# Patient Record
Sex: MALE | Race: WHITE | NOT HISPANIC OR LATINO | Employment: OTHER | URBAN - METROPOLITAN AREA
[De-identification: names, ages, dates, MRNs, and addresses within clinical notes are randomized per-mention and may not be internally consistent; named-entity substitution may affect disease eponyms.]

---

## 2019-01-01 ENCOUNTER — APPOINTMENT (OUTPATIENT)
Dept: LAB | Facility: HOSPITAL | Age: 79
End: 2019-01-01
Attending: NURSE PRACTITIONER
Payer: MEDICARE

## 2019-01-01 ENCOUNTER — HOSPITAL ENCOUNTER (OUTPATIENT)
Dept: RADIOLOGY | Facility: HOSPITAL | Age: 79
Discharge: HOME | End: 2019-03-27
Attending: NURSE PRACTITIONER
Payer: MEDICARE

## 2019-01-01 ENCOUNTER — HOSPITAL ENCOUNTER (OUTPATIENT)
Dept: RADIOLOGY | Facility: HOSPITAL | Age: 79
Discharge: HOME | End: 2019-04-12
Attending: UROLOGY
Payer: MEDICARE

## 2019-01-01 ENCOUNTER — OUTSIDE PLACE OF SERVICE (OUTPATIENT)
Dept: INFECTIOUS DISEASES | Facility: CLINIC | Age: 79
End: 2019-01-01
Payer: MEDICARE

## 2019-01-01 ENCOUNTER — ANESTHESIA EVENT (OUTPATIENT)
Dept: SURGERY | Facility: HOSPITAL | Age: 79
DRG: 853 | End: 2019-01-01
Payer: MEDICARE

## 2019-01-01 ENCOUNTER — OFFICE VISIT (OUTPATIENT)
Dept: UROLOGY | Facility: CLINIC | Age: 79
End: 2019-01-01
Payer: MEDICARE

## 2019-01-01 ENCOUNTER — TELEPHONE (OUTPATIENT)
Dept: MEDSURG UNIT | Facility: HOSPITAL | Age: 79
End: 2019-01-01

## 2019-01-01 ENCOUNTER — ANESTHESIA EVENT (OUTPATIENT)
Dept: ENDOSCOPY | Facility: HOSPITAL | Age: 79
DRG: 377 | End: 2019-01-01
Payer: MEDICARE

## 2019-01-01 ENCOUNTER — HOSPITAL ENCOUNTER (OUTPATIENT)
Dept: PREADMISSION TESTING | Facility: HOSPITAL | Age: 79
Discharge: HOME | End: 2019-04-05
Attending: UROLOGY
Payer: MEDICARE

## 2019-01-01 ENCOUNTER — PATIENT OUTREACH (OUTPATIENT)
Dept: ADMINISTRATIVE | Facility: CLINIC | Age: 79
End: 2019-01-01

## 2019-01-01 ENCOUNTER — ANESTHESIA (OUTPATIENT)
Dept: SURGERY | Facility: HOSPITAL | Age: 79
DRG: 853 | End: 2019-01-01
Payer: MEDICARE

## 2019-01-01 ENCOUNTER — HOSPITAL ENCOUNTER (INPATIENT)
Facility: HOSPITAL | Age: 79
LOS: 9 days | Discharge: NURSING, REHAB OR LONG-TERM CARE | DRG: 853 | End: 2019-08-21
Attending: EMERGENCY MEDICINE | Admitting: HOSPITALIST
Payer: MEDICARE

## 2019-01-01 ENCOUNTER — ANESTHESIA (OUTPATIENT)
Dept: SURGERY | Facility: HOSPITAL | Age: 79
DRG: 291 | End: 2019-01-01
Payer: MEDICARE

## 2019-01-01 ENCOUNTER — TELEPHONE (OUTPATIENT)
Dept: PODIATRY | Facility: CLINIC | Age: 79
End: 2019-01-01

## 2019-01-01 ENCOUNTER — HOSPITAL ENCOUNTER (OUTPATIENT)
Facility: HOSPITAL | Age: 79
Discharge: HOME | End: 2019-04-08
Attending: UROLOGY | Admitting: UROLOGY
Payer: MEDICARE

## 2019-01-01 ENCOUNTER — OUTPATIENT CASE MANAGEMENT (OUTPATIENT)
Dept: ADMINISTRATIVE | Facility: OTHER | Age: 79
End: 2019-01-01

## 2019-01-01 ENCOUNTER — HOSPITAL ENCOUNTER (INPATIENT)
Facility: HOSPITAL | Age: 79
LOS: 13 days | Discharge: HOME HEALTH | DRG: 291 | End: 2019-05-17
Attending: EMERGENCY MEDICINE | Admitting: INTERNAL MEDICINE
Payer: MEDICARE

## 2019-01-01 ENCOUNTER — HOSPITAL ENCOUNTER (INPATIENT)
Facility: HOSPITAL | Age: 79
LOS: 1 days | Discharge: ANOTHER HOSPITAL | DRG: 377 | End: 2019-05-30
Attending: EMERGENCY MEDICINE | Admitting: HOSPITALIST
Payer: MEDICARE

## 2019-01-01 ENCOUNTER — TELEPHONE (OUTPATIENT)
Dept: UROLOGY | Facility: CLINIC | Age: 79
End: 2019-01-01

## 2019-01-01 ENCOUNTER — ANESTHESIA (OUTPATIENT)
Dept: ENDOSCOPY | Facility: HOSPITAL | Age: 79
DRG: 377 | End: 2019-01-01
Payer: MEDICARE

## 2019-01-01 ENCOUNTER — ANESTHESIA (OUTPATIENT)
Dept: SURGERY | Facility: HOSPITAL | Age: 79
End: 2019-01-01
Payer: MEDICARE

## 2019-01-01 ENCOUNTER — CLINICAL SUPPORT (OUTPATIENT)
Dept: UROLOGY | Facility: CLINIC | Age: 79
End: 2019-01-01
Payer: MEDICARE

## 2019-01-01 ENCOUNTER — ANESTHESIA EVENT (OUTPATIENT)
Dept: SURGERY | Facility: HOSPITAL | Age: 79
End: 2019-01-01
Payer: MEDICARE

## 2019-01-01 ENCOUNTER — ANESTHESIA EVENT (OUTPATIENT)
Dept: SURGERY | Facility: HOSPITAL | Age: 79
DRG: 291 | End: 2019-01-01
Payer: MEDICARE

## 2019-01-01 ENCOUNTER — HOSPITAL ENCOUNTER (INPATIENT)
Facility: HOSPITAL | Age: 79
LOS: 6 days | Discharge: NURSING, REHAB OR LONG-TERM CARE | DRG: 871 | End: 2019-07-01
Attending: EMERGENCY MEDICINE | Admitting: HOSPITALIST
Payer: MEDICARE

## 2019-01-01 ENCOUNTER — HOSPITAL ENCOUNTER (INPATIENT)
Facility: HOSPITAL | Age: 79
LOS: 17 days | Discharge: NURSING, REHAB OR LONG-TERM CARE | DRG: 377 | End: 2019-06-17
Attending: INTERNAL MEDICINE | Admitting: INTERNAL MEDICINE
Payer: MEDICARE

## 2019-01-01 VITALS
BODY MASS INDEX: 31.91 KG/M2 | HEART RATE: 101 BPM | OXYGEN SATURATION: 95 % | DIASTOLIC BLOOD PRESSURE: 70 MMHG | SYSTOLIC BLOOD PRESSURE: 104 MMHG | WEIGHT: 248.69 LBS | TEMPERATURE: 98 F | HEIGHT: 74 IN | RESPIRATION RATE: 18 BRPM

## 2019-01-01 VITALS
HEIGHT: 74 IN | SYSTOLIC BLOOD PRESSURE: 119 MMHG | HEART RATE: 55 BPM | TEMPERATURE: 96 F | WEIGHT: 267.88 LBS | OXYGEN SATURATION: 94 % | RESPIRATION RATE: 18 BRPM | DIASTOLIC BLOOD PRESSURE: 50 MMHG | BODY MASS INDEX: 34.38 KG/M2

## 2019-01-01 VITALS
HEIGHT: 74 IN | SYSTOLIC BLOOD PRESSURE: 125 MMHG | RESPIRATION RATE: 18 BRPM | TEMPERATURE: 98 F | HEART RATE: 76 BPM | WEIGHT: 290.38 LBS | BODY MASS INDEX: 37.27 KG/M2 | DIASTOLIC BLOOD PRESSURE: 66 MMHG

## 2019-01-01 VITALS
WEIGHT: 285.06 LBS | BODY MASS INDEX: 37.78 KG/M2 | HEIGHT: 73 IN | TEMPERATURE: 99 F | RESPIRATION RATE: 18 BRPM | DIASTOLIC BLOOD PRESSURE: 74 MMHG | HEART RATE: 76 BPM | SYSTOLIC BLOOD PRESSURE: 131 MMHG

## 2019-01-01 VITALS
DIASTOLIC BLOOD PRESSURE: 56 MMHG | BODY MASS INDEX: 34.07 KG/M2 | WEIGHT: 265.44 LBS | HEIGHT: 74 IN | RESPIRATION RATE: 18 BRPM | HEART RATE: 69 BPM | TEMPERATURE: 99 F | OXYGEN SATURATION: 96 % | SYSTOLIC BLOOD PRESSURE: 105 MMHG

## 2019-01-01 VITALS
DIASTOLIC BLOOD PRESSURE: 57 MMHG | WEIGHT: 267.88 LBS | RESPIRATION RATE: 20 BRPM | HEIGHT: 74 IN | SYSTOLIC BLOOD PRESSURE: 100 MMHG | HEART RATE: 79 BPM | BODY MASS INDEX: 34.38 KG/M2 | OXYGEN SATURATION: 96 % | TEMPERATURE: 98 F

## 2019-01-01 VITALS
SYSTOLIC BLOOD PRESSURE: 108 MMHG | TEMPERATURE: 96 F | HEART RATE: 94 BPM | RESPIRATION RATE: 18 BRPM | WEIGHT: 280 LBS | DIASTOLIC BLOOD PRESSURE: 72 MMHG | OXYGEN SATURATION: 95 % | HEIGHT: 68 IN | BODY MASS INDEX: 42.44 KG/M2

## 2019-01-01 VITALS
SYSTOLIC BLOOD PRESSURE: 153 MMHG | HEART RATE: 63 BPM | WEIGHT: 285 LBS | DIASTOLIC BLOOD PRESSURE: 84 MMHG | BODY MASS INDEX: 37.77 KG/M2 | OXYGEN SATURATION: 98 % | TEMPERATURE: 98 F | RESPIRATION RATE: 16 BRPM | HEIGHT: 73 IN

## 2019-01-01 VITALS
BODY MASS INDEX: 37.27 KG/M2 | WEIGHT: 290.38 LBS | SYSTOLIC BLOOD PRESSURE: 130 MMHG | HEART RATE: 90 BPM | DIASTOLIC BLOOD PRESSURE: 73 MMHG | RESPIRATION RATE: 18 BRPM | HEIGHT: 74 IN | TEMPERATURE: 98 F

## 2019-01-01 DIAGNOSIS — K26.4 GASTROINTESTINAL HEMORRHAGE ASSOCIATED WITH DUODENAL ULCER: ICD-10-CM

## 2019-01-01 DIAGNOSIS — I25.10 CAD (CORONARY ARTERY DISEASE): ICD-10-CM

## 2019-01-01 DIAGNOSIS — R93.89 GU ORGAN IMAGING ABNORMALITY: ICD-10-CM

## 2019-01-01 DIAGNOSIS — L03.119 CELLULITIS OF LOWER EXTREMITY, UNSPECIFIED LATERALITY: ICD-10-CM

## 2019-01-01 DIAGNOSIS — I95.89 HYPOTENSION DUE TO BLOOD LOSS: ICD-10-CM

## 2019-01-01 DIAGNOSIS — C67.9 BLADDER CANCER: ICD-10-CM

## 2019-01-01 DIAGNOSIS — K26.9 DUODENAL ULCER: ICD-10-CM

## 2019-01-01 DIAGNOSIS — K92.2 ACUTE GASTROINTESTINAL BLEEDING: ICD-10-CM

## 2019-01-01 DIAGNOSIS — A41.9 SEPSIS, DUE TO UNSPECIFIED ORGANISM: Primary | ICD-10-CM

## 2019-01-01 DIAGNOSIS — R31.29 MICROSCOPIC HEMATURIA: ICD-10-CM

## 2019-01-01 DIAGNOSIS — I49.9 IRREGULAR CARDIAC RHYTHM: ICD-10-CM

## 2019-01-01 DIAGNOSIS — R06.02 SOB (SHORTNESS OF BREATH): ICD-10-CM

## 2019-01-01 DIAGNOSIS — C67.9 MALIGNANT NEOPLASM OF URINARY BLADDER, UNSPECIFIED SITE: ICD-10-CM

## 2019-01-01 DIAGNOSIS — I50.9 CONGESTIVE HEART FAILURE, UNSPECIFIED HF CHRONICITY, UNSPECIFIED HEART FAILURE TYPE: Primary | ICD-10-CM

## 2019-01-01 DIAGNOSIS — I48.91 A-FIB: ICD-10-CM

## 2019-01-01 DIAGNOSIS — I46.9 CARDIAC ARREST: ICD-10-CM

## 2019-01-01 DIAGNOSIS — C67.9 MALIGNANT NEOPLASM OF URINARY BLADDER, UNSPECIFIED SITE: Primary | ICD-10-CM

## 2019-01-01 DIAGNOSIS — I50.9 CHF (CONGESTIVE HEART FAILURE): ICD-10-CM

## 2019-01-01 DIAGNOSIS — I48.91 ATRIAL FIBRILLATION, UNSPECIFIED TYPE: ICD-10-CM

## 2019-01-01 DIAGNOSIS — R31.0 GROSS HEMATURIA: ICD-10-CM

## 2019-01-01 DIAGNOSIS — I50.9 HEART FAILURE: ICD-10-CM

## 2019-01-01 DIAGNOSIS — E78.2 DM TYPE 2 WITH DIABETIC MIXED HYPERLIPIDEMIA: ICD-10-CM

## 2019-01-01 DIAGNOSIS — S91.302D OPEN WOUND OF LEFT HEEL, SUBSEQUENT ENCOUNTER: ICD-10-CM

## 2019-01-01 DIAGNOSIS — N18.6 ESRD (END STAGE RENAL DISEASE) ON DIALYSIS: ICD-10-CM

## 2019-01-01 DIAGNOSIS — R65.20 SEVERE SEPSIS: ICD-10-CM

## 2019-01-01 DIAGNOSIS — I50.23 ACUTE ON CHRONIC SYSTOLIC CONGESTIVE HEART FAILURE: ICD-10-CM

## 2019-01-01 DIAGNOSIS — R07.9 CHEST PAIN: ICD-10-CM

## 2019-01-01 DIAGNOSIS — Z01.818 PRE-OP EXAM: ICD-10-CM

## 2019-01-01 DIAGNOSIS — Z97.8 PRESENCE OF INDWELLING FOLEY CATHETER: Primary | ICD-10-CM

## 2019-01-01 DIAGNOSIS — N39.0 URINARY TRACT INFECTION WITHOUT HEMATURIA, SITE UNSPECIFIED: ICD-10-CM

## 2019-01-01 DIAGNOSIS — E11.69 DM TYPE 2 WITH DIABETIC MIXED HYPERLIPIDEMIA: ICD-10-CM

## 2019-01-01 DIAGNOSIS — I15.0 RENOVASCULAR HYPERTENSION: ICD-10-CM

## 2019-01-01 DIAGNOSIS — I48.91 NEW ONSET ATRIAL FIBRILLATION: ICD-10-CM

## 2019-01-01 DIAGNOSIS — C67.2 MALIGNANT NEOPLASM OF LATERAL WALL OF URINARY BLADDER: Primary | ICD-10-CM

## 2019-01-01 DIAGNOSIS — N18.6 ESRD (END STAGE RENAL DISEASE): Primary | ICD-10-CM

## 2019-01-01 DIAGNOSIS — L03.116 CELLULITIS OF LEFT LOWER EXTREMITY: ICD-10-CM

## 2019-01-01 DIAGNOSIS — K92.2 GIB (GASTROINTESTINAL BLEEDING): ICD-10-CM

## 2019-01-01 DIAGNOSIS — I96 GANGRENE OF LEFT FOOT: ICD-10-CM

## 2019-01-01 DIAGNOSIS — Z85.51 PERSONAL HISTORY OF BLADDER CANCER: ICD-10-CM

## 2019-01-01 DIAGNOSIS — I95.9 HYPOTENSION: ICD-10-CM

## 2019-01-01 DIAGNOSIS — Z99.2 ESRD (END STAGE RENAL DISEASE) ON DIALYSIS: ICD-10-CM

## 2019-01-01 DIAGNOSIS — I50.9 HEART FAILURE, UNSPECIFIED HF CHRONICITY, UNSPECIFIED HEART FAILURE TYPE: ICD-10-CM

## 2019-01-01 DIAGNOSIS — R58 HYPOTENSION DUE TO BLOOD LOSS: ICD-10-CM

## 2019-01-01 DIAGNOSIS — J96.02 ACUTE HYPERCAPNIC RESPIRATORY FAILURE: ICD-10-CM

## 2019-01-01 DIAGNOSIS — R79.89 ELEVATED TROPONIN I LEVEL: ICD-10-CM

## 2019-01-01 DIAGNOSIS — I25.10 CORONARY ARTERY DISEASE, ANGINA PRESENCE UNSPECIFIED, UNSPECIFIED VESSEL OR LESION TYPE, UNSPECIFIED WHETHER NATIVE OR TRANSPLANTED HEART: ICD-10-CM

## 2019-01-01 DIAGNOSIS — I21.4 NSTEMI (NON-ST ELEVATED MYOCARDIAL INFARCTION): ICD-10-CM

## 2019-01-01 DIAGNOSIS — R00.0 TACHYCARDIA: ICD-10-CM

## 2019-01-01 DIAGNOSIS — I50.33 ACUTE ON CHRONIC DIASTOLIC CONGESTIVE HEART FAILURE: ICD-10-CM

## 2019-01-01 DIAGNOSIS — N17.9 AKI (ACUTE KIDNEY INJURY): ICD-10-CM

## 2019-01-01 DIAGNOSIS — J44.9 CHRONIC OBSTRUCTIVE PULMONARY DISEASE, UNSPECIFIED COPD TYPE: ICD-10-CM

## 2019-01-01 DIAGNOSIS — A41.9 SEVERE SEPSIS: ICD-10-CM

## 2019-01-01 DIAGNOSIS — J96.02 ACUTE HYPERCAPNIC RESPIRATORY FAILURE: Primary | ICD-10-CM

## 2019-01-01 DIAGNOSIS — R31.0 HEMATURIA, GROSS: Primary | ICD-10-CM

## 2019-01-01 LAB
ABO + RH BLD: NORMAL
ALBUMIN SERPL BCP-MCNC: 2 G/DL (ref 3.5–5.2)
ALBUMIN SERPL BCP-MCNC: 2.1 G/DL (ref 3.5–5.2)
ALBUMIN SERPL BCP-MCNC: 2.2 G/DL (ref 3.5–5.2)
ALBUMIN SERPL BCP-MCNC: 2.3 G/DL (ref 3.5–5.2)
ALBUMIN SERPL BCP-MCNC: 2.4 G/DL (ref 3.5–5.2)
ALBUMIN SERPL BCP-MCNC: 2.5 G/DL (ref 3.5–5.2)
ALBUMIN SERPL BCP-MCNC: 2.6 G/DL (ref 3.5–5.2)
ALBUMIN SERPL BCP-MCNC: 2.7 G/DL (ref 3.5–5.2)
ALBUMIN SERPL BCP-MCNC: 2.8 G/DL (ref 3.5–5.2)
ALBUMIN SERPL BCP-MCNC: 2.9 G/DL (ref 3.5–5.2)
ALBUMIN SERPL BCP-MCNC: 3 G/DL (ref 3.5–5.2)
ALBUMIN SERPL BCP-MCNC: 3.2 G/DL (ref 3.5–5.2)
ALLENS TEST: ABNORMAL
ALLENS TEST: NORMAL
ALP SERPL-CCNC: 102 U/L (ref 55–135)
ALP SERPL-CCNC: 104 U/L (ref 55–135)
ALP SERPL-CCNC: 105 U/L (ref 55–135)
ALP SERPL-CCNC: 106 U/L (ref 55–135)
ALP SERPL-CCNC: 112 U/L (ref 55–135)
ALP SERPL-CCNC: 115 U/L (ref 55–135)
ALP SERPL-CCNC: 116 U/L (ref 55–135)
ALP SERPL-CCNC: 116 U/L (ref 55–135)
ALP SERPL-CCNC: 131 U/L (ref 55–135)
ALP SERPL-CCNC: 134 U/L (ref 55–135)
ALP SERPL-CCNC: 136 U/L (ref 55–135)
ALP SERPL-CCNC: 138 U/L (ref 55–135)
ALP SERPL-CCNC: 138 U/L (ref 55–135)
ALP SERPL-CCNC: 141 U/L (ref 55–135)
ALP SERPL-CCNC: 151 U/L (ref 55–135)
ALP SERPL-CCNC: 227 U/L (ref 55–135)
ALP SERPL-CCNC: 369 U/L (ref 55–135)
ALP SERPL-CCNC: 65 U/L (ref 55–135)
ALP SERPL-CCNC: 65 U/L (ref 55–135)
ALP SERPL-CCNC: 67 U/L (ref 55–135)
ALP SERPL-CCNC: 74 U/L (ref 55–135)
ALP SERPL-CCNC: 81 U/L (ref 55–135)
ALP SERPL-CCNC: 82 U/L (ref 55–135)
ALP SERPL-CCNC: 84 U/L (ref 55–135)
ALP SERPL-CCNC: 90 U/L (ref 55–135)
ALP SERPL-CCNC: 92 U/L (ref 55–135)
ALP SERPL-CCNC: 94 U/L (ref 55–135)
ALP SERPL-CCNC: 95 U/L (ref 55–135)
ALP SERPL-CCNC: 96 U/L (ref 55–135)
ALP SERPL-CCNC: 99 U/L (ref 55–135)
ALT SERPL W/O P-5'-P-CCNC: 10 U/L (ref 10–44)
ALT SERPL W/O P-5'-P-CCNC: 11 U/L (ref 10–44)
ALT SERPL W/O P-5'-P-CCNC: 11 U/L (ref 10–44)
ALT SERPL W/O P-5'-P-CCNC: 12 U/L (ref 10–44)
ALT SERPL W/O P-5'-P-CCNC: 12 U/L (ref 10–44)
ALT SERPL W/O P-5'-P-CCNC: 13 U/L (ref 10–44)
ALT SERPL W/O P-5'-P-CCNC: 14 U/L (ref 10–44)
ALT SERPL W/O P-5'-P-CCNC: 15 U/L (ref 10–44)
ALT SERPL W/O P-5'-P-CCNC: 16 U/L (ref 10–44)
ALT SERPL W/O P-5'-P-CCNC: 17 U/L (ref 10–44)
ALT SERPL W/O P-5'-P-CCNC: 19 U/L (ref 10–44)
ALT SERPL W/O P-5'-P-CCNC: 21 U/L (ref 10–44)
ALT SERPL W/O P-5'-P-CCNC: 37 U/L (ref 10–44)
ALT SERPL W/O P-5'-P-CCNC: 7 U/L (ref 10–44)
ALT SERPL W/O P-5'-P-CCNC: 8 U/L (ref 10–44)
ALT SERPL W/O P-5'-P-CCNC: 9 U/L (ref 10–44)
ANION GAP SERPL CALC-SCNC: 10 MMOL/L (ref 8–16)
ANION GAP SERPL CALC-SCNC: 11 MMOL/L (ref 8–16)
ANION GAP SERPL CALC-SCNC: 12 MMOL/L (ref 8–16)
ANION GAP SERPL CALC-SCNC: 13 MMOL/L (ref 8–16)
ANION GAP SERPL CALC-SCNC: 14 MMOL/L (ref 8–16)
ANION GAP SERPL CALC-SCNC: 15 MMOL/L (ref 8–16)
ANION GAP SERPL CALC-SCNC: 16 MMOL/L (ref 8–16)
ANION GAP SERPL CALC-SCNC: 18 MMOL/L (ref 8–16)
ANION GAP SERPL CALC-SCNC: 6 MMOL/L (ref 8–16)
ANION GAP SERPL CALC-SCNC: 7 MMOL/L (ref 8–16)
ANION GAP SERPL CALC-SCNC: 8 MMOL/L (ref 8–16)
ANION GAP SERPL CALC-SCNC: 9 MMOL/L (ref 8–16)
ANISOCYTOSIS BLD QL SMEAR: SLIGHT
AORTIC ROOT ANNULUS: 3.38 CM
AORTIC VALVE CUSP SEPERATION: 2.08 CM
APTT BLDCRRT: 24.5 SEC (ref 21–32)
APTT BLDCRRT: 27.1 SEC (ref 21–32)
APTT BLDCRRT: 30.1 SEC (ref 21–32)
ASCENDING AORTA: 3.37 CM
AST SERPL-CCNC: 10 U/L (ref 10–40)
AST SERPL-CCNC: 11 U/L (ref 10–40)
AST SERPL-CCNC: 11 U/L (ref 10–40)
AST SERPL-CCNC: 12 U/L (ref 10–40)
AST SERPL-CCNC: 13 U/L (ref 10–40)
AST SERPL-CCNC: 14 U/L (ref 10–40)
AST SERPL-CCNC: 14 U/L (ref 10–40)
AST SERPL-CCNC: 15 U/L (ref 10–40)
AST SERPL-CCNC: 16 U/L (ref 10–40)
AST SERPL-CCNC: 17 U/L (ref 10–40)
AST SERPL-CCNC: 17 U/L (ref 10–40)
AST SERPL-CCNC: 18 U/L (ref 10–40)
AST SERPL-CCNC: 18 U/L (ref 10–40)
AST SERPL-CCNC: 19 U/L (ref 10–40)
AST SERPL-CCNC: 20 U/L (ref 10–40)
AST SERPL-CCNC: 20 U/L (ref 10–40)
AST SERPL-CCNC: 21 U/L (ref 10–40)
AST SERPL-CCNC: 22 U/L (ref 10–40)
AST SERPL-CCNC: 27 U/L (ref 10–40)
AST SERPL-CCNC: 36 U/L (ref 10–40)
AST SERPL-CCNC: 9 U/L (ref 10–40)
AV INDEX (PROSTH): 0.5
AV INDEX (PROSTH): 0.98
AV MEAN GRADIENT: 2.97 MMHG
AV MEAN GRADIENT: 4.15 MMHG
AV PEAK GRADIENT: 4.33 MMHG
AV PEAK GRADIENT: 6.55 MMHG
AV VALVE AREA: 2.64 CM2
AV VALVE AREA: 3.59 CM2
AV VELOCITY RATIO: 0.48
AV VELOCITY RATIO: 0.9
BACTERIA #/AREA URNS AUTO: ABNORMAL /HPF
BACTERIA #/AREA URNS HPF: ABNORMAL /HPF
BACTERIA #/AREA URNS HPF: ABNORMAL /HPF
BACTERIA BLD CULT: ABNORMAL
BACTERIA BLD CULT: NORMAL
BACTERIA SPEC AEROBE CULT: ABNORMAL
BACTERIA SPEC AEROBE CULT: NORMAL
BACTERIA SPEC AEROBE CULT: NORMAL
BACTERIA SPEC ANAEROBE CULT: NORMAL
BACTERIA UR CULT: NO GROWTH
BACTERIA UR CULT: NORMAL
BACTERIA UR CULT: NORMAL
BASO STIPL BLD QL SMEAR: ABNORMAL
BASOPHILS # BLD AUTO: 0 K/UL (ref 0–0.2)
BASOPHILS # BLD AUTO: 0.01 K/UL (ref 0–0.2)
BASOPHILS # BLD AUTO: 0.02 K/UL (ref 0–0.2)
BASOPHILS # BLD AUTO: 0.03 K/UL (ref 0–0.2)
BASOPHILS # BLD AUTO: 0.04 K/UL (ref 0–0.2)
BASOPHILS # BLD AUTO: 0.05 K/UL (ref 0–0.2)
BASOPHILS # BLD AUTO: 0.06 K/UL (ref 0–0.2)
BASOPHILS # BLD AUTO: 0.07 K/UL (ref 0–0.2)
BASOPHILS # BLD AUTO: 0.08 K/UL (ref 0–0.2)
BASOPHILS # BLD AUTO: 0.09 K/UL (ref 0–0.2)
BASOPHILS # BLD AUTO: 0.09 K/UL (ref 0–0.2)
BASOPHILS # BLD AUTO: 0.1 K/UL (ref 0–0.2)
BASOPHILS # BLD AUTO: 0.1 K/UL (ref 0–0.2)
BASOPHILS # BLD AUTO: 0.12 K/UL (ref 0–0.2)
BASOPHILS # BLD AUTO: 0.13 K/UL (ref 0–0.2)
BASOPHILS # BLD AUTO: 0.16 K/UL (ref 0–0.2)
BASOPHILS # BLD AUTO: ABNORMAL K/UL (ref 0–0.2)
BASOPHILS NFR BLD: 0 % (ref 0–1.9)
BASOPHILS NFR BLD: 0.1 % (ref 0–1.9)
BASOPHILS NFR BLD: 0.2 % (ref 0–1.9)
BASOPHILS NFR BLD: 0.3 % (ref 0–1.9)
BASOPHILS NFR BLD: 0.4 % (ref 0–1.9)
BASOPHILS NFR BLD: 0.5 % (ref 0–1.9)
BASOPHILS NFR BLD: 0.6 % (ref 0–1.9)
BASOPHILS NFR BLD: 0.7 % (ref 0–1.9)
BASOPHILS NFR BLD: 0.7 % (ref 0–1.9)
BASOPHILS NFR BLD: 0.8 % (ref 0–1.9)
BASOPHILS NFR BLD: 0.8 % (ref 0–1.9)
BASOPHILS NFR BLD: 1 % (ref 0–1.9)
BILIRUB SERPL-MCNC: 0.2 MG/DL (ref 0.1–1)
BILIRUB SERPL-MCNC: 0.3 MG/DL (ref 0.1–1)
BILIRUB SERPL-MCNC: 0.4 MG/DL (ref 0.1–1)
BILIRUB SERPL-MCNC: 0.5 MG/DL (ref 0.1–1)
BILIRUB SERPL-MCNC: 0.6 MG/DL (ref 0.1–1)
BILIRUB SERPL-MCNC: 0.7 MG/DL (ref 0.1–1)
BILIRUB SERPL-MCNC: 0.7 MG/DL (ref 0.1–1)
BILIRUB SERPL-MCNC: 0.8 MG/DL (ref 0.1–1)
BILIRUB SERPL-MCNC: 0.9 MG/DL (ref 0.1–1)
BILIRUB SERPL-MCNC: ABNORMAL MG/DL
BILIRUB UR QL STRIP: ABNORMAL
BILIRUB UR QL STRIP: ABNORMAL
BILIRUB UR QL STRIP: NEGATIVE
BLD GP AB SCN CELLS X3 SERPL QL: NORMAL
BLD PROD TYP BPU: NORMAL
BLOOD UNIT EXPIRATION DATE: NORMAL
BLOOD UNIT TYPE CODE: 5100
BLOOD UNIT TYPE: NORMAL
BLOOD URINE, POC: ABNORMAL
BNP SERPL-MCNC: 2577 PG/ML (ref 0–99)
BNP SERPL-MCNC: 3184 PG/ML (ref 0–99)
BNP SERPL-MCNC: 552 PG/ML (ref 0–99)
BSA FOR ECHO PROCEDURE: 2.56 M2
BSA FOR ECHO PROCEDURE: 2.65 M2
BUN SERPL-MCNC: 11 MG/DL (ref 8–23)
BUN SERPL-MCNC: 11 MG/DL (ref 8–23)
BUN SERPL-MCNC: 13 MG/DL (ref 8–23)
BUN SERPL-MCNC: 15 MG/DL (ref 8–23)
BUN SERPL-MCNC: 15 MG/DL (ref 8–23)
BUN SERPL-MCNC: 17 MG/DL (ref 8–23)
BUN SERPL-MCNC: 19 MG/DL (ref 8–23)
BUN SERPL-MCNC: 21 MG/DL (ref 8–23)
BUN SERPL-MCNC: 22 MG/DL (ref 8–23)
BUN SERPL-MCNC: 22 MG/DL (ref 8–23)
BUN SERPL-MCNC: 23 MG/DL (ref 8–23)
BUN SERPL-MCNC: 26 MG/DL (ref 8–23)
BUN SERPL-MCNC: 27 MG/DL (ref 8–23)
BUN SERPL-MCNC: 29 MG/DL (ref 8–23)
BUN SERPL-MCNC: 30 MG/DL (ref 8–23)
BUN SERPL-MCNC: 32 MG/DL (ref 8–23)
BUN SERPL-MCNC: 33 MG/DL (ref 8–23)
BUN SERPL-MCNC: 34 MG/DL (ref 8–23)
BUN SERPL-MCNC: 34 MG/DL (ref 8–23)
BUN SERPL-MCNC: 35 MG/DL (ref 8–23)
BUN SERPL-MCNC: 36 MG/DL (ref 8–23)
BUN SERPL-MCNC: 37 MG/DL (ref 8–23)
BUN SERPL-MCNC: 37 MG/DL (ref 8–23)
BUN SERPL-MCNC: 39 MG/DL (ref 8–23)
BUN SERPL-MCNC: 39 MG/DL (ref 8–23)
BUN SERPL-MCNC: 40 MG/DL (ref 8–23)
BUN SERPL-MCNC: 41 MG/DL (ref 8–23)
BUN SERPL-MCNC: 42 MG/DL (ref 8–23)
BUN SERPL-MCNC: 43 MG/DL (ref 8–23)
BUN SERPL-MCNC: 44 MG/DL (ref 8–23)
BUN SERPL-MCNC: 49 MG/DL (ref 8–23)
BUN SERPL-MCNC: 5 MG/DL (ref 8–23)
BUN SERPL-MCNC: 50 MG/DL (ref 8–23)
BUN SERPL-MCNC: 53 MG/DL (ref 8–23)
BUN SERPL-MCNC: 53 MG/DL (ref 8–23)
BUN SERPL-MCNC: 54 MG/DL (ref 8–23)
BUN SERPL-MCNC: 55 MG/DL (ref 8–23)
BUN SERPL-MCNC: 58 MG/DL (ref 8–23)
BUN SERPL-MCNC: 59 MG/DL (ref 8–23)
BUN SERPL-MCNC: 61 MG/DL (ref 8–23)
BUN SERPL-MCNC: 62 MG/DL (ref 8–23)
BUN SERPL-MCNC: 63 MG/DL (ref 8–23)
BUN SERPL-MCNC: 67 MG/DL (ref 8–23)
BUN SERPL-MCNC: 71 MG/DL (ref 8–23)
BUN SERPL-MCNC: 72 MG/DL (ref 8–23)
BUN SERPL-MCNC: 76 MG/DL (ref 8–23)
BUN SERPL-MCNC: 77 MG/DL (ref 8–23)
BUN SERPL-MCNC: 77 MG/DL (ref 8–23)
BUN SERPL-MCNC: 80 MG/DL (ref 8–23)
BUN SERPL-MCNC: 87 MG/DL (ref 8–23)
BUN SERPL-MCNC: 9 MG/DL (ref 8–23)
BUN SERPL-MCNC: 94 MG/DL (ref 8–23)
BURR CELLS BLD QL SMEAR: ABNORMAL
C DIFF GDH STL QL: NEGATIVE
C DIFF TOX A+B STL QL IA: NEGATIVE
CALCIUM SERPL-MCNC: 7.6 MG/DL (ref 8.7–10.5)
CALCIUM SERPL-MCNC: 7.7 MG/DL (ref 8.7–10.5)
CALCIUM SERPL-MCNC: 7.8 MG/DL (ref 8.7–10.5)
CALCIUM SERPL-MCNC: 8 MG/DL (ref 8.7–10.5)
CALCIUM SERPL-MCNC: 8.1 MG/DL (ref 8.7–10.5)
CALCIUM SERPL-MCNC: 8.2 MG/DL (ref 8.7–10.5)
CALCIUM SERPL-MCNC: 8.2 MG/DL (ref 8.7–10.5)
CALCIUM SERPL-MCNC: 8.3 MG/DL (ref 8.7–10.5)
CALCIUM SERPL-MCNC: 8.4 MG/DL (ref 8.7–10.5)
CALCIUM SERPL-MCNC: 8.5 MG/DL (ref 8.7–10.5)
CALCIUM SERPL-MCNC: 8.6 MG/DL (ref 8.7–10.5)
CALCIUM SERPL-MCNC: 8.7 MG/DL (ref 8.7–10.5)
CALCIUM SERPL-MCNC: 8.8 MG/DL (ref 8.7–10.5)
CALCIUM SERPL-MCNC: 8.9 MG/DL (ref 8.7–10.5)
CALCIUM SERPL-MCNC: 9 MG/DL (ref 8.7–10.5)
CALCIUM SERPL-MCNC: 9.1 MG/DL (ref 8.7–10.5)
CALCIUM SERPL-MCNC: 9.3 MG/DL (ref 8.7–10.5)
CALCIUM SERPL-MCNC: 9.5 MG/DL (ref 8.7–10.5)
CALCIUM SERPL-MCNC: 9.6 MG/DL (ref 8.7–10.5)
CHLORIDE SERPL-SCNC: 101 MMOL/L (ref 95–110)
CHLORIDE SERPL-SCNC: 102 MMOL/L (ref 95–110)
CHLORIDE SERPL-SCNC: 103 MMOL/L (ref 95–110)
CHLORIDE SERPL-SCNC: 104 MMOL/L (ref 95–110)
CHLORIDE SERPL-SCNC: 105 MMOL/L (ref 95–110)
CHLORIDE SERPL-SCNC: 106 MMOL/L (ref 95–110)
CHLORIDE SERPL-SCNC: 107 MMOL/L (ref 95–110)
CHLORIDE SERPL-SCNC: 108 MMOL/L (ref 95–110)
CHLORIDE SERPL-SCNC: 109 MMOL/L (ref 95–110)
CHLORIDE SERPL-SCNC: 96 MMOL/L (ref 95–110)
CHLORIDE SERPL-SCNC: 97 MMOL/L (ref 95–110)
CHLORIDE SERPL-SCNC: 98 MMOL/L (ref 95–110)
CHLORIDE SERPL-SCNC: 99 MMOL/L (ref 95–110)
CHOLEST SERPL-MCNC: 104 MG/DL (ref 120–199)
CHOLEST/HDLC SERPL: 4.3 {RATIO} (ref 2–5)
CLARITY UR REFRACT.AUTO: ABNORMAL
CLARITY UR: ABNORMAL
CLARITY UR: ABNORMAL
CO2 SERPL-SCNC: 12 MMOL/L (ref 23–29)
CO2 SERPL-SCNC: 17 MMOL/L (ref 23–29)
CO2 SERPL-SCNC: 18 MMOL/L (ref 23–29)
CO2 SERPL-SCNC: 18 MMOL/L (ref 23–29)
CO2 SERPL-SCNC: 19 MMOL/L (ref 23–29)
CO2 SERPL-SCNC: 20 MMOL/L (ref 23–29)
CO2 SERPL-SCNC: 21 MMOL/L (ref 23–29)
CO2 SERPL-SCNC: 22 MMOL/L (ref 23–29)
CO2 SERPL-SCNC: 23 MMOL/L (ref 23–29)
CO2 SERPL-SCNC: 24 MMOL/L (ref 23–29)
CO2 SERPL-SCNC: 25 MMOL/L (ref 23–29)
CO2 SERPL-SCNC: 26 MMOL/L (ref 23–29)
CO2 SERPL-SCNC: 27 MMOL/L (ref 23–29)
CO2 SERPL-SCNC: 27 MMOL/L (ref 23–29)
CO2 SERPL-SCNC: 28 MMOL/L (ref 23–29)
CO2 SERPL-SCNC: 29 MMOL/L (ref 23–29)
CO2 SERPL-SCNC: 29 MMOL/L (ref 23–29)
CO2 SERPL-SCNC: 30 MMOL/L (ref 23–29)
CO2 SERPL-SCNC: 32 MMOL/L (ref 23–29)
CO2 SERPL-SCNC: 32 MMOL/L (ref 23–29)
CODING SYSTEM: NORMAL
COLOR UR AUTO: ABNORMAL
COLOR UR: ABNORMAL
COLOR UR: YELLOW
COLOR, POC UA: ABNORMAL
CREAT SERPL-MCNC: 1.2 MG/DL (ref 0.5–1.4)
CREAT SERPL-MCNC: 1.5 MG/DL (ref 0.5–1.4)
CREAT SERPL-MCNC: 1.5 MG/DL (ref 0.5–1.4)
CREAT SERPL-MCNC: 2 MG/DL (ref 0.5–1.4)
CREAT SERPL-MCNC: 2.1 MG/DL (ref 0.5–1.4)
CREAT SERPL-MCNC: 2.4 MG/DL (ref 0.5–1.4)
CREAT SERPL-MCNC: 2.5 MG/DL (ref 0.5–1.4)
CREAT SERPL-MCNC: 2.6 MG/DL (ref 0.5–1.4)
CREAT SERPL-MCNC: 2.7 MG/DL (ref 0.5–1.4)
CREAT SERPL-MCNC: 2.8 MG/DL (ref 0.5–1.4)
CREAT SERPL-MCNC: 3.1 MG/DL (ref 0.5–1.4)
CREAT SERPL-MCNC: 3.1 MG/DL (ref 0.5–1.4)
CREAT SERPL-MCNC: 3.2 MG/DL (ref 0.5–1.4)
CREAT SERPL-MCNC: 3.3 MG/DL (ref 0.5–1.4)
CREAT SERPL-MCNC: 3.3 MG/DL (ref 0.5–1.4)
CREAT SERPL-MCNC: 3.4 MG/DL (ref 0.5–1.4)
CREAT SERPL-MCNC: 3.5 MG/DL (ref 0.5–1.4)
CREAT SERPL-MCNC: 3.5 MG/DL (ref 0.5–1.4)
CREAT SERPL-MCNC: 3.6 MG/DL (ref 0.5–1.4)
CREAT SERPL-MCNC: 3.9 MG/DL (ref 0.5–1.4)
CREAT SERPL-MCNC: 4 MG/DL (ref 0.5–1.4)
CREAT SERPL-MCNC: 4.1 MG/DL (ref 0.5–1.4)
CREAT SERPL-MCNC: 4.2 MG/DL (ref 0.5–1.4)
CREAT SERPL-MCNC: 4.2 MG/DL (ref 0.5–1.4)
CREAT SERPL-MCNC: 4.3 MG/DL (ref 0.5–1.4)
CREAT SERPL-MCNC: 4.3 MG/DL (ref 0.5–1.4)
CREAT SERPL-MCNC: 4.5 MG/DL (ref 0.5–1.4)
CREAT SERPL-MCNC: 4.6 MG/DL (ref 0.5–1.4)
CREAT SERPL-MCNC: 4.7 MG/DL (ref 0.5–1.4)
CREAT SERPL-MCNC: 4.8 MG/DL (ref 0.5–1.4)
CREAT SERPL-MCNC: 4.9 MG/DL (ref 0.5–1.4)
CREAT SERPL-MCNC: 5.1 MG/DL (ref 0.5–1.4)
CREAT SERPL-MCNC: 5.2 MG/DL (ref 0.5–1.4)
CREAT SERPL-MCNC: 5.4 MG/DL (ref 0.5–1.4)
CREAT SERPL-MCNC: 5.5 MG/DL (ref 0.5–1.4)
CREAT SERPL-MCNC: 5.6 MG/DL (ref 0.5–1.4)
CREAT SERPL-MCNC: 5.6 MG/DL (ref 0.5–1.4)
CREAT SERPL-MCNC: 5.7 MG/DL (ref 0.5–1.4)
CREAT SERPL-MCNC: 5.8 MG/DL (ref 0.5–1.4)
CREAT SERPL-MCNC: 5.9 MG/DL (ref 0.5–1.4)
CREAT SERPL-MCNC: 6 MG/DL (ref 0.5–1.4)
CREAT SERPL-MCNC: 6.2 MG/DL (ref 0.5–1.4)
CREAT SERPL-MCNC: 6.3 MG/DL (ref 0.5–1.4)
CREAT SERPL-MCNC: 6.4 MG/DL (ref 0.5–1.4)
CREAT SERPL-MCNC: 6.5 MG/DL (ref 0.5–1.4)
CREAT SERPL-MCNC: 6.6 MG/DL (ref 0.5–1.4)
CREAT SERPL-MCNC: 6.7 MG/DL (ref 0.5–1.4)
CREAT SERPL-MCNC: 7.1 MG/DL (ref 0.5–1.4)
CV ECHO LV RWT: 0.23 CM
CV ECHO LV RWT: 0.28 CM
DACRYOCYTES BLD QL SMEAR: ABNORMAL
DELSYS: ABNORMAL
DELSYS: NORMAL
DIFFERENTIAL METHOD: ABNORMAL
DISPENSE STATUS: NORMAL
DOHLE BOD BLD QL SMEAR: PRESENT
DOP CALC AO PEAK VEL: 1.04 M/S
DOP CALC AO PEAK VEL: 1.28 M/S
DOP CALC AO VTI: 21.62 CM
DOP CALC AO VTI: 22.86 CM
DOP CALC LVOT AREA: 3.66 CM2
DOP CALC LVOT AREA: 5.31 CM2
DOP CALC LVOT DIAMETER: 2.16 CM
DOP CALC LVOT DIAMETER: 2.6 CM
DOP CALC LVOT PEAK VEL: 0.62 M/S
DOP CALC LVOT PEAK VEL: 0.94 M/S
DOP CALC LVOT STROKE VOLUME: 60.44 CM3
DOP CALC LVOT STROKE VOLUME: 77.68 CM3
DOP CALCLVOT PEAK VEL VTI: 11.39 CM
DOP CALCLVOT PEAK VEL VTI: 21.21 CM
E WAVE DECELERATION TIME: 132.79 MSEC
E WAVE DECELERATION TIME: 189.01 MSEC
E/A RATIO: 1.26
E/A RATIO: 2.12
E/E' RATIO: 14.31
E/E' RATIO: 21.17
ECHO LV POSTERIOR WALL: 0.77 CM (ref 0.6–1.1)
ECHO LV POSTERIOR WALL: 0.85 CM (ref 0.6–1.1)
EOSINOPHIL # BLD AUTO: 0 K/UL (ref 0–0.5)
EOSINOPHIL # BLD AUTO: 0.1 K/UL (ref 0–0.5)
EOSINOPHIL # BLD AUTO: 0.2 K/UL (ref 0–0.5)
EOSINOPHIL # BLD AUTO: 0.3 K/UL (ref 0–0.5)
EOSINOPHIL # BLD AUTO: 0.3 K/UL (ref 0–0.5)
EOSINOPHIL # BLD AUTO: ABNORMAL K/UL (ref 0–0.5)
EOSINOPHIL NFR BLD: 0 % (ref 0–8)
EOSINOPHIL NFR BLD: 0.1 % (ref 0–8)
EOSINOPHIL NFR BLD: 0.1 % (ref 0–8)
EOSINOPHIL NFR BLD: 0.2 % (ref 0–8)
EOSINOPHIL NFR BLD: 0.3 % (ref 0–8)
EOSINOPHIL NFR BLD: 0.3 % (ref 0–8)
EOSINOPHIL NFR BLD: 0.4 % (ref 0–8)
EOSINOPHIL NFR BLD: 0.5 % (ref 0–8)
EOSINOPHIL NFR BLD: 0.5 % (ref 0–8)
EOSINOPHIL NFR BLD: 0.6 % (ref 0–8)
EOSINOPHIL NFR BLD: 0.7 % (ref 0–8)
EOSINOPHIL NFR BLD: 0.8 % (ref 0–8)
EOSINOPHIL NFR BLD: 0.9 % (ref 0–8)
EOSINOPHIL NFR BLD: 1 % (ref 0–8)
EOSINOPHIL NFR BLD: 1.1 % (ref 0–8)
EOSINOPHIL NFR BLD: 1.2 % (ref 0–8)
EOSINOPHIL NFR BLD: 1.3 % (ref 0–8)
EOSINOPHIL NFR BLD: 1.3 % (ref 0–8)
EOSINOPHIL NFR BLD: 1.4 % (ref 0–8)
EOSINOPHIL NFR BLD: 1.4 % (ref 0–8)
EOSINOPHIL NFR BLD: 1.5 % (ref 0–8)
EOSINOPHIL NFR BLD: 1.6 % (ref 0–8)
EOSINOPHIL NFR BLD: 1.7 % (ref 0–8)
EOSINOPHIL NFR BLD: 1.8 % (ref 0–8)
EOSINOPHIL NFR BLD: 2 % (ref 0–8)
EOSINOPHIL NFR BLD: 3 % (ref 0–8)
EOSINOPHIL NFR BLD: 3 % (ref 0–8)
EP: 5
EP: 7
ERYTHROCYTE [DISTWIDTH] IN BLOOD BY AUTOMATED COUNT: 16.5 % (ref 11.5–14.5)
ERYTHROCYTE [DISTWIDTH] IN BLOOD BY AUTOMATED COUNT: 16.6 % (ref 11.5–14.5)
ERYTHROCYTE [DISTWIDTH] IN BLOOD BY AUTOMATED COUNT: 16.8 % (ref 11.5–14.5)
ERYTHROCYTE [DISTWIDTH] IN BLOOD BY AUTOMATED COUNT: 16.9 % (ref 11.5–14.5)
ERYTHROCYTE [DISTWIDTH] IN BLOOD BY AUTOMATED COUNT: 16.9 % (ref 11.5–14.5)
ERYTHROCYTE [DISTWIDTH] IN BLOOD BY AUTOMATED COUNT: 17 % (ref 11.5–14.5)
ERYTHROCYTE [DISTWIDTH] IN BLOOD BY AUTOMATED COUNT: 17.2 % (ref 11.5–14.5)
ERYTHROCYTE [DISTWIDTH] IN BLOOD BY AUTOMATED COUNT: 17.2 % (ref 11.5–14.5)
ERYTHROCYTE [DISTWIDTH] IN BLOOD BY AUTOMATED COUNT: 17.4 % (ref 11.5–14.5)
ERYTHROCYTE [DISTWIDTH] IN BLOOD BY AUTOMATED COUNT: 17.4 % (ref 11.5–14.5)
ERYTHROCYTE [DISTWIDTH] IN BLOOD BY AUTOMATED COUNT: 17.5 % (ref 11.5–14.5)
ERYTHROCYTE [DISTWIDTH] IN BLOOD BY AUTOMATED COUNT: 17.7 % (ref 11.5–14.5)
ERYTHROCYTE [DISTWIDTH] IN BLOOD BY AUTOMATED COUNT: 17.7 % (ref 11.5–14.5)
ERYTHROCYTE [DISTWIDTH] IN BLOOD BY AUTOMATED COUNT: 17.8 % (ref 11.5–14.5)
ERYTHROCYTE [DISTWIDTH] IN BLOOD BY AUTOMATED COUNT: 17.9 % (ref 11.5–14.5)
ERYTHROCYTE [DISTWIDTH] IN BLOOD BY AUTOMATED COUNT: 18.1 % (ref 11.5–14.5)
ERYTHROCYTE [DISTWIDTH] IN BLOOD BY AUTOMATED COUNT: 18.1 % (ref 11.5–14.5)
ERYTHROCYTE [DISTWIDTH] IN BLOOD BY AUTOMATED COUNT: 18.2 % (ref 11.5–14.5)
ERYTHROCYTE [DISTWIDTH] IN BLOOD BY AUTOMATED COUNT: 18.3 % (ref 11.5–14.5)
ERYTHROCYTE [DISTWIDTH] IN BLOOD BY AUTOMATED COUNT: 18.4 % (ref 11.5–14.5)
ERYTHROCYTE [DISTWIDTH] IN BLOOD BY AUTOMATED COUNT: 18.5 % (ref 11.5–14.5)
ERYTHROCYTE [DISTWIDTH] IN BLOOD BY AUTOMATED COUNT: 18.5 % (ref 11.5–14.5)
ERYTHROCYTE [DISTWIDTH] IN BLOOD BY AUTOMATED COUNT: 18.6 % (ref 11.5–14.5)
ERYTHROCYTE [DISTWIDTH] IN BLOOD BY AUTOMATED COUNT: 18.7 % (ref 11.5–14.5)
ERYTHROCYTE [DISTWIDTH] IN BLOOD BY AUTOMATED COUNT: 18.8 % (ref 11.5–14.5)
ERYTHROCYTE [DISTWIDTH] IN BLOOD BY AUTOMATED COUNT: 18.9 % (ref 11.5–14.5)
ERYTHROCYTE [DISTWIDTH] IN BLOOD BY AUTOMATED COUNT: 19 % (ref 11.5–14.5)
ERYTHROCYTE [DISTWIDTH] IN BLOOD BY AUTOMATED COUNT: 19.1 % (ref 11.5–14.5)
ERYTHROCYTE [DISTWIDTH] IN BLOOD BY AUTOMATED COUNT: 19.1 % (ref 11.5–14.5)
ERYTHROCYTE [DISTWIDTH] IN BLOOD BY AUTOMATED COUNT: 19.2 % (ref 11.5–14.5)
ERYTHROCYTE [DISTWIDTH] IN BLOOD BY AUTOMATED COUNT: 19.3 % (ref 11.5–14.5)
ERYTHROCYTE [DISTWIDTH] IN BLOOD BY AUTOMATED COUNT: 19.4 % (ref 11.5–14.5)
ERYTHROCYTE [DISTWIDTH] IN BLOOD BY AUTOMATED COUNT: 19.5 % (ref 11.5–14.5)
ERYTHROCYTE [DISTWIDTH] IN BLOOD BY AUTOMATED COUNT: 19.6 % (ref 11.5–14.5)
ERYTHROCYTE [DISTWIDTH] IN BLOOD BY AUTOMATED COUNT: 19.6 % (ref 11.5–14.5)
ERYTHROCYTE [DISTWIDTH] IN BLOOD BY AUTOMATED COUNT: 19.7 % (ref 11.5–14.5)
ERYTHROCYTE [DISTWIDTH] IN BLOOD BY AUTOMATED COUNT: 19.7 % (ref 11.5–14.5)
ERYTHROCYTE [DISTWIDTH] IN BLOOD BY AUTOMATED COUNT: 19.9 % (ref 11.5–14.5)
ERYTHROCYTE [DISTWIDTH] IN BLOOD BY AUTOMATED COUNT: 20 % (ref 11.5–14.5)
ERYTHROCYTE [DISTWIDTH] IN BLOOD BY AUTOMATED COUNT: 20.1 % (ref 11.5–14.5)
ERYTHROCYTE [DISTWIDTH] IN BLOOD BY AUTOMATED COUNT: 20.3 % (ref 11.5–14.5)
ERYTHROCYTE [DISTWIDTH] IN BLOOD BY AUTOMATED COUNT: 20.4 % (ref 11.5–14.5)
ERYTHROCYTE [DISTWIDTH] IN BLOOD BY AUTOMATED COUNT: 20.5 % (ref 11.5–14.5)
ERYTHROCYTE [DISTWIDTH] IN BLOOD BY AUTOMATED COUNT: 20.6 % (ref 11.5–14.5)
ERYTHROCYTE [DISTWIDTH] IN BLOOD BY AUTOMATED COUNT: 20.6 % (ref 11.5–14.5)
ERYTHROCYTE [DISTWIDTH] IN BLOOD BY AUTOMATED COUNT: 20.7 % (ref 11.5–14.5)
ERYTHROCYTE [DISTWIDTH] IN BLOOD BY AUTOMATED COUNT: 21.1 % (ref 11.5–14.5)
ERYTHROCYTE [DISTWIDTH] IN BLOOD BY AUTOMATED COUNT: 21.3 % (ref 11.5–14.5)
ERYTHROCYTE [DISTWIDTH] IN BLOOD BY AUTOMATED COUNT: 21.4 % (ref 11.5–14.5)
ERYTHROCYTE [DISTWIDTH] IN BLOOD BY AUTOMATED COUNT: 21.5 % (ref 11.5–14.5)
ERYTHROCYTE [DISTWIDTH] IN BLOOD BY AUTOMATED COUNT: 21.6 % (ref 11.5–14.5)
ERYTHROCYTE [DISTWIDTH] IN BLOOD BY AUTOMATED COUNT: 21.9 % (ref 11.5–14.5)
ERYTHROCYTE [DISTWIDTH] IN BLOOD BY AUTOMATED COUNT: 22.1 % (ref 11.5–14.5)
ERYTHROCYTE [DISTWIDTH] IN BLOOD BY AUTOMATED COUNT: 22.6 % (ref 11.5–14.5)
ERYTHROCYTE [SEDIMENTATION RATE] IN BLOOD BY WESTERGREN METHOD: 12 MM/H
ERYTHROCYTE [SEDIMENTATION RATE] IN BLOOD BY WESTERGREN METHOD: 12 MM/H
ERYTHROCYTE [SEDIMENTATION RATE] IN BLOOD BY WESTERGREN METHOD: 14 MM/H
ERYTHROCYTE [SEDIMENTATION RATE] IN BLOOD BY WESTERGREN METHOD: 15 MM/H
ERYTHROCYTE [SEDIMENTATION RATE] IN BLOOD BY WESTERGREN METHOD: 20 MM/H
ERYTHROCYTE [SEDIMENTATION RATE] IN BLOOD BY WESTERGREN METHOD: 24 MM/H
ERYTHROCYTE [SEDIMENTATION RATE] IN BLOOD BY WESTERGREN METHOD: 24 MM/H
EST. GFR  (AFRICAN AMERICAN): 10 ML/MIN/1.73 M^2
EST. GFR  (AFRICAN AMERICAN): 10 ML/MIN/1.73 M^2
EST. GFR  (AFRICAN AMERICAN): 10.3 ML/MIN/1.73 M^2
EST. GFR  (AFRICAN AMERICAN): 10.3 ML/MIN/1.73 M^2
EST. GFR  (AFRICAN AMERICAN): 11 ML/MIN/1.73 M^2
EST. GFR  (AFRICAN AMERICAN): 11.3 ML/MIN/1.73 M^2
EST. GFR  (AFRICAN AMERICAN): 11.3 ML/MIN/1.73 M^2
EST. GFR  (AFRICAN AMERICAN): 12 ML/MIN/1.73 M^2
EST. GFR  (AFRICAN AMERICAN): 12.1 ML/MIN/1.73 M^2
EST. GFR  (AFRICAN AMERICAN): 12.5 ML/MIN/1.73 M^2
EST. GFR  (AFRICAN AMERICAN): 12.8 ML/MIN/1.73 M^2
EST. GFR  (AFRICAN AMERICAN): 13 ML/MIN/1.73 M^2
EST. GFR  (AFRICAN AMERICAN): 13.5 ML/MIN/1.73 M^2
EST. GFR  (AFRICAN AMERICAN): 14 ML/MIN/1.73 M^2
EST. GFR  (AFRICAN AMERICAN): 14 ML/MIN/1.73 M^2
EST. GFR  (AFRICAN AMERICAN): 14.6 ML/MIN/1.73 M^2
EST. GFR  (AFRICAN AMERICAN): 15 ML/MIN/1.73 M^2
EST. GFR  (AFRICAN AMERICAN): 15.1 ML/MIN/1.73 M^2
EST. GFR  (AFRICAN AMERICAN): 16 ML/MIN/1.73 M^2
EST. GFR  (AFRICAN AMERICAN): 18 ML/MIN/1.73 M^2
EST. GFR  (AFRICAN AMERICAN): 18 ML/MIN/1.73 M^2
EST. GFR  (AFRICAN AMERICAN): 18.2 ML/MIN/1.73 M^2
EST. GFR  (AFRICAN AMERICAN): 18.9 ML/MIN/1.73 M^2
EST. GFR  (AFRICAN AMERICAN): 18.9 ML/MIN/1.73 M^2
EST. GFR  (AFRICAN AMERICAN): 19 ML/MIN/1.73 M^2
EST. GFR  (AFRICAN AMERICAN): 19.6 ML/MIN/1.73 M^2
EST. GFR  (AFRICAN AMERICAN): 20 ML/MIN/1.73 M^2
EST. GFR  (AFRICAN AMERICAN): 20 ML/MIN/1.73 M^2
EST. GFR  (AFRICAN AMERICAN): 20.3 ML/MIN/1.73 M^2
EST. GFR  (AFRICAN AMERICAN): 20.3 ML/MIN/1.73 M^2
EST. GFR  (AFRICAN AMERICAN): 21 ML/MIN/1.73 M^2
EST. GFR  (AFRICAN AMERICAN): 21.1 ML/MIN/1.73 M^2
EST. GFR  (AFRICAN AMERICAN): 24 ML/MIN/1.73 M^2
EST. GFR  (AFRICAN AMERICAN): 25 ML/MIN/1.73 M^2
EST. GFR  (AFRICAN AMERICAN): 26 ML/MIN/1.73 M^2
EST. GFR  (AFRICAN AMERICAN): 27 ML/MIN/1.73 M^2
EST. GFR  (AFRICAN AMERICAN): 28.8 ML/MIN/1.73 M^2
EST. GFR  (AFRICAN AMERICAN): 28.8 ML/MIN/1.73 M^2
EST. GFR  (AFRICAN AMERICAN): 29 ML/MIN/1.73 M^2
EST. GFR  (AFRICAN AMERICAN): 33.8 ML/MIN/1.73 M^2
EST. GFR  (AFRICAN AMERICAN): 35.9 ML/MIN/1.73 M^2
EST. GFR  (AFRICAN AMERICAN): 50.8 ML/MIN/1.73 M^2
EST. GFR  (AFRICAN AMERICAN): 50.8 ML/MIN/1.73 M^2
EST. GFR  (AFRICAN AMERICAN): 7.8 ML/MIN/1.73 M^2
EST. GFR  (AFRICAN AMERICAN): 8 ML/MIN/1.73 M^2
EST. GFR  (AFRICAN AMERICAN): 8 ML/MIN/1.73 M^2
EST. GFR  (AFRICAN AMERICAN): 9 ML/MIN/1.73 M^2
EST. GFR  (AFRICAN AMERICAN): 9.1 ML/MIN/1.73 M^2
EST. GFR  (AFRICAN AMERICAN): 9.1 ML/MIN/1.73 M^2
EST. GFR  (AFRICAN AMERICAN): 9.5 ML/MIN/1.73 M^2
EST. GFR  (AFRICAN AMERICAN): 9.9 ML/MIN/1.73 M^2
EST. GFR  (AFRICAN AMERICAN): >60 ML/MIN/1.73 M^2
EST. GFR  (NON AFRICAN AMERICAN): 10 ML/MIN/1.73 M^2
EST. GFR  (NON AFRICAN AMERICAN): 10.5 ML/MIN/1.73 M^2
EST. GFR  (NON AFRICAN AMERICAN): 10.8 ML/MIN/1.73 M^2
EST. GFR  (NON AFRICAN AMERICAN): 11 ML/MIN/1.73 M^2
EST. GFR  (NON AFRICAN AMERICAN): 11.1 ML/MIN/1.73 M^2
EST. GFR  (NON AFRICAN AMERICAN): 11.6 ML/MIN/1.73 M^2
EST. GFR  (NON AFRICAN AMERICAN): 12 ML/MIN/1.73 M^2
EST. GFR  (NON AFRICAN AMERICAN): 12 ML/MIN/1.73 M^2
EST. GFR  (NON AFRICAN AMERICAN): 12.7 ML/MIN/1.73 M^2
EST. GFR  (NON AFRICAN AMERICAN): 13 ML/MIN/1.73 M^2
EST. GFR  (NON AFRICAN AMERICAN): 13.8 ML/MIN/1.73 M^2
EST. GFR  (NON AFRICAN AMERICAN): 14 ML/MIN/1.73 M^2
EST. GFR  (NON AFRICAN AMERICAN): 14 ML/MIN/1.73 M^2
EST. GFR  (NON AFRICAN AMERICAN): 15 ML/MIN/1.73 M^2
EST. GFR  (NON AFRICAN AMERICAN): 15.8 ML/MIN/1.73 M^2
EST. GFR  (NON AFRICAN AMERICAN): 16 ML/MIN/1.73 M^2
EST. GFR  (NON AFRICAN AMERICAN): 16 ML/MIN/1.73 M^2
EST. GFR  (NON AFRICAN AMERICAN): 16.3 ML/MIN/1.73 M^2
EST. GFR  (NON AFRICAN AMERICAN): 16.3 ML/MIN/1.73 M^2
EST. GFR  (NON AFRICAN AMERICAN): 16.9 ML/MIN/1.73 M^2
EST. GFR  (NON AFRICAN AMERICAN): 17 ML/MIN/1.73 M^2
EST. GFR  (NON AFRICAN AMERICAN): 17.6 ML/MIN/1.73 M^2
EST. GFR  (NON AFRICAN AMERICAN): 17.6 ML/MIN/1.73 M^2
EST. GFR  (NON AFRICAN AMERICAN): 18 ML/MIN/1.73 M^2
EST. GFR  (NON AFRICAN AMERICAN): 18 ML/MIN/1.73 M^2
EST. GFR  (NON AFRICAN AMERICAN): 18.3 ML/MIN/1.73 M^2
EST. GFR  (NON AFRICAN AMERICAN): 21 ML/MIN/1.73 M^2
EST. GFR  (NON AFRICAN AMERICAN): 21.6 ML/MIN/1.73 M^2
EST. GFR  (NON AFRICAN AMERICAN): 23 ML/MIN/1.73 M^2
EST. GFR  (NON AFRICAN AMERICAN): 24 ML/MIN/1.73 M^2
EST. GFR  (NON AFRICAN AMERICAN): 24.9 ML/MIN/1.73 M^2
EST. GFR  (NON AFRICAN AMERICAN): 24.9 ML/MIN/1.73 M^2
EST. GFR  (NON AFRICAN AMERICAN): 25 ML/MIN/1.73 M^2
EST. GFR  (NON AFRICAN AMERICAN): 29.3 ML/MIN/1.73 M^2
EST. GFR  (NON AFRICAN AMERICAN): 31 ML/MIN/1.73 M^2
EST. GFR  (NON AFRICAN AMERICAN): 44 ML/MIN/1.73 M^2
EST. GFR  (NON AFRICAN AMERICAN): 44 ML/MIN/1.73 M^2
EST. GFR  (NON AFRICAN AMERICAN): 57.6 ML/MIN/1.73 M^2
EST. GFR  (NON AFRICAN AMERICAN): 6.7 ML/MIN/1.73 M^2
EST. GFR  (NON AFRICAN AMERICAN): 7 ML/MIN/1.73 M^2
EST. GFR  (NON AFRICAN AMERICAN): 7.9 ML/MIN/1.73 M^2
EST. GFR  (NON AFRICAN AMERICAN): 7.9 ML/MIN/1.73 M^2
EST. GFR  (NON AFRICAN AMERICAN): 8 ML/MIN/1.73 M^2
EST. GFR  (NON AFRICAN AMERICAN): 8.2 ML/MIN/1.73 M^2
EST. GFR  (NON AFRICAN AMERICAN): 8.6 ML/MIN/1.73 M^2
EST. GFR  (NON AFRICAN AMERICAN): 8.9 ML/MIN/1.73 M^2
EST. GFR  (NON AFRICAN AMERICAN): 8.9 ML/MIN/1.73 M^2
EST. GFR  (NON AFRICAN AMERICAN): 9 ML/MIN/1.73 M^2
EST. GFR  (NON AFRICAN AMERICAN): 9.8 ML/MIN/1.73 M^2
EST. GFR  (NON AFRICAN AMERICAN): 9.8 ML/MIN/1.73 M^2
ESTIMATED AVG GLUCOSE: 114 MG/DL (ref 68–131)
ESTIMATED AVG GLUCOSE: 128 MG/DL (ref 68–131)
FERRITIN SERPL-MCNC: 55 NG/ML (ref 20–300)
FIO2: 100
FIO2: 21
FIO2: 28
FIO2: 32
FIO2: 34
FIO2: 35
FIO2: 36
FIO2: 40
FIO2: 45
FIO2: 50
FIO2: 50
FIO2: 75
FLOW: 2
FLOW: 2
FLOW: 3
FLOW: 3
FLOW: 3.5
FLOW: 4
FOLATE SERPL-MCNC: 10.8 NG/ML (ref 4–24)
FRACTIONAL SHORTENING: 7 % (ref 28–44)
FRACTIONAL SHORTENING: 8 % (ref 28–44)
GIANT PLATELETS BLD QL SMEAR: PRESENT
GIANT PLATELETS BLD QL SMEAR: PRESENT
GLUCOSE SERPL-MCNC: 100 MG/DL (ref 70–110)
GLUCOSE SERPL-MCNC: 105 MG/DL (ref 70–110)
GLUCOSE SERPL-MCNC: 107 MG/DL (ref 70–110)
GLUCOSE SERPL-MCNC: 107 MG/DL (ref 70–110)
GLUCOSE SERPL-MCNC: 110 MG/DL (ref 70–110)
GLUCOSE SERPL-MCNC: 112 MG/DL (ref 70–110)
GLUCOSE SERPL-MCNC: 112 MG/DL (ref 70–110)
GLUCOSE SERPL-MCNC: 113 MG/DL (ref 70–110)
GLUCOSE SERPL-MCNC: 113 MG/DL (ref 70–110)
GLUCOSE SERPL-MCNC: 116 MG/DL (ref 70–110)
GLUCOSE SERPL-MCNC: 117 MG/DL (ref 70–110)
GLUCOSE SERPL-MCNC: 117 MG/DL (ref 70–110)
GLUCOSE SERPL-MCNC: 119 MG/DL (ref 70–110)
GLUCOSE SERPL-MCNC: 121 MG/DL (ref 70–110)
GLUCOSE SERPL-MCNC: 122 MG/DL (ref 70–110)
GLUCOSE SERPL-MCNC: 125 MG/DL (ref 70–110)
GLUCOSE SERPL-MCNC: 127 MG/DL (ref 70–110)
GLUCOSE SERPL-MCNC: 129 MG/DL (ref 70–110)
GLUCOSE SERPL-MCNC: 131 MG/DL (ref 70–110)
GLUCOSE SERPL-MCNC: 132 MG/DL (ref 70–110)
GLUCOSE SERPL-MCNC: 133 MG/DL (ref 70–110)
GLUCOSE SERPL-MCNC: 138 MG/DL (ref 70–110)
GLUCOSE SERPL-MCNC: 138 MG/DL (ref 70–110)
GLUCOSE SERPL-MCNC: 139 MG/DL (ref 70–110)
GLUCOSE SERPL-MCNC: 140 MG/DL (ref 70–110)
GLUCOSE SERPL-MCNC: 142 MG/DL (ref 70–110)
GLUCOSE SERPL-MCNC: 142 MG/DL (ref 70–110)
GLUCOSE SERPL-MCNC: 143 MG/DL (ref 70–110)
GLUCOSE SERPL-MCNC: 145 MG/DL (ref 70–110)
GLUCOSE SERPL-MCNC: 145 MG/DL (ref 70–110)
GLUCOSE SERPL-MCNC: 148 MG/DL (ref 70–110)
GLUCOSE SERPL-MCNC: 150 MG/DL (ref 70–110)
GLUCOSE SERPL-MCNC: 151 MG/DL (ref 70–110)
GLUCOSE SERPL-MCNC: 160 MG/DL (ref 70–110)
GLUCOSE SERPL-MCNC: 167 MG/DL (ref 70–110)
GLUCOSE SERPL-MCNC: 171 MG/DL (ref 70–110)
GLUCOSE SERPL-MCNC: 174 MG/DL (ref 70–110)
GLUCOSE SERPL-MCNC: 175 MG/DL (ref 70–110)
GLUCOSE SERPL-MCNC: 175 MG/DL (ref 70–110)
GLUCOSE SERPL-MCNC: 180 MG/DL (ref 70–110)
GLUCOSE SERPL-MCNC: 181 MG/DL (ref 70–110)
GLUCOSE SERPL-MCNC: 204 MG/DL (ref 70–110)
GLUCOSE SERPL-MCNC: 207 MG/DL (ref 70–110)
GLUCOSE SERPL-MCNC: 216 MG/DL (ref 70–110)
GLUCOSE SERPL-MCNC: 253 MG/DL (ref 70–110)
GLUCOSE SERPL-MCNC: 62 MG/DL (ref 70–110)
GLUCOSE SERPL-MCNC: 66 MG/DL (ref 70–110)
GLUCOSE SERPL-MCNC: 71 MG/DL (ref 70–110)
GLUCOSE SERPL-MCNC: 78 MG/DL (ref 70–110)
GLUCOSE SERPL-MCNC: 84 MG/DL (ref 70–110)
GLUCOSE SERPL-MCNC: 85 MG/DL (ref 70–110)
GLUCOSE SERPL-MCNC: 87 MG/DL (ref 70–110)
GLUCOSE SERPL-MCNC: 87 MG/DL (ref 70–110)
GLUCOSE SERPL-MCNC: 88 MG/DL (ref 70–110)
GLUCOSE SERPL-MCNC: 89 MG/DL (ref 70–110)
GLUCOSE SERPL-MCNC: 89 MG/DL (ref 70–110)
GLUCOSE SERPL-MCNC: 91 MG/DL (ref 70–110)
GLUCOSE SERPL-MCNC: 93 MG/DL (ref 70–110)
GLUCOSE SERPL-MCNC: 96 MG/DL (ref 70–110)
GLUCOSE SERPL-MCNC: 97 MG/DL (ref 70–110)
GLUCOSE SERPL-MCNC: 98 MG/DL (ref 70–110)
GLUCOSE UR QL STRIP: 100
GLUCOSE UR QL STRIP: ABNORMAL
GLUCOSE UR QL STRIP: NEGATIVE
GRAM STN SPEC: NORMAL
GRAM STN SPEC: NORMAL
H PYLORI IGG SERPL QL IA: NEGATIVE
HAV IGM SERPL QL IA: NEGATIVE
HBA1C MFR BLD HPLC: 5.6 % (ref 4–5.6)
HBA1C MFR BLD HPLC: 6.1 % (ref 4–5.6)
HBV CORE AB SERPL QL IA: NEGATIVE
HBV CORE IGM SERPL QL IA: NEGATIVE
HBV SURFACE AB SER-ACNC: NEGATIVE M[IU]/ML
HBV SURFACE AB SER-ACNC: NEGATIVE M[IU]/ML
HBV SURFACE AG SERPL QL IA: NEGATIVE
HCO3 UR-SCNC: 16 MMOL/L (ref 24–28)
HCO3 UR-SCNC: 16.3 MMOL/L (ref 24–28)
HCO3 UR-SCNC: 18.6 MMOL/L (ref 24–28)
HCO3 UR-SCNC: 18.6 MMOL/L (ref 24–28)
HCO3 UR-SCNC: 18.7 MMOL/L (ref 24–28)
HCO3 UR-SCNC: 19.2 MMOL/L (ref 24–28)
HCO3 UR-SCNC: 20.5 MMOL/L (ref 24–28)
HCO3 UR-SCNC: 22 MMOL/L (ref 24–28)
HCO3 UR-SCNC: 23.7 MMOL/L (ref 24–28)
HCO3 UR-SCNC: 23.8 MMOL/L (ref 24–28)
HCO3 UR-SCNC: 24.2 MMOL/L (ref 24–28)
HCO3 UR-SCNC: 24.3 MMOL/L (ref 24–28)
HCO3 UR-SCNC: 24.5 MMOL/L (ref 24–28)
HCO3 UR-SCNC: 25.8 MMOL/L (ref 24–28)
HCO3 UR-SCNC: 26 MMOL/L (ref 24–28)
HCO3 UR-SCNC: 27.2 MMOL/L (ref 24–28)
HCO3 UR-SCNC: 27.5 MMOL/L (ref 24–28)
HCO3 UR-SCNC: 28.2 MMOL/L (ref 24–28)
HCO3 UR-SCNC: 30 MMOL/L (ref 24–28)
HCT VFR BLD AUTO: 18.9 % (ref 40–54)
HCT VFR BLD AUTO: 22.5 % (ref 40–54)
HCT VFR BLD AUTO: 22.8 % (ref 40–54)
HCT VFR BLD AUTO: 23.2 % (ref 40–54)
HCT VFR BLD AUTO: 23.4 % (ref 40–54)
HCT VFR BLD AUTO: 23.6 % (ref 40–54)
HCT VFR BLD AUTO: 23.6 % (ref 40–54)
HCT VFR BLD AUTO: 24 % (ref 40–54)
HCT VFR BLD AUTO: 24.3 % (ref 40–54)
HCT VFR BLD AUTO: 24.7 % (ref 40–54)
HCT VFR BLD AUTO: 24.8 % (ref 40–54)
HCT VFR BLD AUTO: 24.8 % (ref 40–54)
HCT VFR BLD AUTO: 24.9 % (ref 40–54)
HCT VFR BLD AUTO: 25.3 % (ref 40–54)
HCT VFR BLD AUTO: 25.4 % (ref 40–54)
HCT VFR BLD AUTO: 25.8 % (ref 40–54)
HCT VFR BLD AUTO: 25.9 % (ref 40–54)
HCT VFR BLD AUTO: 26 % (ref 40–54)
HCT VFR BLD AUTO: 26.2 % (ref 40–54)
HCT VFR BLD AUTO: 26.3 % (ref 40–54)
HCT VFR BLD AUTO: 26.7 % (ref 40–54)
HCT VFR BLD AUTO: 27 % (ref 40–54)
HCT VFR BLD AUTO: 27.1 % (ref 40–54)
HCT VFR BLD AUTO: 27.2 % (ref 40–54)
HCT VFR BLD AUTO: 27.3 % (ref 40–54)
HCT VFR BLD AUTO: 27.6 % (ref 40–54)
HCT VFR BLD AUTO: 28 % (ref 40–54)
HCT VFR BLD AUTO: 28.1 % (ref 40–54)
HCT VFR BLD AUTO: 28.8 % (ref 40–54)
HCT VFR BLD AUTO: 28.8 % (ref 40–54)
HCT VFR BLD AUTO: 28.9 % (ref 40–54)
HCT VFR BLD AUTO: 29.5 % (ref 40–54)
HCT VFR BLD AUTO: 29.5 % (ref 40–54)
HCT VFR BLD AUTO: 30.2 % (ref 40–54)
HCT VFR BLD AUTO: 30.2 % (ref 40–54)
HCT VFR BLD AUTO: 30.5 % (ref 40–54)
HCT VFR BLD AUTO: 30.6 % (ref 40–54)
HCT VFR BLD AUTO: 30.7 % (ref 40–54)
HCT VFR BLD AUTO: 30.7 % (ref 40–54)
HCT VFR BLD AUTO: 30.8 % (ref 40–54)
HCT VFR BLD AUTO: 30.9 % (ref 40–54)
HCT VFR BLD AUTO: 31 % (ref 40–54)
HCT VFR BLD AUTO: 31 % (ref 40–54)
HCT VFR BLD AUTO: 31.1 % (ref 40–54)
HCT VFR BLD AUTO: 31.2 % (ref 40–54)
HCT VFR BLD AUTO: 31.2 % (ref 40–54)
HCT VFR BLD AUTO: 31.3 % (ref 40–54)
HCT VFR BLD AUTO: 31.4 % (ref 40–54)
HCT VFR BLD AUTO: 31.5 % (ref 40–54)
HCT VFR BLD AUTO: 31.6 % (ref 40–54)
HCT VFR BLD AUTO: 32 % (ref 40–54)
HCT VFR BLD AUTO: 32.3 % (ref 40–54)
HCT VFR BLD AUTO: 34 % (ref 40–54)
HCT VFR BLD AUTO: 34.1 % (ref 40–54)
HCT VFR BLD AUTO: 35.5 % (ref 40–54)
HCT VFR BLD AUTO: 36.1 % (ref 40–54)
HCT VFR BLD AUTO: 37 % (ref 40–54)
HCT VFR BLD AUTO: 38 % (ref 40–54)
HCT VFR BLD AUTO: 38.5 % (ref 40–54)
HCT VFR BLD AUTO: 38.6 % (ref 40–54)
HCT VFR BLD AUTO: 38.7 % (ref 40–54)
HCT VFR BLD AUTO: 39.1 % (ref 40–54)
HCT VFR BLD AUTO: 39.8 % (ref 40–54)
HCT VFR BLD AUTO: 40 % (ref 40–54)
HCT VFR BLD AUTO: 40.8 % (ref 40–54)
HCT VFR BLD AUTO: 41.1 % (ref 40–54)
HCT VFR BLD CALC: 17 %PCV (ref 36–54)
HCT VFR BLD CALC: 24 %PCV (ref 36–54)
HCV AB SERPL QL IA: NEGATIVE
HDLC SERPL-MCNC: 24 MG/DL (ref 40–75)
HDLC SERPL: 23.1 % (ref 20–50)
HEPARIN INDUCED THROMBOCYTOPENIA: NORMAL
HEPARIN INDUCED THROMBOCYTOPENIA: NORMAL
HGB BLD-MCNC: 10 G/DL (ref 14–18)
HGB BLD-MCNC: 10.1 G/DL (ref 14–18)
HGB BLD-MCNC: 10.1 G/DL (ref 14–18)
HGB BLD-MCNC: 10.2 G/DL (ref 14–18)
HGB BLD-MCNC: 10.2 G/DL (ref 14–18)
HGB BLD-MCNC: 10.4 G/DL (ref 14–18)
HGB BLD-MCNC: 10.6 G/DL (ref 14–18)
HGB BLD-MCNC: 10.6 G/DL (ref 14–18)
HGB BLD-MCNC: 10.7 G/DL (ref 14–18)
HGB BLD-MCNC: 10.8 G/DL (ref 14–18)
HGB BLD-MCNC: 10.9 G/DL (ref 14–18)
HGB BLD-MCNC: 11 G/DL (ref 14–18)
HGB BLD-MCNC: 11.4 G/DL (ref 14–18)
HGB BLD-MCNC: 11.5 G/DL (ref 14–18)
HGB BLD-MCNC: 11.6 G/DL (ref 14–18)
HGB BLD-MCNC: 12 G/DL (ref 14–18)
HGB BLD-MCNC: 12 G/DL (ref 14–18)
HGB BLD-MCNC: 5.7 G/DL (ref 14–18)
HGB BLD-MCNC: 6.9 G/DL (ref 14–18)
HGB BLD-MCNC: 7.1 G/DL (ref 14–18)
HGB BLD-MCNC: 7.2 G/DL (ref 14–18)
HGB BLD-MCNC: 7.2 G/DL (ref 14–18)
HGB BLD-MCNC: 7.3 G/DL (ref 14–18)
HGB BLD-MCNC: 7.5 G/DL (ref 14–18)
HGB BLD-MCNC: 7.6 G/DL (ref 14–18)
HGB BLD-MCNC: 7.6 G/DL (ref 14–18)
HGB BLD-MCNC: 7.7 G/DL (ref 14–18)
HGB BLD-MCNC: 7.8 G/DL (ref 14–18)
HGB BLD-MCNC: 7.9 G/DL (ref 14–18)
HGB BLD-MCNC: 8 G/DL (ref 14–18)
HGB BLD-MCNC: 8.1 G/DL (ref 14–18)
HGB BLD-MCNC: 8.2 G/DL (ref 14–18)
HGB BLD-MCNC: 8.2 G/DL (ref 14–18)
HGB BLD-MCNC: 8.3 G/DL (ref 14–18)
HGB BLD-MCNC: 8.5 G/DL (ref 14–18)
HGB BLD-MCNC: 8.9 G/DL (ref 14–18)
HGB BLD-MCNC: 8.9 G/DL (ref 14–18)
HGB BLD-MCNC: 9.2 G/DL (ref 14–18)
HGB BLD-MCNC: 9.2 G/DL (ref 14–18)
HGB BLD-MCNC: 9.3 G/DL (ref 14–18)
HGB BLD-MCNC: 9.4 G/DL (ref 14–18)
HGB BLD-MCNC: 9.6 G/DL (ref 14–18)
HGB BLD-MCNC: 9.6 G/DL (ref 14–18)
HGB BLD-MCNC: 9.7 G/DL (ref 14–18)
HGB BLD-MCNC: 9.8 G/DL (ref 14–18)
HGB BLD-MCNC: 9.9 G/DL (ref 14–18)
HGB UR QL STRIP: ABNORMAL
HGB UR QL STRIP: NEGATIVE
HYALINE CASTS #/AREA URNS LPF: 0 /LPF
HYALINE CASTS UR QL AUTO: 0 /LPF
HYALINE CASTS UR QL AUTO: 4 /LPF
HYALINE CASTS UR QL AUTO: 8 /LPF
HYPOCHROMIA BLD QL SMEAR: ABNORMAL
IMM GRANULOCYTES # BLD AUTO: 0.04 K/UL (ref 0–0.04)
IMM GRANULOCYTES # BLD AUTO: 0.05 K/UL (ref 0–0.04)
IMM GRANULOCYTES # BLD AUTO: 0.06 K/UL (ref 0–0.04)
IMM GRANULOCYTES # BLD AUTO: 0.07 K/UL (ref 0–0.04)
IMM GRANULOCYTES # BLD AUTO: 0.09 K/UL (ref 0–0.04)
IMM GRANULOCYTES # BLD AUTO: 0.1 K/UL (ref 0–0.04)
IMM GRANULOCYTES # BLD AUTO: 0.12 K/UL (ref 0–0.04)
IMM GRANULOCYTES # BLD AUTO: 0.14 K/UL (ref 0–0.04)
IMM GRANULOCYTES # BLD AUTO: 0.14 K/UL (ref 0–0.04)
IMM GRANULOCYTES # BLD AUTO: 0.15 K/UL (ref 0–0.04)
IMM GRANULOCYTES # BLD AUTO: 0.15 K/UL (ref 0–0.04)
IMM GRANULOCYTES # BLD AUTO: 0.16 K/UL (ref 0–0.04)
IMM GRANULOCYTES # BLD AUTO: 0.17 K/UL (ref 0–0.04)
IMM GRANULOCYTES # BLD AUTO: 0.19 K/UL (ref 0–0.04)
IMM GRANULOCYTES # BLD AUTO: 0.2 K/UL (ref 0–0.04)
IMM GRANULOCYTES # BLD AUTO: 0.2 K/UL (ref 0–0.04)
IMM GRANULOCYTES # BLD AUTO: 0.22 K/UL (ref 0–0.04)
IMM GRANULOCYTES # BLD AUTO: 0.24 K/UL (ref 0–0.04)
IMM GRANULOCYTES # BLD AUTO: 0.25 K/UL (ref 0–0.04)
IMM GRANULOCYTES # BLD AUTO: 0.27 K/UL (ref 0–0.04)
IMM GRANULOCYTES # BLD AUTO: 0.27 K/UL (ref 0–0.04)
IMM GRANULOCYTES # BLD AUTO: 0.29 K/UL (ref 0–0.04)
IMM GRANULOCYTES # BLD AUTO: 0.29 K/UL (ref 0–0.04)
IMM GRANULOCYTES # BLD AUTO: 0.3 K/UL (ref 0–0.04)
IMM GRANULOCYTES # BLD AUTO: 0.3 K/UL (ref 0–0.04)
IMM GRANULOCYTES # BLD AUTO: 0.31 K/UL (ref 0–0.04)
IMM GRANULOCYTES # BLD AUTO: 0.31 K/UL (ref 0–0.04)
IMM GRANULOCYTES # BLD AUTO: 0.32 K/UL (ref 0–0.04)
IMM GRANULOCYTES # BLD AUTO: 0.34 K/UL (ref 0–0.04)
IMM GRANULOCYTES # BLD AUTO: 0.36 K/UL (ref 0–0.04)
IMM GRANULOCYTES # BLD AUTO: 0.4 K/UL (ref 0–0.04)
IMM GRANULOCYTES # BLD AUTO: 0.42 K/UL (ref 0–0.04)
IMM GRANULOCYTES # BLD AUTO: 0.44 K/UL (ref 0–0.04)
IMM GRANULOCYTES # BLD AUTO: 0.47 K/UL (ref 0–0.04)
IMM GRANULOCYTES # BLD AUTO: 0.5 K/UL (ref 0–0.04)
IMM GRANULOCYTES # BLD AUTO: 0.54 K/UL (ref 0–0.04)
IMM GRANULOCYTES # BLD AUTO: 0.56 K/UL (ref 0–0.04)
IMM GRANULOCYTES # BLD AUTO: 0.58 K/UL (ref 0–0.04)
IMM GRANULOCYTES # BLD AUTO: 0.58 K/UL (ref 0–0.04)
IMM GRANULOCYTES # BLD AUTO: 0.66 K/UL (ref 0–0.04)
IMM GRANULOCYTES # BLD AUTO: 0.67 K/UL (ref 0–0.04)
IMM GRANULOCYTES # BLD AUTO: 0.68 K/UL (ref 0–0.04)
IMM GRANULOCYTES # BLD AUTO: 0.74 K/UL (ref 0–0.04)
IMM GRANULOCYTES # BLD AUTO: 0.76 K/UL (ref 0–0.04)
IMM GRANULOCYTES # BLD AUTO: 0.79 K/UL (ref 0–0.04)
IMM GRANULOCYTES # BLD AUTO: 0.8 K/UL (ref 0–0.04)
IMM GRANULOCYTES # BLD AUTO: 0.9 K/UL (ref 0–0.04)
IMM GRANULOCYTES # BLD AUTO: ABNORMAL K/UL (ref 0–0.04)
IMM GRANULOCYTES NFR BLD AUTO: 0.8 % (ref 0–0.5)
IMM GRANULOCYTES NFR BLD AUTO: 0.9 % (ref 0–0.5)
IMM GRANULOCYTES NFR BLD AUTO: 1 % (ref 0–0.5)
IMM GRANULOCYTES NFR BLD AUTO: 1.2 % (ref 0–0.5)
IMM GRANULOCYTES NFR BLD AUTO: 1.3 % (ref 0–0.5)
IMM GRANULOCYTES NFR BLD AUTO: 1.3 % (ref 0–0.5)
IMM GRANULOCYTES NFR BLD AUTO: 1.5 % (ref 0–0.5)
IMM GRANULOCYTES NFR BLD AUTO: 1.5 % (ref 0–0.5)
IMM GRANULOCYTES NFR BLD AUTO: 1.6 % (ref 0–0.5)
IMM GRANULOCYTES NFR BLD AUTO: 1.8 % (ref 0–0.5)
IMM GRANULOCYTES NFR BLD AUTO: 1.9 % (ref 0–0.5)
IMM GRANULOCYTES NFR BLD AUTO: 1.9 % (ref 0–0.5)
IMM GRANULOCYTES NFR BLD AUTO: 2 % (ref 0–0.5)
IMM GRANULOCYTES NFR BLD AUTO: 2.4 % (ref 0–0.5)
IMM GRANULOCYTES NFR BLD AUTO: 2.6 % (ref 0–0.5)
IMM GRANULOCYTES NFR BLD AUTO: 2.6 % (ref 0–0.5)
IMM GRANULOCYTES NFR BLD AUTO: 2.7 % (ref 0–0.5)
IMM GRANULOCYTES NFR BLD AUTO: 2.8 % (ref 0–0.5)
IMM GRANULOCYTES NFR BLD AUTO: 2.8 % (ref 0–0.5)
IMM GRANULOCYTES NFR BLD AUTO: 3 % (ref 0–0.5)
IMM GRANULOCYTES NFR BLD AUTO: 3.2 % (ref 0–0.5)
IMM GRANULOCYTES NFR BLD AUTO: 3.5 % (ref 0–0.5)
IMM GRANULOCYTES NFR BLD AUTO: 3.8 % (ref 0–0.5)
IMM GRANULOCYTES NFR BLD AUTO: 4.1 % (ref 0–0.5)
IMM GRANULOCYTES NFR BLD AUTO: 4.1 % (ref 0–0.5)
IMM GRANULOCYTES NFR BLD AUTO: 4.4 % (ref 0–0.5)
IMM GRANULOCYTES NFR BLD AUTO: 4.5 % (ref 0–0.5)
IMM GRANULOCYTES NFR BLD AUTO: 4.7 % (ref 0–0.5)
IMM GRANULOCYTES NFR BLD AUTO: 4.7 % (ref 0–0.5)
IMM GRANULOCYTES NFR BLD AUTO: 4.9 % (ref 0–0.5)
IMM GRANULOCYTES NFR BLD AUTO: ABNORMAL % (ref 0–0.5)
INR PPP: 1.1 (ref 0.8–1.2)
INR PPP: 1.2 (ref 0.8–1.2)
INR PPP: 1.3 (ref 0.8–1.2)
INTERVENTRICULAR SEPTUM: 0.54 CM (ref 0.6–1.1)
INTERVENTRICULAR SEPTUM: 0.62 CM (ref 0.6–1.1)
IP: 10
IP: 14
IP: 15
IP: 18
IP: 18
IP: 20
IRON SERPL-MCNC: 28 UG/DL (ref 45–160)
IT: 0.9
IVRT: 0.09 MSEC
KETONES UR QL STRIP: 15
KETONES UR QL STRIP: ABNORMAL
KETONES UR QL STRIP: NEGATIVE
KETONES UR QL STRIP: NEGATIVE
LA MAJOR: 6.54 CM
LA MINOR: 6.37 CM
LA WIDTH: 4.24 CM
LACTATE SERPL-SCNC: 1 MMOL/L (ref 0.5–2.2)
LACTATE SERPL-SCNC: 1 MMOL/L (ref 0.5–2.2)
LACTATE SERPL-SCNC: 1.4 MMOL/L (ref 0.5–2.2)
LACTATE SERPL-SCNC: 1.7 MMOL/L (ref 0.5–2.2)
LACTATE SERPL-SCNC: 2 MMOL/L (ref 0.5–2.2)
LACTATE SERPL-SCNC: 2.7 MMOL/L (ref 0.5–2.2)
LDH SERPL L TO P-CCNC: 0.87 MMOL/L (ref 0.5–2.2)
LDLC SERPL CALC-MCNC: 55.8 MG/DL (ref 63–159)
LEFT ATRIUM SIZE: 4.88 CM
LEFT ATRIUM SIZE: 5.16 CM
LEFT ATRIUM VOLUME INDEX: 45.5 ML/M2
LEFT ATRIUM VOLUME: 113.51 CM3
LEFT INTERNAL DIMENSION IN SYSTOLE: 5.6 CM (ref 2.1–4)
LEFT INTERNAL DIMENSION IN SYSTOLE: 6.15 CM (ref 2.1–4)
LEFT VENTRICLE DIASTOLIC VOLUME INDEX: 72.05 ML/M2
LEFT VENTRICLE DIASTOLIC VOLUME INDEX: 88.6 ML/M2
LEFT VENTRICLE DIASTOLIC VOLUME: 179.82 ML
LEFT VENTRICLE DIASTOLIC VOLUME: 227.45 ML
LEFT VENTRICLE MASS INDEX: 67.2 G/M2
LEFT VENTRICLE MASS INDEX: 68.4 G/M2
LEFT VENTRICLE SYSTOLIC VOLUME INDEX: 61.7 ML/M2
LEFT VENTRICLE SYSTOLIC VOLUME INDEX: 74.2 ML/M2
LEFT VENTRICLE SYSTOLIC VOLUME: 153.92 ML
LEFT VENTRICLE SYSTOLIC VOLUME: 190.4 ML
LEFT VENTRICULAR INTERNAL DIMENSION IN DIASTOLE: 6 CM (ref 3.5–6)
LEFT VENTRICULAR INTERNAL DIMENSION IN DIASTOLE: 6.65 CM (ref 3.5–6)
LEFT VENTRICULAR MASS: 167.69 G
LEFT VENTRICULAR MASS: 175.55 G
LEUKOCYTE ESTERASE UR QL STRIP: ABNORMAL
LEUKOCYTE ESTERASE URINE, POC: ABNORMAL
LV LATERAL E/E' RATIO: 10.33
LV LATERAL E/E' RATIO: 15.88
LV SEPTAL E/E' RATIO: 23.25
LV SEPTAL E/E' RATIO: 31.75
LYMPHOCYTES # BLD AUTO: 0.3 K/UL (ref 1–4.8)
LYMPHOCYTES # BLD AUTO: 0.4 K/UL (ref 1–4.8)
LYMPHOCYTES # BLD AUTO: 0.5 K/UL (ref 1–4.8)
LYMPHOCYTES # BLD AUTO: 0.6 K/UL (ref 1–4.8)
LYMPHOCYTES # BLD AUTO: 0.7 K/UL (ref 1–4.8)
LYMPHOCYTES # BLD AUTO: 0.8 K/UL (ref 1–4.8)
LYMPHOCYTES # BLD AUTO: 0.9 K/UL (ref 1–4.8)
LYMPHOCYTES # BLD AUTO: 1 K/UL (ref 1–4.8)
LYMPHOCYTES # BLD AUTO: 1.1 K/UL (ref 1–4.8)
LYMPHOCYTES # BLD AUTO: 1.2 K/UL (ref 1–4.8)
LYMPHOCYTES # BLD AUTO: 1.4 K/UL (ref 1–4.8)
LYMPHOCYTES # BLD AUTO: 1.5 K/UL (ref 1–4.8)
LYMPHOCYTES # BLD AUTO: ABNORMAL K/UL (ref 1–4.8)
LYMPHOCYTES NFR BLD: 10 % (ref 18–48)
LYMPHOCYTES NFR BLD: 10 % (ref 18–48)
LYMPHOCYTES NFR BLD: 10.1 % (ref 18–48)
LYMPHOCYTES NFR BLD: 10.2 % (ref 18–48)
LYMPHOCYTES NFR BLD: 10.3 % (ref 18–48)
LYMPHOCYTES NFR BLD: 10.8 % (ref 18–48)
LYMPHOCYTES NFR BLD: 2.7 % (ref 18–48)
LYMPHOCYTES NFR BLD: 3.2 % (ref 18–48)
LYMPHOCYTES NFR BLD: 3.2 % (ref 18–48)
LYMPHOCYTES NFR BLD: 3.5 % (ref 18–48)
LYMPHOCYTES NFR BLD: 3.6 % (ref 18–48)
LYMPHOCYTES NFR BLD: 3.7 % (ref 18–48)
LYMPHOCYTES NFR BLD: 3.8 % (ref 18–48)
LYMPHOCYTES NFR BLD: 3.8 % (ref 18–48)
LYMPHOCYTES NFR BLD: 3.9 % (ref 18–48)
LYMPHOCYTES NFR BLD: 3.9 % (ref 18–48)
LYMPHOCYTES NFR BLD: 4 % (ref 18–48)
LYMPHOCYTES NFR BLD: 4.4 % (ref 18–48)
LYMPHOCYTES NFR BLD: 4.5 % (ref 18–48)
LYMPHOCYTES NFR BLD: 4.6 % (ref 18–48)
LYMPHOCYTES NFR BLD: 4.6 % (ref 18–48)
LYMPHOCYTES NFR BLD: 4.7 % (ref 18–48)
LYMPHOCYTES NFR BLD: 5 % (ref 18–48)
LYMPHOCYTES NFR BLD: 5 % (ref 18–48)
LYMPHOCYTES NFR BLD: 5.1 % (ref 18–48)
LYMPHOCYTES NFR BLD: 5.2 % (ref 18–48)
LYMPHOCYTES NFR BLD: 5.2 % (ref 18–48)
LYMPHOCYTES NFR BLD: 5.4 % (ref 18–48)
LYMPHOCYTES NFR BLD: 5.4 % (ref 18–48)
LYMPHOCYTES NFR BLD: 5.7 % (ref 18–48)
LYMPHOCYTES NFR BLD: 5.7 % (ref 18–48)
LYMPHOCYTES NFR BLD: 5.8 % (ref 18–48)
LYMPHOCYTES NFR BLD: 5.8 % (ref 18–48)
LYMPHOCYTES NFR BLD: 6 % (ref 18–48)
LYMPHOCYTES NFR BLD: 6.2 % (ref 18–48)
LYMPHOCYTES NFR BLD: 6.4 % (ref 18–48)
LYMPHOCYTES NFR BLD: 6.5 % (ref 18–48)
LYMPHOCYTES NFR BLD: 6.6 % (ref 18–48)
LYMPHOCYTES NFR BLD: 6.6 % (ref 18–48)
LYMPHOCYTES NFR BLD: 6.7 % (ref 18–48)
LYMPHOCYTES NFR BLD: 6.8 % (ref 18–48)
LYMPHOCYTES NFR BLD: 7 % (ref 18–48)
LYMPHOCYTES NFR BLD: 7.1 % (ref 18–48)
LYMPHOCYTES NFR BLD: 7.2 % (ref 18–48)
LYMPHOCYTES NFR BLD: 7.5 % (ref 18–48)
LYMPHOCYTES NFR BLD: 7.6 % (ref 18–48)
LYMPHOCYTES NFR BLD: 7.7 % (ref 18–48)
LYMPHOCYTES NFR BLD: 7.7 % (ref 18–48)
LYMPHOCYTES NFR BLD: 7.8 % (ref 18–48)
LYMPHOCYTES NFR BLD: 8 % (ref 18–48)
LYMPHOCYTES NFR BLD: 8.1 % (ref 18–48)
LYMPHOCYTES NFR BLD: 8.3 % (ref 18–48)
LYMPHOCYTES NFR BLD: 8.5 % (ref 18–48)
LYMPHOCYTES NFR BLD: 8.7 % (ref 18–48)
LYMPHOCYTES NFR BLD: 8.7 % (ref 18–48)
LYMPHOCYTES NFR BLD: 8.9 % (ref 18–48)
LYMPHOCYTES NFR BLD: 9 % (ref 18–48)
LYMPHOCYTES NFR BLD: 9.5 % (ref 18–48)
LYMPHOCYTES NFR BLD: 9.7 % (ref 18–48)
LYMPHOCYTES NFR BLD: 9.8 % (ref 18–48)
MAGNESIUM SERPL-MCNC: 1.7 MG/DL (ref 1.6–2.6)
MAGNESIUM SERPL-MCNC: 1.7 MG/DL (ref 1.6–2.6)
MAGNESIUM SERPL-MCNC: 1.8 MG/DL (ref 1.6–2.6)
MAGNESIUM SERPL-MCNC: 1.9 MG/DL (ref 1.6–2.6)
MAGNESIUM SERPL-MCNC: 2 MG/DL (ref 1.6–2.6)
MAGNESIUM SERPL-MCNC: 2.1 MG/DL (ref 1.6–2.6)
MAGNESIUM SERPL-MCNC: 2.2 MG/DL (ref 1.6–2.6)
MAGNESIUM SERPL-MCNC: 2.3 MG/DL (ref 1.6–2.6)
MAGNESIUM SERPL-MCNC: 2.4 MG/DL (ref 1.6–2.6)
MAGNESIUM SERPL-MCNC: 2.5 MG/DL (ref 1.6–2.6)
MAGNESIUM SERPL-MCNC: 2.5 MG/DL (ref 1.6–2.6)
MAP: 16
MCH RBC QN AUTO: 29.5 PG (ref 27–31)
MCH RBC QN AUTO: 29.6 PG (ref 27–31)
MCH RBC QN AUTO: 29.7 PG (ref 27–31)
MCH RBC QN AUTO: 29.8 PG (ref 27–31)
MCH RBC QN AUTO: 29.9 PG (ref 27–31)
MCH RBC QN AUTO: 30 PG (ref 27–31)
MCH RBC QN AUTO: 30.1 PG (ref 27–31)
MCH RBC QN AUTO: 30.2 PG (ref 27–31)
MCH RBC QN AUTO: 30.3 PG (ref 27–31)
MCH RBC QN AUTO: 30.4 PG (ref 27–31)
MCH RBC QN AUTO: 30.5 PG (ref 27–31)
MCH RBC QN AUTO: 30.6 PG (ref 27–31)
MCH RBC QN AUTO: 30.6 PG (ref 27–31)
MCH RBC QN AUTO: 30.7 PG (ref 27–31)
MCH RBC QN AUTO: 30.8 PG (ref 27–31)
MCH RBC QN AUTO: 30.9 PG (ref 27–31)
MCH RBC QN AUTO: 31 PG (ref 27–31)
MCH RBC QN AUTO: 31.1 PG (ref 27–31)
MCH RBC QN AUTO: 31.2 PG (ref 27–31)
MCH RBC QN AUTO: 31.3 PG (ref 27–31)
MCH RBC QN AUTO: 31.4 PG (ref 27–31)
MCH RBC QN AUTO: 31.5 PG (ref 27–31)
MCH RBC QN AUTO: 31.6 PG (ref 27–31)
MCH RBC QN AUTO: 31.7 PG (ref 27–31)
MCH RBC QN AUTO: 31.7 PG (ref 27–31)
MCH RBC QN AUTO: 31.9 PG (ref 27–31)
MCHC RBC AUTO-ENTMCNC: 28.2 G/DL (ref 32–36)
MCHC RBC AUTO-ENTMCNC: 28.4 G/DL (ref 32–36)
MCHC RBC AUTO-ENTMCNC: 28.5 G/DL (ref 32–36)
MCHC RBC AUTO-ENTMCNC: 28.6 G/DL (ref 32–36)
MCHC RBC AUTO-ENTMCNC: 28.6 G/DL (ref 32–36)
MCHC RBC AUTO-ENTMCNC: 28.7 G/DL (ref 32–36)
MCHC RBC AUTO-ENTMCNC: 28.7 G/DL (ref 32–36)
MCHC RBC AUTO-ENTMCNC: 28.9 G/DL (ref 32–36)
MCHC RBC AUTO-ENTMCNC: 29.1 G/DL (ref 32–36)
MCHC RBC AUTO-ENTMCNC: 29.2 G/DL (ref 32–36)
MCHC RBC AUTO-ENTMCNC: 29.3 G/DL (ref 32–36)
MCHC RBC AUTO-ENTMCNC: 29.4 G/DL (ref 32–36)
MCHC RBC AUTO-ENTMCNC: 29.5 G/DL (ref 32–36)
MCHC RBC AUTO-ENTMCNC: 29.7 G/DL (ref 32–36)
MCHC RBC AUTO-ENTMCNC: 29.7 G/DL (ref 32–36)
MCHC RBC AUTO-ENTMCNC: 29.8 G/DL (ref 32–36)
MCHC RBC AUTO-ENTMCNC: 30 G/DL (ref 32–36)
MCHC RBC AUTO-ENTMCNC: 30.1 G/DL (ref 32–36)
MCHC RBC AUTO-ENTMCNC: 30.1 G/DL (ref 32–36)
MCHC RBC AUTO-ENTMCNC: 30.2 G/DL (ref 32–36)
MCHC RBC AUTO-ENTMCNC: 30.3 G/DL (ref 32–36)
MCHC RBC AUTO-ENTMCNC: 30.3 G/DL (ref 32–36)
MCHC RBC AUTO-ENTMCNC: 30.4 G/DL (ref 32–36)
MCHC RBC AUTO-ENTMCNC: 30.6 G/DL (ref 32–36)
MCHC RBC AUTO-ENTMCNC: 30.7 G/DL (ref 32–36)
MCHC RBC AUTO-ENTMCNC: 30.8 G/DL (ref 32–36)
MCHC RBC AUTO-ENTMCNC: 30.8 G/DL (ref 32–36)
MCHC RBC AUTO-ENTMCNC: 30.9 G/DL (ref 32–36)
MCHC RBC AUTO-ENTMCNC: 31 G/DL (ref 32–36)
MCHC RBC AUTO-ENTMCNC: 31 G/DL (ref 32–36)
MCHC RBC AUTO-ENTMCNC: 31.1 G/DL (ref 32–36)
MCHC RBC AUTO-ENTMCNC: 31.2 G/DL (ref 32–36)
MCHC RBC AUTO-ENTMCNC: 31.3 G/DL (ref 32–36)
MCHC RBC AUTO-ENTMCNC: 31.4 G/DL (ref 32–36)
MCHC RBC AUTO-ENTMCNC: 31.5 G/DL (ref 32–36)
MCHC RBC AUTO-ENTMCNC: 31.5 G/DL (ref 32–36)
MCHC RBC AUTO-ENTMCNC: 31.6 G/DL (ref 32–36)
MCHC RBC AUTO-ENTMCNC: 31.7 G/DL (ref 32–36)
MCHC RBC AUTO-ENTMCNC: 31.8 G/DL (ref 32–36)
MCHC RBC AUTO-ENTMCNC: 31.9 G/DL (ref 32–36)
MCHC RBC AUTO-ENTMCNC: 32 G/DL (ref 32–36)
MCHC RBC AUTO-ENTMCNC: 32.1 G/DL (ref 32–36)
MCHC RBC AUTO-ENTMCNC: 32.2 G/DL (ref 32–36)
MCHC RBC AUTO-ENTMCNC: 32.4 G/DL (ref 32–36)
MCHC RBC AUTO-ENTMCNC: 32.7 G/DL (ref 32–36)
MCHC RBC AUTO-ENTMCNC: 33.1 G/DL (ref 32–36)
MCHC RBC AUTO-ENTMCNC: 33.7 G/DL (ref 32–36)
MCHC RBC AUTO-ENTMCNC: 34.2 G/DL (ref 32–36)
MCHC RBC AUTO-ENTMCNC: 34.7 G/DL (ref 32–36)
MCV RBC AUTO: 100 FL (ref 82–98)
MCV RBC AUTO: 101 FL (ref 82–98)
MCV RBC AUTO: 102 FL (ref 82–98)
MCV RBC AUTO: 103 FL (ref 82–98)
MCV RBC AUTO: 104 FL (ref 82–98)
MCV RBC AUTO: 105 FL (ref 82–98)
MCV RBC AUTO: 106 FL (ref 82–98)
MCV RBC AUTO: 107 FL (ref 82–98)
MCV RBC AUTO: 108 FL (ref 82–98)
MCV RBC AUTO: 108 FL (ref 82–98)
MCV RBC AUTO: 109 FL (ref 82–98)
MCV RBC AUTO: 90 FL (ref 82–98)
MCV RBC AUTO: 91 FL (ref 82–98)
MCV RBC AUTO: 91 FL (ref 82–98)
MCV RBC AUTO: 93 FL (ref 82–98)
MCV RBC AUTO: 93 FL (ref 82–98)
MCV RBC AUTO: 94 FL (ref 82–98)
MCV RBC AUTO: 94 FL (ref 82–98)
MCV RBC AUTO: 96 FL (ref 82–98)
MCV RBC AUTO: 97 FL (ref 82–98)
MCV RBC AUTO: 98 FL (ref 82–98)
MCV RBC AUTO: 99 FL (ref 82–98)
METAMYELOCYTES NFR BLD MANUAL: 0.5 %
METAMYELOCYTES NFR BLD MANUAL: 1 %
METAMYELOCYTES NFR BLD MANUAL: 2 %
METAMYELOCYTES NFR BLD MANUAL: 2 %
MICROSCOPIC COMMENT: ABNORMAL
MIN VOL: 10
MIN VOL: 11.4
MIN VOL: 15.4
MIN VOL: 15.5
MIN VOL: 31
MODE: ABNORMAL
MODE: NORMAL
MONOCYTES # BLD AUTO: 0.2 K/UL (ref 0.3–1)
MONOCYTES # BLD AUTO: 0.3 K/UL (ref 0.3–1)
MONOCYTES # BLD AUTO: 0.4 K/UL (ref 0.3–1)
MONOCYTES # BLD AUTO: 0.4 K/UL (ref 0.3–1)
MONOCYTES # BLD AUTO: 0.5 K/UL (ref 0.3–1)
MONOCYTES # BLD AUTO: 0.6 K/UL (ref 0.3–1)
MONOCYTES # BLD AUTO: 0.6 K/UL (ref 0.3–1)
MONOCYTES # BLD AUTO: 0.7 K/UL (ref 0.3–1)
MONOCYTES # BLD AUTO: 0.8 K/UL (ref 0.3–1)
MONOCYTES # BLD AUTO: 0.9 K/UL (ref 0.3–1)
MONOCYTES # BLD AUTO: 1 K/UL (ref 0.3–1)
MONOCYTES # BLD AUTO: 1.1 K/UL (ref 0.3–1)
MONOCYTES # BLD AUTO: 1.2 K/UL (ref 0.3–1)
MONOCYTES # BLD AUTO: 1.2 K/UL (ref 0.3–1)
MONOCYTES # BLD AUTO: 1.3 K/UL (ref 0.3–1)
MONOCYTES # BLD AUTO: 1.3 K/UL (ref 0.3–1)
MONOCYTES # BLD AUTO: 1.5 K/UL (ref 0.3–1)
MONOCYTES # BLD AUTO: ABNORMAL K/UL (ref 0.3–1)
MONOCYTES NFR BLD: 10 % (ref 4–15)
MONOCYTES NFR BLD: 10.3 % (ref 4–15)
MONOCYTES NFR BLD: 10.7 % (ref 4–15)
MONOCYTES NFR BLD: 11 % (ref 4–15)
MONOCYTES NFR BLD: 2 % (ref 4–15)
MONOCYTES NFR BLD: 2.7 % (ref 4–15)
MONOCYTES NFR BLD: 2.7 % (ref 4–15)
MONOCYTES NFR BLD: 3 % (ref 4–15)
MONOCYTES NFR BLD: 3.8 % (ref 4–15)
MONOCYTES NFR BLD: 4 % (ref 4–15)
MONOCYTES NFR BLD: 4.5 % (ref 4–15)
MONOCYTES NFR BLD: 4.6 % (ref 4–15)
MONOCYTES NFR BLD: 5 % (ref 4–15)
MONOCYTES NFR BLD: 5.1 % (ref 4–15)
MONOCYTES NFR BLD: 5.2 % (ref 4–15)
MONOCYTES NFR BLD: 5.4 % (ref 4–15)
MONOCYTES NFR BLD: 5.7 % (ref 4–15)
MONOCYTES NFR BLD: 5.8 % (ref 4–15)
MONOCYTES NFR BLD: 6 % (ref 4–15)
MONOCYTES NFR BLD: 6.2 % (ref 4–15)
MONOCYTES NFR BLD: 6.4 % (ref 4–15)
MONOCYTES NFR BLD: 6.5 % (ref 4–15)
MONOCYTES NFR BLD: 6.6 % (ref 4–15)
MONOCYTES NFR BLD: 6.6 % (ref 4–15)
MONOCYTES NFR BLD: 6.8 % (ref 4–15)
MONOCYTES NFR BLD: 6.9 % (ref 4–15)
MONOCYTES NFR BLD: 7 % (ref 4–15)
MONOCYTES NFR BLD: 7 % (ref 4–15)
MONOCYTES NFR BLD: 7.2 % (ref 4–15)
MONOCYTES NFR BLD: 7.3 % (ref 4–15)
MONOCYTES NFR BLD: 7.4 % (ref 4–15)
MONOCYTES NFR BLD: 7.6 % (ref 4–15)
MONOCYTES NFR BLD: 7.6 % (ref 4–15)
MONOCYTES NFR BLD: 7.7 % (ref 4–15)
MONOCYTES NFR BLD: 7.8 % (ref 4–15)
MONOCYTES NFR BLD: 7.9 % (ref 4–15)
MONOCYTES NFR BLD: 8.1 % (ref 4–15)
MONOCYTES NFR BLD: 8.2 % (ref 4–15)
MONOCYTES NFR BLD: 8.3 % (ref 4–15)
MONOCYTES NFR BLD: 8.6 % (ref 4–15)
MONOCYTES NFR BLD: 8.6 % (ref 4–15)
MONOCYTES NFR BLD: 8.9 % (ref 4–15)
MONOCYTES NFR BLD: 9.1 % (ref 4–15)
MONOCYTES NFR BLD: 9.1 % (ref 4–15)
MONOCYTES NFR BLD: 9.2 % (ref 4–15)
MONOCYTES NFR BLD: 9.8 % (ref 4–15)
MONOCYTES NFR BLD: 9.9 % (ref 4–15)
MV A" WAVE DURATION": 138 MSEC
MV PEAK A VEL: 0.6 M/S
MV PEAK A VEL: 0.74 M/S
MV PEAK E VEL: 0.93 M/S
MV PEAK E VEL: 1.27 M/S
MYELOCYTES NFR BLD MANUAL: 1 %
MYELOCYTES NFR BLD MANUAL: 2 %
MYELOCYTES NFR BLD MANUAL: 2 %
MYELOCYTES NFR BLD MANUAL: 3 %
MYELOCYTES NFR BLD MANUAL: 3 %
NEUTROPHILS # BLD AUTO: 10.1 K/UL (ref 1.8–7.7)
NEUTROPHILS # BLD AUTO: 10.3 K/UL (ref 1.8–7.7)
NEUTROPHILS # BLD AUTO: 10.4 K/UL (ref 1.8–7.7)
NEUTROPHILS # BLD AUTO: 10.8 K/UL (ref 1.8–7.7)
NEUTROPHILS # BLD AUTO: 10.9 K/UL (ref 1.8–7.7)
NEUTROPHILS # BLD AUTO: 11.3 K/UL (ref 1.8–7.7)
NEUTROPHILS # BLD AUTO: 11.4 K/UL (ref 1.8–7.7)
NEUTROPHILS # BLD AUTO: 11.5 K/UL (ref 1.8–7.7)
NEUTROPHILS # BLD AUTO: 11.6 K/UL (ref 1.8–7.7)
NEUTROPHILS # BLD AUTO: 11.7 K/UL (ref 1.8–7.7)
NEUTROPHILS # BLD AUTO: 11.8 K/UL (ref 1.8–7.7)
NEUTROPHILS # BLD AUTO: 11.9 K/UL (ref 1.8–7.7)
NEUTROPHILS # BLD AUTO: 12 K/UL (ref 1.8–7.7)
NEUTROPHILS # BLD AUTO: 12.2 K/UL (ref 1.8–7.7)
NEUTROPHILS # BLD AUTO: 12.6 K/UL (ref 1.8–7.7)
NEUTROPHILS # BLD AUTO: 12.9 K/UL (ref 1.8–7.7)
NEUTROPHILS # BLD AUTO: 13.5 K/UL (ref 1.8–7.7)
NEUTROPHILS # BLD AUTO: 13.8 K/UL (ref 1.8–7.7)
NEUTROPHILS # BLD AUTO: 14.2 K/UL (ref 1.8–7.7)
NEUTROPHILS # BLD AUTO: 14.2 K/UL (ref 1.8–7.7)
NEUTROPHILS # BLD AUTO: 15.1 K/UL (ref 1.8–7.7)
NEUTROPHILS # BLD AUTO: 15.4 K/UL (ref 1.8–7.7)
NEUTROPHILS # BLD AUTO: 16.7 K/UL (ref 1.8–7.7)
NEUTROPHILS # BLD AUTO: 19.1 K/UL (ref 1.8–7.7)
NEUTROPHILS # BLD AUTO: 26.1 K/UL (ref 1.8–7.7)
NEUTROPHILS # BLD AUTO: 5.1 K/UL (ref 1.8–7.7)
NEUTROPHILS # BLD AUTO: 5.6 K/UL (ref 1.8–7.7)
NEUTROPHILS # BLD AUTO: 5.7 K/UL (ref 1.8–7.7)
NEUTROPHILS # BLD AUTO: 6.1 K/UL (ref 1.8–7.7)
NEUTROPHILS # BLD AUTO: 6.3 K/UL (ref 1.8–7.7)
NEUTROPHILS # BLD AUTO: 6.5 K/UL (ref 1.8–7.7)
NEUTROPHILS # BLD AUTO: 6.9 K/UL (ref 1.8–7.7)
NEUTROPHILS # BLD AUTO: 7 K/UL (ref 1.8–7.7)
NEUTROPHILS # BLD AUTO: 7.4 K/UL (ref 1.8–7.7)
NEUTROPHILS # BLD AUTO: 7.5 K/UL (ref 1.8–7.7)
NEUTROPHILS # BLD AUTO: 7.6 K/UL (ref 1.8–7.7)
NEUTROPHILS # BLD AUTO: 7.7 K/UL (ref 1.8–7.7)
NEUTROPHILS # BLD AUTO: 7.9 K/UL (ref 1.8–7.7)
NEUTROPHILS # BLD AUTO: 8 K/UL (ref 1.8–7.7)
NEUTROPHILS # BLD AUTO: 8.1 K/UL (ref 1.8–7.7)
NEUTROPHILS # BLD AUTO: 8.2 K/UL (ref 1.8–7.7)
NEUTROPHILS # BLD AUTO: 8.2 K/UL (ref 1.8–7.7)
NEUTROPHILS # BLD AUTO: 8.4 K/UL (ref 1.8–7.7)
NEUTROPHILS # BLD AUTO: 8.4 K/UL (ref 1.8–7.7)
NEUTROPHILS # BLD AUTO: 8.5 K/UL (ref 1.8–7.7)
NEUTROPHILS # BLD AUTO: 8.6 K/UL (ref 1.8–7.7)
NEUTROPHILS # BLD AUTO: 8.6 K/UL (ref 1.8–7.7)
NEUTROPHILS # BLD AUTO: 8.8 K/UL (ref 1.8–7.7)
NEUTROPHILS # BLD AUTO: 8.8 K/UL (ref 1.8–7.7)
NEUTROPHILS # BLD AUTO: 8.9 K/UL (ref 1.8–7.7)
NEUTROPHILS # BLD AUTO: 9 K/UL (ref 1.8–7.7)
NEUTROPHILS # BLD AUTO: 9.1 K/UL (ref 1.8–7.7)
NEUTROPHILS # BLD AUTO: 9.4 K/UL (ref 1.8–7.7)
NEUTROPHILS # BLD AUTO: 9.7 K/UL (ref 1.8–7.7)
NEUTROPHILS # BLD AUTO: 9.8 K/UL (ref 1.8–7.7)
NEUTROPHILS # BLD AUTO: 9.8 K/UL (ref 1.8–7.7)
NEUTROPHILS NFR BLD: 74.9 % (ref 38–73)
NEUTROPHILS NFR BLD: 75.2 % (ref 38–73)
NEUTROPHILS NFR BLD: 76 % (ref 38–73)
NEUTROPHILS NFR BLD: 76.1 % (ref 38–73)
NEUTROPHILS NFR BLD: 77.3 % (ref 38–73)
NEUTROPHILS NFR BLD: 77.3 % (ref 38–73)
NEUTROPHILS NFR BLD: 77.7 % (ref 38–73)
NEUTROPHILS NFR BLD: 77.9 % (ref 38–73)
NEUTROPHILS NFR BLD: 78.6 % (ref 38–73)
NEUTROPHILS NFR BLD: 78.9 % (ref 38–73)
NEUTROPHILS NFR BLD: 79.1 % (ref 38–73)
NEUTROPHILS NFR BLD: 79.5 % (ref 38–73)
NEUTROPHILS NFR BLD: 79.7 % (ref 38–73)
NEUTROPHILS NFR BLD: 80 % (ref 38–73)
NEUTROPHILS NFR BLD: 80.2 % (ref 38–73)
NEUTROPHILS NFR BLD: 80.4 % (ref 38–73)
NEUTROPHILS NFR BLD: 80.8 % (ref 38–73)
NEUTROPHILS NFR BLD: 80.9 % (ref 38–73)
NEUTROPHILS NFR BLD: 80.9 % (ref 38–73)
NEUTROPHILS NFR BLD: 81.2 % (ref 38–73)
NEUTROPHILS NFR BLD: 81.4 % (ref 38–73)
NEUTROPHILS NFR BLD: 81.5 % (ref 38–73)
NEUTROPHILS NFR BLD: 81.9 % (ref 38–73)
NEUTROPHILS NFR BLD: 81.9 % (ref 38–73)
NEUTROPHILS NFR BLD: 82.3 % (ref 38–73)
NEUTROPHILS NFR BLD: 82.5 % (ref 38–73)
NEUTROPHILS NFR BLD: 82.6 % (ref 38–73)
NEUTROPHILS NFR BLD: 82.7 % (ref 38–73)
NEUTROPHILS NFR BLD: 83 % (ref 38–73)
NEUTROPHILS NFR BLD: 83 % (ref 38–73)
NEUTROPHILS NFR BLD: 83.4 % (ref 38–73)
NEUTROPHILS NFR BLD: 83.5 % (ref 38–73)
NEUTROPHILS NFR BLD: 84 % (ref 38–73)
NEUTROPHILS NFR BLD: 84 % (ref 38–73)
NEUTROPHILS NFR BLD: 84.2 % (ref 38–73)
NEUTROPHILS NFR BLD: 84.7 % (ref 38–73)
NEUTROPHILS NFR BLD: 84.7 % (ref 38–73)
NEUTROPHILS NFR BLD: 84.9 % (ref 38–73)
NEUTROPHILS NFR BLD: 85 % (ref 38–73)
NEUTROPHILS NFR BLD: 85 % (ref 38–73)
NEUTROPHILS NFR BLD: 85.2 % (ref 38–73)
NEUTROPHILS NFR BLD: 85.2 % (ref 38–73)
NEUTROPHILS NFR BLD: 85.3 % (ref 38–73)
NEUTROPHILS NFR BLD: 85.4 % (ref 38–73)
NEUTROPHILS NFR BLD: 85.6 % (ref 38–73)
NEUTROPHILS NFR BLD: 85.7 % (ref 38–73)
NEUTROPHILS NFR BLD: 85.8 % (ref 38–73)
NEUTROPHILS NFR BLD: 86 % (ref 38–73)
NEUTROPHILS NFR BLD: 86.2 % (ref 38–73)
NEUTROPHILS NFR BLD: 86.2 % (ref 38–73)
NEUTROPHILS NFR BLD: 86.7 % (ref 38–73)
NEUTROPHILS NFR BLD: 87 % (ref 38–73)
NEUTROPHILS NFR BLD: 87.3 % (ref 38–73)
NEUTROPHILS NFR BLD: 88.1 % (ref 38–73)
NEUTROPHILS NFR BLD: 88.3 % (ref 38–73)
NEUTROPHILS NFR BLD: 88.7 % (ref 38–73)
NEUTROPHILS NFR BLD: 88.9 % (ref 38–73)
NEUTROPHILS NFR BLD: 90.6 % (ref 38–73)
NEUTROPHILS NFR BLD: 90.8 % (ref 38–73)
NEUTROPHILS NFR BLD: 91.4 % (ref 38–73)
NEUTROPHILS NFR BLD: 92 % (ref 38–73)
NEUTROPHILS NFR BLD: 93 % (ref 38–73)
NEUTS BAND NFR BLD MANUAL: 1 %
NEUTS BAND NFR BLD MANUAL: 1.5 %
NEUTS BAND NFR BLD MANUAL: 2 %
NEUTS BAND NFR BLD MANUAL: 2 %
NITRITE UR QL STRIP: ABNORMAL
NITRITE UR QL STRIP: NEGATIVE
NITRITE, POC UA: ABNORMAL
NON-SQ EPI CELLS #/AREA URNS AUTO: 1 /HPF
NON-SQ EPI CELLS #/AREA URNS AUTO: 1 /HPF
NONHDLC SERPL-MCNC: 80 MG/DL
NRBC BLD-RTO: 0 /100 WBC
NRBC BLD-RTO: 1 /100 WBC
NUM UNITS TRANS PACKED RBC: NORMAL
OB PNL STL: NEGATIVE
OVALOCYTES BLD QL SMEAR: ABNORMAL
PCO2 BLDA: 31 MMHG (ref 35–45)
PCO2 BLDA: 31.4 MMHG (ref 35–45)
PCO2 BLDA: 33.2 MMHG (ref 35–45)
PCO2 BLDA: 35.3 MMHG (ref 35–45)
PCO2 BLDA: 37 MMHG (ref 35–45)
PCO2 BLDA: 46.9 MMHG (ref 35–45)
PCO2 BLDA: 47.5 MMHG (ref 35–45)
PCO2 BLDA: 47.5 MMHG (ref 35–45)
PCO2 BLDA: 48.7 MMHG (ref 35–45)
PCO2 BLDA: 49.3 MMHG (ref 35–45)
PCO2 BLDA: 51.5 MMHG (ref 35–45)
PCO2 BLDA: 55.2 MMHG (ref 35–45)
PCO2 BLDA: 60.4 MMHG (ref 35–45)
PCO2 BLDA: 62 MMHG (ref 35–45)
PCO2 BLDA: 63.5 MMHG (ref 35–45)
PCO2 BLDA: 64.9 MMHG (ref 35–45)
PCO2 BLDA: 65.4 MMHG (ref 35–45)
PCO2 BLDA: 70.7 MMHG (ref 35–45)
PCO2 BLDA: 82.8 MMHG (ref 35–45)
PEEP: 10
PEEP: 10
PEEP: 5
PH SMN: 7.09 [PH] (ref 7.35–7.45)
PH SMN: 7.1 [PH] (ref 7.35–7.45)
PH SMN: 7.11 [PH] (ref 7.35–7.45)
PH SMN: 7.17 [PH] (ref 7.35–7.45)
PH SMN: 7.17 [PH] (ref 7.35–7.45)
PH SMN: 7.2 [PH] (ref 7.35–7.45)
PH SMN: 7.23 [PH] (ref 7.35–7.45)
PH SMN: 7.24 [PH] (ref 7.35–7.45)
PH SMN: 7.26 [PH] (ref 7.35–7.45)
PH SMN: 7.27 [PH] (ref 7.35–7.45)
PH SMN: 7.3 [PH] (ref 7.35–7.45)
PH SMN: 7.32 [PH] (ref 7.35–7.45)
PH SMN: 7.32 [PH] (ref 7.35–7.45)
PH SMN: 7.33 [PH] (ref 7.35–7.45)
PH SMN: 7.33 [PH] (ref 7.35–7.45)
PH SMN: 7.35 [PH] (ref 7.35–7.45)
PH SMN: 7.38 [PH] (ref 7.35–7.45)
PH SMN: 7.38 [PH] (ref 7.35–7.45)
PH SMN: 7.47 [PH] (ref 7.35–7.45)
PH UR STRIP: 5 [PH] (ref 5–8)
PH UR STRIP: ABNORMAL [PH] (ref 5–8)
PH, POC UA: 5.5
PHOSPHATE SERPL-MCNC: 1.9 MG/DL (ref 2.7–4.5)
PHOSPHATE SERPL-MCNC: 1.9 MG/DL (ref 2.7–4.5)
PHOSPHATE SERPL-MCNC: 2.3 MG/DL (ref 2.7–4.5)
PHOSPHATE SERPL-MCNC: 2.4 MG/DL (ref 2.7–4.5)
PHOSPHATE SERPL-MCNC: 2.8 MG/DL (ref 2.7–4.5)
PHOSPHATE SERPL-MCNC: 3.3 MG/DL (ref 2.7–4.5)
PHOSPHATE SERPL-MCNC: 3.9 MG/DL (ref 2.7–4.5)
PHOSPHATE SERPL-MCNC: 4.1 MG/DL (ref 2.7–4.5)
PHOSPHATE SERPL-MCNC: 4.3 MG/DL (ref 2.7–4.5)
PHOSPHATE SERPL-MCNC: 4.3 MG/DL (ref 2.7–4.5)
PHOSPHATE SERPL-MCNC: 4.4 MG/DL (ref 2.7–4.5)
PHOSPHATE SERPL-MCNC: 4.4 MG/DL (ref 2.7–4.5)
PHOSPHATE SERPL-MCNC: 4.7 MG/DL (ref 2.7–4.5)
PHOSPHATE SERPL-MCNC: 4.9 MG/DL (ref 2.7–4.5)
PHOSPHATE SERPL-MCNC: 5.2 MG/DL (ref 2.7–4.5)
PHOSPHATE SERPL-MCNC: 5.3 MG/DL (ref 2.7–4.5)
PHOSPHATE SERPL-MCNC: 5.3 MG/DL (ref 2.7–4.5)
PHOSPHATE SERPL-MCNC: 5.4 MG/DL (ref 2.7–4.5)
PHOSPHATE SERPL-MCNC: 5.5 MG/DL (ref 2.7–4.5)
PHOSPHATE SERPL-MCNC: 5.7 MG/DL (ref 2.7–4.5)
PHOSPHATE SERPL-MCNC: 5.9 MG/DL (ref 2.7–4.5)
PHOSPHATE SERPL-MCNC: 6 MG/DL (ref 2.7–4.5)
PHOSPHATE SERPL-MCNC: 6 MG/DL (ref 2.7–4.5)
PHOSPHATE SERPL-MCNC: 6.1 MG/DL (ref 2.7–4.5)
PHOSPHATE SERPL-MCNC: 6.2 MG/DL (ref 2.7–4.5)
PHOSPHATE SERPL-MCNC: 6.3 MG/DL (ref 2.7–4.5)
PHOSPHATE SERPL-MCNC: 6.3 MG/DL (ref 2.7–4.5)
PHOSPHATE SERPL-MCNC: 6.5 MG/DL (ref 2.7–4.5)
PHOSPHATE SERPL-MCNC: 6.6 MG/DL (ref 2.7–4.5)
PHOSPHATE SERPL-MCNC: 6.6 MG/DL (ref 2.7–4.5)
PHOSPHATE SERPL-MCNC: 6.8 MG/DL (ref 2.7–4.5)
PHOSPHATE SERPL-MCNC: 6.8 MG/DL (ref 2.7–4.5)
PHOSPHATE SERPL-MCNC: 6.9 MG/DL (ref 2.7–4.5)
PHOSPHATE SERPL-MCNC: 7.1 MG/DL (ref 2.7–4.5)
PHOSPHATE SERPL-MCNC: 7.1 MG/DL (ref 2.7–4.5)
PHOSPHATE SERPL-MCNC: 7.2 MG/DL (ref 2.7–4.5)
PHOSPHATE SERPL-MCNC: 7.2 MG/DL (ref 2.7–4.5)
PHOSPHATE SERPL-MCNC: 7.4 MG/DL (ref 2.7–4.5)
PHOSPHATE SERPL-MCNC: 7.7 MG/DL (ref 2.7–4.5)
PHOSPHATE SERPL-MCNC: 8 MG/DL (ref 2.7–4.5)
PHOSPHATE SERPL-MCNC: 8.7 MG/DL (ref 2.7–4.5)
PIP: 18
PIP: 20
PIP: 30
PIP: 33
PISA TR MAX VEL: 2.82 M/S
PISA TR MAX VEL: 2.96 M/S
PLATELET # BLD AUTO: 102 K/UL (ref 150–350)
PLATELET # BLD AUTO: 103 K/UL (ref 150–350)
PLATELET # BLD AUTO: 105 K/UL (ref 150–350)
PLATELET # BLD AUTO: 107 K/UL (ref 150–350)
PLATELET # BLD AUTO: 110 K/UL (ref 150–350)
PLATELET # BLD AUTO: 111 K/UL (ref 150–350)
PLATELET # BLD AUTO: 112 K/UL (ref 150–350)
PLATELET # BLD AUTO: 113 K/UL (ref 150–350)
PLATELET # BLD AUTO: 113 K/UL (ref 150–350)
PLATELET # BLD AUTO: 114 K/UL (ref 150–350)
PLATELET # BLD AUTO: 115 K/UL (ref 150–350)
PLATELET # BLD AUTO: 116 K/UL (ref 150–350)
PLATELET # BLD AUTO: 117 K/UL (ref 150–350)
PLATELET # BLD AUTO: 117 K/UL (ref 150–350)
PLATELET # BLD AUTO: 120 K/UL (ref 150–350)
PLATELET # BLD AUTO: 121 K/UL (ref 150–350)
PLATELET # BLD AUTO: 122 K/UL (ref 150–350)
PLATELET # BLD AUTO: 122 K/UL (ref 150–350)
PLATELET # BLD AUTO: 125 K/UL (ref 150–350)
PLATELET # BLD AUTO: 126 K/UL (ref 150–350)
PLATELET # BLD AUTO: 127 K/UL (ref 150–350)
PLATELET # BLD AUTO: 130 K/UL (ref 150–350)
PLATELET # BLD AUTO: 131 K/UL (ref 150–350)
PLATELET # BLD AUTO: 132 K/UL (ref 150–350)
PLATELET # BLD AUTO: 134 K/UL (ref 150–350)
PLATELET # BLD AUTO: 137 K/UL (ref 150–350)
PLATELET # BLD AUTO: 139 K/UL (ref 150–350)
PLATELET # BLD AUTO: 139 K/UL (ref 150–350)
PLATELET # BLD AUTO: 144 K/UL (ref 150–350)
PLATELET # BLD AUTO: 144 K/UL (ref 150–350)
PLATELET # BLD AUTO: 145 K/UL (ref 150–350)
PLATELET # BLD AUTO: 145 K/UL (ref 150–350)
PLATELET # BLD AUTO: 156 K/UL (ref 150–350)
PLATELET # BLD AUTO: 160 K/UL (ref 150–350)
PLATELET # BLD AUTO: 162 K/UL (ref 150–350)
PLATELET # BLD AUTO: 162 K/UL (ref 150–350)
PLATELET # BLD AUTO: 163 K/UL (ref 150–350)
PLATELET # BLD AUTO: 163 K/UL (ref 150–350)
PLATELET # BLD AUTO: 164 K/UL (ref 150–350)
PLATELET # BLD AUTO: 167 K/UL (ref 150–350)
PLATELET # BLD AUTO: 168 K/UL (ref 150–350)
PLATELET # BLD AUTO: 171 K/UL (ref 150–350)
PLATELET # BLD AUTO: 173 K/UL (ref 150–350)
PLATELET # BLD AUTO: 176 K/UL (ref 150–350)
PLATELET # BLD AUTO: 179 K/UL (ref 150–350)
PLATELET # BLD AUTO: 182 K/UL (ref 150–350)
PLATELET # BLD AUTO: 189 K/UL (ref 150–350)
PLATELET # BLD AUTO: 191 K/UL (ref 150–350)
PLATELET # BLD AUTO: 198 K/UL (ref 150–350)
PLATELET # BLD AUTO: 203 K/UL (ref 150–350)
PLATELET # BLD AUTO: 205 K/UL (ref 150–350)
PLATELET # BLD AUTO: 59 K/UL (ref 150–350)
PLATELET # BLD AUTO: 59 K/UL (ref 150–350)
PLATELET # BLD AUTO: 61 K/UL (ref 150–350)
PLATELET # BLD AUTO: 63 K/UL (ref 150–350)
PLATELET # BLD AUTO: 78 K/UL (ref 150–350)
PLATELET # BLD AUTO: 81 K/UL (ref 150–350)
PLATELET # BLD AUTO: 95 K/UL (ref 150–350)
PLATELET # BLD AUTO: 98 K/UL (ref 150–350)
PLATELET # BLD AUTO: 99 K/UL (ref 150–350)
PLATELET BLD QL SMEAR: ABNORMAL
PMV BLD AUTO: 10 FL (ref 9.2–12.9)
PMV BLD AUTO: 10 FL (ref 9.2–12.9)
PMV BLD AUTO: 10.1 FL (ref 9.2–12.9)
PMV BLD AUTO: 10.2 FL (ref 9.2–12.9)
PMV BLD AUTO: 10.3 FL (ref 9.2–12.9)
PMV BLD AUTO: 10.4 FL (ref 9.2–12.9)
PMV BLD AUTO: 10.5 FL (ref 9.2–12.9)
PMV BLD AUTO: 10.6 FL (ref 9.2–12.9)
PMV BLD AUTO: 10.7 FL (ref 9.2–12.9)
PMV BLD AUTO: 10.7 FL (ref 9.2–12.9)
PMV BLD AUTO: 10.8 FL (ref 9.2–12.9)
PMV BLD AUTO: 10.9 FL (ref 9.2–12.9)
PMV BLD AUTO: 11 FL (ref 9.2–12.9)
PMV BLD AUTO: 11.1 FL (ref 9.2–12.9)
PMV BLD AUTO: 11.2 FL (ref 9.2–12.9)
PMV BLD AUTO: 11.3 FL (ref 9.2–12.9)
PMV BLD AUTO: 11.4 FL (ref 9.2–12.9)
PMV BLD AUTO: 11.4 FL (ref 9.2–12.9)
PMV BLD AUTO: 11.5 FL (ref 9.2–12.9)
PMV BLD AUTO: 11.5 FL (ref 9.2–12.9)
PMV BLD AUTO: 11.6 FL (ref 9.2–12.9)
PMV BLD AUTO: 11.7 FL (ref 9.2–12.9)
PMV BLD AUTO: 11.8 FL (ref 9.2–12.9)
PMV BLD AUTO: 12.1 FL (ref 9.2–12.9)
PMV BLD AUTO: 12.2 FL (ref 9.2–12.9)
PMV BLD AUTO: 12.4 FL (ref 9.2–12.9)
PMV BLD AUTO: 12.7 FL (ref 9.2–12.9)
PMV BLD AUTO: 8.4 FL (ref 9.2–12.9)
PMV BLD AUTO: 9.3 FL (ref 9.2–12.9)
PMV BLD AUTO: 9.4 FL (ref 9.2–12.9)
PMV BLD AUTO: 9.6 FL (ref 9.2–12.9)
PMV BLD AUTO: 9.7 FL (ref 9.2–12.9)
PMV BLD AUTO: 9.7 FL (ref 9.2–12.9)
PMV BLD AUTO: 9.8 FL (ref 9.2–12.9)
PMV BLD AUTO: 9.9 FL (ref 9.2–12.9)
PO2 BLDA: 101 MMHG (ref 80–100)
PO2 BLDA: 101 MMHG (ref 80–100)
PO2 BLDA: 109 MMHG (ref 80–100)
PO2 BLDA: 110 MMHG (ref 80–100)
PO2 BLDA: 116 MMHG (ref 80–100)
PO2 BLDA: 120 MMHG (ref 80–100)
PO2 BLDA: 122 MMHG (ref 80–100)
PO2 BLDA: 172 MMHG (ref 80–100)
PO2 BLDA: 176 MMHG (ref 80–100)
PO2 BLDA: 29 MMHG (ref 40–60)
PO2 BLDA: 30 MMHG (ref 80–100)
PO2 BLDA: 307 MMHG (ref 80–100)
PO2 BLDA: 308 MMHG (ref 80–100)
PO2 BLDA: 43 MMHG (ref 40–60)
PO2 BLDA: 70 MMHG (ref 80–100)
PO2 BLDA: 75 MMHG (ref 80–100)
PO2 BLDA: 84 MMHG (ref 80–100)
PO2 BLDA: 92 MMHG (ref 80–100)
PO2 BLDA: 94 MMHG (ref 80–100)
POC BE: -1 MMOL/L
POC BE: -10 MMOL/L
POC BE: -10 MMOL/L
POC BE: -11 MMOL/L
POC BE: -2 MMOL/L
POC BE: -2 MMOL/L
POC BE: -3 MMOL/L
POC BE: -3 MMOL/L
POC BE: -4 MMOL/L
POC BE: -7 MMOL/L
POC BE: -9 MMOL/L
POC BE: 0 MMOL/L
POC BE: 1 MMOL/L
POC BE: 1 MMOL/L
POC IONIZED CALCIUM: 1.19 MMOL/L (ref 1.06–1.42)
POC IONIZED CALCIUM: 1.22 MMOL/L (ref 1.06–1.42)
POC SATURATED O2: 100 % (ref 95–100)
POC SATURATED O2: 35 % (ref 95–100)
POC SATURATED O2: 39 % (ref 95–100)
POC SATURATED O2: 61 % (ref 95–100)
POC SATURATED O2: 92 % (ref 95–100)
POC SATURATED O2: 95 % (ref 95–100)
POC SATURATED O2: 96 % (ref 95–100)
POC SATURATED O2: 96 % (ref 95–100)
POC SATURATED O2: 97 % (ref 95–100)
POC SATURATED O2: 98 % (ref 95–100)
POC SATURATED O2: 98 % (ref 95–100)
POC SATURATED O2: 99 % (ref 95–100)
POC SATURATED O2: 99 % (ref 95–100)
POC TCO2: 17 MMOL/L (ref 23–27)
POC TCO2: 17 MMOL/L (ref 23–27)
POC TCO2: 20 MMOL/L (ref 23–27)
POC TCO2: 20 MMOL/L (ref 23–27)
POC TCO2: 21 MMOL/L (ref 23–27)
POC TCO2: 21 MMOL/L (ref 24–29)
POC TCO2: 22 MMOL/L (ref 24–29)
POC TCO2: 23 MMOL/L (ref 23–27)
POC TCO2: 25 MMOL/L (ref 23–27)
POC TCO2: 26 MMOL/L (ref 23–27)
POC TCO2: 26 MMOL/L (ref 23–27)
POC TCO2: 27 MMOL/L (ref 23–27)
POC TCO2: 27 MMOL/L (ref 23–27)
POC TCO2: 29 MMOL/L (ref 23–27)
POC TCO2: 30 MMOL/L (ref 23–27)
POC TCO2: 30 MMOL/L (ref 23–27)
POC TCO2: 32 MMOL/L (ref 23–27)
POCT GLUCOSE: 100 MG/DL (ref 70–110)
POCT GLUCOSE: 103 MG/DL (ref 70–110)
POCT GLUCOSE: 104 MG/DL (ref 70–110)
POCT GLUCOSE: 105 MG/DL (ref 70–110)
POCT GLUCOSE: 105 MG/DL (ref 70–110)
POCT GLUCOSE: 106 MG/DL (ref 70–110)
POCT GLUCOSE: 107 MG/DL (ref 70–110)
POCT GLUCOSE: 108 MG/DL (ref 70–110)
POCT GLUCOSE: 108 MG/DL (ref 70–110)
POCT GLUCOSE: 109 MG/DL (ref 70–110)
POCT GLUCOSE: 109 MG/DL (ref 70–110)
POCT GLUCOSE: 111 MG/DL (ref 70–110)
POCT GLUCOSE: 112 MG/DL (ref 70–110)
POCT GLUCOSE: 113 MG/DL (ref 70–110)
POCT GLUCOSE: 114 MG/DL (ref 70–110)
POCT GLUCOSE: 115 MG/DL (ref 70–110)
POCT GLUCOSE: 116 MG/DL (ref 70–110)
POCT GLUCOSE: 116 MG/DL (ref 70–110)
POCT GLUCOSE: 117 MG/DL (ref 70–110)
POCT GLUCOSE: 117 MG/DL (ref 70–110)
POCT GLUCOSE: 118 MG/DL (ref 70–110)
POCT GLUCOSE: 119 MG/DL (ref 70–110)
POCT GLUCOSE: 120 MG/DL (ref 70–110)
POCT GLUCOSE: 121 MG/DL (ref 70–110)
POCT GLUCOSE: 122 MG/DL (ref 70–110)
POCT GLUCOSE: 122 MG/DL (ref 70–110)
POCT GLUCOSE: 123 MG/DL (ref 70–110)
POCT GLUCOSE: 124 MG/DL (ref 70–110)
POCT GLUCOSE: 125 MG/DL (ref 70–110)
POCT GLUCOSE: 126 MG/DL (ref 70–110)
POCT GLUCOSE: 127 MG/DL (ref 70–110)
POCT GLUCOSE: 128 MG/DL (ref 70–110)
POCT GLUCOSE: 129 MG/DL (ref 70–110)
POCT GLUCOSE: 130 MG/DL (ref 70–110)
POCT GLUCOSE: 130 MG/DL (ref 70–110)
POCT GLUCOSE: 131 MG/DL (ref 70–110)
POCT GLUCOSE: 132 MG/DL (ref 70–110)
POCT GLUCOSE: 133 MG/DL (ref 70–110)
POCT GLUCOSE: 135 MG/DL (ref 70–110)
POCT GLUCOSE: 135 MG/DL (ref 70–110)
POCT GLUCOSE: 136 MG/DL (ref 70–110)
POCT GLUCOSE: 136 MG/DL (ref 70–110)
POCT GLUCOSE: 137 MG/DL (ref 70–110)
POCT GLUCOSE: 137 MG/DL (ref 70–110)
POCT GLUCOSE: 138 MG/DL (ref 70–110)
POCT GLUCOSE: 138 MG/DL (ref 70–110)
POCT GLUCOSE: 140 MG/DL (ref 70–110)
POCT GLUCOSE: 141 MG/DL (ref 70–110)
POCT GLUCOSE: 141 MG/DL (ref 70–110)
POCT GLUCOSE: 142 MG/DL (ref 70–110)
POCT GLUCOSE: 143 MG/DL (ref 70–110)
POCT GLUCOSE: 143 MG/DL (ref 70–110)
POCT GLUCOSE: 144 MG/DL (ref 70–110)
POCT GLUCOSE: 145 MG/DL (ref 70–110)
POCT GLUCOSE: 145 MG/DL (ref 70–110)
POCT GLUCOSE: 146 MG/DL (ref 70–110)
POCT GLUCOSE: 146 MG/DL (ref 70–110)
POCT GLUCOSE: 147 MG/DL (ref 70–110)
POCT GLUCOSE: 149 MG/DL (ref 70–110)
POCT GLUCOSE: 153 MG/DL (ref 70–110)
POCT GLUCOSE: 154 MG/DL (ref 70–110)
POCT GLUCOSE: 156 MG/DL (ref 70–110)
POCT GLUCOSE: 157 MG/DL (ref 70–110)
POCT GLUCOSE: 158 MG/DL (ref 70–110)
POCT GLUCOSE: 160 MG/DL (ref 70–110)
POCT GLUCOSE: 161 MG/DL (ref 70–110)
POCT GLUCOSE: 161 MG/DL (ref 70–110)
POCT GLUCOSE: 163 MG/DL (ref 70–110)
POCT GLUCOSE: 163 MG/DL (ref 70–110)
POCT GLUCOSE: 164 MG/DL (ref 70–110)
POCT GLUCOSE: 165 MG/DL (ref 70–110)
POCT GLUCOSE: 165 MG/DL (ref 70–110)
POCT GLUCOSE: 166 MG/DL (ref 70–110)
POCT GLUCOSE: 166 MG/DL (ref 70–110)
POCT GLUCOSE: 170 MG/DL (ref 70–110)
POCT GLUCOSE: 172 MG/DL (ref 70–110)
POCT GLUCOSE: 173 MG/DL (ref 70–110)
POCT GLUCOSE: 174 MG/DL (ref 70–110)
POCT GLUCOSE: 175 MG/DL (ref 70–110)
POCT GLUCOSE: 176 MG/DL (ref 70–110)
POCT GLUCOSE: 177 MG/DL (ref 70–110)
POCT GLUCOSE: 178 MG/DL (ref 70–110)
POCT GLUCOSE: 179 MG/DL (ref 70–110)
POCT GLUCOSE: 180 MG/DL (ref 70–110)
POCT GLUCOSE: 180 MG/DL (ref 70–110)
POCT GLUCOSE: 181 MG/DL (ref 70–110)
POCT GLUCOSE: 182 MG/DL (ref 70–110)
POCT GLUCOSE: 183 MG/DL (ref 70–110)
POCT GLUCOSE: 186 MG/DL (ref 70–110)
POCT GLUCOSE: 188 MG/DL (ref 70–110)
POCT GLUCOSE: 189 MG/DL (ref 70–110)
POCT GLUCOSE: 190 MG/DL (ref 70–110)
POCT GLUCOSE: 190 MG/DL (ref 70–110)
POCT GLUCOSE: 191 MG/DL (ref 70–110)
POCT GLUCOSE: 194 MG/DL (ref 70–110)
POCT GLUCOSE: 194 MG/DL (ref 70–110)
POCT GLUCOSE: 200 MG/DL (ref 70–110)
POCT GLUCOSE: 202 MG/DL (ref 70–110)
POCT GLUCOSE: 206 MG/DL (ref 70–110)
POCT GLUCOSE: 216 MG/DL (ref 70–110)
POCT GLUCOSE: 221 MG/DL (ref 70–110)
POCT GLUCOSE: 225 MG/DL (ref 70–110)
POCT GLUCOSE: 230 MG/DL (ref 70–110)
POCT GLUCOSE: 54 MG/DL (ref 70–110)
POCT GLUCOSE: 57 MG/DL (ref 70–110)
POCT GLUCOSE: 62 MG/DL (ref 70–110)
POCT GLUCOSE: 63 MG/DL (ref 70–110)
POCT GLUCOSE: 63 MG/DL (ref 70–110)
POCT GLUCOSE: 66 MG/DL (ref 70–110)
POCT GLUCOSE: 66 MG/DL (ref 70–110)
POCT GLUCOSE: 69 MG/DL (ref 70–110)
POCT GLUCOSE: 72 MG/DL (ref 70–110)
POCT GLUCOSE: 77 MG/DL (ref 70–110)
POCT GLUCOSE: 77 MG/DL (ref 70–110)
POCT GLUCOSE: 78 MG/DL (ref 70–110)
POCT GLUCOSE: 81 MG/DL (ref 70–110)
POCT GLUCOSE: 82 MG/DL (ref 70–110)
POCT GLUCOSE: 84 MG/DL (ref 70–110)
POCT GLUCOSE: 86 MG/DL (ref 70–110)
POCT GLUCOSE: 89 MG/DL (ref 70–110)
POCT GLUCOSE: 89 MG/DL (ref 70–110)
POCT GLUCOSE: 90 MG/DL (ref 70–110)
POCT GLUCOSE: 90 MG/DL (ref 70–110)
POCT GLUCOSE: 91 MG/DL (ref 70–110)
POCT GLUCOSE: 92 MG/DL (ref 70–110)
POCT GLUCOSE: 93 MG/DL (ref 70–110)
POCT GLUCOSE: 94 MG/DL (ref 70–110)
POCT GLUCOSE: 95 MG/DL (ref 70–110)
POCT GLUCOSE: 95 MG/DL (ref 70–110)
POCT GLUCOSE: 96 MG/DL (ref 70–110)
POCT GLUCOSE: 96 MG/DL (ref 70–110)
POCT GLUCOSE: 97 MG/DL (ref 70–110)
POCT GLUCOSE: 98 MG/DL (ref 70–110)
POCT GLUCOSE: 99 MG/DL (ref 70–110)
POIKILOCYTOSIS BLD QL SMEAR: ABNORMAL
POIKILOCYTOSIS BLD QL SMEAR: SLIGHT
POLYCHROMASIA BLD QL SMEAR: ABNORMAL
POTASSIUM BLD-SCNC: 5 MMOL/L (ref 3.5–5.1)
POTASSIUM BLD-SCNC: 5.3 MMOL/L (ref 3.5–5.1)
POTASSIUM SERPL-SCNC: 3.7 MMOL/L (ref 3.5–5.1)
POTASSIUM SERPL-SCNC: 3.9 MMOL/L (ref 3.5–5.1)
POTASSIUM SERPL-SCNC: 4 MMOL/L (ref 3.5–5.1)
POTASSIUM SERPL-SCNC: 4 MMOL/L (ref 3.5–5.1)
POTASSIUM SERPL-SCNC: 4.2 MMOL/L (ref 3.5–5.1)
POTASSIUM SERPL-SCNC: 4.3 MMOL/L (ref 3.5–5.1)
POTASSIUM SERPL-SCNC: 4.4 MMOL/L (ref 3.5–5.1)
POTASSIUM SERPL-SCNC: 4.5 MMOL/L (ref 3.5–5.1)
POTASSIUM SERPL-SCNC: 4.6 MMOL/L (ref 3.5–5.1)
POTASSIUM SERPL-SCNC: 4.7 MMOL/L (ref 3.5–5.1)
POTASSIUM SERPL-SCNC: 4.8 MMOL/L (ref 3.5–5.1)
POTASSIUM SERPL-SCNC: 4.9 MMOL/L (ref 3.5–5.1)
POTASSIUM SERPL-SCNC: 5 MMOL/L (ref 3.5–5.1)
POTASSIUM SERPL-SCNC: 5.1 MMOL/L (ref 3.5–5.1)
POTASSIUM SERPL-SCNC: 5.1 MMOL/L (ref 3.5–5.1)
POTASSIUM SERPL-SCNC: 5.2 MMOL/L (ref 3.5–5.1)
POTASSIUM SERPL-SCNC: 5.3 MMOL/L (ref 3.5–5.1)
POTASSIUM SERPL-SCNC: 5.4 MMOL/L (ref 3.5–5.1)
POTASSIUM SERPL-SCNC: 5.6 MMOL/L (ref 3.5–5.1)
POTASSIUM SERPL-SCNC: 5.7 MMOL/L (ref 3.5–5.1)
POTASSIUM SERPL-SCNC: 6.2 MMOL/L (ref 3.5–5.1)
PROCALCITONIN SERPL IA-MCNC: 0.18 NG/ML
PROCALCITONIN SERPL IA-MCNC: 1.91 NG/ML
PROMYELOCYTES NFR BLD MANUAL: 1 %
PROT SERPL-MCNC: 4.8 G/DL (ref 6–8.4)
PROT SERPL-MCNC: 4.9 G/DL (ref 6–8.4)
PROT SERPL-MCNC: 5.1 G/DL (ref 6–8.4)
PROT SERPL-MCNC: 5.3 G/DL (ref 6–8.4)
PROT SERPL-MCNC: 5.4 G/DL (ref 6–8.4)
PROT SERPL-MCNC: 5.5 G/DL (ref 6–8.4)
PROT SERPL-MCNC: 5.6 G/DL (ref 6–8.4)
PROT SERPL-MCNC: 5.7 G/DL (ref 6–8.4)
PROT SERPL-MCNC: 5.7 G/DL (ref 6–8.4)
PROT SERPL-MCNC: 5.8 G/DL (ref 6–8.4)
PROT SERPL-MCNC: 5.9 G/DL (ref 6–8.4)
PROT SERPL-MCNC: 6 G/DL (ref 6–8.4)
PROT SERPL-MCNC: 6.1 G/DL (ref 6–8.4)
PROT SERPL-MCNC: 6.1 G/DL (ref 6–8.4)
PROT SERPL-MCNC: 6.3 G/DL (ref 6–8.4)
PROT SERPL-MCNC: 6.3 G/DL (ref 6–8.4)
PROT SERPL-MCNC: 6.4 G/DL (ref 6–8.4)
PROT SERPL-MCNC: 6.6 G/DL (ref 6–8.4)
PROT SERPL-MCNC: 6.6 G/DL (ref 6–8.4)
PROT UR QL STRIP: ABNORMAL
PROTEIN, POC: 300
PROTHROMBIN TIME: 10.9 SEC (ref 9–12.5)
PROTHROMBIN TIME: 10.9 SEC (ref 9–12.5)
PROTHROMBIN TIME: 11 SEC (ref 9–12.5)
PROTHROMBIN TIME: 11.2 SEC (ref 9–12.5)
PROTHROMBIN TIME: 11.2 SEC (ref 9–12.5)
PROTHROMBIN TIME: 11.3 SEC (ref 9–12.5)
PROTHROMBIN TIME: 11.5 SEC (ref 9–12.5)
PROTHROMBIN TIME: 11.7 SEC (ref 9–12.5)
PROTHROMBIN TIME: 11.9 SEC (ref 9–12.5)
PROTHROMBIN TIME: 12.1 SEC (ref 9–12.5)
PROTHROMBIN TIME: 12.2 SEC (ref 9–12.5)
PROTHROMBIN TIME: 12.3 SEC (ref 9–12.5)
PROTHROMBIN TIME: 12.4 SEC (ref 9–12.5)
PROTHROMBIN TIME: 12.4 SEC (ref 9–12.5)
PROTHROMBIN TIME: 12.5 SEC (ref 9–12.5)
PROTHROMBIN TIME: 12.6 SEC (ref 9–12.5)
PROVIDER CREDENTIALS: ABNORMAL
PROVIDER NOTIFIED: ABNORMAL
PULM VEIN A" WAVE DURATION": 125 MSEC
PV PEAK VELOCITY: 0.89 CM/S
RA MAJOR: 5.85 CM
RA PRESSURE: 3 MMHG
RA WIDTH: 4.86 CM
RBC # BLD AUTO: 1.8 M/UL (ref 4.6–6.2)
RBC # BLD AUTO: 2.23 M/UL (ref 4.6–6.2)
RBC # BLD AUTO: 2.32 M/UL (ref 4.6–6.2)
RBC # BLD AUTO: 2.33 M/UL (ref 4.6–6.2)
RBC # BLD AUTO: 2.33 M/UL (ref 4.6–6.2)
RBC # BLD AUTO: 2.39 M/UL (ref 4.6–6.2)
RBC # BLD AUTO: 2.42 M/UL (ref 4.6–6.2)
RBC # BLD AUTO: 2.46 M/UL (ref 4.6–6.2)
RBC # BLD AUTO: 2.47 M/UL (ref 4.6–6.2)
RBC # BLD AUTO: 2.49 M/UL (ref 4.6–6.2)
RBC # BLD AUTO: 2.5 M/UL (ref 4.6–6.2)
RBC # BLD AUTO: 2.51 M/UL (ref 4.6–6.2)
RBC # BLD AUTO: 2.53 M/UL (ref 4.6–6.2)
RBC # BLD AUTO: 2.53 M/UL (ref 4.6–6.2)
RBC # BLD AUTO: 2.55 M/UL (ref 4.6–6.2)
RBC # BLD AUTO: 2.58 M/UL (ref 4.6–6.2)
RBC # BLD AUTO: 2.59 M/UL (ref 4.6–6.2)
RBC # BLD AUTO: 2.6 M/UL (ref 4.6–6.2)
RBC # BLD AUTO: 2.62 M/UL (ref 4.6–6.2)
RBC # BLD AUTO: 2.62 M/UL (ref 4.6–6.2)
RBC # BLD AUTO: 2.63 M/UL (ref 4.6–6.2)
RBC # BLD AUTO: 2.63 M/UL (ref 4.6–6.2)
RBC # BLD AUTO: 2.65 M/UL (ref 4.6–6.2)
RBC # BLD AUTO: 2.65 M/UL (ref 4.6–6.2)
RBC # BLD AUTO: 2.67 M/UL (ref 4.6–6.2)
RBC # BLD AUTO: 2.69 M/UL (ref 4.6–6.2)
RBC # BLD AUTO: 2.69 M/UL (ref 4.6–6.2)
RBC # BLD AUTO: 2.7 M/UL (ref 4.6–6.2)
RBC # BLD AUTO: 2.72 M/UL (ref 4.6–6.2)
RBC # BLD AUTO: 2.74 M/UL (ref 4.6–6.2)
RBC # BLD AUTO: 2.75 M/UL (ref 4.6–6.2)
RBC # BLD AUTO: 2.79 M/UL (ref 4.6–6.2)
RBC # BLD AUTO: 2.81 M/UL (ref 4.6–6.2)
RBC # BLD AUTO: 2.81 M/UL (ref 4.6–6.2)
RBC # BLD AUTO: 2.85 M/UL (ref 4.6–6.2)
RBC # BLD AUTO: 2.99 M/UL (ref 4.6–6.2)
RBC # BLD AUTO: 3 M/UL (ref 4.6–6.2)
RBC # BLD AUTO: 3.03 M/UL (ref 4.6–6.2)
RBC # BLD AUTO: 3.1 M/UL (ref 4.6–6.2)
RBC # BLD AUTO: 3.11 M/UL (ref 4.6–6.2)
RBC # BLD AUTO: 3.12 M/UL (ref 4.6–6.2)
RBC # BLD AUTO: 3.14 M/UL (ref 4.6–6.2)
RBC # BLD AUTO: 3.14 M/UL (ref 4.6–6.2)
RBC # BLD AUTO: 3.15 M/UL (ref 4.6–6.2)
RBC # BLD AUTO: 3.16 M/UL (ref 4.6–6.2)
RBC # BLD AUTO: 3.17 M/UL (ref 4.6–6.2)
RBC # BLD AUTO: 3.19 M/UL (ref 4.6–6.2)
RBC # BLD AUTO: 3.21 M/UL (ref 4.6–6.2)
RBC # BLD AUTO: 3.22 M/UL (ref 4.6–6.2)
RBC # BLD AUTO: 3.22 M/UL (ref 4.6–6.2)
RBC # BLD AUTO: 3.23 M/UL (ref 4.6–6.2)
RBC # BLD AUTO: 3.26 M/UL (ref 4.6–6.2)
RBC # BLD AUTO: 3.26 M/UL (ref 4.6–6.2)
RBC # BLD AUTO: 3.39 M/UL (ref 4.6–6.2)
RBC # BLD AUTO: 3.41 M/UL (ref 4.6–6.2)
RBC # BLD AUTO: 3.42 M/UL (ref 4.6–6.2)
RBC # BLD AUTO: 3.43 M/UL (ref 4.6–6.2)
RBC # BLD AUTO: 3.44 M/UL (ref 4.6–6.2)
RBC # BLD AUTO: 3.51 M/UL (ref 4.6–6.2)
RBC # BLD AUTO: 3.53 M/UL (ref 4.6–6.2)
RBC # BLD AUTO: 3.58 M/UL (ref 4.6–6.2)
RBC # BLD AUTO: 3.66 M/UL (ref 4.6–6.2)
RBC # BLD AUTO: 3.67 M/UL (ref 4.6–6.2)
RBC # BLD AUTO: 3.7 M/UL (ref 4.6–6.2)
RBC # BLD AUTO: 3.79 M/UL (ref 4.6–6.2)
RBC # BLD AUTO: 3.82 M/UL (ref 4.6–6.2)
RBC # BLD AUTO: 3.82 M/UL (ref 4.6–6.2)
RBC # BLD AUTO: 3.93 M/UL (ref 4.6–6.2)
RBC # BLD AUTO: 4 M/UL (ref 4.6–6.2)
RBC #/AREA URNS AUTO: 11 /HPF (ref 0–4)
RBC #/AREA URNS AUTO: 15 /HPF (ref 0–4)
RBC #/AREA URNS AUTO: 38 /HPF (ref 0–4)
RBC #/AREA URNS HPF: 10 /HPF (ref 0–4)
RBC #/AREA URNS HPF: >100 /HPF (ref 0–4)
RIGHT VENTRICULAR END-DIASTOLIC DIMENSION: 4.15 CM
RIGHT VENTRICULAR END-DIASTOLIC DIMENSION: 5.48 CM
RV TISSUE DOPPLER FREE WALL SYSTOLIC VELOCITY 1 (APICAL 4 CHAMBER VIEW): 5.76 M/S
SAMPLE: ABNORMAL
SAMPLE: NORMAL
SATURATED IRON: 8 % (ref 20–50)
SCHISTOCYTES BLD QL SMEAR: ABNORMAL
SINUS: 3.52 CM
SITE: ABNORMAL
SITE: NORMAL
SODIUM BLD-SCNC: 138 MMOL/L (ref 136–145)
SODIUM BLD-SCNC: 140 MMOL/L (ref 136–145)
SODIUM SERPL-SCNC: 132 MMOL/L (ref 136–145)
SODIUM SERPL-SCNC: 134 MMOL/L (ref 136–145)
SODIUM SERPL-SCNC: 134 MMOL/L (ref 136–145)
SODIUM SERPL-SCNC: 135 MMOL/L (ref 136–145)
SODIUM SERPL-SCNC: 136 MMOL/L (ref 136–145)
SODIUM SERPL-SCNC: 137 MMOL/L (ref 136–145)
SODIUM SERPL-SCNC: 138 MMOL/L (ref 136–145)
SODIUM SERPL-SCNC: 139 MMOL/L (ref 136–145)
SODIUM SERPL-SCNC: 140 MMOL/L (ref 136–145)
SODIUM SERPL-SCNC: 141 MMOL/L (ref 136–145)
SODIUM SERPL-SCNC: 144 MMOL/L (ref 136–145)
SP GR UR STRIP: 1.01 (ref 1–1.03)
SP GR UR STRIP: 1.02 (ref 1–1.03)
SP GR UR STRIP: >=1.03 (ref 1–1.03)
SP GR UR STRIP: >=1.03 (ref 1–1.03)
SP GR UR STRIP: ABNORMAL (ref 1–1.03)
SP02: 100
SP02: 95
SP02: 96
SP02: 97
SP02: 98
SP02: 99
SPECIFIC GRAVITY, POC UA: 1.01
SPONT RATE: 14
SPONT RATE: 21
SPONT RATE: 22
SPONT RATE: 23
SPONT RATE: 24
SQUAMOUS #/AREA URNS AUTO: 1 /HPF
SQUAMOUS #/AREA URNS AUTO: 5 /HPF
SQUAMOUS #/AREA URNS AUTO: 7 /HPF
SQUAMOUS #/AREA URNS HPF: 2 /HPF
STJ: 3.16 CM
TB INDURATION 48 - 72 HR READ: 0 MM
TB INDURATION 48 - 72 HR READ: 0 MM
TDI LATERAL: 0.08
TDI LATERAL: 0.09
TDI SEPTAL: 0.04
TDI SEPTAL: 0.04
TDI: 0.06
TDI: 0.07
TOTAL IRON BINDING CAPACITY: 361 UG/DL (ref 250–450)
TOXIC GRANULES BLD QL SMEAR: PRESENT
TR MAX PG: 31.81 MMHG
TR MAX PG: 35.05 MMHG
TRANS ERYTHROCYTES VOL PATIENT: NORMAL ML
TRANSFERRIN SERPL-MCNC: 244 MG/DL (ref 200–375)
TRICUSPID ANNULAR PLANE SYSTOLIC EXCURSION: 0.95 CM
TRICUSPID ANNULAR PLANE SYSTOLIC EXCURSION: 2.03 CM
TRIGL SERPL-MCNC: 121 MG/DL (ref 30–150)
TROPONIN I SERPL DL<=0.01 NG/ML-MCNC: 0.06 NG/ML (ref 0–0.03)
TROPONIN I SERPL DL<=0.01 NG/ML-MCNC: 0.07 NG/ML (ref 0–0.03)
TROPONIN I SERPL DL<=0.01 NG/ML-MCNC: 0.39 NG/ML (ref 0–0.03)
TROPONIN I SERPL DL<=0.01 NG/ML-MCNC: 1.44 NG/ML (ref 0–0.03)
TROPONIN I SERPL DL<=0.01 NG/ML-MCNC: 1.64 NG/ML (ref 0–0.03)
TROPONIN I SERPL DL<=0.01 NG/ML-MCNC: 1.76 NG/ML (ref 0–0.03)
TROPONIN I SERPL DL<=0.01 NG/ML-MCNC: 1.84 NG/ML (ref 0–0.03)
TROPONIN I SERPL DL<=0.01 NG/ML-MCNC: 2.06 NG/ML (ref 0–0.03)
TROPONIN I SERPL DL<=0.01 NG/ML-MCNC: 2.09 NG/ML (ref 0–0.03)
TROPONIN I SERPL DL<=0.01 NG/ML-MCNC: 2.77 NG/ML (ref 0–0.03)
TROPONIN I SERPL DL<=0.01 NG/ML-MCNC: 3.1 NG/ML (ref 0–0.03)
TROPONIN I SERPL DL<=0.01 NG/ML-MCNC: 3.16 NG/ML (ref 0–0.03)
TROPONIN I SERPL DL<=0.01 NG/ML-MCNC: 3.27 NG/ML (ref 0–0.03)
TV REST PULMONARY ARTERY PRESSURE: 38 MMHG
URN SPEC COLLECT METH UR: ABNORMAL
UROBILINOGEN UR STRIP-ACNC: ABNORMAL EU/DL
UROBILINOGEN UR STRIP-ACNC: NEGATIVE EU/DL
UROBILINOGEN, POC UA: 4
VANCOMYCIN SERPL-MCNC: 14.2 UG/ML
VANCOMYCIN SERPL-MCNC: 14.6 UG/ML
VANCOMYCIN SERPL-MCNC: 16 UG/ML
VANCOMYCIN SERPL-MCNC: 16.5 UG/ML
VANCOMYCIN SERPL-MCNC: 18.6 UG/ML
VANCOMYCIN SERPL-MCNC: 21.9 UG/ML
VIT B12 SERPL-MCNC: 374 PG/ML (ref 210–950)
VT: 490
VT: 500
VT: 550
VT: 575
WBC # BLD AUTO: 10.13 K/UL (ref 3.9–12.7)
WBC # BLD AUTO: 10.55 K/UL (ref 3.9–12.7)
WBC # BLD AUTO: 10.63 K/UL (ref 3.9–12.7)
WBC # BLD AUTO: 10.63 K/UL (ref 3.9–12.7)
WBC # BLD AUTO: 10.84 K/UL (ref 3.9–12.7)
WBC # BLD AUTO: 10.88 K/UL (ref 3.9–12.7)
WBC # BLD AUTO: 11.07 K/UL (ref 3.9–12.7)
WBC # BLD AUTO: 11.21 K/UL (ref 3.9–12.7)
WBC # BLD AUTO: 11.27 K/UL (ref 3.9–12.7)
WBC # BLD AUTO: 11.36 K/UL (ref 3.9–12.7)
WBC # BLD AUTO: 11.41 K/UL (ref 3.9–12.7)
WBC # BLD AUTO: 11.65 K/UL (ref 3.9–12.7)
WBC # BLD AUTO: 11.73 K/UL (ref 3.9–12.7)
WBC # BLD AUTO: 11.78 K/UL (ref 3.9–12.7)
WBC # BLD AUTO: 11.89 K/UL (ref 3.9–12.7)
WBC # BLD AUTO: 11.94 K/UL (ref 3.9–12.7)
WBC # BLD AUTO: 12.17 K/UL (ref 3.9–12.7)
WBC # BLD AUTO: 12.26 K/UL (ref 3.9–12.7)
WBC # BLD AUTO: 12.51 K/UL (ref 3.9–12.7)
WBC # BLD AUTO: 12.85 K/UL (ref 3.9–12.7)
WBC # BLD AUTO: 12.89 K/UL (ref 3.9–12.7)
WBC # BLD AUTO: 12.91 K/UL (ref 3.9–12.7)
WBC # BLD AUTO: 12.92 K/UL (ref 3.9–12.7)
WBC # BLD AUTO: 13.16 K/UL (ref 3.9–12.7)
WBC # BLD AUTO: 13.41 K/UL (ref 3.9–12.7)
WBC # BLD AUTO: 13.52 K/UL (ref 3.9–12.7)
WBC # BLD AUTO: 13.61 K/UL (ref 3.9–12.7)
WBC # BLD AUTO: 13.69 K/UL (ref 3.9–12.7)
WBC # BLD AUTO: 13.8 K/UL (ref 3.9–12.7)
WBC # BLD AUTO: 13.86 K/UL (ref 3.9–12.7)
WBC # BLD AUTO: 13.96 K/UL (ref 3.9–12.7)
WBC # BLD AUTO: 14.21 K/UL (ref 3.9–12.7)
WBC # BLD AUTO: 14.22 K/UL (ref 3.9–12.7)
WBC # BLD AUTO: 14.23 K/UL (ref 3.9–12.7)
WBC # BLD AUTO: 14.62 K/UL (ref 3.9–12.7)
WBC # BLD AUTO: 14.83 K/UL (ref 3.9–12.7)
WBC # BLD AUTO: 15.04 K/UL (ref 3.9–12.7)
WBC # BLD AUTO: 16.09 K/UL (ref 3.9–12.7)
WBC # BLD AUTO: 16.73 K/UL (ref 3.9–12.7)
WBC # BLD AUTO: 17.01 K/UL (ref 3.9–12.7)
WBC # BLD AUTO: 17.57 K/UL (ref 3.9–12.7)
WBC # BLD AUTO: 18.18 K/UL (ref 3.9–12.7)
WBC # BLD AUTO: 18.23 K/UL (ref 3.9–12.7)
WBC # BLD AUTO: 18.36 K/UL (ref 3.9–12.7)
WBC # BLD AUTO: 19.14 K/UL (ref 3.9–12.7)
WBC # BLD AUTO: 19.14 K/UL (ref 3.9–12.7)
WBC # BLD AUTO: 20.75 K/UL (ref 3.9–12.7)
WBC # BLD AUTO: 22.43 K/UL (ref 3.9–12.7)
WBC # BLD AUTO: 29.38 K/UL (ref 3.9–12.7)
WBC # BLD AUTO: 5.8 K/UL (ref 3.9–12.7)
WBC # BLD AUTO: 6 K/UL (ref 3.9–12.7)
WBC # BLD AUTO: 6 K/UL (ref 3.9–12.7)
WBC # BLD AUTO: 6.1 K/UL (ref 3.9–12.7)
WBC # BLD AUTO: 6.2 K/UL (ref 3.9–12.7)
WBC # BLD AUTO: 6.29 K/UL (ref 3.9–12.7)
WBC # BLD AUTO: 6.4 K/UL (ref 3.9–12.7)
WBC # BLD AUTO: 6.5 K/UL (ref 3.9–12.7)
WBC # BLD AUTO: 6.5 K/UL (ref 3.9–12.7)
WBC # BLD AUTO: 6.71 K/UL (ref 3.9–12.7)
WBC # BLD AUTO: 6.74 K/UL (ref 3.9–12.7)
WBC # BLD AUTO: 6.9 K/UL (ref 3.9–12.7)
WBC # BLD AUTO: 7.4 K/UL (ref 3.9–12.7)
WBC # BLD AUTO: 7.7 K/UL (ref 3.9–12.7)
WBC # BLD AUTO: 7.8 K/UL (ref 3.9–12.7)
WBC # BLD AUTO: 7.9 K/UL (ref 3.9–12.7)
WBC # BLD AUTO: 8.11 K/UL (ref 3.9–12.7)
WBC # BLD AUTO: 8.85 K/UL (ref 3.9–12.7)
WBC # BLD AUTO: 8.89 K/UL (ref 3.9–12.7)
WBC # BLD AUTO: 8.9 K/UL (ref 3.9–12.7)
WBC # BLD AUTO: 9.04 K/UL (ref 3.9–12.7)
WBC # BLD AUTO: 9.1 K/UL (ref 3.9–12.7)
WBC # BLD AUTO: 9.34 K/UL (ref 3.9–12.7)
WBC # BLD AUTO: 9.6 K/UL (ref 3.9–12.7)
WBC # BLD AUTO: 9.73 K/UL (ref 3.9–12.7)
WBC # BLD AUTO: 9.86 K/UL (ref 3.9–12.7)
WBC #/AREA URNS AUTO: 15 /HPF (ref 0–5)
WBC #/AREA URNS AUTO: 42 /HPF (ref 0–5)
WBC #/AREA URNS AUTO: >100 /HPF (ref 0–5)
WBC #/AREA URNS HPF: 30 /HPF (ref 0–5)
WBC #/AREA URNS HPF: >100 /HPF (ref 0–5)
WBC CLUMPS UR QL AUTO: ABNORMAL
YEAST UR QL AUTO: ABNORMAL
YEAST UR QL AUTO: ABNORMAL

## 2019-01-01 PROCEDURE — 99291 CRITICAL CARE FIRST HOUR: CPT | Mod: GC,,, | Performed by: INTERNAL MEDICINE

## 2019-01-01 PROCEDURE — 25000003 PHARM REV CODE 250: Performed by: NURSE PRACTITIONER

## 2019-01-01 PROCEDURE — 94660 CPAP INITIATION&MGMT: CPT

## 2019-01-01 PROCEDURE — 11000001 HC ACUTE MED/SURG PRIVATE ROOM

## 2019-01-01 PROCEDURE — 84484 ASSAY OF TROPONIN QUANT: CPT

## 2019-01-01 PROCEDURE — 36415 COLL VENOUS BLD VENIPUNCTURE: CPT

## 2019-01-01 PROCEDURE — 99900035 HC TECH TIME PER 15 MIN (STAT)

## 2019-01-01 PROCEDURE — 97164 PT RE-EVAL EST PLAN CARE: CPT | Performed by: PHYSICAL THERAPIST

## 2019-01-01 PROCEDURE — 83735 ASSAY OF MAGNESIUM: CPT

## 2019-01-01 PROCEDURE — 27000646 HC AEROBIKA DEVICE

## 2019-01-01 PROCEDURE — 85025 COMPLETE CBC W/AUTO DIFF WBC: CPT

## 2019-01-01 PROCEDURE — 84100 ASSAY OF PHOSPHORUS: CPT

## 2019-01-01 PROCEDURE — 97535 SELF CARE MNGMENT TRAINING: CPT

## 2019-01-01 PROCEDURE — 25000003 PHARM REV CODE 250: Performed by: HOSPITALIST

## 2019-01-01 PROCEDURE — 99291 CRITICAL CARE FIRST HOUR: CPT | Mod: ,,, | Performed by: PHYSICIAN ASSISTANT

## 2019-01-01 PROCEDURE — 99291 CRITICAL CARE FIRST HOUR: CPT | Mod: 25

## 2019-01-01 PROCEDURE — 85027 COMPLETE CBC AUTOMATED: CPT

## 2019-01-01 PROCEDURE — 27000221 HC OXYGEN, UP TO 24 HOURS

## 2019-01-01 PROCEDURE — 83605 ASSAY OF LACTIC ACID: CPT

## 2019-01-01 PROCEDURE — 94640 AIRWAY INHALATION TREATMENT: CPT

## 2019-01-01 PROCEDURE — 63600175 PHARM REV CODE 636 W HCPCS: Performed by: NURSE PRACTITIONER

## 2019-01-01 PROCEDURE — 25000003 PHARM REV CODE 250: Performed by: INTERNAL MEDICINE

## 2019-01-01 PROCEDURE — 87040 BLOOD CULTURE FOR BACTERIA: CPT

## 2019-01-01 PROCEDURE — 93005 ELECTROCARDIOGRAM TRACING: CPT

## 2019-01-01 PROCEDURE — 85610 PROTHROMBIN TIME: CPT

## 2019-01-01 PROCEDURE — 25000003 PHARM REV CODE 250: Performed by: STUDENT IN AN ORGANIZED HEALTH CARE EDUCATION/TRAINING PROGRAM

## 2019-01-01 PROCEDURE — 36593 DECLOT VASCULAR DEVICE: CPT

## 2019-01-01 PROCEDURE — C9113 INJ PANTOPRAZOLE SODIUM, VIA: HCPCS | Performed by: HOSPITALIST

## 2019-01-01 PROCEDURE — 80053 COMPREHEN METABOLIC PANEL: CPT

## 2019-01-01 PROCEDURE — 11043 DBRDMT MUSC&/FSCA 1ST 20/<: CPT | Mod: ,,, | Performed by: PODIATRIST

## 2019-01-01 PROCEDURE — 94761 N-INVAS EAR/PLS OXIMETRY MLT: CPT

## 2019-01-01 PROCEDURE — S5571 INSULIN DISPOS PEN 3 ML: HCPCS | Performed by: NURSE PRACTITIONER

## 2019-01-01 PROCEDURE — 99900103 DSU ONLY-NO CHARGE-INITIAL HR (STAT): Performed by: SURGERY

## 2019-01-01 PROCEDURE — D9220A PRA ANESTHESIA: Mod: ANES,,, | Performed by: ANESTHESIOLOGY

## 2019-01-01 PROCEDURE — 63600175 PHARM REV CODE 636 W HCPCS: Performed by: HOSPITALIST

## 2019-01-01 PROCEDURE — 37000009 HC ANESTHESIA EA ADD 15 MINS: Performed by: THORACIC SURGERY (CARDIOTHORACIC VASCULAR SURGERY)

## 2019-01-01 PROCEDURE — 90935 HEMODIALYSIS ONE EVALUATION: CPT | Mod: ,,, | Performed by: INTERNAL MEDICINE

## 2019-01-01 PROCEDURE — 87086 URINE CULTURE/COLONY COUNT: CPT

## 2019-01-01 PROCEDURE — 63600175 PHARM REV CODE 636 W HCPCS: Performed by: INTERNAL MEDICINE

## 2019-01-01 PROCEDURE — 76942 ECHO GUIDE FOR BIOPSY: CPT | Performed by: ANESTHESIOLOGY

## 2019-01-01 PROCEDURE — 90935 HEMODIALYSIS ONE EVALUATION: CPT | Mod: ,,, | Performed by: NURSE PRACTITIONER

## 2019-01-01 PROCEDURE — 82746 ASSAY OF FOLIC ACID SERUM: CPT

## 2019-01-01 PROCEDURE — 99223 PR INITIAL HOSPITAL CARE,LEVL III: ICD-10-PCS | Mod: S$GLB,,, | Performed by: INTERNAL MEDICINE

## 2019-01-01 PROCEDURE — 86022 PLATELET ANTIBODIES: CPT

## 2019-01-01 PROCEDURE — 99232 SBSQ HOSP IP/OBS MODERATE 35: CPT | Mod: ,,, | Performed by: INTERNAL MEDICINE

## 2019-01-01 PROCEDURE — 82607 VITAMIN B-12: CPT

## 2019-01-01 PROCEDURE — 25000003 PHARM REV CODE 250: Performed by: ANESTHESIOLOGY

## 2019-01-01 PROCEDURE — 71000033 HC RECOVERY, INTIAL HOUR: Performed by: THORACIC SURGERY (CARDIOTHORACIC VASCULAR SURGERY)

## 2019-01-01 PROCEDURE — 36430 TRANSFUSION BLD/BLD COMPNT: CPT

## 2019-01-01 PROCEDURE — 63600175 PHARM REV CODE 636 W HCPCS: Performed by: PODIATRIST

## 2019-01-01 PROCEDURE — 97164 PT RE-EVAL EST PLAN CARE: CPT

## 2019-01-01 PROCEDURE — S5571 INSULIN DISPOS PEN 3 ML: HCPCS | Performed by: HOSPITALIST

## 2019-01-01 PROCEDURE — 25000242 PHARM REV CODE 250 ALT 637 W/ HCPCS: Performed by: EMERGENCY MEDICINE

## 2019-01-01 PROCEDURE — 80069 RENAL FUNCTION PANEL: CPT | Mod: 91

## 2019-01-01 PROCEDURE — 86580 TB INTRADERMAL TEST: CPT | Performed by: HOSPITALIST

## 2019-01-01 PROCEDURE — 83735 ASSAY OF MAGNESIUM: CPT | Mod: 91

## 2019-01-01 PROCEDURE — 25000003 PHARM REV CODE 250: Performed by: PODIATRIST

## 2019-01-01 PROCEDURE — 87040 BLOOD CULTURE FOR BACTERIA: CPT | Mod: 59

## 2019-01-01 PROCEDURE — 97162 PT EVAL MOD COMPLEX 30 MIN: CPT

## 2019-01-01 PROCEDURE — 97530 THERAPEUTIC ACTIVITIES: CPT

## 2019-01-01 PROCEDURE — 97530 THERAPEUTIC ACTIVITIES: CPT | Performed by: PHYSICAL THERAPIST

## 2019-01-01 PROCEDURE — 85014 HEMATOCRIT: CPT

## 2019-01-01 PROCEDURE — 63600175 PHARM REV CODE 636 W HCPCS: Performed by: ANESTHESIOLOGY

## 2019-01-01 PROCEDURE — G8987 SELF CARE CURRENT STATUS: HCPCS | Mod: CL

## 2019-01-01 PROCEDURE — 76942 PR U/S GUIDANCE FOR NEEDLE GUIDANCE: ICD-10-PCS | Mod: 26,,, | Performed by: ANESTHESIOLOGY

## 2019-01-01 PROCEDURE — 37799 UNLISTED PX VASCULAR SURGERY: CPT

## 2019-01-01 PROCEDURE — 25000242 PHARM REV CODE 250 ALT 637 W/ HCPCS: Performed by: HOSPITALIST

## 2019-01-01 PROCEDURE — 99223 PR INITIAL HOSPITAL CARE,LEVL III: ICD-10-PCS | Mod: ,,, | Performed by: UROLOGY

## 2019-01-01 PROCEDURE — 99231 PR SUBSEQUENT HOSPITAL CARE,LEVL I: ICD-10-PCS | Mod: S$GLB,,, | Performed by: INTERNAL MEDICINE

## 2019-01-01 PROCEDURE — G8979 MOBILITY GOAL STATUS: HCPCS | Mod: CK | Performed by: PHYSICAL THERAPIST

## 2019-01-01 PROCEDURE — C9113 INJ PANTOPRAZOLE SODIUM, VIA: HCPCS | Performed by: STUDENT IN AN ORGANIZED HEALTH CARE EDUCATION/TRAINING PROGRAM

## 2019-01-01 PROCEDURE — 83880 ASSAY OF NATRIURETIC PEPTIDE: CPT

## 2019-01-01 PROCEDURE — P9016 RBC LEUKOCYTES REDUCED: HCPCS

## 2019-01-01 PROCEDURE — 97803 MED NUTRITION INDIV SUBSEQ: CPT

## 2019-01-01 PROCEDURE — 27000190 HC CPAP FULL FACE MASK W/VALVE

## 2019-01-01 PROCEDURE — 99233 PR SUBSEQUENT HOSPITAL CARE,LEVL III: ICD-10-PCS | Mod: GC,,, | Performed by: INTERNAL MEDICINE

## 2019-01-01 PROCEDURE — S0077 INJECTION, CLINDAMYCIN PHOSP: HCPCS | Performed by: EMERGENCY MEDICINE

## 2019-01-01 PROCEDURE — 80202 ASSAY OF VANCOMYCIN: CPT

## 2019-01-01 PROCEDURE — 52240 CYSTOSCOPY AND TREATMENT: CPT | Mod: ,,, | Performed by: UROLOGY

## 2019-01-01 PROCEDURE — 80048 BASIC METABOLIC PNL TOTAL CA: CPT

## 2019-01-01 PROCEDURE — 97116 GAIT TRAINING THERAPY: CPT | Performed by: PHYSICAL THERAPIST

## 2019-01-01 PROCEDURE — 63600175 PHARM REV CODE 636 W HCPCS: Mod: JG | Performed by: STUDENT IN AN ORGANIZED HEALTH CARE EDUCATION/TRAINING PROGRAM

## 2019-01-01 PROCEDURE — 80100014 HC HEMODIALYSIS 1:1

## 2019-01-01 PROCEDURE — 37000009 HC ANESTHESIA EA ADD 15 MINS: Performed by: SURGERY

## 2019-01-01 PROCEDURE — 25000003 PHARM REV CODE 250: Performed by: THORACIC SURGERY (CARDIOTHORACIC VASCULAR SURGERY)

## 2019-01-01 PROCEDURE — 84145 PROCALCITONIN (PCT): CPT

## 2019-01-01 PROCEDURE — 99232 SBSQ HOSP IP/OBS MODERATE 35: CPT | Mod: GC,,, | Performed by: INTERNAL MEDICINE

## 2019-01-01 PROCEDURE — 36000706: Performed by: PODIATRIST

## 2019-01-01 PROCEDURE — 90945 PR DIALYSIS, NOT HEMO, 1 EVAL: ICD-10-PCS | Mod: GC,,, | Performed by: INTERNAL MEDICINE

## 2019-01-01 PROCEDURE — G8978 MOBILITY CURRENT STATUS: HCPCS | Mod: CK | Performed by: PHYSICAL THERAPIST

## 2019-01-01 PROCEDURE — 80069 RENAL FUNCTION PANEL: CPT

## 2019-01-01 PROCEDURE — 96367 TX/PROPH/DG ADDL SEQ IV INF: CPT

## 2019-01-01 PROCEDURE — 80100016 HC MAINTENANCE HEMODIALYSIS

## 2019-01-01 PROCEDURE — 11043 PR DEBRIDEMENT, SKIN, SUB-Q TISSUE,MUSCLE,=<20 SQ CM: ICD-10-PCS | Mod: ,,, | Performed by: PODIATRIST

## 2019-01-01 PROCEDURE — 99238 PR HOSPITAL DISCHARGE DAY,<30 MIN: ICD-10-PCS | Mod: ,,, | Performed by: HOSPITALIST

## 2019-01-01 PROCEDURE — 93010 ELECTROCARDIOGRAM REPORT: CPT | Mod: ,,, | Performed by: INTERNAL MEDICINE

## 2019-01-01 PROCEDURE — 88112 CYTOPATH CELL ENHANCE TECH: CPT | Mod: 26,,, | Performed by: PATHOLOGY

## 2019-01-01 PROCEDURE — 94664 DEMO&/EVAL PT USE INHALER: CPT

## 2019-01-01 PROCEDURE — G8978 MOBILITY CURRENT STATUS: HCPCS | Mod: CL | Performed by: PHYSICAL THERAPIST

## 2019-01-01 PROCEDURE — 99222 1ST HOSP IP/OBS MODERATE 55: CPT | Mod: ,,, | Performed by: PODIATRIST

## 2019-01-01 PROCEDURE — D9220A PRA ANESTHESIA: Mod: CRNA,,, | Performed by: NURSE ANESTHETIST, CERTIFIED REGISTERED

## 2019-01-01 PROCEDURE — G8979 MOBILITY GOAL STATUS: HCPCS | Mod: CI | Performed by: PHYSICAL THERAPIST

## 2019-01-01 PROCEDURE — 99233 PR SUBSEQUENT HOSPITAL CARE,LEVL III: ICD-10-PCS | Mod: ,,, | Performed by: INTERNAL MEDICINE

## 2019-01-01 PROCEDURE — 37000009 HC ANESTHESIA EA ADD 15 MINS: Performed by: PODIATRIST

## 2019-01-01 PROCEDURE — 81001 URINALYSIS AUTO W/SCOPE: CPT

## 2019-01-01 PROCEDURE — 83605 ASSAY OF LACTIC ACID: CPT | Mod: 91

## 2019-01-01 PROCEDURE — 87077 CULTURE AEROBIC IDENTIFY: CPT | Mod: 59

## 2019-01-01 PROCEDURE — 83036 HEMOGLOBIN GLYCOSYLATED A1C: CPT

## 2019-01-01 PROCEDURE — 36000710: Performed by: SURGERY

## 2019-01-01 PROCEDURE — 99999 PR PBB SHADOW E&M-EST. PATIENT-LVL V: ICD-10-PCS | Mod: PBBFAC,,, | Performed by: NURSE PRACTITIONER

## 2019-01-01 PROCEDURE — 81002 URINALYSIS NONAUTO W/O SCOPE: CPT | Mod: PBBFAC,PN | Performed by: NURSE PRACTITIONER

## 2019-01-01 PROCEDURE — 99999 PR PBB SHADOW E&M-EST. PATIENT-LVL III: ICD-10-PCS | Mod: PBBFAC,,, | Performed by: UROLOGY

## 2019-01-01 PROCEDURE — 74176 CT ABD & PELVIS W/O CONTRAST: CPT | Mod: TC

## 2019-01-01 PROCEDURE — 99238 HOSP IP/OBS DSCHRG MGMT 30/<: CPT | Mod: ,,, | Performed by: HOSPITALIST

## 2019-01-01 PROCEDURE — 94799 UNLISTED PULMONARY SVC/PX: CPT

## 2019-01-01 PROCEDURE — 25000003 PHARM REV CODE 250: Performed by: NURSE ANESTHETIST, CERTIFIED REGISTERED

## 2019-01-01 PROCEDURE — 99213 OFFICE O/P EST LOW 20 MIN: CPT | Mod: S$PBB,,, | Performed by: UROLOGY

## 2019-01-01 PROCEDURE — 92526 ORAL FUNCTION THERAPY: CPT

## 2019-01-01 PROCEDURE — 99214 PR OFFICE/OUTPT VISIT, EST, LEVL IV, 30-39 MIN: ICD-10-PCS | Mod: S$PBB,,, | Performed by: NURSE PRACTITIONER

## 2019-01-01 PROCEDURE — G8979 MOBILITY GOAL STATUS: HCPCS | Mod: CJ

## 2019-01-01 PROCEDURE — 99214 OFFICE O/P EST MOD 30 MIN: CPT | Mod: S$PBB,,, | Performed by: NURSE PRACTITIONER

## 2019-01-01 PROCEDURE — 85025 COMPLETE CBC W/AUTO DIFF WBC: CPT | Mod: 91

## 2019-01-01 PROCEDURE — 87106 FUNGI IDENTIFICATION YEAST: CPT

## 2019-01-01 PROCEDURE — 25000242 PHARM REV CODE 250 ALT 637 W/ HCPCS: Performed by: THORACIC SURGERY (CARDIOTHORACIC VASCULAR SURGERY)

## 2019-01-01 PROCEDURE — 99232 SBSQ HOSP IP/OBS MODERATE 35: CPT | Mod: ,,, | Performed by: HOSPITALIST

## 2019-01-01 PROCEDURE — 97116 GAIT TRAINING THERAPY: CPT

## 2019-01-01 PROCEDURE — 20000000 HC ICU ROOM

## 2019-01-01 PROCEDURE — 99292 PR CRITICAL CARE, ADDL 30 MIN: ICD-10-PCS | Mod: ,,, | Performed by: INTERNAL MEDICINE

## 2019-01-01 PROCEDURE — 99900104 DSU ONLY-NO CHARGE-EA ADD'L HR (STAT): Performed by: UROLOGY

## 2019-01-01 PROCEDURE — 76770 US RETROPERITONEAL COMPLETE: ICD-10-PCS | Mod: 26,,, | Performed by: RADIOLOGY

## 2019-01-01 PROCEDURE — 99900026 HC AIRWAY MAINTENANCE (STAT)

## 2019-01-01 PROCEDURE — 27200997: Performed by: INTERNAL MEDICINE

## 2019-01-01 PROCEDURE — 63600175 PHARM REV CODE 636 W HCPCS: Performed by: STUDENT IN AN ORGANIZED HEALTH CARE EDUCATION/TRAINING PROGRAM

## 2019-01-01 PROCEDURE — 99232 PR SUBSEQUENT HOSPITAL CARE,LEVL II: ICD-10-PCS | Mod: ,,, | Performed by: HOSPITALIST

## 2019-01-01 PROCEDURE — 36600 WITHDRAWAL OF ARTERIAL BLOOD: CPT

## 2019-01-01 PROCEDURE — 86850 RBC ANTIBODY SCREEN: CPT

## 2019-01-01 PROCEDURE — 99232 PR SUBSEQUENT HOSPITAL CARE,LEVL II: ICD-10-PCS | Mod: GC,,, | Performed by: INTERNAL MEDICINE

## 2019-01-01 PROCEDURE — S5571 INSULIN DISPOS PEN 3 ML: HCPCS | Performed by: PODIATRIST

## 2019-01-01 PROCEDURE — 99213 OFFICE O/P EST LOW 20 MIN: CPT | Mod: PBBFAC,25,PN | Performed by: UROLOGY

## 2019-01-01 PROCEDURE — 82803 BLOOD GASES ANY COMBINATION: CPT

## 2019-01-01 PROCEDURE — 71000039 HC RECOVERY, EACH ADD'L HOUR: Performed by: PODIATRIST

## 2019-01-01 PROCEDURE — 86920 COMPATIBILITY TEST SPIN: CPT

## 2019-01-01 PROCEDURE — 99900103 DSU ONLY-NO CHARGE-INITIAL HR (STAT): Performed by: PODIATRIST

## 2019-01-01 PROCEDURE — 99232 PR SUBSEQUENT HOSPITAL CARE,LEVL II: ICD-10-PCS | Mod: S$GLB,,, | Performed by: INTERNAL MEDICINE

## 2019-01-01 PROCEDURE — 85007 BL SMEAR W/DIFF WBC COUNT: CPT

## 2019-01-01 PROCEDURE — 99291 CRITICAL CARE FIRST HOUR: CPT | Mod: ,,, | Performed by: INTERNAL MEDICINE

## 2019-01-01 PROCEDURE — 27200651 HC AIRWAY, LMA: Performed by: NURSE ANESTHETIST, CERTIFIED REGISTERED

## 2019-01-01 PROCEDURE — 85730 THROMBOPLASTIN TIME PARTIAL: CPT

## 2019-01-01 PROCEDURE — 99233 SBSQ HOSP IP/OBS HIGH 50: CPT | Mod: GC,,, | Performed by: INTERNAL MEDICINE

## 2019-01-01 PROCEDURE — 99233 SBSQ HOSP IP/OBS HIGH 50: CPT | Mod: ,,, | Performed by: INTERNAL MEDICINE

## 2019-01-01 PROCEDURE — 12000002 HC ACUTE/MED SURGE SEMI-PRIVATE ROOM

## 2019-01-01 PROCEDURE — 90935 HEMODIALYSIS ONE EVALUATION: CPT

## 2019-01-01 PROCEDURE — D9220A PRA ANESTHESIA: ICD-10-PCS | Mod: ANES,,, | Performed by: ANESTHESIOLOGY

## 2019-01-01 PROCEDURE — 37000009 HC ANESTHESIA EA ADD 15 MINS: Performed by: INTERNAL MEDICINE

## 2019-01-01 PROCEDURE — 97802 MEDICAL NUTRITION INDIV IN: CPT

## 2019-01-01 PROCEDURE — 82728 ASSAY OF FERRITIN: CPT

## 2019-01-01 PROCEDURE — 25500020 PHARM REV CODE 255: Performed by: INTERNAL MEDICINE

## 2019-01-01 PROCEDURE — 99223 1ST HOSP IP/OBS HIGH 75: CPT | Mod: GC,,, | Performed by: INTERNAL MEDICINE

## 2019-01-01 PROCEDURE — 27202415 HC CARTRIDGE, CRRT

## 2019-01-01 PROCEDURE — 36000707: Performed by: THORACIC SURGERY (CARDIOTHORACIC VASCULAR SURGERY)

## 2019-01-01 PROCEDURE — P9047 ALBUMIN (HUMAN), 25%, 50ML: HCPCS | Performed by: INTERNAL MEDICINE

## 2019-01-01 PROCEDURE — 99231 SBSQ HOSP IP/OBS SF/LOW 25: CPT | Mod: S$GLB,,, | Performed by: INTERNAL MEDICINE

## 2019-01-01 PROCEDURE — 25500020 PHARM REV CODE 255: Performed by: HOSPITALIST

## 2019-01-01 PROCEDURE — 63600175 PHARM REV CODE 636 W HCPCS: Performed by: NURSE ANESTHETIST, CERTIFIED REGISTERED

## 2019-01-01 PROCEDURE — 27201423 OPTIME MED/SURG SUP & DEVICES STERILE SUPPLY: Performed by: UROLOGY

## 2019-01-01 PROCEDURE — 99222 PR INITIAL HOSPITAL CARE,LEVL II: ICD-10-PCS | Mod: ,,, | Performed by: PODIATRIST

## 2019-01-01 PROCEDURE — 99900103 DSU ONLY-NO CHARGE-INITIAL HR (STAT): Performed by: UROLOGY

## 2019-01-01 PROCEDURE — 99213 OFFICE O/P EST LOW 20 MIN: CPT | Mod: PBBFAC,PN | Performed by: UROLOGY

## 2019-01-01 PROCEDURE — 88307 TISSUE EXAM BY PATHOLOGIST: CPT | Performed by: PATHOLOGY

## 2019-01-01 PROCEDURE — 71000015 HC POSTOP RECOV 1ST HR: Performed by: UROLOGY

## 2019-01-01 PROCEDURE — 86677 HELICOBACTER PYLORI ANTIBODY: CPT

## 2019-01-01 PROCEDURE — 80100008 HC CRRT DAILY MAINTENANCE

## 2019-01-01 PROCEDURE — D9220A PRA ANESTHESIA: ICD-10-PCS | Mod: CRNA,,, | Performed by: NURSE ANESTHETIST, CERTIFIED REGISTERED

## 2019-01-01 PROCEDURE — 92610 EVALUATE SWALLOWING FUNCTION: CPT

## 2019-01-01 PROCEDURE — 37000008 HC ANESTHESIA 1ST 15 MINUTES: Performed by: PODIATRIST

## 2019-01-01 PROCEDURE — 37000008 HC ANESTHESIA 1ST 15 MINUTES: Performed by: SURGERY

## 2019-01-01 PROCEDURE — 25000242 PHARM REV CODE 250 ALT 637 W/ HCPCS: Performed by: SURGERY

## 2019-01-01 PROCEDURE — 30200315 PPD INTRADERMAL TEST REV CODE 302: Performed by: HOSPITALIST

## 2019-01-01 PROCEDURE — 90935 PR HEMODIALYSIS, ONE EVALUATION: ICD-10-PCS | Mod: ,,, | Performed by: NURSE PRACTITIONER

## 2019-01-01 PROCEDURE — 96374 THER/PROPH/DIAG INJ IV PUSH: CPT

## 2019-01-01 PROCEDURE — 97161 PT EVAL LOW COMPLEX 20 MIN: CPT

## 2019-01-01 PROCEDURE — 88307 TISSUE SPECIMEN TO PATHOLOGY - SURGERY: ICD-10-PCS | Mod: 26,,, | Performed by: PATHOLOGY

## 2019-01-01 PROCEDURE — 64445 NJX AA&/STRD SCIATIC NRV IMG: CPT | Performed by: ANESTHESIOLOGY

## 2019-01-01 PROCEDURE — 97162 PT EVAL MOD COMPLEX 30 MIN: CPT | Performed by: PHYSICAL THERAPIST

## 2019-01-01 PROCEDURE — 90945 DIALYSIS ONE EVALUATION: CPT

## 2019-01-01 PROCEDURE — 97110 THERAPEUTIC EXERCISES: CPT

## 2019-01-01 PROCEDURE — 99285 EMERGENCY DEPT VISIT HI MDM: CPT | Mod: 25

## 2019-01-01 PROCEDURE — 94150 VITAL CAPACITY TEST: CPT

## 2019-01-01 PROCEDURE — 99999 PR PBB SHADOW E&M-EST. PATIENT-LVL III: CPT | Mod: PBBFAC,,, | Performed by: UROLOGY

## 2019-01-01 PROCEDURE — 99232 PR SUBSEQUENT HOSPITAL CARE,LEVL II: ICD-10-PCS | Mod: ,,, | Performed by: INTERNAL MEDICINE

## 2019-01-01 PROCEDURE — 85025 COMPLETE CBC W/AUTO DIFF WBC: CPT | Mod: AY

## 2019-01-01 PROCEDURE — 81000 URINALYSIS NONAUTO W/SCOPE: CPT

## 2019-01-01 PROCEDURE — 87340 HEPATITIS B SURFACE AG IA: CPT

## 2019-01-01 PROCEDURE — 99213 PR OFFICE/OUTPT VISIT, EST, LEVL III, 20-29 MIN: ICD-10-PCS | Mod: S$PBB,,, | Performed by: UROLOGY

## 2019-01-01 PROCEDURE — 88112 CYTOLOGY SPECIMEN-URINE: ICD-10-PCS | Mod: 26,,, | Performed by: PATHOLOGY

## 2019-01-01 PROCEDURE — 99291 PR CRITICAL CARE, E/M 30-74 MINUTES: ICD-10-PCS | Mod: ,,, | Performed by: NURSE PRACTITIONER

## 2019-01-01 PROCEDURE — 99900104 DSU ONLY-NO CHARGE-EA ADD'L HR (STAT): Performed by: SURGERY

## 2019-01-01 PROCEDURE — 36000706: Performed by: UROLOGY

## 2019-01-01 PROCEDURE — 63600175 PHARM REV CODE 636 W HCPCS: Performed by: UROLOGY

## 2019-01-01 PROCEDURE — A4216 STERILE WATER/SALINE, 10 ML: HCPCS | Performed by: HOSPITALIST

## 2019-01-01 PROCEDURE — P9021 RED BLOOD CELLS UNIT: HCPCS

## 2019-01-01 PROCEDURE — 99900103 DSU ONLY-NO CHARGE-INITIAL HR (STAT): Performed by: THORACIC SURGERY (CARDIOTHORACIC VASCULAR SURGERY)

## 2019-01-01 PROCEDURE — 80074 ACUTE HEPATITIS PANEL: CPT

## 2019-01-01 PROCEDURE — 43255 EGD CONTROL BLEEDING ANY: CPT | Mod: GC,,, | Performed by: INTERNAL MEDICINE

## 2019-01-01 PROCEDURE — G8987 SELF CARE CURRENT STATUS: HCPCS | Mod: CJ

## 2019-01-01 PROCEDURE — 87186 SC STD MICRODIL/AGAR DIL: CPT

## 2019-01-01 PROCEDURE — 25000003 PHARM REV CODE 250: Performed by: EMERGENCY MEDICINE

## 2019-01-01 PROCEDURE — 88112 CYTOPATH CELL ENHANCE TECH: CPT | Performed by: PATHOLOGY

## 2019-01-01 PROCEDURE — 86901 BLOOD TYPING SEROLOGIC RH(D): CPT

## 2019-01-01 PROCEDURE — 97165 OT EVAL LOW COMPLEX 30 MIN: CPT

## 2019-01-01 PROCEDURE — 52240 PR CYSTOURETHROSCOPY,FULGUR >5 CM LESN: ICD-10-PCS | Mod: ,,, | Performed by: UROLOGY

## 2019-01-01 PROCEDURE — 84132 ASSAY OF SERUM POTASSIUM: CPT

## 2019-01-01 PROCEDURE — 36000711: Performed by: SURGERY

## 2019-01-01 PROCEDURE — 25000003 PHARM REV CODE 250: Performed by: SURGERY

## 2019-01-01 PROCEDURE — 84484 ASSAY OF TROPONIN QUANT: CPT | Mod: 91

## 2019-01-01 PROCEDURE — 25000242 PHARM REV CODE 250 ALT 637 W/ HCPCS: Performed by: NURSE PRACTITIONER

## 2019-01-01 PROCEDURE — 25000003 PHARM REV CODE 250

## 2019-01-01 PROCEDURE — 37000008 HC ANESTHESIA 1ST 15 MINUTES: Performed by: THORACIC SURGERY (CARDIOTHORACIC VASCULAR SURGERY)

## 2019-01-01 PROCEDURE — 90945 DIALYSIS ONE EVALUATION: CPT | Mod: GC,,, | Performed by: INTERNAL MEDICINE

## 2019-01-01 PROCEDURE — 85027 COMPLETE CBC AUTOMATED: CPT | Mod: 91

## 2019-01-01 PROCEDURE — 96361 HYDRATE IV INFUSION ADD-ON: CPT

## 2019-01-01 PROCEDURE — 43255 PR EGD, FLEX, W/CTRL BLEED, ANY METHOD: ICD-10-PCS | Mod: GC,,, | Performed by: INTERNAL MEDICINE

## 2019-01-01 PROCEDURE — 99291 PR CRITICAL CARE, E/M 30-74 MINUTES: ICD-10-PCS | Mod: ,,, | Performed by: PHYSICIAN ASSISTANT

## 2019-01-01 PROCEDURE — 87449 NOS EACH ORGANISM AG IA: CPT

## 2019-01-01 PROCEDURE — 27201028 HC NEEDLE, SCLERO: Performed by: INTERNAL MEDICINE

## 2019-01-01 PROCEDURE — G8978 MOBILITY CURRENT STATUS: HCPCS | Mod: CL

## 2019-01-01 PROCEDURE — 43255 EGD CONTROL BLEEDING ANY: CPT | Performed by: INTERNAL MEDICINE

## 2019-01-01 PROCEDURE — 74176 CT ABD & PELVIS W/O CONTRAST: CPT | Mod: 26,,, | Performed by: RADIOLOGY

## 2019-01-01 PROCEDURE — 76942 ECHO GUIDE FOR BIOPSY: CPT | Mod: 26,,, | Performed by: ANESTHESIOLOGY

## 2019-01-01 PROCEDURE — 63600175 PHARM REV CODE 636 W HCPCS: Performed by: SURGERY

## 2019-01-01 PROCEDURE — 80053 COMPREHEN METABOLIC PANEL: CPT | Mod: 91

## 2019-01-01 PROCEDURE — 99215 OFFICE O/P EST HI 40 MIN: CPT | Mod: PBBFAC,PN | Performed by: NURSE PRACTITIONER

## 2019-01-01 PROCEDURE — 88307 TISSUE EXAM BY PATHOLOGIST: CPT | Mod: 59 | Performed by: PATHOLOGY

## 2019-01-01 PROCEDURE — 90935 PR HEMODIALYSIS, ONE EVALUATION: ICD-10-PCS | Mod: ,,, | Performed by: INTERNAL MEDICINE

## 2019-01-01 PROCEDURE — 36000706: Performed by: THORACIC SURGERY (CARDIOTHORACIC VASCULAR SURGERY)

## 2019-01-01 PROCEDURE — 93010 EKG 12-LEAD: ICD-10-PCS | Mod: ,,, | Performed by: INTERNAL MEDICINE

## 2019-01-01 PROCEDURE — 63600175 PHARM REV CODE 636 W HCPCS

## 2019-01-01 PROCEDURE — 88307 TISSUE EXAM BY PATHOLOGIST: CPT | Mod: 26,,, | Performed by: PATHOLOGY

## 2019-01-01 PROCEDURE — G8988 SELF CARE GOAL STATUS: HCPCS | Mod: CL

## 2019-01-01 PROCEDURE — 94770 HC EXHALED C02 TEST: CPT

## 2019-01-01 PROCEDURE — 97110 THERAPEUTIC EXERCISES: CPT | Performed by: PHYSICAL THERAPIST

## 2019-01-01 PROCEDURE — 99232 SBSQ HOSP IP/OBS MODERATE 35: CPT | Mod: S$GLB,,, | Performed by: INTERNAL MEDICINE

## 2019-01-01 PROCEDURE — 99291 PR CRITICAL CARE, E/M 30-74 MINUTES: ICD-10-PCS | Mod: ,,, | Performed by: INTERNAL MEDICINE

## 2019-01-01 PROCEDURE — 96365 THER/PROPH/DIAG IV INF INIT: CPT

## 2019-01-01 PROCEDURE — 99900103 DSU ONLY-NO CHARGE-INITIAL HR (STAT)

## 2019-01-01 PROCEDURE — 97166 OT EVAL MOD COMPLEX 45 MIN: CPT

## 2019-01-01 PROCEDURE — 99223 PR INITIAL HOSPITAL CARE,LEVL III: ICD-10-PCS | Mod: GC,,, | Performed by: INTERNAL MEDICINE

## 2019-01-01 PROCEDURE — 63600175 PHARM REV CODE 636 W HCPCS: Performed by: THORACIC SURGERY (CARDIOTHORACIC VASCULAR SURGERY)

## 2019-01-01 PROCEDURE — 94002 VENT MGMT INPAT INIT DAY: CPT

## 2019-01-01 PROCEDURE — S0028 INJECTION, FAMOTIDINE, 20 MG: HCPCS | Performed by: STUDENT IN AN ORGANIZED HEALTH CARE EDUCATION/TRAINING PROGRAM

## 2019-01-01 PROCEDURE — 94003 VENT MGMT INPAT SUBQ DAY: CPT

## 2019-01-01 PROCEDURE — 99999 PR PBB SHADOW E&M-EST. PATIENT-LVL V: CPT | Mod: PBBFAC,,, | Performed by: NURSE PRACTITIONER

## 2019-01-01 PROCEDURE — 74176 CT RENAL STONE STUDY ABD PELVIS WO: ICD-10-PCS | Mod: 26,,, | Performed by: RADIOLOGY

## 2019-01-01 PROCEDURE — 87070 CULTURE OTHR SPECIMN AEROBIC: CPT

## 2019-01-01 PROCEDURE — 99223 1ST HOSP IP/OBS HIGH 75: CPT | Mod: ,,, | Performed by: INTERNAL MEDICINE

## 2019-01-01 PROCEDURE — 64450 PR NERVE BLOCK INJ, ANES/STEROID, OTHER PERIPHERAL: ICD-10-PCS | Mod: 59,LT,, | Performed by: ANESTHESIOLOGY

## 2019-01-01 PROCEDURE — 25000003 PHARM REV CODE 250: Performed by: UROLOGY

## 2019-01-01 PROCEDURE — C1750 CATH, HEMODIALYSIS,LONG-TERM: HCPCS | Performed by: THORACIC SURGERY (CARDIOTHORACIC VASCULAR SURGERY)

## 2019-01-01 PROCEDURE — 63600175 PHARM REV CODE 636 W HCPCS: Performed by: EMERGENCY MEDICINE

## 2019-01-01 PROCEDURE — 27201038 HC PROBE, BI-POLAR: Performed by: INTERNAL MEDICINE

## 2019-01-01 PROCEDURE — 71000033 HC RECOVERY, INTIAL HOUR: Performed by: SURGERY

## 2019-01-01 PROCEDURE — 99900104 DSU ONLY-NO CHARGE-EA ADD'L HR (STAT): Performed by: THORACIC SURGERY (CARDIOTHORACIC VASCULAR SURGERY)

## 2019-01-01 PROCEDURE — 99223 PR INITIAL HOSPITAL CARE,LEVL III: ICD-10-PCS | Mod: ,,, | Performed by: INTERNAL MEDICINE

## 2019-01-01 PROCEDURE — 96375 TX/PRO/DX INJ NEW DRUG ADDON: CPT

## 2019-01-01 PROCEDURE — 36000707: Performed by: PODIATRIST

## 2019-01-01 PROCEDURE — 99291 CRITICAL CARE FIRST HOUR: CPT | Mod: ,,, | Performed by: NURSE PRACTITIONER

## 2019-01-01 PROCEDURE — 37000009 HC ANESTHESIA EA ADD 15 MINS: Performed by: UROLOGY

## 2019-01-01 PROCEDURE — 99214 PR OFFICE/OUTPT VISIT, EST, LEVL IV, 30-39 MIN: ICD-10-PCS | Mod: S$PBB,,, | Performed by: UROLOGY

## 2019-01-01 PROCEDURE — 82330 ASSAY OF CALCIUM: CPT

## 2019-01-01 PROCEDURE — 99223 1ST HOSP IP/OBS HIGH 75: CPT | Mod: ,,, | Performed by: UROLOGY

## 2019-01-01 PROCEDURE — 76770 US EXAM ABDO BACK WALL COMP: CPT | Mod: 26,,, | Performed by: RADIOLOGY

## 2019-01-01 PROCEDURE — 99223 1ST HOSP IP/OBS HIGH 75: CPT | Mod: S$GLB,,, | Performed by: INTERNAL MEDICINE

## 2019-01-01 PROCEDURE — 86705 HEP B CORE ANTIBODY IGM: CPT

## 2019-01-01 PROCEDURE — 37000008 HC ANESTHESIA 1ST 15 MINUTES: Performed by: UROLOGY

## 2019-01-01 PROCEDURE — 99214 OFFICE O/P EST MOD 30 MIN: CPT | Mod: S$PBB,,, | Performed by: UROLOGY

## 2019-01-01 PROCEDURE — G8988 SELF CARE GOAL STATUS: HCPCS | Mod: CI

## 2019-01-01 PROCEDURE — S5571 INSULIN DISPOS PEN 3 ML: HCPCS | Performed by: SURGERY

## 2019-01-01 PROCEDURE — 87205 SMEAR GRAM STAIN: CPT

## 2019-01-01 PROCEDURE — 99292 CRITICAL CARE ADDL 30 MIN: CPT | Mod: ,,, | Performed by: INTERNAL MEDICINE

## 2019-01-01 PROCEDURE — 87075 CULTR BACTERIA EXCEPT BLOOD: CPT

## 2019-01-01 PROCEDURE — 99291 PR CRITICAL CARE, E/M 30-74 MINUTES: ICD-10-PCS | Mod: GC,,, | Performed by: INTERNAL MEDICINE

## 2019-01-01 PROCEDURE — 71000033 HC RECOVERY, INTIAL HOUR: Performed by: UROLOGY

## 2019-01-01 PROCEDURE — 27201423 OPTIME MED/SURG SUP & DEVICES STERILE SUPPLY: Performed by: SURGERY

## 2019-01-01 PROCEDURE — 71000033 HC RECOVERY, INTIAL HOUR: Performed by: PODIATRIST

## 2019-01-01 PROCEDURE — G8988 SELF CARE GOAL STATUS: HCPCS | Mod: CK

## 2019-01-01 PROCEDURE — 80061 LIPID PANEL: CPT

## 2019-01-01 PROCEDURE — G8987 SELF CARE CURRENT STATUS: HCPCS | Mod: CK

## 2019-01-01 PROCEDURE — 71000039 HC RECOVERY, EACH ADD'L HOUR: Performed by: SURGERY

## 2019-01-01 PROCEDURE — 37000008 HC ANESTHESIA 1ST 15 MINUTES: Performed by: INTERNAL MEDICINE

## 2019-01-01 PROCEDURE — 87077 CULTURE AEROBIC IDENTIFY: CPT

## 2019-01-01 PROCEDURE — 86706 HEP B SURFACE ANTIBODY: CPT

## 2019-01-01 PROCEDURE — 64450 NJX AA&/STRD OTHER PN/BRANCH: CPT | Mod: 59,LT,, | Performed by: ANESTHESIOLOGY

## 2019-01-01 PROCEDURE — 84295 ASSAY OF SERUM SODIUM: CPT

## 2019-01-01 PROCEDURE — 76770 US EXAM ABDO BACK WALL COMP: CPT | Mod: TC

## 2019-01-01 PROCEDURE — 82272 OCCULT BLD FECES 1-3 TESTS: CPT

## 2019-01-01 PROCEDURE — 87088 URINE BACTERIA CULTURE: CPT

## 2019-01-01 PROCEDURE — 99900104 DSU ONLY-NO CHARGE-EA ADD'L HR (STAT)

## 2019-01-01 PROCEDURE — 36000707: Performed by: UROLOGY

## 2019-01-01 PROCEDURE — 94010 BREATHING CAPACITY TEST: CPT

## 2019-01-01 PROCEDURE — 80053 COMPREHEN METABOLIC PANEL: CPT | Mod: AY

## 2019-01-01 PROCEDURE — 86704 HEP B CORE ANTIBODY TOTAL: CPT

## 2019-01-01 PROCEDURE — 83540 ASSAY OF IRON: CPT

## 2019-01-01 PROCEDURE — 71000016 HC POSTOP RECOV ADDL HR: Performed by: UROLOGY

## 2019-01-01 PROCEDURE — 71000039 HC RECOVERY, EACH ADD'L HOUR: Performed by: UROLOGY

## 2019-01-01 PROCEDURE — 97168 OT RE-EVAL EST PLAN CARE: CPT

## 2019-01-01 DEVICE — KIT CATH HEMOSPLIT 14FR 23CM: Type: IMPLANTABLE DEVICE | Site: SUBCLAVIAN | Status: FUNCTIONAL

## 2019-01-01 RX ORDER — LEVALBUTEROL INHALATION SOLUTION 0.63 MG/3ML
0.63 SOLUTION RESPIRATORY (INHALATION) EVERY 8 HOURS
Status: DISCONTINUED | OUTPATIENT
Start: 2019-01-01 | End: 2019-01-01 | Stop reason: HOSPADM

## 2019-01-01 RX ORDER — MIDODRINE HYDROCHLORIDE 5 MG/1
5 TABLET ORAL
Qty: 12 TABLET | Refills: 11
Start: 2019-01-01 | End: 2020-08-21

## 2019-01-01 RX ORDER — ROPINIROLE 1 MG/1
2 TABLET, FILM COATED ORAL NIGHTLY
Status: DISCONTINUED | OUTPATIENT
Start: 2019-01-01 | End: 2019-01-01 | Stop reason: HOSPADM

## 2019-01-01 RX ORDER — FENTANYL CITRATE 50 UG/ML
25 INJECTION, SOLUTION INTRAMUSCULAR; INTRAVENOUS EVERY 5 MIN PRN
Status: DISCONTINUED | OUTPATIENT
Start: 2019-01-01 | End: 2019-01-01 | Stop reason: HOSPADM

## 2019-01-01 RX ORDER — GLUCAGON 1 MG
1 KIT INJECTION
Status: DISCONTINUED | OUTPATIENT
Start: 2019-01-01 | End: 2019-01-01

## 2019-01-01 RX ORDER — SODIUM CHLORIDE 9 MG/ML
INJECTION, SOLUTION INTRAVENOUS
Status: DISCONTINUED | OUTPATIENT
Start: 2019-01-01 | End: 2019-01-01 | Stop reason: HOSPADM

## 2019-01-01 RX ORDER — DEXAMETHASONE SODIUM PHOSPHATE 4 MG/ML
INJECTION, SOLUTION INTRA-ARTICULAR; INTRALESIONAL; INTRAMUSCULAR; INTRAVENOUS; SOFT TISSUE
Status: DISCONTINUED | OUTPATIENT
Start: 2019-01-01 | End: 2019-01-01

## 2019-01-01 RX ORDER — CLOPIDOGREL BISULFATE 75 MG/1
75 TABLET ORAL DAILY
Status: DISCONTINUED | OUTPATIENT
Start: 2019-01-01 | End: 2019-01-01 | Stop reason: HOSPADM

## 2019-01-01 RX ORDER — SEVELAMER CARBONATE 800 MG/1
1600 TABLET, FILM COATED ORAL
Qty: 180 TABLET | Refills: 11 | Status: SHIPPED | OUTPATIENT
Start: 2019-01-01 | End: 2020-06-30

## 2019-01-01 RX ORDER — PHENYLEPHRINE HYDROCHLORIDE 10 MG/ML
INJECTION INTRAVENOUS
Status: DISCONTINUED | OUTPATIENT
Start: 2019-01-01 | End: 2019-01-01

## 2019-01-01 RX ORDER — ETOMIDATE 2 MG/ML
INJECTION INTRAVENOUS
Status: COMPLETED
Start: 2019-01-01 | End: 2019-01-01

## 2019-01-01 RX ORDER — ALLOPURINOL 300 MG/1
TABLET ORAL
Status: ON HOLD | COMMUNITY
Start: 2015-06-10 | End: 2019-01-01 | Stop reason: HOSPADM

## 2019-01-01 RX ORDER — MUPIROCIN 20 MG/G
OINTMENT TOPICAL 2 TIMES DAILY
Status: DISPENSED | OUTPATIENT
Start: 2019-01-01 | End: 2019-01-01

## 2019-01-01 RX ORDER — NITROGLYCERIN 0.4 MG/1
0.4 TABLET SUBLINGUAL EVERY 5 MIN PRN
Status: DISCONTINUED | OUTPATIENT
Start: 2019-01-01 | End: 2019-01-01 | Stop reason: HOSPADM

## 2019-01-01 RX ORDER — SODIUM CHLORIDE, SODIUM LACTATE, POTASSIUM CHLORIDE, CALCIUM CHLORIDE 600; 310; 30; 20 MG/100ML; MG/100ML; MG/100ML; MG/100ML
10 INJECTION, SOLUTION INTRAVENOUS CONTINUOUS
Status: DISCONTINUED | OUTPATIENT
Start: 2019-01-01 | End: 2019-01-01 | Stop reason: HOSPADM

## 2019-01-01 RX ORDER — SODIUM CHLORIDE 9 MG/ML
INJECTION, SOLUTION INTRAVENOUS CONTINUOUS
Status: DISCONTINUED | OUTPATIENT
Start: 2019-01-01 | End: 2019-01-01

## 2019-01-01 RX ORDER — INDOMETHACIN 25 MG/1
CAPSULE ORAL
Status: COMPLETED
Start: 2019-01-01 | End: 2019-01-01

## 2019-01-01 RX ORDER — IPRATROPIUM BROMIDE AND ALBUTEROL SULFATE 2.5; .5 MG/3ML; MG/3ML
3 SOLUTION RESPIRATORY (INHALATION) EVERY 6 HOURS
Status: DISCONTINUED | OUTPATIENT
Start: 2019-01-01 | End: 2019-01-01 | Stop reason: HOSPADM

## 2019-01-01 RX ORDER — VANCOMYCIN HYDROCHLORIDE 500 MG/100ML
500 INJECTION, SOLUTION INTRAVENOUS ONCE
Status: DISCONTINUED | OUTPATIENT
Start: 2019-01-01 | End: 2019-01-01 | Stop reason: SDUPTHER

## 2019-01-01 RX ORDER — FLUCONAZOLE 100 MG/1
100 TABLET ORAL DAILY
Status: DISCONTINUED | OUTPATIENT
Start: 2019-01-01 | End: 2019-01-01 | Stop reason: HOSPADM

## 2019-01-01 RX ORDER — SODIUM CHLORIDE 9 MG/ML
INJECTION, SOLUTION INTRAVENOUS ONCE
Status: COMPLETED | OUTPATIENT
Start: 2019-01-01 | End: 2019-01-01

## 2019-01-01 RX ORDER — PANTOPRAZOLE SODIUM 40 MG/10ML
80 INJECTION, POWDER, LYOPHILIZED, FOR SOLUTION INTRAVENOUS ONCE
Status: DISCONTINUED | OUTPATIENT
Start: 2019-01-01 | End: 2019-01-01

## 2019-01-01 RX ORDER — DEXTROSE MONOHYDRATE 50 MG/ML
INJECTION, SOLUTION INTRAVENOUS CONTINUOUS
Status: DISCONTINUED | OUTPATIENT
Start: 2019-01-01 | End: 2019-01-01

## 2019-01-01 RX ORDER — CEFAZOLIN SODIUM 2 G/50ML
2 SOLUTION INTRAVENOUS
Status: COMPLETED | OUTPATIENT
Start: 2019-01-01 | End: 2019-01-01

## 2019-01-01 RX ORDER — MIDODRINE HYDROCHLORIDE 5 MG/1
10 TABLET ORAL ONCE
Status: COMPLETED | OUTPATIENT
Start: 2019-01-01 | End: 2019-01-01

## 2019-01-01 RX ORDER — GLUCAGON 1 MG
1 KIT INJECTION
Status: DISCONTINUED | OUTPATIENT
Start: 2019-01-01 | End: 2019-01-01 | Stop reason: HOSPADM

## 2019-01-01 RX ORDER — LEVOFLOXACIN 500 MG/1
500 TABLET, FILM COATED ORAL EVERY OTHER DAY
Qty: 4 TABLET | Refills: 0 | Status: SHIPPED | OUTPATIENT
Start: 2019-01-01 | End: 2019-01-01 | Stop reason: HOSPADM

## 2019-01-01 RX ORDER — FUROSEMIDE 10 MG/ML
40 INJECTION INTRAMUSCULAR; INTRAVENOUS
Status: COMPLETED | OUTPATIENT
Start: 2019-01-01 | End: 2019-01-01

## 2019-01-01 RX ORDER — ZINC GLUCONATE 50 MG
50 TABLET ORAL DAILY
Status: ON HOLD | COMMUNITY
End: 2019-01-01 | Stop reason: HOSPADM

## 2019-01-01 RX ORDER — LIDOCAINE HYDROCHLORIDE 20 MG/ML
JELLY TOPICAL
Refills: 0
Start: 2019-01-01

## 2019-01-01 RX ORDER — SODIUM CHLORIDE 9 MG/ML
INJECTION, SOLUTION INTRAVENOUS ONCE
Status: DISCONTINUED | OUTPATIENT
Start: 2019-01-01 | End: 2019-01-01

## 2019-01-01 RX ORDER — FLUOXETINE HYDROCHLORIDE 20 MG/1
20 CAPSULE ORAL 2 TIMES DAILY
Status: DISCONTINUED | OUTPATIENT
Start: 2019-01-01 | End: 2019-01-01 | Stop reason: HOSPADM

## 2019-01-01 RX ORDER — FERROUS SULFATE 325(65) MG
325 TABLET, DELAYED RELEASE (ENTERIC COATED) ORAL 2 TIMES DAILY WITH MEALS
Status: DISCONTINUED | OUTPATIENT
Start: 2019-01-01 | End: 2019-01-01

## 2019-01-01 RX ORDER — PHENAZOPYRIDINE HYDROCHLORIDE 100 MG/1
200 TABLET, FILM COATED ORAL 3 TIMES DAILY PRN
Qty: 20 TABLET | Refills: 0 | Status: SHIPPED | OUTPATIENT
Start: 2019-01-01 | End: 2019-01-01 | Stop reason: ALTCHOICE

## 2019-01-01 RX ORDER — ACETAMINOPHEN 325 MG/1
650 TABLET ORAL EVERY 6 HOURS PRN
Status: CANCELLED | OUTPATIENT
Start: 2019-01-01

## 2019-01-01 RX ORDER — HYDROCODONE BITARTRATE AND ACETAMINOPHEN 500; 5 MG/1; MG/1
TABLET ORAL CONTINUOUS
Status: DISCONTINUED | OUTPATIENT
Start: 2019-01-01 | End: 2019-01-01

## 2019-01-01 RX ORDER — SODIUM,POTASSIUM PHOSPHATES 280-250MG
2 POWDER IN PACKET (EA) ORAL
Status: DISCONTINUED | OUTPATIENT
Start: 2019-01-01 | End: 2019-01-01 | Stop reason: HOSPADM

## 2019-01-01 RX ORDER — MIDAZOLAM HYDROCHLORIDE 1 MG/ML
INJECTION, SOLUTION INTRAMUSCULAR; INTRAVENOUS
Status: DISCONTINUED | OUTPATIENT
Start: 2019-01-01 | End: 2019-01-01

## 2019-01-01 RX ORDER — SEVELAMER CARBONATE 800 MG/1
1600 TABLET, FILM COATED ORAL
Status: DISCONTINUED | OUTPATIENT
Start: 2019-01-01 | End: 2019-01-01 | Stop reason: HOSPADM

## 2019-01-01 RX ORDER — ROCURONIUM BROMIDE 10 MG/ML
INJECTION, SOLUTION INTRAVENOUS
Status: DISCONTINUED | OUTPATIENT
Start: 2019-01-01 | End: 2019-01-01

## 2019-01-01 RX ORDER — ACETAMINOPHEN 500 MG
1000 TABLET ORAL EVERY 8 HOURS PRN
Status: DISCONTINUED | OUTPATIENT
Start: 2019-01-01 | End: 2019-01-01 | Stop reason: HOSPADM

## 2019-01-01 RX ORDER — PREDNISONE 10 MG/1
40 TABLET ORAL DAILY
Qty: 20 TABLET | Refills: 0 | Status: SHIPPED | OUTPATIENT
Start: 2019-01-01 | End: 2019-01-01

## 2019-01-01 RX ORDER — INSULIN ASPART 100 [IU]/ML
0-5 INJECTION, SOLUTION INTRAVENOUS; SUBCUTANEOUS
Status: DISCONTINUED | OUTPATIENT
Start: 2019-01-01 | End: 2019-01-01 | Stop reason: HOSPADM

## 2019-01-01 RX ORDER — RAMELTEON 8 MG/1
8 TABLET ORAL NIGHTLY PRN
Status: DISCONTINUED | OUTPATIENT
Start: 2019-01-01 | End: 2019-01-01 | Stop reason: HOSPADM

## 2019-01-01 RX ORDER — PANTOPRAZOLE SODIUM 40 MG/10ML
40 INJECTION, POWDER, LYOPHILIZED, FOR SOLUTION INTRAVENOUS EVERY 12 HOURS
Status: DISCONTINUED | OUTPATIENT
Start: 2019-01-01 | End: 2019-01-01

## 2019-01-01 RX ORDER — SODIUM CHLORIDE 0.9 % (FLUSH) 0.9 %
10 SYRINGE (ML) INJECTION
Status: DISCONTINUED | OUTPATIENT
Start: 2019-01-01 | End: 2019-01-01 | Stop reason: HOSPADM

## 2019-01-01 RX ORDER — SEVELAMER CARBONATE 800 MG/1
800 TABLET, FILM COATED ORAL
Qty: 90 TABLET | Refills: 11 | Status: ON HOLD | OUTPATIENT
Start: 2019-01-01 | End: 2019-01-01 | Stop reason: HOSPADM

## 2019-01-01 RX ORDER — PROPOFOL 10 MG/ML
5 INJECTION, EMULSION INTRAVENOUS CONTINUOUS
Status: DISCONTINUED | OUTPATIENT
Start: 2019-01-01 | End: 2019-01-01

## 2019-01-01 RX ORDER — EZETIMIBE 10 MG/1
10 TABLET ORAL NIGHTLY
Status: DISCONTINUED | OUTPATIENT
Start: 2019-01-01 | End: 2019-01-01

## 2019-01-01 RX ORDER — FUROSEMIDE 10 MG/ML
40 INJECTION INTRAMUSCULAR; INTRAVENOUS 2 TIMES DAILY
Status: DISCONTINUED | OUTPATIENT
Start: 2019-01-01 | End: 2019-01-01

## 2019-01-01 RX ORDER — FENTANYL CITRATE 50 UG/ML
INJECTION, SOLUTION INTRAMUSCULAR; INTRAVENOUS
Status: DISCONTINUED | OUTPATIENT
Start: 2019-01-01 | End: 2019-01-01

## 2019-01-01 RX ORDER — ACETAMINOPHEN 500 MG
1000 TABLET ORAL EVERY 4 HOURS PRN
Status: DISCONTINUED | OUTPATIENT
Start: 2019-01-01 | End: 2019-01-01 | Stop reason: HOSPADM

## 2019-01-01 RX ORDER — MAGNESIUM SULFATE HEPTAHYDRATE 40 MG/ML
2 INJECTION, SOLUTION INTRAVENOUS
Status: DISCONTINUED | OUTPATIENT
Start: 2019-01-01 | End: 2019-01-01

## 2019-01-01 RX ORDER — GABAPENTIN 100 MG/1
100 CAPSULE ORAL 2 TIMES DAILY
Status: DISCONTINUED | OUTPATIENT
Start: 2019-01-01 | End: 2019-01-01

## 2019-01-01 RX ORDER — ENOXAPARIN SODIUM 100 MG/ML
30 INJECTION SUBCUTANEOUS EVERY 24 HOURS
Status: DISCONTINUED | OUTPATIENT
Start: 2019-01-01 | End: 2019-01-01

## 2019-01-01 RX ORDER — EZETIMIBE 10 MG/1
10 TABLET ORAL NIGHTLY
Status: DISCONTINUED | OUTPATIENT
Start: 2019-01-01 | End: 2019-01-01 | Stop reason: HOSPADM

## 2019-01-01 RX ORDER — ASPIRIN 81 MG/1
81 TABLET ORAL DAILY
Status: DISCONTINUED | OUTPATIENT
Start: 2019-01-01 | End: 2019-01-01 | Stop reason: HOSPADM

## 2019-01-01 RX ORDER — EPHEDRINE SULFATE 50 MG/ML
INJECTION, SOLUTION INTRAVENOUS
Status: DISCONTINUED | OUTPATIENT
Start: 2019-01-01 | End: 2019-01-01

## 2019-01-01 RX ORDER — IBUPROFEN 200 MG
24 TABLET ORAL
Status: DISCONTINUED | OUTPATIENT
Start: 2019-01-01 | End: 2019-01-01

## 2019-01-01 RX ORDER — SODIUM BICARBONATE 1 MEQ/ML
SYRINGE (ML) INTRAVENOUS
Status: DISCONTINUED | OUTPATIENT
Start: 2019-01-01 | End: 2019-01-01

## 2019-01-01 RX ORDER — CHLORHEXIDINE GLUCONATE ORAL RINSE 1.2 MG/ML
15 SOLUTION DENTAL 2 TIMES DAILY
Status: DISCONTINUED | OUTPATIENT
Start: 2019-01-01 | End: 2019-01-01

## 2019-01-01 RX ORDER — PROPOFOL 10 MG/ML
INJECTION, EMULSION INTRAVENOUS
Status: DISCONTINUED
Start: 2019-01-01 | End: 2019-01-01 | Stop reason: WASHOUT

## 2019-01-01 RX ORDER — ALBUMIN HUMAN 250 G/1000ML
12.5 SOLUTION INTRAVENOUS
Status: DISCONTINUED | OUTPATIENT
Start: 2019-01-01 | End: 2019-01-01

## 2019-01-01 RX ORDER — INSULIN ASPART 100 [IU]/ML
0-5 INJECTION, SOLUTION INTRAVENOUS; SUBCUTANEOUS
Status: DISCONTINUED | OUTPATIENT
Start: 2019-01-01 | End: 2019-01-01

## 2019-01-01 RX ORDER — SODIUM CHLORIDE 9 MG/ML
INJECTION, SOLUTION INTRAVENOUS CONTINUOUS PRN
Status: DISCONTINUED | OUTPATIENT
Start: 2019-01-01 | End: 2019-01-01

## 2019-01-01 RX ORDER — FERROUS SULFATE 325(65) MG
325 TABLET, DELAYED RELEASE (ENTERIC COATED) ORAL 2 TIMES DAILY WITH MEALS
Status: DISCONTINUED | OUTPATIENT
Start: 2019-01-01 | End: 2019-01-01 | Stop reason: HOSPADM

## 2019-01-01 RX ORDER — ROCURONIUM BROMIDE 10 MG/ML
INJECTION, SOLUTION INTRAVENOUS
Status: DISCONTINUED
Start: 2019-01-01 | End: 2019-01-01 | Stop reason: WASHOUT

## 2019-01-01 RX ORDER — SODIUM CHLORIDE 0.9 % (FLUSH) 0.9 %
3 SYRINGE (ML) INJECTION
Status: DISCONTINUED | OUTPATIENT
Start: 2019-01-01 | End: 2019-01-01 | Stop reason: HOSPADM

## 2019-01-01 RX ORDER — HYDROCODONE BITARTRATE AND ACETAMINOPHEN 5; 325 MG/1; MG/1
1 TABLET ORAL EVERY 4 HOURS PRN
Status: DISCONTINUED | OUTPATIENT
Start: 2019-01-01 | End: 2019-01-01 | Stop reason: HOSPADM

## 2019-01-01 RX ORDER — MAGNESIUM SULFATE HEPTAHYDRATE 40 MG/ML
2 INJECTION, SOLUTION INTRAVENOUS
Status: DISPENSED | OUTPATIENT
Start: 2019-01-01 | End: 2019-01-01

## 2019-01-01 RX ORDER — METOPROLOL TARTRATE 25 MG/1
25 TABLET, FILM COATED ORAL 2 TIMES DAILY
Status: DISCONTINUED | OUTPATIENT
Start: 2019-01-01 | End: 2019-01-01

## 2019-01-01 RX ORDER — FLUOXETINE HYDROCHLORIDE 20 MG/1
20 CAPSULE ORAL NIGHTLY
Status: DISCONTINUED | OUTPATIENT
Start: 2019-01-01 | End: 2019-01-01 | Stop reason: HOSPADM

## 2019-01-01 RX ORDER — PANTOPRAZOLE SODIUM 40 MG/1
40 TABLET, DELAYED RELEASE ORAL DAILY
Status: DISCONTINUED | OUTPATIENT
Start: 2019-01-01 | End: 2019-01-01

## 2019-01-01 RX ORDER — ONDANSETRON 2 MG/ML
INJECTION INTRAMUSCULAR; INTRAVENOUS
Status: DISCONTINUED | OUTPATIENT
Start: 2019-01-01 | End: 2019-01-01

## 2019-01-01 RX ORDER — LIDOCAINE HYDROCHLORIDE 10 MG/ML
1 INJECTION, SOLUTION EPIDURAL; INFILTRATION; INTRACAUDAL; PERINEURAL ONCE
Status: DISCONTINUED | OUTPATIENT
Start: 2019-01-01 | End: 2019-01-01 | Stop reason: HOSPADM

## 2019-01-01 RX ORDER — ONDANSETRON 8 MG/1
8 TABLET, ORALLY DISINTEGRATING ORAL EVERY 8 HOURS PRN
Status: DISCONTINUED | OUTPATIENT
Start: 2019-01-01 | End: 2019-01-01

## 2019-01-01 RX ORDER — PANTOPRAZOLE SODIUM 40 MG/1
40 TABLET, DELAYED RELEASE ORAL
Qty: 60 TABLET | Refills: 11 | Status: SHIPPED | OUTPATIENT
Start: 2019-01-01 | End: 2020-06-30

## 2019-01-01 RX ORDER — CLINDAMYCIN PHOSPHATE 600 MG/50ML
600 INJECTION, SOLUTION INTRAVENOUS
Status: COMPLETED | OUTPATIENT
Start: 2019-01-01 | End: 2019-01-01

## 2019-01-01 RX ORDER — CEFEPIME HYDROCHLORIDE 1 G/50ML
1 INJECTION, SOLUTION INTRAVENOUS
Status: DISCONTINUED | OUTPATIENT
Start: 2019-01-01 | End: 2019-01-01 | Stop reason: HOSPADM

## 2019-01-01 RX ORDER — MIDODRINE HYDROCHLORIDE 2.5 MG/1
5 TABLET ORAL
Status: DISCONTINUED | OUTPATIENT
Start: 2019-01-01 | End: 2019-01-01 | Stop reason: HOSPADM

## 2019-01-01 RX ORDER — ALBUTEROL SULFATE 2.5 MG/.5ML
5 SOLUTION RESPIRATORY (INHALATION)
Status: DISCONTINUED | OUTPATIENT
Start: 2019-01-01 | End: 2019-01-01

## 2019-01-01 RX ORDER — PANTOPRAZOLE SODIUM 40 MG/10ML
40 INJECTION, POWDER, LYOPHILIZED, FOR SOLUTION INTRAVENOUS 2 TIMES DAILY
Status: DISCONTINUED | OUTPATIENT
Start: 2019-01-01 | End: 2019-01-01

## 2019-01-01 RX ORDER — LIDOCAINE HYDROCHLORIDE 10 MG/ML
INJECTION, SOLUTION EPIDURAL; INFILTRATION; INTRACAUDAL; PERINEURAL
Status: DISCONTINUED | OUTPATIENT
Start: 2019-01-01 | End: 2019-01-01 | Stop reason: HOSPADM

## 2019-01-01 RX ORDER — EPINEPHRINE 0.1 MG/ML
INJECTION INTRAVENOUS
Status: DISCONTINUED | OUTPATIENT
Start: 2019-01-01 | End: 2019-01-01

## 2019-01-01 RX ORDER — CEFUROXIME AXETIL 500 MG/1
500 TABLET ORAL 2 TIMES DAILY
Qty: 20 TABLET | Refills: 0 | Status: SHIPPED | OUTPATIENT
Start: 2019-01-01 | End: 2019-01-01 | Stop reason: ALTCHOICE

## 2019-01-01 RX ORDER — GEMFIBROZIL 600 MG/1
600 TABLET, FILM COATED ORAL 2 TIMES DAILY
Status: DISCONTINUED | OUTPATIENT
Start: 2019-01-01 | End: 2019-01-01 | Stop reason: HOSPADM

## 2019-01-01 RX ORDER — DOXYCYCLINE HYCLATE 100 MG
100 TABLET ORAL 2 TIMES DAILY
Qty: 42 TABLET | Refills: 0 | Status: SHIPPED | OUTPATIENT
Start: 2019-01-01 | End: 2019-01-01

## 2019-01-01 RX ORDER — METOPROLOL SUCCINATE 50 MG/1
50 TABLET, EXTENDED RELEASE ORAL 2 TIMES DAILY
Status: ON HOLD | COMMUNITY
End: 2019-01-01 | Stop reason: HOSPADM

## 2019-01-01 RX ORDER — NOREPINEPHRINE BITARTRATE 1 MG/ML
INJECTION, SOLUTION INTRAVENOUS
Status: DISPENSED
Start: 2019-01-01 | End: 2019-01-01

## 2019-01-01 RX ORDER — INSULIN ASPART 100 [IU]/ML
1-10 INJECTION, SOLUTION INTRAVENOUS; SUBCUTANEOUS EVERY 6 HOURS PRN
Status: DISCONTINUED | OUTPATIENT
Start: 2019-01-01 | End: 2019-01-01 | Stop reason: HOSPADM

## 2019-01-01 RX ORDER — HYDROCODONE BITARTRATE AND ACETAMINOPHEN 5; 325 MG/1; MG/1
1 TABLET ORAL
Qty: 20 TABLET | Refills: 0 | Status: SHIPPED | OUTPATIENT
Start: 2019-01-01 | End: 2019-01-01 | Stop reason: ALTCHOICE

## 2019-01-01 RX ORDER — MEPERIDINE HYDROCHLORIDE 50 MG/ML
12.5 INJECTION INTRAMUSCULAR; INTRAVENOUS; SUBCUTANEOUS ONCE AS NEEDED
Status: DISCONTINUED | OUTPATIENT
Start: 2019-01-01 | End: 2019-01-01 | Stop reason: HOSPADM

## 2019-01-01 RX ORDER — ALBUMIN HUMAN 250 G/1000ML
12.5 SOLUTION INTRAVENOUS ONCE
Status: COMPLETED | OUTPATIENT
Start: 2019-01-01 | End: 2019-01-01

## 2019-01-01 RX ORDER — ALBUMIN HUMAN 250 G/1000ML
25 SOLUTION INTRAVENOUS 2 TIMES DAILY
Status: DISCONTINUED | OUTPATIENT
Start: 2019-01-01 | End: 2019-01-01

## 2019-01-01 RX ORDER — IPRATROPIUM BROMIDE AND ALBUTEROL SULFATE 2.5; .5 MG/3ML; MG/3ML
3 SOLUTION RESPIRATORY (INHALATION)
Status: COMPLETED | OUTPATIENT
Start: 2019-01-01 | End: 2019-01-01

## 2019-01-01 RX ORDER — SEVELAMER CARBONATE 800 MG/1
2400 TABLET, FILM COATED ORAL
Status: DISCONTINUED | OUTPATIENT
Start: 2019-01-01 | End: 2019-01-01 | Stop reason: HOSPADM

## 2019-01-01 RX ORDER — NATEGLINIDE 60 MG/1
60 TABLET ORAL DAILY
Status: ON HOLD | COMMUNITY
End: 2019-01-01 | Stop reason: HOSPADM

## 2019-01-01 RX ORDER — ASPIRIN 81 MG/1
81 TABLET ORAL DAILY
Status: DISCONTINUED | OUTPATIENT
Start: 2019-01-01 | End: 2019-01-01

## 2019-01-01 RX ORDER — DIPHENHYDRAMINE HYDROCHLORIDE 50 MG/ML
25 INJECTION INTRAMUSCULAR; INTRAVENOUS EVERY 6 HOURS PRN
Status: DISCONTINUED | OUTPATIENT
Start: 2019-01-01 | End: 2019-01-01 | Stop reason: HOSPADM

## 2019-01-01 RX ORDER — SODIUM CHLORIDE 0.9 % (FLUSH) 0.9 %
3 SYRINGE (ML) INJECTION EVERY 8 HOURS
Status: DISCONTINUED | OUTPATIENT
Start: 2019-01-01 | End: 2019-01-01 | Stop reason: HOSPADM

## 2019-01-01 RX ORDER — SAXAGLIPTIN 2.5 MG/1
2.5 TABLET, FILM COATED ORAL DAILY
Status: DISCONTINUED | OUTPATIENT
Start: 2019-01-01 | End: 2019-01-01 | Stop reason: HOSPADM

## 2019-01-01 RX ORDER — HYDROMORPHONE HYDROCHLORIDE 2 MG/ML
0.2 INJECTION, SOLUTION INTRAMUSCULAR; INTRAVENOUS; SUBCUTANEOUS EVERY 5 MIN PRN
Status: DISCONTINUED | OUTPATIENT
Start: 2019-01-01 | End: 2019-01-01 | Stop reason: HOSPADM

## 2019-01-01 RX ORDER — SEVELAMER CARBONATE 800 MG/1
800 TABLET, FILM COATED ORAL
Status: DISCONTINUED | OUTPATIENT
Start: 2019-01-01 | End: 2019-01-01 | Stop reason: HOSPADM

## 2019-01-01 RX ORDER — OXYCODONE HYDROCHLORIDE 5 MG/1
5 TABLET ORAL
Status: DISCONTINUED | OUTPATIENT
Start: 2019-01-01 | End: 2019-01-01 | Stop reason: HOSPADM

## 2019-01-01 RX ORDER — LEVOFLOXACIN 500 MG/1
500 TABLET, FILM COATED ORAL EVERY OTHER DAY
Qty: 2 TABLET | Refills: 0 | Status: SHIPPED | OUTPATIENT
Start: 2019-01-01 | End: 2019-01-01

## 2019-01-01 RX ORDER — ROPIVACAINE HYDROCHLORIDE 5 MG/ML
INJECTION, SOLUTION EPIDURAL; INFILTRATION; PERINEURAL
Status: DISCONTINUED | OUTPATIENT
Start: 2019-01-01 | End: 2019-01-01

## 2019-01-01 RX ORDER — ONDANSETRON 2 MG/ML
4 INJECTION INTRAMUSCULAR; INTRAVENOUS EVERY 8 HOURS PRN
Status: DISCONTINUED | OUTPATIENT
Start: 2019-01-01 | End: 2019-01-01 | Stop reason: HOSPADM

## 2019-01-01 RX ORDER — EPINEPHRINE 0.1 MG/ML
INJECTION INTRAVENOUS CODE/TRAUMA/SEDATION MEDICATION
Status: COMPLETED | OUTPATIENT
Start: 2019-01-01 | End: 2019-01-01

## 2019-01-01 RX ORDER — LIDOCAINE HCL/PF 100 MG/5ML
SYRINGE (ML) INTRAVENOUS
Status: DISCONTINUED | OUTPATIENT
Start: 2019-01-01 | End: 2019-01-01

## 2019-01-01 RX ORDER — ALBUMIN HUMAN 250 G/1000ML
25 SOLUTION INTRAVENOUS ONCE
Status: COMPLETED | OUTPATIENT
Start: 2019-01-01 | End: 2019-01-01

## 2019-01-01 RX ORDER — SUCCINYLCHOLINE CHLORIDE 20 MG/ML
INJECTION INTRAMUSCULAR; INTRAVENOUS
Status: DISCONTINUED
Start: 2019-01-01 | End: 2019-01-01 | Stop reason: WASHOUT

## 2019-01-01 RX ORDER — LANOLIN ALCOHOL/MO/W.PET/CERES
400 CREAM (GRAM) TOPICAL DAILY
Status: ON HOLD | COMMUNITY
End: 2019-01-01 | Stop reason: HOSPADM

## 2019-01-01 RX ORDER — ETOMIDATE 2 MG/ML
INJECTION INTRAVENOUS
Status: DISCONTINUED | OUTPATIENT
Start: 2019-01-01 | End: 2019-01-01

## 2019-01-01 RX ORDER — HYDROMORPHONE HYDROCHLORIDE 2 MG/ML
INJECTION, SOLUTION INTRAMUSCULAR; INTRAVENOUS; SUBCUTANEOUS
Status: COMPLETED
Start: 2019-01-01 | End: 2019-01-01

## 2019-01-01 RX ORDER — SODIUM CHLORIDE 9 MG/ML
INJECTION, SOLUTION INTRAVENOUS ONCE
Status: DISCONTINUED | OUTPATIENT
Start: 2019-01-01 | End: 2019-01-01 | Stop reason: HOSPADM

## 2019-01-01 RX ORDER — DEXTROSE 50 % IN WATER (D50W) INTRAVENOUS SYRINGE
25
Status: COMPLETED | OUTPATIENT
Start: 2019-01-01 | End: 2019-01-01

## 2019-01-01 RX ORDER — METHYLPREDNISOLONE SOD SUCC 125 MG
80 VIAL (EA) INJECTION EVERY 12 HOURS
Status: DISCONTINUED | OUTPATIENT
Start: 2019-01-01 | End: 2019-01-01

## 2019-01-01 RX ORDER — ATORVASTATIN CALCIUM 20 MG/1
40 TABLET, FILM COATED ORAL DAILY
Status: DISCONTINUED | OUTPATIENT
Start: 2019-01-01 | End: 2019-01-01

## 2019-01-01 RX ORDER — MIDAZOLAM HYDROCHLORIDE 5 MG/ML
2 INJECTION INTRAMUSCULAR; INTRAVENOUS ONCE
Status: COMPLETED | OUTPATIENT
Start: 2019-01-01 | End: 2019-01-01

## 2019-01-01 RX ORDER — PROPOFOL 10 MG/ML
VIAL (ML) INTRAVENOUS
Status: DISCONTINUED | OUTPATIENT
Start: 2019-01-01 | End: 2019-01-01

## 2019-01-01 RX ORDER — IBUPROFEN 200 MG
24 TABLET ORAL
Status: DISCONTINUED | OUTPATIENT
Start: 2019-01-01 | End: 2019-01-01 | Stop reason: HOSPADM

## 2019-01-01 RX ORDER — NITROGLYCERIN 0.4 MG/1
0.4 TABLET SUBLINGUAL EVERY 5 MIN PRN
Status: DISCONTINUED | OUTPATIENT
Start: 2019-01-01 | End: 2019-01-01

## 2019-01-01 RX ORDER — INSULIN ASPART 100 [IU]/ML
1-10 INJECTION, SOLUTION INTRAVENOUS; SUBCUTANEOUS EVERY 6 HOURS PRN
Status: DISCONTINUED | OUTPATIENT
Start: 2019-01-01 | End: 2019-01-01

## 2019-01-01 RX ORDER — METOPROLOL TARTRATE 25 MG/1
25 TABLET, FILM COATED ORAL 2 TIMES DAILY
Status: DISCONTINUED | OUTPATIENT
Start: 2019-01-01 | End: 2019-01-01 | Stop reason: HOSPADM

## 2019-01-01 RX ORDER — GEMFIBROZIL 600 MG/1
600 TABLET, FILM COATED ORAL
Status: ON HOLD | COMMUNITY
End: 2019-01-01 | Stop reason: HOSPADM

## 2019-01-01 RX ORDER — SODIUM CHLORIDE 9 MG/ML
INJECTION, SOLUTION INTRAVENOUS
Status: DISCONTINUED | OUTPATIENT
Start: 2019-01-01 | End: 2019-01-01

## 2019-01-01 RX ORDER — MEROPENEM AND SODIUM CHLORIDE 500 MG/50ML
500 INJECTION, SOLUTION INTRAVENOUS EVERY 12 HOURS
Qty: 200 ML | Refills: 0
Start: 2019-01-01 | End: 2019-01-01

## 2019-01-01 RX ORDER — PANTOPRAZOLE SODIUM 40 MG/1
40 TABLET, DELAYED RELEASE ORAL
Qty: 60 TABLET | Refills: 11 | Status: SHIPPED | OUTPATIENT
Start: 2019-01-01 | End: 2019-01-01 | Stop reason: CLARIF

## 2019-01-01 RX ORDER — IBUPROFEN 200 MG
16 TABLET ORAL
Status: DISCONTINUED | OUTPATIENT
Start: 2019-01-01 | End: 2019-01-01 | Stop reason: HOSPADM

## 2019-01-01 RX ORDER — HYDROCORTISONE 25 MG/G
CREAM TOPICAL 2 TIMES DAILY
Start: 2019-01-01

## 2019-01-01 RX ORDER — ALFUZOSIN HYDROCHLORIDE 10 MG/1
10 TABLET, EXTENDED RELEASE ORAL NIGHTLY
Status: DISCONTINUED | OUTPATIENT
Start: 2019-01-01 | End: 2019-01-01 | Stop reason: HOSPADM

## 2019-01-01 RX ORDER — ROPINIROLE 2 MG/1
2 TABLET, FILM COATED ORAL NIGHTLY
Status: ON HOLD | COMMUNITY
End: 2019-01-01

## 2019-01-01 RX ORDER — HYDROCODONE BITARTRATE AND ACETAMINOPHEN 500; 5 MG/1; MG/1
TABLET ORAL
Status: DISCONTINUED | OUTPATIENT
Start: 2019-01-01 | End: 2019-01-01

## 2019-01-01 RX ORDER — EPINEPHRINE 0.22MG
100 AEROSOL WITH ADAPTER (ML) INHALATION DAILY
Status: DISCONTINUED | OUTPATIENT
Start: 2019-01-01 | End: 2019-01-01

## 2019-01-01 RX ORDER — ASPIRIN 81 MG/1
81 TABLET ORAL DAILY
Refills: 0 | Status: ON HOLD | COMMUNITY
Start: 2019-01-01 | End: 2019-01-01

## 2019-01-01 RX ORDER — MUPIROCIN 20 MG/G
OINTMENT TOPICAL 2 TIMES DAILY
Status: DISCONTINUED | OUTPATIENT
Start: 2019-01-01 | End: 2019-01-01 | Stop reason: HOSPADM

## 2019-01-01 RX ORDER — HYDROMORPHONE HYDROCHLORIDE 2 MG/ML
0.2 INJECTION, SOLUTION INTRAMUSCULAR; INTRAVENOUS; SUBCUTANEOUS EVERY 5 MIN PRN
Status: COMPLETED | OUTPATIENT
Start: 2019-01-01 | End: 2019-01-01

## 2019-01-01 RX ORDER — GEMFIBROZIL 600 MG/1
600 TABLET, FILM COATED ORAL
Status: DISCONTINUED | OUTPATIENT
Start: 2019-01-01 | End: 2019-01-01

## 2019-01-01 RX ORDER — FLUOXETINE HYDROCHLORIDE 20 MG/1
20 CAPSULE ORAL NIGHTLY
Status: DISCONTINUED | OUTPATIENT
Start: 2019-01-01 | End: 2019-01-01

## 2019-01-01 RX ORDER — ASPIRIN 81 MG/1
81 TABLET ORAL NIGHTLY
Status: DISCONTINUED | OUTPATIENT
Start: 2019-01-01 | End: 2019-01-01 | Stop reason: HOSPADM

## 2019-01-01 RX ORDER — NOREPINEPHRINE BITARTRATE/D5W 4MG/250ML
0.02 PLASTIC BAG, INJECTION (ML) INTRAVENOUS CONTINUOUS
Status: DISCONTINUED | OUTPATIENT
Start: 2019-01-01 | End: 2019-01-01

## 2019-01-01 RX ORDER — ONDANSETRON 2 MG/ML
4 INJECTION INTRAMUSCULAR; INTRAVENOUS ONCE
Status: DISCONTINUED | OUTPATIENT
Start: 2019-01-01 | End: 2019-01-01 | Stop reason: HOSPADM

## 2019-01-01 RX ORDER — NITROGLYCERIN 0.4 MG/1
0.4 TABLET SUBLINGUAL EVERY 5 MIN PRN
COMMUNITY

## 2019-01-01 RX ORDER — CYANOCOBALAMIN 1000 UG/ML
1000 INJECTION, SOLUTION INTRAMUSCULAR; SUBCUTANEOUS ONCE
Status: DISCONTINUED | OUTPATIENT
Start: 2019-01-01 | End: 2019-01-01

## 2019-01-01 RX ORDER — FLUCONAZOLE 100 MG/1
100 TABLET ORAL DAILY
Qty: 10 TABLET | Refills: 0 | Status: SHIPPED | OUTPATIENT
Start: 2019-01-01 | End: 2019-01-01 | Stop reason: CLARIF

## 2019-01-01 RX ORDER — HYDRALAZINE HYDROCHLORIDE 25 MG/1
25 TABLET, FILM COATED ORAL EVERY 12 HOURS
Status: ON HOLD | COMMUNITY
End: 2019-01-01 | Stop reason: HOSPADM

## 2019-01-01 RX ORDER — CEFEPIME HYDROCHLORIDE 1 G/50ML
1 INJECTION, SOLUTION INTRAVENOUS
Status: COMPLETED | OUTPATIENT
Start: 2019-01-01 | End: 2019-01-01

## 2019-01-01 RX ORDER — CEFEPIME HYDROCHLORIDE 1 G/50ML
1 INJECTION, SOLUTION INTRAVENOUS
Status: DISCONTINUED | OUTPATIENT
Start: 2019-01-01 | End: 2019-01-01

## 2019-01-01 RX ORDER — POTASSIUM CHLORIDE 20 MEQ/15ML
40 SOLUTION ORAL
Status: DISCONTINUED | OUTPATIENT
Start: 2019-01-01 | End: 2019-01-01 | Stop reason: HOSPADM

## 2019-01-01 RX ORDER — ONDANSETRON 2 MG/ML
4 INJECTION INTRAMUSCULAR; INTRAVENOUS EVERY 8 HOURS PRN
Status: CANCELLED | OUTPATIENT
Start: 2019-01-01

## 2019-01-01 RX ORDER — LIDOCAINE HYDROCHLORIDE 20 MG/ML
JELLY TOPICAL
Status: DISCONTINUED | OUTPATIENT
Start: 2019-01-01 | End: 2019-01-01 | Stop reason: HOSPADM

## 2019-01-01 RX ORDER — METOPROLOL TARTRATE 25 MG/1
25 TABLET, FILM COATED ORAL
Status: DISCONTINUED | OUTPATIENT
Start: 2019-01-01 | End: 2019-01-01 | Stop reason: HOSPADM

## 2019-01-01 RX ORDER — HEPARIN SODIUM 1000 [USP'U]/ML
10000 INJECTION, SOLUTION INTRAVENOUS; SUBCUTANEOUS ONCE
Status: COMPLETED | OUTPATIENT
Start: 2019-01-01 | End: 2019-01-01

## 2019-01-01 RX ORDER — FLUOXETINE HYDROCHLORIDE 20 MG/1
20 CAPSULE ORAL EVERY MORNING
COMMUNITY
Start: 2019-01-01

## 2019-01-01 RX ORDER — LANOLIN ALCOHOL/MO/W.PET/CERES
800 CREAM (GRAM) TOPICAL
Status: DISCONTINUED | OUTPATIENT
Start: 2019-01-01 | End: 2019-01-01 | Stop reason: HOSPADM

## 2019-01-01 RX ORDER — METOPROLOL TARTRATE 25 MG/1
25 TABLET, FILM COATED ORAL
Qty: 16 TABLET | Refills: 11 | Status: SHIPPED | OUTPATIENT
Start: 2019-01-01 | End: 2020-06-30

## 2019-01-01 RX ORDER — MORPHINE SULFATE 2 MG/ML
2 INJECTION, SOLUTION INTRAMUSCULAR; INTRAVENOUS ONCE
Status: COMPLETED | OUTPATIENT
Start: 2019-01-01 | End: 2019-01-01

## 2019-01-01 RX ORDER — ONDANSETRON 2 MG/ML
4 INJECTION INTRAMUSCULAR; INTRAVENOUS DAILY PRN
Status: DISCONTINUED | OUTPATIENT
Start: 2019-01-01 | End: 2019-01-01 | Stop reason: HOSPADM

## 2019-01-01 RX ORDER — ASPIRIN 81 MG/1
81 TABLET ORAL DAILY
COMMUNITY
End: 2019-01-01 | Stop reason: CLARIF

## 2019-01-01 RX ORDER — MORPHINE SULFATE 2 MG/ML
2 INJECTION, SOLUTION INTRAMUSCULAR; INTRAVENOUS EVERY 4 HOURS PRN
Status: DISCONTINUED | OUTPATIENT
Start: 2019-01-01 | End: 2019-01-01 | Stop reason: HOSPADM

## 2019-01-01 RX ORDER — ACETAMINOPHEN 500 MG
1000 TABLET ORAL EVERY 8 HOURS PRN
Refills: 0 | COMMUNITY
Start: 2019-01-01

## 2019-01-01 RX ORDER — HEPARIN SODIUM 1000 [USP'U]/ML
4000 INJECTION, SOLUTION INTRAVENOUS; SUBCUTANEOUS ONCE
Status: COMPLETED | OUTPATIENT
Start: 2019-01-01 | End: 2019-01-01

## 2019-01-01 RX ORDER — AMOXICILLIN 250 MG
1 CAPSULE ORAL DAILY PRN
Status: CANCELLED | OUTPATIENT
Start: 2019-01-01

## 2019-01-01 RX ORDER — HEPARIN SODIUM 1000 [USP'U]/ML
5000 INJECTION, SOLUTION INTRAVENOUS; SUBCUTANEOUS
Status: DISCONTINUED | OUTPATIENT
Start: 2019-01-01 | End: 2019-01-01 | Stop reason: HOSPADM

## 2019-01-01 RX ORDER — ONDANSETRON 2 MG/ML
8 INJECTION INTRAMUSCULAR; INTRAVENOUS EVERY 8 HOURS PRN
Status: DISCONTINUED | OUTPATIENT
Start: 2019-01-01 | End: 2019-01-01 | Stop reason: HOSPADM

## 2019-01-01 RX ORDER — AMOXICILLIN 250 MG
1 CAPSULE ORAL 2 TIMES DAILY
Status: DISCONTINUED | OUTPATIENT
Start: 2019-01-01 | End: 2019-01-01 | Stop reason: HOSPADM

## 2019-01-01 RX ORDER — FUROSEMIDE 10 MG/ML
80 INJECTION INTRAMUSCULAR; INTRAVENOUS
Status: COMPLETED | OUTPATIENT
Start: 2019-01-01 | End: 2019-01-01

## 2019-01-01 RX ORDER — POTASSIUM CHLORIDE 20 MEQ/15ML
60 SOLUTION ORAL
Status: DISCONTINUED | OUTPATIENT
Start: 2019-01-01 | End: 2019-01-01 | Stop reason: HOSPADM

## 2019-01-01 RX ORDER — DEXTROSE MONOHYDRATE AND SODIUM CHLORIDE 5; .9 G/100ML; G/100ML
INJECTION, SOLUTION INTRAVENOUS CONTINUOUS
Status: DISCONTINUED | OUTPATIENT
Start: 2019-01-01 | End: 2019-01-01 | Stop reason: HOSPADM

## 2019-01-01 RX ORDER — LEVOFLOXACIN 500 MG/1
500 TABLET, FILM COATED ORAL EVERY OTHER DAY
Status: DISCONTINUED | OUTPATIENT
Start: 2019-01-01 | End: 2019-01-01

## 2019-01-01 RX ORDER — GABAPENTIN 100 MG/1
CAPSULE ORAL NIGHTLY
Status: ON HOLD | COMMUNITY
Start: 2019-01-01 | End: 2019-01-01 | Stop reason: HOSPADM

## 2019-01-01 RX ORDER — FENTANYL CITRATE-0.9 % NACL/PF 10 MCG/ML
25 PLASTIC BAG, INJECTION (ML) INTRAVENOUS CONTINUOUS
Status: DISCONTINUED | OUTPATIENT
Start: 2019-01-01 | End: 2019-01-01

## 2019-01-01 RX ORDER — CEFTRIAXONE 1 G/1
1 INJECTION, POWDER, FOR SOLUTION INTRAMUSCULAR; INTRAVENOUS
Status: DISCONTINUED | OUTPATIENT
Start: 2019-01-01 | End: 2019-01-01

## 2019-01-01 RX ORDER — LIDOCAINE HYDROCHLORIDE AND EPINEPHRINE 20; 10 MG/ML; UG/ML
INJECTION, SOLUTION INFILTRATION; PERINEURAL
Status: DISCONTINUED | OUTPATIENT
Start: 2019-01-01 | End: 2019-01-01

## 2019-01-01 RX ORDER — ASPIRIN 81 MG/1
81 TABLET ORAL DAILY
Refills: 0 | COMMUNITY
Start: 2019-01-01 | End: 2020-08-20

## 2019-01-01 RX ORDER — HEPARIN SODIUM 5000 [USP'U]/ML
5000 INJECTION, SOLUTION INTRAVENOUS; SUBCUTANEOUS EVERY 8 HOURS
Status: DISCONTINUED | OUTPATIENT
Start: 2019-01-01 | End: 2019-01-01 | Stop reason: HOSPADM

## 2019-01-01 RX ORDER — ALFUZOSIN HYDROCHLORIDE 10 MG/1
10 TABLET, EXTENDED RELEASE ORAL NIGHTLY
Status: DISCONTINUED | OUTPATIENT
Start: 2019-01-01 | End: 2019-01-01

## 2019-01-01 RX ORDER — PANTOPRAZOLE SODIUM 40 MG/1
40 TABLET, DELAYED RELEASE ORAL
Status: DISCONTINUED | OUTPATIENT
Start: 2019-01-01 | End: 2019-01-01 | Stop reason: HOSPADM

## 2019-01-01 RX ORDER — SAXAGLIPTIN 2.5 MG/1
2.5 TABLET, FILM COATED ORAL DAILY
Qty: 90 TABLET | Refills: 3 | Status: ON HOLD
Start: 2019-01-01 | End: 2019-01-01

## 2019-01-01 RX ORDER — EZETIMIBE 10 MG/1
10 TABLET ORAL DAILY
Status: ON HOLD | COMMUNITY
End: 2019-01-01 | Stop reason: HOSPADM

## 2019-01-01 RX ORDER — OMEPRAZOLE 20 MG/1
20 CAPSULE, DELAYED RELEASE ORAL DAILY
Status: ON HOLD | COMMUNITY
End: 2019-01-01 | Stop reason: HOSPADM

## 2019-01-01 RX ORDER — IBUPROFEN 200 MG
16 TABLET ORAL
Status: DISCONTINUED | OUTPATIENT
Start: 2019-01-01 | End: 2019-01-01

## 2019-01-01 RX ORDER — METOCLOPRAMIDE HYDROCHLORIDE 5 MG/ML
10 INJECTION INTRAMUSCULAR; INTRAVENOUS EVERY 10 MIN PRN
Status: DISCONTINUED | OUTPATIENT
Start: 2019-01-01 | End: 2019-01-01 | Stop reason: HOSPADM

## 2019-01-01 RX ORDER — HYDROCORTISONE 25 MG/G
CREAM TOPICAL 2 TIMES DAILY
Status: DISCONTINUED | OUTPATIENT
Start: 2019-01-01 | End: 2019-01-01 | Stop reason: HOSPADM

## 2019-01-01 RX ORDER — ONDANSETRON 2 MG/ML
4 INJECTION INTRAMUSCULAR; INTRAVENOUS EVERY 6 HOURS PRN
Status: DISCONTINUED | OUTPATIENT
Start: 2019-01-01 | End: 2019-01-01 | Stop reason: HOSPADM

## 2019-01-01 RX ORDER — AMOXICILLIN 250 MG
1 CAPSULE ORAL 2 TIMES DAILY
COMMUNITY
Start: 2019-01-01

## 2019-01-01 RX ORDER — FLUCONAZOLE 200 MG/1
200 TABLET ORAL DAILY
Status: DISCONTINUED | OUTPATIENT
Start: 2019-01-01 | End: 2019-01-01

## 2019-01-01 RX ORDER — INSULIN GLARGINE 100 [IU]/ML
27 INJECTION, SOLUTION SUBCUTANEOUS DAILY
Status: ON HOLD | COMMUNITY
End: 2019-01-01 | Stop reason: HOSPADM

## 2019-01-01 RX ORDER — SODIUM CHLORIDE, SODIUM LACTATE, POTASSIUM CHLORIDE, CALCIUM CHLORIDE 600; 310; 30; 20 MG/100ML; MG/100ML; MG/100ML; MG/100ML
10 INJECTION, SOLUTION INTRAVENOUS CONTINUOUS
Status: DISCONTINUED | OUTPATIENT
Start: 2019-01-01 | End: 2019-01-01

## 2019-01-01 RX ORDER — MIDAZOLAM HYDROCHLORIDE 1 MG/ML
INJECTION INTRAMUSCULAR; INTRAVENOUS
Status: COMPLETED
Start: 2019-01-01 | End: 2019-01-01

## 2019-01-01 RX ORDER — GABAPENTIN 100 MG/1
100 CAPSULE ORAL NIGHTLY PRN
Status: ON HOLD | COMMUNITY
End: 2019-01-01 | Stop reason: HOSPADM

## 2019-01-01 RX ORDER — FLUOXETINE HYDROCHLORIDE 20 MG/1
20 CAPSULE ORAL EVERY MORNING
Status: DISCONTINUED | OUTPATIENT
Start: 2019-01-01 | End: 2019-01-01 | Stop reason: HOSPADM

## 2019-01-01 RX ORDER — ROPINIROLE 2 MG/1
2 TABLET, FILM COATED ORAL NIGHTLY
Status: DISCONTINUED | OUTPATIENT
Start: 2019-01-01 | End: 2019-01-01

## 2019-01-01 RX ORDER — HYDROMORPHONE HYDROCHLORIDE 2 MG/ML
1 INJECTION, SOLUTION INTRAMUSCULAR; INTRAVENOUS; SUBCUTANEOUS ONCE
Status: COMPLETED | OUTPATIENT
Start: 2019-01-01 | End: 2019-01-01

## 2019-01-01 RX ORDER — MEROPENEM AND SODIUM CHLORIDE 500 MG/50ML
500 INJECTION, SOLUTION INTRAVENOUS
Status: DISCONTINUED | OUTPATIENT
Start: 2019-01-01 | End: 2019-01-01 | Stop reason: HOSPADM

## 2019-01-01 RX ORDER — MEROPENEM AND SODIUM CHLORIDE 1 G/50ML
1 INJECTION, SOLUTION INTRAVENOUS
Status: DISCONTINUED | OUTPATIENT
Start: 2019-01-01 | End: 2019-01-01 | Stop reason: DRUGHIGH

## 2019-01-01 RX ORDER — ACETAMINOPHEN 500 MG
1000 TABLET ORAL EVERY 6 HOURS PRN
Status: DISCONTINUED | OUTPATIENT
Start: 2019-01-01 | End: 2019-01-01 | Stop reason: HOSPADM

## 2019-01-01 RX ORDER — HYDROCODONE BITARTRATE AND ACETAMINOPHEN 10; 325 MG/1; MG/1
1 TABLET ORAL EVERY 6 HOURS PRN
Status: DISCONTINUED | OUTPATIENT
Start: 2019-01-01 | End: 2019-01-01 | Stop reason: HOSPADM

## 2019-01-01 RX ORDER — GENTAMICIN SULFATE 40 MG/ML
80 INJECTION, SOLUTION INTRAMUSCULAR; INTRAVENOUS
Status: DISCONTINUED | OUTPATIENT
Start: 2019-01-01 | End: 2019-01-01 | Stop reason: HOSPADM

## 2019-01-01 RX ORDER — FAMOTIDINE 10 MG/ML
20 INJECTION INTRAVENOUS 2 TIMES DAILY
Status: DISCONTINUED | OUTPATIENT
Start: 2019-01-01 | End: 2019-01-01

## 2019-01-01 RX ORDER — PATIROMER 16.8 G/1
POWDER, FOR SUSPENSION ORAL
Refills: 4 | Status: ON HOLD | COMMUNITY
Start: 2019-01-01 | End: 2019-01-01 | Stop reason: HOSPADM

## 2019-01-01 RX ORDER — CLOPIDOGREL BISULFATE 75 MG/1
75 TABLET ORAL DAILY
Status: ON HOLD | COMMUNITY
End: 2019-01-01 | Stop reason: HOSPADM

## 2019-01-01 RX ORDER — CEFAZOLIN SODIUM 1 G/50ML
1 SOLUTION INTRAVENOUS ONCE
Status: COMPLETED | OUTPATIENT
Start: 2019-01-01 | End: 2019-01-01

## 2019-01-01 RX ORDER — SODIUM CHLORIDE, SODIUM LACTATE, POTASSIUM CHLORIDE, CALCIUM CHLORIDE 600; 310; 30; 20 MG/100ML; MG/100ML; MG/100ML; MG/100ML
75 INJECTION, SOLUTION INTRAVENOUS CONTINUOUS
Status: DISCONTINUED | OUTPATIENT
Start: 2019-01-01 | End: 2019-01-01

## 2019-01-01 RX ORDER — SUCCINYLCHOLINE CHLORIDE 20 MG/ML
INJECTION INTRAMUSCULAR; INTRAVENOUS
Status: DISCONTINUED | OUTPATIENT
Start: 2019-01-01 | End: 2019-01-01

## 2019-01-01 RX ORDER — SODIUM CHLORIDE, SODIUM LACTATE, POTASSIUM CHLORIDE, CALCIUM CHLORIDE 600; 310; 30; 20 MG/100ML; MG/100ML; MG/100ML; MG/100ML
10 INJECTION, SOLUTION INTRAVENOUS CONTINUOUS
Status: CANCELLED | OUTPATIENT
Start: 2019-01-01

## 2019-01-01 RX ORDER — SEVELAMER CARBONATE 800 MG/1
1600 TABLET, FILM COATED ORAL
Status: DISCONTINUED | OUTPATIENT
Start: 2019-01-01 | End: 2019-01-01

## 2019-01-01 RX ORDER — SODIUM CHLORIDE 0.9 % (FLUSH) 0.9 %
10 SYRINGE (ML) INJECTION EVERY 8 HOURS PRN
Status: DISCONTINUED | OUTPATIENT
Start: 2019-01-01 | End: 2019-01-01 | Stop reason: HOSPADM

## 2019-01-01 RX ORDER — IPRATROPIUM BROMIDE AND ALBUTEROL SULFATE 2.5; .5 MG/3ML; MG/3ML
3 SOLUTION RESPIRATORY (INHALATION) EVERY 6 HOURS PRN
Status: DISCONTINUED | OUTPATIENT
Start: 2019-01-01 | End: 2019-01-01 | Stop reason: HOSPADM

## 2019-01-01 RX ORDER — LIDOCAINE HYDROCHLORIDE 10 MG/ML
INJECTION, SOLUTION EPIDURAL; INFILTRATION; INTRACAUDAL; PERINEURAL
Status: COMPLETED
Start: 2019-01-01 | End: 2019-01-01

## 2019-01-01 RX ORDER — INSULIN ASPART 100 [IU]/ML
0-5 INJECTION, SOLUTION INTRAVENOUS; SUBCUTANEOUS EVERY 6 HOURS PRN
Status: DISCONTINUED | OUTPATIENT
Start: 2019-01-01 | End: 2019-01-01

## 2019-01-01 RX ORDER — KETAMINE HYDROCHLORIDE 100 MG/ML
INJECTION, SOLUTION INTRAMUSCULAR; INTRAVENOUS
Status: DISCONTINUED | OUTPATIENT
Start: 2019-01-01 | End: 2019-01-01

## 2019-01-01 RX ORDER — SEVELAMER CARBONATE 800 MG/1
800 TABLET, FILM COATED ORAL
Status: DISCONTINUED | OUTPATIENT
Start: 2019-01-01 | End: 2019-01-01

## 2019-01-01 RX ORDER — HEPARIN SODIUM 1000 [USP'U]/ML
4000 INJECTION, SOLUTION INTRAVENOUS; SUBCUTANEOUS
Status: DISCONTINUED | OUTPATIENT
Start: 2019-01-01 | End: 2019-01-01 | Stop reason: HOSPADM

## 2019-01-01 RX ORDER — IPRATROPIUM BROMIDE AND ALBUTEROL SULFATE 2.5; .5 MG/3ML; MG/3ML
3 SOLUTION RESPIRATORY (INHALATION) EVERY 6 HOURS
Qty: 1 BOX | Refills: 0 | Status: SHIPPED | OUTPATIENT
Start: 2019-01-01 | End: 2020-08-20

## 2019-01-01 RX ORDER — ACETAMINOPHEN 500 MG
5000 TABLET ORAL DAILY
Status: ON HOLD | COMMUNITY
End: 2019-01-01 | Stop reason: HOSPADM

## 2019-01-01 RX ORDER — SODIUM CHLORIDE 9 MG/ML
1000 INJECTION, SOLUTION INTRAVENOUS
Status: COMPLETED | OUTPATIENT
Start: 2019-01-01 | End: 2019-01-01

## 2019-01-01 RX ORDER — PHENAZOPYRIDINE HYDROCHLORIDE 200 MG/1
200 TABLET, FILM COATED ORAL ONCE
Status: COMPLETED | OUTPATIENT
Start: 2019-01-01 | End: 2019-01-01

## 2019-01-01 RX ORDER — METOPROLOL TARTRATE 25 MG/1
25 TABLET, FILM COATED ORAL 2 TIMES DAILY
Qty: 60 TABLET | Refills: 11 | Status: ON HOLD | OUTPATIENT
Start: 2019-01-01 | End: 2019-01-01 | Stop reason: SDUPTHER

## 2019-01-01 RX ORDER — ALBUMIN HUMAN 250 G/1000ML
25 SOLUTION INTRAVENOUS
Status: DISCONTINUED | OUTPATIENT
Start: 2019-01-01 | End: 2019-01-01 | Stop reason: HOSPADM

## 2019-01-01 RX ORDER — HYDROCODONE BITARTRATE AND ACETAMINOPHEN 10; 325 MG/1; MG/1
1 TABLET ORAL EVERY 6 HOURS PRN
Qty: 20 TABLET | Refills: 0 | Status: SHIPPED | OUTPATIENT
Start: 2019-01-01

## 2019-01-01 RX ORDER — FERROUS SULFATE 325(65) MG
325 TABLET, DELAYED RELEASE (ENTERIC COATED) ORAL 2 TIMES DAILY WITH MEALS
Refills: 0 | Status: ON HOLD | COMMUNITY
Start: 2019-01-01 | End: 2019-01-01

## 2019-01-01 RX ORDER — INDOMETHACIN 25 MG/1
50 CAPSULE ORAL
Status: DISCONTINUED | OUTPATIENT
Start: 2019-01-01 | End: 2019-01-01 | Stop reason: HOSPADM

## 2019-01-01 RX ORDER — VASOPRESSIN 20 [USP'U]/ML
INJECTION, SOLUTION INTRAMUSCULAR; SUBCUTANEOUS
Status: DISCONTINUED | OUTPATIENT
Start: 2019-01-01 | End: 2019-01-01

## 2019-01-01 RX ORDER — HEPARIN SODIUM 1000 [USP'U]/ML
4000 INJECTION, SOLUTION INTRAVENOUS; SUBCUTANEOUS ONCE
Status: DISCONTINUED | OUTPATIENT
Start: 2019-01-01 | End: 2019-01-01 | Stop reason: HOSPADM

## 2019-01-01 RX ORDER — MUPIROCIN 20 MG/G
OINTMENT TOPICAL 2 TIMES DAILY
Status: COMPLETED | OUTPATIENT
Start: 2019-01-01 | End: 2019-01-01

## 2019-01-01 RX ORDER — SODIUM CHLORIDE 0.9 % (FLUSH) 0.9 %
10 SYRINGE (ML) INJECTION
Status: CANCELLED | OUTPATIENT
Start: 2019-01-01

## 2019-01-01 RX ADMIN — LEVALBUTEROL HYDROCHLORIDE 0.63 MG: 0.63 SOLUTION RESPIRATORY (INHALATION) at 12:05

## 2019-01-01 RX ADMIN — ALBUMIN (HUMAN) 25 G: 12.5 SOLUTION INTRAVENOUS at 12:08

## 2019-01-01 RX ADMIN — ALFUZOSIN HYDROCHLORIDE 10 MG: 10 TABLET ORAL at 09:05

## 2019-01-01 RX ADMIN — ROPINIROLE HYDROCHLORIDE 2 MG: 1 TABLET, FILM COATED ORAL at 08:05

## 2019-01-01 RX ADMIN — PANTOPRAZOLE SODIUM 40 MG: 40 TABLET, DELAYED RELEASE ORAL at 04:08

## 2019-01-01 RX ADMIN — DEXTROSE MONOHYDRATE 12.5 G: 500 INJECTION PARENTERAL at 06:08

## 2019-01-01 RX ADMIN — SEVELAMER CARBONATE 800 MG: 800 TABLET, FILM COATED ORAL at 12:06

## 2019-01-01 RX ADMIN — INSULIN DETEMIR 20 UNITS: 100 INJECTION, SOLUTION SUBCUTANEOUS at 09:05

## 2019-01-01 RX ADMIN — PANTOPRAZOLE SODIUM 8 MG/HR: 40 INJECTION, POWDER, FOR SOLUTION INTRAVENOUS at 11:05

## 2019-01-01 RX ADMIN — ROPINIROLE HYDROCHLORIDE 2 MG: 2 TABLET, FILM COATED ORAL at 12:06

## 2019-01-01 RX ADMIN — PANTOPRAZOLE SODIUM 40 MG: 40 INJECTION, POWDER, FOR SOLUTION INTRAVENOUS at 08:06

## 2019-01-01 RX ADMIN — HYDROCORTISONE 2.5%: 25 CREAM TOPICAL at 11:08

## 2019-01-01 RX ADMIN — METOPROLOL TARTRATE 25 MG: 25 TABLET, FILM COATED ORAL at 08:05

## 2019-01-01 RX ADMIN — HEPARIN SODIUM 5000 UNITS: 5000 INJECTION, SOLUTION INTRAVENOUS; SUBCUTANEOUS at 09:08

## 2019-01-01 RX ADMIN — METOPROLOL TARTRATE 25 MG: 25 TABLET ORAL at 09:06

## 2019-01-01 RX ADMIN — MEROPENEM AND SODIUM CHLORIDE 500 MG: 500 INJECTION, SOLUTION INTRAVENOUS at 11:08

## 2019-01-01 RX ADMIN — SEVELAMER CARBONATE 800 MG: 800 TABLET, FILM COATED ORAL at 10:06

## 2019-01-01 RX ADMIN — GABAPENTIN 100 MG: 100 CAPSULE ORAL at 09:06

## 2019-01-01 RX ADMIN — PANTOPRAZOLE SODIUM 40 MG: 40 TABLET, DELAYED RELEASE ORAL at 04:06

## 2019-01-01 RX ADMIN — MUPIROCIN: 20 OINTMENT TOPICAL at 10:08

## 2019-01-01 RX ADMIN — PHENYLEPHRINE HYDROCHLORIDE 100 MCG: 10 INJECTION INTRAVENOUS at 03:08

## 2019-01-01 RX ADMIN — PROPOFOL 10 MG: 10 INJECTION, EMULSION INTRAVENOUS at 03:05

## 2019-01-01 RX ADMIN — FLUOXETINE 20 MG: 20 CAPSULE ORAL at 12:06

## 2019-01-01 RX ADMIN — SODIUM CHLORIDE: 0.9 INJECTION, SOLUTION INTRAVENOUS at 02:06

## 2019-01-01 RX ADMIN — EZETIMIBE 10 MG: 10 TABLET ORAL at 08:05

## 2019-01-01 RX ADMIN — PANTOPRAZOLE SODIUM 40 MG: 40 TABLET, DELAYED RELEASE ORAL at 06:06

## 2019-01-01 RX ADMIN — SENNOSIDES AND DOCUSATE SODIUM 1 TABLET: 8.6; 5 TABLET ORAL at 08:06

## 2019-01-01 RX ADMIN — FLUOXETINE 20 MG: 20 CAPSULE ORAL at 08:05

## 2019-01-01 RX ADMIN — METHYLPREDNISOLONE SODIUM SUCCINATE 80 MG: 125 INJECTION, POWDER, FOR SOLUTION INTRAMUSCULAR; INTRAVENOUS at 08:06

## 2019-01-01 RX ADMIN — FERROUS SULFATE TAB EC 325 MG (65 MG FE EQUIVALENT) 325 MG: 325 (65 FE) TABLET DELAYED RESPONSE at 09:05

## 2019-01-01 RX ADMIN — Medication 0.16 MCG/KG/MIN: at 10:06

## 2019-01-01 RX ADMIN — ROPINIROLE HYDROCHLORIDE 2 MG: 1 TABLET, FILM COATED ORAL at 09:08

## 2019-01-01 RX ADMIN — EPINEPHRINE 10 MCG: 0.1 INJECTION, SOLUTION ENDOTRACHEAL; INTRACARDIAC; INTRAVENOUS at 09:06

## 2019-01-01 RX ADMIN — HEPARIN SODIUM 5000 UNITS: 5000 INJECTION, SOLUTION INTRAVENOUS; SUBCUTANEOUS at 01:08

## 2019-01-01 RX ADMIN — MICONAZOLE NITRATE: 20 OINTMENT TOPICAL at 12:06

## 2019-01-01 RX ADMIN — HYDROMORPHONE HYDROCHLORIDE 0.2 MG: 2 INJECTION, SOLUTION INTRAMUSCULAR; INTRAVENOUS; SUBCUTANEOUS at 06:08

## 2019-01-01 RX ADMIN — FUROSEMIDE 40 MG: 10 INJECTION, SOLUTION INTRAVENOUS at 09:05

## 2019-01-01 RX ADMIN — SENNOSIDES, DOCUSATE SODIUM 1 TABLET: 50; 8.6 TABLET, FILM COATED ORAL at 09:08

## 2019-01-01 RX ADMIN — SODIUM CHLORIDE: 0.9 INJECTION, SOLUTION INTRAVENOUS at 08:06

## 2019-01-01 RX ADMIN — ASPIRIN 81 MG: 81 TABLET, COATED ORAL at 08:06

## 2019-01-01 RX ADMIN — GEMFIBROZIL 600 MG: 600 TABLET ORAL at 08:05

## 2019-01-01 RX ADMIN — ASPIRIN 81 MG: 81 TABLET, COATED ORAL at 09:06

## 2019-01-01 RX ADMIN — ROPINIROLE HYDROCHLORIDE 2 MG: 2 TABLET, FILM COATED ORAL at 08:06

## 2019-01-01 RX ADMIN — PANTOPRAZOLE SODIUM 40 MG: 40 TABLET, DELAYED RELEASE ORAL at 06:08

## 2019-01-01 RX ADMIN — EPINEPHRINE 1 MG: 0.1 INJECTION, SOLUTION ENDOTRACHEAL; INTRACARDIAC; INTRAVENOUS at 09:06

## 2019-01-01 RX ADMIN — MEROPENEM AND SODIUM CHLORIDE 500 MG: 500 INJECTION, SOLUTION INTRAVENOUS at 12:08

## 2019-01-01 RX ADMIN — EZETIMIBE 10 MG: 10 TABLET ORAL at 11:05

## 2019-01-01 RX ADMIN — GEMFIBROZIL 600 MG: 600 TABLET ORAL at 01:05

## 2019-01-01 RX ADMIN — SODIUM CHLORIDE: 0.9 INJECTION, SOLUTION INTRAVENOUS at 09:06

## 2019-01-01 RX ADMIN — COLLAGENASE SANTYL: 250 OINTMENT TOPICAL at 09:08

## 2019-01-01 RX ADMIN — MICONAZOLE NITRATE: 20 OINTMENT TOPICAL at 09:06

## 2019-01-01 RX ADMIN — MIDODRINE HYDROCHLORIDE 10 MG: 5 TABLET ORAL at 02:06

## 2019-01-01 RX ADMIN — GEMFIBROZIL 600 MG: 600 TABLET ORAL at 11:05

## 2019-01-01 RX ADMIN — IPRATROPIUM BROMIDE AND ALBUTEROL SULFATE 3 ML: .5; 3 SOLUTION RESPIRATORY (INHALATION) at 01:08

## 2019-01-01 RX ADMIN — ALFUZOSIN HYDROCHLORIDE 10 MG: 10 TABLET ORAL at 09:08

## 2019-01-01 RX ADMIN — PROPOFOL 25 MCG/KG/MIN: 10 INJECTION, EMULSION INTRAVENOUS at 02:05

## 2019-01-01 RX ADMIN — EPHEDRINE SULFATE 10 MG: 50 INJECTION, SOLUTION INTRAMUSCULAR; INTRAVENOUS; SUBCUTANEOUS at 10:04

## 2019-01-01 RX ADMIN — PIPERACILLIN AND TAZOBACTAM 4.5 G: 4; .5 INJECTION, POWDER, LYOPHILIZED, FOR SOLUTION INTRAVENOUS; PARENTERAL at 02:06

## 2019-01-01 RX ADMIN — Medication 0.14 MCG/KG/MIN: at 04:06

## 2019-01-01 RX ADMIN — INSULIN ASPART 2 UNITS: 100 INJECTION, SOLUTION INTRAVENOUS; SUBCUTANEOUS at 05:06

## 2019-01-01 RX ADMIN — PANTOPRAZOLE SODIUM 8 MG/HR: 40 INJECTION, POWDER, FOR SOLUTION INTRAVENOUS at 10:06

## 2019-01-01 RX ADMIN — CHLORHEXIDINE GLUCONATE 0.12% ORAL RINSE 15 ML: 1.2 LIQUID ORAL at 09:05

## 2019-01-01 RX ADMIN — PIPERACILLIN AND TAZOBACTAM 4.5 G: 4; .5 INJECTION, POWDER, LYOPHILIZED, FOR SOLUTION INTRAVENOUS; PARENTERAL at 10:06

## 2019-01-01 RX ADMIN — LEVALBUTEROL HYDROCHLORIDE 0.63 MG: 0.63 SOLUTION RESPIRATORY (INHALATION) at 03:05

## 2019-01-01 RX ADMIN — INSULIN ASPART 4 UNITS: 100 INJECTION, SOLUTION INTRAVENOUS; SUBCUTANEOUS at 11:06

## 2019-01-01 RX ADMIN — PIPERACILLIN AND TAZOBACTAM 4.5 G: 4; .5 INJECTION, POWDER, LYOPHILIZED, FOR SOLUTION INTRAVENOUS; PARENTERAL at 01:06

## 2019-01-01 RX ADMIN — ALBUMIN (HUMAN) 25 G: 12.5 SOLUTION INTRAVENOUS at 10:08

## 2019-01-01 RX ADMIN — KETAMINE HYDROCHLORIDE 5 MG: 100 INJECTION, SOLUTION, CONCENTRATE INTRAMUSCULAR; INTRAVENOUS at 03:05

## 2019-01-01 RX ADMIN — FLUOXETINE 20 MG: 20 CAPSULE ORAL at 08:06

## 2019-01-01 RX ADMIN — INSULIN DETEMIR 20 UNITS: 100 INJECTION, SOLUTION SUBCUTANEOUS at 08:05

## 2019-01-01 RX ADMIN — SEVELAMER CARBONATE 2400 MG: 800 TABLET, FILM COATED ORAL at 06:08

## 2019-01-01 RX ADMIN — ALFUZOSIN HYDROCHLORIDE 10 MG: 10 TABLET, EXTENDED RELEASE ORAL at 08:06

## 2019-01-01 RX ADMIN — FAMOTIDINE 20 MG: 10 INJECTION, SOLUTION INTRAVENOUS at 08:05

## 2019-01-01 RX ADMIN — ATORVASTATIN CALCIUM 40 MG: 20 TABLET, FILM COATED ORAL at 02:06

## 2019-01-01 RX ADMIN — CLOPIDOGREL BISULFATE 75 MG: 75 TABLET ORAL at 09:05

## 2019-01-01 RX ADMIN — ALFUZOSIN HYDROCHLORIDE 10 MG: 10 TABLET ORAL at 08:05

## 2019-01-01 RX ADMIN — LEVALBUTEROL HYDROCHLORIDE 0.63 MG: 0.63 SOLUTION RESPIRATORY (INHALATION) at 08:05

## 2019-01-01 RX ADMIN — FLUOXETINE 20 MG: 20 CAPSULE ORAL at 11:06

## 2019-01-01 RX ADMIN — DEXTROSE MONOHYDRATE 12.5 G: 500 INJECTION PARENTERAL at 11:08

## 2019-01-01 RX ADMIN — PANTOPRAZOLE SODIUM 8 MG/HR: 40 INJECTION, POWDER, FOR SOLUTION INTRAVENOUS at 01:06

## 2019-01-01 RX ADMIN — ALBUMIN HUMAN 25 G: 0.25 SOLUTION INTRAVENOUS at 08:05

## 2019-01-01 RX ADMIN — SEVELAMER CARBONATE 800 MG: 800 TABLET, FILM COATED ORAL at 04:06

## 2019-01-01 RX ADMIN — SEVELAMER CARBONATE 2400 MG: 800 TABLET, FILM COATED ORAL at 09:08

## 2019-01-01 RX ADMIN — HYDROCORTISONE 2.5%: 25 CREAM TOPICAL at 08:08

## 2019-01-01 RX ADMIN — FLUOXETINE 20 MG: 20 CAPSULE ORAL at 08:07

## 2019-01-01 RX ADMIN — METOPROLOL TARTRATE 25 MG: 25 TABLET ORAL at 10:06

## 2019-01-01 RX ADMIN — MORPHINE SULFATE 2 MG: 2 INJECTION, SOLUTION INTRAMUSCULAR; INTRAVENOUS at 08:08

## 2019-01-01 RX ADMIN — FERROUS SULFATE TAB EC 325 MG (65 MG FE EQUIVALENT) 325 MG: 325 (65 FE) TABLET DELAYED RESPONSE at 04:05

## 2019-01-01 RX ADMIN — PANTOPRAZOLE SODIUM 8 MG/HR: 40 INJECTION, POWDER, FOR SOLUTION INTRAVENOUS at 03:06

## 2019-01-01 RX ADMIN — ROPINIROLE HYDROCHLORIDE 2 MG: 1 TABLET, FILM COATED ORAL at 11:05

## 2019-01-01 RX ADMIN — SAXAGLIPTIN 2.5 MG: 2.5 TABLET, FILM COATED ORAL at 08:07

## 2019-01-01 RX ADMIN — MIDAZOLAM HYDROCHLORIDE 2 MG: 1 INJECTION, SOLUTION INTRAMUSCULAR; INTRAVENOUS at 08:06

## 2019-01-01 RX ADMIN — MICONAZOLE NITRATE: 20 OINTMENT TOPICAL at 08:06

## 2019-01-01 RX ADMIN — PANTOPRAZOLE SODIUM 40 MG: 40 TABLET, DELAYED RELEASE ORAL at 05:06

## 2019-01-01 RX ADMIN — EZETIMIBE 10 MG: 10 TABLET ORAL at 09:05

## 2019-01-01 RX ADMIN — Medication 50 MCG/HR: at 10:06

## 2019-01-01 RX ADMIN — IPRATROPIUM BROMIDE AND ALBUTEROL SULFATE 3 ML: .5; 3 SOLUTION RESPIRATORY (INHALATION) at 07:08

## 2019-01-01 RX ADMIN — ASPIRIN 81 MG: 81 TABLET, COATED ORAL at 09:05

## 2019-01-01 RX ADMIN — METOPROLOL TARTRATE 25 MG: 25 TABLET, FILM COATED ORAL at 09:05

## 2019-01-01 RX ADMIN — HEPARIN SODIUM 4000 UNITS: 1000 INJECTION, SOLUTION INTRAVENOUS; SUBCUTANEOUS at 10:05

## 2019-01-01 RX ADMIN — CEFEPIME HYDROCHLORIDE 1 G: 1 INJECTION, POWDER, FOR SOLUTION INTRAMUSCULAR; INTRAVENOUS at 11:06

## 2019-01-01 RX ADMIN — SAXAGLIPTIN 2.5 MG: 2.5 TABLET, FILM COATED ORAL at 09:06

## 2019-01-01 RX ADMIN — COLLAGENASE SANTYL: 250 OINTMENT TOPICAL at 12:08

## 2019-01-01 RX ADMIN — MUPIROCIN: 20 OINTMENT TOPICAL at 08:06

## 2019-01-01 RX ADMIN — METHYLPREDNISOLONE SODIUM SUCCINATE 80 MG: 125 INJECTION, POWDER, FOR SOLUTION INTRAMUSCULAR; INTRAVENOUS at 12:06

## 2019-01-01 RX ADMIN — MIDAZOLAM 1 MG: 1 INJECTION INTRAMUSCULAR; INTRAVENOUS at 03:05

## 2019-01-01 RX ADMIN — HYDROCORTISONE 2.5%: 25 CREAM TOPICAL at 09:08

## 2019-01-01 RX ADMIN — DEXAMETHASONE SODIUM PHOSPHATE 4 MG: 4 INJECTION, SOLUTION INTRAMUSCULAR; INTRAVENOUS at 03:05

## 2019-01-01 RX ADMIN — ROPINIROLE HYDROCHLORIDE 2 MG: 2 TABLET, FILM COATED ORAL at 09:06

## 2019-01-01 RX ADMIN — MUPIROCIN: 20 OINTMENT TOPICAL at 09:06

## 2019-01-01 RX ADMIN — FLUOXETINE 20 MG: 20 CAPSULE ORAL at 09:06

## 2019-01-01 RX ADMIN — METOPROLOL TARTRATE 25 MG: 25 TABLET, FILM COATED ORAL at 08:06

## 2019-01-01 RX ADMIN — IPRATROPIUM BROMIDE AND ALBUTEROL SULFATE 3 ML: .5; 3 SOLUTION RESPIRATORY (INHALATION) at 03:08

## 2019-01-01 RX ADMIN — Medication 3 ML: at 08:06

## 2019-01-01 RX ADMIN — MORPHINE SULFATE 2 MG: 2 INJECTION, SOLUTION INTRAMUSCULAR; INTRAVENOUS at 12:08

## 2019-01-01 RX ADMIN — HEPARIN SODIUM 5000 UNITS: 5000 INJECTION, SOLUTION INTRAVENOUS; SUBCUTANEOUS at 06:08

## 2019-01-01 RX ADMIN — INSULIN ASPART 1 UNITS: 100 INJECTION, SOLUTION INTRAVENOUS; SUBCUTANEOUS at 12:06

## 2019-01-01 RX ADMIN — DEXTROSE MONOHYDRATE 12.5 G: 500 INJECTION PARENTERAL at 07:08

## 2019-01-01 RX ADMIN — ALFUZOSIN HYDROCHLORIDE 10 MG: 10 TABLET ORAL at 08:06

## 2019-01-01 RX ADMIN — DEXTROSE MONOHYDRATE 12.5 G: 500 INJECTION PARENTERAL at 10:08

## 2019-01-01 RX ADMIN — CHLORHEXIDINE GLUCONATE 0.12% ORAL RINSE 15 ML: 1.2 LIQUID ORAL at 08:06

## 2019-01-01 RX ADMIN — SEVELAMER CARBONATE 2400 MG: 800 TABLET, FILM COATED ORAL at 05:08

## 2019-01-01 RX ADMIN — MIDODRINE HYDROCHLORIDE 10 MG: 5 TABLET ORAL at 09:06

## 2019-01-01 RX ADMIN — DEXAMETHASONE SODIUM PHOSPHATE 4 MG: 4 INJECTION, SOLUTION INTRAMUSCULAR; INTRAVENOUS at 10:04

## 2019-01-01 RX ADMIN — INSULIN ASPART 1 UNITS: 100 INJECTION, SOLUTION INTRAVENOUS; SUBCUTANEOUS at 09:06

## 2019-01-01 RX ADMIN — SODIUM BICARBONATE 50 MEQ: 84 INJECTION, SOLUTION INTRAVENOUS at 09:06

## 2019-01-01 RX ADMIN — GEMFIBROZIL 600 MG: 600 TABLET ORAL at 09:05

## 2019-01-01 RX ADMIN — PANTOPRAZOLE SODIUM 40 MG: 40 TABLET, DELAYED RELEASE ORAL at 05:07

## 2019-01-01 RX ADMIN — ASPIRIN 81 MG: 81 TABLET, COATED ORAL at 11:06

## 2019-01-01 RX ADMIN — Medication 3 ML: at 01:06

## 2019-01-01 RX ADMIN — SULFUR HEXAFLUORIDE 2.4 ML: KIT at 12:05

## 2019-01-01 RX ADMIN — MIDODRINE HYDROCHLORIDE 5 MG: 2.5 TABLET ORAL at 02:08

## 2019-01-01 RX ADMIN — DEXTROSE MONOHYDRATE, SODIUM CITRATE, AND CITRIC ACID MONOHYDRATE: 2.45; 2.2; .8 INJECTION, SOLUTION INTRAVENOUS at 01:06

## 2019-01-01 RX ADMIN — PANTOPRAZOLE SODIUM 8 MG/HR: 40 INJECTION, POWDER, FOR SOLUTION INTRAVENOUS at 01:05

## 2019-01-01 RX ADMIN — IOHEXOL 100 ML: 350 INJECTION, SOLUTION INTRAVENOUS at 10:08

## 2019-01-01 RX ADMIN — MIDAZOLAM HYDROCHLORIDE 2 MG: 1 INJECTION, SOLUTION INTRAMUSCULAR; INTRAVENOUS at 09:06

## 2019-01-01 RX ADMIN — FERROUS SULFATE TAB EC 325 MG (65 MG FE EQUIVALENT) 325 MG: 325 (65 FE) TABLET DELAYED RESPONSE at 01:05

## 2019-01-01 RX ADMIN — ALBUMIN HUMAN 25 G: 0.25 SOLUTION INTRAVENOUS at 09:05

## 2019-01-01 RX ADMIN — MUPIROCIN: 20 OINTMENT TOPICAL at 08:05

## 2019-01-01 RX ADMIN — HYDROCORTISONE 2.5%: 25 CREAM TOPICAL at 10:08

## 2019-01-01 RX ADMIN — LEVALBUTEROL HYDROCHLORIDE 0.63 MG: 0.63 SOLUTION RESPIRATORY (INHALATION) at 07:05

## 2019-01-01 RX ADMIN — MUPIROCIN: 20 OINTMENT TOPICAL at 04:05

## 2019-01-01 RX ADMIN — ETOMIDATE 4 MG: 2 INJECTION, SOLUTION INTRAVENOUS at 09:06

## 2019-01-01 RX ADMIN — PANTOPRAZOLE SODIUM 40 MG: 40 INJECTION, POWDER, FOR SOLUTION INTRAVENOUS at 12:06

## 2019-01-01 RX ADMIN — SODIUM CHLORIDE 300 ML: 0.9 INJECTION, SOLUTION INTRAVENOUS at 09:06

## 2019-01-01 RX ADMIN — PROPOFOL 25 MCG/KG/MIN: 10 INJECTION, EMULSION INTRAVENOUS at 09:05

## 2019-01-01 RX ADMIN — INSULIN ASPART 2 UNITS: 100 INJECTION, SOLUTION INTRAVENOUS; SUBCUTANEOUS at 06:06

## 2019-01-01 RX ADMIN — FLUOXETINE 20 MG: 20 CAPSULE ORAL at 09:05

## 2019-01-01 RX ADMIN — SEVELAMER CARBONATE 1600 MG: 800 TABLET, FILM COATED ORAL at 06:08

## 2019-01-01 RX ADMIN — FENTANYL CITRATE 25 MCG: 50 INJECTION, SOLUTION INTRAMUSCULAR; INTRAVENOUS at 03:08

## 2019-01-01 RX ADMIN — MUPIROCIN: 20 OINTMENT TOPICAL at 11:08

## 2019-01-01 RX ADMIN — HYDROCODONE BITARTRATE AND ACETAMINOPHEN 1 TABLET: 10; 325 TABLET ORAL at 10:08

## 2019-01-01 RX ADMIN — ASPIRIN 81 MG: 81 TABLET, COATED ORAL at 08:07

## 2019-01-01 RX ADMIN — SENNOSIDES, DOCUSATE SODIUM 1 TABLET: 50; 8.6 TABLET, FILM COATED ORAL at 08:08

## 2019-01-01 RX ADMIN — ASPIRIN 81 MG: 81 TABLET, COATED ORAL at 08:05

## 2019-01-01 RX ADMIN — ROPIVACAINE HYDROCHLORIDE 15 ML: 5 INJECTION, SOLUTION EPIDURAL; INFILTRATION; PERINEURAL at 08:08

## 2019-01-01 RX ADMIN — EPOETIN ALFA-EPBX 6500 UNITS: 10000 INJECTION, SOLUTION INTRAVENOUS; SUBCUTANEOUS at 12:06

## 2019-01-01 RX ADMIN — GABAPENTIN 100 MG: 100 CAPSULE ORAL at 10:06

## 2019-01-01 RX ADMIN — INSULIN ASPART 1 UNITS: 100 INJECTION, SOLUTION INTRAVENOUS; SUBCUTANEOUS at 11:06

## 2019-01-01 RX ADMIN — FENTANYL CITRATE 25 MCG: 50 INJECTION, SOLUTION INTRAMUSCULAR; INTRAVENOUS at 05:08

## 2019-01-01 RX ADMIN — SEVELAMER CARBONATE 1600 MG: 800 TABLET, FILM COATED ORAL at 08:06

## 2019-01-01 RX ADMIN — METOPROLOL TARTRATE 25 MG: 25 TABLET ORAL at 08:06

## 2019-01-01 RX ADMIN — ROPINIROLE HYDROCHLORIDE 2 MG: 2 TABLET, FILM COATED ORAL at 11:06

## 2019-01-01 RX ADMIN — IOHEXOL 125 ML: 350 INJECTION, SOLUTION INTRAVENOUS at 10:08

## 2019-01-01 RX ADMIN — HEPARIN SODIUM 5000 UNITS: 1000 INJECTION, SOLUTION INTRAVENOUS; SUBCUTANEOUS at 02:08

## 2019-01-01 RX ADMIN — PANTOPRAZOLE SODIUM 40 MG: 40 TABLET, DELAYED RELEASE ORAL at 03:06

## 2019-01-01 RX ADMIN — GABAPENTIN 100 MG: 100 CAPSULE ORAL at 08:06

## 2019-01-01 RX ADMIN — MUPIROCIN: 20 OINTMENT TOPICAL at 12:08

## 2019-01-01 RX ADMIN — MUPIROCIN: 20 OINTMENT TOPICAL at 09:05

## 2019-01-01 RX ADMIN — LEVALBUTEROL HYDROCHLORIDE 0.63 MG: 0.63 SOLUTION RESPIRATORY (INHALATION) at 04:05

## 2019-01-01 RX ADMIN — HEPARIN SODIUM 5000 UNITS: 5000 INJECTION, SOLUTION INTRAVENOUS; SUBCUTANEOUS at 10:08

## 2019-01-01 RX ADMIN — FLUCONAZOLE 100 MG: 100 TABLET ORAL at 09:06

## 2019-01-01 RX ADMIN — SEVELAMER CARBONATE 800 MG: 800 TABLET, FILM COATED ORAL at 11:06

## 2019-01-01 RX ADMIN — LIDOCAINE HYDROCHLORIDE 100 MG: 20 INJECTION, SOLUTION INTRAVENOUS at 02:08

## 2019-01-01 RX ADMIN — INSULIN ASPART 2 UNITS: 100 INJECTION, SOLUTION INTRAVENOUS; SUBCUTANEOUS at 12:06

## 2019-01-01 RX ADMIN — CEFEPIME HYDROCHLORIDE 1 G: 1 INJECTION, POWDER, FOR SOLUTION INTRAMUSCULAR; INTRAVENOUS at 10:06

## 2019-01-01 RX ADMIN — ONDANSETRON 8 MG: 8 TABLET, ORALLY DISINTEGRATING ORAL at 09:06

## 2019-01-01 RX ADMIN — FLUOXETINE 20 MG: 20 CAPSULE ORAL at 06:08

## 2019-01-01 RX ADMIN — PANTOPRAZOLE SODIUM 40 MG: 40 INJECTION, POWDER, FOR SOLUTION INTRAVENOUS at 08:05

## 2019-01-01 RX ADMIN — FERROUS SULFATE TAB EC 325 MG (65 MG FE EQUIVALENT) 325 MG: 325 (65 FE) TABLET DELAYED RESPONSE at 05:05

## 2019-01-01 RX ADMIN — ASPIRIN 81 MG: 81 TABLET, COATED ORAL at 09:08

## 2019-01-01 RX ADMIN — LEVALBUTEROL HYDROCHLORIDE 0.63 MG: 0.63 SOLUTION RESPIRATORY (INHALATION) at 11:05

## 2019-01-01 RX ADMIN — MAGNESIUM SULFATE HEPTAHYDRATE 2 G: 40 INJECTION, SOLUTION INTRAVENOUS at 03:06

## 2019-01-01 RX ADMIN — METOPROLOL TARTRATE 25 MG: 25 TABLET, FILM COATED ORAL at 12:05

## 2019-01-01 RX ADMIN — CALCIUM GLUCONATE 3000 MG: 98 INJECTION, SOLUTION INTRAVENOUS at 11:06

## 2019-01-01 RX ADMIN — PANTOPRAZOLE SODIUM 40 MG: 40 TABLET, DELAYED RELEASE ORAL at 03:08

## 2019-01-01 RX ADMIN — HEPARIN SODIUM 5000 UNITS: 1000 INJECTION, SOLUTION INTRAVENOUS; SUBCUTANEOUS at 09:08

## 2019-01-01 RX ADMIN — HEPARIN SODIUM 5000 UNITS: 5000 INJECTION, SOLUTION INTRAVENOUS; SUBCUTANEOUS at 12:08

## 2019-01-01 RX ADMIN — PANTOPRAZOLE SODIUM 8 MG/HR: 40 INJECTION, POWDER, FOR SOLUTION INTRAVENOUS at 05:05

## 2019-01-01 RX ADMIN — FUROSEMIDE 40 MG: 10 INJECTION, SOLUTION INTRAVENOUS at 05:05

## 2019-01-01 RX ADMIN — NOREPINEPHRINE BITARTRATE 0.05 MCG/KG/MIN: 1 INJECTION, SOLUTION, CONCENTRATE INTRAVENOUS at 06:06

## 2019-01-01 RX ADMIN — DOCUSATE SODIUM AND SENNOSIDES 1 TABLET: 8.6; 5 TABLET, FILM COATED ORAL at 09:05

## 2019-01-01 RX ADMIN — MIDODRINE HYDROCHLORIDE 10 MG: 5 TABLET ORAL at 10:06

## 2019-01-01 RX ADMIN — SEVELAMER CARBONATE 800 MG: 800 TABLET, FILM COATED ORAL at 06:06

## 2019-01-01 RX ADMIN — ALBUMIN (HUMAN) 12.5 G: 25 SOLUTION INTRAVENOUS at 10:08

## 2019-01-01 RX ADMIN — SAXAGLIPTIN 2.5 MG: 2.5 TABLET, FILM COATED ORAL at 08:06

## 2019-01-01 RX ADMIN — ROPINIROLE HYDROCHLORIDE 2 MG: 1 TABLET, FILM COATED ORAL at 09:05

## 2019-01-01 RX ADMIN — FENTANYL CITRATE 100 MCG: 50 INJECTION, SOLUTION INTRAMUSCULAR; INTRAVENOUS at 10:04

## 2019-01-01 RX ADMIN — METHYLPREDNISOLONE SODIUM SUCCINATE 40 MG: 40 INJECTION, POWDER, FOR SOLUTION INTRAMUSCULAR; INTRAVENOUS at 09:06

## 2019-01-01 RX ADMIN — CLINDAMYCIN IN 5 PERCENT DEXTROSE 600 MG: 12 INJECTION, SOLUTION INTRAVENOUS at 04:08

## 2019-01-01 RX ADMIN — ROPINIROLE HYDROCHLORIDE 2 MG: 1 TABLET, FILM COATED ORAL at 08:08

## 2019-01-01 RX ADMIN — CEFTRIAXONE SODIUM 1 G: 1 INJECTION, POWDER, FOR SOLUTION INTRAMUSCULAR; INTRAVENOUS at 08:05

## 2019-01-01 RX ADMIN — MIDAZOLAM HYDROCHLORIDE 2 MG: 5 INJECTION, SOLUTION INTRAMUSCULAR; INTRAVENOUS at 10:06

## 2019-01-01 RX ADMIN — SEVELAMER CARBONATE 1600 MG: 800 TABLET, FILM COATED ORAL at 08:07

## 2019-01-01 RX ADMIN — NOREPINEPHRINE BITARTRATE 0.16 MCG/KG/MIN: 1 INJECTION, SOLUTION, CONCENTRATE INTRAVENOUS at 05:06

## 2019-01-01 RX ADMIN — OXYCODONE HYDROCHLORIDE 5 MG: 5 TABLET ORAL at 11:04

## 2019-01-01 RX ADMIN — GENTAMICIN SULFATE 80 MG: 40 INJECTION, SOLUTION INTRAMUSCULAR; INTRAVENOUS at 11:06

## 2019-01-01 RX ADMIN — NEPHROCAP 1 CAPSULE: 1 CAP ORAL at 08:05

## 2019-01-01 RX ADMIN — HYDROCORTISONE 2.5%: 25 CREAM TOPICAL at 12:08

## 2019-01-01 RX ADMIN — SEVELAMER CARBONATE 1600 MG: 800 TABLET, FILM COATED ORAL at 05:06

## 2019-01-01 RX ADMIN — MEROPENEM AND SODIUM CHLORIDE 500 MG: 500 INJECTION, SOLUTION INTRAVENOUS at 10:08

## 2019-01-01 RX ADMIN — DEXTROSE AND SODIUM CHLORIDE: 5; .9 INJECTION, SOLUTION INTRAVENOUS at 08:04

## 2019-01-01 RX ADMIN — MICONAZOLE NITRATE: 20 OINTMENT TOPICAL at 09:08

## 2019-01-01 RX ADMIN — VANCOMYCIN HYDROCHLORIDE 500 MG: 500 INJECTION, POWDER, LYOPHILIZED, FOR SOLUTION INTRAVENOUS at 06:06

## 2019-01-01 RX ADMIN — FLUCONAZOLE 100 MG: 100 TABLET ORAL at 08:06

## 2019-01-01 RX ADMIN — INSULIN DETEMIR 10 UNITS: 100 INJECTION, SOLUTION SUBCUTANEOUS at 09:08

## 2019-01-01 RX ADMIN — HEPARIN SODIUM 4000 UNITS: 1000 INJECTION, SOLUTION INTRAVENOUS; SUBCUTANEOUS at 03:05

## 2019-01-01 RX ADMIN — TUBERCULIN PURIFIED PROTEIN DERIVATIVE 5 UNITS: 5 INJECTION, SOLUTION INTRADERMAL at 06:06

## 2019-01-01 RX ADMIN — PANTOPRAZOLE SODIUM 8 MG/HR: 40 INJECTION, POWDER, FOR SOLUTION INTRAVENOUS at 07:06

## 2019-01-01 RX ADMIN — DOCUSATE SODIUM AND SENNOSIDES 1 TABLET: 8.6; 5 TABLET, FILM COATED ORAL at 08:05

## 2019-01-01 RX ADMIN — SEVELAMER CARBONATE 800 MG: 800 TABLET, FILM COATED ORAL at 09:06

## 2019-01-01 RX ADMIN — Medication 0.16 MCG/KG/MIN: at 06:06

## 2019-01-01 RX ADMIN — ALBUMIN (HUMAN) 12.5 G: 25 SOLUTION INTRAVENOUS at 04:08

## 2019-01-01 RX ADMIN — SODIUM CHLORIDE: 0.9 INJECTION, SOLUTION INTRAVENOUS at 07:06

## 2019-01-01 RX ADMIN — ALFUZOSIN HYDROCHLORIDE 10 MG: 10 TABLET ORAL at 10:06

## 2019-01-01 RX ADMIN — NEPHROCAP 1 CAPSULE: 1 CAP ORAL at 07:05

## 2019-01-01 RX ADMIN — SODIUM CHLORIDE: 0.9 INJECTION, SOLUTION INTRAVENOUS at 02:08

## 2019-01-01 RX ADMIN — CLOPIDOGREL BISULFATE 75 MG: 75 TABLET ORAL at 01:05

## 2019-01-01 RX ADMIN — FERROUS SULFATE TAB EC 325 MG (65 MG FE EQUIVALENT) 325 MG: 325 (65 FE) TABLET DELAYED RESPONSE at 12:05

## 2019-01-01 RX ADMIN — GENTAMICIN SULFATE 80 MG: 40 INJECTION, SOLUTION INTRAMUSCULAR; INTRAVENOUS at 12:06

## 2019-01-01 RX ADMIN — RAMELTEON 8 MG: 8 TABLET, FILM COATED ORAL at 01:05

## 2019-01-01 RX ADMIN — SEVELAMER CARBONATE 1600 MG: 800 TABLET, FILM COATED ORAL at 11:06

## 2019-01-01 RX ADMIN — PIPERACILLIN AND TAZOBACTAM 4.5 G: 4; .5 INJECTION, POWDER, LYOPHILIZED, FOR SOLUTION INTRAVENOUS; PARENTERAL at 02:05

## 2019-01-01 RX ADMIN — INSULIN DETEMIR 20 UNITS: 100 INJECTION, SOLUTION SUBCUTANEOUS at 11:05

## 2019-01-01 RX ADMIN — MUPIROCIN: 20 OINTMENT TOPICAL at 10:06

## 2019-01-01 RX ADMIN — PANTOPRAZOLE SODIUM 8 MG/HR: 40 INJECTION, POWDER, FOR SOLUTION INTRAVENOUS at 02:06

## 2019-01-01 RX ADMIN — CLOPIDOGREL BISULFATE 75 MG: 75 TABLET ORAL at 08:05

## 2019-01-01 RX ADMIN — PIPERACILLIN AND TAZOBACTAM 4.5 G: 4; .5 INJECTION, POWDER, LYOPHILIZED, FOR SOLUTION INTRAVENOUS; PARENTERAL at 11:06

## 2019-01-01 RX ADMIN — ALBUMIN (HUMAN) 12.5 G: 25 SOLUTION INTRAVENOUS at 09:08

## 2019-01-01 RX ADMIN — RAMELTEON 8 MG: 8 TABLET, FILM COATED ORAL at 12:05

## 2019-01-01 RX ADMIN — METOPROLOL TARTRATE 25 MG: 25 TABLET, FILM COATED ORAL at 10:06

## 2019-01-01 RX ADMIN — Medication 3 ML: at 03:06

## 2019-01-01 RX ADMIN — METOPROLOL TARTRATE 25 MG: 25 TABLET, FILM COATED ORAL at 11:05

## 2019-01-01 RX ADMIN — ETOMIDATE 2 MG: 2 INJECTION, SOLUTION INTRAVENOUS at 09:06

## 2019-01-01 RX ADMIN — MAGNESIUM SULFATE IN WATER 2 G: 40 INJECTION, SOLUTION INTRAVENOUS at 04:06

## 2019-01-01 RX ADMIN — FENTANYL CITRATE 25 MCG: 50 INJECTION, SOLUTION INTRAMUSCULAR; INTRAVENOUS at 03:05

## 2019-01-01 RX ADMIN — SODIUM CHLORIDE: 0.9 INJECTION, SOLUTION INTRAVENOUS at 11:05

## 2019-01-01 RX ADMIN — Medication 3 ML: at 09:06

## 2019-01-01 RX ADMIN — CEFTRIAXONE 2 G: 2 INJECTION, SOLUTION INTRAVENOUS at 11:06

## 2019-01-01 RX ADMIN — HEPARIN SODIUM 10000 UNITS: 1000 INJECTION, SOLUTION INTRAVENOUS; SUBCUTANEOUS at 09:05

## 2019-01-01 RX ADMIN — Medication 100 MG: at 02:06

## 2019-01-01 RX ADMIN — Medication 0.04 MCG/KG/MIN: at 08:05

## 2019-01-01 RX ADMIN — MUPIROCIN: 20 OINTMENT TOPICAL at 12:06

## 2019-01-01 RX ADMIN — MIDODRINE HYDROCHLORIDE 10 MG: 5 TABLET ORAL at 08:06

## 2019-01-01 RX ADMIN — METHYLPREDNISOLONE SODIUM SUCCINATE 80 MG: 125 INJECTION, POWDER, FOR SOLUTION INTRAMUSCULAR; INTRAVENOUS at 09:06

## 2019-01-01 RX ADMIN — MICONAZOLE NITRATE: 20 OINTMENT TOPICAL at 12:08

## 2019-01-01 RX ADMIN — CEFEPIME HYDROCHLORIDE 1 G: 1 INJECTION, POWDER, FOR SOLUTION INTRAMUSCULAR; INTRAVENOUS at 01:06

## 2019-01-01 RX ADMIN — MORPHINE SULFATE 2 MG: 2 INJECTION, SOLUTION INTRAMUSCULAR; INTRAVENOUS at 07:08

## 2019-01-01 RX ADMIN — Medication 0.2 MCG/KG/MIN: at 01:06

## 2019-01-01 RX ADMIN — Medication 0.12 MCG/KG/MIN: at 07:06

## 2019-01-01 RX ADMIN — SEVELAMER CARBONATE 800 MG: 800 TABLET, FILM COATED ORAL at 05:06

## 2019-01-01 RX ADMIN — PANTOPRAZOLE SODIUM 40 MG: 40 TABLET, DELAYED RELEASE ORAL at 05:08

## 2019-01-01 RX ADMIN — ASPIRIN 81 MG: 81 TABLET, COATED ORAL at 10:08

## 2019-01-01 RX ADMIN — MICONAZOLE NITRATE: 20 OINTMENT TOPICAL at 10:06

## 2019-01-01 RX ADMIN — FENTANYL CITRATE 25 MCG: 50 INJECTION INTRAMUSCULAR; INTRAVENOUS at 12:04

## 2019-01-01 RX ADMIN — METOPROLOL TARTRATE 25 MG: 25 TABLET, FILM COATED ORAL at 06:08

## 2019-01-01 RX ADMIN — ALFUZOSIN HYDROCHLORIDE 10 MG: 10 TABLET ORAL at 11:05

## 2019-01-01 RX ADMIN — METHYLPREDNISOLONE SODIUM SUCCINATE 40 MG: 40 INJECTION, POWDER, FOR SOLUTION INTRAMUSCULAR; INTRAVENOUS at 08:06

## 2019-01-01 RX ADMIN — LIDOCAINE HYDROCHLORIDE 50 MG: 10 INJECTION, SOLUTION EPIDURAL; INFILTRATION; INTRACAUDAL; PERINEURAL at 12:06

## 2019-01-01 RX ADMIN — INSULIN ASPART 4 UNITS: 100 INJECTION, SOLUTION INTRAVENOUS; SUBCUTANEOUS at 06:06

## 2019-01-01 RX ADMIN — PANTOPRAZOLE SODIUM 8 MG/HR: 40 INJECTION, POWDER, FOR SOLUTION INTRAVENOUS at 09:06

## 2019-01-01 RX ADMIN — VANCOMYCIN HYDROCHLORIDE 1500 MG: 1.5 INJECTION, POWDER, LYOPHILIZED, FOR SOLUTION INTRAVENOUS at 02:06

## 2019-01-01 RX ADMIN — MIDODRINE HYDROCHLORIDE 5 MG: 2.5 TABLET ORAL at 09:08

## 2019-01-01 RX ADMIN — VASOPRESSIN 1 UNITS: 20 INJECTION, SOLUTION INTRAMUSCULAR; SUBCUTANEOUS at 09:06

## 2019-01-01 RX ADMIN — PANTOPRAZOLE SODIUM 8 MG/HR: 40 INJECTION, POWDER, FOR SOLUTION INTRAVENOUS at 05:06

## 2019-01-01 RX ADMIN — ALBUMIN (HUMAN) 25 G: 12.5 SOLUTION INTRAVENOUS at 03:08

## 2019-01-01 RX ADMIN — ETOMIDATE 20 MG: 2 INJECTION, SOLUTION INTRAVENOUS at 02:08

## 2019-01-01 RX ADMIN — GEMFIBROZIL 600 MG: 600 TABLET ORAL at 12:05

## 2019-01-01 RX ADMIN — SODIUM BICARBONATE 25 MEQ: 84 INJECTION, SOLUTION INTRAVENOUS at 09:06

## 2019-01-01 RX ADMIN — ASPIRIN 81 MG: 81 TABLET, COATED ORAL at 12:06

## 2019-01-01 RX ADMIN — ONDANSETRON 4 MG: 2 INJECTION, SOLUTION INTRAMUSCULAR; INTRAVENOUS at 03:05

## 2019-01-01 RX ADMIN — TUBERCULIN PURIFIED PROTEIN DERIVATIVE 5 UNITS: 5 INJECTION, SOLUTION INTRADERMAL at 04:05

## 2019-01-01 RX ADMIN — CHLORHEXIDINE GLUCONATE 0.12% ORAL RINSE 15 ML: 1.2 LIQUID ORAL at 01:06

## 2019-01-01 RX ADMIN — FUROSEMIDE 40 MG: 10 INJECTION, SOLUTION INTRAVENOUS at 05:08

## 2019-01-01 RX ADMIN — METOPROLOL TARTRATE 25 MG: 25 TABLET ORAL at 01:06

## 2019-01-01 RX ADMIN — SEVELAMER CARBONATE 1600 MG: 800 TABLET, FILM COATED ORAL at 08:08

## 2019-01-01 RX ADMIN — INSULIN ASPART 1 UNITS: 100 INJECTION, SOLUTION INTRAVENOUS; SUBCUTANEOUS at 09:08

## 2019-01-01 RX ADMIN — CLOPIDOGREL BISULFATE 75 MG: 75 TABLET ORAL at 12:05

## 2019-01-01 RX ADMIN — FERROUS SULFATE TAB EC 325 MG (65 MG FE EQUIVALENT) 325 MG: 325 (65 FE) TABLET DELAYED RESPONSE at 08:05

## 2019-01-01 RX ADMIN — FLUCONAZOLE 100 MG: 100 TABLET ORAL at 12:06

## 2019-01-01 RX ADMIN — ACETAMINOPHEN 1000 MG: 500 TABLET ORAL at 01:05

## 2019-01-01 RX ADMIN — IOHEXOL 100 ML: 350 INJECTION, SOLUTION INTRAVENOUS at 02:06

## 2019-01-01 RX ADMIN — CALCIUM GLUCONATE 3000 MG: 98 INJECTION, SOLUTION INTRAVENOUS at 01:06

## 2019-01-01 RX ADMIN — ATORVASTATIN CALCIUM 40 MG: 20 TABLET, FILM COATED ORAL at 08:06

## 2019-01-01 RX ADMIN — ONDANSETRON 4 MG: 2 INJECTION INTRAMUSCULAR; INTRAVENOUS at 10:08

## 2019-01-01 RX ADMIN — SODIUM CHLORIDE: 0.9 INJECTION, SOLUTION INTRAVENOUS at 03:06

## 2019-01-01 RX ADMIN — NEPHROCAP 1 CAPSULE: 1 CAP ORAL at 09:05

## 2019-01-01 RX ADMIN — SODIUM CHLORIDE: 0.9 INJECTION, SOLUTION INTRAVENOUS at 12:06

## 2019-01-01 RX ADMIN — PIPERACILLIN AND TAZOBACTAM 4.5 G: 4; .5 INJECTION, POWDER, LYOPHILIZED, FOR SOLUTION INTRAVENOUS; PARENTERAL at 09:06

## 2019-01-01 RX ADMIN — CEFEPIME HYDROCHLORIDE 1 G: 1 INJECTION, SOLUTION INTRAVENOUS at 12:06

## 2019-01-01 RX ADMIN — ASPIRIN 81 MG: 81 TABLET, COATED ORAL at 08:08

## 2019-01-01 RX ADMIN — FLUCONAZOLE 200 MG: 200 TABLET ORAL at 09:06

## 2019-01-01 RX ADMIN — HEPARIN SODIUM 5000 UNITS: 1000 INJECTION, SOLUTION INTRAVENOUS; SUBCUTANEOUS at 04:08

## 2019-01-01 RX ADMIN — MUPIROCIN: 20 OINTMENT TOPICAL at 09:08

## 2019-01-01 RX ADMIN — PANTOPRAZOLE SODIUM 8 MG/HR: 40 INJECTION, POWDER, FOR SOLUTION INTRAVENOUS at 04:06

## 2019-01-01 RX ADMIN — SEVELAMER CARBONATE 800 MG: 800 TABLET, FILM COATED ORAL at 08:06

## 2019-01-01 RX ADMIN — SEVELAMER CARBONATE 2400 MG: 800 TABLET, FILM COATED ORAL at 12:08

## 2019-01-01 RX ADMIN — MORPHINE SULFATE 2 MG: 2 INJECTION, SOLUTION INTRAMUSCULAR; INTRAVENOUS at 03:08

## 2019-01-01 RX ADMIN — NEPHROCAP 1 CAPSULE: 1 CAP ORAL at 01:05

## 2019-01-01 RX ADMIN — PIPERACILLIN AND TAZOBACTAM 4.5 G: 4; .5 INJECTION, POWDER, LYOPHILIZED, FOR SOLUTION INTRAVENOUS; PARENTERAL at 03:06

## 2019-01-01 RX ADMIN — METHYLPREDNISOLONE SODIUM SUCCINATE 80 MG: 125 INJECTION, POWDER, FOR SOLUTION INTRAMUSCULAR; INTRAVENOUS at 10:06

## 2019-01-01 RX ADMIN — MUPIROCIN: 20 OINTMENT TOPICAL at 01:05

## 2019-01-01 RX ADMIN — SEVELAMER CARBONATE 1600 MG: 800 TABLET, FILM COATED ORAL at 12:07

## 2019-01-01 RX ADMIN — HYDROMORPHONE HYDROCHLORIDE 0.2 MG: 2 INJECTION, SOLUTION INTRAMUSCULAR; INTRAVENOUS; SUBCUTANEOUS at 05:08

## 2019-01-01 RX ADMIN — METOPROLOL TARTRATE 25 MG: 25 TABLET, FILM COATED ORAL at 09:06

## 2019-01-01 RX ADMIN — ROCURONIUM BROMIDE 20 MG: 10 INJECTION, SOLUTION INTRAVENOUS at 09:06

## 2019-01-01 RX ADMIN — SODIUM CHLORIDE 500 ML: 0.9 INJECTION, SOLUTION INTRAVENOUS at 10:05

## 2019-01-01 RX ADMIN — DEXTROSE 750 MG: 5 SOLUTION INTRAVENOUS at 04:06

## 2019-01-01 RX ADMIN — MAGNESIUM SULFATE HEPTAHYDRATE 2 G: 40 INJECTION, SOLUTION INTRAVENOUS at 02:06

## 2019-01-01 RX ADMIN — VANCOMYCIN HYDROCHLORIDE 500 MG: 500 INJECTION, POWDER, LYOPHILIZED, FOR SOLUTION INTRAVENOUS at 08:08

## 2019-01-01 RX ADMIN — Medication 3 ML: at 11:06

## 2019-01-01 RX ADMIN — ASPIRIN 81 MG: 81 TABLET, COATED ORAL at 01:06

## 2019-01-01 RX ADMIN — FENTANYL CITRATE 50 MCG: 50 INJECTION, SOLUTION INTRAMUSCULAR; INTRAVENOUS at 02:08

## 2019-01-01 RX ADMIN — GENTAMICIN SULFATE 40 MG: 40 INJECTION, SOLUTION INTRAMUSCULAR; INTRAVENOUS at 12:06

## 2019-01-01 RX ADMIN — RAMELTEON 8 MG: 8 TABLET, FILM COATED ORAL at 08:05

## 2019-01-01 RX ADMIN — Medication 1000 ML: at 02:06

## 2019-01-01 RX ADMIN — FLUCONAZOLE 100 MG: 100 TABLET ORAL at 08:07

## 2019-01-01 RX ADMIN — IPRATROPIUM BROMIDE AND ALBUTEROL SULFATE 3 ML: .5; 3 SOLUTION RESPIRATORY (INHALATION) at 12:08

## 2019-01-01 RX ADMIN — SODIUM CHLORIDE 25 ML: 0.9 INJECTION, SOLUTION INTRAVENOUS at 12:08

## 2019-01-01 RX ADMIN — SEVELAMER CARBONATE 1600 MG: 800 TABLET, FILM COATED ORAL at 11:08

## 2019-01-01 RX ADMIN — HEPARIN SODIUM 5000 UNITS: 5000 INJECTION, SOLUTION INTRAVENOUS; SUBCUTANEOUS at 05:08

## 2019-01-01 RX ADMIN — ONDANSETRON 4 MG: 2 INJECTION INTRAMUSCULAR; INTRAVENOUS at 05:08

## 2019-01-01 RX ADMIN — Medication 0.13 MCG/KG/MIN: at 03:06

## 2019-01-01 RX ADMIN — VANCOMYCIN HYDROCHLORIDE 500 MG: 500 INJECTION, POWDER, LYOPHILIZED, FOR SOLUTION INTRAVENOUS at 10:06

## 2019-01-01 RX ADMIN — FUROSEMIDE 40 MG: 10 INJECTION, SOLUTION INTRAVENOUS at 08:05

## 2019-01-01 RX ADMIN — SEVELAMER CARBONATE 800 MG: 800 TABLET, FILM COATED ORAL at 01:06

## 2019-01-01 RX ADMIN — ALTEPLASE 2 MG: 2.2 INJECTION, POWDER, LYOPHILIZED, FOR SOLUTION INTRAVENOUS at 11:06

## 2019-01-01 RX ADMIN — ALFUZOSIN HYDROCHLORIDE 10 MG: 10 TABLET ORAL at 09:06

## 2019-01-01 RX ADMIN — PANTOPRAZOLE SODIUM 8 MG/HR: 40 INJECTION, POWDER, FOR SOLUTION INTRAVENOUS at 08:05

## 2019-01-01 RX ADMIN — FERROUS SULFATE TAB EC 325 MG (65 MG FE EQUIVALENT) 325 MG: 325 (65 FE) TABLET DELAYED RESPONSE at 07:05

## 2019-01-01 RX ADMIN — Medication 3 ML: at 06:06

## 2019-01-01 RX ADMIN — FERROUS SULFATE TAB EC 325 MG (65 MG FE EQUIVALENT) 325 MG: 325 (65 FE) TABLET DELAYED RESPONSE at 06:05

## 2019-01-01 RX ADMIN — EPOETIN ALFA-EPBX 5800 UNITS: 10000 INJECTION, SOLUTION INTRAVENOUS; SUBCUTANEOUS at 10:08

## 2019-01-01 RX ADMIN — PROPOFOL 150 MG: 10 INJECTION, EMULSION INTRAVENOUS at 10:04

## 2019-01-01 RX ADMIN — IPRATROPIUM BROMIDE AND ALBUTEROL SULFATE 3 ML: .5; 3 SOLUTION RESPIRATORY (INHALATION) at 06:08

## 2019-01-01 RX ADMIN — MAGNESIUM SULFATE HEPTAHYDRATE 2 G: 40 INJECTION, SOLUTION INTRAVENOUS at 10:06

## 2019-01-01 RX ADMIN — LIDOCAINE HYDROCHLORIDE 40 MG: 20 INJECTION, SOLUTION INTRAVENOUS at 09:06

## 2019-01-01 RX ADMIN — SENNOSIDES AND DOCUSATE SODIUM 1 TABLET: 8.6; 5 TABLET ORAL at 08:07

## 2019-01-01 RX ADMIN — FLUOXETINE 20 MG: 20 CAPSULE ORAL at 10:06

## 2019-01-01 RX ADMIN — SUCCINYLCHOLINE CHLORIDE 200 MG: 20 INJECTION, SOLUTION INTRAMUSCULAR; INTRAVENOUS at 10:04

## 2019-01-01 RX ADMIN — METOPROLOL TARTRATE 25 MG: 25 TABLET, FILM COATED ORAL at 07:08

## 2019-01-01 RX ADMIN — SODIUM CHLORIDE, SODIUM LACTATE, POTASSIUM CHLORIDE, AND CALCIUM CHLORIDE: .6; .31; .03; .02 INJECTION, SOLUTION INTRAVENOUS at 09:04

## 2019-01-01 RX ADMIN — PIPERACILLIN AND TAZOBACTAM 4.5 G: 4; .5 INJECTION, POWDER, LYOPHILIZED, FOR SOLUTION INTRAVENOUS; PARENTERAL at 12:06

## 2019-01-01 RX ADMIN — PANTOPRAZOLE SODIUM 40 MG: 40 TABLET, DELAYED RELEASE ORAL at 07:08

## 2019-01-01 RX ADMIN — METOPROLOL TARTRATE 25 MG: 25 TABLET, FILM COATED ORAL at 02:06

## 2019-01-01 RX ADMIN — MIDAZOLAM 1 MG: 1 INJECTION INTRAMUSCULAR; INTRAVENOUS at 09:04

## 2019-01-01 RX ADMIN — ROPINIROLE HYDROCHLORIDE 2 MG: 2 TABLET, FILM COATED ORAL at 10:06

## 2019-01-01 RX ADMIN — PHENAZOPYRIDINE HYDROCHLORIDE 200 MG: 200 TABLET ORAL at 11:04

## 2019-01-01 RX ADMIN — INSULIN HUMAN 5 UNITS: 100 INJECTION, SOLUTION PARENTERAL at 11:05

## 2019-01-01 RX ADMIN — EPOETIN ALFA-EPBX 5800 UNITS: 10000 INJECTION, SOLUTION INTRAVENOUS; SUBCUTANEOUS at 02:08

## 2019-01-01 RX ADMIN — GEMFIBROZIL 600 MG: 600 TABLET ORAL at 03:06

## 2019-01-01 RX ADMIN — GENTAMICIN SULFATE 40 MG: 40 INJECTION, SOLUTION INTRAMUSCULAR; INTRAVENOUS at 05:06

## 2019-01-01 RX ADMIN — SEVELAMER CARBONATE 2400 MG: 800 TABLET, FILM COATED ORAL at 08:08

## 2019-01-01 RX ADMIN — CEFAZOLIN SODIUM 1 G: 1 SOLUTION INTRAVENOUS at 03:05

## 2019-01-01 RX ADMIN — PANTOPRAZOLE SODIUM 40 MG: 40 INJECTION, POWDER, FOR SOLUTION INTRAVENOUS at 09:06

## 2019-01-01 RX ADMIN — FLUOXETINE 20 MG: 20 CAPSULE ORAL at 08:08

## 2019-01-01 RX ADMIN — SEVELAMER CARBONATE 2400 MG: 800 TABLET, FILM COATED ORAL at 01:08

## 2019-01-01 RX ADMIN — HEPARIN SODIUM 5000 UNITS: 5000 INJECTION, SOLUTION INTRAVENOUS; SUBCUTANEOUS at 03:08

## 2019-01-01 RX ADMIN — INSULIN DETEMIR 10 UNITS: 100 INJECTION, SOLUTION SUBCUTANEOUS at 08:08

## 2019-01-01 RX ADMIN — SODIUM CHLORIDE: 0.9 INJECTION, SOLUTION INTRAVENOUS at 10:06

## 2019-01-01 RX ADMIN — MEROPENEM AND SODIUM CHLORIDE 500 MG: 500 INJECTION, SOLUTION INTRAVENOUS at 01:08

## 2019-01-01 RX ADMIN — GEMFIBROZIL 600 MG: 600 TABLET ORAL at 07:05

## 2019-01-01 RX ADMIN — LEVOFLOXACIN 500 MG: 500 TABLET, FILM COATED ORAL at 02:06

## 2019-01-01 RX ADMIN — INSULIN ASPART 2 UNITS: 100 INJECTION, SOLUTION INTRAVENOUS; SUBCUTANEOUS at 11:06

## 2019-01-01 RX ADMIN — NEPHROCAP 1 CAPSULE: 1 CAP ORAL at 12:05

## 2019-01-01 RX ADMIN — METOPROLOL TARTRATE 25 MG: 25 TABLET, FILM COATED ORAL at 01:05

## 2019-01-01 RX ADMIN — INSULIN ASPART 1 UNITS: 100 INJECTION, SOLUTION INTRAVENOUS; SUBCUTANEOUS at 08:06

## 2019-01-01 RX ADMIN — Medication 150 MCG/HR: at 12:06

## 2019-01-01 RX ADMIN — EPOETIN ALFA-EPBX 10000 UNITS: 10000 INJECTION, SOLUTION INTRAVENOUS; SUBCUTANEOUS at 08:06

## 2019-01-01 RX ADMIN — SEVELAMER CARBONATE 2400 MG: 800 TABLET, FILM COATED ORAL at 11:08

## 2019-01-01 RX ADMIN — METOPROLOL TARTRATE 25 MG: 25 TABLET, FILM COATED ORAL at 07:05

## 2019-01-01 RX ADMIN — CLOPIDOGREL BISULFATE 75 MG: 75 TABLET ORAL at 07:05

## 2019-01-01 RX ADMIN — HYDROMORPHONE HYDROCHLORIDE 1 MG: 2 INJECTION, SOLUTION INTRAMUSCULAR; INTRAVENOUS; SUBCUTANEOUS at 04:08

## 2019-01-01 RX ADMIN — Medication 3 ML: at 07:06

## 2019-01-01 RX ADMIN — HYDROCODONE BITARTRATE AND ACETAMINOPHEN 1 TABLET: 10; 325 TABLET ORAL at 04:08

## 2019-01-01 RX ADMIN — IOHEXOL 100 ML: 350 INJECTION, SOLUTION INTRAVENOUS at 10:05

## 2019-01-01 RX ADMIN — HYDROCODONE BITARTRATE AND ACETAMINOPHEN 1 TABLET: 10; 325 TABLET ORAL at 07:08

## 2019-01-01 RX ADMIN — EZETIMIBE 10 MG: 10 TABLET ORAL at 08:06

## 2019-01-01 RX ADMIN — INSULIN ASPART 2 UNITS: 100 INJECTION, SOLUTION INTRAVENOUS; SUBCUTANEOUS at 06:08

## 2019-01-01 RX ADMIN — PANTOPRAZOLE SODIUM 8 MG/HR: 40 INJECTION, POWDER, FOR SOLUTION INTRAVENOUS at 08:06

## 2019-01-01 RX ADMIN — LIDOCAINE HYDROCHLORIDE AND EPINEPHRINE 15 ML: 20; 10 INJECTION, SOLUTION INFILTRATION; PERINEURAL at 08:08

## 2019-01-01 RX ADMIN — SODIUM CHLORIDE 1000 ML: 0.9 INJECTION, SOLUTION INTRAVENOUS at 10:06

## 2019-01-01 RX ADMIN — SENNOSIDES AND DOCUSATE SODIUM 1 TABLET: 8.6; 5 TABLET ORAL at 12:06

## 2019-01-01 RX ADMIN — EPINEPHRINE 50 MCG: 0.1 INJECTION, SOLUTION ENDOTRACHEAL; INTRACARDIAC; INTRAVENOUS at 09:06

## 2019-01-01 RX ADMIN — CEFEPIME HYDROCHLORIDE 1 G: 1 INJECTION, POWDER, FOR SOLUTION INTRAMUSCULAR; INTRAVENOUS at 09:07

## 2019-01-01 RX ADMIN — ROCURONIUM BROMIDE 10 MG: 10 INJECTION, SOLUTION INTRAVENOUS at 10:04

## 2019-01-01 RX ADMIN — SODIUM CHLORIDE: 0.9 INJECTION, SOLUTION INTRAVENOUS at 03:05

## 2019-01-01 RX ADMIN — HYDROCODONE BITARTRATE AND ACETAMINOPHEN 1 TABLET: 10; 325 TABLET ORAL at 06:08

## 2019-01-01 RX ADMIN — CEFAZOLIN SODIUM 2 G: 2 SOLUTION INTRAVENOUS at 10:04

## 2019-01-01 RX ADMIN — FUROSEMIDE 80 MG: 10 INJECTION, SOLUTION INTRAVENOUS at 07:05

## 2019-01-01 RX ADMIN — FLUOXETINE 20 MG: 20 CAPSULE ORAL at 11:05

## 2019-01-01 RX ADMIN — ALBUMIN (HUMAN) 25 G: 12.5 SOLUTION INTRAVENOUS at 04:08

## 2019-01-01 RX ADMIN — LIDOCAINE HYDROCHLORIDE 100 MG: 20 INJECTION, SOLUTION INTRAVENOUS at 10:04

## 2019-01-01 RX ADMIN — HYDROCODONE BITARTRATE AND ACETAMINOPHEN 1 TABLET: 10; 325 TABLET ORAL at 09:08

## 2019-01-01 RX ADMIN — MEROPENEM AND SODIUM CHLORIDE 1 G: 1 INJECTION, SOLUTION INTRAVENOUS at 11:08

## 2019-01-01 RX ADMIN — METOPROLOL TARTRATE 25 MG: 25 TABLET ORAL at 11:06

## 2019-01-01 RX ADMIN — PHENYLEPHRINE HYDROCHLORIDE 100 MCG: 10 INJECTION INTRAVENOUS at 02:08

## 2019-01-01 RX ADMIN — PROPOFOL 25 MCG/KG/MIN: 10 INJECTION, EMULSION INTRAVENOUS at 07:05

## 2019-01-01 RX ADMIN — CALCIUM GLUCONATE 3000 MG: 98 INJECTION, SOLUTION INTRAVENOUS at 10:06

## 2019-01-01 RX ADMIN — CHLORHEXIDINE GLUCONATE 0.12% ORAL RINSE 15 ML: 1.2 LIQUID ORAL at 09:06

## 2019-01-01 RX ADMIN — ALFUZOSIN HYDROCHLORIDE 10 MG: 10 TABLET ORAL at 12:06

## 2019-01-01 RX ADMIN — DEXTROSE MONOHYDRATE 12.5 G: 500 INJECTION PARENTERAL at 12:08

## 2019-01-01 RX ADMIN — FLUCONAZOLE 100 MG: 100 TABLET ORAL at 11:06

## 2019-01-01 RX ADMIN — INSULIN DETEMIR 10 UNITS: 100 INJECTION, SOLUTION SUBCUTANEOUS at 10:08

## 2019-01-01 RX ADMIN — ALBUMIN (HUMAN) 12.5 G: 25 SOLUTION INTRAVENOUS at 01:08

## 2019-01-01 RX ADMIN — EPOETIN ALFA-EPBX 6500 UNITS: 10000 INJECTION, SOLUTION INTRAVENOUS; SUBCUTANEOUS at 11:06

## 2019-01-01 RX ADMIN — Medication 3 ML: at 05:06

## 2019-01-01 RX ADMIN — METHYLPREDNISOLONE SODIUM SUCCINATE 40 MG: 40 INJECTION, POWDER, FOR SOLUTION INTRAMUSCULAR; INTRAVENOUS at 08:07

## 2019-01-01 RX ADMIN — SODIUM CHLORIDE: 0.9 INJECTION, SOLUTION INTRAVENOUS at 01:05

## 2019-01-01 RX ADMIN — HUMAN ALBUMIN MICROSPHERES AND PERFLUTREN 0.66 MG: 10; .22 INJECTION, SOLUTION INTRAVENOUS at 03:06

## 2019-01-01 RX ADMIN — ONDANSETRON 4 MG: 2 INJECTION, SOLUTION INTRAMUSCULAR; INTRAVENOUS at 10:04

## 2019-01-01 RX ADMIN — VANCOMYCIN HYDROCHLORIDE 1500 MG: 1.5 INJECTION, POWDER, LYOPHILIZED, FOR SOLUTION INTRAVENOUS at 11:08

## 2019-01-01 RX ADMIN — HEPARIN SODIUM 4000 UNITS: 1000 INJECTION, SOLUTION INTRAVENOUS; SUBCUTANEOUS at 08:06

## 2019-01-01 RX ADMIN — ETOMIDATE 10 MG: 2 INJECTION, SOLUTION INTRAVENOUS at 09:06

## 2019-01-01 RX ADMIN — ALFUZOSIN HYDROCHLORIDE 10 MG: 10 TABLET ORAL at 08:08

## 2019-01-01 RX ADMIN — DEXTROSE MONOHYDRATE 25 G: 25 INJECTION, SOLUTION INTRAVENOUS at 11:05

## 2019-01-01 RX ADMIN — Medication 3 ML: at 10:06

## 2019-01-01 RX ADMIN — HYDROCODONE BITARTRATE AND ACETAMINOPHEN 1 TABLET: 10; 325 TABLET ORAL at 05:08

## 2019-01-01 RX ADMIN — EPOETIN ALFA-EPBX 5800 UNITS: 10000 INJECTION, SOLUTION INTRAVENOUS; SUBCUTANEOUS at 09:08

## 2019-01-01 RX ADMIN — HEPARIN SODIUM 5000 UNITS: 1000 INJECTION, SOLUTION INTRAVENOUS; SUBCUTANEOUS at 10:08

## 2019-01-01 RX ADMIN — SEVELAMER CARBONATE 1600 MG: 800 TABLET, FILM COATED ORAL at 06:06

## 2019-03-26 NOTE — PATIENT INSTRUCTIONS
Blood in the Urine    Blood in the urine (hematuria) has many possible causes. If it occurs after an injury (such as a car accident or fall), it is most often a sign of bruising to the kidney or bladder. Common causes of blood in the urine include urinary tract infections, kidney stones, inflammation, tumors, or certain other diseases of the kidney or bladder. Menstruation can cause blood to appear in the urine sample, although it is not coming from the urinary tract.  If only a trace amount of blood is present, it will show up on the urine test, even though the urine may be yellow and not pink or red. This may occur with any of the above conditions, as well as heavy exercise or high fever. In this case, your doctor may want to repeat the urine test on another day. This will show if the blood is still present. If it is, then other tests can be done to find out the cause.  Home care  Follow these home care guidelines:  · If your urine does not appear bloody (pink, brown or red) then you do not need to restrict your activity in any way.  · If you can see blood in your urine, rest and avoid heavy exertion until your next exam. Do not use aspirin, blood thinners, or anti-platelet or anti-inflammatory medicines. These include ibuprofen and naproxen. These thin the blood and may increase bleeding.  Follow-up care  Follow up with your healthcare provider, or as advised. If you were injured and had blood in your urine, you should have a repeat urine test in 1 to 2 days. Contact your doctor for this test.  A radiologist will review any X-rays that were taken. You will be told of any new findings that may affect your care.  When to seek medical advice  Call your healthcare provider right away if any of these occur:  · Bright red blood or blood clots in the urine (if you did not have this before)  · Weakness, dizziness or fainting  · New groin, abdominal, or back pain  · Fever of 100.4ºF (38ºC) or higher, or as directed by  your healthcare provider  · Repeated vomiting  · Bleeding from the nose or gums or easy bruising  Date Last Reviewed: 9/1/2016 © 2000-2017 [employer] [address] [place] [address], [place] [address]. All rights reserved. This information is not intended as a substitute for professional medical care. Always follow your healthcare professional's instructions.        What is Hematuria?  Blood in your urine is a condition known as hematuria. Most of the time, the cause of hematuria is not serious. But, never ignore blood in the urine. Your doctor can evaluate you to find the cause of the bleeding and treat it, if needed.  Types of hematuria  · Gross hematuria means that the blood can be seen by the naked eye. The urine may look pinkish, brownish, or bright red.  · Microscopic hematuria means that the urine is clear, but blood cells can be seen when urine is looked at under a microscope or tested in a lab.  Both types of hematuria can have the same causes. Neither one is necessarily more serious than the other. With either type, you may have other symptoms, such as pain, pressure, or burning when you urinate, abdominal pain, or back pain. Or, you may not have any other symptoms. No matter how much blood is found, the cause of the bleeding needs to be identified.  Finding the cause of hematuria  To evaluate your condition, your doctor will first confirm that blood is indeed present. Then other tests are done to pinpoint where the blood is coming from and why. Your doctor will decide which tests will best determine the cause of your hematuria. Some common tests are listed below.  · History and physical exam  · Lab tests may include urinalysis, a urine culture, a urine cytology, and various blood tests  · Cystoscopy  · Computed tomography (CT) or CT urography  · Magnetic resonance imaging (MRI) or MR urography  · Ultrasound of the kidney  · Kidney biopsy  Causes of hematuria include the very benign (exercised  induced hematuria) to the very severe (cancer of the urinary system). A variety of treatments are available depending on the cause.  Date Last Reviewed: 12/2/2016  © 0627-8758 [employer]. [address] [place] [address], [place] [address]. All rights reserved. This information is not intended as a substitute for professional medical care. Always follow your healthcare professional's instructions.

## 2019-03-26 NOTE — PROGRESS NOTES
[facility] Note  Staff: RON Mayo-C    PCP: Dr. Mikhail Shields  Cardiologist:  Dr. Mendez  Nephrologist:  Dr. Quinonez    Chief Complaint: Gross hematuria, hx of bladder cancer    Subjective:        HPI: Frank Zayas is a 78 y.o. male presents today with c/o gross hematuria which began 3-4 days ago and is intermittent.    The pt was last seen by Dr. Vasquez on 09/27/2016 for hx of transitional cell carcinoma of the bladder.  During last ov, pt received an intravesical mitomycin C instillation.    The patient has a history of transitional cell carcinoma of the bladder, first diagnosed in 2012 and he subsequently developed recurrences in November 2013 and again in   September 2014, but has shown no further evidence of any recurrences since   then.  His last cystoscopic examination was on 07/19/2016, but there was no   evidence of any recurrence.    REVIEW OF SYSTEMS:  A comprehensive 10 system review was performed and is negative except as noted above in HPI    PMHx:  Past Medical History:   Diagnosis Date    Arthritis     Bladder cancer     Blood transfusion     Chronic kidney disease     Coronary artery disease     Diabetes mellitus     Gout     [place] (hard of hearing)     HAS BILAT AIDS BUT DOES NOT WEAR    Hyperlipidemia     Hypertension     Myocardial infarction     RLS (restless legs syndrome)     Sleep apnea     NO MACHINE    Wears glasses      PSHx:  Past Surgical History:   Procedure Laterality Date    CARDIAC SURGERY      CABG X 2 1997    CATARACT EXTRACTION, BILATERAL      CHOLECYSTECTOMY      COLONOSCOPY      CORONARY ARTERY BYPASS GRAFT      x 2    coronary stents      CYSTOSCOPY      EXCISION-BLADDER TUMOR-TRANSURETHRAL (TURBT) N/A 9/8/2014    [facility]    EXCISION-BLADDER TUMOR-TRANSURETHRAL (TURBT) N/A 11/18/2013    [facility] OR    VASECTOMY       Allergies:  Lidocaine and  Statins-hmg-coa reductase inhibitors    Medications: reviewed   Anticoagulation: Yes - Plavix 75 mg daily; Ecotrin 81 mg daily    Objective:     Vitals:    03/26/19 1313   BP: 130/73   Pulse: 90   Resp: 18   Temp: 97.8 °F (36.6 °C)     General:WDWN in NAD  Eyes: PERRLA, normal conjunctiva  Respiratory: no increased work on breathing, clear to auscultation  Cardiovascular: regular rate and rhythm. No obvious extremity edema.  GI: palpation of masses. No tenderness. No hepatosplenomegaly to palpation.  Musculoskeletal: normal range of motion of bilateral upper extremities. Normal muscle strength and tone.  Skin: no obvious rashes or lesions. No tightening of skin noted.  Neurologic: CN grossly normal. Normal sensation.   Psychiatric: awake, alert and oriented x 3. Mood and affect normal. Cooperative.    LABS REVIEW:  UA today:  ABNORMAL    Assessment:       1. Hematuria, gross    2. Personal history of bladder cancer    3. Gross hematuria          Plan:   Gross Hematuria, Hx of Bladder CA:    Urine culture and cytology to be performed.  We will obtain recent lab work from Dr. Jose Shields's office-RECORDS RELEASE form was signed by pt and faxed to their office by me today.  CT Urogram with and without contrast to be performed.    F/u with Dr. Vasquez for Cystoscopy procedure for further evaluation.    [facility]: N/A    CHENTE Wiseman

## 2019-03-28 NOTE — TELEPHONE ENCOUNTER
Patient called with complains of urinary frequency and dysuria. He's requesting a call back to find out if something can be sent in for him to take to ease his symptoms.

## 2019-03-28 NOTE — TELEPHONE ENCOUNTER
Make sure pt started the Doxycycline that I prescribed to him at last office visit.  We are unable to send in a bladder spasmodic (Pyrdium or AZO) because his kidney function is so low at this time. If pain gets severe over weekend he needs to go straight to ER for further treatment.

## 2019-03-28 NOTE — TELEPHONE ENCOUNTER
Talked with patient and he informed that he is taking the antibiotic. He states that he may end up going to the ER if he has to wait until next week ( his scheduled appt is 4/5) to be seen.

## 2019-04-03 NOTE — H&P
Subjective:       Patient ID: Frank Zayas is a 78 y.o. male.    Chief Complaint:  Transitional cell carcinoma of the bladder initially diagnosed in 2012 and he did develop recurrences in 2013 and again in 2014 but none since then.  He has undergone intravesical mitomycin C treatments in the past.  Recent urine cytology was negative.  CT scan on 03/27/2019 revealed a 7.3 cm lobular density within the bladder lumen that could represent blood clot a calculus or neoplasm.  Renal masses consistent with cysts were also identified.    Past Medical History:   Diagnosis Date    Arthritis     Bladder cancer     Blood transfusion     Chronic kidney disease     Coronary artery disease     Diabetes mellitus     Gout     [place] (hard of hearing)     HAS BILAT AIDS BUT DOES NOT WEAR    Hyperlipidemia     Hypertension     Myocardial infarction     RLS (restless legs syndrome)     Sleep apnea     NO MACHINE    Wears glasses      Past Surgical History:   Procedure Laterality Date    CARDIAC SURGERY      CABG X 2 1997    CATARACT EXTRACTION, BILATERAL      CHOLECYSTECTOMY      COLONOSCOPY      CORONARY ARTERY BYPASS GRAFT      x 2    coronary stents      CYSTOSCOPY      EXCISION-BLADDER TUMOR-TRANSURETHRAL (TURBT) N/A 9/8/2014    [facility]    EXCISION-BLADDER TUMOR-TRANSURETHRAL (TURBT) N/A 11/18/2013    [facility]    VASECTOMY       Family History   Problem Relation Age of Onset    Cancer Father     Heart disease Father      Social History     Socioeconomic History    Marital status:      Spouse name: Not on file    Number of children: Not on file    Years of education: Not on file    Highest education level: Not on file   Occupational History    Not on file   Social Needs    Financial resource strain: Not on file    Food insecurity:     Worry: Not on file     Inability: Not on file    Transportation needs:     Medical: Not  on file     Non-medical: Not on file   Tobacco Use    Smoking status: Former Smoker     Packs/day: 0.25     Years: 20.00     Pack years: 5.00     Last attempt to quit: 1982     Years since quittin.1    Smokeless tobacco: Never Used   Substance and Sexual Activity    Alcohol use: No    Drug use: No    Sexual activity: Not on file   Lifestyle    Physical activity:     Days per week: Not on file     Minutes per session: Not on file    Stress: Not on file   Relationships    Social connections:     Talks on phone: Not on file     Gets together: Not on file     Attends Anabaptist service: Not on file     Active member of club or organization: Not on file     Attends meetings of clubs or organizations: Not on file     Relationship status: Not on file   Other Topics Concern    Not on file   Social History Narrative    Not on file       Current Outpatient Medications   Medication Sig Dispense Refill    alfuzosin (UROXATRAL) 10 mg Tb24 Take 10 mg by mouth nightly.       allopurinol (ZYLOPRIM) 100 MG tablet Take 300 mg by mouth once daily.       aspirin (ECOTRIN) 81 MG EC tablet Take 81 mg by mouth once daily.      clopidogrel (PLAVIX) 75 mg tablet Take 75 mg by mouth once daily.      coenzyme Q10 (CO Q-10) 100 mg capsule Take 100 mg by mouth once daily.      doxycycline (VIBRA-TABS) 100 MG tablet Take 1 tablet (100 mg total) by mouth 2 (two) times daily. for 21 days 42 tablet 0    ezetimibe (ZETIA) 10 mg tablet Take 10 mg by mouth every evening.       FLUoxetine 20 MG capsule       gabapentin (NEURONTIN) 100 MG capsule       gemfibrozil (LOPID) 600 MG tablet Take 600 mg by mouth 2 (two) times daily.       insulin glargine (LANTUS) 100 unit/mL injection Inject 60 Units into the skin every evening.       lisinopril (PRINIVIL,ZESTRIL) 5 MG tablet Take 5 mg by mouth nightly.       metoprolol tartrate (LOPRESSOR) 25 MG tablet Take 25 mg by mouth 2 (two) times daily.      nateglinide (STARLIX) 60 MG  tablet Take 60 mg by mouth 3 (three) times daily before meals.       niacin 250 MG Tab Take 250 mg by mouth nightly.      nitroGLYCERIN (NITROSTAT) 0.4 MG SL tablet Place 0.4 mg under the tongue every 5 (five) minutes as needed.       ropinirole (REQUIP) 2 MG tablet Take 2 mg by mouth 2 (two) times daily.       saw palmetto 500 MG capsule Take 450 mg by mouth once daily.      SPIRIVA WITH HANDIHALER 18 mcg inhalation capsule Inhale 18 mcg into the lungs once daily.   3    torsemide (DEMADEX) 20 MG tablet Take 20 mg by mouth once daily.       TRADJENTA 5 mg Tab tablet Take 5 mg by mouth once daily.  0    VASOLEX 90- unit-mg-mg/gram Oint   1    VELTASSA 16.8 gram PwPk MIX 1 PACKET IN LIQUID AND TAKE PO QD UTD  4    zinc 50 mg Tab Take 50 mg by mouth once daily. 1/2 tab daily       Current Facility-Administered Medications   Medication Dose Route Frequency Provider Last Rate Last Dose    mitomycin (MUTAMYCIN) 40 mg in sodium chloride 0.9% 40 mL bladder instillation  40 mg Intravesical 1 time in Clinic/[facility] Kaylee Vasquez MD        mitomycin (MUTAMYCIN) 40 mg in sodium chloride 0.9% 40 mL bladder instillation  40 mg Intravesical 1 time in Clinic/[facility] Kaylee Vasquez MD        mitomycin (MUTAMYCIN) 40 mg in sodium chloride 0.9% 40 mL bladder instillation  40 mg Intravesical 1 time in Clinic/[facility] Kaylee Vasquez MD        mitomycin (MUTAMYCIN) 40 mg in sodium chloride 0.9% 40 mL bladder instillation  40 mg Intravesical 1 time in Clinic/[facility] Kaylee Vasquez MD        mitomycin (MUTAMYCIN) 40 mg in sodium chloride 0.9% 40 mL bladder instillation  40 mg Intravesical 1 time in Clinic/[facility] Kaylee Vasquez MD        mitomycin (MUTAMYCIN) 40 mg in sodium chloride 0.9% 50 mL bladder instillation  40 mg Intravesical 1 time in Clinic/[facility] Obrien        mitomycin 40 mg in sodium chloride 0.9% 40 mL bladder instillation   Intravesical 1 time in Clinic/[facility] Kaylee Vasquez MD         Review of  patient's allergies indicates:   Allergen Reactions    Lidocaine Nausea Only     NOVACAINE --  Severe nausea    Statins-hmg-coa reductase inhibitors Anxiety       Review of Systems   All other systems reviewed and are negative.      Objective:      There were no vitals filed for this visit.  Physical Exam   Cardiovascular: Normal rate.   Pulmonary/Chest: Effort normal.   Abdominal: Soft.   Genitourinary: Prostate normal and penis normal.            Assessment:       1. Malignant neoplasm of urinary bladder, unspecified site    2. Microscopic hematuria    3. Bladder cancer        Plan:       Cystoscopy and possible TURBT

## 2019-04-03 NOTE — H&P (VIEW-ONLY)
Subjective:       Patient ID: Frank Zayas is a 78 y.o. male.    Chief Complaint:  Transitional cell carcinoma of the bladder initially diagnosed in 2012 and he did develop recurrences in 2013 and again in 2014 but none since then.  He has undergone intravesical mitomycin C treatments in the past.  Recent urine cytology was negative.  CT scan on 03/27/2019 revealed a 7.3 cm lobular density within the bladder lumen that could represent blood clot a calculus or neoplasm.  Renal masses consistent with cysts were also identified.    Past Medical History:   Diagnosis Date    Arthritis     Bladder cancer     Blood transfusion     Chronic kidney disease     Coronary artery disease     Diabetes mellitus     Gout     [place] (hard of hearing)     HAS BILAT AIDS BUT DOES NOT WEAR    Hyperlipidemia     Hypertension     Myocardial infarction     RLS (restless legs syndrome)     Sleep apnea     NO MACHINE    Wears glasses      Past Surgical History:   Procedure Laterality Date    CARDIAC SURGERY      CABG X 2 1997    CATARACT EXTRACTION, BILATERAL      CHOLECYSTECTOMY      COLONOSCOPY      CORONARY ARTERY BYPASS GRAFT      x 2    coronary stents      CYSTOSCOPY      EXCISION-BLADDER TUMOR-TRANSURETHRAL (TURBT) N/A 9/8/2014    [facility]    EXCISION-BLADDER TUMOR-TRANSURETHRAL (TURBT) N/A 11/18/2013    [facility]    VASECTOMY       Family History   Problem Relation Age of Onset    Cancer Father     Heart disease Father      Social History     Socioeconomic History    Marital status:      Spouse name: Not on file    Number of children: Not on file    Years of education: Not on file    Highest education level: Not on file   Occupational History    Not on file   Social Needs    Financial resource strain: Not on file    Food insecurity:     Worry: Not on file     Inability: Not on file    Transportation needs:     Medical: Not  on file     Non-medical: Not on file   Tobacco Use    Smoking status: Former Smoker     Packs/day: 0.25     Years: 20.00     Pack years: 5.00     Last attempt to quit: 1982     Years since quittin.1    Smokeless tobacco: Never Used   Substance and Sexual Activity    Alcohol use: No    Drug use: No    Sexual activity: Not on file   Lifestyle    Physical activity:     Days per week: Not on file     Minutes per session: Not on file    Stress: Not on file   Relationships    Social connections:     Talks on phone: Not on file     Gets together: Not on file     Attends Caodaism service: Not on file     Active member of club or organization: Not on file     Attends meetings of clubs or organizations: Not on file     Relationship status: Not on file   Other Topics Concern    Not on file   Social History Narrative    Not on file       Current Outpatient Medications   Medication Sig Dispense Refill    alfuzosin (UROXATRAL) 10 mg Tb24 Take 10 mg by mouth nightly.       allopurinol (ZYLOPRIM) 100 MG tablet Take 300 mg by mouth once daily.       aspirin (ECOTRIN) 81 MG EC tablet Take 81 mg by mouth once daily.      clopidogrel (PLAVIX) 75 mg tablet Take 75 mg by mouth once daily.      coenzyme Q10 (CO Q-10) 100 mg capsule Take 100 mg by mouth once daily.      doxycycline (VIBRA-TABS) 100 MG tablet Take 1 tablet (100 mg total) by mouth 2 (two) times daily. for 21 days 42 tablet 0    ezetimibe (ZETIA) 10 mg tablet Take 10 mg by mouth every evening.       FLUoxetine 20 MG capsule       gabapentin (NEURONTIN) 100 MG capsule       gemfibrozil (LOPID) 600 MG tablet Take 600 mg by mouth 2 (two) times daily.       insulin glargine (LANTUS) 100 unit/mL injection Inject 60 Units into the skin every evening.       lisinopril (PRINIVIL,ZESTRIL) 5 MG tablet Take 5 mg by mouth nightly.       metoprolol tartrate (LOPRESSOR) 25 MG tablet Take 25 mg by mouth 2 (two) times daily.      nateglinide (STARLIX) 60 MG  tablet Take 60 mg by mouth 3 (three) times daily before meals.       niacin 250 MG Tab Take 250 mg by mouth nightly.      nitroGLYCERIN (NITROSTAT) 0.4 MG SL tablet Place 0.4 mg under the tongue every 5 (five) minutes as needed.       ropinirole (REQUIP) 2 MG tablet Take 2 mg by mouth 2 (two) times daily.       saw palmetto 500 MG capsule Take 450 mg by mouth once daily.      SPIRIVA WITH HANDIHALER 18 mcg inhalation capsule Inhale 18 mcg into the lungs once daily.   3    torsemide (DEMADEX) 20 MG tablet Take 20 mg by mouth once daily.       TRADJENTA 5 mg Tab tablet Take 5 mg by mouth once daily.  0    VASOLEX 90- unit-mg-mg/gram Oint   1    VELTASSA 16.8 gram PwPk MIX 1 PACKET IN LIQUID AND TAKE PO QD UTD  4    zinc 50 mg Tab Take 50 mg by mouth once daily. 1/2 tab daily       Current Facility-Administered Medications   Medication Dose Route Frequency Provider Last Rate Last Dose    mitomycin (MUTAMYCIN) 40 mg in sodium chloride 0.9% 40 mL bladder instillation  40 mg Intravesical 1 time in Clinic/[facility] Kaylee Vasquez MD        mitomycin (MUTAMYCIN) 40 mg in sodium chloride 0.9% 40 mL bladder instillation  40 mg Intravesical 1 time in Clinic[facility] [place] Kaylee Vasquez MD        mitomycin (MUTAMYCIN) 40 mg in sodium chloride 0.9% 40 mL bladder instillation  40 mg Intravesical 1 time in Clinic[facility] [place] Kaylee Vasquez MD        mitomycin (MUTAMYCIN) 40 mg in sodium chloride 0.9% 40 mL bladder instillation  40 mg Intravesical 1 time in Clinic/[facility] [place] Kaylee Vasquez MD        mitomycin (MUTAMYCIN) 40 mg in sodium chloride 0.9% 40 mL bladder instillation  40 mg Intravesical 1 time in Clinic/[facility] [place] Kaylee Vasquez MD        mitomycin (MUTAMYCIN) 40 mg in sodium chloride 0.9% 50 mL bladder instillation  40 mg Intravesical 1 time in Clinic/[facility] R[facility] Ob[facility]        mitomycin 40 mg in sodium chloride 0.9% 40 mL bladder instillation   Intravesical 1 time in Clinic/[facility] [place] Kaylee Vasquez MD         Review of  patient's allergies indicates:   Allergen Reactions    Lidocaine Nausea Only     NOVACAINE --  Severe nausea    Statins-hmg-coa reductase inhibitors Anxiety       Review of Systems   All other systems reviewed and are negative.      Objective:      There were no vitals filed for this visit.  Physical Exam   Cardiovascular: Normal rate.   Pulmonary/Chest: Effort normal.   Abdominal: Soft.   Genitourinary: Prostate normal and penis normal.            Assessment:       1. Malignant neoplasm of urinary bladder, unspecified site    2. Microscopic hematuria    3. Bladder cancer        Plan:       Cystoscopy and possible TURBT

## 2019-04-05 NOTE — PROGRESS NOTES
OFFICE NOTE    CHIEF COMPLAINT:  Transitional cell carcinoma of the bladder and hematuria.    HISTORY OF PRESENT ILLNESS:  This is a 78-year-old male has a history of   transitional cell carcinoma of the bladder that was initially diagnosed in 2012   and he was found to have recurrence in 2013 and again in 2014.  His last visit   with me was on 09/27/2016 and had not recurred since then.  His last cystoscopic   examination was on 07/19/2016 and there was no evidence of any tumor recurrence   at that time.  He most recently was seen by Bree Bosch on 03/26/2019   recently a week or two ago when he developed gross hematuria, which has since   improved and is coming to us for further followup evaluation.  He did undergo CT   scan of the abdomen and pelvis on 03/27/2019, which revealed a 7.3 cm density   mass within the bladder lumen.  It is unclear whether this is a blood clot or   could be a stone or neoplasm.  There were also three small masses in the lateral   surface of the right kidney, probable cyst for which ultrasound that needs to   be done and also one mass in the lower pole of the left kidney which also is  probable cyst.  On today's visit, the patient overall doing well. As I was   planning on proceeding with physical examination the patient stated he would   rather hold off on it and we will plan to do it at all at the time of cystoscopy   that we were planning on doing on 04/08/2019.    FINAL IMPRESSION:  Transitional cell carcinoma of the bladder, gross hematuria,   bladder lesions consistent with probable cyst.    RECOMMENDATIONS:  Cystoscopy with possible TURBT scheduled for 04/08/2019.  We   also will need to plan for a renal ultrasound for further followup evaluation of   the renal density seen on the CT scan, and the patient proceeded to the registration office in the   hospital to register for the procedure.      CHRIS  dd: 04/05/2019 13:25:31 (CDT)  td: 04/06/2019 00:38:18 (CDT)  Doc ID    #[id number]  Job ID #[id number]    CC:

## 2019-04-05 NOTE — DISCHARGE INSTRUCTIONS
To confirm, Your doctor has instructed you that surgery is scheduled for:     Please report to [facility], Registration the morning of surgery. You must check-in and receive a wristband before going to your procedure.    Pre-Op will call the afternoon prior to surgery between 1:00 and 6:00 PM with the final arrival time.  Phone number: [phone]    PLEASE NOTE:  The surgery schedule has many variables which may affect the time of your surgery case.  Family members should be available if your surgery time changes.  Plan to be here the day of your procedure between 4-6 hours.    MEDICATIONS:  TAKE ONLY THESE MEDICATIONS WITH A SMALL SIP OF WATER THE MORNING OF YOUR PROCEDURE:  METOPROLOL      DO NOT TAKE THESE MEDICATIONS 5-7 DAYS PRIOR to your procedure or per your surgeon's request: ASPIRIN, ALEVE, ADVIL, IBUPROFEN, FISH OIL VITAMIN E, HERBALS  (May take Tylenol)  PLAVIX AND ASPIRIN, CO Q-10    ONLY if you are prescribed any types of blood thinners such as:  Aspirin, Coumadin, Plavix, Pradaxa, Xarelto, Aggrenox, Effient, Eliquis, Savasya, Brilinta, or any other, ask your surgeon whether you should stop taking them and how long before surgery you should stop.  You may also need to verify with the prescribing physician if it is ok to stop your medication.      INSTRUCTIONS IMPORTANT!!  · Do not eat or drink anything between midnight and the time of your procedure- this includes gum, mints, and candy.  · Do not smoke or drink alcoholic beverages 24 hours prior to your procedure.  · Shower the night before AND the morning of your procedure with a Chlorhexidine wash such as Hibiclens or Dial antibacterial soap from the neck down.  Do not get it on your face or in your eyes.  You may use your own shampoo and face wash. This helps your skin to be as bacteria free as possible.    · If you wear contact lenses, dentures, hearing aids or glasses, bring a container to put them in during surgery and  give to a family member for safe keeping.  Please leave all jewelry, piercing's and valuables at home.   · DO NOT remove hair from the surgery site.  Do not shave the incision site unless you are given specific instructions to do so.    · ONLY if you have been diagnosed with sleep apnea please bring your C-PAP machine.  · ONLY if you wear home oxygen please bring your portable oxygen tank the day of your procedure.  · ONLY if you have a history of OPEN HEART SURGERY you will need a clearance from your Cardiologist per Anesthesia.      · ONLY for patients requiring bowel prep, written instructions will be given by your doctor's office.  · ONLY if you have a neuro stimulator, please bring the controller with you the morning of surgery  · ONLY if a type and screen test is needed before surgery, please return:  · If your doctor has scheduled you for an overnight stay, bring a small overnight bag with any personal items you need.  · Make arrangements in advance for transportation home by a responsible adult.  It is not safe to drive a vehicle during the 24 hours after anesthesia.      · Visiting hours are 10:00AM to 8:30PM.  For the safety of all patients, visitors under the age of 12 are not allowed above the first floor of the hospital.    · All [facility] facilities and properties are tobacco free.  Smoking is NOT allowed.       If you have any questions about these instructions, call Pre-Op Admit  Nursing at [phone] or the Pre-Op Day Surgery Unit at [phone].

## 2019-04-08 PROBLEM — C67.9 BLADDER CANCER: Status: ACTIVE | Noted: 2019-01-01

## 2019-04-08 NOTE — TRANSFER OF CARE
"Anesthesia Transfer of Care Note    Patient: Frank Zayas    Procedure(s) Performed: Procedure(s) (LRB):  CYSTOSCOPY (N/A)  TURBT (TRANSURETHRAL RESECTION OF BLADDER TUMOR) (N/A)    Patient location: PACU    Anesthesia Type: general    Transport from OR: Transported from OR on 2-3 L/min O2 by NC with adequate spontaneous ventilation    Post pain: adequate analgesia    Post assessment: no apparent anesthetic complications and tolerated procedure well    Post vital signs: stable    Level of consciousness: awake, alert and oriented    Nausea/Vomiting: no nausea/vomiting    Complications: none    Transfer of care protocol was followed      Last vitals:   Visit Vitals  BP (!) 161/82 (BP Location: Left arm, Patient Position: Sitting)   Pulse 76   Temp 36.3 °C (97.4 °F) (Skin)   Resp 20   Ht 6' 1" (1.854 m)   Wt 129.3 kg (285 lb)   SpO2 97%   BMI 37.60 kg/m²     "

## 2019-04-08 NOTE — PLAN OF CARE
Arrived via wheelchair with spouse in the waiting room, plan of care was reviewed and a warm blanket provided. Patient stated his blood sugar at home was 89, new accucheck was 63. Notified Dr. Whitley and new order for 5% Dextrose 9% normal saline was initiated. Patient was also noted to have multiple covered areas on both legs from multiple falls. Per patient his spouse cleans and covers them daily.  Photographs taken and in Epic

## 2019-04-08 NOTE — OP NOTE
DATE OF PROCEDURE:  04/08/2019.    PREOPERATIVE DIAGNOSES:  Transitional cell carcinoma of the bladder, first   diagnosed in 2012 and he did develop recurrences again in 2013 and again in   September 2014, but no further recurrences since then including the last   cystoscopic examination of 07/20/2016.  He has not returned for a followup visit   since then.    POSTOPERATIVE DIAGNOSES:  Transitional cell carcinoma of the bladder, first   diagnosed in 2012 and he did develop recurrences again in 2013 and again in   September 2014, but no further recurrences since then including the last   cystoscopic examination of 07/20/2016.  He has not returned for a followup visit   since. Bladder lesions, pathology pending, which appeared to be   suspicious for recurrent transitional cell carcinoma of the bladder.    PROCEDURES PERFORMED:  Cystourethroscopy and transurethral resection of bladder   tumors.    SURGEON:  Kaylee Vasquez M.D.    ANESTHESIA:  General.    PROCEDURE IN DETAIL:  The patient was brought to the Cystoscopy Suite and after   adequate induction of general anesthesia, he was placed in lithotomy position.    Genitalia were prepped and draped in usual manner.  A 22-Yoruba cystoscope   sheath with a foroblique lens video camera was inserted under direct vision into   urethra.  Examination of the anterior urethra was unremarkable.  The   verumontanum of the prostatic urethra was then examined.  There was some   enlargement of the lateral lobes of the prostate that had been noted on prior   examination, but no evidence of any lesions.  The interior of the bladder was   then examined.  The trigone was symmetrically configurated.  Both orifices were   slit-like and had clear efflux.  Between the trigone and the bladder neck, there   were several exophytic frond-like lesions that appear to be consistent with   recurrent bladder tumors.  None of these were actively bleeding, but there was   some larger one between the  trigone and bladder neck was approximately 1 to 2 cm   in diameter.  The right lateral wall of the bladder was examined.  There was a   larger lesion approximately 5 to 6 cm in diameter and there also appeared to be   large blood clots attached to it.  Examination of the rest of the bladder did   not identify any other lesions.  The cystoscope was then removed and replaced by   the 26-Canadian resectoscope sheath that was introduced with the obturator, the   Jain working element and cutting loop and using the bipolar mode.  Initially   the lesion between the trigone and the bladder neck was fully resected and the   bed of the lesion was fulgurated with cautery and subsequently, the large right   lateral wall lesion was resected including all the clots that were then attached   to it and the base of the lesion was also fulgurated with electrocautery.  At   the end the procedure, there did not appear to be any evidence of any undue   bleeding.  A 22-Canadian Huynh catheter was inserted into the bladder and there   was a clear return.  A digital rectal examination did not palpate any pelvic   masses and it was essentially smooth prostatic surface and the patient having   tolerated the procedure well, was then transferred to the Recovery Room in   stable condition.      CHRIS  dd: 04/08/2019 11:36:13 (CDMINERVA)  td: 04/08/2019 12:56:09 (MANUEL)  Doc ID   #[id number]  Job ID #[id number]    CC:

## 2019-04-08 NOTE — BRIEF OP NOTE
Operative Note     SUMMARY     Surgery Date: 4/8/2019     Surgeon(s) and Role:     * Kaylee Vasquez MD - Primary    Pre-op Diagnosis:  Malignant neoplasm of urinary bladder, unspecified site [C67.9]  Microscopic hematuria [R31.29]    Post-op Diagnosis:  Malignant neoplasm of urinary bladder, unspecified site [C67.9]  Microscopic hematuria [R31.29]    Procedure(s) (LRB):  CYSTOSCOPY (N/A)  TURBT (TRANSURETHRAL RESECTION OF BLADDER TUMOR) (N/A)    Anesthesia: General    Findings/Key Components:  See Op Note      Estimated Blood Loss: * No values recorded between 4/8/2019 10:20 AM and 4/8/2019 11:36 AM *         Specimens (From admission, onward)    Start     Ordered    04/08/19 1050  Specimen to Pathology - Surgery  Once     Start Status     04/08/19 1050 Collected (04/08/19 1118) Order ID: [id number]       04/08/19 1118            Discharge Note      SUMMARY     Admit Date: 4/8/2019    Attending Physician: Kaylee Vasquez MD     Discharge Physician: Kaylee Vasquez MD    Discharge Date: 4/8/2019     Final Diagnosis: Malignant neoplasm of urinary bladder, unspecified site [C67.9]  Microscopic hematuria [R31.29]    Hospital Course: uneventful, going home same day    Disposition: Home or Self Care    Patient Instructions:   Current Discharge Medication List      START taking these medications    Details   cefUROXime (CEFTIN) 500 MG tablet Take 1 tablet (500 mg total) by mouth 2 (two) times daily.  Qty: 20 tablet, Refills: 0      HYDROcodone-acetaminophen (NORCO) 5-325 mg per tablet Take 1 tablet by mouth every 4 to 6 hours as needed for Pain.  Qty: 20 tablet, Refills: 0      phenazopyridine (PYRIDIUM) 100 MG tablet Take 2 tablets (200 mg total) by mouth 3 (three) times daily as needed for Pain.  Qty: 20 tablet, Refills: 0         CONTINUE these medications which have NOT CHANGED    Details   alfuzosin (UROXATRAL) 10 mg Tb24 Take 10 mg by mouth nightly.       coenzyme Q10 (CO Q-10) 100 mg capsule Take 100 mg by mouth  once daily.      doxycycline (VIBRA-TABS) 100 MG tablet Take 1 tablet (100 mg total) by mouth 2 (two) times daily. for 21 days  Qty: 42 tablet, Refills: 0      ezetimibe (ZETIA) 10 mg tablet Take 10 mg by mouth every evening.       gemfibrozil (LOPID) 600 MG tablet Take 600 mg by mouth 2 (two) times daily.       insulin glargine (LANTUS) 100 unit/mL injection Inject 27 Units into the skin every evening.       metoprolol tartrate (LOPRESSOR) 25 MG tablet Take 25 mg by mouth 2 (two) times daily.      torsemide (DEMADEX) 20 MG tablet Take 20 mg by mouth once daily.       TRADJENTA 5 mg Tab tablet Take 5 mg by mouth once daily.  Refills: 0      zinc 50 mg Tab Take 50 mg by mouth once daily. 1/2 tab daily      allopurinol (ZYLOPRIM) 300 MG tablet 1 tablet      aspirin (ECOTRIN) 81 MG EC tablet Take 81 mg by mouth once daily.      clopidogrel (PLAVIX) 75 mg tablet Take 75 mg by mouth once daily.      FLUoxetine 20 MG capsule every evening.       gabapentin (NEURONTIN) 100 MG capsule every evening.       nateglinide (STARLIX) 60 MG tablet Take 60 mg by mouth every evening.       nitroGLYCERIN (NITROSTAT) 0.4 MG SL tablet Place 0.4 mg under the tongue every 5 (five) minutes as needed.       ropinirole (REQUIP) 2 MG tablet Take 2 mg by mouth nightly.       VELTASSA 16.8 gram PwPk MIX 1 PACKET IN LIQUID AND TAKE PO QD UTD  Refills: 4             Discharge Procedure Orders (must include Diet, Follow-up, Activity)  Resume previous diet.  Follow up with PCP as needed.

## 2019-04-08 NOTE — PLAN OF CARE
"Dr farmer released pt to phase 2 from pauc vs wnl pain controlled with meds reports a 5/10 tolerable cath is just uncomfortable" reassurace given dr Vasquez at bs to talk with pt and assess urine cherry lt koolaid colored  No clots seen report to Jessica post no nausea no emesis velasquez 600 cc water po   "

## 2019-04-08 NOTE — ANESTHESIA POSTPROCEDURE EVALUATION
Anesthesia Post Evaluation    Patient: Frank Zayas    Procedure(s) Performed: Procedure(s) (LRB):  CYSTOSCOPY (N/A)  TURBT (TRANSURETHRAL RESECTION OF BLADDER TUMOR) (N/A)    Final Anesthesia Type: general  Patient location during evaluation: PACU  Patient participation: Yes- Able to Participate  Level of consciousness: awake and alert  Post-procedure vital signs: reviewed and stable  Pain management: adequate  Airway patency: patent  PONV status at discharge: No PONV  Anesthetic complications: no      Cardiovascular status: blood pressure returned to baseline  Respiratory status: unassisted  Hydration status: euvolemic  Follow-up not needed.          Vitals Value Taken Time   /88 4/8/2019  1:19 PM   Temp 36.5 °C (97.7 °F) 4/8/2019 12:30 PM   Pulse 62 4/8/2019  1:22 PM   Resp 13 4/8/2019  1:22 PM   SpO2 96 % 4/8/2019  1:22 PM   Vitals shown include unvalidated device data.      No case tracking events are documented in the log.      Pain/Giorgi Score: Pain Rating Prior to Med Admin: 5 (4/8/2019  1:00 PM)  Giorgi Score: 10 (4/8/2019  1:00 PM)

## 2019-04-08 NOTE — ANESTHESIA PREPROCEDURE EVALUATION
04/08/2019  Frank Zayas is a 78 y.o., male.    Pre-op Assessment    I have reviewed the Patient Summary Reports.     I have reviewed the Nursing Notes.      Review of Systems  Anesthesia Hx:  Denies Family Hx of Anesthesia complications.   Denies Personal Hx of Anesthesia complications.   Cardiovascular:   Hypertension Past MI CAD  CABG/stent  CHF ECG has been reviewed.    Pulmonary:   COPD, moderate Sleep Apnea    Renal/:   Chronic Renal Disease, CRI    Hepatic/GI:   GERD, poorly controlled    Neurological:   Peripheral Neuropathy    Endocrine:   Diabetes, poorly controlled, type 2, using insulin        Physical Exam  General:  Morbid Obesity, Malnutrition    Airway/Jaw/Neck:  Airway Findings: Mouth Opening: Normal General Airway Assessment: Adult  Mallampati: I  TM Distance: Normal, at least 6 cm       Chest/Lungs:  Chest/Lungs Findings: Clear to auscultation, Normal Respiratory Rate     Heart/Vascular:  Heart Findings: Rate: Normal  Rhythm: Regular Rhythm  Sounds: S3 Gallop             Anesthesia Plan  Type of Anesthesia, risks & benefits discussed:  Anesthesia Type:  general  Patient's Preference:   Intra-op Monitoring Plan: standard ASA monitors  Intra-op Monitoring Plan Comments:   Post Op Pain Control Plan:   Post Op Pain Control Plan Comments:   Induction:   IV  Beta Blocker:  Patient is not currently on a Beta-Blocker (No further documentation required).       Informed Consent: Patient understands risks and agrees with Anesthesia plan.  Questions answered. Anesthesia consent signed with patient.  ASA Score: 3     Day of Surgery Review of History & Physical:    H&P update referred to the surgeon.     Anesthesia Plan Notes: GETA        Ready For Surgery From Anesthesia Perspective.

## 2019-04-08 NOTE — INTERVAL H&P NOTE
The patient has been examined and the H&P has been reviewed:    I concur with the findings and no changes have occurred since H&P was written.    Anesthesia/Surgery risks, benefits and alternative options discussed and understood by patient/family.          Active Hospital Problems    Diagnosis  POA    Bladder cancer [C67.9]  Yes      Resolved Hospital Problems   No resolved problems to display.

## 2019-04-08 NOTE — PLAN OF CARE
Discharged home with spouse after speaking with Dr. Vasquez. All questions were answered, IV removed, prescription provided for pain (patient states he would not fill), prescriptions also e-scribed, assisted with dressing, patient did not want a leg bag. All valuables were returned and criteria met for discharge home

## 2019-04-08 NOTE — DISCHARGE INSTRUCTIONS
"Discharge Instructions: After Your Surgery/Procedure  Youve just had surgery. During surgery you were given medicine called anesthesia to keep you relaxed and free of pain. After surgery you may have some pain or nausea. This is common. Here are some tips for feeling better and getting well after surgery.     Stay on schedule with your medication.   Going home  Your doctor or nurse will show you how to take care of yourself when you go home. He or she will also answer your questions. Have an adult family member or friend drive you home.      For your safety we recommend these precaution for the first 24 hours after your procedure:  · Do not drive or use heavy equipment.  · Do not make important decisions or sign legal papers.  · Do not drink alcohol.  · Have someone stay with you, if needed. He or she can watch for problems and help keep you safe.  · Your concentration, balance, coordination, and judgement may be impaired for many hours after anesthesia.  Use caution when ambulating or standing up.     · You may feel weak and "washed out" after anesthesia and surgery.      Subtle residual effects of general anesthesia or sedation with regional / local anesthesia can last more than 24 hours.  Rest for the remainder of the day or longer if your Doctor/Surgeon has advised you to do so.  Although you may feel normal within the first 24 hours, your reflexes and mental ability may be impaired without you realizing it.  You may feel dizzy, lightheaded or sleepy for 24 hours or longer.      Be sure to go to all follow-up visits with your doctor. And rest after your surgery for as long as your doctor tells you to.  Coping with pain  If you have pain after surgery, pain medicine will help you feel better. Take it as told, before pain becomes severe. Also, ask your doctor or pharmacist about other ways to control pain. This might be with heat, ice, or relaxation. And follow any other instructions your surgeon or nurse gives " you.  Tips for taking pain medicine  To get the best relief possible, remember these points:  · Pain medicines can upset your stomach. Taking them with a little food may help.  · Most pain relievers taken by mouth need at least 20 to 30 minutes to start to work.  · Taking medicine on a schedule can help you remember to take it. Try to time your medicine so that you can take it before starting an activity. This might be before you get dressed, go for a walk, or sit down for dinner.  · Constipation is a common side effect of pain medicines. Call your doctor before taking any medicines such as laxatives or stool softeners to help ease constipation. Also ask if you should skip any foods. Drinking lots of fluids and eating foods such as fruits and vegetables that are high in fiber can also help. Remember, do not take laxatives unless your surgeon has prescribed them.  · Drinking alcohol and taking pain medicine can cause dizziness and slow your breathing. It can even be deadly. Do not drink alcohol while taking pain medicine.  · Pain medicine can make you react more slowly to things. Do not drive or run machinery while taking pain medicine.  Your health care provider may tell you to take acetaminophen to help ease your pain. Ask him or her how much you are supposed to take each day. Acetaminophen or other pain relievers may interact with your prescription medicines or other over-the-counter (OTC) drugs. Some prescription medicines have acetaminophen and other ingredients. Using both prescription and OTC acetaminophen for pain can cause you to overdose. Read the labels on your OTC medicines with care. This will help you to clearly know the list of ingredients, how much to take, and any warnings. It may also help you not take too much acetaminophen. If you have questions or do not understand the information, ask your pharmacist or health care provider to explain it to you before you take the OTC medicine.  Managing  nausea  Some people have an upset stomach after surgery. This is often because of anesthesia, pain, or pain medicine, or the stress of surgery. These tips will help you handle nausea and eat healthy foods as you get better. If you were on a special food plan before surgery, ask your doctor if you should follow it while you get better. These tips may help:  · Do not push yourself to eat. Your body will tell you when to eat and how much.  · Start off with clear liquids and soup. They are easier to digest.  · Next try semi-solid foods, such as mashed potatoes, applesauce, and gelatin, as you feel ready.  · Slowly move to solid foods. Dont eat fatty, rich, or spicy foods at first.  · Do not force yourself to have 3 large meals a day. Instead eat smaller amounts more often.  · Take pain medicines with a small amount of solid food, such as crackers or toast, to avoid nausea.     Call your surgeon if  · You still have pain an hour after taking medicine. The medicine may not be strong enough.  · You feel too sleepy, dizzy, or groggy. The medicine may be too strong.  · You have side effects like nausea, vomiting, or skin changes, such as rash, itching, or hives.       If you have obstructive sleep apnea  You were given anesthesia medicine during surgery to keep you comfortable and free of pain. After surgery, you may have more apnea spells because of this medicine and other medicines you were given. The spells may last longer than usual.   At home:  · Keep using the continuous positive airway pressure (CPAP) device when you sleep. Unless your health care provider tells you not to, use it when you sleep, day or night. CPAP is a common device used to treat obstructive sleep apnea.  · Talk with your provider before taking any pain medicine, muscle relaxants, or sedatives. Your provider will tell you about the possible dangers of taking these medicines.  © 9611-2866 [employer] [address] [place] [address]  [place] [address]. All rights reserved. This information is not intended as a substitute for professional medical care. Always follow your healthcare professional's instructions.    General Information:    1.  Do not drink alcoholic beverages including beer for 24 hours or as long as you are on pain medication..  2.  Do not drive a motor vehicle, operate machinery or power tools, or signs legal papers for 24 hours or as long as you are on pain medication.   3.  You may experience light-headedness, dizziness, and sleepiness following surgery. Please do not stay alone. A responsible adult should be with you for this 24 hour period.  4.  Go home and rest.    5. Progress slowly to a normal diet unless instructed.  Otherwise, begin with liquids such as soft drinks, then soup and crackers working up to solid foods. Drink plenty of nonalcoholic fluids.  6.  Certain anesthetics and pain medications produce nausea and vomiting in certain       individuals. If nausea becomes a problem at home, call you doctor.    7. A nurse will be calling you sometime after surgery. Do not be alarmed. This is our way of finding out how you are doing.    8. Several times every hour while you are awake, take 2-3 deep breaths and cough. If you had stomach surgery hold a pillow or rolled towel firmly against your stomach before you cough. This will help with any pain the cough might cause.  9. Several times every hour while you are awake, pump and flex your feet 5-6 times and do foot circles. This will help prevent blood clots.    10.Call your doctor for severe pain, bleeding, fever, or signs or symptoms of infection (pain, swelling, redness, foul odor, drainage).    Post op instructions for prevention of DVT  What is deep vein thrombosis?  Deep vein thrombosis (DVT) is the medical term for blood clots in the deep veins of the leg.  These blood clots can be dangerous.  A DVT can block a blood vessel and keep blood from getting where it needs to go.   Another problem is that the clot can travel to other parts of the body such as the lungs.  A clot that travels to the lungs is called a pulmonary embolus (PE) and can cause serious problems with breathing which can lead to death.  Am I at risk for DVT/PE?  If you are not very active, you are at risk of DVT.  Anyone confined to bed, sitting for long periods of time, recovering from surgery, etc. increases the risk of DVT.  Other risk factors are cancer diagnosis, certain medications, estrogen replacement in any form,older age, obesity, pregnancy, smoking, history of clotting disorders, and dehydration.  How will I know if I have a DVT?   Swelling in the lower leg   Pain   Warmth, redness, hardness or bulging of the vein  If you have any of these symptoms, call your doctors office right away.  Some people will not have any symptoms until the clot moves to the lungs.  What are the symptoms of a PE?   Panting, shortness of breath, or trouble breathing   Sharp, knife-like chest pain when you breathe   Coughing or coughing up blood   Rapid heartbeat  If you have any of these symptoms or get worse quickly, call 911 for emergency treatment.  How can I prevent a DVT?   Avoid long periods of inactivity and dont cross your legs--get up and walk around every hour or so.   Stay active--walking after surgery is highly encouraged.  This means you should get out of the house and walk in the neighborhood.  Going up and down stairs will not impair healing (unless advised against such activity by your doctor).     Drink plenty of noncaffeinated, nonalcoholic fluids each day to prevent dehydration.   Wear special support stockings, if they have been advised by your doctor.   If you travel, stop at least once an hour and walk around.   Avoid smoking (assistance with stopping is available through your healthcare provider)  Always notify your doctor if you are not able to follow the post operative instructions that are  given to you at the time of discharge.  It may be necessary to prescribe one of the medications available to prevent DVT.Using Opioids for Pain Management     Your doctor has given instructions for you to take an opioid.  This is a drug for bad pain.  It helps control pain without causing bleeding and kidney problems.  Common opioid names are morphine, hydromorphone, oxycodone, and methadone. These drugs are called narcotics.    There are several safety concerns you need to know.     · It is against the law to give or sell this drug to another person.  You must keep this medicine safely locked.    · You may have side effects from taking this medication.  These include nausea, itching, sweating, sleepiness, a change in your ability to breathe, and depression.  · Do not take alcohol or sleeping pills opioids.    · Long-term opoid use may no longer giver you relief from pain.  It can cause you stomach pain, mental anxiety, and headaches.  Long-term opoid use can potentially lead to unlawful street drug abuse and reduce your ability to stay employed.    · Your body may become opioid tolerant if you need to take more to get relief.    · You must stop taking opioids if you begin having more pain as a result of the medicine.    · Opioid withdrawal occurs when you have to stop taking the drug.  It can cause you to have nausea, vomiting, diarrhea, stomach pain, anxiety, and dilated pupils in your eyes. This condition means you are opioid dependent.    · Addiction is a drug induced brain disease. It means there are changes in how your brain is working.  Children, teens, and young adults under 25 years old are more likely to get addicted to opioids.      · Addiction can happen with repeated opioid use.  It does not happen with short-term use of two weeks or less.      We hope your stay was comfortable as you heal now, mend and rest.    For we have enjoyed taking care of you by giving your our best.    And as you get better, by  regaining your health and strength;   We count it as a privilege to have served you and hope your time at [facility] was well spent.      Thank  You!!!   For more information, please speak with your doctor or pharmacist.

## 2019-04-15 NOTE — PROGRESS NOTES
Patient arrived to have catheter removed. 200 ml yellow urine in  bag. Deflated 10 ml NS balloon, removed 22 fr spann without difficulty, discharge teaching done, patient and wife verbally understood.

## 2019-05-04 PROBLEM — I50.9 CONGESTIVE HEART FAILURE: Status: ACTIVE | Noted: 2019-01-01

## 2019-05-04 NOTE — ED PROVIDER NOTES
"Encounter Date: 5/4/2019    SCRIBE #1 NOTE: I, Mayo Aguilar, am scribing for, and in the presence of, Dr. Peña.       History     Chief Complaint   Patient presents with    Shortness of Breath     "due to increasing fluid"     Time seen by provider: 6:59 PM on 05/04/2019      Frank Zayas is a 79 y.o. male who presents to the ED for SOB that started 1 month ago, but worsened 6 days ago. He states that he has been "collecting fluid" and having increased SOB. He reports that he has more swelling in his leg than what he typically does. Patient reports that he has a cough as well that started 4 days ago and is wet in nature. Patient reports that he has had to get "2 lasix's shots" for excessive fluid with Dr. Mendez. The patient denies chest pain, palpations, numbness, weakness, or any other complaint at this time. Patient has a PMHx of HTN, CKD, DM, and COPD. She has a PSHx of cholecystectomy, CABG, colonoscopy, and cystoscopy.     The history is provided by the patient.     Review of patient's allergies indicates:   Allergen Reactions    Lidocaine Nausea Only     NOVACAINE --  Severe nausea    Statins-hmg-coa reductase inhibitors Anxiety     Past Medical History:   Diagnosis Date    Anticoagulant long-term use     Arthritis     Bladder cancer     Blood transfusion     CHF (congestive heart failure)     Chronic kidney disease     COPD (chronic obstructive pulmonary disease)     Coronary artery disease     Diabetes mellitus     Gout     [place] (hard of hearing)     HAS BILAT AIDS BUT DOES NOT WEAR    Hyperlipidemia     Hypertension     Myocardial infarction     RLS (restless legs syndrome)     Sleep apnea     NO MACHINE    Wears glasses      Past Surgical History:   Procedure Laterality Date    CARDIAC SURGERY      CABG X 2 1997    CATARACT EXTRACTION, BILATERAL      CHOLECYSTECTOMY      COLONOSCOPY      CORONARY ARTERY BYPASS GRAFT      x 2    coronary stents      CYSTOSCOPY      " CYSTOSCOPY N/A 2019    [facility]    EXCISION-BLADDER TUMOR-TRANSURETHRAL (TURBT) N/A 2014    [facility]    EXCISION-BLADDER TUMOR-TRANSURETHRAL (TURBT) N/A 2013    [facility]    EYE SURGERY Bilateral     CATARACT    TURBT (TRANSURETHRAL RESECTION OF BLADDER TUMOR) N/A 2019    [facility]    VASECTOMY       Family History   Problem Relation Age of Onset    Cancer Father     Heart disease Father      Social History     Tobacco Use    Smoking status: Former Smoker     Packs/day: 0.25     Years: 20.00     Pack years: 5.00     Last attempt to quit: 1982     Years since quittin.2    Smokeless tobacco: Never Used   Substance Use Topics    Alcohol use: No    Drug use: No     Review of Systems   Constitutional: Negative for activity change, appetite change, chills, fatigue and fever.   Eyes: Negative for visual disturbance.   Respiratory: Positive for cough and shortness of breath. Negative for apnea.    Cardiovascular: Positive for leg swelling. Negative for chest pain and palpitations.   Gastrointestinal: Negative for abdominal distention and abdominal pain.   Genitourinary: Negative for difficulty urinating.   Musculoskeletal: Negative for neck pain.   Skin: Negative for pallor and rash.   Neurological: Negative for headaches.   Hematological: Does not bruise/bleed easily.   Psychiatric/Behavioral: Negative for agitation.       Physical Exam     Initial Vitals [19 1848]   BP Pulse Resp Temp SpO2   (!) 157/90 90 (!) 24 97.8 °F (36.6 °C) 95 %      MAP       --         Physical Exam    Nursing note and vitals reviewed.  Constitutional: He appears well-developed and well-nourished.   HENT:   Head: Normocephalic and atraumatic.   Eyes: Conjunctivae are normal.   Neck: Normal range of motion. Neck supple.   Cardiovascular: Normal rate and regular rhythm.  Exam reveals gallop and S3. Exam reveals no friction rub.    No murmur heard.  Pulmonary/Chest: No respiratory distress. He has decreased breath sounds in the right lower field and the left lower field. He has no wheezes. He has no rhonchi. He has no rales.   Diminished breath sounds more on the right than the left   Abdominal: Soft. He exhibits distension and fluid wave. There is hepatomegaly. There is no tenderness.   Musculoskeletal: Normal range of motion.        Right lower leg: He exhibits edema.        Left lower leg: He exhibits edema.   3+ pitting edema   Neurological: He is alert and oriented to person, place, and time.   Skin: Skin is warm and dry.   Psychiatric: He has a normal mood and affect.         ED Course   Critical Care  Date/Time: 5/4/2019 10:33 PM  Performed by: Neptali Peña III, MD  Authorized by: Marcie Palmer MD   Direct patient critical care time: 60 minutes  Documentation critical care time: 10 minutes  Total critical care time (exclusive of procedural time) : 70 minutes  Critical care was necessary to treat or prevent imminent or life-threatening deterioration of the following conditions: cardiac failure.  Critical care was time spent personally by me on the following activities: review of old charts, pulse oximetry, ordering and review of laboratory studies, obtaining history from patient or surrogate, evaluation of patient's response to treatment, development of treatment plan with patient or surrogate, examination of patient, ordering and performing treatments and interventions and ordering and review of radiographic studies.  Subsequent provider of critical care: I assumed direction of critical care for this patient from another provider of my specialty.        Labs Reviewed   B-TYPE NATRIURETIC PEPTIDE - Abnormal; Notable for the following components:       Result Value    BNP 2,577 (*)     All other components within normal limits   CBC W/ AUTO DIFFERENTIAL - Abnormal;  Notable for the following components:    RBC 3.93 (*)     Hemoglobin 12.0 (*)     Hematocrit 38.5 (*)     Mean Corpuscular Hemoglobin Conc 31.2 (*)     RDW 17.8 (*)     Lymph # 0.9 (*)     Gran% 80.9 (*)     Lymph% 10.3 (*)     All other components within normal limits   COMPREHENSIVE METABOLIC PANEL - Abnormal; Notable for the following components:    Glucose 117 (*)     BUN, Bld 50 (*)     Creatinine 2.4 (*)     Albumin 2.6 (*)     ALT 9 (*)     eGFR if  29 (*)     eGFR if non  25 (*)     All other components within normal limits          Imaging Results    None          Medical Decision Making:   History:   Old Medical Records: I decided to obtain old medical records.  Independently Interpreted Test(s):   I have ordered and independently interpreted X-rays - see summary below.       <> Summary of X-Ray Reading(s): Chest x-ray independently interpreted by me demonstrates bilateral pleural effusions (right much greater than left) with interstitial and alveolar infiltrates.  Clinical Tests:   Lab Tests: Ordered and Reviewed  Radiological Study: Ordered and Reviewed  Medical Tests: Ordered and Reviewed  ED Management:  79-year-old male with a history of chronic renal insufficiency presents with worsening shortness of breath and lower extremity swelling.  Chest x-ray reveals CHF with a markedly elevated BNP.  He will be admitted for diuresis and supplemental oxygen.  Other:   I have discussed this case with another health care provider.       <> Summary of the Discussion: Discussed with MsJohn Trinh who will admit the patient on behalf of Dr. Palmer       APC / Resident Notes:   I, Dr. Neptali Peña III, personally performed the services described in this documentation. All medical record entries made by the scribe were at my direction and in my presence.  I have reviewed the chart and agree that the record reflects my personal performance and is accurate and complete       Mando  Attestation:   Scribe #1: I performed the above scribed service and the documentation accurately describes the services I performed. I attest to the accuracy of the note.               Clinical Impression:       ICD-10-CM ICD-9-CM   1. Congestive heart failure, unspecified HF chronicity, unspecified heart failure type I50.9 428.0   2. SOB (shortness of breath) R06.02 786.05   3. CHF (congestive heart failure) I50.9 428.0         Disposition:   Disposition: Discharged  Condition: Stable                        Neptali Peña III, MD  05/04/19 [id number]

## 2019-05-05 PROBLEM — E83.39 HYPERPHOSPHATEMIA: Status: ACTIVE | Noted: 2019-01-01

## 2019-05-05 PROBLEM — S81.801A LEG WOUND, RIGHT: Status: ACTIVE | Noted: 2019-01-01

## 2019-05-05 PROBLEM — E44.1 MILD MALNUTRITION: Status: ACTIVE | Noted: 2019-01-01

## 2019-05-05 PROBLEM — R53.81 DEBILITY: Status: ACTIVE | Noted: 2019-01-01

## 2019-05-05 PROBLEM — N18.9 CHRONIC KIDNEY DISEASE: Status: ACTIVE | Noted: 2019-01-01

## 2019-05-05 PROBLEM — I25.10 CORONARY ARTERY DISEASE: Status: ACTIVE | Noted: 2019-01-01

## 2019-05-05 PROBLEM — D63.8 ANEMIA, CHRONIC DISEASE: Status: ACTIVE | Noted: 2019-01-01

## 2019-05-05 PROBLEM — R31.9 HEMATURIA: Status: ACTIVE | Noted: 2019-01-01

## 2019-05-05 PROBLEM — Z79.4 TYPE 2 DIABETES MELLITUS WITH KIDNEY COMPLICATION, WITH LONG-TERM CURRENT USE OF INSULIN: Status: ACTIVE | Noted: 2019-01-01

## 2019-05-05 PROBLEM — E11.29 TYPE 2 DIABETES MELLITUS WITH KIDNEY COMPLICATION, WITH LONG-TERM CURRENT USE OF INSULIN: Status: ACTIVE | Noted: 2019-01-01

## 2019-05-05 PROBLEM — N18.4 STAGE 4 CHRONIC KIDNEY DISEASE: Status: ACTIVE | Noted: 2019-01-01

## 2019-05-05 PROBLEM — E78.5 HYPERLIPIDEMIA: Status: ACTIVE | Noted: 2019-01-01

## 2019-05-05 PROBLEM — Z86.69 HISTORY OF OBSTRUCTIVE SLEEP APNEA: Status: ACTIVE | Noted: 2019-01-01

## 2019-05-05 PROBLEM — I10 HYPERTENSION: Status: ACTIVE | Noted: 2019-01-01

## 2019-05-05 PROBLEM — E11.9 TYPE 2 DIABETES MELLITUS: Status: ACTIVE | Noted: 2019-01-01

## 2019-05-05 PROBLEM — R79.89 ELEVATED BRAIN NATRIURETIC PEPTIDE (BNP) LEVEL: Status: ACTIVE | Noted: 2019-01-01

## 2019-05-05 PROBLEM — J44.9 COPD (CHRONIC OBSTRUCTIVE PULMONARY DISEASE): Status: ACTIVE | Noted: 2019-01-01

## 2019-05-05 PROBLEM — E11.69 DM TYPE 2 WITH DIABETIC MIXED HYPERLIPIDEMIA: Status: ACTIVE | Noted: 2019-01-01

## 2019-05-05 PROBLEM — E78.2 DM TYPE 2 WITH DIABETIC MIXED HYPERLIPIDEMIA: Status: ACTIVE | Noted: 2019-01-01

## 2019-05-05 PROBLEM — R79.89 ELEVATED TROPONIN: Status: ACTIVE | Noted: 2019-01-01

## 2019-05-05 PROBLEM — I48.91 NEW ONSET ATRIAL FIBRILLATION: Status: ACTIVE | Noted: 2019-01-01

## 2019-05-05 NOTE — NURSING
Pt just had a 12 beat run of v-tach. Pt asymptomatic, was sitting in bed awake.  First occurence of v-tach. Notified HEDY Tan NP

## 2019-05-05 NOTE — HOSPITAL COURSE
Patient was admitted to the hospital medicine service    He was placed on continuous telemetry monitor.  Cardiology was consulted.  Patient was placed on supplemental O2 and IV diuretics for his acute heart failure.  An echocardiogram was -noted EF 45% and DD.  He was continued on metoprolol for his new onset of atrial fib which later converted back to sinus rhythm. He was transferred to ICU 5/7 overnight for bipap for hypercarbic resp failure and monitored in intensive care while hemodialysis was done to her remove volume.  His respiratory status significantly improved as did his anasarca.   Nephrology was consulted for chronic kidney disease stage 4 with acute of volume overload. He had HD for several days for volume removal after failure with diuresis.  Symptoms were significantly improved and was ambulating and on room air.  He no longer desired to wear BiPAP.  He underwent tunneled catheter placement on 05/16.  He was set up with an outpatient dialysis unit.  Physical therapy and occupational therapy were consulted for debility.  Initially the plan was skilled nursing however his ambulation and strength improved significantly so he was discharged home with home health.    Patient also noted to have some new onset of hematuria and Urology was consulted.   He has a history of bladder cancer.  The [place] remained in place until hematuria resolved and was discontinued.  He had no further symptomatology.  He will follow-up with  for  the bladder cancer.

## 2019-05-05 NOTE — NURSING
When pt was admitted was in a junctional rhythm and as of 2341 pt has been in A-fib with rates in the 70s. VSS. Pt asymptomatic and denies any hx of a-fib. Notified ANA Beauchamp NP

## 2019-05-05 NOTE — ASSESSMENT & PLAN NOTE
Clinic hours for Dr. Dan:  MONDAY   7:00 A.M. - 4:15 P.M.   TUESDAY  7:30 A.M. - 4:00 P.M.  WEDNESDAY  7:30 A.M. - 4:00 P.M.   THURSDAY  9:00 A.M. - 3:30 P.M.   FRIDAY   WE ARE OUT OF THE OFFICE     [address] [address]                      Phone [phone]                  If you need a refill on your prescription please call your pharmacy and let them know. Please be proactive and call before your medication runs out. The pharmacy will then contact us for the refill. Please allow 24-48 hours for the refill to be processed.      If your physician has ordered additional laboratory or radiology testing as part of your ongoing plan of care, please allow 5-7 business days from the day of your lab draw or test for the results to be sent and reviewed by your provider. If your results are critical and require more immediate intervention, you will be contacted sooner. Your results will be conveyed to you via a phone call or letter.    If you are a [employer] user-Test results may be posted within a few hours or a few weeks, depending on the test. Once your test results are available for viewing, you will receive an e-mail stating that you have a test result, which is ready for review.  Not all tests result at the same time.  Your office may wait to reach out to you once ALL results are final.  Please keep in mind, your doctor may not always have had the opportunity to review your results before they were released to your [employer] account.     You may receive a patient satisfaction survey in the mail. Please take the time to complete, as your feedback is very important to us. We strive to make your experience exceptional and your comments help us with that goal. We look forward to hearing from you.    [facility]     [address] [place] [address].   [place], [place]  [id number] [phone]  Hours of Operation:   Monday - Friday 700  Echocardiogram is pending as suspect that he has systolic heart failure.  I'm sure he is not compliant with his diet.  He has observed sleep apnea noncompliant with his sleep apnea machine.  Continue to diuresis.  His renal function might worsen.  We need to keep a close watch on the renal function as well as electrolytes   a.m. -1000 p.m.  Saturday and Sunday 800 a.m. - 400 p.m.  Holiday Hours Vary. Please call the clinic for Holiday hours.

## 2019-05-05 NOTE — ASSESSMENT & PLAN NOTE
Chronic problem.  Holding oral anti diabetic medications.  Continue Levemir at decreased dose 20 units.  Will obtain hemoglobin A1c for control.    Most recent fingerstick glucose reviewed-   Recent Labs   Lab 05/04/19  2310   POCTGLUCOSE 109     Current correctional scale  Low  Maintain anti-hyperglycemic dose as follows-   Antihyperglycemics (From admission, onward)    Start     Stop Route Frequency Ordered    05/04/19 2230  insulin detemir U-100 pen 20 Units      -- SubQ Nightly 05/04/19 2225 05/04/19 2225  insulin aspart U-100 pen 0-5 Units      -- SubQ Before meals & nightly PRN 05/04/19 2225

## 2019-05-05 NOTE — ASSESSMENT & PLAN NOTE
Chronic problem.  BUN and creatinine at baseline.  Monitor renal function panel and electrolytes closely.  Avoid nonessential nephrotoxins. Renal dose medications for Estimated Creatinine Clearance: 37 mL/min (A) (based on SCr of 2.4 mg/dL (H)).

## 2019-05-05 NOTE — PLAN OF CARE
SW met w/ pt for assessment.  Pt lives w/ spouse, reports independence w/ ADL & drives.  Pt denies HH or use of his CPAP.  Pt does not anticipate any d/c needs at present time.       05/05/19 1513   Discharge Assessment   Assessment Type Discharge Planning Assessment   Confirmed/corrected address and phone number on facesheet? Yes   Assessment information obtained from? Patient   Prior to hospitilization cognitive status: Alert/Oriented   Prior to hospitalization functional status: Independent   Current cognitive status: Alert/Oriented   Current Functional Status: Independent   Facility Arrived From: home   Lives With spouse   Able to Return to Prior Arrangements yes   Is patient able to care for self after discharge? Yes   Who are your caregiver(s) and their phone number(s)? spouse:  Yue Zayas  [phone]   Patient's perception of discharge disposition home or selfcare   Readmission Within the Last 30 Days no previous admission in last 30 days   Patient currently being followed by outpatient case management? No   Patient currently receives any other outside agency services? No   Equipment Currently Used at Home cane, elyse  (also has a CPAP--has not used in approx 2 years)   Do you have any problems affording any of your prescribed medications? No   Is the patient taking medications as prescribed? yes   Does the patient have transportation home? Yes   Transportation Anticipated family or friend will provide   Does the patient receive services at the [facility] Clinic? No   Discharge Plan A Home with family   Discharge Plan B Home with family   DME Needed Upon Discharge  none   Patient/Family in Agreement with Plan yes

## 2019-05-05 NOTE — ASSESSMENT & PLAN NOTE
Historical diagnosis s/p MI and stents, presents without s/s of angina.  Continue ASA, Plavix, Metoprolol and Zetia.  Telemetry monitoring.  Monitor closely for s/s of ACS.

## 2019-05-05 NOTE — ASSESSMENT & PLAN NOTE
New onset, without RVR.  Possible culprit of SOB.  Consultation to Cardiology - follow recommendations.  Continuous cardiac monitoring. CHADS-VASc score 5  - will likely need to be started on anticoagulation - will defer to cardiology specialty. Trend troponin x 3 and obtain Echocardiogram. Repeat EKG.

## 2019-05-05 NOTE — PT/OT/SLP EVAL
Physical Therapy Evaluation    Patient Name:  Frank Zayas   MRN:  [id number]    Recommendations:     Discharge Recommendations:  home   Discharge Equipment Recommendations: none(TBD)   Barriers to discharge: None    Assessment:     Frank Zayas is a 79 y.o. male admitted with a medical diagnosis of Acute on chronic congestive heart failure.  He presents with the following impairments/functional limitations:  weakness, impaired endurance, gait instability, impaired functional mobilty .SOBOE    Rehab Prognosis: Good; patient would benefit from acute skilled PT services to address these deficits and reach maximum level of function.    Recent Surgery: * No surgery found *      Plan:     During this hospitalization, patient to be seen   to address the identified rehab impairments via gait training, therapeutic activities, therapeutic exercises and progress toward the following goals:    · Plan of Care Expires:       Subjective     Chief Complaint: weakness  Patient/Family Comments/goals: go home.  Pain/Comfort:  · Pain Rating 1: 0/10  · Pain Rating Post-Intervention 1: 0/10    Patients cultural, spiritual, Taoism conflicts given the current situation: no    Living Environment:  In 1 tom house with wife.  Prior to admission, patients level of function was ambulating with cane PTA..  Equipment used at home: cane, straight.  DME owned (not currently used): single point cane.  Upon discharge, patient will have assistance from wife.    Objective:     Communicated with RN prior to session.  Patient found supine with oxygen, spann catheter  upon PT entry to room.    General Precautions: Standard, fall   Orthopedic Precautions:N/A   Braces: N/A     Exams:  · Cognitive Exam:  Patient is oriented to Person, Place, Time and Situation  · Gross Motor Coordination:  WFL  · Sensation:    · -       Intact    Functional Mobility:  · Bed Mobility:     · Rolling Right: minimum assistance  · Transfers:     · Sit to Stand:   minimum assistance with rolling walker  · Gait: 15'      Therapeutic Activities and Exercises:       AM-PAC 6 CLICK MOBILITY  Total Score:      Patient left supine with all lines intact, call button in reach and bed alarm on.    GOALS:   Multidisciplinary Problems     Physical Therapy Goals        Problem: Physical Therapy Goal    Goal Priority Disciplines Outcome Goal Variances Interventions   Physical Therapy Goal     PT, PT/OT      Description:  1.Pt will be independent with bed mobility and transfers.  2.Pt will ambulate 500' with AD with CGA.                    History:     Past Medical History:   Diagnosis Date    Anticoagulant long-term use     Arthritis     Bladder cancer     Blood transfusion     CHF (congestive heart failure)     Chronic kidney disease     COPD (chronic obstructive pulmonary disease)     Coronary artery disease     Diabetes mellitus     Gout     [place]     HAS BILAT AIDS BUT DOES NOT WEAR    Hyperlipidemia     Hypertension     Myocardial infarction     RLS (restless legs syndrome)     Sleep apnea     NO MACHINE    Wears glasses        Past Surgical History:   Procedure Laterality Date    CARDIAC SURGERY      CABG X 2 1997    CATARACT EXTRACTION, BILATERAL      CHOLECYSTECTOMY      COLONOSCOPY      CORONARY ARTERY BYPASS GRAFT      x 2    coronary stents      CYSTOSCOPY      CYSTOSCOPY N/A 4/8/2019    [facility]    EXCISION-BLADDER TUMOR-TRANSURETHRAL (TURBT) N/A 9/8/2014    [facility]    EXCISION-BLADDER TUMOR-TRANSURETHRAL (TURBT) N/A 11/18/2013    [facility] OR    EYE SURGERY Bilateral     CATARACT    TURBT (TRANSURETHRAL RESECTION OF BLADDER TUMOR) N/A 4/8/2019    [facility] OR    VASECTOMY         Time Tracking:     PT Received On: 05/05/19  PT Start Time: 1005     PT Stop Time: 1020  PT Total Time (min): 15 min      Billable Minutes: Evaluation 15      Jam Adhikari, PT  05/05/2019

## 2019-05-05 NOTE — ASSESSMENT & PLAN NOTE
DM diet  Accu-Cheks with correctional sliding scale insulin  HGBA1C is 5.6  Monitor blood glucose levels

## 2019-05-05 NOTE — ASSESSMENT & PLAN NOTE
Telemetry  Orders as per Cardiology-currently on IV diuretics  Monitor O2 sats, supplemental O2 as needed  Monitor electrolyte and renal status  Check echocardiogram  Daily weights. Strict I/Os. Fluid restriction.   Na restriction <2g/d.

## 2019-05-05 NOTE — SUBJECTIVE & OBJECTIVE
Past Medical History:   Diagnosis Date    Anticoagulant long-term use     Arthritis     Bladder cancer     Blood transfusion     CHF (congestive heart failure)     Chronic kidney disease     COPD (chronic obstructive pulmonary disease)     Coronary artery disease     Diabetes mellitus     Gout     [place] (hard of hearing)     HAS BILAT AIDS BUT DOES NOT WEAR    Hyperlipidemia     Hypertension     Myocardial infarction     RLS (restless legs syndrome)     Sleep apnea     NO MACHINE    Wears glasses        Past Surgical History:   Procedure Laterality Date    CARDIAC SURGERY      CABG X 2 1997    CATARACT EXTRACTION, BILATERAL      CHOLECYSTECTOMY      COLONOSCOPY      CORONARY ARTERY BYPASS GRAFT      x 2    coronary stents      CYSTOSCOPY      CYSTOSCOPY N/A 4/8/2019    [facility]    EXCISION-BLADDER TUMOR-TRANSURETHRAL (TURBT) N/A 9/8/2014    [facility]    EXCISION-BLADDER TUMOR-TRANSURETHRAL (TURBT) N/A 11/18/2013    [facility] OR    EYE SURGERY Bilateral     CATARACT    TURBT (TRANSURETHRAL RESECTION OF BLADDER TUMOR) N/A 4/8/2019    [facility]    VASECTOMY         Review of patient's allergies indicates:   Allergen Reactions    Lidocaine Nausea Only     NOVACAINE --  Severe nausea    Statins-hmg-coa reductase inhibitors Anxiety       No current facility-administered medications on file prior to encounter.      Current Outpatient Medications on File Prior to Encounter   Medication Sig    alfuzosin (UROXATRAL) 10 mg Tb24 Take 10 mg by mouth nightly.     allopurinol (ZYLOPRIM) 300 MG tablet 1 tablet    aspirin (ECOTRIN) 81 MG EC tablet Take 81 mg by mouth once daily.    clopidogrel (PLAVIX) 75 mg tablet Take 75 mg by mouth once daily.    coenzyme Q10 (CO Q-10) 100 mg capsule Take 100 mg by mouth once daily.    ezetimibe (ZETIA) 10 mg tablet Take 10 mg by  mouth every evening.     FLUoxetine 20 MG capsule every evening.     gabapentin (NEURONTIN) 100 MG capsule every evening.     gemfibrozil (LOPID) 600 MG tablet Take 600 mg by mouth 2 (two) times daily.     insulin glargine (LANTUS) 100 unit/mL injection Inject 27 Units into the skin every evening.     metoprolol tartrate (LOPRESSOR) 25 MG tablet Take 25 mg by mouth 2 (two) times daily.    nateglinide (STARLIX) 60 MG tablet Take 60 mg by mouth every evening.     ropinirole (REQUIP) 2 MG tablet Take 2 mg by mouth nightly.     torsemide (DEMADEX) 20 MG tablet Take 20 mg by mouth once daily.     TRADJENTA 5 mg Tab tablet Take 5 mg by mouth once daily.    VELTASSA 16.8 gram PwPk MIX 1 PACKET IN LIQUID AND TAKE PO QD UTD    zinc 50 mg Tab Take 50 mg by mouth once daily. 1/2 tab daily    nitroGLYCERIN (NITROSTAT) 0.4 MG SL tablet Place 0.4 mg under the tongue every 5 (five) minutes as needed.      Family History     Problem Relation (Age of Onset)    Cancer Father, Sister, Brother    Heart disease Father        Tobacco Use    Smoking status: Former Smoker     Packs/day: 0.25     Years: 20.00     Pack years: 5.00     Types: Cigars     Last attempt to quit: 1982     Years since quittin.2    Smokeless tobacco: Never Used   Substance and Sexual Activity    Alcohol use: No    Drug use: No    Sexual activity: Not on file     Review of Systems   Constitution: Positive for malaise/fatigue and weight gain. Negative for fever.   Cardiovascular: Positive for dyspnea on exertion, leg swelling and paroxysmal nocturnal dyspnea. Negative for chest pain and irregular heartbeat.   Respiratory: Positive for cough, shortness of breath and snoring.    Skin: Positive for poor wound healing.   Gastrointestinal: Positive for bloating.   Genitourinary: Positive for frequency and incomplete emptying.     Objective:     Vital Signs (Most Recent):  Temp: 97 °F (36.1 °C) (19)  Pulse: 66 (19)  Resp: 17  (05/05/19 0733)  BP: 121/64 (05/05/19 1141)  SpO2: 96 % (05/05/19 1141) Vital Signs (24h Range):  Temp:  [96.3 °F (35.7 °C)-97.8 °F (36.6 °C)] 97 °F (36.1 °C)  Pulse:  [56-92] 66  Resp:  [17-24] 17  SpO2:  [94 %-100 %] 96 %  BP: (110-158)/(55-94) 121/64     Weight: 134.3 kg (296 lb)  Body mass index is 38 kg/m².    SpO2: 96 %  O2 Device (Oxygen Therapy): nasal cannula      Intake/Output Summary (Last 24 hours) at 5/5/2019 1443  Last data filed at 5/5/2019 0600  Gross per 24 hour   Intake 60 ml   Output 1350 ml   Net -1290 ml       Lines/Drains/Airways     Drain                 Urethral Catheter 04/08/19 1118 Latex 22 Fr. 27 days         Urethral Catheter 05/04/19 2220 less than 1 day                Physical Exam   Constitutional: He is oriented to person, place, and time.   Morbid obesity, he falls asleep  Throughout the interview.  I had constantly had to wake him up   HENT:   Head: Normocephalic.   Eyes: Pupils are equal, round, and reactive to light. Conjunctivae are normal.   Cardiovascular: Normal rate and regular rhythm.   Pulmonary/Chest: He has rales.   Abdominal: There is no tenderness. There is no rebound.   Genitourinary:   Genitourinary Comments: Huynh catheter in place with hematuria.   Neurological: He is alert and oriented to person, place, and time.   Skin: Skin is warm and dry.       Significant Labs:   ABG: No results for input(s): PH, PCO2, HCO3, POCSATURATED, BE in the last 48 hours., BMP:   Recent Labs   Lab 05/04/19 1910 05/05/19  0236   * 98    141   K 4.3 4.3    101   CO2 26 30*   BUN 50* 53*   CREATININE 2.4* 2.5*   CALCIUM 9.6 9.0   MG  --  1.9   , CMP   Recent Labs   Lab 05/04/19 1910 05/05/19  0236    141   K 4.3 4.3    101   CO2 26 30*   * 98   BUN 50* 53*   CREATININE 2.4* 2.5*   CALCIUM 9.6 9.0   PROT 6.6 5.9*   ALBUMIN 2.6* 2.6*   BILITOT 0.2 0.3   ALKPHOS 115 102   AST 18 14   ALT 9* 10   ANIONGAP 11 10   ESTGFRAFRICA 29* 27*   EGFRNONAA 25*  24*   , CBC   Recent Labs   Lab 05/04/19 1910 05/05/19  0236   WBC 9.10 9.60   HGB 12.0* 11.4*   HCT 38.5* 36.1*    162    and Troponin   Recent Labs   Lab 05/04/19 1910 05/05/19 0236   TROPONINI 0.070* 0.060*       Significant Imaging: EKG: nnormal sinus rhythm rate of 68 first-degree AV block and right bundle branch block and left anterior hemiblock and nonspecific ST-T changes

## 2019-05-05 NOTE — PT/OT/SLP PROGRESS
Occupational Therapy      Patient Name:  Frank Zayas   MRN:  [id number]    OT order received and chart reviewed. Pt is not an appropriate OT candidate in the acute care setting due to being admitted for management of CHF, a chronic condition. OT orders to be discontinued.      RUPINDER Lundy  5/5/2019

## 2019-05-05 NOTE — PLAN OF CARE
05/05/19 0025   Patient Assessment/Suction   Level of Consciousness (AVPU) alert   Respiratory Effort Unlabored   PRE-TX-O2   O2 Device (Oxygen Therapy) nasal cannula   $ Is the patient on Low Flow Oxygen? Yes   Flow (L/min) 2   Oxygen Concentration (%) 28   SpO2 96 %   Pulse Oximetry Type Intermittent   $ Pulse Oximetry - Multiple Charge Pulse Oximetry - Multiple   Ready to Wean/Extubation Screen   FIO2<=50 (chart decimal) 0.28

## 2019-05-05 NOTE — PROGRESS NOTES
05/05/19 0145   Significant Events This Shift   Significant Events Other (comment)  (STAT EKG done given to Kelly.)

## 2019-05-05 NOTE — ASSESSMENT & PLAN NOTE
Monitor closely  Nephrology consulted due to volume overload with history of chronic kidney disease stage 4 on IV diuretics

## 2019-05-05 NOTE — NURSING
Irrigated spann catheter Until return from Red to clear noted, with NS 60ml X 3. Irrigated without resistance. Pt tolerated well, and denied pain or any complaints.

## 2019-05-05 NOTE — ASSESSMENT & PLAN NOTE
Patient with PMHx of CHF.  Presents with worsening SOB associated with bilateral lower extremity pitting edema. Patient reports compliance with his medications and dietary restrictions.  BNP upon admission grossly elevated at 2577.  Chest x-ray concerning for right pleural effusion and pulmonary edema. Was given Lasix 80 mg IV in the ED.  Consultation to Cardiology-follow recommendations. Supplemental O2 via nasal canula; titrate O2 saturation to >92%. Serial troponins.  Diuretic administration. Monitor electrolytes for hypokalemia and hypomagnesemia. 2-D Echocardiogram for evaluation of left ventricle systolic function or any valvular heart disease. Daily weights. Strict I/Os. Fluid restriction. Na restriction <2g/d.

## 2019-05-05 NOTE — ASSESSMENT & PLAN NOTE
Continue antihypertensives per home regimen, monitor BP closely, and titrate medication for sustained BP control.

## 2019-05-05 NOTE — NURSING
"Pt has spann catheter that is draining dark red urine with some clots in drainage bag. On assessment, tip of penis has dried blood clots around urinary cath tube. Pt states that he has bladder cancer and see's Dr. Rock outpatient. I ask pt what color his urine has been at home, and he stated yellow. I asked if he has had any urinary or penile bleeding recently and he states "Yea for a while now." Pt denies pain. VSS. Notified CY Barrera NP, whom ordered to consult Urology.   "

## 2019-05-05 NOTE — HPI
This is a 79-year-old male with PMHx significant for HTN, HLD, MI, CHF, COPD, DM, CKD, gout, arthritis, and bladder CA.  He presented to the ED with a progressively worsening SOB.  He stated that his SOB has been going on for the past few months, but has worsened in the last 3 days.  Symptoms are associated with bilateral lower extremity leg swelling. Exertion exacerbates his symptoms.  He states that he has been compliant with his medications and dietary restrictions.  He was given Lasix intravenously in the ED reports that he does feel better.  He reports that he has had a cough productive of clear mucus and felt nauseated earlier today.  He denied fever, chills, sweats, chest pain, wheezing, abdominal pain, vomiting, diarrhea, dysuria, and any other symptoms at this time.  The patient was evaluated in the emergency room where he was noted to have signs of acute heart failure and new onset of atrial fib.  Patient was admitted for further evaluation and treatment.

## 2019-05-05 NOTE — CONSULTS
Consult Note  Nephrology    Consult Requested By: Marcie Plamer MD    Reason for Consult:  CKD 4, HTN, fluid overload    SUBJECTIVE:     History of Present Illness: 80 y/o male pt admitted with acute exacerbation of CHF, new onset Afib, BNP 2577.  His SOB has been worsening over the last week or so, and he reports that his peripheral edema is worse as well.  He sees Dr. Quinonez in clinic.  Renal is consulted for co-management.    5/5  Pt is SOB w/conversation.  Has bloody urine in spann bag.  He reports that he recently saw his urologist, had a cystoscope and his cancer is back.  Will order UA, there is not one for this admission.    Assessment/plan:    1.  CKD 4--at baseline.  Will monitor renal function closely w/diuresis.  UA today, daily renal panel.    2.  Fluid volume overload--agree w/IV lasix bid.  Monitor renal function.  On fluid restriction.  3.  Anemia of chronic dz/ABL--trend Hgb.  Stable for now at 11.4.  4.  Hyperkalemia, chronic--stable at 4.3.  Takes veltassa daily at home.  Will be getting IV lasix here, will monitor closely.  Renal diet.  5.  DM 2--blood glucose control per hospital medicine  6.  HTN--controlled with current treatment    Past Medical History:   Diagnosis Date    Anticoagulant long-term use     Arthritis     Bladder cancer     Blood transfusion     CHF (congestive heart failure)     Chronic kidney disease     COPD (chronic obstructive pulmonary disease)     Coronary artery disease     Diabetes mellitus     Gout     [place] (hard of hearing)     HAS BILAT AIDS BUT DOES NOT WEAR    Hyperlipidemia     Hypertension     Myocardial infarction     RLS (restless legs syndrome)     Sleep apnea     NO MACHINE    Wears glasses      Past Surgical History:   Procedure Laterality Date    CARDIAC SURGERY      CABG X 2 1997    CATARACT EXTRACTION, BILATERAL      CHOLECYSTECTOMY      COLONOSCOPY      CORONARY ARTERY BYPASS GRAFT      x 2    coronary stents      CYSTOSCOPY       CYSTOSCOPY N/A 2019    [facility]    EXCISION-BLADDER TUMOR-TRANSURETHRAL (TURBT) N/A 2014    [facility]    EXCISION-BLADDER TUMOR-TRANSURETHRAL (TURBT) N/A 2013    [facility] OR    EYE SURGERY Bilateral     CATARACT    TURBT (TRANSURETHRAL RESECTION OF BLADDER TUMOR) N/A 2019    [facility] OR    VASECTOMY       Family History   Problem Relation Age of Onset    Cancer Father     Heart disease Father     Cancer Sister     Cancer Brother      Social History     Tobacco Use    Smoking status: Former Smoker     Packs/day: 0.25     Years: 20.00     Pack years: 5.00     Types: Cigars     Last attempt to quit: 1982     Years since quittin.2    Smokeless tobacco: Never Used   Substance Use Topics    Alcohol use: No    Drug use: No       Review of patient's allergies indicates:   Allergen Reactions    Lidocaine Nausea Only     NOVACAINE --  Severe nausea    Statins-hmg-coa reductase inhibitors Anxiety        Review of Systems:  Negative except for above.  Limited questioning  SOB    OBJECTIVE:     Vital Signs Range (Last 24H):  Temp:  [96.3 °F (35.7 °C)-97.8 °F (36.6 °C)]   Pulse:  [56-92]   Resp:  [17-24]   BP: (110-158)/(55-94)   SpO2:  [94 %-100 %]     Physical Exam:  General- Patient alert and oriented x3 in mild distress  HEENT- WNL  Neck- supple  CV- Regular rate and rhythm, No Murmur/lashawn/rubs  Resp- breath sounds diminished, WOB is increased  GI- Non tender, firm, ascites  Extrem- edemetous  Derm- skin w/d  Neuro-  No flap. No focal deficits noted.     Body mass index is 38.07 kg/m².    Laboratory:  CBC:   Recent Labs   Lab 19  0236   WBC 9.60   RBC 3.67*   HGB 11.4*   HCT 36.1*      MCV 98   MCH 31.0   MCHC 31.6*     CMP:   Recent Labs   Lab 19   GLU 98   CALCIUM 9.0   ALBUMIN 2.6*   PROT 5.9*      K 4.3   CO2  30*      BUN 53*   CREATININE 2.5*   ALKPHOS 102   ALT 10   AST 14   BILITOT 0.3       Diagnostic Results:  Labs: Reviewed      ASSESSMENT/PLAN:     Active Hospital Problems    Diagnosis  POA    *Acute on chronic congestive heart failure [I50.9]  Yes    Type 2 diabetes mellitus [E11.9]  Yes    Hypertension [I10]  Yes    Type 2 diabetes mellitus with kidney complication, with long-term current use of insulin [E11.29, Z79.4]  Not Applicable    DM type 2 with diabetic mixed hyperlipidemia [E11.69, E78.2]  Yes    Coronary artery disease [I25.10]  Yes    COPD (chronic obstructive pulmonary disease) [J44.9]  Yes    New onset atrial fibrillation [I48.91]  Yes    Anemia, chronic disease [D63.8]  Yes    Hyperphosphatemia [E83.39]  Yes    Mild malnutrition [E44.1]  Yes    Elevated troponin [R74.8]  Yes    Elevated brain natriuretic peptide (BNP) level [R79.89]  Yes    Hematuria [R31.9]  Yes      Resolved Hospital Problems   No resolved problems to display.         Thank you for allowing us to participate in the care of your patient. We will follow the patient and provide recommendations as needed.      Time spent seeing patient( greater than 1/2 spent in direct contact) :

## 2019-05-05 NOTE — SUBJECTIVE & OBJECTIVE
Interval History:  Cardiology consulted for acute heart failure and new onset of atrial fib.  Nephrology consulted for history of chronic kidney disease stage 4 with acute volume overload.  Patient also with new onset of hematuria after Huynh insertion and urology consulted as well.     Review of Systems   Constitutional: Positive for activity change, appetite change and fatigue. Negative for chills and fever.   HENT: Negative for ear discharge, ear pain and facial swelling.    Eyes: Negative for pain and redness.   Respiratory: Positive for cough and shortness of breath.    Cardiovascular: Positive for leg swelling. Negative for chest pain and palpitations.   Gastrointestinal: Positive for nausea. Negative for abdominal distention, abdominal pain, constipation, diarrhea and vomiting.   Endocrine: Negative for polydipsia and polyphagia.   Genitourinary:        Huynh catheter present   Musculoskeletal: Positive for gait problem. Negative for neck pain and neck stiffness.   Skin: Negative for rash and wound.   Allergic/Immunologic: Negative for food allergies.   Neurological: Positive for weakness. Negative for seizures, facial asymmetry and speech difficulty.   Hematological: Bruises/bleeds easily.   Psychiatric/Behavioral: Negative for agitation, behavioral problems, confusion, hallucinations and suicidal ideas. The patient is not nervous/anxious.      Objective:     Vital Signs (Most Recent):  Temp: 97 °F (36.1 °C) (05/05/19 0733)  Pulse: 66 (05/05/19 0733)  Resp: 17 (05/05/19 0733)  BP: 121/64 (05/05/19 1141)  SpO2: 96 % (05/05/19 1141) Vital Signs (24h Range):  Temp:  [96.3 °F (35.7 °C)-97.8 °F (36.6 °C)] 97 °F (36.1 °C)  Pulse:  [56-92] 66  Resp:  [17-24] 17  SpO2:  [94 %-100 %] 96 %  BP: (110-158)/(55-94) 121/64     Weight: 134.3 kg (296 lb)  Body mass index is 38 kg/m².    Physical Exam   Constitutional: He is oriented to person, place, and time. He appears well-developed and well-nourished. No distress.    Obese.   HENT:   Head: Normocephalic and atraumatic.   Eyes: Pupils are equal, round, and reactive to light. Conjunctivae and EOM are normal. Right eye exhibits no discharge. Left eye exhibits no discharge.   Neck: Normal range of motion. Neck supple.   Cardiovascular: Normal rate, normal heart sounds and intact distal pulses. An irregularly irregular rhythm present.   Pulses:       Dorsalis pedis pulses are 2+ on the right side, and 2+ on the left side.   Pulmonary/Chest: Effort normal. No accessory muscle usage. He has no wheezes. He has no rhonchi. He has rales.   Bilateral breath sounds with rales  Dyspnea on mild exertion   Abdominal: Soft. Bowel sounds are normal. He exhibits no distension. There is no tenderness. There is no guarding.   Genitourinary:   Genitourinary Comments: Huynh catheter noted draining hematuria   Musculoskeletal: Normal range of motion. He exhibits edema (2+ pitting BLE).   Bilateral lower extremity edema 3+   Neurological: He is alert and oriented to person, place, and time. He has normal strength. No cranial nerve deficit.   Skin: Skin is warm, dry and intact. Capillary refill takes less than 2 seconds. He is not diaphoretic.   Right lower extremity dressing intact   Psychiatric: He has a normal mood and affect. His speech is normal and behavior is normal. Judgment and thought content normal.   Vitals reviewed.        CRANIAL NERVES     CN III, IV, VI   Pupils are equal, round, and reactive to light.  Extraocular motions are normal.     Labs reviewed

## 2019-05-05 NOTE — SUBJECTIVE & OBJECTIVE
Past Medical History:   Diagnosis Date    Anticoagulant long-term use     Arthritis     Bladder cancer     Blood transfusion     CHF (congestive heart failure)     Chronic kidney disease     COPD (chronic obstructive pulmonary disease)     Coronary artery disease     Diabetes mellitus     Gout     [place] (hard of hearing)     HAS BILAT AIDS BUT DOES NOT WEAR    Hyperlipidemia     Hypertension     Myocardial infarction     RLS (restless legs syndrome)     Sleep apnea     NO MACHINE    Wears glasses        Past Surgical History:   Procedure Laterality Date    CARDIAC SURGERY      CABG X 2 1997    CATARACT EXTRACTION, BILATERAL      CHOLECYSTECTOMY      COLONOSCOPY      CORONARY ARTERY BYPASS GRAFT      x 2    coronary stents      CYSTOSCOPY      CYSTOSCOPY N/A 4/8/2019    [facility]    EXCISION-BLADDER TUMOR-TRANSURETHRAL (TURBT) N/A 9/8/2014    [facility]    EXCISION-BLADDER TUMOR-TRANSURETHRAL (TURBT) N/A 11/18/2013    [facility]    EYE SURGERY Bilateral     CATARACT    TURBT (TRANSURETHRAL RESECTION OF BLADDER TUMOR) N/A 4/8/2019    [facility]    VASECTOMY         Review of patient's allergies indicates:   Allergen Reactions    Lidocaine Nausea Only     NOVACAINE --  Severe nausea    Statins-hmg-coa reductase inhibitors Anxiety       No current facility-administered medications on file prior to encounter.      Current Outpatient Medications on File Prior to Encounter   Medication Sig    alfuzosin (UROXATRAL) 10 mg Tb24 Take 10 mg by mouth nightly.     allopurinol (ZYLOPRIM) 300 MG tablet 1 tablet    aspirin (ECOTRIN) 81 MG EC tablet Take 81 mg by mouth once daily.    clopidogrel (PLAVIX) 75 mg tablet Take 75 mg by mouth once daily.    coenzyme Q10 (CO Q-10) 100 mg capsule Take 100 mg by mouth once daily.    ezetimibe (ZETIA) 10 mg tablet Take 10 mg by  mouth every evening.     FLUoxetine 20 MG capsule every evening.     gabapentin (NEURONTIN) 100 MG capsule every evening.     gemfibrozil (LOPID) 600 MG tablet Take 600 mg by mouth 2 (two) times daily.     insulin glargine (LANTUS) 100 unit/mL injection Inject 27 Units into the skin every evening.     metoprolol tartrate (LOPRESSOR) 25 MG tablet Take 25 mg by mouth 2 (two) times daily.    nateglinide (STARLIX) 60 MG tablet Take 60 mg by mouth every evening.     ropinirole (REQUIP) 2 MG tablet Take 2 mg by mouth nightly.     torsemide (DEMADEX) 20 MG tablet Take 20 mg by mouth once daily.     TRADJENTA 5 mg Tab tablet Take 5 mg by mouth once daily.    VELTASSA 16.8 gram PwPk MIX 1 PACKET IN LIQUID AND TAKE PO QD UTD    zinc 50 mg Tab Take 50 mg by mouth once daily. 1/2 tab daily    nitroGLYCERIN (NITROSTAT) 0.4 MG SL tablet Place 0.4 mg under the tongue every 5 (five) minutes as needed.      Family History     Problem Relation (Age of Onset)    Cancer Father, Sister, Brother    Heart disease Father        Tobacco Use    Smoking status: Former Smoker     Packs/day: 0.25     Years: 20.00     Pack years: 5.00     Types: Cigars     Last attempt to quit: 1982     Years since quittin.2    Smokeless tobacco: Never Used   Substance and Sexual Activity    Alcohol use: No    Drug use: No    Sexual activity: Not on file     Review of Systems   Constitutional: Negative for activity change, appetite change, chills, fatigue and fever.   HENT: Negative for congestion, hearing loss and sore throat.    Eyes: Negative for photophobia and visual disturbance.   Respiratory: Positive for cough and shortness of breath. Negative for wheezing.    Cardiovascular: Positive for leg swelling. Negative for chest pain and palpitations.   Gastrointestinal: Positive for nausea. Negative for abdominal pain, diarrhea and vomiting.   Endocrine: Negative for polydipsia, polyphagia and polyuria.   Genitourinary: Negative for  dysuria, frequency and urgency.   Musculoskeletal: Negative for neck pain and neck stiffness.   Skin: Negative for rash and wound.   Neurological: Negative for dizziness, syncope, weakness, numbness and headaches.   Psychiatric/Behavioral: Negative for confusion. The patient is not nervous/anxious.      Objective:     Vital Signs (Most Recent):  Temp: 97.1 °F (36.2 °C) (05/04/19 2224)  Pulse: 75 (05/05/19 0042)  Resp: (!) 22 (05/04/19 2224)  BP: (!) 126/59 (05/05/19 0042)  SpO2: 96 % (05/05/19 0025) Vital Signs (24h Range):  Temp:  [97.1 °F (36.2 °C)-97.8 °F (36.6 °C)] 97.1 °F (36.2 °C)  Pulse:  [75-92] 75  Resp:  [22-24] 22  SpO2:  [95 %-100 %] 96 %  BP: (126-158)/(59-94) 126/59     Weight: 134.5 kg (296 lb 8.3 oz)  Body mass index is 38.07 kg/m².    Physical Exam   Constitutional: He is oriented to person, place, and time. He appears well-developed and well-nourished. No distress.   Obese.   HENT:   Head: Normocephalic and atraumatic.   Mouth/Throat: Oropharynx is clear and moist.   Eyes: Pupils are equal, round, and reactive to light. Conjunctivae and EOM are normal. No scleral icterus.   Neck: Normal range of motion. Neck supple. No tracheal deviation present. No thyromegaly present.   Cardiovascular: Normal rate, normal heart sounds and intact distal pulses. An irregularly irregular rhythm present. Exam reveals no gallop and no friction rub.   No murmur heard.  Pulses:       Dorsalis pedis pulses are 2+ on the right side, and 2+ on the left side.   Pulmonary/Chest: Effort normal. No accessory muscle usage. No respiratory distress. He has no wheezes. He has no rhonchi. He has rales.   Abdominal: Soft. Bowel sounds are normal. He exhibits no distension and no mass. There is no tenderness. There is no guarding.   Musculoskeletal: Normal range of motion. He exhibits edema (2+ pitting BLE). He exhibits no tenderness or deformity.   Neurological: He is alert and oriented to person, place, and time. He has normal  strength. Coordination normal.   Skin: Skin is warm, dry and intact. Capillary refill takes less than 2 seconds. No rash noted. No erythema.   Psychiatric: He has a normal mood and affect. His speech is normal and behavior is normal. Thought content normal.   Vitals reviewed.        CRANIAL NERVES     CN III, IV, VI   Pupils are equal, round, and reactive to light.  Extraocular motions are normal.        Significant Labs:   CBC:   Recent Labs   Lab 05/04/19 1910   WBC 9.10   HGB 12.0*   HCT 38.5*        CMP:   Recent Labs   Lab 05/04/19 1910      K 4.3      CO2 26   *   BUN 50*   CREATININE 2.4*   CALCIUM 9.6   PROT 6.6   ALBUMIN 2.6*   BILITOT 0.2   ALKPHOS 115   AST 18   ALT 9*   ANIONGAP 11   EGFRNONAA 25*     Cardiac Markers:   Recent Labs   Lab 05/04/19 1910   BNP 2,577*     Troponin:   Recent Labs   Lab 05/04/19 1910   TROPONINI 0.070*       Significant Imaging:    CXR:  My read: right pleural effusion, possible infiltrates indicating pulmonary edema.  No consolidations.

## 2019-05-05 NOTE — CONSULTS
[facility]  Cardiology  Consult Note    Patient Name: Frank Zayas  MRN: [id number]  Admission Date: 5/4/2019  Hospital Length of Stay: 1 days  Code Status: Full Code   Attending Provider: Marcie Palmer MD   Consulting Provider: Sky Simpson MD  Primary Care Physician: Mikhail Shields MD  Principal Problem:Acute on chronic congestive heart failure    Patient information was obtained from patient and ER records.     Consults  Subjective:     Chief Complaint:  Shortness of breath and generalized edema    HPI:   The patient is a 79-year-old male with history of coronary artery disease and chronic congestive heart failure.  He has not been able to breathe properly for the past few weeks.  He also noticed retention of fluid.  He has bladder cancer he had a cystoscopy ever since then his been having difficulty urinating.  He has been followed by Dr. Mendez.  [facility].  He has obstructive sleep apnea he is noncompliant with his sleep apnea machine.    Past Medical History:   Diagnosis Date    Anticoagulant long-term use     Arthritis     Bladder cancer     Blood transfusion     CHF (congestive heart failure)     Chronic kidney disease     COPD (chronic obstructive pulmonary disease)     Coronary artery disease     Diabetes mellitus     Gout     [place] (hard of hearing)     HAS BILAT AIDS BUT DOES NOT WEAR    Hyperlipidemia     Hypertension     Myocardial infarction     RLS (restless legs syndrome)     Sleep apnea     NO MACHINE    Wears glasses        Past Surgical History:   Procedure Laterality Date    CARDIAC SURGERY      CABG X 2 1997    CATARACT EXTRACTION, BILATERAL      CHOLECYSTECTOMY      COLONOSCOPY      CORONARY ARTERY BYPASS GRAFT      x 2    coronary stents      CYSTOSCOPY      CYSTOSCOPY N/A 4/8/2019    [facility]    EXCISION-BLADDER TUMOR-TRANSURETHRAL  (TURBT) N/A 9/8/2014    [facility]    EXCISION-BLADDER TUMOR-TRANSURETHRAL (TURBT) N/A 11/18/2013    [facility] at [facility] OR    EYE SURGERY Bilateral     CATARACT    TURBT (TRANSURETHRAL RESECTION OF BLADDER TUMOR) N/A 4/8/2019    [facility] OR    VASECTOMY         Review of patient's allergies indicates:   Allergen Reactions    Lidocaine Nausea Only     NOVACAINE --  Severe nausea    Statins-hmg-coa reductase inhibitors Anxiety       No current facility-administered medications on file prior to encounter.      Current Outpatient Medications on File Prior to Encounter   Medication Sig    alfuzosin (UROXATRAL) 10 mg Tb24 Take 10 mg by mouth nightly.     allopurinol (ZYLOPRIM) 300 MG tablet 1 tablet    aspirin (ECOTRIN) 81 MG EC tablet Take 81 mg by mouth once daily.    clopidogrel (PLAVIX) 75 mg tablet Take 75 mg by mouth once daily.    coenzyme Q10 (CO Q-10) 100 mg capsule Take 100 mg by mouth once daily.    ezetimibe (ZETIA) 10 mg tablet Take 10 mg by mouth every evening.     FLUoxetine 20 MG capsule every evening.     gabapentin (NEURONTIN) 100 MG capsule every evening.     gemfibrozil (LOPID) 600 MG tablet Take 600 mg by mouth 2 (two) times daily.     insulin glargine (LANTUS) 100 unit/mL injection Inject 27 Units into the skin every evening.     metoprolol tartrate (LOPRESSOR) 25 MG tablet Take 25 mg by mouth 2 (two) times daily.    nateglinide (STARLIX) 60 MG tablet Take 60 mg by mouth every evening.     ropinirole (REQUIP) 2 MG tablet Take 2 mg by mouth nightly.     torsemide (DEMADEX) 20 MG tablet Take 20 mg by mouth once daily.     TRADJENTA 5 mg Tab tablet Take 5 mg by mouth once daily.    VELTASSA 16.8 gram PwPk MIX 1 PACKET IN LIQUID AND TAKE PO QD UTD    zinc 50 mg Tab Take 50 mg by mouth once daily. 1/2 tab daily    nitroGLYCERIN (NITROSTAT) 0.4 MG SL tablet Place 0.4 mg under the tongue every 5  (five) minutes as needed.      Family History     Problem Relation (Age of Onset)    Cancer Father, Sister, Brother    Heart disease Father        Tobacco Use    Smoking status: Former Smoker     Packs/day: 0.25     Years: 20.00     Pack years: 5.00     Types: Cigars     Last attempt to quit: 1982     Years since quittin.2    Smokeless tobacco: Never Used   Substance and Sexual Activity    Alcohol use: No    Drug use: No    Sexual activity: Not on file     Review of Systems   Constitution: Positive for malaise/fatigue and weight gain. Negative for fever.   Cardiovascular: Positive for dyspnea on exertion, leg swelling and paroxysmal nocturnal dyspnea. Negative for chest pain and irregular heartbeat.   Respiratory: Positive for cough, shortness of breath and snoring.    Skin: Positive for poor wound healing.   Gastrointestinal: Positive for bloating.   Genitourinary: Positive for frequency and incomplete emptying.     Objective:     Vital Signs (Most Recent):  Temp: 97 °F (36.1 °C) (19 07)  Pulse: 66 (19 0733)  Resp: 17 (19 07)  BP: 121/64 (19 1141)  SpO2: 96 % (19 1141) Vital Signs (24h Range):  Temp:  [96.3 °F (35.7 °C)-97.8 °F (36.6 °C)] 97 °F (36.1 °C)  Pulse:  [56-92] 66  Resp:  [17-24] 17  SpO2:  [94 %-100 %] 96 %  BP: (110-158)/(55-94) 121/64     Weight: 134.3 kg (296 lb)  Body mass index is 38 kg/m².    SpO2: 96 %  O2 Device (Oxygen Therapy): nasal cannula      Intake/Output Summary (Last 24 hours) at 2019 1443  Last data filed at 2019 0600  Gross per 24 hour   Intake 60 ml   Output 1350 ml   Net -1290 ml       Lines/Drains/Airways     Drain                 Urethral Catheter 19 1118 Latex 22 Fr. 27 days         Urethral Catheter 19 2220 less than 1 day                Physical Exam   Constitutional: He is oriented to person, place, and time.   Morbid obesity, he falls asleep  Throughout the interview.  I had constantly had to wake him up    HENT:   Head: Normocephalic.   Eyes: Pupils are equal, round, and reactive to light. Conjunctivae are normal.   Cardiovascular: Normal rate and regular rhythm.   Pulmonary/Chest: He has rales.   Abdominal: There is no tenderness. There is no rebound.   Genitourinary:   Genitourinary Comments: Huynh catheter in place with hematuria.   Neurological: He is alert and oriented to person, place, and time.   Skin: Skin is warm and dry.       Significant Labs:   ABG: No results for input(s): PH, PCO2, HCO3, POCSATURATED, BE in the last 48 hours., BMP:   Recent Labs   Lab 05/04/19 1910 05/05/19 0236   * 98    141   K 4.3 4.3    101   CO2 26 30*   BUN 50* 53*   CREATININE 2.4* 2.5*   CALCIUM 9.6 9.0   MG  --  1.9   , CMP   Recent Labs   Lab 05/04/19 1910 05/05/19 0236    141   K 4.3 4.3    101   CO2 26 30*   * 98   BUN 50* 53*   CREATININE 2.4* 2.5*   CALCIUM 9.6 9.0   PROT 6.6 5.9*   ALBUMIN 2.6* 2.6*   BILITOT 0.2 0.3   ALKPHOS 115 102   AST 18 14   ALT 9* 10   ANIONGAP 11 10   ESTGFRAFRICA 29* 27*   EGFRNONAA 25* 24*   , CBC   Recent Labs   Lab 05/04/19 1910 05/05/19 0236   WBC 9.10 9.60   HGB 12.0* 11.4*   HCT 38.5* 36.1*    162    and Troponin   Recent Labs   Lab 05/04/19 1910 05/05/19 0236   TROPONINI 0.070* 0.060*       Significant Imaging: EKG: nnormal sinus rhythm rate of 68 first-degree AV block and right bundle branch block and left anterior hemiblock and nonspecific ST-T changes    Assessment and Plan:     * Acute on chronic congestive heart failure  Echocardiogram is pending as suspect that he has systolic heart failure.  I'm sure he is not compliant with his diet.  He has observed sleep apnea noncompliant with his sleep apnea machine.  Continue to diuresis.  His renal function might worsen.  We need to keep a close watch on the renal function as well as electrolytes    Hematuria  Secondary to bladder CA  Undergoing therapy    Elevated brain natriuretic  peptide (BNP) level  Acute on chronic systolic heart failure    Elevated troponin  Secondary to congestive heart failure    New onset atrial fibrillation  Patient appears to be normal sinus rhythm now    Coronary artery disease  Status post coronary artery bypass.  He denies any chest pains.  His cardiac markers are within normal limits.  I do not feel that this episode of  Heart failure is secondary to myocardial ischemia.    Hypertension  Better control        VTE Risk Mitigation (From admission, onward)        Ordered     Place JACK hose  Until discontinued      05/05/19 1120     IP VTE HIGH RISK PATIENT  Once      05/04/19 8197          Thank you for your consult. I will follow-up with patient. Please contact us if you have any additional questions.    Sky Simpson MD  Cardiology   [facility]

## 2019-05-05 NOTE — ASSESSMENT & PLAN NOTE
COPD is controlled currently.  Not on scheduled inhalers, nebulizer treatments or supplemental oxygen.  Monitor respiratory status closely.

## 2019-05-05 NOTE — HOSPITAL COURSE
He has been admitted to the hospital placed on IV diuretics he has had some improvement of his shortness of breath but continued to have generalized edema.   I suspect that he is not compliant with his diet as well as sodium intake.

## 2019-05-05 NOTE — H&P
"[facility] Medicine  History & Physical    Patient Name: Frank Zayas  MRN: [id number]  Admission Date: 5/4/2019  Attending Physician: Marcie Palmer MD   Primary Care Provider: Mikhail Shields MD         Patient information was obtained from patient, past medical records and ER records.     Subjective:     Principal Problem:Acute on chronic congestive heart failure    Chief Complaint:   Chief Complaint   Patient presents with    Shortness of Breath     "due to increasing fluid"        HPI: Frank Zayas is a 79-year-old male with PMHx significant for HTN, HLD, MI, CHF, COPD, DM, CKD, gout, arthritis, and bladder CA.  He presented to the ED with a progressively worsening SOB.  He stated that his SOB has been going on for the past few months, but has worsened in the last 3 days.  Symptoms are associated with bilateral lower extremity leg swelling. Exertion exacerbates his symptoms.  He states that he has been compliant with his medications and dietary restrictions.  He was given Lasix intravenously in the ED reports that he does feel better.  He reports that he has had a cough productive of clear mucus and felt nauseated earlier today.  He denied fever, chills, sweats, chest pain, wheezing, abdominal pain, vomiting, diarrhea, dysuria, and any other symptoms at this time.    Past Medical History:   Diagnosis Date    Anticoagulant long-term use     Arthritis     Bladder cancer     Blood transfusion     CHF (congestive heart failure)     Chronic kidney disease     COPD (chronic obstructive pulmonary disease)     Coronary artery disease     Diabetes mellitus     Gout     [place] (hard of hearing)     HAS BILAT AIDS BUT DOES NOT WEAR    Hyperlipidemia     Hypertension     Myocardial infarction     RLS (restless legs syndrome)     Sleep apnea     NO MACHINE    Wears glasses        Past Surgical History:   Procedure Laterality Date    CARDIAC SURGERY      CABG X 2 1997 "    CATARACT EXTRACTION, BILATERAL      CHOLECYSTECTOMY      COLONOSCOPY      CORONARY ARTERY BYPASS GRAFT      x 2    coronary stents      CYSTOSCOPY      CYSTOSCOPY N/A 4/8/2019    [facility]    EXCISION-BLADDER TUMOR-TRANSURETHRAL (TURBT) N/A 9/8/2014    [facility] OR    EXCISION-BLADDER TUMOR-TRANSURETHRAL (TURBT) N/A 11/18/2013    [facility] at [facility] OR    EYE SURGERY Bilateral     CATARACT    TURBT (TRANSURETHRAL RESECTION OF BLADDER TUMOR) N/A 4/8/2019    [facility] OR    VASECTOMY         Review of patient's allergies indicates:   Allergen Reactions    Lidocaine Nausea Only     NOVACAINE --  Severe nausea    Statins-hmg-coa reductase inhibitors Anxiety       No current facility-administered medications on file prior to encounter.      Current Outpatient Medications on File Prior to Encounter   Medication Sig    alfuzosin (UROXATRAL) 10 mg Tb24 Take 10 mg by mouth nightly.     allopurinol (ZYLOPRIM) 300 MG tablet 1 tablet    aspirin (ECOTRIN) 81 MG EC tablet Take 81 mg by mouth once daily.    clopidogrel (PLAVIX) 75 mg tablet Take 75 mg by mouth once daily.    coenzyme Q10 (CO Q-10) 100 mg capsule Take 100 mg by mouth once daily.    ezetimibe (ZETIA) 10 mg tablet Take 10 mg by mouth every evening.     FLUoxetine 20 MG capsule every evening.     gabapentin (NEURONTIN) 100 MG capsule every evening.     gemfibrozil (LOPID) 600 MG tablet Take 600 mg by mouth 2 (two) times daily.     insulin glargine (LANTUS) 100 unit/mL injection Inject 27 Units into the skin every evening.     metoprolol tartrate (LOPRESSOR) 25 MG tablet Take 25 mg by mouth 2 (two) times daily.    nateglinide (STARLIX) 60 MG tablet Take 60 mg by mouth every evening.     ropinirole (REQUIP) 2 MG tablet Take 2 mg by mouth nightly.     torsemide (DEMADEX) 20 MG tablet Take 20 mg by mouth once daily.      TRADJENTA 5 mg Tab tablet Take 5 mg by mouth once daily.    VELTASSA 16.8 gram PwPk MIX 1 PACKET IN LIQUID AND TAKE PO QD UTD    zinc 50 mg Tab Take 50 mg by mouth once daily. 1/2 tab daily    nitroGLYCERIN (NITROSTAT) 0.4 MG SL tablet Place 0.4 mg under the tongue every 5 (five) minutes as needed.      Family History     Problem Relation (Age of Onset)    Cancer Father, Sister, Brother    Heart disease Father        Tobacco Use    Smoking status: Former Smoker     Packs/day: 0.25     Years: 20.00     Pack years: 5.00     Types: Cigars     Last attempt to quit: 1982     Years since quittin.2    Smokeless tobacco: Never Used   Substance and Sexual Activity    Alcohol use: No    Drug use: No    Sexual activity: Not on file     Review of Systems   Constitutional: Negative for activity change, appetite change, chills, fatigue and fever.   HENT: Negative for congestion, hearing loss and sore throat.    Eyes: Negative for photophobia and visual disturbance.   Respiratory: Positive for cough and shortness of breath. Negative for wheezing.    Cardiovascular: Positive for leg swelling. Negative for chest pain and palpitations.   Gastrointestinal: Positive for nausea. Negative for abdominal pain, diarrhea and vomiting.   Endocrine: Negative for polydipsia, polyphagia and polyuria.   Genitourinary: Negative for dysuria, frequency and urgency.   Musculoskeletal: Negative for neck pain and neck stiffness.   Skin: Negative for rash and wound.   Neurological: Negative for dizziness, syncope, weakness, numbness and headaches.   Psychiatric/Behavioral: Negative for confusion. The patient is not nervous/anxious.      Objective:     Vital Signs (Most Recent):  Temp: 97.1 °F (36.2 °C) (19)  Pulse: 75 (19)  Resp: (!) 22 (19)  BP: (!) 126/59 (19 004)  SpO2: 96 % (19 0025) Vital Signs (24h Range):  Temp:  [97.1 °F (36.2 °C)-97.8 °F (36.6 °C)] 97.1 °F (36.2 °C)  Pulse:  [75-92]  75  Resp:  [22-24] 22  SpO2:  [95 %-100 %] 96 %  BP: (126-158)/(59-94) 126/59     Weight: 134.5 kg (296 lb 8.3 oz)  Body mass index is 38.07 kg/m².    Physical Exam   Constitutional: He is oriented to person, place, and time. He appears well-developed and well-nourished. No distress.   Obese.   HENT:   Head: Normocephalic and atraumatic.   Mouth/Throat: Oropharynx is clear and moist.   Eyes: Pupils are equal, round, and reactive to light. Conjunctivae and EOM are normal. No scleral icterus.   Neck: Normal range of motion. Neck supple. No tracheal deviation present. No thyromegaly present.   Cardiovascular: Normal rate, normal heart sounds and intact distal pulses. An irregularly irregular rhythm present. Exam reveals no gallop and no friction rub.   No murmur heard.  Pulses:       Dorsalis pedis pulses are 2+ on the right side, and 2+ on the left side.   Pulmonary/Chest: Effort normal. No accessory muscle usage. No respiratory distress. He has no wheezes. He has no rhonchi. He has rales.   Abdominal: Soft. Bowel sounds are normal. He exhibits no distension and no mass. There is no tenderness. There is no guarding.   Musculoskeletal: Normal range of motion. He exhibits edema (2+ pitting BLE). He exhibits no tenderness or deformity.   Neurological: He is alert and oriented to person, place, and time. He has normal strength. Coordination normal.   Skin: Skin is warm, dry and intact. Capillary refill takes less than 2 seconds. No rash noted. No erythema.   Psychiatric: He has a normal mood and affect. His speech is normal and behavior is normal. Thought content normal.   Vitals reviewed.        CRANIAL NERVES     CN III, IV, VI   Pupils are equal, round, and reactive to light.  Extraocular motions are normal.        Significant Labs:   CBC:   Recent Labs   Lab 05/04/19 1910   WBC 9.10   HGB 12.0*   HCT 38.5*        CMP:   Recent Labs   Lab 05/04/19 1910      K 4.3      CO2 26   *   BUN 50*    CREATININE 2.4*   CALCIUM 9.6   PROT 6.6   ALBUMIN 2.6*   BILITOT 0.2   ALKPHOS 115   AST 18   ALT 9*   ANIONGAP 11   EGFRNONAA 25*     Cardiac Markers:   Recent Labs   Lab 05/04/19 1910   BNP 2,577*     Troponin:   Recent Labs   Lab 05/04/19 1910   TROPONINI 0.070*       Significant Imaging:    CXR:  My read: right pleural effusion, possible infiltrates indicating pulmonary edema.  No consolidations.     Assessment/Plan:     * Acute on chronic congestive heart failure  Patient with PMHx of CHF.  Presents with worsening SOB associated with bilateral lower extremity pitting edema. Patient reports compliance with his medications and dietary restrictions.  BNP upon admission grossly elevated at 2577.  Chest x-ray concerning for right pleural effusion and pulmonary edema. Was given Lasix 80 mg IV in the ED.  Consultation to Cardiology-follow recommendations. Supplemental O2 via nasal canula; titrate O2 saturation to >92%. Serial troponins.  Diuretic administration. Monitor electrolytes for hypokalemia and hypomagnesemia. 2-D Echocardiogram for evaluation of left ventricle systolic function or any valvular heart disease. Daily weights. Strict I/Os. Fluid restriction. Na restriction <2g/d.          New onset atrial fibrillation  New onset, without RVR.  Possible culprit of SOB.  Consultation to Cardiology - follow recommendations.  Continuous cardiac monitoring. CHADS-VASc score 5  - will likely need to be started on anticoagulation - will defer to cardiology specialty. Trend troponin x 3 and obtain Echocardiogram. Repeat EKG.        COPD (chronic obstructive pulmonary disease)  COPD is controlled currently.  Not on scheduled inhalers, nebulizer treatments or supplemental oxygen.  Monitor respiratory status closely.      Coronary artery disease  Historical diagnosis s/p MI and stents, presents without s/s of angina.  Continue ASA, Plavix, Metoprolol and Zetia.  Telemetry monitoring.  Monitor closely for s/s of  ACS.        Hyperlipidemia  Chronic, continue Zetia.   Obtain fasting lipid panel.        Chronic kidney disease  Chronic problem.  BUN and creatinine at baseline.  Monitor renal function panel and electrolytes closely.  Avoid nonessential nephrotoxins. Renal dose medications for Estimated Creatinine Clearance: 37 mL/min (A) (based on SCr of 2.4 mg/dL (H)).            Hypertension  Chronic, stable.  Continue antihypertensives per home regimen, monitor BP closely, and titrate medication for sustained BP control.        Type 2 diabetes mellitus  Chronic problem.  Holding oral anti diabetic medications.  Continue Levemir at decreased dose 20 units.  Will obtain hemoglobin A1c for control.    Most recent fingerstick glucose reviewed-   Recent Labs   Lab 05/04/19  2310   POCTGLUCOSE 109     Current correctional scale  Low  Maintain anti-hyperglycemic dose as follows-   Antihyperglycemics (From admission, onward)    Start     Stop Route Frequency Ordered    05/04/19 2230  insulin detemir U-100 pen 20 Units      -- SubQ Nightly 05/04/19 2225    05/04/19 2225  insulin aspart U-100 pen 0-5 Units      -- SubQ Before meals & nightly PRN 05/04/19 2225                VTE Risk Mitigation (From admission, onward)        Ordered     enoxaparin injection 30 mg  Daily      05/04/19 2225     IP VTE HIGH RISK PATIENT  Once      05/04/19 2225             Malini Tsang NP  Department of Hospital Medicine   [facility]

## 2019-05-05 NOTE — HPI
The patient is a 79-year-old male with history of coronary artery disease and chronic congestive heart failure.  He has not been able to breathe properly for the past few weeks.  He also noticed retention of fluid.  He has bladder cancer he had a cystoscopy ever since then his been having difficulty urinating.  He has been followed by Dr. Mendez.  He was admitted to [facility]  Approximately a month ago.  He has obstructive sleep apnea he is noncompliant with his sleep apnea machine.

## 2019-05-05 NOTE — NURSING
Called Dr Vasquez for consult, no answer, there is no urology on call today per house supervisor, Cristina Barrera, Np Notified,consult will be called in tomorrow morning

## 2019-05-05 NOTE — ASSESSMENT & PLAN NOTE
Status post coronary artery bypass.  He denies any chest pains.  His cardiac markers are within normal limits.  I do not feel that this episode of  Heart failure is secondary to myocardial ischemia.

## 2019-05-05 NOTE — PROGRESS NOTES
05/05/19 1358   PT G-Codes   Functional Assessment Tool Used FIM   PT Evaluation   History (PT Eval Complexity) (LOW) no personal factors and/or comorbidities   Examination of Body Systems (PT Eval Complexity) (LOW) 1-2 elements   Clinical Presentation (PT Evaluation Complexity) (LOW) stable   Clinical Decision Making (PT Evaluation Complexity) (LOW) low complexity   $PT Evaluation EVAL, LOW COMPLEXITY

## 2019-05-05 NOTE — PROGRESS NOTES
[facility] Hospital Medicine  Progress Note    Patient Name: Frank Zayas  MRN: [id number]  Patient Class: IP- Inpatient   Admission Date: 5/4/2019  Length of Stay: 1 days  Attending Physician: Marcie Palmer MD  Primary Care Provider: Mikhail Shields MD      Subjective:     Principal Problem:Acute on chronic congestive heart failure    HPI:  This is a 79-year-old male with PMHx significant for HTN, HLD, MI, CHF, COPD, DM, CKD, gout, arthritis, and bladder CA.  He presented to the ED with a progressively worsening SOB.  He stated that his SOB has been going on for the past few months, but has worsened in the last 3 days.  Symptoms are associated with bilateral lower extremity leg swelling. Exertion exacerbates his symptoms.  He states that he has been compliant with his medications and dietary restrictions.  He was given Lasix intravenously in the ED reports that he does feel better.  He reports that he has had a cough productive of clear mucus and felt nauseated earlier today.  He denied fever, chills, sweats, chest pain, wheezing, abdominal pain, vomiting, diarrhea, dysuria, and any other symptoms at this time.  The patient was evaluated in the emergency room where he was noted to have signs of acute heart failure and new onset of atrial fib.  Patient was admitted for further evaluation and treatment.    Hospital Course:  The patient was monitored closely during his stay.  He was placed on continuous telemetry monitor.  Cardiology was consulted.  Patient was placed on supplemental O2 and IV diuretics for his acute heart failure.  An echocardiogram was ordered.  He was continued on metoprolol for his new onset of atrial fib which later converted back to sinus rhythm. Nephrology was consulted for chronic kidney disease stage 4 with acute of volume overload.  Patient also noted to have some new onset of hematuria and Urology was consulted.    Interval History:  Cardiology consulted for acute  heart failure and new onset of atrial fib.  Nephrology consulted for history of chronic kidney disease stage 4 with acute volume overload.  Patient also with new onset of hematuria after Huynh insertion and urology consulted as well.     Review of Systems   Constitutional: Positive for activity change, appetite change and fatigue. Negative for chills and fever.   HENT: Negative for ear discharge, ear pain and facial swelling.    Eyes: Negative for pain and redness.   Respiratory: Positive for cough and shortness of breath.    Cardiovascular: Positive for leg swelling. Negative for chest pain and palpitations.   Gastrointestinal: Positive for nausea. Negative for abdominal distention, abdominal pain, constipation, diarrhea and vomiting.   Endocrine: Negative for polydipsia and polyphagia.   Genitourinary:        Huynh catheter present   Musculoskeletal: Positive for gait problem. Negative for neck pain and neck stiffness.   Skin: Negative for rash and wound.   Allergic/Immunologic: Negative for food allergies.   Neurological: Positive for weakness. Negative for seizures, facial asymmetry and speech difficulty.   Hematological: Bruises/bleeds easily.   Psychiatric/Behavioral: Negative for agitation, behavioral problems, confusion, hallucinations and suicidal ideas. The patient is not nervous/anxious.      Objective:     Vital Signs (Most Recent):  Temp: 97 °F (36.1 °C) (05/05/19 0733)  Pulse: 66 (05/05/19 0733)  Resp: 17 (05/05/19 0733)  BP: 121/64 (05/05/19 1141)  SpO2: 96 % (05/05/19 1141) Vital Signs (24h Range):  Temp:  [96.3 °F (35.7 °C)-97.8 °F (36.6 °C)] 97 °F (36.1 °C)  Pulse:  [56-92] 66  Resp:  [17-24] 17  SpO2:  [94 %-100 %] 96 %  BP: (110-158)/(55-94) 121/64     Weight: 134.3 kg (296 lb)  Body mass index is 38 kg/m².    Physical Exam   Constitutional: He is oriented to person, place, and time. He appears well-developed and well-nourished. No distress.   Obese.   HENT:   Head: Normocephalic and atraumatic.    Eyes: Pupils are equal, round, and reactive to light. Conjunctivae and EOM are normal. Right eye exhibits no discharge. Left eye exhibits no discharge.   Neck: Normal range of motion. Neck supple.   Cardiovascular: Normal rate, normal heart sounds and intact distal pulses. An irregularly irregular rhythm present.   Pulses:       Dorsalis pedis pulses are 2+ on the right side, and 2+ on the left side.   Pulmonary/Chest: Effort normal. No accessory muscle usage. He has no wheezes. He has no rhonchi. He has rales.   Bilateral breath sounds with rales  Dyspnea on mild exertion   Abdominal: Soft. Bowel sounds are normal. He exhibits no distension. There is no tenderness. There is no guarding.   Genitourinary:   Genitourinary Comments: Huynh catheter noted draining hematuria   Musculoskeletal: Normal range of motion. He exhibits edema (2+ pitting BLE).   Bilateral lower extremity edema 3+   Neurological: He is alert and oriented to person, place, and time. He has normal strength. No cranial nerve deficit.   Skin: Skin is warm, dry and intact. Capillary refill takes less than 2 seconds. He is not diaphoretic.   Right lower extremity dressing intact   Psychiatric: He has a normal mood and affect. His speech is normal and behavior is normal. Judgment and thought content normal.   Vitals reviewed.        CRANIAL NERVES     CN III, IV, VI   Pupils are equal, round, and reactive to light.  Extraocular motions are normal.     Labs reviewed    Assessment/Plan:      * Acute on chronic congestive heart failure  Telemetry  Orders as per Cardiology-currently on IV diuretics  Monitor O2 sats, supplemental O2 as needed  Monitor electrolyte and renal status  Check echocardiogram  Daily weights. Strict I/Os. Fluid restriction.   Na restriction <2g/d.          Leg wound, right  Consult wound care for recommendations      History of obstructive sleep apnea  Patient noncompliant with sleep apnea machine in past      Debility  Fall and  skin precautions  Consult physical therapy and occupational therapy      Hematuria  Monitor closely  Discontinue Lovenox  Urology consulted      Elevated troponin  Workup as per Cardiology      Mild malnutrition  Add renal multivitamin  Add p.o. supplement      Hyperphosphatemia  Monitor  Orders as per renal      Anemia, chronic disease  Add renal multivitamin      New onset atrial fibrillation  Telemetry  Orders as per Cardiology  Continue beta-blockade        COPD (chronic obstructive pulmonary disease)  Monitor O2 sats, supplement O2 as needed  Add t.i.d. Xopenex aerosol treatments        Coronary artery disease  Telemetry  Continue home medications- including e ASA, Plavix, Metoprolol and Zetia.  Monitor closely for s/s of ACS.        DM type 2 with diabetic mixed hyperlipidemia  Chronic, continue Zetia.   Obtain fasting lipid panel.        Type 2 diabetes mellitus with kidney complication, with long-term current use of insulin  Monitor closely  Nephrology consulted due to volume overload with history of chronic kidney disease stage 4 on IV diuretics          Hypertension  Continue antihypertensives per home regimen, monitor BP closely, and titrate medication for sustained BP control.        Type 2 diabetes mellitus  DM diet  Accu-Cheks with correctional sliding scale insulin  HGBA1C is 5.6  Monitor blood glucose levels        VTE Risk Mitigation (From admission, onward)        Ordered     Place JACK hose  Until discontinued      05/05/19 1120     IP VTE HIGH RISK PATIENT  Once      05/04/19 2254          RON Ramos  Department of Hospital Medicine   [facility]    Time spent seeing patient( greater than 1/2 spent in direct contact) : 38 minutes

## 2019-05-05 NOTE — ASSESSMENT & PLAN NOTE
Telemetry  Continue home medications- including e ASA, Plavix, Metoprolol and Zetia.  Monitor closely for s/s of ACS.

## 2019-05-05 NOTE — PLAN OF CARE
Problem: Adult Inpatient Plan of Care  Goal: Plan of Care Review  Outcome: Ongoing (interventions implemented as appropriate)  POC reviewed with pt, understanding verbalized. AAOX4. No c/o pain throughout shift.  Glucose monitoring/Coverage AC/HS As needed. CardiaC/DM/Renal/1.5L fluid restricted diet maintained. Irrigated spann once today. IV lasix and Supplemental O2 at 2L via NC. Tele monitoring maintained. Safety Maintained. Will continue to monitor.

## 2019-05-06 PROBLEM — I50.23 ACUTE ON CHRONIC SYSTOLIC CONGESTIVE HEART FAILURE: Status: ACTIVE | Noted: 2019-01-01

## 2019-05-06 NOTE — PROGRESS NOTES
"INPATIENT NEPHROLOGY PROGRESS NOTE  [facility] NEPHROLOGY    Patient Name: Frank Zayas  Date: 05/06/2019    Reason for consultation: MOLLY    Chief Complaint:   Chief Complaint   Patient presents with    Shortness of Breath     "due to increasing fluid"       History of Present Illness:  Patient known to Dr. Quinonez for CKD4, HTN, hyperkalemia on Veltassa is admitted for CHF exacerbation, fluid overload and gross hematuria, consulted for renal management.    · Interval History/Subjective:    - intermittent hypotension, on 2L NC, UOP 900cc  - c/w gross hematuria  - weight is going up  - + abd distention, edema  - no cp/dyspnea    · Review of Systems: All 14 systems reviewed and negative, except as noted in HPI    Plan of Care:    Assessment:  Rodrigo, baseline stage IV CKD  HTN/CHF (EF 45%)  Hyperkalemia  SHPT  Anemia of CKD  Gross hematuria    Plan:    - His renal function is getting worse. I am concerned about cardiorenal syndrome. Continue IV lasix BID. Add albumin 25g BID. If no improvement, he is looking at initiation of RRT for clearance and volume removal. No NSAIDs or IV contrast.  - Add renal diet, 1.5L fluid restriction.  - K is at goal. Alkalosis is worse. Would benefit from NIPPV.  - Phos is rising- will f/u in AM before adding binder- for now, added renal diet.  - Anemia is stable.  - He is getting flushed- on alpha blocker- urology following.    Thank you for allowing us to participate in this patient's care. We will continue to follow.    Medications:  No current facility-administered medications on file prior to encounter.      Current Outpatient Medications on File Prior to Encounter   Medication Sig Dispense Refill    alfuzosin (UROXATRAL) 10 mg Tb24 Take 10 mg by mouth nightly.       allopurinol (ZYLOPRIM) 300 MG tablet 1 tablet      aspirin (ECOTRIN) 81 MG EC tablet Take 81 mg by mouth once daily.      clopidogrel (PLAVIX) 75 mg tablet Take 75 mg by mouth once daily.      coenzyme Q10 (CO " Q-10) 100 mg capsule Take 100 mg by mouth once daily.      ezetimibe (ZETIA) 10 mg tablet Take 10 mg by mouth every evening.       FLUoxetine 20 MG capsule every evening.       gabapentin (NEURONTIN) 100 MG capsule every evening.       gemfibrozil (LOPID) 600 MG tablet Take 600 mg by mouth 2 (two) times daily.       insulin glargine (LANTUS) 100 unit/mL injection Inject 27 Units into the skin every evening.       metoprolol tartrate (LOPRESSOR) 25 MG tablet Take 25 mg by mouth 2 (two) times daily.      nateglinide (STARLIX) 60 MG tablet Take 60 mg by mouth every evening.       ropinirole (REQUIP) 2 MG tablet Take 2 mg by mouth nightly.       torsemide (DEMADEX) 20 MG tablet Take 20 mg by mouth once daily.       TRADJENTA 5 mg Tab tablet Take 5 mg by mouth once daily.  0    VELTASSA 16.8 gram PwPk MIX 1 PACKET IN LIQUID AND TAKE PO QD UTD  4    zinc 50 mg Tab Take 50 mg by mouth once daily. 1/2 tab daily      nitroGLYCERIN (NITROSTAT) 0.4 MG SL tablet Place 0.4 mg under the tongue every 5 (five) minutes as needed.        Scheduled Meds:   alfuzosin  10 mg Oral Nightly    aspirin  81 mg Oral Daily    clopidogrel  75 mg Oral Daily    ezetimibe  10 mg Oral QHS    FLUoxetine  20 mg Oral QHS    furosemide  40 mg Intravenous BID    gemfibrozil  600 mg Oral BID    insulin detemir U-100  20 Units Subcutaneous QHS    levalbuterol  0.63 mg Nebulization Q8H    metoprolol tartrate  25 mg Oral BID    rOPINIRole  2 mg Oral Nightly    senna-docusate 8.6-50 mg  1 tablet Oral BID    vitamin renal formula (B-complex-vitamin c-folic acid)  1 capsule Oral Daily     Continuous Infusions:  PRN Meds:.acetaminophen, dextrose 50%, dextrose 50%, glucagon (human recombinant), glucose, glucose, insulin aspart U-100, magnesium oxide, ondansetron, potassium chloride 10%, ramelteon, sodium chloride 0.9%    Allergies:  Lidocaine and Statins-hmg-coa reductase inhibitors    Vital Signs:  Temp Readings from Last 3 Encounters:    05/06/19 97.7 °F (36.5 °C) (Oral)   04/24/19 98.6 °F (37 °C) (Oral)   04/08/19 97.9 °F (36.6 °C)       Pulse Readings from Last 3 Encounters:   05/06/19 (!) 56   04/24/19 76   04/08/19 63       BP Readings from Last 3 Encounters:   05/06/19 (!) 118/53   04/24/19 131/74   04/08/19 (!) 153/84       Weight:  Wt Readings from Last 3 Encounters:   05/06/19 (!) 136.6 kg (301 lb 2.4 oz)   04/24/19 129.3 kg (285 lb 0.9 oz)   04/08/19 129.3 kg (285 lb)       INS/OUTS:  I/O last 3 completed shifts:  In: 700 [P.O.:700]  Out: 2250 [Urine:2250]  I/O this shift:  In: 240 [P.O.:240]  Out: -     Physical Exam:  Constitutional: nad, aao x 3  Heart: rrr, no m/r/g, wwp, anasarca  Lungs: ant ausc clear, no w/r/r/c, no lb  Abdomen: s/nt/d, +BS    Results:  Lab Results   Component Value Date     05/06/2019    K 4.8 05/06/2019    CL 99 05/06/2019    CO2 32 (H) 05/06/2019    BUN 63 (H) 05/06/2019    CREATININE 3.4 (H) 05/06/2019    CALCIUM 9.1 05/06/2019    ANIONGAP 9 05/06/2019    ESTGFRAFRICA 19 (A) 05/06/2019    EGFRNONAA 16 (A) 05/06/2019       Lab Results   Component Value Date    CALCIUM 9.1 05/06/2019    PHOS 6.3 (H) 05/06/2019    PHOS 6.3 (H) 05/06/2019       Recent Labs   Lab 05/06/19  0513   WBC 7.70   RBC 3.44*   HGB 10.6*   HCT 34.1*   *   MCV 99*   MCH 30.9   MCHC 31.1*       I have personally reviewed pertinent radiological imaging and reports.    Marivel Quinonez MD MPH  [facility] [place] [place]  [address] [place] [address]  [phone] (p)  [phone] (f)

## 2019-05-06 NOTE — NURSING
Rounded with Dr. Gonzláes.  Updated him or urine output, irrigation status, no clots.  No change in POC at this time.  We will continue to monitor and irrigate as needed.

## 2019-05-06 NOTE — ASSESSMENT & PLAN NOTE
Monitor closely-history of bladder cancer  Discontinue Lovenox-Huynh catheter remains in place  Urology consulted

## 2019-05-06 NOTE — NURSING
CY Barrera,NP updated on pt status- very low urine output this morning.  Blood tinged but cath irrigates very easily, no clots noted.  Pt is also very sleepy.  Not interested in eating.  He is arousable but not very interactive.  Vs stable. Will monitor.

## 2019-05-06 NOTE — PLAN OF CARE
Receiving aerosol tx q8     05/06/19 0734   Patient Assessment/Suction   Level of Consciousness (AVPU) responds to voice   Respiratory Effort Unlabored   Expansion/Accessory Muscles/Retractions no use of accessory muscles   All Lung Fields Breath Sounds diminished   PRE-TX-O2   O2 Device (Oxygen Therapy) nasal cannula   $ Is the patient on Low Flow Oxygen? Yes   Flow (L/min) 2   Oxygen Concentration (%) 28   SpO2 96 %   Pulse Oximetry Type Intermittent   $ Pulse Oximetry - Multiple Charge Pulse Oximetry - Multiple   Pulse 61   Resp 18   Aerosol Therapy   $ Aerosol Therapy Charges Aerosol Treatment   Respiratory Treatment Status (SVN) given   Treatment Route (SVN) mask   Patient Position (SVN) semi-Ma's   Post Treatment Assessment (SVN) breath sounds unchanged   Signs of Intolerance (SVN) none   Breath Sounds Post-Respiratory Treatment   Throughout All Fields Post-Treatment All Fields   Throughout All Fields Post-Treatment no change   Post-treatment Heart Rate (beats/min) 64   Post-treatment Resp Rate (breaths/min) 18   Ready to Wean/Extubation Screen   FIO2<=50 (chart decimal) 0.28

## 2019-05-06 NOTE — PROGRESS NOTES
[facility] Hospital Medicine  Progress Note    Patient Name: Frank Zayas  MRN: [id number]  Patient Class: IP- Inpatient   Admission Date: 5/4/2019  Length of Stay: 2 days  Attending Physician: Breanne Parrish MD  Primary Care Provider: Mikhail Shields MD      Subjective:     Principal Problem:Acute on chronic systolic congestive heart failure    HPI:  This is a 79-year-old male with PMHx significant for HTN, HLD, MI, CHF, COPD, DM, CKD, gout, arthritis, and bladder CA.  He presented to the ED with a progressively worsening SOB.  He stated that his SOB has been going on for the past few months, but has worsened in the last 3 days.  Symptoms are associated with bilateral lower extremity leg swelling. Exertion exacerbates his symptoms.  He states that he has been compliant with his medications and dietary restrictions.  He was given Lasix intravenously in the ED reports that he does feel better.  He reports that he has had a cough productive of clear mucus and felt nauseated earlier today.  He denied fever, chills, sweats, chest pain, wheezing, abdominal pain, vomiting, diarrhea, dysuria, and any other symptoms at this time.  The patient was evaluated in the emergency room where he was noted to have signs of acute heart failure and new onset of atrial fib.  Patient was admitted for further evaluation and treatment.    Hospital Course:  The patient was monitored closely during his stay.  He was placed on continuous telemetry monitor.  Cardiology was consulted.  Patient was placed on supplemental O2 and IV diuretics for his acute heart failure.  An echocardiogram was ordered.  He was continued on metoprolol for his new onset of atrial fib which later converted back to sinus rhythm. Nephrology was consulted for chronic kidney disease stage 4 with acute of volume overload.  Patient also noted to have some new onset of hematuria and Urology was consulted.  Physical therapy was consulted for  debility.  The patient's renal and cardiac status were monitored closely.    Interval History:  Patient remains short of breath with significant volume on board.  Creatinine trending upward.  Cardiology and Nephrology following.  Hematuria still noted in Huynh.  Urology consult pending.     Review of Systems   Constitutional: Positive for activity change, appetite change and fatigue. Negative for chills and fever.   HENT: Negative for ear discharge, ear pain and facial swelling.    Eyes: Negative for pain and redness.   Respiratory: Positive for cough and shortness of breath.    Cardiovascular: Positive for leg swelling. Negative for chest pain and palpitations.   Gastrointestinal: Negative for abdominal distention, abdominal pain, constipation, diarrhea and vomiting.   Endocrine: Negative for polydipsia and polyphagia.   Genitourinary: Positive for hematuria.        Huynh catheter present draining hematuria  Patient reports he had some hematuria about 3 or 4 weeks ago and then reoccurred after Huynh was placed   Musculoskeletal: Positive for gait problem. Negative for neck pain and neck stiffness.   Skin: Positive for wound.        Right lower extremity dressing intact   Allergic/Immunologic: Negative for food allergies.   Neurological: Positive for weakness. Negative for seizures, facial asymmetry and speech difficulty.   Hematological: Bruises/bleeds easily.   Psychiatric/Behavioral: Negative for agitation, behavioral problems, confusion, hallucinations and suicidal ideas. The patient is not nervous/anxious.      Objective:     Vital Signs (Most Recent):  Temp: 97.7 °F (36.5 °C) (05/06/19 1133)  Pulse: 62 (05/06/19 1517)  Resp: 19 (05/06/19 1517)  BP: (!) 118/53 (05/06/19 1133)  SpO2: 98 % (05/06/19 1517) Vital Signs (24h Range):  Temp:  [96.2 °F (35.7 °C)-97.7 °F (36.5 °C)] 97.7 °F (36.5 °C)  Pulse:  [52-72] 62  Resp:  [16-20] 19  SpO2:  [93 %-99 %] 98 %  BP: ()/(53-77) 118/53     Weight: (!) 136.6 kg (301  lb 2.4 oz)  Body mass index is 38.67 kg/m².    Physical Exam   Constitutional: He is oriented to person, place, and time. He appears well-developed and well-nourished. No distress.   Obese.   HENT:   Head: Normocephalic and atraumatic.   Eyes: Pupils are equal, round, and reactive to light. Conjunctivae and EOM are normal. Right eye exhibits no discharge. Left eye exhibits no discharge.   Neck: Normal range of motion. Neck supple.   Cardiovascular: Normal rate, normal heart sounds and intact distal pulses. An irregularly irregular rhythm present.   Pulses:       Dorsalis pedis pulses are 2+ on the right side, and 2+ on the left side.   Pulmonary/Chest: Effort normal. No accessory muscle usage. He has no wheezes. He has no rhonchi. He has rales.   Bilateral breath sounds with rales  Dyspnea on mild exertion   Abdominal: Soft. Bowel sounds are normal. He exhibits no distension. There is no tenderness. There is no guarding.   Genitourinary:   Genitourinary Comments: Huynh catheter noted draining hematuria   Musculoskeletal: Normal range of motion. He exhibits edema (2+ pitting BLE).   Bilateral lower extremity edema 3+   Neurological: He is alert and oriented to person, place, and time. He has normal strength. No cranial nerve deficit.   Skin: Skin is warm, dry and intact. Capillary refill takes less than 2 seconds. He is not diaphoretic.   Right lower extremity dressing intact   Psychiatric: He has a normal mood and affect. His speech is normal and behavior is normal. Judgment and thought content normal.   Vitals reviewed.        CRANIAL NERVES     CN III, IV, VI   Pupils are equal, round, and reactive to light.  Extraocular motions are normal.     Labs reviewed    Assessment/Plan:      * Acute on chronic systolic congestive heart failure  Telemetry  Orders as per Cardiology-currently on IV diuretics  Monitor O2 sats, supplemental O2 as needed  Monitor electrolyte and renal status  Echocardiogram results noted-EF  45%  Daily weights. Strict I/Os. Fluid restriction.   Na restriction <2g/d.  Monitor electrolyte and renal status-creatinine currently trending upward -3.4 today          Leg wound, right  Wound care      History of obstructive sleep apnea  Patient reports he cannot tolerate CPAP or BiPAP      Debility  Fall and skin precautions  Continue physical therapy and occupational therapy      Hematuria  Monitor closely-history of bladder cancer  Discontinue Lovenox-Huynh catheter remains in place  Urology consulted      Elevated troponin  Workup as per Cardiology      Mild malnutrition  Continue renal multivitamin  Continue p.o. supplement      Hyperphosphatemia  Monitor  Orders as per renal      Anemia, chronic disease  Continue renal multivitamin      New onset atrial fibrillation  Telemetry  Orders as per Cardiology  Continue beta-blockade        COPD (chronic obstructive pulmonary disease)  Monitor O2 sats, supplement O2 as needed  Continue t.i.d. Xopenex aerosol treatments        Coronary artery disease  Telemetry  Continue home medications- including e ASA, Plavix, Metoprolol and Zetia.  Monitor closely for s/s of ACS.        DM type 2 with diabetic mixed hyperlipidemia  Chronic, continue Zetia.   Lipid panel noted        Type 2 diabetes mellitus with kidney complication, with long-term current use of insulin  Monitor closely  Nephrology consulted due to volume overload with history of chronic kidney disease stage 4 on IV diuretics          Hypertension  Continue antihypertensives per home regimen, monitor BP closely, and titrate medication for sustained BP control.        Type 2 diabetes mellitus  DM diet  Accu-Cheks with correctional sliding scale insulin  HGBA1C is 5.6  Blood sugars running 113-180        VTE Risk Mitigation (From admission, onward)        Ordered     Place JACK hose  Until discontinued      05/05/19 1120     IP VTE HIGH RISK PATIENT  Once      05/04/19 3420          Cristina Barrera,  FNP  Department of Hospital Medicine   [facility]    Time spent seeing patient( greater than 1/2 spent in direct contact) :  38 minutes

## 2019-05-06 NOTE — ASSESSMENT & PLAN NOTE
Telemetry  Orders as per Cardiology-currently on IV diuretics  Monitor O2 sats, supplemental O2 as needed  Monitor electrolyte and renal status  Echocardiogram results noted-EF 45%  Daily weights. Strict I/Os. Fluid restriction.   Na restriction <2g/d.  Monitor electrolyte and renal status-creatinine currently trending upward -3.4 today

## 2019-05-06 NOTE — NURSING
Spoke with Dr. Quinonez during rounds regarding very low urine output.  She is aware.  She is monitoring, creatnine remains elevated.

## 2019-05-06 NOTE — ASSESSMENT & PLAN NOTE
DM diet  Accu-Cheks with correctional sliding scale insulin  HGBA1C is 5.6  Blood sugars running 113-180

## 2019-05-06 NOTE — PLAN OF CARE
05/05/19 1958   Patient Assessment/Suction   Level of Consciousness (AVPU) alert   Respiratory Effort Unlabored   PRE-TX-O2   O2 Device (Oxygen Therapy) nasal cannula   Flow (L/min) 2   Oxygen Concentration (%) 28   SpO2 97 %   Pulse Oximetry Type Intermittent   Ready to Wean/Extubation Screen   FIO2<=50 (chart decimal) 0.28

## 2019-05-06 NOTE — PLAN OF CARE
Problem: Diabetes Comorbidity  Goal: Blood Glucose Level Within Desired Range    Intervention: Maintain Glycemic Control     05/06/19 1541   Monitor and Manage Ketoacidosis   Glycemic Management blood glucose monitoring         Problem: Fall Injury Risk  Goal: Absence of Fall and Fall-Related Injury    Intervention: Promote Injury-Free Environment     05/06/19 1541   Optimize [place] and Functional Mobility   Environmental Safety Modification clutter free environment maintained   Optimize Balance and Safe Activity   Safety Promotion/Fall Prevention bed alarm set;Fall Risk reviewed with patient/family;Fall Risk signage in place;side rails raised x 2         Problem: Skin Injury Risk Increased  Goal: Skin Health and Integrity    Intervention: Promote and Optimize Oral Intake     05/06/19 1541   Monitor and Manage Anemia   Oral Nutrition Promotion physical activity promoted         Problem: Infection  Goal: Infection Symptom Resolution    Intervention: Prevent or Manage Infection     05/06/19 1541   Prevent or Manage Infection   Infection Management aseptic technique maintained

## 2019-05-06 NOTE — PT/OT/SLP PROGRESS
"Occupational Therapy      Patient Name:  Frank Zayas   MRN:  [id number]    Attempted OT evaluation twice today. On both attempts patient refused participation 2/2 generalized weakness. Pt stated "I'm too tired to do anything." Upon discussion with pt's nurse, nurse advised no more attempts be made today 2/2 pt's poor renal function and increased generalized weakness. Will follow up tomorrow (5/7).    Jm Patel, OT  5/6/2019  "

## 2019-05-06 NOTE — PT/OT/SLP PROGRESS
Physical Therapy      Patient Name:  Frank Zayas   MRN:  [id number]    PT on hold today per nurse Vargas.    Reina Winter, PT

## 2019-05-07 NOTE — SUBJECTIVE & OBJECTIVE
Interval History:  Patient remains short of breath with significant volume on board.  Creatinine trending upward.  Patient discussed with Nephrology.  Patient may need ultrafiltration and or dialysis.  I did approach the subject with the patient and he is open to it if needed.     Review of Systems   Constitutional: Positive for activity change, appetite change and fatigue. Negative for chills and fever.   HENT: Negative for ear discharge, ear pain and facial swelling.    Eyes: Negative for pain and redness.   Respiratory: Positive for cough and shortness of breath.    Cardiovascular: Positive for leg swelling. Negative for chest pain and palpitations.   Gastrointestinal: Negative for abdominal distention, abdominal pain, constipation, diarrhea and vomiting.   Endocrine: Negative for polydipsia and polyphagia.   Genitourinary: Positive for hematuria.        Huynh catheter present draining hematuria  Patient reports he had some hematuria about 3 or 4 weeks ago and then reoccurred after Huynh was placed   Musculoskeletal: Positive for gait problem. Negative for neck pain and neck stiffness.   Skin: Positive for wound.        Right lower extremity dressing intact   Allergic/Immunologic: Negative for food allergies.   Neurological: Positive for weakness. Negative for seizures, facial asymmetry and speech difficulty.   Hematological: Bruises/bleeds easily.   Psychiatric/Behavioral: Negative for agitation, behavioral problems, confusion, hallucinations and suicidal ideas. The patient is not nervous/anxious.      Objective:     Vital Signs (Most Recent):  Temp: 97.1 °F (36.2 °C) (05/07/19 1104)  Pulse: 66 (05/07/19 1104)  Resp: 16 (05/07/19 1104)  BP: 123/62 (05/07/19 1104)  SpO2: 97 % (05/07/19 1229) Vital Signs (24h Range):  Temp:  [96.8 °F (36 °C)-97.8 °F (36.6 °C)] 97.1 °F (36.2 °C)  Pulse:  [59-72] 66  Resp:  [16-20] 16  SpO2:  [90 %-100 %] 97 %  BP: (107-158)/() 123/62     Weight: (!) 139 kg (306 lb 7 oz)  Body  mass index is 39.34 kg/m².    Physical Exam   Constitutional: He is oriented to person, place, and time. He appears well-developed and well-nourished. No distress.   Obese.   HENT:   Head: Normocephalic and atraumatic.   Eyes: Pupils are equal, round, and reactive to light. Conjunctivae and EOM are normal. Right eye exhibits no discharge. Left eye exhibits no discharge.   Neck: Normal range of motion. Neck supple.   Cardiovascular: Normal rate, normal heart sounds and intact distal pulses. An irregularly irregular rhythm present.   Pulses:       Dorsalis pedis pulses are 2+ on the right side, and 2+ on the left side.   Pulmonary/Chest: Effort normal. No accessory muscle usage. He has no wheezes. He has no rhonchi. He has rales.   Bilateral breath sounds with rales  Dyspnea on mild exertion   Abdominal: Soft. Bowel sounds are normal. He exhibits no distension. There is no tenderness. There is no guarding.   Genitourinary:   Genitourinary Comments: Huynh catheter noted draining hematuria   Musculoskeletal: Normal range of motion. He exhibits edema (2+ pitting BLE).   Bilateral lower extremity edema 3+   Neurological: He is alert and oriented to person, place, and time. He has normal strength. No cranial nerve deficit.   Skin: Skin is warm, dry and intact. Capillary refill takes less than 2 seconds. He is not diaphoretic.   Right lower extremity dressing intact   Psychiatric: He has a normal mood and affect. His speech is normal and behavior is normal. Judgment and thought content normal.   Vitals reviewed.        CRANIAL NERVES     CN III, IV, VI   Pupils are equal, round, and reactive to light.  Extraocular motions are normal.     Labs reviewed

## 2019-05-07 NOTE — PLAN OF CARE
05/07/19 0018   Patient Assessment/Suction   Level of Consciousness (AVPU) alert   Respiratory Effort Unlabored   PRE-TX-O2   O2 Device (Oxygen Therapy) nasal cannula   Flow (L/min) 2   Oxygen Concentration (%) 28   SpO2 97 %   Pulse Oximetry Type Intermittent   $ Pulse Oximetry - Multiple Charge Pulse Oximetry - Multiple   Aerosol Therapy   $ Aerosol Therapy Charges Refused  (pt. wanted to sleep. )   Ready to Wean/Extubation Screen   FIO2<=50 (chart decimal) 0.28

## 2019-05-07 NOTE — CONSULTS
Bilateral lower extremity venous stasis dermatitis.  Right anterior tibial area skin integrity impairment is draining large amount of clear fluid.  Must keep both legs elevated to help reduce edema.

## 2019-05-07 NOTE — PROGRESS NOTES
"INPATIENT NEPHROLOGY PROGRESS NOTE  [facility] NEPHROLOGY    Patient Name: Frank Zayas  Date: 05/07/2019    Reason for consultation: MOLLY    Chief Complaint:   Chief Complaint   Patient presents with    Shortness of Breath     "due to increasing fluid"       History of Present Illness:  Patient known to Dr. Quinonez for CKD4, HTN, hyperkalemia on [place] is admitted for CHF exacerbation, fluid overload and gross hematuria, consulted for renal management.    · Interval History/Subjective:    - vital signs same as yest, on 2L NC, UOP down to 325cc  - weight is going up- now up 4kg since admission  - he says he feels like he is dying- short of breath, abd distention, edema- gen discomfort- no cp    · Review of Systems: All 14 systems reviewed and negative, except as noted in HPI    Plan of Care:    Assessment:  Rodrigo, baseline stage IV CKD- suspect CRS + ischemic ATN  HTN/CHF (EF 45%)  SHPT  Anemia of CKD  Gross hematuria    Plan:    - His renal function is worse with symptomatic volume overload and oliguria. Discussed risks/benefits of RRT- he has agreed to proceed. Consulted Dr. Rodrgiuez for a archana. Informed HD nurse. HD orders placed for AM. No NSAIDs or IV contrast.  - For now, continue IV lasix/albumin + NIPPV. Ordered 2L UF with HD for AM. Continue a low salt diet + 1.5L fluid restriction.  - Recheck phos in AM.  - + Hb drop- will give RAFAEL with HD if he requires more than 3 treatments.   - He c/w spann- urology following.    Thank you for allowing us to participate in this patient's care. We will continue to follow.    Medications:  No current facility-administered medications on file prior to encounter.      Current Outpatient Medications on File Prior to Encounter   Medication Sig Dispense Refill    alfuzosin (UROXATRAL) 10 mg Tb24 Take 10 mg by mouth nightly.       allopurinol (ZYLOPRIM) 300 MG tablet 1 tablet      aspirin (ECOTRIN) 81 MG EC tablet Take 81 mg by mouth once daily.      clopidogrel " (PLAVIX) 75 mg tablet Take 75 mg by mouth once daily.      coenzyme Q10 (CO Q-10) 100 mg capsule Take 100 mg by mouth once daily.      ezetimibe (ZETIA) 10 mg tablet Take 10 mg by mouth every evening.       FLUoxetine 20 MG capsule every evening.       gabapentin (NEURONTIN) 100 MG capsule every evening.       gemfibrozil (LOPID) 600 MG tablet Take 600 mg by mouth 2 (two) times daily.       insulin glargine (LANTUS) 100 unit/mL injection Inject 27 Units into the skin every evening.       metoprolol tartrate (LOPRESSOR) 25 MG tablet Take 25 mg by mouth 2 (two) times daily.      nateglinide (STARLIX) 60 MG tablet Take 60 mg by mouth every evening.       ropinirole (REQUIP) 2 MG tablet Take 2 mg by mouth nightly.       torsemide (DEMADEX) 20 MG tablet Take 20 mg by mouth once daily.       TRADJENTA 5 mg Tab tablet Take 5 mg by mouth once daily.  0    VELTASSA 16.8 gram PwPk MIX 1 PACKET IN LIQUID AND TAKE PO QD UTD  4    zinc 50 mg Tab Take 50 mg by mouth once daily. 1/2 tab daily      nitroGLYCERIN (NITROSTAT) 0.4 MG SL tablet Place 0.4 mg under the tongue every 5 (five) minutes as needed.        Scheduled Meds:   albumin human 25%  25 g Intravenous BID    alfuzosin  10 mg Oral Nightly    aspirin  81 mg Oral Daily    clopidogrel  75 mg Oral Daily    ezetimibe  10 mg Oral QHS    FLUoxetine  20 mg Oral QHS    furosemide  40 mg Intravenous BID    gemfibrozil  600 mg Oral BID    insulin detemir U-100  20 Units Subcutaneous QHS    levalbuterol  0.63 mg Nebulization Q8H    metoprolol tartrate  25 mg Oral BID    rOPINIRole  2 mg Oral Nightly    senna-docusate 8.6-50 mg  1 tablet Oral BID    vitamin renal formula (B-complex-vitamin c-folic acid)  1 capsule Oral Daily     Continuous Infusions:  PRN Meds:.acetaminophen, dextrose 50%, dextrose 50%, glucagon (human recombinant), glucose, glucose, insulin aspart U-100, magnesium oxide, ondansetron, potassium chloride 10%, ramelteon, sodium chloride  0.9%    Allergies:  Lidocaine and Statins-hmg-coa reductase inhibitors    Vital Signs:  Temp Readings from Last 3 Encounters:   05/07/19 97.1 °F (36.2 °C)   04/24/19 98.6 °F (37 °C) (Oral)   04/08/19 97.9 °F (36.6 °C)       Pulse Readings from Last 3 Encounters:   05/07/19 66   04/24/19 76   04/08/19 63       BP Readings from Last 3 Encounters:   05/07/19 123/62   04/24/19 131/74   04/08/19 (!) 153/84       Weight:  Wt Readings from Last 3 Encounters:   05/07/19 (!) 139 kg (306 lb 7 oz)   04/24/19 129.3 kg (285 lb 0.9 oz)   04/08/19 129.3 kg (285 lb)       INS/OUTS:  I/O last 3 completed shifts:  In: 750 [P.O.:750]  Out: 575 [Urine:575]  No intake/output data recorded.    Physical Exam:  Constitutional: nad, aao x 3  Heart: rrr, no m/r/g, wwp, anasarca  Lungs: coarse, no w/r/r/c, no lb  Abdomen: s/nt/d, +BS    Results:  Lab Results   Component Value Date     05/07/2019    K 4.7 05/07/2019    CL 97 05/07/2019    CO2 29 05/07/2019    BUN 76 (H) 05/07/2019    CREATININE 3.9 (H) 05/07/2019    CALCIUM 8.8 05/07/2019    ANIONGAP 11 05/07/2019    ESTGFRAFRICA 16 (A) 05/07/2019    EGFRNONAA 14 (A) 05/07/2019       Lab Results   Component Value Date    CALCIUM 8.8 05/07/2019    PHOS 6.3 (H) 05/06/2019    PHOS 6.3 (H) 05/06/2019       Recent Labs   Lab 05/07/19  0502   WBC 6.20   RBC 3.19*   HGB 9.9*   HCT 31.2*   *   MCV 98   MCH 31.0   MCHC 31.7*       I have personally reviewed pertinent radiological imaging and reports.    Marivel Quinonez MD MPH  [facility] [address] [place] [address]  [phone] (p)  [phone] (f)

## 2019-05-07 NOTE — CONSULTS
ATTENDING PHYSICIAN:  Dr. Parrish.    CONSULTANT:  Kaylee Vasquez M.D.    REASON FOR CONSULTATION:  History of transitional cell carcinoma of the bladder   and gross hematuria.    HISTORY OF PRESENT ILLNESS:  This 79-year-old male was admitted on 05/04/2019   with significant shortness of breath, found to be in congestive heart failure.    He also has history of COPD, hypertension, chronic renal insufficiency.  The   patient is familiar to me as he has diagnosis of transitional carcinoma of the   bladder that was initially diagnosed in 2012 and was found to have recurrence in   2013 and again in 2014.  Followup evaluation in 2016 did not show any evidence   of any recurrence, but more recently on 04/08/2019, the patient did undergo   cystoscopy and TURBT and was found to have two bladder lesions, both of which   further resected and one of them did show an area of invasion of the lamina   propria and the muscle of the trigonal lesion.  The patient was seen in the   office for followup and discussion was held with him considering other treatment   options for the above findings.  In view of his very poor cardiovascular   conditions and renal insufficiency, he is not a candidate for radical   cystectomy, which would be a consideration for transitional cell carcinoma of   the bladder with muscle invasion.  The other option to consider would be   repeating a TURBT if he can found the Anesthesia and also consideration for   referral to Oncology.  Possibility of also intravesical BCG could be considered   even though this is usually used for noninvasive lesions.  The patient was   recently seen in the office at a followup appointment to discuss the above   findings, but now he is admitted as mentioned above with severe congestive heart   failure and shortness of breath and also found to have worsening of his renal   insufficiency during his current admission with rising BUN and creatinine.  He   states he feels very weak  and has poor energy.  The patient also must be noted   he is on Plavix.  Since his admission, he did undergo placement of a Huynh   catheter.  He states prior to that, he was voiding well and the urine was clear   and following admission, the urine did become somewhat bloody, although during   my visit with him it did show a tea colored urine, which showing evidence of old   blood, but no evidence of any active bleeding, but the urine output is somewhat   decreased in view of his renal insufficiency.    PAST MEDICAL HISTORY:  Otherwise is clearly well documented and has mentioned   above, does have a history of congestive heart failure, chronic renal   insufficiency, COPD, coronary artery disease, diabetes mellitus, Gout,   hyperlipidemia, hypertension, myocardial infarction, restless leg syndrome,   sleep apnea.    PAST SURGICAL HISTORY:  Include coronary artery bypass surgery, cataract   surgery, cholecystectomy and colonoscopy.  He has undergone number of   cystoscopies and TURBT and also vasectomy.    ALLERGIES:  INCLUDE LIDOCAINE AND STATINS.    MEDICATIONS:  Clearly documented.    PHYSICAL EXAMINATION:  Examination by me at the bedside:  GENERAL:  Reveals him to be somewhat weak and shortness of breath, but he was   awake and alert, able to respond.  ABDOMEN:  Soft, nontender.  No masses.  EXTERNAL GENITAL:  Normal phallus with indwelling Huynh catheter draining   slightly tea colored urine.  No evidence of any active bleeding.  RECTAL:  Deferred at this time, although on prior examinations has found to be a   smooth prostate surface.  No evidence of any prostatic malignancy.    CURRENT LABORATORY WORK:  Reveals BUN of 63, creatinine 3.4.  WBC 7.7,   hemoglobin 10.6, hematocrit 34.1, platelets 127.  Urinalysis did reveal evidence   of hematuria.  Urine culture is pending at this time.    FINAL IMPRESSION:  Current admission with congestive heart failure, multiple   comorbid cardiopulmonary problems, history of  transitional cell carcinoma of the   bladder for which I am familiar with, gross hematuria, very likely from   catheter irritation in addition to his history of transitional cell carcinoma of   the bladder.    RECOMMENDATIONS:  I will suggest that we may continue with current treatment for   his cardiovascular problems. In terms of the transitional cell carcinoma of the   bladder we will have to hold off any further treatment until he has fully recovered from his current   condition and we will need to make a decision following consultation with   Oncology concerning the possibility of other treatment options for his   transitional cell carcinoma of the bladder as he is not a candidate for any   major surgery at this time.  In terms of the Huynh catheter once it has   adequately cleared and urine output monitoring is not required, it can be   discontinued.    Thank you for this consult.      CHRIS  dd: 05/06/2019 15:19:33 (CDT)  td: 05/06/2019 19:45:37 (MAYRAT)  Doc ID   #[id number]  Job ID #[id number]    CC:

## 2019-05-07 NOTE — PLAN OF CARE
Problem: Physical Therapy Goal  Goal: Physical Therapy Goal  1.Pt will be independent with bed mobility and transfers.  2.Pt will ambulate 500' with AD with CGA.   Outcome: Ongoing (interventions implemented as appropriate)  PT for bed mobility and EOB with max encouragement

## 2019-05-07 NOTE — ASSESSMENT & PLAN NOTE
Echocardiogram is pending as suspect that he has systolic heart failure.  I'm sure he is not compliant with his diet.  He has observed sleep apnea noncompliant with his sleep apnea machine.  Worsened renal function

## 2019-05-07 NOTE — PLAN OF CARE
Problem: Adult Inpatient Plan of Care  Goal: Plan of Care Review  Outcome: Ongoing (interventions implemented as appropriate)  Bed is in low position, bed alarm set, call light is within reach, SR up x 2, instructed to use call light for assistance.  VSS, Cardiac monitored SR, glucose monitored to sliding scale.  Irrigated spann cath per PRN order, mild pain with irrigation, no signs of distress, safety maintained, patient also complained of mild nose bleed states he believed he knocked a scab loose, has gauze in nostril to clot bleed, will continue to monitor and round.

## 2019-05-07 NOTE — ASSESSMENT & PLAN NOTE
Noncompliant with C Pap machine   Telephone Encounter by Raisa House NP at 03/17/17 04:26 PM     Author:  Raisa House NP Service:  (none) Author Type:  Nurse Practitioner     Filed:  03/17/17 04:27 PM Encounter Date:  3/17/2017 Status:  Signed     :  Riasa House NP (Nurse Practitioner)            Please schedule patient for a diaphragm fitting, at a time when she is not menstruating.[LN1.1M]      Revision History        User Key Date/Time User Provider Type Action    > LN1.1 03/17/17 04:27 PM Raisa House NP Nurse Practitioner Sign    M - Manual

## 2019-05-07 NOTE — PROGRESS NOTES
[facility] Hospital Medicine  Progress Note    Patient Name: Frank Zayas  MRN: [id number]  Patient Class: IP- Inpatient   Admission Date: 5/4/2019  Length of Stay: 3 days  Attending Physician: Breanne Parrish MD  Primary Care Provider: Mikhail Shields MD    Subjective:     Principal Problem:Acute on chronic systolic congestive heart failure    HPI:  This is a 79-year-old male with PMHx significant for HTN, HLD, MI, CHF, COPD, DM, CKD, gout, arthritis, and bladder CA.  He presented to the ED with a progressively worsening SOB.  He stated that his SOB has been going on for the past few months, but has worsened in the last 3 days.  Symptoms are associated with bilateral lower extremity leg swelling. Exertion exacerbates his symptoms.  He states that he has been compliant with his medications and dietary restrictions.  He was given Lasix intravenously in the ED reports that he does feel better.  He reports that he has had a cough productive of clear mucus and felt nauseated earlier today.  He denied fever, chills, sweats, chest pain, wheezing, abdominal pain, vomiting, diarrhea, dysuria, and any other symptoms at this time.  The patient was evaluated in the emergency room where he was noted to have signs of acute heart failure and new onset of atrial fib.  Patient was admitted for further evaluation and treatment.    Hospital Course:  The patient was monitored closely during his stay.  He was placed on continuous telemetry monitor.  Cardiology was consulted.  Patient was placed on supplemental O2 and IV diuretics for his acute heart failure.  An echocardiogram was ordered.  He was continued on metoprolol for his new onset of atrial fib which later converted back to sinus rhythm. Nephrology was consulted for chronic kidney disease stage 4 with acute of volume overload.  Patient also noted to have some new onset of hematuria and Urology was consulted.  Physical therapy was consulted for debility.   The patient's renal and cardiac status were monitored closely.    Interval History:  Patient remains short of breath with significant volume on board.  Creatinine trending upward.  Patient discussed with Nephrology.  Patient may need ultrafiltration and or dialysis.  I did approach the subject with the patient and he is open to it if needed.     Review of Systems   Constitutional: Positive for activity change, appetite change and fatigue. Negative for chills and fever.   HENT: Negative for ear discharge, ear pain and facial swelling.    Eyes: Negative for pain and redness.   Respiratory: Positive for cough and shortness of breath.    Cardiovascular: Positive for leg swelling. Negative for chest pain and palpitations.   Gastrointestinal: Negative for abdominal distention, abdominal pain, constipation, diarrhea and vomiting.   Endocrine: Negative for polydipsia and polyphagia.   Genitourinary: Positive for hematuria.        Huynh catheter present draining hematuria  Patient reports he had some hematuria about 3 or 4 weeks ago and then reoccurred after Huynh was placed   Musculoskeletal: Positive for gait problem. Negative for neck pain and neck stiffness.   Skin: Positive for wound.        Right lower extremity dressing intact   Allergic/Immunologic: Negative for food allergies.   Neurological: Positive for weakness. Negative for seizures, facial asymmetry and speech difficulty.   Hematological: Bruises/bleeds easily.   Psychiatric/Behavioral: Negative for agitation, behavioral problems, confusion, hallucinations and suicidal ideas. The patient is not nervous/anxious.      Objective:     Vital Signs (Most Recent):  Temp: 97.1 °F (36.2 °C) (05/07/19 1104)  Pulse: 66 (05/07/19 1104)  Resp: 16 (05/07/19 1104)  BP: 123/62 (05/07/19 1104)  SpO2: 97 % (05/07/19 1229) Vital Signs (24h Range):  Temp:  [96.8 °F (36 °C)-97.8 °F (36.6 °C)] 97.1 °F (36.2 °C)  Pulse:  [59-72] 66  Resp:  [16-20] 16  SpO2:  [90 %-100 %] 97 %  BP:  (107-158)/() 123/62     Weight: (!) 139 kg (306 lb 7 oz)  Body mass index is 39.34 kg/m².    Physical Exam   Constitutional: He is oriented to person, place, and time. He appears well-developed and well-nourished. No distress.   Obese.   HENT:   Head: Normocephalic and atraumatic.   Eyes: Pupils are equal, round, and reactive to light. Conjunctivae and EOM are normal. Right eye exhibits no discharge. Left eye exhibits no discharge.   Neck: Normal range of motion. Neck supple.   Cardiovascular: Normal rate, normal heart sounds and intact distal pulses. An irregularly irregular rhythm present.   Pulses:       Dorsalis pedis pulses are 2+ on the right side, and 2+ on the left side.   Pulmonary/Chest: Effort normal. No accessory muscle usage. He has no wheezes. He has no rhonchi. He has rales.   Bilateral breath sounds with rales  Dyspnea on mild exertion   Abdominal: Soft. Bowel sounds are normal. He exhibits no distension. There is no tenderness. There is no guarding.   Genitourinary:   Genitourinary Comments: Huynh catheter noted draining hematuria   Musculoskeletal: Normal range of motion. He exhibits edema (2+ pitting BLE).   Bilateral lower extremity edema 3+   Neurological: He is alert and oriented to person, place, and time. He has normal strength. No cranial nerve deficit.   Skin: Skin is warm, dry and intact. Capillary refill takes less than 2 seconds. He is not diaphoretic.   Right lower extremity dressing intact   Psychiatric: He has a normal mood and affect. His speech is normal and behavior is normal. Judgment and thought content normal.   Vitals reviewed.        CRANIAL NERVES     CN III, IV, VI   Pupils are equal, round, and reactive to light.  Extraocular motions are normal.     Labs reviewed    Assessment/Plan:      * Acute on chronic systolic congestive heart failure  Telemetry  Orders as per Cardiology-currently on IV diuretics  Monitor O2 sats, supplemental O2 as needed  Monitor electrolyte  and renal status  Echocardiogram results noted-EF 45%  Daily weights. Strict I/Os. Fluid restriction.   Na restriction <2g/d.  Monitor electrolyte and renal status-BUN creatinine currently continue to trend upward-discussed with Nephrology and patient may need ultrafiltration and are hemodialysis    Leg wound, right  Continue Wound care      History of obstructive sleep apnea  Patient reports he cannot tolerate CPAP or BiPAP      Debility  Fall and skin precautions  Continue physical therapy and occupational therapy      Hematuria  Monitor closely-history of bladder cancer  Discontinue Lovenox-Huynh catheter remains in place  Urology consulted      Elevated troponin  Workup as per Cardiology      Mild malnutrition  Continue renal multivitamin  Continue p.o. supplement      Hyperphosphatemia  Monitor  Orders as per renal      Anemia, chronic disease  Continue renal multivitamin  Iron level low TIBC within normal limits, ferritin on the low side of normal-continue renal multivitamin and add ferrous sulfate b.i.d.  Folate within normal limits, vitamin B12 on the low side of normal and patient given a dose of subq vitamin B12  Check stool OCB      New onset atrial fibrillation  Telemetry  Orders as per Cardiology  Continue beta-blockade        COPD (chronic obstructive pulmonary disease)  Monitor O2 sats, supplement O2 as needed  Continue t.i.d. Xopenex aerosol treatments        Coronary artery disease  Telemetry  Continue home medications- including e ASA, Plavix, Metoprolol and Zetia.  Monitor closely for s/s of ACS.        DM type 2 with diabetic mixed hyperlipidemia  Chronic, continue Zetia.   Lipid panel noted        Type 2 diabetes mellitus with kidney complication, with long-term current use of insulin  Monitor closely  Nephrology consulted due to volume overload with history of chronic kidney disease stage 4 on IV diuretics          Hypertension  Continue antihypertensives per home regimen, monitor BP closely,  and titrate medication for sustained BP control.        Type 2 diabetes mellitus  DM diet  Accu-Cheks with correctional sliding scale insulin  HGBA1C is 5.6  Blood sugars running 108-154    VTE Risk Mitigation (From admission, onward)        Ordered     Place JACK hose  Until discontinued      05/07/19 0923     Place JACK hose  Until discontinued      05/05/19 1120     IP VTE HIGH RISK PATIENT  Once      05/04/19 9534          RON Ramos  Department of Hospital Medicine   [facility]    Time spent seeing patient( greater than 1/2 spent in direct contact) : 42 minutes

## 2019-05-07 NOTE — PROGRESS NOTES
[facility]  Cardiology  Progress Note    Patient Name: Frank Zayas  MRN: [id number]  Admission Date: 5/4/2019  Hospital Length of Stay: 3 days  Code Status: Full Code   Attending Physician: Breanne Parrish MD   Primary Care Physician: Mikhail Shields MD  Expected Discharge Date:   Principal Problem:Acute on chronic systolic congestive heart failure    Subjective:     Hospital Course:   He has been admitted to the hospital placed on IV diuretics he has had some improvement of his shortness of breath but continued to have generalized edema.   I suspect that he is not compliant with his diet as well as sodium intake.    Interval History: He is breathing better. Continues to have significant amount of edema.    Review of Systems   Constitution: Negative for chills and decreased appetite.   Cardiovascular: Positive for dyspnea on exertion and leg swelling. Negative for chest pain and claudication.   Respiratory: Negative for cough, hemoptysis and shortness of breath.    Skin: Positive for itching, poor wound healing and rash.   Gastrointestinal: Negative for bloating and abdominal pain.     Objective:     Vital Signs (Most Recent):  Temp: 96.1 °F (35.6 °C) (05/07/19 1716)  Pulse: 66 (05/07/19 1716)  Resp: 20 (05/07/19 1716)  BP: 112/60 (05/07/19 1716)  SpO2: 98 % (05/07/19 1716) Vital Signs (24h Range):  Temp:  [96.1 °F (35.6 °C)-97.8 °F (36.6 °C)] 96.1 °F (35.6 °C)  Pulse:  [60-72] 66  Resp:  [16-20] 20  SpO2:  [90 %-100 %] 98 %  BP: (107-132)/(60-71) 112/60     Weight: (!) 139 kg (306 lb 7 oz)  Body mass index is 39.34 kg/m².     SpO2: 98 %  O2 Device (Oxygen Therapy): nasal cannula      Intake/Output Summary (Last 24 hours) at 5/7/2019 1839  Last data filed at 5/7/2019 0400  Gross per 24 hour   Intake 150 ml   Output 175 ml   Net -25 ml       Lines/Drains/Airways     Drain                 Urethral Catheter 04/08/19 1118 Latex 22 Fr. 29 days         Urethral Catheter 05/04/19 2220 2 days           Peripheral Intravenous Line                 Peripheral IV - Single Lumen 20 G Anterior;Right Wrist -- days                Physical Exam    Significant Labs:   BMP:   Recent Labs   Lab 05/06/19  0513 05/07/19  0502    107    137   K 4.8 4.7   CL 99 97   CO2 32* 29   BUN 63* 76*   CREATININE 3.4* 3.9*   CALCIUM 9.1 8.8   MG 2.2  --     and CBC   Recent Labs   Lab 05/06/19  0513 05/07/19  0502   WBC 7.70 6.20   HGB 10.6* 9.9*   HCT 34.1* 31.2*   * 121*       Significant Imaging: X-Ray: CXR: X-Ray Chest 1 View (CXR): No results found for this visit on 05/04/19.    Assessment and Plan:     Brief HPI: The breathing is better. He continues to have significant amount of edema    * Acute on chronic systolic congestive heart failure  Echocardiogram is pending as suspect that he has systolic heart failure.  I'm sure he is not compliant with his diet.  He has observed sleep apnea noncompliant with his sleep apnea machine.  Worsened renal function    History of obstructive sleep apnea  Noncompliant with C Pap machine    Hematuria  Secondary to bladder CA  Undergoing therapy    Elevated brain natriuretic peptide (BNP) level  Acute on chronic systolic heart failure    Elevated troponin  Secondary to congestive heart failure    New onset atrial fibrillation  Patient appears to be normal sinus rhythm now    Coronary artery disease  Status post coronary artery bypass.  He denies any chest pains.  His cardiac markers are within normal limits.  I do not feel that this episode of  Heart failure is secondary to myocardial ischemia.    Hypertension  Better control        VTE Risk Mitigation (From admission, onward)        Ordered     Place JACK hose  Until discontinued      05/07/19 0923     Place JACK hose  Until discontinued      05/05/19 1120     IP VTE HIGH RISK PATIENT  Once      05/04/19 2225       Chronic kidney disease that is deteriorating. Agree with renal evaluation and see if ultrafiltration will  help    Sky Simpson MD  Cardiology  [facility]

## 2019-05-07 NOTE — PT/OT/SLP PROGRESS
"Physical Therapy Treatment    Patient Name:  Frank Zayas   MRN:  [id number]    Recommendations:     Discharge Recommendations:      Assessment:     Frank Zayas is a 79 y.o. male admitted with a medical diagnosis of Acute on chronic systolic congestive heart failure.  He presents with the following impairments/functional limitations:  weakness, impaired endurance, impaired self care skills, impaired functional mobilty, impaired cardiopulmonary response to activity, impaired skin, decreased lower extremity function, edema, impaired balance .    Rehab Prognosis: Fair; patient would benefit from acute skilled PT services to address these deficits and reach maximum level of function.    Recent Surgery: * No surgery found *      Plan:     During this hospitalization, patient to be seen   to address the identified rehab impairments via gait training, therapeutic activities, therapeutic exercises and progress toward the following goals:    · Plan of Care Expires:       Subjective     Chief Complaint: fatigue and weakness  Patient/Family Comments/goals: agreeable to PT with max encouragement. Pt stating "I'm waiting on that lady to come back and look at this" (pt pointing to wound on R LE)  Pain/Comfort:  · Pain Rating 1: (did not express)      Objective:     Communicated with nurse Pandya prior to session.  Patient found supine with oxygen, telemetry upon PT entry to room.     General Precautions: Standard, fall   Orthopedic Precautions:N/A   Braces: N/A     Functional Mobility:  · Bed Mobility:     · Scooting: minimum assistance  · Supine to Sit: minimum assistance  · Sit to Supine: minimum assistance    Therapeutic Activities and Exercises:   pt assisted to sitting EOB after much encouragement.   pt sat EOB approx 2' and began to return to supine 2' fatigue.      Patient left supine with all lines intact, call button in reach and nurse Pandya notified..    GOALS:   Multidisciplinary Problems     Physical Therapy " Goals        Problem: Physical Therapy Goal    Goal Priority Disciplines Outcome Goal Variances Interventions   Physical Therapy Goal     PT, PT/OT      Description:  1.Pt will be independent with bed mobility and transfers.  2.Pt will ambulate 500' with AD with CGA.                    Time Tracking:     PT Received On: 05/07/19  PT Start Time: 1132     PT Stop Time: 1140  PT Total Time (min): 8 min     Billable Minutes: Therapeutic Activity 8    Treatment Type: Treatment  PT/PTA: PTA     PTA Visit Number: 1     Jackie Cage PTA  05/07/2019

## 2019-05-07 NOTE — SUBJECTIVE & OBJECTIVE
Interval History: He is breathing better. Continues to have significant amount of edema.    Review of Systems   Constitution: Negative for chills and decreased appetite.   Cardiovascular: Positive for dyspnea on exertion and leg swelling. Negative for chest pain and claudication.   Respiratory: Negative for cough, hemoptysis and shortness of breath.    Skin: Positive for itching, poor wound healing and rash.   Gastrointestinal: Negative for bloating and abdominal pain.     Objective:     Vital Signs (Most Recent):  Temp: 96.1 °F (35.6 °C) (05/07/19 1716)  Pulse: 66 (05/07/19 1716)  Resp: 20 (05/07/19 1716)  BP: 112/60 (05/07/19 1716)  SpO2: 98 % (05/07/19 1716) Vital Signs (24h Range):  Temp:  [96.1 °F (35.6 °C)-97.8 °F (36.6 °C)] 96.1 °F (35.6 °C)  Pulse:  [60-72] 66  Resp:  [16-20] 20  SpO2:  [90 %-100 %] 98 %  BP: (107-132)/(60-71) 112/60     Weight: (!) 139 kg (306 lb 7 oz)  Body mass index is 39.34 kg/m².     SpO2: 98 %  O2 Device (Oxygen Therapy): nasal cannula      Intake/Output Summary (Last 24 hours) at 5/7/2019 1839  Last data filed at 5/7/2019 0400  Gross per 24 hour   Intake 150 ml   Output 175 ml   Net -25 ml       Lines/Drains/Airways     Drain                 Urethral Catheter 04/08/19 1118 Latex 22 Fr. 29 days         Urethral Catheter 05/04/19 2220 2 days          Peripheral Intravenous Line                 Peripheral IV - Single Lumen 20 G Anterior;Right Wrist -- days                Physical Exam    Significant Labs:   BMP:   Recent Labs   Lab 05/06/19  0513 05/07/19  0502    107    137   K 4.8 4.7   CL 99 97   CO2 32* 29   BUN 63* 76*   CREATININE 3.4* 3.9*   CALCIUM 9.1 8.8   MG 2.2  --     and CBC   Recent Labs   Lab 05/06/19  0513 05/07/19  0502   WBC 7.70 6.20   HGB 10.6* 9.9*   HCT 34.1* 31.2*   * 121*       Significant Imaging: X-Ray: CXR: X-Ray Chest 1 View (CXR): No results found for this visit on 05/04/19.

## 2019-05-07 NOTE — PLAN OF CARE
Problem: Adult Inpatient Plan of Care  Goal: Plan of Care Review  Outcome: Ongoing (interventions implemented as appropriate)  Pt remained free from injury/falls this shift. VSS- no signs of any distress. Tele NSR. POC reviewed with pt. Pt repositioned with assist. Wound care completed to bilateral legs. Consult placed to [place]io this shift for quintion placement.  Bed locked in lowest position, SR upx2, call light within reach. Will continue to monitor.

## 2019-05-07 NOTE — PLAN OF CARE
Problem: Adult Inpatient Plan of Care  Goal: Plan of Care Review  Outcome: Ongoing (interventions implemented as appropriate)  Patient receiving aerosol tx via 0.63mg xopenex and nacl now and q8hr.  Hr 67 and 02 saturation 96% on nc at 2lpm.

## 2019-05-08 PROBLEM — G93.41 ENCEPHALOPATHY, METABOLIC: Status: ACTIVE | Noted: 2019-01-01

## 2019-05-08 PROBLEM — I50.33 ACUTE ON CHRONIC DIASTOLIC CONGESTIVE HEART FAILURE: Status: ACTIVE | Noted: 2019-01-01

## 2019-05-08 PROBLEM — E66.2 MORBID (SEVERE) OBESITY WITH ALVEOLAR HYPOVENTILATION: Status: ACTIVE | Noted: 2019-01-01

## 2019-05-08 PROBLEM — E11.22 HYPERTENSION ASSOCIATED WITH STAGE 4 CHRONIC KIDNEY DISEASE DUE TO TYPE 2 DIABETES MELLITUS: Status: ACTIVE | Noted: 2019-01-01

## 2019-05-08 PROBLEM — N18.4 HYPERTENSION ASSOCIATED WITH STAGE 4 CHRONIC KIDNEY DISEASE DUE TO TYPE 2 DIABETES MELLITUS: Status: ACTIVE | Noted: 2019-01-01

## 2019-05-08 PROBLEM — E87.5 HYPERKALEMIA: Status: ACTIVE | Noted: 2019-01-01

## 2019-05-08 PROBLEM — G47.33 OSA (OBSTRUCTIVE SLEEP APNEA): Status: ACTIVE | Noted: 2019-01-01

## 2019-05-08 PROBLEM — N17.9 AKI (ACUTE KIDNEY INJURY): Status: ACTIVE | Noted: 2019-01-01

## 2019-05-08 PROBLEM — I12.9 HYPERTENSION ASSOCIATED WITH STAGE 4 CHRONIC KIDNEY DISEASE DUE TO TYPE 2 DIABETES MELLITUS: Status: ACTIVE | Noted: 2019-01-01

## 2019-05-08 PROBLEM — L97.921 SKIN ULCER OF LEFT LOWER LEG, LIMITED TO BREAKDOWN OF SKIN: Status: ACTIVE | Noted: 2019-01-01

## 2019-05-08 PROBLEM — J96.02 ACUTE HYPERCAPNIC RESPIRATORY FAILURE: Status: ACTIVE | Noted: 2019-01-01

## 2019-05-08 NOTE — PROGRESS NOTES
After informed consent obtained a trialysis catheter was inserted via the R subclavian vein in the usual sterile fashion without complication  Tolerated well  Follow up CXR : catheter appears to be in good position and no pneumothorax seen

## 2019-05-08 NOTE — PT/OT/SLP PROGRESS
Physical Therapy      Patient Name:  Frank Zayas   MRN:  [id number]    Patient not seen today secondary to Patient fatigue, Unavailable (Comment), Dialysis. Pt with increased fatigue/lethargy on 1st attempt in AM. 2nd attempt pt in dialysis. Will follow-up .    Jackie Cage, PTA

## 2019-05-08 NOTE — ASSESSMENT & PLAN NOTE
Body mass index is 39.2 kg/m².  General weight loss/lifestyle modification strategies discussed (elicit support from others; identify saboteurs; non-food rewards, etc).

## 2019-05-08 NOTE — PLAN OF CARE
Problem: Adult Inpatient Plan of Care  Goal: Plan of Care Review  Outcome: Ongoing (interventions implemented as appropriate)  Bed is in low position, bed alarm set, call light is within reach, SR up x 2, instructed to use call light for assistance.  Cardiac monitored SR, VSS, glucose monitored to sliding scale, patient refused night time long acting detemir and cyanocobalamin injection. Patient has been very agitated has made several attempts to get up without assistance, states he is trapped, reassured and reiterated patient safety and care, no complaints of pain, patient is resting between care, very little output of urine 50 ml, bladder scan resulted with 52 ml, dialysis port dry and intact, will continue to monitor and round.

## 2019-05-08 NOTE — SUBJECTIVE & OBJECTIVE
Interval History:  No new issues overnight.  Patient with worsening of anasarca and shortness of breath.  Renal function continues to worsen.  Venous access device placed last p.m. for vascular surgery.  Patient evaluated while on dialysis.  He is encephalopathic.     Review of Systems   Constitutional: Positive for activity change, appetite change and fatigue. Negative for chills and fever.   HENT: Negative for ear discharge, ear pain and facial swelling.    Eyes: Negative for pain and redness.   Respiratory: Positive for cough and shortness of breath.    Cardiovascular: Positive for leg swelling. Negative for chest pain and palpitations.   Gastrointestinal: Negative for abdominal distention, abdominal pain, constipation, diarrhea and vomiting.   Endocrine: Negative for polydipsia and polyphagia.   Genitourinary: Positive for hematuria. Huynh was placed   Musculoskeletal: Positive for gait problem. Negative for neck pain and neck stiffness.   Skin: Positive for wound.        Right lower extremity dressing intact   Allergic/Immunologic: Negative for food allergies.   Neurological: Positive for weakness. Negative for seizures, facial asymmetry and speech difficulty.   Hematological: Bruises/bleeds easily.   Psychiatric/Behavioral: Negative for agitation, behavioral problems, confusion, hallucinations and suicidal ideas. The patient is not nervous/anxious.      Objective:     Vital Signs (Most Recent):  Temp: 97.6 °F (36.4 °C) (05/08/19 1628)  Pulse: 81 (05/08/19 1628)  Resp: 18 (05/08/19 1628)  BP: 130/63 (05/08/19 1628)  SpO2: 97 % (05/08/19 1628) Vital Signs (24h Range):  Temp:  [96.3 °F (35.7 °C)-97.6 °F (36.4 °C)] 97.6 °F (36.4 °C)  Pulse:  [70-81] 81  Resp:  [] 18  SpO2:  [91 %-99 %] 97 %  BP: (111-166)/() 130/63     Weight: (!) 138.5 kg (305 lb 5.4 oz)  Body mass index is 39.2 kg/m².    Physical Exam   Constitutional: He is oriented to person, place, and time. He appears well-developed and  well-nourished. No distress. 97% on 2 L nasal cannula  Obese.   HENT:   Head: Normocephalic and atraumatic.   Eyes: Pupils are equal, round, and reactive to light. Conjunctivae and EOM are normal.  Neck: Normal range of motion. Neck supple.   Cardiovascular: Normal rate, normal heart sounds and intact distal pulses. An irregularly irregular rhythm present.   Pulses:       Dorsalis pedis pulses are 2+ on the right side, and 2+ on the left side.   Pulmonary/Chest: Effort normal. No accessory muscle usage. He has no wheezes. He has no rhonchi. He has rales.   Bilateral breath sounds with diffuse rales  Dyspnea on exertion  Abdominal: Soft. Bowel sounds are normal. He exhibits no distension. There is no tenderness. There is no guarding.   Genitourinary:   Genitourinary Comments: Huynh catheter noted draining hematuria   Musculoskeletal: Normal range of motion. He exhibits edema (3-4+ pitting BLE).   Bilateral lower extremity edema 3+  extending to bilateral thighs and sacral area  Neurological:  Encephalopathic.  Answers few questions  Skin: Skin is warm, dry and intact. Capillary refill takes less than 2 seconds. He is not diaphoretic.   Right lower extremity dressing intact   Psychiatric: He has a normal mood and affect. His speech is normal and behavior is normal. Judgment and thought content normal.   Vitals reviewed.        CRANIAL NERVES     CN III, IV, VI   Pupils are equal, round, and reactive to light.  Extraocular motions are normal.     Labs reviewed

## 2019-05-08 NOTE — ASSESSMENT & PLAN NOTE
With cardiorenal syndrome.  Cardiovascular surgery consulted for Lucho placement.  Nephrology consulted.  He has been initiated on hemodialysis for ultrafiltration and volume removal.  Trend renal function close

## 2019-05-08 NOTE — PROGRESS NOTES
05/08/19 0043   Patient Assessment/Suction   Level of Consciousness (AVPU) alert   Respiratory Effort Normal;Unlabored   Expansion/Accessory Muscles/Retractions no use of accessory muscles;no retractions;expansion symmetric   All Lung Fields Breath Sounds diminished   SANDER Breath Sounds diminished   LLL Breath Sounds diminished   RUL Breath Sounds diminished   RML Breath Sounds diminished   PRE-TX-O2   O2 Device (Oxygen Therapy) nasal cannula   Flow (L/min) 2   Oxygen Concentration (%) 28   SpO2 97 %   Pulse Oximetry Type Intermittent   Pulse 75   Resp 17   Aerosol Therapy   $ Aerosol Therapy Charges Aerosol Treatment   Respiratory Treatment Status (SVN) given   Treatment Route (SVN) mask   Patient Position (SVN) HOB elevated   Signs of Intolerance (SVN) none   Breath Sounds Post-Respiratory Treatment   Throughout All Fields Post-Treatment no change   Post-treatment Heart Rate (beats/min) 74   Post-treatment Resp Rate (breaths/min) 18   Ready to Wean/Extubation Screen   FIO2<=50 (chart decimal) 0.28

## 2019-05-08 NOTE — PROGRESS NOTES
UF 2 liters.  Tolerated treatment well.  Consent signed and on chart.  Patient's 1st treatment ever.

## 2019-05-08 NOTE — PROGRESS NOTES
[facility] Hospital Medicine  Progress Note    Patient Name: Frank Zayas  MRN: [id number]  Patient Class: IP- Inpatient   Admission Date: 5/4/2019  Length of Stay: 4 days  Attending Physician: Katerin Kaufman MD  Primary Care Provider: Mikhail Shields MD        Subjective:     Principal Problem:Acute on chronic diastolic congestive heart failure    HPI:  This is a 79-year-old male with PMHx significant for HTN, HLD, MI, CHF, COPD, DM, CKD, gout, arthritis, and bladder CA.  He presented to the ED with a progressively worsening SOB.  He stated that his SOB has been going on for the past few months, but has worsened in the last 3 days.  Symptoms are associated with bilateral lower extremity leg swelling. Exertion exacerbates his symptoms.  He states that he has been compliant with his medications and dietary restrictions.  He was given Lasix intravenously in the ED reports that he does feel better.  He reports that he has had a cough productive of clear mucus and felt nauseated earlier today.  He denied fever, chills, sweats, chest pain, wheezing, abdominal pain, vomiting, diarrhea, dysuria, and any other symptoms at this time.  The patient was evaluated in the emergency room where he was noted to have signs of acute heart failure and new onset of atrial fib.  Patient was admitted for further evaluation and treatment.    Hospital Course:  The patient was monitored closely during his stay.  He was placed on continuous telemetry monitor.  Cardiology was consulted.  Patient was placed on supplemental O2 and IV diuretics for his acute heart failure.  An echocardiogram was ordered.  He was continued on metoprolol for his new onset of atrial fib which later converted back to sinus rhythm. Nephrology was consulted for chronic kidney disease stage 4 with acute of volume overload.  Patient also noted to have some new onset of hematuria and Urology was consulted.  Physical therapy was consulted for  debility.  The patient's renal and cardiac status were monitored closely.    Interval History:  No new issues overnight.  Patient with worsening of anasarca and shortness of breath.  Renal function continues to worsen.  Venous access device placed last p.m. for vascular surgery.  Patient evaluated while on dialysis.  He is encephalopathic.     Review of Systems   Constitutional: Positive for activity change, appetite change and fatigue. Negative for chills and fever.   HENT: Negative for ear discharge, ear pain and facial swelling.    Eyes: Negative for pain and redness.   Respiratory: Positive for cough and shortness of breath.    Cardiovascular: Positive for leg swelling. Negative for chest pain and palpitations.   Gastrointestinal: Negative for abdominal distention, abdominal pain, constipation, diarrhea and vomiting.   Endocrine: Negative for polydipsia and polyphagia.   Genitourinary: Positive for hematuria. Huynh was placed   Musculoskeletal: Positive for gait problem. Negative for neck pain and neck stiffness.   Skin: Positive for wound.        Right lower extremity dressing intact   Allergic/Immunologic: Negative for food allergies.   Neurological: Positive for weakness. Negative for seizures, facial asymmetry and speech difficulty.   Hematological: Bruises/bleeds easily.   Psychiatric/Behavioral: Negative for agitation, behavioral problems, confusion, hallucinations and suicidal ideas. The patient is not nervous/anxious.      Objective:     Vital Signs (Most Recent):  Temp: 97.6 °F (36.4 °C) (05/08/19 1628)  Pulse: 81 (05/08/19 1628)  Resp: 18 (05/08/19 1628)  BP: 130/63 (05/08/19 1628)  SpO2: 97 % (05/08/19 1628) Vital Signs (24h Range):  Temp:  [96.3 °F (35.7 °C)-97.6 °F (36.4 °C)] 97.6 °F (36.4 °C)  Pulse:  [70-81] 81  Resp:  [] 18  SpO2:  [91 %-99 %] 97 %  BP: (111-166)/() 130/63     Weight: (!) 138.5 kg (305 lb 5.4 oz)  Body mass index is 39.2 kg/m².    Physical Exam   Constitutional: He is  oriented to person, place, and time. He appears well-developed and well-nourished. No distress. 97% on 2 L nasal cannula  Obese.   HENT:   Head: Normocephalic and atraumatic.   Eyes: Pupils are equal, round, and reactive to light. Conjunctivae and EOM are normal.  Neck: Normal range of motion. Neck supple.   Cardiovascular: Normal rate, normal heart sounds and intact distal pulses. An irregularly irregular rhythm present.   Pulses:       Dorsalis pedis pulses are 2+ on the right side, and 2+ on the left side.   Pulmonary/Chest: Effort normal. No accessory muscle usage. He has no wheezes. He has no rhonchi. He has rales.   Bilateral breath sounds with diffuse rales  Dyspnea on exertion  Abdominal: Soft. Bowel sounds are normal. He exhibits no distension. There is no tenderness. There is no guarding.   Genitourinary:   Genitourinary Comments: Huynh catheter noted draining hematuria   Musculoskeletal: Normal range of motion. He exhibits edema (3-4+ pitting BLE).   Bilateral lower extremity edema 3+  extending to bilateral thighs and sacral area  Neurological:  Encephalopathic.  Answers few questions  Skin: Skin is warm, dry and intact. Capillary refill takes less than 2 seconds. He is not diaphoretic.   Right lower extremity dressing intact   Psychiatric: He has a normal mood and affect. His speech is normal and behavior is normal. Judgment and thought content normal.   Vitals reviewed.        CRANIAL NERVES     CN III, IV, VI   Pupils are equal, round, and reactive to light.  Extraocular motions are normal.     Labs reviewed      Assessment/Plan:      * Acute on chronic diastolic congestive heart failure  Telemetry  Orders as per Cardiology-currently on IV diuretics  Monitor O2 sats, supplemental O2 as needed  Monitor electrolyte and renal status  Echocardiogram results noted-EF 45%  Daily weights. Strict I/Os. Fluid restriction.   Na restriction <2g/d.  Monitor electrolyte and renal status-creatinine currently  trending upward -3.4 today          Hyperkalemia  Renal diet.  Trend renal function.  K bath adjusted with HD      Encephalopathy, metabolic  Due to worsening of renal function.  Fall and delirium precautions    MOLLY (acute kidney injury)  With cardiorenal syndrome.  Cardiovascular surgery consulted for Lucho placement.  Nephrology consulted.  He has been initiated on hemodialysis for ultrafiltration and volume removal.  Trend renal function close      Morbid (severe) obesity with alveolar hypoventilation  Body mass index is 39.2 kg/m².  General weight loss/lifestyle modification strategies discussed (elicit support from others; identify saboteurs; non-food rewards, etc).        Skin ulcer of left lower leg, limited to breakdown of skin  Wound care      BRANDEN (obstructive sleep apnea)  Patient reports he cannot tolerate CPAP or BiPAP      Debility  Fall and skin precautions  Continue physical therapy and occupational therapy      Hematuria  Monitor closely-history of bladder cancer  Discontinue Lovenox-Huynh catheter remains in place  Urology consulted      Elevated troponin  Workup as per Cardiology      Mild malnutrition  Continue renal multivitamin  Continue p.o. supplement      Hyperphosphatemia  Monitor  Orders as per renal      Anemia, chronic disease  Continue renal multivitamin  Check iron and vitamin B levels  Check stool OCB      New onset atrial fibrillation  Telemetry  Orders as per Cardiology  Continue beta-blockade        COPD (chronic obstructive pulmonary disease)  Monitor O2 sats, supplement O2 as needed  Continue t.i.d. Xopenex aerosol treatments        Coronary artery disease  Telemetry  Continue home medications- including e ASA, Plavix, Metoprolol and Zetia.  Monitor closely for s/s of ACS.        DM type 2 with diabetic mixed hyperlipidemia  Chronic, continue Zetia.   Lipid panel noted        Type 2 diabetes mellitus with kidney complication, with long-term current use of insulin  Monitor  closely  Nephrology consulted due to volume overload with history of chronic kidney disease stage 4 on IV diuretics          Hypertension associated with stage 4 chronic kidney disease due to type 2 diabetes mellitus  Continue antihypertensives per home regimen, monitor BP closely, and titrate medication for sustained BP control.        Type 2 diabetes mellitus  DM diet  Accu-Cheks with correctional sliding scale insulin  HGBA1C is 5.6  Blood sugars running 113-180        VTE Risk Mitigation (From admission, onward)        Ordered     Place JACK hose  Until discontinued      05/07/19 0923     Place JACK hose  Until discontinued      05/05/19 1120     IP VTE HIGH RISK PATIENT  Once      05/04/19 8452              Monika Holliday NP  Department of Hospital Medicine   [facility]

## 2019-05-08 NOTE — PLAN OF CARE
Problem: Adult Inpatient Plan of Care  Goal: Plan of Care Review  Outcome: Ongoing (interventions implemented as appropriate)  Patient receiving aerosol tx via 0.63mg xopenex and nacl now and q8hr.  Hr 74 and 02 saturation 96% on nc at 2lpm.

## 2019-05-08 NOTE — CONSULTS
Consult Note  Cardiothoracic Surgery    Consults  SUBJECTIVE:     History of Present Illness:  Patient is a 79 y.o. male presents SOB, worsening bilateral lower extremity swelling    Scheduled Meds:   albumin human 25%  25 g Intravenous BID    alfuzosin  10 mg Oral Nightly    aspirin  81 mg Oral Daily    clopidogrel  75 mg Oral Daily    cyanocobalamin  1,000 mcg Subcutaneous Once    ezetimibe  10 mg Oral QHS    ferrous sulfate  325 mg Oral BID WM    FLUoxetine  20 mg Oral QHS    furosemide  40 mg Intravenous BID    gemfibrozil  600 mg Oral BID    heparin (porcine)  10,000 Units Intravenous Once    insulin detemir U-100  20 Units Subcutaneous QHS    levalbuterol  0.63 mg Nebulization Q8H    metoprolol tartrate  25 mg Oral BID    rOPINIRole  2 mg Oral Nightly    senna-docusate 8.6-50 mg  1 tablet Oral BID    vitamin renal formula (B-complex-vitamin c-folic acid)  1 capsule Oral Daily     Infusions/Drips:  PRN Meds:acetaminophen, dextrose 50%, dextrose 50%, glucagon (human recombinant), glucose, glucose, insulin aspart U-100, magnesium oxide, ondansetron, potassium chloride 10%, ramelteon, sodium chloride 0.9%    Review of patient's allergies indicates:   Allergen Reactions    Lidocaine Nausea Only     NOVACAINE --  Severe nausea    Statins-hmg-coa reductase inhibitors Anxiety       Past Medical History:   Diagnosis Date    Anticoagulant long-term use     Arthritis     Bladder cancer     Blood transfusion     CHF (congestive heart failure)     Chronic kidney disease     COPD (chronic obstructive pulmonary disease)     Coronary artery disease     Diabetes mellitus     Gout     [place] (hard of hearing[place]     HAS BILAT AIDS BUT DOES NOT WEAR    Hyperlipidemia     Hypertension     Myocardial infarction     RLS (restless legs syndrome)     Sleep apnea     NO MACHINE    Wears glasses      Past Surgical History:   Procedure Laterality Date    CARDIAC SURGERY      CABG X 2 1997    CATARACT  EXTRACTION, BILATERAL      CHOLECYSTECTOMY      COLONOSCOPY      CORONARY ARTERY BYPASS GRAFT      x 2    coronary stents      CYSTOSCOPY      CYSTOSCOPY N/A 2019    [facility]    EXCISION-BLADDER TUMOR-TRANSURETHRAL (TURBT) N/A 2014    [facility] OR    EXCISION-BLADDER TUMOR-TRANSURETHRAL (TURBT) N/A 2013    [facility] OR    EYE SURGERY Bilateral     CATARACT    TURBT (TRANSURETHRAL RESECTION OF BLADDER TUMOR) N/A 2019    [facility] OR    VASECTOMY       Family History   Problem Relation Age of Onset    Cancer Father     Heart disease Father     Cancer Sister     Cancer Brother      Social History     Tobacco Use    Smoking status: Former Smoker     Packs/day: 0.25     Years: 20.00     Pack years: 5.00     Types: Cigars     Last attempt to quit: 1982     Years since quittin.2    Smokeless tobacco: Never Used   Substance Use Topics    Alcohol use: No    Drug use: No            OBJECTIVE:     Vital Signs (Most Recent)  Temp: 97.1 °F (36.2 °C) (19)  Pulse: 76 (19)  Resp: 18 (19)  BP: 133/71 (19)  SpO2: 96 % (19)    Admission Weight: 135.2 kg (298 lb) (19 184)   Most Recent Weight: (!) 139 kg (306 lb 7 oz) (19 0400)    Vital Signs Range (Last 24H):  Temp:  [96.1 °F (35.6 °C)-97.8 °F (36.6 °C)]   Pulse:  [60-77]   Resp:  [16-20]   BP: (107-133)/(60-71)   SpO2:  [90 %-100 %]     Physical Exam:  NAD  BS  CV RRR  GI abd soft  Ext + edema    Laboratory:  CBC:   Recent Labs   Lab 19  0502   WBC 6.20   RBC 3.19*   HGB 9.9*   HCT 31.2*   *   MCV 98   MCH 31.0   MCHC 31.7*     BMP:   Recent Labs   Lab 19  0513 19  0502    107    137   K 4.8 4.7   CL 99 97   CO2 32* 29   BUN 63* 76*   CREATININE 3.4* 3.9*   CALCIUM 9.1 8.8   MG 2.2  --      Coagulation: No  results for input(s): LABPROT, INR, APTT in the last 168 hours.    ASSESSMENT/PLAN:     Impression : Acute on chronic kidney injury    Plan : Will place acute hemodialysis catheter               Thank you for the consult.

## 2019-05-08 NOTE — PROGRESS NOTES
"INPATIENT NEPHROLOGY PROGRESS NOTE  [facility] NEPHROLOGY    Patient Name: Frank Zayas  Date: 05/08/2019    Reason for consultation: MOLLY    Chief Complaint:   Chief Complaint   Patient presents with    Shortness of Breath     "due to increasing fluid"       History of Present Illness:  Patient known to Dr. Quinonez for CKD4, HTN, hyperkalemia on Veltassa is admitted for CHF exacerbation, fluid overload and gross hematuria, consulted for renal management.    · Interval History/Subjective:    - Dr. Rodriguez placed trialysis catheter last night- much appreciated.  - VSS, on 2L NC, UOP 325cc, weight is stable since yest  - seen on HD- no cp, + dyspnea/abd distention/worsening edema    · Review of Systems: All 14 systems reviewed and negative, except as noted in HPI    Plan of Care:    Assessment:  Rodrigo, baseline stage IV CKD- suspect CRS + ischemic ATN  HTN/CHF (EF 45%) with volume overload  Hyperkalemia  SHPT  Anemia of CKD  Gross hematuria    Plan:    - His renal function is worse with metabolic derangements, symptomatic volume overload, and oliguria. He now has a trialysis catheter. Will initiate HD today (5/8/19).   - Ordered 2L UF. D/C'ed IV lasix/albumin. Continue 2g salt, 1.5L fluid restriction.  - Ordered 2K bath.  - Trend daily phos with dialysis. If after 3 treatments he remains > 5.5, will add phos binder.  - Hb stabilized. If he requires more than 3 treatments, will start RAFAEL with HD.  - He c/w spann- urology following.    Thank you for allowing us to participate in this patient's care. We will continue to follow.    Medications:  No current facility-administered medications on file prior to encounter.      Current Outpatient Medications on File Prior to Encounter   Medication Sig Dispense Refill    alfuzosin (UROXATRAL) 10 mg Tb24 Take 10 mg by mouth nightly.       allopurinol (ZYLOPRIM) 300 MG tablet 1 tablet      aspirin (ECOTRIN) 81 MG EC tablet Take 81 mg by mouth once daily.      clopidogrel " (PLAVIX) 75 mg tablet Take 75 mg by mouth once daily.      coenzyme Q10 (CO Q-10) 100 mg capsule Take 100 mg by mouth once daily.      ezetimibe (ZETIA) 10 mg tablet Take 10 mg by mouth every evening.       FLUoxetine 20 MG capsule every evening.       gabapentin (NEURONTIN) 100 MG capsule every evening.       gemfibrozil (LOPID) 600 MG tablet Take 600 mg by mouth 2 (two) times daily.       insulin glargine (LANTUS) 100 unit/mL injection Inject 27 Units into the skin every evening.       metoprolol tartrate (LOPRESSOR) 25 MG tablet Take 25 mg by mouth 2 (two) times daily.      nateglinide (STARLIX) 60 MG tablet Take 60 mg by mouth every evening.       ropinirole (REQUIP) 2 MG tablet Take 2 mg by mouth nightly.       torsemide (DEMADEX) 20 MG tablet Take 20 mg by mouth once daily.       TRADJENTA 5 mg Tab tablet Take 5 mg by mouth once daily.  0    VELTASSA 16.8 gram PwPk MIX 1 PACKET IN LIQUID AND TAKE PO QD UTD  4    zinc 50 mg Tab Take 50 mg by mouth once daily. 1/2 tab daily      nitroGLYCERIN (NITROSTAT) 0.4 MG SL tablet Place 0.4 mg under the tongue every 5 (five) minutes as needed.        Scheduled Meds:   albumin human 25%  25 g Intravenous BID    alfuzosin  10 mg Oral Nightly    aspirin  81 mg Oral Daily    clopidogrel  75 mg Oral Daily    cyanocobalamin  1,000 mcg Subcutaneous Once    ezetimibe  10 mg Oral QHS    ferrous sulfate  325 mg Oral BID WM    FLUoxetine  20 mg Oral QHS    furosemide  40 mg Intravenous BID    gemfibrozil  600 mg Oral BID    insulin detemir U-100  20 Units Subcutaneous QHS    levalbuterol  0.63 mg Nebulization Q8H    metoprolol tartrate  25 mg Oral BID    mupirocin   Nasal BID    rOPINIRole  2 mg Oral Nightly    senna-docusate 8.6-50 mg  1 tablet Oral BID    vitamin renal formula (B-complex-vitamin c-folic acid)  1 capsule Oral Daily     Continuous Infusions:  PRN Meds:.acetaminophen, dextrose 50%, dextrose 50%, glucagon (human recombinant), glucose,  glucose, insulin aspart U-100, magnesium oxide, ondansetron, potassium chloride 10%, ramelteon, sodium chloride 0.9%    Allergies:  Lidocaine and Statins-hmg-coa reductase inhibitors    Vital Signs:  Temp Readings from Last 3 Encounters:   05/08/19 96.9 °F (36.1 °C) (Oral)   04/24/19 98.6 °F (37 °C) (Oral)   04/08/19 97.9 °F (36.6 °C)       Pulse Readings from Last 3 Encounters:   05/08/19 76   04/24/19 76   04/08/19 63       BP Readings from Last 3 Encounters:   05/08/19 128/60   04/24/19 131/74   04/08/19 (!) 153/84       Weight:  Wt Readings from Last 3 Encounters:   05/08/19 (!) 138.5 kg (305 lb 5.4 oz)   04/24/19 129.3 kg (285 lb 0.9 oz)   04/08/19 129.3 kg (285 lb)       INS/OUTS:  I/O last 3 completed shifts:  In: 450 [P.O.:450]  Out: 500 [Urine:500]  No intake/output data recorded.    Physical Exam:  Constitutional: nad, aao x 3  Heart: rrr, estela, no r/g, wwp, anasarca  Lungs: coarse, no w/r/r/c, no lb  Abdomen: s/nt/d, +BS    Results:  Lab Results   Component Value Date     05/08/2019    K 5.3 (H) 05/08/2019    CL 98 05/08/2019    CO2 25 05/08/2019    BUN 87 (H) 05/08/2019    CREATININE 4.3 (H) 05/08/2019    CALCIUM 8.7 05/08/2019    ANIONGAP 14 05/08/2019    ESTGFRAFRICA 14 (A) 05/08/2019    EGFRNONAA 12 (A) 05/08/2019       Lab Results   Component Value Date    CALCIUM 8.7 05/08/2019    PHOS 7.2 (H) 05/08/2019    PHOS 7.2 (H) 05/08/2019       Recent Labs   Lab 05/08/19  0421   WBC 6.50   RBC 3.23*   HGB 9.9*   HCT 31.4*   *   MCV 97   MCH 30.8   MCHC 31.6*       I have personally reviewed pertinent radiological imaging and reports.    Marivel Quinonez MD MPH  [facility] [address] [address]  [phone] (p)  [phone] (f)

## 2019-05-09 NOTE — EICU
New admit    80 y/o M HFrEF 45%, HTN, DM, bladder CA, CKD 4 with hyperkalemia on Veltassa presented with progressive worsening of shortness of breath and lower extremity edema.  He was started on diuresis and is now on dialysis after dialysis catheter placement. Placed on BiPap for hypercapneic and hypoxemic respiratory failure.    Camera assessment:  Tolerating BiPap 18/8 FiO2 40% TV 500s to 700s    Telemetry:  /59  HR 58  O2 100%    Data:  WBC 6.5, H/H 9.9/31.4, plt 125  Na 137, K 5.3, BUN 87, creatinine 4.3  ABG 7.26/62/92  CXR congestive changes with right sided effusion pleural effusion    · Hypoxemic and hypercapneic respiratory failure on BiPap, volume removal with dialysis  · Hematuria with history of bladder CA, urology consulted

## 2019-05-09 NOTE — PROGRESS NOTES
[facility] Hospital Medicine  Progress Note    Patient Name: Frank Zayas  MRN: [id number]  Patient Class: IP- Inpatient   Admission Date: 5/4/2019  Length of Stay: 5 days  Attending Physician: Katerin Kaufman MD  Primary Care Provider: Mikhail Shields MD        Subjective:     Principal Problem:Acute on chronic diastolic congestive heart failure    HPI:  This is a 79-year-old male with PMHx significant for HTN, HLD, MI, CHF, COPD, DM, CKD, gout, arthritis, and bladder CA.  He presented to the ED with a progressively worsening SOB.  He stated that his SOB has been going on for the past few months, but has worsened in the last 3 days.  Symptoms are associated with bilateral lower extremity leg swelling. Exertion exacerbates his symptoms.  He states that he has been compliant with his medications and dietary restrictions.  He was given Lasix intravenously in the ED reports that he does feel better.  He reports that he has had a cough productive of clear mucus and felt nauseated earlier today.  He denied fever, chills, sweats, chest pain, wheezing, abdominal pain, vomiting, diarrhea, dysuria, and any other symptoms at this time.  The patient was evaluated in the emergency room where he was noted to have signs of acute heart failure and new onset of atrial fib.  Patient was admitted for further evaluation and treatment.    Hospital Course:  The patient was monitored closely during his stay.  He was placed on continuous telemetry monitor.  Cardiology was consulted.  Patient was placed on supplemental O2 and IV diuretics for his acute heart failure.  An echocardiogram was -noted EF 45% and DD.  He was continued on metoprolol for his new onset of atrial fib which later converted back to sinus rhythm. He was transferred to ICU 5/7 overnight for bipap for hypercarbic resp failure   Nephrology was consulted for chronic kidney disease stage 4 with acute of volume overload. He had HD 5/7, 5/8 for volume  removal after failure with diuresis.     Patient also noted to have some new onset of hematuria and Urology was consulted.  Physical therapy was consulted for debility.        Interval History: awake and alert-  Tolerated bipap overnight and weaned to nc oxygen  Tolerated HD today --day  2   Eating and bowels working  3500cc off in HD today     Review of Systems   Constitutional: Positive for activity change, appetite change and fatigue. Negative for chills and fever.   Respiratory: Negative for cough and shortness of breath.    Cardiovascular: Positive for leg swelling. Negative for chest pain and palpitations.   Gastrointestinal: Negative for abdominal distention, abdominal pain, constipation, diarrhea and vomiting.   Genitourinary: Positive for hematuria.        Huynh catheter present draining hematuria  Patient reports he had some hematuria about 3 or 4 weeks ago and then reoccurred after Huynh was placed   Musculoskeletal: Positive for gait problem. Negative for neck pain and neck stiffness.   Skin: Positive for wound.        Right lower extremity dressing intact   Neurological: Positive for weakness. Negative for seizures and speech difficulty.   Hematological: Negative for adenopathy. Bruises/bleeds easily.   Psychiatric/Behavioral: Positive for behavioral problems and confusion (AMS with hypercarbia).     Objective:     Vital Signs (Most Recent):  Temp: 98.4 °F (36.9 °C) (05/09/19 1315)  Pulse: 66 (05/09/19 1526)  Resp: (!) 27 (05/09/19 1526)  BP: (!) 109/55 (05/09/19 1445)  SpO2: 96 % (05/09/19 1526) Vital Signs (24h Range):  Temp:  [98.4 °F (36.9 °C)-99 °F (37.2 °C)] 98.4 °F (36.9 °C)  Pulse:  [54-86] 66  Resp:  [19-49] 27  SpO2:  [90 %-100 %] 96 %  BP: (103-129)/(51-69) 109/55     Weight: (!) 138.5 kg (305 lb 5.4 oz)  Body mass index is 39.2 kg/m².    Intake/Output Summary (Last 24 hours) at 5/9/2019 1700  Last data filed at 5/9/2019 0600  Gross per 24 hour   Intake 120 ml   Output 345 ml   Net -225 ml       Physical Exam   Constitutional: He is oriented to person, place, and time. He appears well-developed and well-nourished. No distress.   Obese.   HENT:   Head: Normocephalic and atraumatic.   Eyes: Pupils are equal, round, and reactive to light. Conjunctivae and EOM are normal. Right eye exhibits no discharge. Left eye exhibits no discharge.   Neck: Normal range of motion. Neck supple.   Cardiovascular: Normal rate, normal heart sounds and intact distal pulses. An irregularly irregular rhythm present.   Pulses:       Dorsalis pedis pulses are 2+ on the right side, and 2+ on the left side.   Pulmonary/Chest: Effort normal. No accessory muscle usage. He has no wheezes. He has no rhonchi. He has rales.   Bilateral breath sounds with rales  Dyspnea on mild exertion   Abdominal: Soft. Bowel sounds are normal. He exhibits no distension. There is no tenderness. There is no guarding.   Genitourinary:   Genitourinary Comments: Huynh catheter noted draining hematuria   Musculoskeletal: Normal range of motion. He exhibits edema (2+ pitting BLE).   Bilateral lower extremity edema 3+   Neurological: He is alert and oriented to person, place, and time. He has normal strength. No cranial nerve deficit.   Skin: Skin is warm, dry and intact. Capillary refill takes less than 2 seconds. He is not diaphoretic.   Right lower extremity dressing intact   Psychiatric: He has a normal mood and affect. His speech is normal and behavior is normal. Judgment and thought content normal.   Vitals reviewed.      Significant Labs:   BMP:   Recent Labs   Lab 05/09/19  0337   GLU 89      K 4.9   CL 98   CO2 27   BUN 67*   CREATININE 3.6*   CALCIUM 8.5*     CBC:   Recent Labs   Lab 05/08/19  0421 05/09/19  0337   WBC 6.50 6.50   HGB 9.9* 9.6*   HCT 31.4* 30.6*   * 125*       Significant Imaging: I have reviewed and interpreted all pertinent imaging results/findings within the past 24 hours.    Assessment/Plan:      * Acute on chronic  diastolic congestive heart failure  IN ICU  for pulm edema /hypoventilation   Orders as per Cardiology-currently on IV diuretics and now HD x 2 days for volume removal 2/2 associated  ckd4  Monitor O2 sats, supplemental O2 as needed  Monitor electrolyte and renal status-stable   Echocardiogram results noted-EF 45%  Daily weights. Strict I/Os. Fluid restriction.   Na restriction <2g/d.  Monitor electrolyte and renal status-creatinine currently trending upward -3.4 today          Acute hypercapnic respiratory failure    Acute --multifactorial as noted in all above -    Hyperkalemia  Renal diet.  Trend renal function.  K bath adjusted with HD      Encephalopathy, metabolic  Due to worsening of renal function.  Fall and delirium precautions    MOLLY (acute kidney injury)  With cardiorenal syndrome.  Cardiovascular surgery consulted for Lucho placement.  Nephrology consulted.  He has been initiated on hemodialysis for ultrafiltration and volume removal.  Trend renal function closely      Morbid (severe) obesity with alveolar hypoventilation  Body mass index is 39.2 kg/m².  General weight loss/lifestyle modification strategies discussed (elicit support from others; identify saboteurs; non-food rewards, etc).     bipap at hs for hypercarbia --will use prn   Has improved with volume removal  Consult PT to ambulate  Add IS am         Skin ulcer of left lower leg, limited to breakdown of skin  Wound care      BRANDEN (obstructive sleep apnea)  Patient reports he cannot tolerate CPAP or BiPAP      Debility  Fall and skin precautions  Continue physical therapy and occupational therapy      Hematuria  Monitor closely-history of bladder cancer  Discontinue Lovenox-Huynh catheter remains in place  Urology consulted      Elevated troponin  Workup as per Cardiology      Mild malnutrition  Continue renal multivitamin  Continue p.o. supplement      Hyperphosphatemia  Monitor  Orders as per renal      Anemia, chronic disease  Continue  renal multivitamin  Check iron and vitamin B levels  Check stool OCB      New onset atrial fibrillation  Telemetry  Orders as per Cardiology  Continue beta-blockade        COPD (chronic obstructive pulmonary disease)  Monitor O2 sats, supplement O2 as needed  Continue t.i.d. Xopenex aerosol treatments        Coronary artery disease  Telemetry  Continue home medications- including e ASA, Plavix, Metoprolol and Zetia.  Monitor closely for s/s of ACS.  Pt takes plavix only 3 x per week       DM type 2 with diabetic mixed hyperlipidemia  Chronic, continue Zetia.   Lipid panel noted        Type 2 diabetes mellitus with kidney complication, with long-term current use of insulin  Chronic, stable-on  levermir and ISS ;   Nephrology consulted due to volume overload with history of chronic kidney disease stage 4 on IV diuretics          Hypertension associated with stage 4 chronic kidney disease due to type 2 diabetes mellitus  Continue antihypertensives per home regimen, monitor BP closely, and titrate medication for sustained BP control.  Hypertension Medications             metoprolol tartrate (LOPRESSOR) 25 MG tablet Take 25 mg by mouth 2 (two) times daily.    nitroGLYCERIN (NITROSTAT) 0.4 MG SL tablet Place 0.4 mg under the tongue every 5 (five) minutes as needed.     torsemide (DEMADEX) 20 MG tablet Take 20 mg by mouth once daily.       Hospital Medications             metoprolol tartrate (LOPRESSOR) tablet 25 mg Take 1 tablet (25 mg total) by mouth 2 (two) times daily.            Type 2 diabetes mellitus  DM diet  Accu-Cheks with correctional sliding scale insulin  HGBA1C is 5.6  Blood sugars running 113-180      Malignant neoplasm of bladder  Follows with urology         VTE Risk Mitigation (From admission, onward)        Ordered     heparin (porcine) injection 4,000 Units  Once      05/09/19 1537     Place JACK hose  Until discontinued      05/05/19 1120     IP VTE HIGH RISK PATIENT  Once      05/04/19 1932           Critical care time spent on the evaluation and treatment of severe organ dysfunction, review of pertinent labs and imaging studies, discussions with consulting providers and discussions with patient/family: 35 minutes.  Discussed with nephrology -continue HD and monitor for recovery  Katerin Kaufman MD  Department of Hospital Medicine   [facility]

## 2019-05-09 NOTE — ASSESSMENT & PLAN NOTE
Chronic, stable-on  levermir and ISS ;   Nephrology consulted due to volume overload with history of chronic kidney disease stage 4 on IV diuretics

## 2019-05-09 NOTE — PLAN OF CARE
Problem: Adult Inpatient Plan of Care  Goal: Plan of Care Review  Outcome: Ongoing (interventions implemented as appropriate)  Pt remains in ICU on room air now, O2 sats 95-97 at rest. 3 L taken off with dialysis. Up to bedside commode with one person assist, 2 soft BM's this shift. 125 ml of dark ruth urine. Remains aferile. Speech evaluated swallowing and recommended dysphagia soft diet with nectar thick liquids. Denies any pain. Safety maintained.

## 2019-05-09 NOTE — PLAN OF CARE
05/08/19 2116   PRE-TX-O2   SpO2 100 %   Pulse (!) 59   Resp 20   Preset CPAP/BiPAP Settings   Mode Of Delivery BiPAP   $ CPAP/BiPAP Daily Charge BiPAP/CPAP Daily   $ Initial CPAP/BiPAP Setup? Yes   $ Is patient using? Yes   Size of Mask Small/Medium   Sized Appropriately? Yes   Equipment Type V60   Airway Device Type medium full face mask   Humidifier not applicable   Ipap 18   EPAP (cm H2O) 8   Pressure Support (cm H2O) 10   Set Rate (Breaths/Min) 12   ITime (sec) 0.8   Rise Time (sec) 2   Patient CPAP/BiPAP Settings   RR Total (Breaths/Min) 18   Tidal Volume (mL) 990   VE Minute Ventilation (L/min) 17.8 L/min   Peak Inspiratory Pressure (cm H2O) 19   TiTOT (%) 20   Total Leak (L/Min) 26   Patient Trigger - ST Mode Only (%) 90

## 2019-05-09 NOTE — PLAN OF CARE
Problem: Adult Inpatient Plan of Care  Goal: Plan of Care Review  Outcome: Ongoing (interventions implemented as appropriate)  Pt on 2L NC with Q8 xopenex treatments and Bipap as needed at bedside on standby.

## 2019-05-09 NOTE — PROGRESS NOTES
Patient arrived from PCU via bed and transferred over to our unit bed. Patient was on 3LO2NC sating 98&, but stating he was sob. He was hooked up to all monitors and placed on bipap at 40%, sating 100% rr 18. Patient in NAD and bath given. Will continue to monitor closely.

## 2019-05-09 NOTE — PROGRESS NOTES
3 hour dialysis tx today.  No complications except patient had some mild hand cramping at end of tx.  Net uf 3L.

## 2019-05-09 NOTE — PLAN OF CARE
Problem: SLP Goal  Goal: SLP Goal    1) Participate in MBSS for further evaluation of swallow function    Clinical swallow evaluation completed. Pt presents with s.s possible pharyngeal dysphagia. +H/O esophageal dilation. REC Dental soft textures (IDDSI 6) with NECTAR thick liquids. ONE sip at a time, slow pace. REC MBSS for further evaluation. Please order if you agree. Will need to be completed at [facility] due to equipment repairs.     Shannon Burt MS, CCC/SLP 5/9/2019

## 2019-05-09 NOTE — PT/OT/SLP DISCHARGE
Physical Therapy Discharge Summary    Name: Frank Zayas  MRN: [id number]   Principal Problem: Acute on chronic diastolic congestive heart failure     Patient Discharged from acute Physical Therapy on 05-.  Please refer to prior PT noted date on 05- for functional status.     Assessment:     transferred to ICU 05-    Objective:     GOALS:   Multidisciplinary Problems     Physical Therapy Goals        Problem: Physical Therapy Goal    Goal Priority Disciplines Outcome Goal Variances Interventions   Physical Therapy Goal     PT, PT/OT Ongoing (interventions implemented as appropriate)     Description:  1.Pt will be independent with bed mobility and transfers.  2.Pt will ambulate 500' with AD with CGA.                    Reasons for Discontinuation of Therapy Services  ICU      Plan:     Patient Discharged to: ICU- reconsult once appropriate.    Reina Winter, PT  5/9/2019

## 2019-05-09 NOTE — PT/OT/SLP PROGRESS
Occupational Therapy      Patient Name:  Frank Zayas   MRN:  [id number]     Pt transferred to ICU and will need new orders when appropriate for OT. Will follow-up when new orders are submitted.    RUPINDER Lundy  5/9/2019

## 2019-05-09 NOTE — ASSESSMENT & PLAN NOTE
Body mass index is 39.2 kg/m².  General weight loss/lifestyle modification strategies discussed (elicit support from others; identify saboteurs; non-food rewards, etc).     bipap at hs for hypercarbia --will use prn   Has improved with volume removal  Consult PT to ambulate  Add IS am

## 2019-05-09 NOTE — PLAN OF CARE
Problem: Adult Inpatient Plan of Care  Goal: Plan of Care Review  Outcome: Outcome(s) achieved Date Met: 05/09/19  Ensured patient safety with frequent checks, assessing pain and monitoring respiratory status with patient on continuous bipap at 40%. Patient remains free of falls and skin intact. Wound care done to legs with serous drainage noted. Patient remains aaox4 throughout the shift and remains calm and cooperative. RCW trialysis cath in place and piv intact. Minimal uop noted and dark ruth urine clearing up to a dark yellow. Denies pain and will continue to monitor.

## 2019-05-09 NOTE — PROGRESS NOTES
"INPATIENT NEPHROLOGY PROGRESS NOTE  [facility] NEPHROLOGY    Patient Name: Frank Zayas  Date: 05/09/2019    Reason for consultation: MOLLY    Chief Complaint:   Chief Complaint   Patient presents with    Shortness of Breath     "due to increasing fluid"       History of Present Illness:  Patient known to Dr. Quinonez for CKD4, HTN, hyperkalemia on Veltassa is admitted for CHF exacerbation, fluid overload and gross hematuria, consulted for renal management.    · Interval History/Subjective:    - transferred to ICU for increased work of breathing and encephalopathy- mental status is now improved  - placed on BIPAP  - borderline T, HR 50s, SBP 120s, on 2L NC/BIPAP, UOP 425cc  - no cp; + dyspnea, abd distention, edema    · Review of Systems: All 14 systems reviewed and negative, except as noted in HPI    Plan of Care:    Assessment:  AoCKI, baseline stage IV CKD- suspect CRS + ischemic ATN  HTN/CHF (EF 45%) with volume overload/HHRF requiring BIPAP   Hyperkalemia  SHPT  Anemia 2/2 SHANDRA + CKD  Gross hematuria/Hx of bladder cancer    Plan:    - He was initiated on dialysis on 5/8 for worsening renal function with metabolic derangements, symptomatic volume overload, and oliguria. Plan for 2nd HD treatment today and 3rd HD treatment tomorrow. No NSAIDs or IV contrast.   - Will continue to work on fluid removal. Plan for 3L UF today and will try for 4L tomorrow. Continue 2g salt, 1.5L fluid restriction. ABG noted- predominant resp acidosis causing acidemia- continue BIPAP- likely explanation for encephalopathy along with uremia.   - HyperK is resolved with 2K bath.  - Phos is improving. If after 3 treatments he remains > 5.5, will add phos binder.  - Hb is stable. Continue iron tabs BID. If he requires more than 3 treatments, will start RAFAEL with HD.  - He c/w spann- urology following.    Thank you for allowing us to participate in this patient's care. We will continue to follow.    Medications:  No current " facility-administered medications on file prior to encounter.      Current Outpatient Medications on File Prior to Encounter   Medication Sig Dispense Refill    alfuzosin (UROXATRAL) 10 mg Tb24 Take 10 mg by mouth nightly.       allopurinol (ZYLOPRIM) 300 MG tablet 1 tablet      aspirin (ECOTRIN) 81 MG EC tablet Take 81 mg by mouth once daily.      clopidogrel (PLAVIX) 75 mg tablet Take 75 mg by mouth once daily.      coenzyme Q10 (CO Q-10) 100 mg capsule Take 100 mg by mouth once daily.      ezetimibe (ZETIA) 10 mg tablet Take 10 mg by mouth every evening.       FLUoxetine 20 MG capsule every evening.       gabapentin (NEURONTIN) 100 MG capsule every evening.       gemfibrozil (LOPID) 600 MG tablet Take 600 mg by mouth 2 (two) times daily.       insulin glargine (LANTUS) 100 unit/mL injection Inject 27 Units into the skin every evening.       metoprolol tartrate (LOPRESSOR) 25 MG tablet Take 25 mg by mouth 2 (two) times daily.      nateglinide (STARLIX) 60 MG tablet Take 60 mg by mouth every evening.       ropinirole (REQUIP) 2 MG tablet Take 2 mg by mouth nightly.       torsemide (DEMADEX) 20 MG tablet Take 20 mg by mouth once daily.       TRADJENTA 5 mg Tab tablet Take 5 mg by mouth once daily.  0    VELTASSA 16.8 gram PwPk MIX 1 PACKET IN LIQUID AND TAKE PO QD UTD  4    zinc 50 mg Tab Take 50 mg by mouth once daily. 1/2 tab daily      nitroGLYCERIN (NITROSTAT) 0.4 MG SL tablet Place 0.4 mg under the tongue every 5 (five) minutes as needed.        Scheduled Meds:   sodium chloride 0.9%   Intravenous Once    alfuzosin  10 mg Oral Nightly    aspirin  81 mg Oral Daily    clopidogrel  75 mg Oral Daily    cyanocobalamin  1,000 mcg Subcutaneous Once    ezetimibe  10 mg Oral QHS    ferrous sulfate  325 mg Oral BID WM    FLUoxetine  20 mg Oral QHS    gemfibrozil  600 mg Oral BID    insulin detemir U-100  20 Units Subcutaneous QHS    levalbuterol  0.63 mg Nebulization Q8H    metoprolol  tartrate  25 mg Oral BID    mupirocin   Nasal BID    rOPINIRole  2 mg Oral Nightly    senna-docusate 8.6-50 mg  1 tablet Oral BID    vitamin renal formula (B-complex-vitamin c-folic acid)  1 capsule Oral Daily     Continuous Infusions:  PRN Meds:.sodium chloride 0.9%, acetaminophen, dextrose 50%, dextrose 50%, glucagon (human recombinant), glucose, glucose, insulin aspart U-100, magnesium oxide, ondansetron, potassium chloride 10%, ramelteon, sodium chloride 0.9%    Allergies:  Lidocaine and Statins-hmg-coa reductase inhibitors    Vital Signs:  Temp Readings from Last 3 Encounters:   05/09/19 98.5 °F (36.9 °C) (Axillary)   04/24/19 98.6 °F (37 °C) (Oral)   04/08/19 97.9 °F (36.6 °C)       Pulse Readings from Last 3 Encounters:   05/09/19 72   04/24/19 76   04/08/19 63       BP Readings from Last 3 Encounters:   05/09/19 126/69   04/24/19 131/74   04/08/19 (!) 153/84       Weight:  Wt Readings from Last 3 Encounters:   05/08/19 (!) 138.5 kg (305 lb 5.4 oz)   04/24/19 129.3 kg (285 lb 0.9 oz)   04/08/19 129.3 kg (285 lb)       INS/OUTS:  I/O last 3 completed shifts:  In: 920 [P.O.:420; Other:500]  Out: 3030 [Urine:530; Other:2500]  No intake/output data recorded.    Physical Exam:  Constitutional: nad, aao x 3  Heart: rrr, estela, no r/g, wwp, anasarca  Lungs: coarse, no w/r/r/c, no lb  Abdomen: s/nt/d, +BS    Results:  Lab Results   Component Value Date     05/09/2019    K 4.9 05/09/2019    CL 98 05/09/2019    CO2 27 05/09/2019    BUN 67 (H) 05/09/2019    CREATININE 3.6 (H) 05/09/2019    CALCIUM 8.5 (L) 05/09/2019    ANIONGAP 11 05/09/2019    ESTGFRAFRICA 18 (A) 05/09/2019    EGFRNONAA 15 (A) 05/09/2019       Lab Results   Component Value Date    CALCIUM 8.5 (L) 05/09/2019    PHOS 6.2 (H) 05/09/2019       Recent Labs   Lab 05/09/19  0337   WBC 6.50   RBC 3.15*   HGB 9.6*   HCT 30.6*   *   MCV 97   MCH 30.6   MCHC 31.5*       I have personally reviewed pertinent radiological imaging and  reports.    Marivel Quinonez MD MPH  [facility] [place]  [address] [place] [address]  [phone] (p)  [phone] (f)

## 2019-05-09 NOTE — PT/OT/SLP EVAL
Speech Language Pathology Evaluation  Bedside Swallow    Patient Name:  Frank Zayas   MRN:  [id number]  Admitting Diagnosis: Acute on chronic diastolic congestive heart failure    Recommendations:                 General Recommendations:  Modified barium swallow study  Diet recommendations:  Dental Soft(IDDSI 6), [place] Thick   Aspiration Precautions: 1 bite/sip at a time, Alternating bites/sips, Meds whole 1 at a time and Small bites/sips   General Precautions: Standard, aspiration, fall, respiratory  Communication strategies:  none    History:     Past Medical History:   Diagnosis Date    Anticoagulant long-term use     Arthritis     Bladder cancer     Blood transfusion     CHF (congestive heart failure)     Chronic kidney disease     COPD (chronic obstructive pulmonary disease)     Coronary artery disease     Diabetes mellitus     Gout     [place] (hard of hearing)     HAS BILAT AIDS BUT DOES NOT WEAR    Hyperlipidemia     Hypertension     Myocardial infarction     RLS (restless legs syndrome)     Sleep apnea     NO MACHINE    Wears glasses        Past Surgical History:   Procedure Laterality Date    CARDIAC SURGERY      CABG X 2 1997    CATARACT EXTRACTION, BILATERAL      CHOLECYSTECTOMY      COLONOSCOPY      CORONARY ARTERY BYPASS GRAFT      x 2    coronary stents      CYSTOSCOPY      CYSTOSCOPY N/A 4/8/2019    [facility]    EXCISION-BLADDER TUMOR-TRANSURETHRAL (TURBT) N/A 9/8/2014    [facility]    EXCISION-BLADDER TUMOR-TRANSURETHRAL (TURBT) N/A 11/18/2013    [facility] OR    EYE SURGERY Bilateral     CATARACT    TURBT (TRANSURETHRAL RESECTION OF BLADDER TUMOR) N/A 4/8/2019    [facility] OR    VASECTOMY         Social History: Patient lives with spouse.    Prior Intubation HX:  None this admit    Modified Barium Swallow: None in Epic. Possibly had esophagram  "in past. None in Epic    Imaging:   X-Ray Chest 1 View   Final Result      Continued opacification right lower lung field appearing slightly decreased compared to the prior exam.      Final read         Electronically signed by: Anahi Galvez MD   Date:    05/09/2019   Time:    08:31      X-Ray Chest AP Portable   Final Result      Appropriately positioned central catheter.  Right perihilar infiltrate and small pleural effusion without significant change.         Electronically signed by: Pop Salas MD   Date:    05/08/2019   Time:    08:34      X-Ray Chest AP Portable   Final Result      Radiographic findings which suggest cardiac decompensation/heart failure.  Pneumonia and aspiration could theoretically produce a similar appearance.  Please correlate clinically.         Electronically signed by: Tommie Maldonado MD   Date:    05/05/2019   Time:    07:18      RADIOLOGY REPORT   Final Result           Prior diet: Regular/thin.    Subjective     Wife states, "He used to get his throat stretched."  Pt shares, "I choke. It gets stuck."    Objective:   Pt seen for clinical swallow evaluation. Transferred to ICU yesterday with respiratory distress. Nsg reports "coughing and clearing throat with everything." He is alert and cooperative with wife at bedside. He endorses dysphagia and h/o esophageal dilation in remote past. Wife states pt eats too quickly and frequently chokes.     Oral Musculature Evaluation  · Oral Musculature: WFL  · Dentition: present and adequate  · Secretion Management: adequate  · Mucosal Quality: good  · Mandibular Strength and Mobility: WFL  · Oral Labial Strength and Mobility: WFL  · Lingual Strength and Mobility: WFL  · Velar Elevation: WFL  · Buccal Strength and Mobility: WFL  · Volitional Cough: strong  · Volitional Swallow: able to palpate laryngeal rise  · Voice Prior to PO Intake: clear    Bedside Swallow Eval:   Consistencies Assessed:  · Thin liquids via tsp, cup, straw, continous " cup, 3 oz water test  · Nectar thick liquids via cup, straw, continous straw  · Puree --applesauce  · Mixed consistencies -diced peaches  · Solids -cookie     Oral Phase:   · WFL    Pharyngeal Phase:   · Coughing/choking following 3 oz water test  · Strong throat clearing noted with thin liquids via cup and straw  · All other PO trials consumed with no overt s/s penetration/aspiration with cues for single/small bites/sips and slow pace.    Compensatory Strategies  · Single sips     Treatment: Pt/family educated re: results/recs of evaluation, need for MBSS, SLP role and POC. Receptive to information provided.     Assessment:   Clinical swallow evaluation completed. Pt presents with s.s possible pharyngeal dysphagia. +H/O esophageal dilation. REC Dental soft textures (IDDSI 6) with NECTAR thick liquids. ONE sip at a time, slow pace. REC MBSS for further evaluation. Please order if you agree. Will need to be completed [facility] due to equipment repairs.     Goals:   Multidisciplinary Problems     SLP Goals        Problem: SLP Goal    Goal Priority Disciplines Outcome   SLP Goal     SLP    Description:    1) Participate in MBSS for further evaluation of swallow function                    Plan:     · Patient to be seen:  (pending results of MBSS)   · Plan of Care expires:     · Plan of Care reviewed with:  patient, spouse   · SLP Follow-Up:  Yes       Time Tracking:     SLP Treatment Date:   05/09/19  Speech Start Time:  1023  Speech Stop Time:  1043     Speech Total Time (min):  20 min    Billable Minutes: Eval Swallow and Oral Function 20 and Total Time 20    Shannon Burt CCC-SLP  05/09/2019

## 2019-05-09 NOTE — SUBJECTIVE & OBJECTIVE
Interval History: awake and alert-  Tolerated bipap overnight and weaned to nc oxygen  Tolerated HD today --day  2   Eating and bowels working  3500cc off in HD today     Review of Systems   Constitutional: Positive for activity change, appetite change and fatigue. Negative for chills and fever.   Respiratory: Negative for cough and shortness of breath.    Cardiovascular: Positive for leg swelling. Negative for chest pain and palpitations.   Gastrointestinal: Negative for abdominal distention, abdominal pain, constipation, diarrhea and vomiting.   Genitourinary: Positive for hematuria.        Huynh catheter present draining hematuria  Patient reports he had some hematuria about 3 or 4 weeks ago and then reoccurred after Huynh was placed   Musculoskeletal: Positive for gait problem. Negative for neck pain and neck stiffness.   Skin: Positive for wound.        Right lower extremity dressing intact   Neurological: Positive for weakness. Negative for seizures and speech difficulty.   Hematological: Negative for adenopathy. Bruises/bleeds easily.   Psychiatric/Behavioral: Positive for behavioral problems and confusion (AMS with hypercarbia).     Objective:     Vital Signs (Most Recent):  Temp: 98.4 °F (36.9 °C) (05/09/19 1315)  Pulse: 66 (05/09/19 1526)  Resp: (!) 27 (05/09/19 1526)  BP: (!) 109/55 (05/09/19 1445)  SpO2: 96 % (05/09/19 1526) Vital Signs (24h Range):  Temp:  [98.4 °F (36.9 °C)-99 °F (37.2 °C)] 98.4 °F (36.9 °C)  Pulse:  [54-86] 66  Resp:  [19-49] 27  SpO2:  [90 %-100 %] 96 %  BP: (103-129)/(51-69) 109/55     Weight: (!) 138.5 kg (305 lb 5.4 oz)  Body mass index is 39.2 kg/m².    Intake/Output Summary (Last 24 hours) at 5/9/2019 1700  Last data filed at 5/9/2019 0600  Gross per 24 hour   Intake 120 ml   Output 345 ml   Net -225 ml      Physical Exam   Constitutional: He is oriented to person, place, and time. He appears well-developed and well-nourished. No distress.   Obese.   HENT:   Head: Normocephalic  and atraumatic.   Eyes: Pupils are equal, round, and reactive to light. Conjunctivae and EOM are normal. Right eye exhibits no discharge. Left eye exhibits no discharge.   Neck: Normal range of motion. Neck supple.   Cardiovascular: Normal rate, normal heart sounds and intact distal pulses. An irregularly irregular rhythm present.   Pulses:       Dorsalis pedis pulses are 2+ on the right side, and 2+ on the left side.   Pulmonary/Chest: Effort normal. No accessory muscle usage. He has no wheezes. He has no rhonchi. He has rales.   Bilateral breath sounds with rales  Dyspnea on mild exertion   Abdominal: Soft. Bowel sounds are normal. He exhibits no distension. There is no tenderness. There is no guarding.   Genitourinary:   Genitourinary Comments: Huynh catheter noted draining hematuria   Musculoskeletal: Normal range of motion. He exhibits edema (2+ pitting BLE).   Bilateral lower extremity edema 3+   Neurological: He is alert and oriented to person, place, and time. He has normal strength. No cranial nerve deficit.   Skin: Skin is warm, dry and intact. Capillary refill takes less than 2 seconds. He is not diaphoretic.   Right lower extremity dressing intact   Psychiatric: He has a normal mood and affect. His speech is normal and behavior is normal. Judgment and thought content normal.   Vitals reviewed.      Significant Labs:   BMP:   Recent Labs   Lab 05/09/19  0337   GLU 89      K 4.9   CL 98   CO2 27   BUN 67*   CREATININE 3.6*   CALCIUM 8.5*     CBC:   Recent Labs   Lab 05/08/19  0421 05/09/19  0337   WBC 6.50 6.50   HGB 9.9* 9.6*   HCT 31.4* 30.6*   * 125*       Significant Imaging: I have reviewed and interpreted all pertinent imaging results/findings within the past 24 hours.

## 2019-05-09 NOTE — ASSESSMENT & PLAN NOTE
IN ICU  for pulm edema /hypoventilation   Orders as per Cardiology-currently on IV diuretics and now HD x 2 days for volume removal 2/2 associated  ckd4  Monitor O2 sats, supplemental O2 as needed  Monitor electrolyte and renal status-stable   Echocardiogram results noted-EF 45%  Daily weights. Strict I/Os. Fluid restriction.   Na restriction <2g/d.  Monitor electrolyte and renal status-creatinine currently trending upward -3.4 today

## 2019-05-09 NOTE — ASSESSMENT & PLAN NOTE
With cardiorenal syndrome.  Cardiovascular surgery consulted for Lucho placement.  Nephrology consulted.  He has been initiated on hemodialysis for ultrafiltration and volume removal.  Trend renal function closely

## 2019-05-09 NOTE — ASSESSMENT & PLAN NOTE
Telemetry  Continue home medications- including e ASA, Plavix, Metoprolol and Zetia.  Monitor closely for s/s of ACS.  Pt takes plavix only 3 x per week

## 2019-05-09 NOTE — ASSESSMENT & PLAN NOTE
Continue antihypertensives per home regimen, monitor BP closely, and titrate medication for sustained BP control.  Hypertension Medications             metoprolol tartrate (LOPRESSOR) 25 MG tablet Take 25 mg by mouth 2 (two) times daily.    nitroGLYCERIN (NITROSTAT) 0.4 MG SL tablet Place 0.4 mg under the tongue every 5 (five) minutes as needed.     torsemide (DEMADEX) 20 MG tablet Take 20 mg by mouth once daily.       Hospital Medications             metoprolol tartrate (LOPRESSOR) tablet 25 mg Take 1 tablet (25 mg total) by mouth 2 (two) times daily.

## 2019-05-09 NOTE — PROGRESS NOTES
05/09/19 1205   PRE-TX-O2   O2 Device (Oxygen Therapy) nasal cannula   $ Is the patient on Low Flow Oxygen? Yes   Flow (L/min) 1  (decreased to 1L from 2L for sats 98%)   SpO2 98 %

## 2019-05-10 PROBLEM — R13.13 DYSPHAGIA, PHARYNGEAL: Status: ACTIVE | Noted: 2019-01-01

## 2019-05-10 NOTE — PT/OT/SLP RE-EVAL
Physical Therapy Re-evaluation    Patient Name:  Frank Zayas   MRN:  [id number]    Recommendations:     Discharge Recommendations:  nursing facility, skilled   Discharge Equipment Recommendations: (TBD)   Barriers to discharge: None    Assessment:     Frank Zayas is a 79 y.o. male admitted with a medical diagnosis of Acute on chronic diastolic congestive heart failure.  He presents with the following impairments/functional limitations:  weakness, decreased upper extremity function, gait instability, impaired cardiopulmonary response to activity, impaired endurance, impaired balance, decreased lower extremity function, decreased safety awareness, impaired self care skills, impaired skin, impaired functional mobilty.  During PT re-eval, he demonstrated supine<>sit with supervision, and sit<>Stand with Min A and RW.  Increased time to gain initial standing balance.  Pt then ambulated 30ft with RW and CGA on 1L of O2.  Pt is recommended for SNF upon d/c.     Rehab Prognosis:  Good; patient would benefit from acute skilled PT services to address these deficits and reach maximum level of function.      Recent Surgery: * No surgery found *      Plan:     During this hospitalization, patient to be seen   to address the above listed problems via gait training, therapeutic activities, therapeutic exercises  · Plan of Care Expires:  05/24/19   Plan of Care Reviewed with: patient    Subjective     Communicated with nurse Francie prior to session.  Patient found HOB elevated with peripheral IV, telemetry, pulse ox (continuous), spann catheter, oxygen upon PT entry to room, agreeable to evaluation.      Chief Complaint: none  Patient comments/goals: none stated  Pain/Comfort:  · Pain Rating 1: 0/10    Patients cultural, spiritual, Episcopal conflicts given the current situation: no      Objective:     Patient found with: peripheral IV, telemetry, pulse ox (continuous), spann catheter, oxygen     General Precautions:  Standard, aspiration, fall, respiratory   Orthopedic Precautions:N/A   Braces: N/A     Exams:  · Cognitive Exam:  Patient is oriented to Person and Place  · Postural Exam:  Patient presented with the following abnormalities:    · -       Rounded shoulders  · -       Forward head  · RLE ROM: WFL  · RLE Strength: WFL  · LLE ROM: WFL  · LLE Strength: WFL   · Wound on R shin noted to be weeping. Nurse Francie notified.     Functional Mobility:  · Bed Mobility:     · Supine to Sit: supervision  · Transfers:     · Sit to Stand:  minimum assistance with rolling walker  · Gait: 30ft with RW and CGA on 1L of O2.  Distance limited due to fatigue.   · Balance: fair    AM-PAC 6 CLICK MOBILITY  Total Score:20       Patient left up in chair with all lines intact, call button in reach, chair alarm on and nurse Francie notified.    GOALS:   Multidisciplinary Problems     Physical Therapy Goals        Problem: Physical Therapy Goal    Goal Priority Disciplines Outcome Goal Variances Interventions   Physical Therapy Goal     PT, PT/OT Ongoing (interventions implemented as appropriate)     Description:  1.Pt will be independent with bed mobility and transfers.  2. Pt will perform sit<>Stand with RW and SBA  3.Pt will ambulate 100' with AD with CGA.   4. Pt will perform LE exercises x 10-20 reps independently.                    History:     Past Medical History:   Diagnosis Date    Anticoagulant long-term use     Arthritis     Bladder cancer     Blood transfusion     CHF (congestive heart failure)     Chronic kidney disease     COPD (chronic obstructive pulmonary disease)     Coronary artery disease     Diabetes mellitus     Gout     [place] (hard of hearing)     HAS BILAT AIDS BUT DOES NOT WEAR    Hyperlipidemia     Hypertension     Myocardial infarction     RLS (restless legs syndrome)     Sleep apnea     NO MACHINE    Wears glasses        Past Surgical History:   Procedure Laterality Date    CARDIAC SURGERY      CABG X 2  1997    CATARACT EXTRACTION, BILATERAL      CHOLECYSTECTOMY      COLONOSCOPY      CORONARY ARTERY BYPASS GRAFT      x 2    coronary stents      CYSTOSCOPY      CYSTOSCOPY N/A 4/8/2019    [facility] OR    EXCISION-BLADDER TUMOR-TRANSURETHRAL (TURBT) N/A 9/8/2014    [facility] at [facility] OR    EXCISION-BLADDER TUMOR-TRANSURETHRAL (TURBT) N/A 11/18/2013    Performed by [facility] at [facility] OR    EYE SURGERY Bilateral     CATARACT    TURBT (TRANSURETHRAL RESECTION OF BLADDER TUMOR) N/A 4/8/2019    Performed by [facility] at [facility] OR    VASECTOMY         Time Tracking:     PT Received On: 05/10/19  PT Start Time: 1335     PT Stop Time: 1358  PT Total Time (min): 23 min     Billable Minutes: Re-eval 10 and Gait Training 13       Norma Salomon, PT  05/10/2019

## 2019-05-10 NOTE — NURSING
Pt had HD TX this am, uf net 3500ml off pt, post vss , pt voiced no complaints, report given to LEELEE Brown rn

## 2019-05-10 NOTE — ASSESSMENT & PLAN NOTE
Continue renal multivitamin  Check iron and vitamin B levels  Check stool OCB  Monitor and transfuse if he becomes hemodynamically unstable or H/H < 7/21  epo per nephrology

## 2019-05-10 NOTE — PLAN OF CARE
05/10/19 0056   Patient Assessment/Suction   Level of Consciousness (AVPU) alert   Respiratory Effort Unlabored   Expansion/Accessory Muscles/Retractions no use of accessory muscles   All Lung Fields Breath Sounds diminished;clear   PRE-TX-O2   O2 Device (Oxygen Therapy) nasal cannula   $ Is the patient on Low Flow Oxygen? Yes   Flow (L/min) 2   SpO2 95 %   Pulse 84   Resp 20   Aerosol Therapy   $ Aerosol Therapy Charges Aerosol Treatment   Respiratory Treatment Status (SVN) given   Treatment Route (SVN) mask;oxygen   Patient Position (SVN) semi-Ma's   Post Treatment Assessment (SVN) breath sounds unchanged   Signs of Intolerance (SVN) none   Breath Sounds Post-Respiratory Treatment   Throughout All Fields Post-Treatment All Fields   Throughout All Fields Post-Treatment no change   Post-treatment Heart Rate (beats/min) 76   Post-treatment Resp Rate (breaths/min) 22   Wound Care   $ Wound Care Tech Time 15 min   Area of Concern Left;Right;Cheek;Bridge of nose;Chin   Skin Color/Characteristics without discoloration  (skin is intact)   Skin Temperature warm   Preset CPAP/BiPAP Settings   Mode Of Delivery Standby  (Pt is wake at this time and not ready to sleep. )   Pt tolerates NC and treatments well. Pt is not ready for BiPAP at this time.

## 2019-05-10 NOTE — PROGRESS NOTES
[facility] Hospital Medicine  Progress Note    Patient Name: Frank Zayas  MRN: [id number]  Patient Class: IP- Inpatient   Admission Date: 5/4/2019  Length of Stay: 6 days  Attending Physician: Katerin Kaufman MD  Primary Care Provider: Mikhail Shields MD        Subjective:     Principal Problem:Acute on chronic diastolic congestive heart failure    HPI:  This is a 79-year-old male with PMHx significant for HTN, HLD, MI, CHF, COPD, DM, CKD, gout, arthritis, and bladder CA.  He presented to the ED with a progressively worsening SOB.  He stated that his SOB has been going on for the past few months, but has worsened in the last 3 days.  Symptoms are associated with bilateral lower extremity leg swelling. Exertion exacerbates his symptoms.  He states that he has been compliant with his medications and dietary restrictions.  He was given Lasix intravenously in the ED reports that he does feel better.  He reports that he has had a cough productive of clear mucus and felt nauseated earlier today.  He denied fever, chills, sweats, chest pain, wheezing, abdominal pain, vomiting, diarrhea, dysuria, and any other symptoms at this time.  The patient was evaluated in the emergency room where he was noted to have signs of acute heart failure and new onset of atrial fib.  Patient was admitted for further evaluation and treatment.    Hospital Course:  The patient was monitored closely during his stay.  He was placed on continuous telemetry monitor.  Cardiology was consulted.  Patient was placed on supplemental O2 and IV diuretics for his acute heart failure.  An echocardiogram was -noted EF 45% and DD.  He was continued on metoprolol for his new onset of atrial fib which later converted back to sinus rhythm. He was transferred to ICU 5/7 overnight for bipap for hypercarbic resp failure   Nephrology was consulted for chronic kidney disease stage 4 with acute of volume overload. He had HD 5/7, 5/8 for volume  removal after failure with diuresis.     Patient also noted to have some new onset of hematuria and Urology was consulted.  Physical therapy was consulted for debility.        Interval History:   Patient seen and examined.  Sitting up and doing well during dialysis.  3.5 L removed.  Reports she rested well.  Shortness of breath and swelling much improved.  Ambulated a few steps with PT.  Review of Systems   Constitutional: Positive for activity change, appetite change and fatigue. Negative for chills and fever.   Respiratory: Negative for cough and shortness of breath.    Cardiovascular: Positive for leg swelling. Negative for chest pain and palpitations.   Gastrointestinal: Negative for abdominal distention, abdominal pain, constipation, diarrhea and vomiting.   Genitourinary: Positive for hematuria.        Huynh catheter present draining hematuria initially and now improved  Patient reports he had some hematuria about 3 or 4 weeks ago and then reoccurred after Huynh was placed   Musculoskeletal: Positive for gait problem. Negative for neck pain and neck stiffness.   Skin: Positive for wound.        Right lower extremity dressing intact   Neurological: Positive for weakness. Negative for seizures and speech difficulty.   Hematological: Negative for adenopathy. Bruises/bleeds easily.   Psychiatric/Behavioral: Negative for behavioral problems and confusion (AMS with hypercarbia).     Objective:     Vital Signs (Most Recent):  Temp: 97.9 °F (36.6 °C) (05/10/19 1150)  Pulse: (!) 59 (05/10/19 1505)  Resp: (!) 33 (05/10/19 1505)  BP: 128/61 (05/10/19 1505)  SpO2: 100 % (05/10/19 1505) Vital Signs (24h Range):  Temp:  [97.8 °F (36.6 °C)-98.5 °F (36.9 °C)] 97.9 °F (36.6 °C)  Pulse:  [51-87] 59  Resp:  [20-39] 33  SpO2:  [88 %-100 %] 100 %  BP: (104-151)/(51-75) 128/61     Weight: (!) 138.5 kg (305 lb 5.4 oz)  Body mass index is 39.2 kg/m².    Intake/Output Summary (Last 24 hours) at 5/10/2019 0645  Last data filed at  5/10/2019 1150  Gross per 24 hour   Intake 982 ml   Output 4150 ml   Net -3168 ml      Physical Exam   Constitutional: He is oriented to person, place, and time. He appears well-developed and well-nourished. No distress.   Obese.   HENT:   Head: Normocephalic and atraumatic.   Eyes: Pupils are equal, round, and reactive to light. Conjunctivae and EOM are normal. Right eye exhibits no discharge. Left eye exhibits no discharge.   Neck: Normal range of motion. Neck supple.   Cardiovascular: Normal rate, normal heart sounds and intact distal pulses. An irregularly irregular rhythm present.   Pulses:       Dorsalis pedis pulses are 2+ on the right side, and 2+ on the left side.   Pulmonary/Chest: Effort normal. No accessory muscle usage. He has decreased breath sounds in the right lower field and the left lower field. He has no wheezes. He has no rhonchi. He has rales in the right lower field.   Abdominal: Soft. Bowel sounds are normal. He exhibits no distension. There is no tenderness. There is no guarding.   Genitourinary:   Genitourinary Comments: Huynh catheter noted draining hematuria   Musculoskeletal: Normal range of motion. He exhibits edema (2+ pitting BLE).   Bilateral lower extremity edema 3+   Neurological: He is alert and oriented to person, place, and time. He has normal strength. No cranial nerve deficit.   Skin: Skin is warm, dry and intact. Capillary refill takes less than 2 seconds. He is not diaphoretic.   Right lower extremity dressing intact   Psychiatric: He has a normal mood and affect. His speech is normal and behavior is normal. Judgment and thought content normal.   Vitals reviewed.      Significant Labs:   BMP:   Recent Labs   Lab 05/10/19  0333   *      K 3.9   CL 99   CO2 29   BUN 50*   CREATININE 3.3*   CALCIUM 8.3*   MG 2.1     CBC:   Recent Labs   Lab 05/09/19  0337 05/10/19  0333   WBC 6.50 6.00   HGB 9.6* 9.3*   HCT 30.6* 28.8*   * 98*       Significant Imaging: I  have reviewed and interpreted all pertinent imaging results/findings within the past 24 hours.    Assessment/Plan:      * Acute on chronic diastolic congestive heart failure  IN ICU  for pulm edema /hypoventilation due to cardiorenal syndrome and obesity hypoventilation--much improved  Orders as per Cardiology-currently on IV diuretics and now HD x 3 days for volume removal 2/2 associated  ckd4  Monitor O2 sats, supplemental O2 as needed  Monitor electrolyte and renal status-stable   Echocardiogram results noted-EF 45%  Daily weights. Strict I/Os. Fluid restriction.   Na restriction <2g/d.            Acute hypercapnic respiratory failure    Acute --multifactorial as noted in all above -continue BiPAP at HS and p.r.n. as well as volume removal and incentive spirometry and physical therapy    Hyperkalemia  Renal diet.  Trend renal function.  K bath adjusted with HD      Encephalopathy, metabolic  Due to worsening of renal function.-resolved with improvement of renal function and dialysis as well as treatment of hypercarbia    Fall and delirium precautions    MOLLY (acute kidney injury)  With cardiorenal syndrome.  Cardiovascular surgery consulted for Lucho placement.  Nephrology consulted.  He has been initiated on hemodialysis for ultrafiltration and volume removal.  Trend renal function closely      Morbid (severe) obesity with alveolar hypoventilation  Body mass index is 39.2 kg/m².  General weight loss/lifestyle modification strategies discussed (elicit support from others; identify saboteurs; non-food rewards, etc).     bipap at hs for hypercarbia --will use prn -improved.  Has improved with volume removal  Consult PT to ambulate  Add IS am         Skin ulcer of left lower leg, limited to breakdown of skin  Wound care      BRANDEN (obstructive sleep apnea)  Patient reports he cannot tolerate CPAP or BiPAP-at home. Tolerating BIpap here       Debility  Fall and skin precautions  Continue physical therapy and  occupational therapy-follow recs for outpt      Hematuria  Monitor closely-history of bladder cancer  Discontinue Lovenox-Spann catheter remains in place  Urology consulted--  In terms of the transitional cell carcinoma of the   bladder we will have to hold off any further treatment until he has fully recovered from his current   condition and we will need to make a decision following consultation with   Oncology concerning the possibility of other treatment options for his   transitional cell carcinoma of the bladder  And removed spann when able--requires strict I/ O at present         Elevated troponin  Workup as per Cardiology      Mild malnutrition  Continue renal multivitamin/supplements recom.mended by dietician as indicated  Continue p.o. 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 Xopenex aerosol treatments        Coronary artery disease  Telemetry  Continue home medications- including e ASA, Plavix, Metoprolol and Zetia.  Monitor closely for s/s of ACS.  Pt takes plavix only 3 x per week       DM type 2 with diabetic mixed hyperlipidemia  Chronic, continue Zetia.   Lipid panel noted        Type 2 diabetes mellitus with kidney complication, with long-term current use of insulin  Chronic, stable-on  levermir and ISS ;   Nephrology consulted due to volume overload with history of chronic kidney disease stage 4 on IV diuretics          Hypertension associated with stage 4 chronic kidney disease due to type 2 diabetes mellitus  Continue  antihypertensives per home regimen, monitor BP closely, and titrate medication for sustained BP control.  On hemodialysis for fluid removal  Hypertension Medications             metoprolol tartrate (LOPRESSOR) 25 MG tablet Take 25 mg by mouth 2 (two) times daily.    nitroGLYCERIN (NITROSTAT) 0.4 MG SL tablet Place 0.4 mg under the tongue every 5 (five) minutes as needed.     torsemide (DEMADEX) 20 MG tablet Take 20 mg by mouth once daily.       Hospital Medications             metoprolol tartrate (LOPRESSOR) tablet 25 mg Take 1 tablet (25 mg total) by mouth 2 (two) times daily.            Type 2 diabetes mellitus  DM diet  Accu-Cheks with correctional sliding scale insulin  HGBA1C is 5.6  Blood sugars running 113-180      Malignant neoplasm of bladder  Follows with urology -current has Huynh because of hematuria and monitoring of INR.  Urine culture no growth.      VTE Risk Mitigation (From admission, onward)        Ordered     heparin (porcine) injection 4,000 Units  Once      05/09/19 1537     Place JACK hose  Until discontinued      05/05/19 1120     IP VTE HIGH RISK PATIENT  Once      05/04/19 2225          Critical care time spent on the evaluation and treatment of severe organ dysfunction, review of pertinent labs and imaging studies, discussions with consulting providers and discussions with patient/family: 35 minutes.    Katerin Kaufman MD  Department of Hospital Medicine   [facility]

## 2019-05-10 NOTE — PLAN OF CARE
Problem: Adult Inpatient Plan of Care  Goal: Plan of Care Review  Outcome: Ongoing (interventions implemented as appropriate)  POC discussed with patient, verbalized understanding. Patient with uneventful night, was restless with sleep until am bath given and then medicated with Tylenol and Rozerem for sleep, then slept well with bipap on. VS stable. No distress noted except for the shortness of breath with minimal exertion. Initially was on room air, but eventually changed to 02 @ 2L/NC when sats dipped down to 87-88  RA. Bilateral legs elevated on pillows,scant weeping noted from ulcers to lower extremities. No complaints of pain voiced. 1.5 FR. Minimal urine output noted in spann. Scheduled for HD today via [facility]. Did notice some coughing after swallowing a few pm pills, will monitor. Call light at bedside.

## 2019-05-10 NOTE — PROGRESS NOTES
05/10/19 0731   Patient Assessment/Suction   Level of Consciousness (AVPU) alert   All Lung Fields Breath Sounds diminished   Cough Frequency no cough   PRE-TX-O2   O2 Device (Oxygen Therapy) nasal cannula   $ Is the patient on Low Flow Oxygen? Yes   Flow (L/min) 2   SpO2 100 %   Pulse Oximetry Type Continuous   $ Pulse Oximetry - Multiple Charge Pulse Oximetry - Multiple   Pulse 69   Resp (!) 21   Aerosol Therapy   $ Aerosol Therapy Charges Aerosol Treatment   Respiratory Treatment Status (SVN) given   Treatment Route (SVN) mask   Patient Position (SVN) sitting in chair   Post Treatment Assessment (SVN) breath sounds unchanged   Signs of Intolerance (SVN) none   Breath Sounds Post-Respiratory Treatment   Post-treatment Heart Rate (beats/min) 71   Post-treatment Resp Rate (breaths/min) 22   Incentive Spirometer   $ Incentive Spirometer Charges done with encouragement;proper technique demonstrated   Administration (IS) instruction provided, initial;mouthpiece;proper technique demonstrated   Number of Repetitions (IS) 10   Level Incentive Spirometer (mL) 1000   Patient Tolerance (IS) good   Preset CPAP/BiPAP Settings   Mode Of Delivery Standby

## 2019-05-10 NOTE — PT/OT/SLP PROGRESS
Speech Language Pathology      Frank Zayas  MRN: [id number]    Patient not seen today secondary to dialysis. Awaiting MBSS orders.    Shannon Burt, SHORTY-SLP

## 2019-05-10 NOTE — ASSESSMENT & PLAN NOTE
Fall and skin precautions  Continue physical therapy and occupational therapy-follow recs for outpt

## 2019-05-10 NOTE — PLAN OF CARE
Problem: Physical Therapy Goal  Goal: Physical Therapy Goal  1.Pt will be independent with bed mobility and transfers.  2. Pt will perform sit<>Stand with RW and SBA  3.Pt will ambulate 100' with AD with CGA.   4. Pt will perform LE exercises x 10-20 reps independently.  Outcome: Ongoing (interventions implemented as appropriate)  Pt was able to ambulate 30ft with RW and CGA on 1L

## 2019-05-10 NOTE — ASSESSMENT & PLAN NOTE
Due to worsening of renal function.-resolved with improvement of renal function and dialysis as well as treatment of hypercarbia    Fall and delirium precautions

## 2019-05-10 NOTE — ASSESSMENT & PLAN NOTE
Continue renal multivitamin/supplements recom.mended by dietician as indicated  Continue p.o. Supplement-

## 2019-05-10 NOTE — SUBJECTIVE & OBJECTIVE
Interval History:   Patient seen and examined.  Sitting up and doing well during dialysis.  3.5 L removed.  Reports she rested well.  Shortness of breath and swelling much improved.  Ambulated a few steps with PT.  Review of Systems   Constitutional: Positive for activity change, appetite change and fatigue. Negative for chills and fever.   Respiratory: Negative for cough and shortness of breath.    Cardiovascular: Positive for leg swelling. Negative for chest pain and palpitations.   Gastrointestinal: Negative for abdominal distention, abdominal pain, constipation, diarrhea and vomiting.   Genitourinary: Positive for hematuria.        Huynh catheter present draining hematuria initially and now improved  Patient reports he had some hematuria about 3 or 4 weeks ago and then reoccurred after Huynh was placed   Musculoskeletal: Positive for gait problem. Negative for neck pain and neck stiffness.   Skin: Positive for wound.        Right lower extremity dressing intact   Neurological: Positive for weakness. Negative for seizures and speech difficulty.   Hematological: Negative for adenopathy. Bruises/bleeds easily.   Psychiatric/Behavioral: Negative for behavioral problems and confusion (AMS with hypercarbia).     Objective:     Vital Signs (Most Recent):  Temp: 97.9 °F (36.6 °C) (05/10/19 1150)  Pulse: (!) 59 (05/10/19 1505)  Resp: (!) 33 (05/10/19 1505)  BP: 128/61 (05/10/19 1505)  SpO2: 100 % (05/10/19 1505) Vital Signs (24h Range):  Temp:  [97.8 °F (36.6 °C)-98.5 °F (36.9 °C)] 97.9 °F (36.6 °C)  Pulse:  [51-87] 59  Resp:  [20-39] 33  SpO2:  [88 %-100 %] 100 %  BP: (104-151)/(51-75) 128/61     Weight: (!) 138.5 kg (305 lb 5.4 oz)  Body mass index is 39.2 kg/m².    Intake/Output Summary (Last 24 hours) at 5/10/2019 1656  Last data filed at 5/10/2019 1150  Gross per 24 hour   Intake 982 ml   Output 4150 ml   Net -3168 ml      Physical Exam   Constitutional: He is oriented to person, place, and time. He appears  well-developed and well-nourished. No distress.   Obese.   HENT:   Head: Normocephalic and atraumatic.   Eyes: Pupils are equal, round, and reactive to light. Conjunctivae and EOM are normal. Right eye exhibits no discharge. Left eye exhibits no discharge.   Neck: Normal range of motion. Neck supple.   Cardiovascular: Normal rate, normal heart sounds and intact distal pulses. An irregularly irregular rhythm present.   Pulses:       Dorsalis pedis pulses are 2+ on the right side, and 2+ on the left side.   Pulmonary/Chest: Effort normal. No accessory muscle usage. He has decreased breath sounds in the right lower field and the left lower field. He has no wheezes. He has no rhonchi. He has rales in the right lower field.   Abdominal: Soft. Bowel sounds are normal. He exhibits no distension. There is no tenderness. There is no guarding.   Genitourinary:   Genitourinary Comments: Huynh catheter noted draining hematuria   Musculoskeletal: Normal range of motion. He exhibits edema (2+ pitting BLE).   Bilateral lower extremity edema 3+   Neurological: He is alert and oriented to person, place, and time. He has normal strength. No cranial nerve deficit.   Skin: Skin is warm, dry and intact. Capillary refill takes less than 2 seconds. He is not diaphoretic.   Right lower extremity dressing intact   Psychiatric: He has a normal mood and affect. His speech is normal and behavior is normal. Judgment and thought content normal.   Vitals reviewed.      Significant Labs:   BMP:   Recent Labs   Lab 05/10/19  0333   *      K 3.9   CL 99   CO2 29   BUN 50*   CREATININE 3.3*   CALCIUM 8.3*   MG 2.1     CBC:   Recent Labs   Lab 05/09/19  0337 05/10/19  0333   WBC 6.50 6.00   HGB 9.6* 9.3*   HCT 30.6* 28.8*   * 98*       Significant Imaging: I have reviewed and interpreted all pertinent imaging results/findings within the past 24 hours.

## 2019-05-10 NOTE — ASSESSMENT & PLAN NOTE
Acute --multifactorial as noted in all above -continue BiPAP at HS and p.r.n. as well as volume removal and incentive spirometry and physical therapy

## 2019-05-10 NOTE — ASSESSMENT & PLAN NOTE
Body mass index is 39.2 kg/m².  General weight loss/lifestyle modification strategies discussed (elicit support from others; identify saboteurs; non-food rewards, etc).     bipap at hs for hypercarbia --will use prn -improved.  Has improved with volume removal  Consult PT to ambulate  Add IS am

## 2019-05-10 NOTE — ASSESSMENT & PLAN NOTE
Monitor closely-history of bladder cancer  Discontinue Lovenox-Spann catheter remains in place  Urology consulted--  In terms of the transitional cell carcinoma of the   bladder we will have to hold off any further treatment until he has fully recovered from his current   condition and we will need to make a decision following consultation with   Oncology concerning the possibility of other treatment options for his   transitional cell carcinoma of the bladder  And removed spann when able--requires strict I/ O at present

## 2019-05-10 NOTE — ASSESSMENT & PLAN NOTE
IN ICU  for pulm edema /hypoventilation due to cardiorenal syndrome and obesity hypoventilation--much improved  Orders as per Cardiology-currently on IV diuretics and now HD x 3 days for volume removal 2/2 associated  ckd4  Monitor O2 sats, supplemental O2 as needed  Monitor electrolyte and renal status-stable   Echocardiogram results noted-EF 45%  Daily weights. Strict I/Os. Fluid restriction.   Na restriction <2g/d.

## 2019-05-10 NOTE — ASSESSMENT & PLAN NOTE
Continue antihypertensives per home regimen, monitor BP closely, and titrate medication for sustained BP control.  On hemodialysis for fluid removal  Hypertension Medications             metoprolol tartrate (LOPRESSOR) 25 MG tablet Take 25 mg by mouth 2 (two) times daily.    nitroGLYCERIN (NITROSTAT) 0.4 MG SL tablet Place 0.4 mg under the tongue every 5 (five) minutes as needed.     torsemide (DEMADEX) 20 MG tablet Take 20 mg by mouth once daily.       Hospital Medications             metoprolol tartrate (LOPRESSOR) tablet 25 mg Take 1 tablet (25 mg total) by mouth 2 (two) times daily.

## 2019-05-10 NOTE — ASSESSMENT & PLAN NOTE
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

## 2019-05-10 NOTE — PROGRESS NOTES
"INPATIENT NEPHROLOGY PROGRESS NOTE  [facility] NEPHROLOGY    Patient Name: Frank Zayas  Date: 05/10/2019    Reason for consultation: MOLLY    Chief Complaint:   Chief Complaint   Patient presents with    Shortness of Breath     "due to increasing fluid"       History of Present Illness:  Patient known to Dr. Quinonez for CKD4, HTN, hyperkalemia on Veltassa is admitted for CHF exacerbation, fluid overload and gross hematuria, consulted for renal management.    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 Plan 3rd HD treatment today and 4rth HD treatment tomorrow. No NSAIDs or IV contrast.   - Will continue to work on fluid removal. Plan for 4L UF today and tomorrow. Continue 2g salt, 1.5L fluid restriction. Continue NIPPV.   - Phos is at goal. He doesn't need a phos binder.  - Hb is stable. Continue iron tabs BID. If he requires HD on Monday, will start RAFAEL with HD.   - He c/w spann- urology following.    Thank you for allowing us to participate in this patient's care. We will continue to follow.    Medications:  No current facility-administered medications on file prior to encounter.      Current Outpatient Medications on File Prior to Encounter   Medication Sig Dispense Refill    alfuzosin (UROXATRAL) 10 mg Tb24 Take 10 mg by mouth nightly.       allopurinol (ZYLOPRIM) 300 MG tablet 1 tablet      " aspirin (ECOTRIN) 81 MG EC tablet Take 81 mg by mouth once daily.      clopidogrel (PLAVIX) 75 mg tablet Take 75 mg by mouth once daily.      coenzyme Q10 (CO Q-10) 100 mg capsule Take 100 mg by mouth once daily.      ezetimibe (ZETIA) 10 mg tablet Take 10 mg by mouth every evening.       FLUoxetine 20 MG capsule every evening.       gabapentin (NEURONTIN) 100 MG capsule every evening.       gemfibrozil (LOPID) 600 MG tablet Take 600 mg by mouth 2 (two) times daily.       insulin glargine (LANTUS) 100 unit/mL injection Inject 27 Units into the skin every evening.       metoprolol tartrate (LOPRESSOR) 25 MG tablet Take 25 mg by mouth 2 (two) times daily.      nateglinide (STARLIX) 60 MG tablet Take 60 mg by mouth every evening.       ropinirole (REQUIP) 2 MG tablet Take 2 mg by mouth nightly.       torsemide (DEMADEX) 20 MG tablet Take 20 mg by mouth once daily.       TRADJENTA 5 mg Tab tablet Take 5 mg by mouth once daily.  0    VELTASSA 16.8 gram PwPk MIX 1 PACKET IN LIQUID AND TAKE PO QD UTD  4    zinc 50 mg Tab Take 50 mg by mouth once daily. 1/2 tab daily      nitroGLYCERIN (NITROSTAT) 0.4 MG SL tablet Place 0.4 mg under the tongue every 5 (five) minutes as needed.        Scheduled Meds:   alfuzosin  10 mg Oral Nightly    aspirin  81 mg Oral QHS    clopidogrel  75 mg Oral Daily    cyanocobalamin  1,000 mcg Subcutaneous Once    ezetimibe  10 mg Oral QHS    ferrous sulfate  325 mg Oral BID WM    FLUoxetine  20 mg Oral QHS    gemfibrozil  600 mg Oral BID    heparin (porcine)  4,000 Units Intra-Catheter Once    insulin detemir U-100  20 Units Subcutaneous QHS    levalbuterol  0.63 mg Nebulization Q8H    metoprolol tartrate  25 mg Oral BID    mupirocin   Nasal BID    rOPINIRole  2 mg Oral Nightly    senna-docusate 8.6-50 mg  1 tablet Oral BID    vitamin renal formula (B-complex-vitamin c-folic acid)  1 capsule Oral Daily     Continuous Infusions:  PRN Meds:.sodium chloride 0.9%,  acetaminophen, dextrose 50%, dextrose 50%, glucagon (human recombinant), glucose, glucose, insulin aspart U-100, magnesium oxide, ondansetron, potassium chloride 10%, ramelteon, sodium chloride 0.9%    Allergies:  Lidocaine and Statins-hmg-coa reductase inhibitors    Vital Signs:  Temp Readings from Last 3 Encounters:   05/10/19 97.8 °F (36.6 °C) (Axillary)   04/24/19 98.6 °F (37 °C) (Oral)   04/08/19 97.9 °F (36.6 °C)       Pulse Readings from Last 3 Encounters:   05/10/19 (!) 54   04/24/19 76   04/08/19 63       BP Readings from Last 3 Encounters:   05/10/19 (!) 118/55   04/24/19 131/74   04/08/19 (!) 153/84       Weight:  Wt Readings from Last 3 Encounters:   05/08/19 (!) 138.5 kg (305 lb 5.4 oz)   04/24/19 129.3 kg (285 lb 0.9 oz)   04/08/19 129.3 kg (285 lb)       INS/OUTS:  I/O last 3 completed shifts:  In: 1582 [P.O.:1082; Other:500]  Out: 3995 [Urine:495; Other:3500]  No intake/output data recorded.    Physical Exam:  Constitutional: nad, aao x 3  Heart: rrr, estela, no r/g, wwp, anasarca  Lungs: coarse, no w/r/r/c, no lb  Abdomen: s/nt/d, +BS    Results:  Lab Results   Component Value Date     05/10/2019    K 3.9 05/10/2019    CL 99 05/10/2019    CO2 29 05/10/2019    BUN 50 (H) 05/10/2019    CREATININE 3.3 (H) 05/10/2019    CALCIUM 8.3 (L) 05/10/2019    ANIONGAP 9 05/10/2019    ESTGFRAFRICA 20 (A) 05/10/2019    EGFRNONAA 17 (A) 05/10/2019       Lab Results   Component Value Date    CALCIUM 8.3 (L) 05/10/2019    PHOS 4.7 (H) 05/10/2019       Recent Labs   Lab 05/10/19  0333   WBC 6.00   RBC 2.99*   HGB 9.3*   HCT 28.8*   PLT 98*   MCV 96   MCH 31.1*   MCHC 32.2       I have personally reviewed pertinent radiological imaging and reports.    Marivel Quinonez MD MPH  [facility] [address] [address]  [phone] (p)  [phone] (f)

## 2019-05-10 NOTE — PLAN OF CARE
Problem: Adult Inpatient Plan of Care  Goal: Plan of Care Review  Outcome: Ongoing (interventions implemented as appropriate)  Pt remains in ICU with orders to transfer to floor. 3.5 L off today with dialysis. Pt ambulated to nursing station with physical therapy and up to chair for a few hours. Denies any chest pain. Had run v-tach, awaiting BMP and Mg results. Safety maintained.

## 2019-05-10 NOTE — ASSESSMENT & PLAN NOTE
Follows with urology -current has Huynh because of hematuria and monitoring of INR.  Urine culture no growth.

## 2019-05-11 NOTE — PT/OT/SLP PROGRESS
Physical Therapy Treatment    Patient Name:  Frank Zayas   MRN:  [id number]    Recommendations:     Discharge Recommendations:  nursing facility, skilled   Discharge Equipment Recommendations: walker, rolling, commode   Barriers to discharge: None    Assessment:     Frank Zayas is a 79 y.o. male admitted with a medical diagnosis of Acute on chronic diastolic congestive heart failure.  He presents with the following impairments/functional limitations:  weakness, gait instability, decreased upper extremity function, impaired cardiopulmonary response to activity, impaired balance, decreased lower extremity function, impaired endurance, decreased safety awareness, pain, impaired skin, impaired self care skills, impaired functional mobilty.  During PT tx, pt was able to perform supine<>sit with Min A, sit<>stand with RW and Min A.  He then ambulated 60ft x2 with CGA, RW, Chair follow, and 1L O2.  He took 1 seated rest break in between trials.  Pt noted to have a silver dollar size amount of bright red blood on the pad after standing up from the chair.  Nurse Daly notified.   After walking pt also has increased weeping from wound on anterior aspect of L shin.    Rehab Prognosis: Good; patient would benefit from acute skilled PT services to address these deficits and reach maximum level of function.    Recent Surgery: * No surgery found *      Plan:     During this hospitalization, patient to be seen 6 x/week to address the identified rehab impairments via gait training, therapeutic activities, therapeutic exercises and progress toward the following goals:    · Plan of Care Expires:  05/27/19    Subjective     Chief Complaint: none  Patient/Family Comments/goals: none stated  Pain/Comfort:  · Pain Rating 1: 0/10      Objective:     Communicated with nurse Daly prior to session.  Patient found HOB elevated with peripheral IV, telemetry, blood pressure cuff, pulse ox (continuous), spann catheter, oxygen upon PT  entry to room.     General Precautions: Standard, aspiration, fall, respiratory   Orthopedic Precautions:N/A   Braces: N/A     Functional Mobility:  · Bed Mobility:     · Supine to Sit: minimum assistance  · Transfers:     · Sit to Stand:  minimum assistance with rolling walker  · Gait: 60ft x 2  trials with RW, CGA, chair follow, and 1L O2.  L shin wound noted to increase weeping with ambulation.  · Balance: fair      AM-PAC 6 CLICK MOBILITY  Turning over in bed (including adjusting bedclothes, sheets and blankets)?: 4  Sitting down on and standing up from a chair with arms (e.g., wheelchair, bedside commode, etc.): 3  Moving from lying on back to sitting on the side of the bed?: 4  Moving to and from a bed to a chair (including a wheelchair)?: 3  Need to walk in hospital room?: 3  Climbing 3-5 steps with a railing?: 3  Basic Mobility Total Score: 20       Patient left up in chair with all lines intact, call button in reach, nurse Addy notified and present..    GOALS:   Multidisciplinary Problems     Physical Therapy Goals        Problem: Physical Therapy Goal    Goal Priority Disciplines Outcome Goal Variances Interventions   Physical Therapy Goal     PT, PT/OT Ongoing (interventions implemented as appropriate)     Description:  1.Pt will be independent with bed mobility and transfers.  2. Pt will perform sit<>Stand with RW and SBA  3.Pt will ambulate 100' with AD with CGA.   4. Pt will perform LE exercises x 10-20 reps independently.                    Time Tracking:     PT Received On: 05/11/19  PT Start Time: 0912     PT Stop Time: 0930  PT Total Time (min): 18 min     Billable Minutes: Gait Training 18    Treatment Type: Treatment  PT/PTA: PT     PTA Visit Number: 1     Norma Salomon, PT  05/11/2019

## 2019-05-11 NOTE — PLAN OF CARE
Problem: Physical Therapy Goal  Goal: Physical Therapy Goal  1.Pt will be independent with bed mobility and transfers.  2. Pt will perform sit<>Stand with RW and SBA  3.Pt will ambulate 100' with AD with CGA.   4. Pt will perform LE exercises x 10-20 reps independently.   Outcome: Ongoing (interventions implemented as appropriate)  Pt ambulated 60ft x 2 with RW and CGA on 1L

## 2019-05-11 NOTE — PLAN OF CARE
Problem: Adult Inpatient Plan of Care  Goal: Plan of Care Review  Outcome: Ongoing (interventions implemented as appropriate)  No UO this shift.  BM x1.  Afebrile.  Currently on 2L NC.  Pt's legs continue to weep.  POC discussed with pt.  Pt will be should be transferring to floor today.  Safety maintained entire shift.

## 2019-05-11 NOTE — PROGRESS NOTES
05/11/19 0707   Patient Assessment/Suction   Level of Consciousness (AVPU) alert   All Lung Fields Breath Sounds diminished   Cough Frequency no cough   PRE-TX-O2   O2 Device (Oxygen Therapy) nasal cannula   $ Is the patient on Low Flow Oxygen? Yes   Flow (L/min) 2   SpO2 98 %   Pulse Oximetry Type Continuous   $ Pulse Oximetry - Multiple Charge Pulse Oximetry - Multiple   Pulse 75   Resp (!) 21   Aerosol Therapy   $ Aerosol Therapy Charges Aerosol Treatment   Respiratory Treatment Status (SVN) given   Treatment Route (SVN) mask   Patient Position (SVN) HOB elevated   Post Treatment Assessment (SVN) breath sounds unchanged   Signs of Intolerance (SVN) none   Breath Sounds Post-Respiratory Treatment   Post-treatment Heart Rate (beats/min) 76   Post-treatment Resp Rate (breaths/min) 23   Incentive Spirometer   $ Incentive Spirometer Charges done with encouragement   Administration (IS) mouthpiece   Number of Repetitions (IS) 10   Level Incentive Spirometer (mL) 1000   Patient Tolerance (IS) good

## 2019-05-11 NOTE — PLAN OF CARE
Problem: Adult Inpatient Plan of Care  Goal: Plan of Care Review  Outcome: Ongoing (interventions implemented as appropriate)  Has eaten breakfast, 100%, and walkin in hallway with PT.  Up in chair.  Orders received for transfer to telemetry.  Transferred via wheelchair to room 213, with report given to DERRICK Mejia.  Tolerated activity well.

## 2019-05-11 NOTE — PROGRESS NOTES
"INPATIENT NEPHROLOGY PROGRESS NOTE  [facility] NEPHROLOGY    Patient Name: Frank Zayas  Date: 05/11/2019    Reason for consultation: MOLLY    Chief Complaint:   Chief Complaint   Patient presents with    Shortness of Breath     "due to increasing fluid"       History of Present Illness:  Patient known to Dr. Quinonez for CKD4, HTN, hyperkalemia on Veltassa is admitted for CHF exacerbation, fluid overload and gross hematuria, consulted for renal management.    · Interval History/Subjective:    -5/11 seen on dialysis.  No chest pain, sob, nausea.  He states he feels good.  His appetite is good.    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 Getting his fourth hd today.   No NSAIDs or IV contrast.    -erythropoiesis stimulating agent with renal replacement therapy  .   - Phos is at goal. He doesn't need a phos binder.  - Hb is stable. Continue iron tabs BID. If he requires HD on Monday,    - He c/w spann- urology following.  Hold dialysis tomorrow.  Reassess Monday       Thank you for allowing us to participate in this patient's care. We will continue to follow.    Medications:  No current facility-administered medications on file prior to encounter.      Current Outpatient Medications on File Prior to Encounter   Medication Sig Dispense Refill    alfuzosin (UROXATRAL) 10 mg Tb24 Take 10 mg by mouth nightly.       allopurinol (ZYLOPRIM) 300 MG tablet 1 tablet      aspirin (ECOTRIN) 81 MG EC tablet Take 81 mg by mouth once daily.      clopidogrel (PLAVIX) 75 mg tablet Take 75 mg by mouth once daily. "      coenzyme Q10 (CO Q-10) 100 mg capsule Take 100 mg by mouth once daily.      ezetimibe (ZETIA) 10 mg tablet Take 10 mg by mouth every evening.       FLUoxetine 20 MG capsule every evening.       gabapentin (NEURONTIN) 100 MG capsule every evening.       gemfibrozil (LOPID) 600 MG tablet Take 600 mg by mouth 2 (two) times daily.       insulin glargine (LANTUS) 100 unit/mL injection Inject 27 Units into the skin every evening.       metoprolol tartrate (LOPRESSOR) 25 MG tablet Take 25 mg by mouth 2 (two) times daily.      nateglinide (STARLIX) 60 MG tablet Take 60 mg by mouth every evening.       ropinirole (REQUIP) 2 MG tablet Take 2 mg by mouth nightly.       torsemide (DEMADEX) 20 MG tablet Take 20 mg by mouth once daily.       TRADJENTA 5 mg Tab tablet Take 5 mg by mouth once daily.  0    VELTASSA 16.8 gram PwPk MIX 1 PACKET IN LIQUID AND TAKE PO QD UTD  4    zinc 50 mg Tab Take 50 mg by mouth once daily. 1/2 tab daily      nitroGLYCERIN (NITROSTAT) 0.4 MG SL tablet Place 0.4 mg under the tongue every 5 (five) minutes as needed.        Scheduled Meds:   alfuzosin  10 mg Oral Nightly    aspirin  81 mg Oral QHS    clopidogrel  75 mg Oral Daily    ezetimibe  10 mg Oral QHS    ferrous sulfate  325 mg Oral BID WM    FLUoxetine  20 mg Oral QHS    gemfibrozil  600 mg Oral BID    heparin (porcine)  4,000 Units Intra-Catheter Once    insulin detemir U-100  20 Units Subcutaneous QHS    levalbuterol  0.63 mg Nebulization Q8H    metoprolol tartrate  25 mg Oral BID    mupirocin   Nasal BID    rOPINIRole  2 mg Oral Nightly    senna-docusate 8.6-50 mg  1 tablet Oral BID    vitamin renal formula (B-complex-vitamin c-folic acid)  1 capsule Oral Daily     Continuous Infusions:  PRN Meds:.sodium chloride 0.9%, acetaminophen, dextrose 50%, dextrose 50%, glucagon (human recombinant), glucose, glucose, insulin aspart U-100, magnesium oxide, nitroGLYCERIN, ondansetron, potassium chloride 10%, ramelteon,  sodium chloride 0.9%    Allergies:  Lidocaine and Statins-hmg-coa reductase inhibitors    Vital Signs:  Temp Readings from Last 3 Encounters:   05/11/19 96.2 °F (35.7 °C) (Tympanic)   04/24/19 98.6 °F (37 °C) (Oral)   04/08/19 97.9 °F (36.6 °C)       Pulse Readings from Last 3 Encounters:   05/11/19 (!) 42   04/24/19 76   04/08/19 63       BP Readings from Last 3 Encounters:   05/11/19 97/63   04/24/19 131/74   04/08/19 (!) 153/84       Weight:  Wt Readings from Last 3 Encounters:   05/11/19 (!) 139.1 kg (306 lb 10.6 oz)   04/24/19 129.3 kg (285 lb 0.9 oz)   04/08/19 129.3 kg (285 lb)       INS/OUTS:  I/O last 3 completed shifts:  In: 620 [P.O.:120; Other:500]  Out: 4050 [Urine:50; Other:4000]  No intake/output data recorded.    Physical Exam:  Constitutional: nad, aao x 3  Heart: rrr, estela, no r/g, wwp, anasarca  Lungs: coarse, no w/r/r/c, no lb  Abdomen: s/nt/d, +BS    Results:  Lab Results   Component Value Date     (L) 05/11/2019    K 4.0 05/11/2019    CL 98 05/11/2019    CO2 24 05/11/2019    BUN 39 (H) 05/11/2019    CREATININE 3.2 (H) 05/11/2019    CALCIUM 8.6 (L) 05/11/2019    ANIONGAP 13 05/11/2019    ESTGFRAFRICA 20 (A) 05/11/2019    EGFRNONAA 18 (A) 05/11/2019       Lab Results   Component Value Date    CALCIUM 8.6 (L) 05/11/2019    PHOS 4.9 (H) 05/11/2019       Recent Labs   Lab 05/11/19  0312   WBC 7.40   RBC 3.21*   HGB 9.9*   HCT 31.0*   *   MCV 97   MCH 30.9   MCHC 32.0       I have personally reviewed pertinent radiological imaging and reports.    Kalyan Babin MD  Nephrology  [facility]  [phone]

## 2019-05-11 NOTE — PROGRESS NOTES
HD:  Pt stable, tx complete, lines reinfused, catheter capped and clamped, dressing CDI.  Pt tolerated tx well, some mild cramping.    NET UF:  2000 mL

## 2019-05-11 NOTE — NURSING
Pt received to room 213 via wheelchair. Pt on 1.5 L NC- O2 sats 96%. Pt reports mild SOB. Huynh to gravity. /66. Bed alarm set. Will monitor.

## 2019-05-12 NOTE — PROGRESS NOTES
"INPATIENT NEPHROLOGY PROGRESS NOTE  [facility] NEPHROLOGY    Patient Name: Frank Zayas  Date: 05/12/2019    Reason for consultation: MOLLY    Chief Complaint:   Chief Complaint   Patient presents with    Shortness of Breath     "due to increasing fluid"       History of Present Illness:  Patient known to Dr. Quinonez for CKD4, HTN, hyperkalemia on Veltassa is admitted for CHF exacerbation, fluid overload and gross hematuria, consulted for renal management.    · Interval History/Subjective:    -5/11 seen on dialysis.  No chest pain, sob, nausea.  He states he feels good.  His appetite is good.  -5/12 sleeping,  No distress.  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 Getting his fourth hd today.   No NSAIDs or IV contrast.    -erythropoiesis stimulating agent with renal replacement therapy  .   - Phos is at goal. He doesn't need a phos binder.  - Hb is stable. Continue iron tabs BID. If he requires HD on Monday,  Consult for hemesplit  Holding hd today  -reassess for dialytic need tomorrow.           Thank you for allowing us to participate in this patient's care. We will continue to follow.    Medications:  No current facility-administered medications on file prior to encounter.      Current Outpatient Medications on File Prior to Encounter   Medication Sig Dispense Refill    alfuzosin (UROXATRAL) 10 mg Tb24 Take 10 mg by mouth nightly.       allopurinol (ZYLOPRIM) 300 MG tablet 1 tablet      aspirin (ECOTRIN) 81 MG EC tablet Take 81 mg by mouth once daily.      clopidogrel (PLAVIX) 75 mg tablet Take 75 mg by mouth once daily.      coenzyme " Q10 (CO Q-10) 100 mg capsule Take 100 mg by mouth once daily.      ezetimibe (ZETIA) 10 mg tablet Take 10 mg by mouth every evening.       FLUoxetine 20 MG capsule every evening.       gabapentin (NEURONTIN) 100 MG capsule every evening.       gemfibrozil (LOPID) 600 MG tablet Take 600 mg by mouth 2 (two) times daily.       insulin glargine (LANTUS) 100 unit/mL injection Inject 27 Units into the skin every evening.       metoprolol tartrate (LOPRESSOR) 25 MG tablet Take 25 mg by mouth 2 (two) times daily.      nateglinide (STARLIX) 60 MG tablet Take 60 mg by mouth every evening.       ropinirole (REQUIP) 2 MG tablet Take 2 mg by mouth nightly.       torsemide (DEMADEX) 20 MG tablet Take 20 mg by mouth once daily.       TRADJENTA 5 mg Tab tablet Take 5 mg by mouth once daily.  0    VELTASSA 16.8 gram PwPk MIX 1 PACKET IN LIQUID AND TAKE PO QD UTD  4    zinc 50 mg Tab Take 50 mg by mouth once daily. 1/2 tab daily      nitroGLYCERIN (NITROSTAT) 0.4 MG SL tablet Place 0.4 mg under the tongue every 5 (five) minutes as needed.        Scheduled Meds:   alfuzosin  10 mg Oral Nightly    aspirin  81 mg Oral QHS    clopidogrel  75 mg Oral Daily    ezetimibe  10 mg Oral QHS    ferrous sulfate  325 mg Oral BID WM    FLUoxetine  20 mg Oral QHS    gemfibrozil  600 mg Oral BID    heparin (porcine)  4,000 Units Intra-Catheter Once    insulin detemir U-100  20 Units Subcutaneous QHS    levalbuterol  0.63 mg Nebulization Q8H    metoprolol tartrate  25 mg Oral BID    mupirocin   Nasal BID    rOPINIRole  2 mg Oral Nightly    senna-docusate 8.6-50 mg  1 tablet Oral BID    vitamin renal formula (B-complex-vitamin c-folic acid)  1 capsule Oral Daily     Continuous Infusions:  PRN Meds:.sodium chloride 0.9%, acetaminophen, dextrose 50%, dextrose 50%, glucagon (human recombinant), glucose, glucose, insulin aspart U-100, magnesium oxide, nitroGLYCERIN, ondansetron, potassium chloride 10%, ramelteon, sodium chloride  0.9%    Allergies:  Lidocaine and Statins-hmg-coa reductase inhibitors    Vital Signs:  Temp Readings from Last 3 Encounters:   05/12/19 97.6 °F (36.4 °C) (Oral)   04/24/19 98.6 °F (37 °C) (Oral)   04/08/19 97.9 °F (36.6 °C)       Pulse Readings from Last 3 Encounters:   05/12/19 70   04/24/19 76   04/08/19 63       BP Readings from Last 3 Encounters:   05/12/19 120/62   04/24/19 131/74   04/08/19 (!) 153/84       Weight:  Wt Readings from Last 3 Encounters:   05/12/19 124.1 kg (273 lb 9.5 oz)   04/24/19 129.3 kg (285 lb 0.9 oz)   04/08/19 129.3 kg (285 lb)       INS/OUTS:  I/O last 3 completed shifts:  In: 860 [P.O.:360; Other:500]  Out: 2500 [Other:2500]  No intake/output data recorded.    Physical Exam:  Constitutional: nad, aao x 3  Heart: rrr, estela, no r/g, wwp, anasarca  Lungs: coarse, no w/r/r/c, no lb  Abdomen: s/nt/d, +BS    Results:  Lab Results   Component Value Date     (L) 05/12/2019    K 3.9 05/12/2019    CL 98 05/12/2019    CO2 28 05/12/2019    BUN 33 (H) 05/12/2019    CREATININE 3.1 (H) 05/12/2019    CALCIUM 8.8 05/12/2019    ANIONGAP 9 05/12/2019    ESTGFRAFRICA 21 (A) 05/12/2019    EGFRNONAA 18 (A) 05/12/2019       Lab Results   Component Value Date    CALCIUM 8.8 05/12/2019    PHOS 4.9 (H) 05/12/2019       Recent Labs   Lab 05/12/19  0542   WBC 7.90   RBC 3.22*   HGB 10.0*   HCT 31.1*   *   MCV 97   MCH 31.0   MCHC 32.0       I have personally reviewed pertinent radiological imaging and reports.    Kalyan Babin MD  Nephrology  [facility] [place]  [phone]

## 2019-05-12 NOTE — PLAN OF CARE
05/11/19 0037   Patient Assessment/Suction   Level of Consciousness (AVPU) alert   Respiratory Effort Normal;Unlabored   Expansion/Accessory Muscles/Retractions no use of accessory muscles   All Lung Fields Breath Sounds diminished   Rhythm/Pattern, Respiratory unlabored   PRE-TX-O2   O2 Device (Oxygen Therapy) nasal cannula   $ Is the patient on Low Flow Oxygen? Yes   Flow (L/min) 2   SpO2 98 %   Pulse 62   Resp (!) 22   Aerosol Therapy   $ Aerosol Therapy Charges Aerosol Treatment   Respiratory Treatment Status (SVN) given   Treatment Route (SVN) mask;oxygen   Patient Position (SVN) semi-Ma's   Post Treatment Assessment (SVN) breath sounds unchanged   Signs of Intolerance (SVN) none   Breath Sounds Post-Respiratory Treatment   Throughout All Fields Post-Treatment All Fields   Throughout All Fields Post-Treatment no change   Post-treatment Heart Rate (beats/min) 54   Post-treatment Resp Rate (breaths/min) 22   Incentive Spirometer   $ Incentive Spirometer Charges done independently per patient   Administration (IS) self-administered   Number of Repetitions (IS) 10   Level Incentive Spirometer (mL) 1000   Patient Tolerance (IS) good   Wound Care   $ Wound Care Tech Time 15 min   Area of Concern Left;Right;Cheek;Bridge of nose;Chin   Skin Color/Characteristics without discoloration   Skin Temperature warm   Preset CPAP/BiPAP Settings   Mode Of Delivery BiPAP  (Pt placed on BiPAP after TX.)   $ CPAP/BiPAP Daily Charge BiPAP/CPAP Daily   $ Initial CPAP/BiPAP Setup? No   $ Is patient using? Yes   Sized Appropriately? Yes   Equipment Type V60   Airway Device Type large full face mask   Ipap 18   EPAP (cm H2O) 8   Pressure Support (cm H2O) 10   Set Rate (Breaths/Min) 12   ITime (sec) 0.8   Rise Time (sec) 0.2   Patient CPAP/BiPAP Settings   RR Total (Breaths/Min) 22   Tidal Volume (mL) 675   VE Minute Ventilation (L/min) 14 L/min   Peak Inspiratory Pressure (cm H2O) 18   TiTOT (%) 27   Total Leak (L/Min) 45  (Pt  has facial hair.)   Patient Trigger - ST Mode Only (%) 89   CPAP/BiPAP Alarms   High Pressure (cm H2O) 31   Low Pressure (cm H2O) 5   Low Pressure Delay (Sec) 20   Minute Ventilation (L/Min) 2   High RR (breaths/min) 40   Low RR (breaths/min) 10   Pt tolerates NC, treatments, and BiPAP well.

## 2019-05-12 NOTE — PLAN OF CARE
Problem: Adult Inpatient Plan of Care  Goal: Absence of Hospital-Acquired Illness or Injury    Intervention: Prevent Skin Injury  Provided pt with clean bedding, assisted with perianal care and repositioned stat lock to increase slack and relieve pressure around penis.  Pt had very little urine output (unmeasurable) but did have urine drainage on pad.  Advised oncoming nurse to assess pad for leakage for better output measurement.

## 2019-05-12 NOTE — ASSESSMENT & PLAN NOTE
Improved--stable. Transferred out of icu   Acute --multifactorial as noted in all above -continue BiPAP at HS and p.r.n. as well as volume removal and incentive spirometry and physical therapy

## 2019-05-12 NOTE — PROGRESS NOTES
05/12/19 0724   Patient Assessment/Suction   Level of Consciousness (AVPU) alert   Respiratory Effort Normal;Unlabored   All Lung Fields Breath Sounds diminished   PRE-TX-O2   O2 Device (Oxygen Therapy) nasal cannula   $ Is the patient on Low Flow Oxygen? Yes   Flow (L/min) 2   Oxygen Concentration (%) 28   SpO2 99 %   Pulse Oximetry Type Intermittent   $ Pulse Oximetry - Multiple Charge Pulse Oximetry - Multiple   Pulse 70   Resp 18   Aerosol Therapy   $ Aerosol Therapy Charges Aerosol Treatment   Daily Review of Necessity (SVN) completed   Respiratory Treatment Status (SVN) given   Treatment Route (SVN) mask   Patient Position (SVN) Ma's   Post Treatment Assessment (SVN) breath sounds unchanged   Signs of Intolerance (SVN) none   Breath Sounds Post-Respiratory Treatment   Throughout All Fields Post-Treatment no change   Post-treatment Heart Rate (beats/min) 70   Post-treatment Resp Rate (breaths/min) 18   Incentive Spirometer   $ Incentive Spirometer Charges done with encouragement   Administration (IS) instruction provided, follow-up   Number of Repetitions (IS) 10   Level Incentive Spirometer (mL) 1000   Patient Tolerance (IS) good   Xop .63 Q8, IS Q2, tolerated tx well, vitals as charted.

## 2019-05-12 NOTE — SUBJECTIVE & OBJECTIVE
Interval History:   sleepy-  Aroused and oriented x 3. Reports feeling much better and got up with PT   Will dc spann today -after reviewing Dr Vasquez note-can be dc once hematuria resolved and heart failure improved   Weight down 10 plus kg     Review of Systems   Constitutional: Positive for activity change, appetite change and fatigue. Negative for chills and fever.   Respiratory: Negative for cough and shortness of breath.    Cardiovascular: Positive for leg swelling. Negative for chest pain and palpitations.   Gastrointestinal: Negative for abdominal distention, abdominal pain, constipation, diarrhea and vomiting.   Genitourinary: Negative for hematuria.        Spann catheter present draining hematuria initially and now improved  Patient reports he had some hematuria about 3 or 4 weeks ago and then reoccurred after Spann was placed   Musculoskeletal: Positive for gait problem. Negative for neck pain and neck stiffness.   Skin: Positive for wound.        Right lower extremity dressing intact   Neurological: Positive for weakness. Negative for seizures and speech difficulty.   Hematological: Negative for adenopathy. Bruises/bleeds easily.   Psychiatric/Behavioral: Negative for behavioral problems and confusion (AMS with hypercarbia).     Objective:     Vital Signs (Most Recent):  Temp: 97.6 °F (36.4 °C) (05/12/19 0730)  Pulse: 70 (05/12/19 0730)  Resp: 18 (05/12/19 0730)  BP: 120/62 (05/12/19 0730)  SpO2: 99 % (05/12/19 0730) Vital Signs (24h Range):  Temp:  [96.1 °F (35.6 °C)-97.6 °F (36.4 °C)] 97.6 °F (36.4 °C)  Pulse:  [40-77] 70  Resp:  [18-20] 18  SpO2:  [95 %-99 %] 99 %  BP: ()/(47-95) 120/62     Weight: 124.1 kg (273 lb 9.5 oz)  Body mass index is 35.13 kg/m².    Intake/Output Summary (Last 24 hours) at 5/12/2019 1045  Last data filed at 5/12/2019 0500  Gross per 24 hour   Intake 860 ml   Output 2500 ml   Net -1640 ml      Physical Exam   Constitutional: He is oriented to person, place, and time. He  appears well-developed and well-nourished. No distress.   Obese.   HENT:   Head: Normocephalic and atraumatic.   Eyes: Pupils are equal, round, and reactive to light. Conjunctivae and EOM are normal. Right eye exhibits no discharge. Left eye exhibits no discharge.   Neck: Normal range of motion. Neck supple.   Cardiovascular: Normal rate, normal heart sounds and intact distal pulses. An irregularly irregular rhythm present.   Pulses:       Dorsalis pedis pulses are 2+ on the right side, and 2+ on the left side.   Pulmonary/Chest: Effort normal. No accessory muscle usage. He has decreased breath sounds in the right lower field and the left lower field. He has no wheezes. He has no rhonchi. He has rales in the right lower field.   Abdominal: Soft. Bowel sounds are normal. He exhibits no distension. There is no tenderness. There is no guarding.   Genitourinary:   Genitourinary Comments: Huynh catheter noted draining--clear yellow urine   Musculoskeletal: Normal range of motion. He exhibits edema (2+ pitting BLE).   Bilateral lower extremity edema 3+   Neurological: He is alert and oriented to person, place, and time. He has normal strength. No cranial nerve deficit.   Skin: Skin is warm, dry and intact. Capillary refill takes less than 2 seconds. He is not diaphoretic.   Right lower extremity dressing intact   Psychiatric: He has a normal mood and affect. His speech is normal and behavior is normal. Judgment and thought content normal.   Vitals reviewed.      Significant Labs:   BMP:   Recent Labs   Lab 05/10/19  1743  05/12/19  0542   *   < > 100   *   < > 135*   K 3.7   < > 3.9   CL 98   < > 98   CO2 27   < > 28   BUN 35*   < > 33*   CREATININE 2.8*   < > 3.1*   CALCIUM 8.5*   < > 8.8   MG 2.1  --   --     < > = values in this interval not displayed.     CBC:   Recent Labs   Lab 05/11/19  0312 05/12/19  0542   WBC 7.40 7.90   HGB 9.9* 10.0*   HCT 31.0* 31.1*   * 105*       Significant Imaging: I  have reviewed and interpreted all pertinent imaging results/findings within the past 24 hours.

## 2019-05-12 NOTE — PROGRESS NOTES
[facility] Hospital Medicine  Progress Note    Patient Name: Frank Zayas  MRN: [id number]  Patient Class: IP- Inpatient   Admission Date: 5/4/2019  Length of Stay: 8 days  Attending Physician: Katerin Kaufman MD  Primary Care Provider: Mikhail Shields MD        Subjective:     Principal Problem:Acute on chronic diastolic congestive heart failure    HPI:  This is a 79-year-old male with PMHx significant for HTN, HLD, MI, CHF, COPD, DM, CKD, gout, arthritis, and bladder CA.  He presented to the ED with a progressively worsening SOB.  He stated that his SOB has been going on for the past few months, but has worsened in the last 3 days.  Symptoms are associated with bilateral lower extremity leg swelling. Exertion exacerbates his symptoms.  He states that he has been compliant with his medications and dietary restrictions.  He was given Lasix intravenously in the ED reports that he does feel better.  He reports that he has had a cough productive of clear mucus and felt nauseated earlier today.  He denied fever, chills, sweats, chest pain, wheezing, abdominal pain, vomiting, diarrhea, dysuria, and any other symptoms at this time.  The patient was evaluated in the emergency room where he was noted to have signs of acute heart failure and new onset of atrial fib.  Patient was admitted for further evaluation and treatment.    Hospital Course:  The patient was monitored closely during his stay.  He was placed on continuous telemetry monitor.  Cardiology was consulted.  Patient was placed on supplemental O2 and IV diuretics for his acute heart failure.  An echocardiogram was -noted EF 45% and DD.  He was continued on metoprolol for his new onset of atrial fib which later converted back to sinus rhythm. He was transferred to ICU 5/7 overnight for bipap for hypercarbic resp failure   Nephrology was consulted for chronic kidney disease stage 4 with acute of volume overload. He had HD 5/7, 5/8 for volume  removal after failure with diuresis.     Patient also noted to have some new onset of hematuria and Urology was consulted.  Physical therapy was consulted for debility.        Interval History:    Patient seen examined.  No arrhythmias overnight.  No respiratory difficulty.    Review of Systems   Constitutional: Positive for activity change, appetite change and fatigue. Negative for chills and fever.   Respiratory: Negative for cough and shortness of breath.    Cardiovascular: Positive for leg swelling. Negative for chest pain and palpitations.   Gastrointestinal: Negative for abdominal distention, abdominal pain, constipation, diarrhea and vomiting.   Genitourinary: Positive for hematuria.        Huynh catheter present draining hematuria initially and now improved  Patient reports he had some hematuria about 3 or 4 weeks ago and then reoccurred after Huynh was placed   Musculoskeletal: Positive for gait problem. Negative for neck pain and neck stiffness.   Skin: Positive for wound.        Right lower extremity dressing intact   Neurological: Positive for weakness. Negative for seizures and speech difficulty.   Hematological: Negative for adenopathy. Bruises/bleeds easily.   Psychiatric/Behavioral: Negative for behavioral problems and confusion (AMS with hypercarbia).     Objective:     Vital Signs (Most Recent):  Temp: 96.6 °F (35.9 °C) (05/11/19 1420)  Pulse: (!) 46 (05/11/19 1420)  Resp: 20 (05/11/19 1420)  BP: (!) 111/47 (05/11/19 1420)  SpO2: 96 % (05/11/19 0815) Vital Signs (24h Range):  Temp:  [96.2 °F (35.7 °C)-98.7 °F (37.1 °C)] 96.6 °F (35.9 °C)  Pulse:  [] 46  Resp:  [17-41] 20  SpO2:  [89 %-100 %] 96 %  BP: ()/(47-95) 111/47     Weight: (!) 139.1 kg (306 lb 10.6 oz)  Body mass index is 39.37 kg/m².    Intake/Output Summary (Last 24 hours) at 5/11/2019 1443  Last data filed at 5/11/2019 1420  Gross per 24 hour   Intake 500 ml   Output 2525 ml   Net -2025 ml      Physical Exam   Constitutional:  He is oriented to person, place, and time. He appears well-developed and well-nourished. No distress.   Obese.   HENT:   Head: Normocephalic and atraumatic.   Eyes: Pupils are equal, round, and reactive to light. Conjunctivae and EOM are normal. Right eye exhibits no discharge. Left eye exhibits no discharge.   Neck: Normal range of motion. Neck supple.   Cardiovascular: Normal rate, normal heart sounds and intact distal pulses. An irregularly irregular rhythm present.   Pulses:       Dorsalis pedis pulses are 2+ on the right side, and 2+ on the left side.   Pulmonary/Chest: Effort normal. No accessory muscle usage. He has decreased breath sounds in the right lower field and the left lower field. He has no wheezes. He has no rhonchi. He has rales in the right lower field.   Abdominal: Soft. Bowel sounds are normal. He exhibits no distension. There is no tenderness. There is no guarding.   Genitourinary:   Genitourinary Comments: Huynh catheter noted draining--clear yellow urine   Musculoskeletal: Normal range of motion. He exhibits edema (2+ pitting BLE).   Bilateral lower extremity edema 3+   Neurological: He is alert and oriented to person, place, and time. He has normal strength. No cranial nerve deficit.   Skin: Skin is warm, dry and intact. Capillary refill takes less than 2 seconds. He is not diaphoretic.   Right lower extremity dressing intact   Psychiatric: He has a normal mood and affect. His speech is normal and behavior is normal. Judgment and thought content normal.   Vitals reviewed.      Significant Labs:   CBC:   Recent Labs   Lab 05/11/19 0312 05/12/19  0542   WBC 7.40 7.90   HGB 9.9* 10.0*   HCT 31.0* 31.1*   * 105*     CMP:   Recent Labs   Lab 05/10/19  1743 05/11/19 0312 05/12/19  0542   * 135* 135*   K 3.7 4.0 3.9   CL 98 98 98   CO2 27 24 28   * 113* 100   BUN 35* 39* 33*   CREATININE 2.8* 3.2* 3.1*   CALCIUM 8.5* 8.6* 8.8   ALBUMIN  --  2.8* 2.9*   ANIONGAP 10 13 9    EGFRNONAA 21* 18* 18*       Significant Imaging: I have reviewed and interpreted all pertinent imaging results/findings within the past 24 hours.    Assessment/Plan:      * Acute on chronic diastolic congestive heart failure  IN ICU  for pulm edema /hypoventilation due to cardiorenal syndrome and obesity hypoventilation--much improved  Orders as per Cardiology-currently on IV diuretics and now HD x 3 days for volume removal 2/2 associated  ckd4  Monitor O2 sats, supplemental O2 as needed  Monitor electrolyte and renal status-stable   Echocardiogram results noted-EF 45%  Daily weights. Strict I/Os. Fluid restriction.   Na restriction <2g/d.            Acute hypercapnic respiratory failure  Improved--stable. Transferred out of icu   Acute --multifactorial as noted in all above -continue BiPAP at HS and p.r.n. as well as volume removal and incentive spirometry and physical therapy    Hyperkalemia  Renal diet.  Trend renal function.  K bath adjusted with HD      Encephalopathy, metabolic  Due to worsening of renal function.-resolved with improvement of renal function and dialysis as well as treatment of hypercarbia    Fall and delirium precautions    MOLLY (acute kidney injury)  With cardiorenal syndrome.  Cardiovascular surgery consulted for Lucho placement.  Nephrology consulted.  He has been initiated on hemodialysis for ultrafiltration and volume removal.  Trend renal function closely      Morbid (severe) obesity with alveolar hypoventilation  Body mass index is 39.2 kg/m².  General weight loss/lifestyle modification strategies discussed (elicit support from others; identify saboteurs; non-food rewards, etc).     bipap at hs for hypercarbia --will use prn -improved.  Has improved with volume removal  Consult PT to ambulate  Add IS am         Skin ulcer of left lower leg, limited to breakdown of skin  Wound care      BRANDEN (obstructive sleep apnea)  Patient reports he cannot tolerate CPAP or BiPAP-at home. Tolerating  BIpap here       Debility  Fall and skin precautions  Continue physical therapy and occupational therapy-follow recs for outpt      Hematuria  Monitor closely-history of bladder cancer  Discontinue Lovenox-Spann catheter remains in place  Urology consulted--  In terms of the transitional cell carcinoma of the   bladder we will have to hold off any further treatment until he has fully recovered from his current   condition and we will need to make a decision following consultation with   Oncology concerning the possibility of other treatment options for his   transitional cell carcinoma of the bladder  And removed spann when able--requires strict I/ O at present         Elevated troponin  Workup as per Cardiology      Mild malnutrition  Continue renal multivitamin/supplements recom.mended by dietician as indicated  Continue p.o. 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 Xopenex aerosol treatments        Coronary artery disease  Telemetry  Continue home medications- including e ASA, Plavix, Metoprolol and Zetia.  Monitor closely for s/s of ACS.  Pt takes plavix only 3 x per week       DM type 2 with diabetic mixed hyperlipidemia  Chronic, continue Zetia.   Lipid panel noted        Type 2 diabetes mellitus with kidney complication, with long-term current use of insulin  Chronic, stable-on  levermir and ISS ;   Nephrology consulted due to volume overload with history of chronic kidney disease stage 4 on IV diuretics          Hypertension associated with  stage 4 chronic kidney disease due to type 2 diabetes mellitus  Continue antihypertensives per home regimen, monitor BP closely, and titrate medication for sustained BP control.  On hemodialysis for fluid removal  Hypertension Medications             metoprolol tartrate (LOPRESSOR) 25 MG tablet Take 25 mg by mouth 2 (two) times daily.    nitroGLYCERIN (NITROSTAT) 0.4 MG SL tablet Place 0.4 mg under the tongue every 5 (five) minutes as needed.     torsemide (DEMADEX) 20 MG tablet Take 20 mg by mouth once daily.       Hospital Medications             metoprolol tartrate (LOPRESSOR) tablet 25 mg Take 1 tablet (25 mg total) by mouth 2 (two) times daily.            Type 2 diabetes mellitus  DM diet  Accu-Cheks with correctional sliding scale insulin  HGBA1C is 5.6  Blood sugars running 113-180      Malignant neoplasm of bladder  Follows with urology -current has Huynh because of hematuria and monitoring of INR.  Urine culture no growth.      VTE Risk Mitigation (From admission, onward)        Ordered     heparin (porcine) injection 4,000 Units  Once      05/09/19 1537     Place JACK hose  Until discontinued      05/05/19 1120     IP VTE HIGH RISK PATIENT  Once      05/04/19 8729              Katerin Kaufman MD  Department of Hospital Medicine   [facility]

## 2019-05-13 NOTE — PT/OT/SLP PROGRESS
"Physical Therapy Treatment    Patient Name:  Frank Zayas   MRN:  [id number]    Recommendations:     Discharge Recommendations:  nursing facility, skilled(vs HHPT as pt is progressing well today)   Discharge Equipment Recommendations: walker, rolling, commode   Barriers to discharge: None    Assessment:     Frank Zayas is a 79 y.o. male admitted with a medical diagnosis of Acute on chronic diastolic congestive heart failure.  He presents with the following impairments/functional limitations:  weakness, impaired functional mobilty, impaired cardiopulmonary response to activity, impaired endurance, gait instability, impaired balance, decreased lower extremity function; pt has very good motivation to get better; able to amb ~ 200 ft with a RW with a portable O2 at 2L/min per NC; prgressing well with Tx; pt felt better after Tx; no new complaints.    Rehab Prognosis: Good; patient would benefit from acute skilled PT services to address these deficits and reach maximum level of function.    Recent Surgery: * No surgery found *      Plan:     During this hospitalization, patient to be seen 6 x/week to address the identified rehab impairments via gait training, therapeutic activities, therapeutic exercises and progress toward the following goals:    · Plan of Care Expires:  05/27/19    Subjective     Chief Complaint: "I stink today", otherwise pt is cooperative and pleasant  Patient/Family Comments/goals: wants to walk as tolerated and sit in the chair afterwards  Pain/Comfort:  · Pain Rating 1: 0/10  · Pain Rating Post-Intervention 1: 0/10      Objective:     Communicated with DERRICK Benoit prior to session.  Patient found HOB elevated with peripheral IV, telemetry, oxygen upon PT entry to room.     General Precautions: Standard, aspiration, fall, respiratory   Orthopedic Precautions:N/A   Braces: N/A     Functional Mobility:  · Bed Mobility:     · Rolling Left:  supervision  · Supine to Sit: stand by " assistance  · Transfers:     · Sit to Stand:  contact guard assistance with rolling walker  · Bed to Chair: contact guard assistance with  rolling walker  using  Stand Pivot  · Gait: 200 ft with a RW, CGA with O2 per NC at 2L/min (portable tank), slow-paced, min kyphotic posture  · Balance: Sitting: G/G; Standing: G/F      AM-PAC 6 CLICK MOBILITY  Turning over in bed (including adjusting bedclothes, sheets and blankets)?: 4  Sitting down on and standing up from a chair with arms (e.g., wheelchair, bedside commode, etc.): 3  Moving from lying on back to sitting on the side of the bed?: 4  Moving to and from a bed to a chair (including a wheelchair)?: 3  Need to walk in hospital room?: 3  Climbing 3-5 steps with a railing?: 3  Basic Mobility Total Score: 20       Therapeutic Activities and Exercises:   Functional mob training as above; role and importance of PT and mobilization and the benefits of sitting more erect and not lean over the RW, and the befits of sitting up longer/ upright rather than just reclined/ supine in bed.    Patient left up in chair with all lines intact, call button in reach and RN Kaycee notified..    GOALS:   Multidisciplinary Problems     Physical Therapy Goals        Problem: Physical Therapy Goal    Goal Priority Disciplines Outcome Goal Variances Interventions   Physical Therapy Goal     PT, PT/OT Ongoing (interventions implemented as appropriate)     Description:  1.Pt will be independent with bed mobility and transfers.  2. Pt will perform sit<>Stand with RW and SBA  3.Pt will ambulate 100' with AD with CGA.   4. Pt will perform LE exercises x 10-20 reps independently.                    Time Tracking:     PT Received On: 05/13/19  PT Start Time: 1049     PT Stop Time: 1114  PT Total Time (min): 25 min     Billable Minutes: Gait Training 12 and Therapeutic Activity 12    Treatment Type: Treatment  PT/PTA: PT     PTA Visit Number: 1     Peggy Willis, PT  05/13/2019

## 2019-05-13 NOTE — PLAN OF CARE
05/12/19 1940   Patient Assessment/Suction   Level of Consciousness (AVPU) alert   Respiratory Effort Normal;Unlabored   PRE-TX-O2   O2 Device (Oxygen Therapy) nasal cannula   Flow (L/min) 2   SpO2 99 %   Pulse Oximetry Type Intermittent   Incentive Spirometer   $ Incentive Spirometer Charges done with encouragement   Administration (IS) instruction provided, follow-up   Number of Repetitions (IS) 10   Level Incentive Spirometer (mL) 1000   Patient Tolerance (IS) good   Preset CPAP/BiPAP Settings   Mode Of Delivery Standby

## 2019-05-13 NOTE — PLAN OF CARE
Problem: SLP Goal  Goal: SLP Goal    1) Participate in MBSS for further evaluation of swallow function   2) Demonstrate use of safe swallow strategies with SPV    Improved tolerance of thin liquids trials. Continues s/s possible pharyngeal dysphagia with thin liquids via straw. REC advance diet to Dental Soft (IDDSI 6) and thin liquids; NO straws, small/single sips, slow pace, wear O2. Will continue to follow.     Shannon Burt MS, CCC/SLP 5/13/2019

## 2019-05-13 NOTE — ASSESSMENT & PLAN NOTE
Contributing Nutrition Diagnosis  Acute mild malnutrition    Related to (etiology):   Debility     Signs and Symptoms (as evidenced by):   1) Mild fat/muscle losses noted in upper arm, orbital, thigh, patella, calf  2) Edema 3+    Of note: Wounds of unknown stage on LLL,    Interventions/Recommendations (treatment strategy):  1) DM, renal, cardiac diet + ONS to meet pt's calorie needs 2) wt checks     Nutrition Diagnosis Status:   New

## 2019-05-13 NOTE — PROGRESS NOTES
"Addendum @ 1:00 PM  Seen and examined on HD machine, tolerating well, no complains. 2L UF as tolerated.    Reyes Yoo MD    INPATIENT NEPHROLOGY PROGRESS NOTE  [facility] NEPHROLOGY    Patient Name: Frank Zayas  Date: 05/13/2019    Reason for consultation: MOLLY    Chief Complaint:   Chief Complaint   Patient presents with    Shortness of Breath     "due to increasing fluid"       History of Present Illness:  Patient known to Dr. Quinonez for CKD4, HTN, hyperkalemia on Veltassa is admitted for CHF exacerbation, fluid overload and gross hematuria, consulted for renal management.    · Interval History/Subjective:    -5/11 seen on dialysis.  No chest pain, sob, nausea.  He states he feels good.  His appetite is good.  -5/12 sleeping,  No distress.  Urine output not recorded  -5/13 still requires HD, will do HD today.  ·      Plan of Care:    Assessment:  Rodrigo, baseline stage IV CKD- suspect CRS + ischemic ATN  HTN/CHF (EF 45%) with volume overload/HHRF (+ underlying BRANDEN) requiring BIPAP   SHPT  Anemia 2/2 SHANDRA + CKD  Gross hematuria/Hx of bladder cancer    Plan:    - Initiated on dialysis on 5/8 for worsening renal function with metabolic derangements, symptomatic volume overload, and oliguria. Has not recovered yet and remains dialysis dependent. If he still requires HD by Wed, will get tunneled HD cath and prepare for outpatient HD.  - No NSAIDs or IV contrast.    -RAFAEL with HD PRN.   - Phos is at goal. He doesn't need a phos binder now.  - Hb is stable. Continue iron tabs BID.  - Reassess for dialytic need tomorrow.    Thank you for allowing us to participate in this patient's care. We will continue to follow.    Medications:  No current facility-administered medications on file prior to encounter.      Current Outpatient Medications on File Prior to Encounter   Medication Sig Dispense Refill    alfuzosin (UROXATRAL) 10 mg Tb24 Take 10 mg by mouth nightly.       allopurinol (ZYLOPRIM) 300 MG tablet 1 tablet "      aspirin (ECOTRIN) 81 MG EC tablet Take 81 mg by mouth once daily.      clopidogrel (PLAVIX) 75 mg tablet Take 75 mg by mouth once daily.      coenzyme Q10 (CO Q-10) 100 mg capsule Take 100 mg by mouth once daily.      ezetimibe (ZETIA) 10 mg tablet Take 10 mg by mouth every evening.       FLUoxetine 20 MG capsule every evening.       gabapentin (NEURONTIN) 100 MG capsule every evening.       gemfibrozil (LOPID) 600 MG tablet Take 600 mg by mouth 2 (two) times daily.       insulin glargine (LANTUS) 100 unit/mL injection Inject 27 Units into the skin every evening.       metoprolol tartrate (LOPRESSOR) 25 MG tablet Take 25 mg by mouth 2 (two) times daily.      nateglinide (STARLIX) 60 MG tablet Take 60 mg by mouth every evening.       ropinirole (REQUIP) 2 MG tablet Take 2 mg by mouth nightly.       torsemide (DEMADEX) 20 MG tablet Take 20 mg by mouth once daily.       TRADJENTA 5 mg Tab tablet Take 5 mg by mouth once daily.  0    VELTASSA 16.8 gram PwPk MIX 1 PACKET IN LIQUID AND TAKE PO QD UTD  4    zinc 50 mg Tab Take 50 mg by mouth once daily. 1/2 tab daily      nitroGLYCERIN (NITROSTAT) 0.4 MG SL tablet Place 0.4 mg under the tongue every 5 (five) minutes as needed.        Scheduled Meds:   alfuzosin  10 mg Oral Nightly    aspirin  81 mg Oral QHS    clopidogrel  75 mg Oral Daily    ezetimibe  10 mg Oral QHS    ferrous sulfate  325 mg Oral BID WM    FLUoxetine  20 mg Oral QHS    gemfibrozil  600 mg Oral BID    heparin (porcine)  4,000 Units Intra-Catheter Once    insulin detemir U-100  20 Units Subcutaneous QHS    levalbuterol  0.63 mg Nebulization Q8H    metoprolol tartrate  25 mg Oral BID    mupirocin   Nasal BID    rOPINIRole  2 mg Oral Nightly    senna-docusate 8.6-50 mg  1 tablet Oral BID    vitamin renal formula (B-complex-vitamin c-folic acid)  1 capsule Oral Daily     Continuous Infusions:  PRN Meds:.sodium chloride 0.9%, acetaminophen, dextrose 50%, dextrose 50%,  glucagon (human recombinant), glucose, glucose, insulin aspart U-100, magnesium oxide, nitroGLYCERIN, ondansetron, potassium chloride 10%, ramelteon, sodium chloride 0.9%    Allergies:  Lidocaine and Statins-hmg-coa reductase inhibitors    Vital Signs:  Temp Readings from Last 3 Encounters:   05/13/19 96.3 °F (35.7 °C)   04/24/19 98.6 °F (37 °C) (Oral)   04/08/19 97.9 °F (36.6 °C)       Pulse Readings from Last 3 Encounters:   05/13/19 72   04/24/19 76   04/08/19 63       BP Readings from Last 3 Encounters:   05/13/19 118/62   04/24/19 131/74   04/08/19 (!) 153/84       Weight:  Wt Readings from Last 3 Encounters:   05/13/19 123.2 kg (271 lb 9.7 oz)   04/24/19 129.3 kg (285 lb 0.9 oz)   04/08/19 129.3 kg (285 lb)       INS/OUTS:  I/O last 3 completed shifts:  In: 580 [P.O.:580]  Out: 350 [Urine:350]  I/O this shift:  In: 120 [P.O.:120]  Out: -     Physical Exam:  Constitutional: nad, aao x 3  Heart: rrr, estela, no r/g, wwp, anasarca  Lungs: coarse, no w/r/r/c, no lb  Abdomen: s/nt/d, +BS    Results:  Lab Results   Component Value Date     (L) 05/13/2019    K 4.3 05/13/2019    CL 97 05/13/2019    CO2 26 05/13/2019    BUN 43 (H) 05/13/2019    CREATININE 3.9 (H) 05/13/2019    CALCIUM 9.0 05/13/2019    ANIONGAP 12 05/13/2019    ESTGFRAFRICA 16 (A) 05/13/2019    EGFRNONAA 14 (A) 05/13/2019       Lab Results   Component Value Date    CALCIUM 9.0 05/13/2019    PHOS 5.9 (H) 05/13/2019       Recent Labs   Lab 05/13/19  0516   WBC 6.40   RBC 3.17*   HGB 9.8*   HCT 30.7*   *   MCV 97   MCH 31.0   MCHC 32.0       I have personally reviewed pertinent radiological imaging and reports.    Reyes Yoo MD  Nephrology  [facility]  [phone]

## 2019-05-13 NOTE — PROGRESS NOTES
"[facility]  Adult Nutrition  Progress Note    SUMMARY   Interventions: Modified carbohydrate, sodium, fat, phosphorus, potassium    Recommendations:   1) Continue DM 2000 calorie, renal, cardiac diet with texture per SLP   2) Change supplement to chocolate Boost pudding 2 x daily to meet requirement for nectar thick beverage.  (providing 250 mg potassium and phosphorus per carton daily)   3) Discontinue Novasource Renal  4) Continue monitoring weight  Goals: 1) Pt will consume >=85% EEN during admit.   Nutrition Goal Status: new  Communication of RD Recs: (POC, sticky note, second sign)    Reason for Assessment    Reason For Assessment: length of stay  Diagnosis: cardiac disease  Relevant Medical History: Admitted with SOB, acute on chronic HF, dysphagia and debility. PMH: HTN, HLD, CHF, DM CKD, COPD  Interdisciplinary Rounds: did not attend  General Information Comments: Pt alert. Reports good appetite. Wt has fluctuated greatly since admit 2/2 fluid shifts.  Pt is diuresing and wt is closer to pt's stated UBW of 250 lbs. HD was initiated 5/8/19.  NFPE completed, mild fat/muscle loss. Education on nectar thick liquids and supplements provided.   Nutrition Discharge Planning: Recommend DM, renal with texture per SLP.     Nutrition Risk Screen    Nutrition Risk Screen: no indicators present    Nutrition/Diet History    Patient Reported Diet/Restrictions/Preferences: diabetic diet, renal  Typical Food/Fluid Intake: 1-2 meals daily, wife cooks/shops.    Spiritual, Cultural Beliefs, Buddhism Practices, Values that Affect Care: no  Food Allergies: NKFA(or intolerances)  Factors Affecting Nutritional Intake: difficulty/impaired swallowing(dislike of current dysphagia diet)    Anthropometrics    Temp: 96.3 °F (35.7 °C)  Height Method: Measured  Height: 6' 2"  Height (inches): 74 in  Weight Method: Bed Scale  Weight: 123.2 kg (271 lb 9.7 oz)  Weight (lb): 271.61 lb  Ideal Body Weight (IBW), Male: 190 " lb  % Ideal Body Weight, Male (lb): 142.95 lb  BMI (Calculated): 34.9  BMI Grade: 30 - 34.9- obesity - grade I  Usual Body Weight (UBW), k kg  % Usual Body Weight: 94.97  % Weight Change From Usual Weight: -5.23 %       Lab/Procedures/Meds    Pertinent Labs Reviewed: reviewed  BMP  Lab Results   Component Value Date     (L) 2019    K 4.3 2019    CL 97 2019    CO2 26 2019    BUN 43 (H) 2019    CREATININE 3.9 (H) 2019    CALCIUM 9.0 2019    ANIONGAP 12 2019    ESTGFRAFRICA 16 (A) 2019    EGFRNONAA 14 (A) 2019     Lab Results   Component Value Date    CALCIUM 9.0 2019    PHOS 5.9 (H) 2019     Lab Results   Component Value Date    HGBA1C 5.6 2019     Recent Labs   Lab 19  0605   POCTGLUCOSE 103       Pertinent Medications Reviewed: reviewed  ferrous sulfate, insulin, senna, vitamin renal formula    Physical Findings/Assessment         Estimated/Assessed Needs    Weight Used For Calorie Calculations: 89 kg (196 lb 3.4 oz)(per NHANES SBW)  Energy Calorie Requirements (kcal): 6066-7183   Energy Need Method: Kcal/kg  Protein Requirements: 107 (1.2 g/kg/d per renal on HD)  Weight Used For Protein Calculations: 89 kg (196 lb 3.4 oz)     Estimated Fluid Requirement Method: (1500 mls per MD )  RDA Method (mL): 2225  CHO Requirement: 45-50% EEN      Nutrition Prescription Ordered    Current Diet Order: DM, renal, cardiac. Texture per SLP, dysphagia (soft) no bread   Nutrition Order Comments: Pt dislikes Novasource Renal.   Oral Nutrition Supplement: Novasource Renal, bid    Evaluation of Received Nutrient/Fluid Intake    Energy Calories Required: meeting needs  Protein Required: meeting needs  Fluid Required: (Per MD 2/2 CKD 5, CHF diuresing)  Tolerance: tolerating  % Intake of Estimated Energy Needs: 75 - 100 % (~91% EEN)  % Meal Intake: 75 - 100 %    Nutrition Risk      1 x wkly    Assessment and Plan    Mild  malnutrition  Contributing Nutrition Diagnosis  Acute mild malnutrition    Related to (etiology):   Debility     Signs and Symptoms (as evidenced by):   1) Mild fat/muscle losses noted in upper arm, orbital, thigh, patella, calf  2) Edema 3+    Of note: Wounds of unknown stage on LLL,    Interventions/Recommendations (treatment strategy):  1) DM, renal, cardiac diet + ONS to meet pt's calorie needs 2) wt checks     Nutrition Diagnosis Status:   New           Monitor and Evaluation    Food and Nutrient Intake: energy intake  Food and Nutrient Adminstration: diet order  Anthropometric Measurements: weight  Biochemical Data, Medical Tests and Procedures: electrolyte and renal panel, glucose/endocrine profile, inflammatory profile  Nutrition-Focused Physical Findings: overall appearance, skin     Malnutrition Assessment         Micronutrient Evaluation Comments: on renal vitamin       Orbital Region (Subcutaneous Fat Loss): mild depletion  Upper Arm Region (Subcutaneous Fat Loss): mild depletion   Hinduism Region (Muscle Loss): well nourished  Clavicle Bone Region (Muscle Loss): well nourished  Clavicle and Acromion Bone Region (Muscle Loss): well nourished  Dorsal Hand (Muscle Loss): well nourished  Patellar Region (Muscle Loss): well nourished  Anterior Thigh Region (Muscle Loss): mild depletion  Posterior Calf Region (Muscle Loss): mild depletion   Edema (Fluid Accumulation): 3-->moderate(dependent, 5/12/19)   Subcutaneous Fat Loss (Final Summary): mild protein-calorie malnutrition  Fluid Accumulation Evaluation: moderate         Nutrition Follow-Up  yes

## 2019-05-13 NOTE — PLAN OF CARE
Problem: Adult Inpatient Plan of Care  Goal: Plan of Care Review  Outcome: Ongoing (interventions implemented as appropriate)  Pt currently in bed resting.VSS. No acute distress noted. O2 at 2/LPM. 1500cc fluid restriction with nectar thick liquids maintained this shift. Sinus rhythm on the monitor. Fall and safety precautions maintained. Bed in low position, call light and bedside table within reach. Will continue to monitor.

## 2019-05-13 NOTE — PLAN OF CARE
Problem: Adult Inpatient Plan of Care  Goal: Plan of Care Review  Outcome: Ongoing (interventions implemented as appropriate)  Patient ambulated to bathroom, output 100cc urine, patient on hemodialysis, blood sugar controlled, monitor weight, nad

## 2019-05-13 NOTE — ASSESSMENT & PLAN NOTE
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

## 2019-05-13 NOTE — PLAN OF CARE
Problem: Malnutrition  Goal: Improved Nutritional Intake    Intervention: Promote and Optimize Oral Intake  Interventions: Modified carbohydrate, sodium, fat, phosphorus, potassium    Recommendations:   1) Continue DM 2000 calorie, renal, cardiac diet with texture per SLP   2) Change supplement to chocolate Boost pudding 2 x daily to meet requirement for nectar thick beverage.  (providing 250 mg potassium and phosphorus per carton daily)   3) Discontinue Novasource Renal  4) Continue monitoring weight  Goals: 1) Pt will consume >=85% EEN during admit.   Nutrition Goal Status: new  Communication of RD Recs: (POC, sticky note, second sign)

## 2019-05-13 NOTE — ASSESSMENT & PLAN NOTE
Fall and skin precautions  Continue physical therapy and occupational therapy-follow recs for outpt -SNF with HD

## 2019-05-13 NOTE — PT/OT/SLP PROGRESS
"Speech Language Pathology Treatment    Patient Name:  Frank Zayas   MRN:  [id number]  Admitting Diagnosis: Acute on chronic diastolic congestive heart failure    Recommendations:                 General Recommendations:  Dysphagia therapy  Diet recommendations:  Dental Soft(IDDSI 6), Liquid Diet Level: Thin   Aspiration Precautions: slow pace, respites as needed, 1 bite/sip at a time, Avoid talking while eating, Meds whole 1 at a time, No straws, Small bites/sips and Wear oxygen during intake   General Precautions: Standard, aspiration, fall, respiratory  Communication strategies:  none    Subjective     "I think it's just something I'm going to have to live with."  "My wife says its because I eat too fast."    Objective:   Pt seen for ongoing swallow assessment and tolerance of upgraded consistencies. No orders for MBSS. He is AAOx4 sitting upright in chair. Endorses coughing with breakfast this morning. He consume water via straw with reactive cough on 1 of 3 trials. He consistently consumed water via cup, single sips, with no overt s/s penetration/aspiration. Educated pt re: importance of compliance of aspiration precautions. He verbalized understanding.     Has the patient been evaluated by SLP for swallowing?   Yes  Keep patient NPO? No   Current Respiratory Status: nasal cannula      Assessment:   Improved tolerance of thin liquids trials. Continues s/s possible pharyngeal dysphagia with thin liquids via straw. REC advance diet to Dental Soft (IDDSI 6) and thin liquids; NO straws, small/single sips, slow pace, wear O2. Will continue to follow.     Goals:   Multidisciplinary Problems     SLP Goals        Problem: SLP Goal    Goal Priority Disciplines Outcome   SLP Goal     SLP    Description:    1) Participate in MBSS for further evaluation of swallow function   2) Demonstrate use of safe swallow strategies with SPV                    Plan:     · Patient to be seen:  5 x/week   · Plan of Care expires:  " 06/03/19  · Plan of Care reviewed with:  patient   · SLP Follow-Up:  Yes       Discharge recommendations:      Barriers to Discharge:  None    Time Tracking:     SLP Treatment Date:   05/13/19  Speech Start Time:  1120  Speech Stop Time:  1130     Speech Total Time (min):  10 min    Billable Minutes: Treatment Swallowing Dysfunction 10 and Total Time 10    Shannon Burt CCC-SLP  05/13/2019

## 2019-05-13 NOTE — PROGRESS NOTES
HD: Pt stable, tx complete. Lines re infused. Catheter capped and clamped. dsg c/d/i, pt tolerated tx well.     NET UF: 2000  ML

## 2019-05-13 NOTE — PROGRESS NOTES
[facility] Hospital Medicine  Progress Note    Patient Name: Frank Zayas  MRN: [id number]  Patient Class: IP- Inpatient   Admission Date: 5/4/2019  Length of Stay: 8 days  Attending Physician: Katerin Kaufman MD  Primary Care Provider: Mikhail Shields MD        Subjective:     Principal Problem:Acute on chronic diastolic congestive heart failure    HPI:  This is a 79-year-old male with PMHx significant for HTN, HLD, MI, CHF, COPD, DM, CKD, gout, arthritis, and bladder CA.  He presented to the ED with a progressively worsening SOB.  He stated that his SOB has been going on for the past few months, but has worsened in the last 3 days.  Symptoms are associated with bilateral lower extremity leg swelling. Exertion exacerbates his symptoms.  He states that he has been compliant with his medications and dietary restrictions.  He was given Lasix intravenously in the ED reports that he does feel better.  He reports that he has had a cough productive of clear mucus and felt nauseated earlier today.  He denied fever, chills, sweats, chest pain, wheezing, abdominal pain, vomiting, diarrhea, dysuria, and any other symptoms at this time.  The patient was evaluated in the emergency room where he was noted to have signs of acute heart failure and new onset of atrial fib.  Patient was admitted for further evaluation and treatment.    Hospital Course:  The patient was monitored closely during his stay.  He was placed on continuous telemetry monitor.  Cardiology was consulted.  Patient was placed on supplemental O2 and IV diuretics for his acute heart failure.  An echocardiogram was -noted EF 45% and DD.  He was continued on metoprolol for his new onset of atrial fib which later converted back to sinus rhythm. He was transferred to ICU 5/7 overnight for bipap for hypercarbic resp failure   Nephrology was consulted for chronic kidney disease stage 4 with acute of volume overload. He had HD 5/7, 5/8 for volume  removal after failure with diuresis.     Patient also noted to have some new onset of hematuria and Urology was consulted.  Physical therapy was consulted for debility.        Interval History:   sleepy-  Aroused and oriented x 3. Reports feeling much better and got up with PT   Will dc spann today -after reviewing Dr Vasquez note-can be dc once hematuria resolved and heart failure improved   Weight down 10 plus kg     Review of Systems   Constitutional: Positive for activity change, appetite change and fatigue. Negative for chills and fever.   Respiratory: Negative for cough and shortness of breath.    Cardiovascular: Positive for leg swelling. Negative for chest pain and palpitations.   Gastrointestinal: Negative for abdominal distention, abdominal pain, constipation, diarrhea and vomiting.   Genitourinary: Negative for hematuria.        Spann catheter present draining hematuria initially and now improved  Patient reports he had some hematuria about 3 or 4 weeks ago and then reoccurred after Spann was placed   Musculoskeletal: Positive for gait problem. Negative for neck pain and neck stiffness.   Skin: Positive for wound.        Right lower extremity dressing intact   Neurological: Positive for weakness. Negative for seizures and speech difficulty.   Hematological: Negative for adenopathy. Bruises/bleeds easily.   Psychiatric/Behavioral: Negative for behavioral problems and confusion (AMS with hypercarbia).     Objective:     Vital Signs (Most Recent):  Temp: 97.6 °F (36.4 °C) (05/12/19 0730)  Pulse: 70 (05/12/19 0730)  Resp: 18 (05/12/19 0730)  BP: 120/62 (05/12/19 0730)  SpO2: 99 % (05/12/19 0730) Vital Signs (24h Range):  Temp:  [96.1 °F (35.6 °C)-97.6 °F (36.4 °C)] 97.6 °F (36.4 °C)  Pulse:  [40-77] 70  Resp:  [18-20] 18  SpO2:  [95 %-99 %] 99 %  BP: ()/(47-95) 120/62     Weight: 124.1 kg (273 lb 9.5 oz)  Body mass index is 35.13 kg/m².    Intake/Output Summary (Last 24 hours) at 5/12/2019 1045  Last  data filed at 5/12/2019 0500  Gross per 24 hour   Intake 860 ml   Output 2500 ml   Net -1640 ml      Physical Exam   Constitutional: He is oriented to person, place, and time. He appears well-developed and well-nourished. No distress.   Obese.   HENT:   Head: Normocephalic and atraumatic.   Eyes: Pupils are equal, round, and reactive to light. Conjunctivae and EOM are normal. Right eye exhibits no discharge. Left eye exhibits no discharge.   Neck: Normal range of motion. Neck supple.   Cardiovascular: Normal rate, normal heart sounds and intact distal pulses. An irregularly irregular rhythm present.   Pulses:       Dorsalis pedis pulses are 2+ on the right side, and 2+ on the left side.   Pulmonary/Chest: Effort normal. No accessory muscle usage. He has decreased breath sounds in the right lower field and the left lower field. He has no wheezes. He has no rhonchi. He has rales in the right lower field.   Abdominal: Soft. Bowel sounds are normal. He exhibits no distension. There is no tenderness. There is no guarding.   Genitourinary:   Genitourinary Comments: Huynh catheter noted draining--clear yellow urine   Musculoskeletal: Normal range of motion. He exhibits edema (2+ pitting BLE).   Bilateral lower extremity edema 3+   Neurological: He is alert and oriented to person, place, and time. He has normal strength. No cranial nerve deficit.   Skin: Skin is warm, dry and intact. Capillary refill takes less than 2 seconds. He is not diaphoretic.   Right lower extremity dressing intact   Psychiatric: He has a normal mood and affect. His speech is normal and behavior is normal. Judgment and thought content normal.   Vitals reviewed.      Significant Labs:   BMP:   Recent Labs   Lab 05/10/19  1743  05/12/19  0542   *   < > 100   *   < > 135*   K 3.7   < > 3.9   CL 98   < > 98   CO2 27   < > 28   BUN 35*   < > 33*   CREATININE 2.8*   < > 3.1*   CALCIUM 8.5*   < > 8.8   MG 2.1  --   --     < > = values in this  interval not displayed.     CBC:   Recent Labs   Lab 05/11/19  0312 05/12/19  0542   WBC 7.40 7.90   HGB 9.9* 10.0*   HCT 31.0* 31.1*   * 105*       Significant Imaging: I have reviewed and interpreted all pertinent imaging results/findings within the past 24 hours.    Assessment/Plan:      * Acute on chronic diastolic congestive heart failure  IN ICU  for pulm edema /hypoventilation due to cardiorenal syndrome and obesity hypoventilation--much improved  Orders as per Cardiology-currently on IV diuretics and now HD x 3 days for volume removal 2/2 associated  ckd4  Monitor O2 sats, supplemental O2 as needed  Monitor electrolyte and renal status-stable   Echocardiogram results noted-EF 45%  Daily weights. Strict I/Os. Fluid restriction.   Na restriction <2g/d.            Dysphagia, pharyngeal  Improved continue st -eval done; no mbss as was not available at facility   And pt swallowing improved with treatment of pulm elizabeth a      Acute hypercapnic respiratory failure  Improved--stable. Transferred out of icu   Acute --multifactorial as noted in all above -continue BiPAP at HS and p.r.n. as well as volume removal and incentive spirometry and physical therapy    Hyperkalemia  Renal diet.  Trend renal function.  K bath adjusted with HD      Encephalopathy, metabolic  Due to worsening of renal function.-resolved with improvement of renal function and dialysis as well as treatment of hypercarbia    Fall and delirium precautions    MOLLY (acute kidney injury)  With cardiorenal syndrome.  Cardiovascular surgery consulted for Lucho placement.  Nephrology consulted.  He has been initiated on hemodialysis for ultrafiltration and volume removal.  Trend renal function closely      Morbid (severe) obesity with alveolar hypoventilation  Body mass index is 39.2 kg/m².  General weight loss/lifestyle modification strategies discussed (elicit support from others; identify saboteurs; non-food rewards, etc).     bipap at hs for  hypercarbia --will use prn -improved.  Has improved with volume removal  Consult PT to ambulate  Add IS am         Skin ulcer of left lower leg, limited to breakdown of skin  Wound care      BRANDEN (obstructive sleep apnea)  Patient reports he cannot tolerate CPAP or BiPAP-at home. Tolerating BIpap here       Debility  Fall and skin precautions  Continue physical therapy and occupational therapy-follow recs for outpt -SNF with HD     Hematuria  Monitor closely-history of bladder cancer  Discontinue Lovenox-Spann catheter dc 5/12      Per Urology consult--Dr Vasquez  As follows:  In terms of the transitional cell carcinoma of the   bladder we will have to hold off any further treatment until he has fully recovered from his current   condition and we will need to make a decision following consultation with   Oncology concerning the possibility of other treatment options for his   transitional cell carcinoma of the bladder  And removed spann when able--requires strict I/ O at present         Elevated troponin  Workup as per Cardiology      Mild malnutrition  Continue renal multivitamin/supplements recom.mended by dietician as indicated  Continue p.o. 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 Xopenex aerosol treatments        Coronary artery disease  Telemetry  Continue home medications- including e ASA, Plavix, Metoprolol and Zetia.  Monitor closely for s/s of  ACS.  Pt takes plavix only 3 x per week       DM type 2 with diabetic mixed hyperlipidemia  Chronic, continue Zetia.   Lipid panel noted        Type 2 diabetes mellitus with kidney complication, with long-term current use of insulin  Chronic, stable-on  levermir and ISS ;   Nephrology consulted due to volume overload with history of chronic kidney disease stage 4 on IV diuretics          Hypertension associated with stage 4 chronic kidney disease due to type 2 diabetes mellitus  Continue antihypertensives per home regimen, monitor BP closely, and titrate medication for sustained BP control.  On hemodialysis for fluid removal  Hypertension Medications             metoprolol tartrate (LOPRESSOR) 25 MG tablet Take 25 mg by mouth 2 (two) times daily.    nitroGLYCERIN (NITROSTAT) 0.4 MG SL tablet Place 0.4 mg under the tongue every 5 (five) minutes as needed.     torsemide (DEMADEX) 20 MG tablet Take 20 mg by mouth once daily.       Hospital Medications             metoprolol tartrate (LOPRESSOR) tablet 25 mg Take 1 tablet (25 mg total) by mouth 2 (two) times daily.            Type 2 diabetes mellitus  DM diet  Accu-Cheks with correctional sliding scale insulin  HGBA1C is 5.6  Blood sugars running 113-180      Malignant neoplasm of bladder  Follows with urology -current has Huynh because of hematuria and monitoring of INR.-clear to dc   Urine culture no growth.      VTE Risk Mitigation (From admission, onward)        Ordered     heparin (porcine) injection 4,000 Units  Once      05/09/19 1537     Place JACK hose  Until discontinued      05/05/19 1120     IP VTE HIGH RISK PATIENT  Once      05/04/19 4860              Katerin Kaufman MD  Department of Hospital Medicine   [facility]

## 2019-05-13 NOTE — PT/OT/SLP PROGRESS
Occupational Therapy      Patient Name:  Frank Zayas   MRN:  [id number]    Patient not seen today secondary to Dialysis. Will follow-up 5/14/19.    RUPINDER Lundy  5/13/2019

## 2019-05-13 NOTE — ASSESSMENT & PLAN NOTE
Improved continue st -eval done; no mbss as was not available at facility   And pt swallowing improved with treatment of pulm elizabeth a

## 2019-05-13 NOTE — ASSESSMENT & PLAN NOTE
Monitor closely-history of bladder cancer  Discontinue Lovenox-Spann catheter dc 5/12      Per Urology consult--Dr Vasquez  As follows:  In terms of the transitional cell carcinoma of the   bladder we will have to hold off any further treatment until he has fully recovered from his current   condition and we will need to make a decision following consultation with   Oncology concerning the possibility of other treatment options for his   transitional cell carcinoma of the bladder  And removed spann when able--requires strict I/ O at present

## 2019-05-13 NOTE — PROGRESS NOTES
HD:  Pt stable, tx complete, lines reinfused, catheter capped and clamped, dressing CDI.  Pt tolerated tx well.    NET UF:  2000 mL

## 2019-05-13 NOTE — ASSESSMENT & PLAN NOTE
Follows with urology -current has Huynh because of hematuria and monitoring of INR.-clear to dc   Urine culture no growth.

## 2019-05-13 NOTE — PLAN OF CARE
05/13/19 0831   Patient Assessment/Suction   Level of Consciousness (AVPU) alert   All Lung Fields Breath Sounds diminished   PRE-TX-O2   O2 Device (Oxygen Therapy) nasal cannula   $ Is the patient on Low Flow Oxygen? Yes   Flow (L/min) 2   Oxygen Concentration (%) 28   SpO2 99 %   Pulse Oximetry Type Intermittent   $ Pulse Oximetry - Multiple Charge Pulse Oximetry - Multiple   Pulse 72   Resp 18   Aerosol Therapy   $ Aerosol Therapy Charges Aerosol Treatment   Respiratory Treatment Status (SVN) given   Treatment Route (SVN) mask   Patient Position (SVN) HOB elevated   Post Treatment Assessment (SVN) breath sounds unchanged   Signs of Intolerance (SVN) none   Breath Sounds Post-Respiratory Treatment   Throughout All Fields Post-Treatment All Fields   Throughout All Fields Post-Treatment no change   Post-treatment Heart Rate (beats/min) 70   Post-treatment Resp Rate (breaths/min) 18   Ready to Wean/Extubation Screen   FIO2<=50 (chart decimal) 0.28

## 2019-05-14 NOTE — PLAN OF CARE
Problem: SLP Goal  Goal: SLP Goal    1) Participate in MBSS for further evaluation of swallow function   2) Demonstrate use of safe swallow strategies with SPV     Continue POC    Shannon Burt MS, CCC/SLP 5/14/2019

## 2019-05-14 NOTE — ASSESSMENT & PLAN NOTE
Follows with urology -had Huynh because of hematuria and monitoring of INR.-clear to dc   Urine culture no growth.

## 2019-05-14 NOTE — ASSESSMENT & PLAN NOTE
Was IN ICU  for pulm edema /hypoventilation due to cardiorenal syndrome and obesity hypoventilation--much improved aftervolume removal on HD for CKD 4  Orders as per Cardiology-Monitor electrolyte and renal status-stable   Echocardiogram results noted-EF 45%  Daily weights. Strict I/Os. Fluid restriction.   Na restriction <2g/d.

## 2019-05-14 NOTE — PLAN OF CARE
Problem: Physical Therapy Goal  Goal: Physical Therapy Goal  1.Pt will be independent with bed mobility and transfers.  2. Pt will perform sit<>Stand with RW and SBA  3.Pt will ambulate 250' with RW and Supervision.   4. Pt will perform LE exercises x 10-20 reps independently.   Outcome: Ongoing (interventions implemented as appropriate)  Pt ambulated 250ft with RW and CGA on 2L of O2.

## 2019-05-14 NOTE — PLAN OF CARE
Problem: Adult Inpatient Plan of Care  Goal: Plan of Care Review  Outcome: Ongoing (interventions implemented as appropriate)  Bed is in low position call light is within reach, SR up x 2, bed alarm set, instructed to use call light for assistance.  Cardiac monitored SR, VSS, glucose monitored to sliding scale, no complaints of pain or discomfort, safety maintained, will continue to monitor and round.

## 2019-05-14 NOTE — PROGRESS NOTES
[facility] Hospital Medicine  Progress Note    Patient Name: Frank Zayas  MRN: [id number]  Patient Class: IP- Inpatient   Admission Date: 5/4/2019  Length of Stay: 10 days  Attending Physician: Katerin Kaufman MD  Primary Care Provider: Mikhail Shields MD        Subjective:     Principal Problem:Acute on chronic diastolic congestive heart failure    HPI:  This is a 79-year-old male with PMHx significant for HTN, HLD, MI, CHF, COPD, DM, CKD, gout, arthritis, and bladder CA.  He presented to the ED with a progressively worsening SOB.  He stated that his SOB has been going on for the past few months, but has worsened in the last 3 days.  Symptoms are associated with bilateral lower extremity leg swelling. Exertion exacerbates his symptoms.  He states that he has been compliant with his medications and dietary restrictions.  He was given Lasix intravenously in the ED reports that he does feel better.  He reports that he has had a cough productive of clear mucus and felt nauseated earlier today.  He denied fever, chills, sweats, chest pain, wheezing, abdominal pain, vomiting, diarrhea, dysuria, and any other symptoms at this time.  The patient was evaluated in the emergency room where he was noted to have signs of acute heart failure and new onset of atrial fib.  Patient was admitted for further evaluation and treatment.    Hospital Course:      He was placed on continuous telemetry monitor.  Cardiology was consulted.  Patient was placed on supplemental O2 and IV diuretics for his acute heart failure.  An echocardiogram was -noted EF 45% and DD.  He was continued on metoprolol for his new onset of atrial fib which later converted back to sinus rhythm. He was transferred to ICU 5/7 overnight for bipap for hypercarbic resp failure and monitored in intensive care while hemodialysis was done to her remove volume.  His respiratory status significantly improved as did his anasarca.   Nephrology was  consulted for chronic kidney disease stage 4 with acute of volume overload. He had HD for several days for volume removal after failure with diuresis.  And symptomatic we a significantly improved and was ambulating and on room air.  He no longer desired to wear BiPAP.  He is scheduled for tunnel catheter placement on 05/14 and then scheduled for outpatient dialysis.  Initially the plan was skilled nursing however his ambulation and strength improved significantly so he will be discharged home with home health.    Patient also noted to have some new onset of hematuria and Urology was consulted.  He has a history of bladder cancer.  The Huynh remained in place until hematuria resolved and was discontinued.  He had no further symptomatology.  He needs follow-up with Dr. yelena Grove or the bladder cancer.  Physical therapy was consulted for debility.        Interval History: .  Resting and denies complaints--easily awakened.  No longer using BiPAP./reports discussion with Nephrology for tunnel placement in the morning and consult      Review of Systems   Constitutional: Positive for activity change, appetite change and fatigue. Negative for chills and fever.   Respiratory: Negative for cough and shortness of breath.    Cardiovascular: Positive for leg swelling. Negative for chest pain and palpitations.   Gastrointestinal: Negative for abdominal distention, abdominal pain, constipation, diarrhea and vomiting.   Genitourinary: Negative for hematuria.        Huynh catheter present draining hematuria initially and now improved  Patient reports he had some hematuria about 3 or 4 weeks ago and then reoccurred after Huynh was placed   Musculoskeletal: Positive for gait problem. Negative for neck pain and neck stiffness.   Skin: Positive for wound.        Right lower extremity dressing intact   Neurological: Positive for weakness. Negative for seizures and speech difficulty.   Hematological: Negative for adenopathy.  Bruises/bleeds easily.   Psychiatric/Behavioral: Negative for behavioral problems and confusion (AMS with hypercarbia).          Objective:     Vital Signs (Most Recent):  Temp: 96.3 °F (35.7 °C) (05/14/19 1147)  Pulse: 65 (05/14/19 1511)  Resp: 20 (05/14/19 1511)  BP: 123/63 (05/14/19 1147)  SpO2: 95 % (05/14/19 1511) Vital Signs (24h Range):  Temp:  [96.3 °F (35.7 °C)-98.6 °F (37 °C)] 96.3 °F (35.7 °C)  Pulse:  [] 65  Resp:  [16-20] 20  SpO2:  [94 %-100 %] 95 %  BP: (104-138)/(40-65) 123/63     Weight: 122.3 kg (269 lb 11.2 oz)  Body mass index is 34.63 kg/m².    Intake/Output Summary (Last 24 hours) at 5/14/2019 1539  Last data filed at 5/14/2019 0500  Gross per 24 hour   Intake 1190 ml   Output 2700 ml   Net -1510 ml      Physical Exam   Constitutional: He is oriented to person, place, and time. He appears well-developed and well-nourished. No distress.   Obese.   HENT:   Head: Normocephalic and atraumatic.   Eyes: Pupils are equal, round, and reactive to light. Conjunctivae and EOM are normal. Right eye exhibits no discharge. Left eye exhibits no discharge.   Neck: Normal range of motion. Neck supple.   Cardiovascular: Normal rate, regular rhythm, normal heart sounds and intact distal pulses.   Pulses:       Dorsalis pedis pulses are 2+ on the right side, and 2+ on the left side.   Pulmonary/Chest: Effort normal. No accessory muscle usage. He has decreased breath sounds in the right lower field and the left lower field. He has no wheezes. He has no rhonchi. He has rales in the right lower field.   Abdominal: Soft. Bowel sounds are normal. He exhibits no distension. There is no tenderness. There is no guarding.   Genitourinary:   Genitourinary Comments: Huynh catheter noted draining--clear yellow urine   Musculoskeletal: Normal range of motion. He exhibits edema (2+ pitting BLE).   Bilateral lower extremity edema down to 1+      Neurological: He is alert and oriented to person, place, and time. He has  normal strength. No cranial nerve deficit.   Skin: Skin is warm, dry and intact. Capillary refill takes less than 2 seconds. He is not diaphoretic.   Right lower extremity dressing intact  Areas of dry scaly skin and excoriations anterior tibial a bilaterally without signs of infection   Psychiatric: He has a normal mood and affect. His speech is normal and behavior is normal. Judgment and thought content normal.   Vitals reviewed.      Significant Labs: All pertinent labs within the past 24 hours have been reviewed.   GFR remains the same    Significant Imaging: I have reviewed and interpreted all pertinent imaging results/findings within the past 24 hours.    Assessment/Plan:      * Acute on chronic diastolic congestive heart failure  Was IN ICU  for pulm edema /hypoventilation due to cardiorenal syndrome and obesity hypoventilation--much improved aftervolume removal on HD for CKD 4  Orders as per Cardiology-Monitor electrolyte and renal status-stable   Echocardiogram results noted-EF 45%  Daily weights. Strict I/Os. Fluid restriction.   Na restriction <2g/d.              Dysphagia, pharyngeal  Improved continue st -eval done; no mbss as was not available at facility   And pt swallowing improved with treatment of pulm elizabeth a      Acute hypercapnic respiratory failure  Improved--resolved . Transferred out of icu   Acute --multifactorial as noted in all above -continue BiPAP at HS and p.r.n. as well as volume removal and incentive spirometry and physical therapy--poorly/non compliant with bipap     Hyperkalemia  Renal diet.  Trend renal function.  K bath adjusted with HD      Encephalopathy, metabolic  Due to worsening of renal function.-resolved with improvement of renal function and dialysis as well as treatment of hypercarbia    Fall and delirium precautions    MOLLY (acute kidney injury)  With cardiorenal syndrome.  Cardiovascular surgery consulted for Lucho placement.  Nephrology consulted.  He has been  initiated on hemodialysis for ultrafiltration and volume removal.  Trend renal function closely      Morbid (severe) obesity with alveolar hypoventilation  Body mass index is 39.2 kg/m².  General weight loss/lifestyle modification strategies discussed (elicit support from others; identify saboteurs; non-food rewards, etc).     bipap at hs for hypercarbia --will use prn -improved.  Has improved with volume removal  Consult PT to ambulate  Add IS am         Skin ulcer of left lower leg, limited to breakdown of skin  Wound care      BRANDEN (obstructive sleep apnea)  Patient reports he cannot tolerate CPAP or BiPAP-at home. Tolerating BIpap here       Debility  Fall and skin precautions  Continue physical therapy and occupational therapy-follow recs for outpt -SNF with HD     Hematuria  Monitor closely-history of bladder cancer  Discontinue Lovenox-Spann catheter dc 5/12      Per Urology consult--Dr Vasquez  As follows:  In terms of the transitional cell carcinoma of the   bladder we will have to hold off any further treatment until he has fully recovered from his current   condition and we will need to make a decision following consultation with   Oncology concerning the possibility of other treatment options for his   transitional cell carcinoma of the bladder  And removed spann when able--requires strict I/ O at present         Elevated troponin  Workup as per Cardiology      Mild malnutrition  Continue renal multivitamin/supplements recom.mended by dietician as indicated  Continue p.o. 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 Xopenex aerosol treatments        Coronary artery disease  Telemetry  Continue home medications- including e ASA, Plavix, Metoprolol and Zetia.  Monitor closely for s/s of ACS.  Pt takes plavix only 3 x per week       DM type 2 with diabetic mixed hyperlipidemia  Chronic, continue Zetia.   Lipid panel noted        Type 2 diabetes mellitus with kidney complication, with long-term current use of insulin  Chronic, stable-on  levermir and ISS ;   Nephrology consulted due to volume overload with history of chronic kidney disease stage 4 on IV diuretics          Hypertension associated with stage 4 chronic kidney disease due to type 2 diabetes mellitus  Continue antihypertensives per home regimen, monitor BP closely, and titrate medication for sustained BP control.  On hemodialysis for fluid removal  Hypertension Medications             metoprolol tartrate (LOPRESSOR) 25 MG tablet Take 25 mg by mouth 2 (two) times daily.    nitroGLYCERIN (NITROSTAT) 0.4 MG SL tablet Place 0.4 mg under the tongue every 5 (five) minutes as needed.     torsemide (DEMADEX) 20 MG tablet Take 20 mg by mouth once daily.       Hospital Medications             metoprolol tartrate (LOPRESSOR) tablet 25 mg Take 1 tablet (25 mg total) by mouth 2 (two) times daily.            Type 2 diabetes mellitus  DM diet  Accu-Cheks with correctional sliding scale insulin  HGBA1C is 5.6  Blood sugars running 113-180      Malignant neoplasm of bladder  Follows with urology -had Huynh because of hematuria and monitoring of INR.-clear to dc   Urine culture no growth.      VTE Risk Mitigation (From admission, onward)        Ordered     Place JACK hose  Until discontinued      05/05/19 1120     IP VTE HIGH RISK PATIENT  Once      05/04/19 4471              Katerin Kaufman MD  Department of Hospital  Medicine   [facility]

## 2019-05-14 NOTE — SUBJECTIVE & OBJECTIVE
Interval History: .  Resting and denies complaints--easily awakened.  No longer using BiPAP./reports discussion with Nephrology for tunnel placement in the morning and consult      Review of Systems   Constitutional: Positive for activity change, appetite change and fatigue. Negative for chills and fever.   Respiratory: Negative for cough and shortness of breath.    Cardiovascular: Positive for leg swelling. Negative for chest pain and palpitations.   Gastrointestinal: Negative for abdominal distention, abdominal pain, constipation, diarrhea and vomiting.   Genitourinary: Negative for hematuria.        Huynh catheter present draining hematuria initially and now improved  Patient reports he had some hematuria about 3 or 4 weeks ago and then reoccurred after Huynh was placed   Musculoskeletal: Positive for gait problem. Negative for neck pain and neck stiffness.   Skin: Positive for wound.        Right lower extremity dressing intact   Neurological: Positive for weakness. Negative for seizures and speech difficulty.   Hematological: Negative for adenopathy. Bruises/bleeds easily.   Psychiatric/Behavioral: Negative for behavioral problems and confusion (AMS with hypercarbia).          Objective:     Vital Signs (Most Recent):  Temp: 96.3 °F (35.7 °C) (05/14/19 1147)  Pulse: 65 (05/14/19 1511)  Resp: 20 (05/14/19 1511)  BP: 123/63 (05/14/19 1147)  SpO2: 95 % (05/14/19 1511) Vital Signs (24h Range):  Temp:  [96.3 °F (35.7 °C)-98.6 °F (37 °C)] 96.3 °F (35.7 °C)  Pulse:  [] 65  Resp:  [16-20] 20  SpO2:  [94 %-100 %] 95 %  BP: (104-138)/(40-65) 123/63     Weight: 122.3 kg (269 lb 11.2 oz)  Body mass index is 34.63 kg/m².    Intake/Output Summary (Last 24 hours) at 5/14/2019 1539  Last data filed at 5/14/2019 0500  Gross per 24 hour   Intake 1190 ml   Output 2700 ml   Net -1510 ml      Physical Exam   Constitutional: He is oriented to person, place, and time. He appears well-developed and well-nourished. No distress.    Obese.   HENT:   Head: Normocephalic and atraumatic.   Eyes: Pupils are equal, round, and reactive to light. Conjunctivae and EOM are normal. Right eye exhibits no discharge. Left eye exhibits no discharge.   Neck: Normal range of motion. Neck supple.   Cardiovascular: Normal rate, regular rhythm, normal heart sounds and intact distal pulses.   Pulses:       Dorsalis pedis pulses are 2+ on the right side, and 2+ on the left side.   Pulmonary/Chest: Effort normal. No accessory muscle usage. He has decreased breath sounds in the right lower field and the left lower field. He has no wheezes. He has no rhonchi. He has rales in the right lower field.   Abdominal: Soft. Bowel sounds are normal. He exhibits no distension. There is no tenderness. There is no guarding.   Genitourinary:   Genitourinary Comments: Huynh catheter noted draining--clear yellow urine   Musculoskeletal: Normal range of motion. He exhibits edema (2+ pitting BLE).   Bilateral lower extremity edema down to 1+      Neurological: He is alert and oriented to person, place, and time. He has normal strength. No cranial nerve deficit.   Skin: Skin is warm, dry and intact. Capillary refill takes less than 2 seconds. He is not diaphoretic.   Right lower extremity dressing intact  Areas of dry scaly skin and excoriations anterior tibial a bilaterally without signs of infection   Psychiatric: He has a normal mood and affect. His speech is normal and behavior is normal. Judgment and thought content normal.   Vitals reviewed.      Significant Labs: All pertinent labs within the past 24 hours have been reviewed.   GFR remains the same    Significant Imaging: I have reviewed and interpreted all pertinent imaging results/findings within the past 24 hours.

## 2019-05-14 NOTE — PROGRESS NOTES
"INPATIENT NEPHROLOGY PROGRESS NOTE  [facility] NEPHROLOGY    Patient Name: Frank Zayas  Date: 05/14/2019    Reason for consultation: MOLLY    Chief Complaint:   Chief Complaint   Patient presents with    Shortness of Breath     "due to increasing fluid"       History of Present Illness:  Patient known to Dr. Quinonez for CKD4, HTN, hyperkalemia on Veltassa is admitted for CHF exacerbation, fluid overload and gross hematuria, consulted for renal management.    · Interval History/Subjective:    -5/11 seen on dialysis.  No chest pain, sob, nausea.  He states he feels good.  His appetite is good.  -5/12 sleeping,  No distress.  Urine output not recorded  -5/13 still requires HD, will do HD today.  -5/14 VSS, tolerated HD well yesterday. Plan to get tunneled cath by Dr. Rodriguez and start dispo planning with HD placement as he is not improving sufficiently at this time. HD tomorrow.    Plan of Care:    Assessment:  AoCKI, baseline stage IV CKD- suspect CRS + ischemic ATN  HTN/CHF (EF 45%) with volume overload/HHRF (+ underlying BRANDEN) requiring BIPAP   SHPT  Anemia 2/2 SHANDRA + CKD  Gross hematuria/Hx of bladder cancer    Plan:    - Initiated on dialysis on 5/8 for worsening renal function with metabolic derangements, symptomatic volume overload and oliguria. Has not recovered yet and remains dialysis dependent. Will ask Dr. Rodriguez to place a tunneled HD cath and will prepare patient for outpatient HD.  - No NSAIDs or IV contrast.  - Phos is at goal. He doesn't need a phos binder now.  - Hgb is stable. Continue iron tabs BID. RAFAEL with HD PRN.     Thank you for allowing us to participate in this patient's care. We will continue to follow.    Medications:  No current facility-administered medications on file prior to encounter.      Current Outpatient Medications on File Prior to Encounter   Medication Sig Dispense Refill    alfuzosin (UROXATRAL) 10 mg Tb24 Take 10 mg by mouth nightly.       allopurinol (ZYLOPRIM) 300 MG " tablet 1 tablet      aspirin (ECOTRIN) 81 MG EC tablet Take 81 mg by mouth once daily.      clopidogrel (PLAVIX) 75 mg tablet Take 75 mg by mouth once daily.      coenzyme Q10 (CO Q-10) 100 mg capsule Take 100 mg by mouth once daily.      ezetimibe (ZETIA) 10 mg tablet Take 10 mg by mouth every evening.       FLUoxetine 20 MG capsule every evening.       gabapentin (NEURONTIN) 100 MG capsule every evening.       gemfibrozil (LOPID) 600 MG tablet Take 600 mg by mouth 2 (two) times daily.       insulin glargine (LANTUS) 100 unit/mL injection Inject 27 Units into the skin every evening.       metoprolol tartrate (LOPRESSOR) 25 MG tablet Take 25 mg by mouth 2 (two) times daily.      nateglinide (STARLIX) 60 MG tablet Take 60 mg by mouth every evening.       ropinirole (REQUIP) 2 MG tablet Take 2 mg by mouth nightly.       torsemide (DEMADEX) 20 MG tablet Take 20 mg by mouth once daily.       TRADJENTA 5 mg Tab tablet Take 5 mg by mouth once daily.  0    VELTASSA 16.8 gram PwPk MIX 1 PACKET IN LIQUID AND TAKE PO QD UTD  4    zinc 50 mg Tab Take 50 mg by mouth once daily. 1/2 tab daily      nitroGLYCERIN (NITROSTAT) 0.4 MG SL tablet Place 0.4 mg under the tongue every 5 (five) minutes as needed.        Scheduled Meds:   alfuzosin  10 mg Oral Nightly    aspirin  81 mg Oral QHS    clopidogrel  75 mg Oral Daily    ezetimibe  10 mg Oral QHS    ferrous sulfate  325 mg Oral BID WM    FLUoxetine  20 mg Oral QHS    gemfibrozil  600 mg Oral BID    insulin detemir U-100  20 Units Subcutaneous QHS    levalbuterol  0.63 mg Nebulization Q8H    metoprolol tartrate  25 mg Oral BID    mupirocin   Nasal BID    rOPINIRole  2 mg Oral Nightly    senna-docusate 8.6-50 mg  1 tablet Oral BID    vitamin renal formula (B-complex-vitamin c-folic acid)  1 capsule Oral Daily     Continuous Infusions:  PRN Meds:.sodium chloride 0.9%, acetaminophen, dextrose 50%, dextrose 50%, glucagon (human recombinant), glucose,  glucose, insulin aspart U-100, magnesium oxide, nitroGLYCERIN, ondansetron, potassium chloride 10%, ramelteon, sodium chloride 0.9%    Allergies:  Lidocaine and Statins-hmg-coa reductase inhibitors    Vital Signs:  Temp Readings from Last 3 Encounters:   05/14/19 98.6 °F (37 °C) (Oral)   04/24/19 98.6 °F (37 °C) (Oral)   04/08/19 97.9 °F (36.6 °C)       Pulse Readings from Last 3 Encounters:   05/14/19 74   04/24/19 76   04/08/19 63       BP Readings from Last 3 Encounters:   05/14/19 (!) 104/56   04/24/19 131/74   04/08/19 (!) 153/84       Weight:  Wt Readings from Last 3 Encounters:   05/14/19 122.3 kg (269 lb 11.2 oz)   04/24/19 129.3 kg (285 lb 0.9 oz)   04/08/19 129.3 kg (285 lb)       INS/OUTS:  I/O last 3 completed shifts:  In: 1790 [P.O.:1290; Other:500]  Out: 2950 [Urine:450; Other:2500]  No intake/output data recorded.    Physical Exam:  Constitutional: nad, aao x 3  Heart: rrr, estela, no r/g, wwp, anasarca  Lungs: coarse, no w/r/r/c, no lb  Abdomen: s/nt/d, +BS    Results:  Lab Results   Component Value Date     (L) 05/14/2019    K 4.0 05/14/2019    CL 99 05/14/2019    CO2 25 05/14/2019    BUN 34 (H) 05/14/2019    CREATININE 3.5 (H) 05/14/2019    CALCIUM 8.8 05/14/2019    ANIONGAP 10 05/14/2019    ESTGFRAFRICA 18 (A) 05/14/2019    EGFRNONAA 16 (A) 05/14/2019       Lab Results   Component Value Date    CALCIUM 8.8 05/14/2019    PHOS 5.3 (H) 05/14/2019       Recent Labs   Lab 05/14/19  0514   WBC 6.10   RBC 3.16*   HGB 9.8*   HCT 30.8*   PLT 99*   MCV 97   MCH 30.8   MCHC 31.7*       I have personally reviewed pertinent radiological imaging and reports.    Reyes Yoo MD  Nephrology  [facility] [place]  [phone]

## 2019-05-14 NOTE — PLAN OF CARE
Met with patient at bedside regarding arranging outpatient HD; patient would like the [facility], no preference in days, however prefers mid morning.       05/14/19 1056   Discharge Reassessment   Assessment Type Discharge Planning Reassessment

## 2019-05-14 NOTE — PLAN OF CARE
05/14/19 0853   Patient Assessment/Suction   Level of Consciousness (AVPU) alert   Respiratory Effort Normal;Unlabored   All Lung Fields Breath Sounds diminished   PRE-TX-O2   O2 Device (Oxygen Therapy) nasal cannula   $ Is the patient on Low Flow Oxygen? Yes   Flow (L/min) 2   Oxygen Concentration (%) 28   SpO2 100 %   Pulse Oximetry Type Intermittent   $ Pulse Oximetry - Multiple Charge Pulse Oximetry - Multiple   Pulse 74   Resp 16   Aerosol Therapy   $ Aerosol Therapy Charges Aerosol Treatment   Respiratory Treatment Status (SVN) given   Treatment Route (SVN) mask   Patient Position (SVN) HOB elevated   Signs of Intolerance (SVN) none   Incentive Spirometer   $ Incentive Spirometer Charges ready for self-administration   Administration (IS) mouthpiece   Number of Repetitions (IS) 10   Level Incentive Spirometer (mL) 1500   Patient Tolerance (IS) good   Ready to Wean/Extubation Screen   FIO2<=50 (chart decimal) 0.28

## 2019-05-14 NOTE — PLAN OF CARE
Faxed Admissions packet to [facility].  Need to fax PPD, and Hep B Core Total when available.       05/14/19 1110   Post-Acute Status   Post-Acute Authorization Dialysis   Diaylsis Status Referrals Sent

## 2019-05-14 NOTE — PLAN OF CARE
Xopenex Q8 aerosol treatments. BIPAP PRN, pt says he does not wear it. Will continue to monitor     05/13/19 1956   Patient Assessment/Suction   Level of Consciousness (AVPU) alert   Respiratory Effort Normal;Unlabored   PRE-TX-O2   O2 Device (Oxygen Therapy) nasal cannula   $ Is the patient on Low Flow Oxygen? Yes   Flow (L/min) 2   Oxygen Concentration (%) 28   SpO2 100 %   Pulse Oximetry Type Intermittent   $ Pulse Oximetry - Multiple Charge Pulse Oximetry - Multiple   Pulse 85   Resp 16   Ready to Wean/Extubation Screen   FIO2<=50 (chart decimal) 0.28   .

## 2019-05-14 NOTE — ASSESSMENT & PLAN NOTE
Improved--resolved . Transferred out of icu   Acute --multifactorial as noted in all above -continue BiPAP at HS and p.r.n. as well as volume removal and incentive spirometry and physical therapy--poorly/non compliant with bipap

## 2019-05-14 NOTE — PT/OT/SLP PROGRESS
Speech Language Pathology Treatment    Patient Name:  Frank Zayas   MRN:  [id number]  Admitting Diagnosis: Acute on chronic diastolic congestive heart failure    Recommendations:                 General Recommendations:  Dysphagia therapy  Diet recommendations:  Dental Soft(IDDSI 6), Liquid Diet Level: Thin   Aspiration Precautions: slow pace, 1 bite/sip at a time, Meds whole 1 at a time, Monitor for s/s of aspiration, No straws, Small bites/sips and Wear oxygen during intake   General Precautions: Standard, aspiration, fall, respiratory  Communication strategies:  none    Subjective     Reports he may be discharging by Friday.     Objective:   Pt alert, pleasant and participatory. Remains on nectar thick liquids. Instructed nurse of diet change yesterday. Reviewed swallowing guidelines with pt. Delayed recall of guideline with MIN/MOD cues. Consumed sips of water via with cough noted on 2 of 10 trials. He required MIN verbal cues for small sips. Educated re: aspiration precautions, importance of compliance, risk on PNA, etc. Verbalized understanding.     Has the patient been evaluated by SLP for swallowing?   Yes  Keep patient NPO? No   Current Respiratory Status: nasal cannula      Assessment:     Frank Zayas is a 79 y.o. male with an SLP diagnosis of Dysphagia.  He presents with improving tolerance of thin liquids when consumed enma cup and with small sips. Continue POC. .    Goals:   Multidisciplinary Problems     SLP Goals        Problem: SLP Goal    Goal Priority Disciplines Outcome   SLP Goal     SLP    Description:    1) Participate in MBSS for further evaluation of swallow function   2) Demonstrate use of safe swallow strategies with SPV                    Plan:     · Patient to be seen:  5 x/week   · Plan of Care expires:  06/03/19  · Plan of Care reviewed with:  patient   · SLP Follow-Up:  Yes       Discharge recommendations:      Barriers to Discharge:  None    Time Tracking:     SLP Treatment  Date:   05/14/19  Speech Start Time:  1027  Speech Stop Time:  1040     Speech Total Time (min):  13 min    Billable Minutes: Treatment Swallowing Dysfunction 13 and Total Time 13    Shannon Burt CCC-SLP  05/14/2019

## 2019-05-14 NOTE — PLAN OF CARE
Problem: Adult Inpatient Plan of Care  Goal: Plan of Care Review  Outcome: Ongoing (interventions implemented as appropriate)  No complaint, up to the bathroom, put out 50cc of urine, diet thin liquids, no straws, refused plavix (patient stated he only take on MWF), nothing for legs, PPD left inner forearm, last glucose 137

## 2019-05-14 NOTE — PHYSICIAN QUERY
PT Name: Frank Zayas  MR #: [id number]     PHYSICIAN QUERY -  ACUITY OF CONDITION CLARIFICATION      CDS/: Tammy Villalpando               Contact information:[email or web address]  This form is a permanent document in the medical record.     Query Date: May 14, 2019    By submitting this query, we are merely seeking further clarification of documentation to reflect the severity of illness of your patient. Please utilize your independent clinical judgment when addressing the question(s) below.    The Medical record reflects the following:     Indicators   Supporting Clinical Findings Location in Medical Record   x Documentation of condition Hypoxemic and hypercapneic respiratory failure    EICU note 5-9    Lab Value(s)      Radiology Findings     x Treatment                                 Medication Bipap EICU note 5-9    Other       Provider, please specify the acuity/chronicity of  Hypoxemic respiratory failure.    [  x ] Acute   [   ]  Clinically Undetermined       Please document in your progress notes daily for the duration of treatment until resolved, and include in your discharge summary.

## 2019-05-14 NOTE — PT/OT/SLP PROGRESS
Physical Therapy Treatment    Patient Name:  Frank Zayas   MRN:  [id number]    Recommendations:     Discharge Recommendations:  home health PT   Discharge Equipment Recommendations: walker, rolling, commode   Barriers to discharge: None    Assessment:     Frank Zayas is a 79 y.o. male admitted with a medical diagnosis of Acute on chronic diastolic congestive heart failure.  He presents with the following impairments/functional limitations:  weakness, impaired self care skills, impaired balance, decreased safety awareness, impaired endurance, impaired functional mobilty, gait instability, decreased lower extremity function, impaired cardiopulmonary response to activity.  Pt continues to improve.  He was able to perform supine<>sit with supervision, and sit<>Stand with CGA.  He ambulated 250ft with RW and CGA on 2L.  D/C recs updated to [facility].     Rehab Prognosis: Good; patient would benefit from acute skilled PT services to address these deficits and reach maximum level of function.    Recent Surgery: * No surgery found *      Plan:     During this hospitalization, patient to be seen 6 x/week to address the identified rehab impairments via therapeutic activities, therapeutic exercises and progress toward the following goals:    · Plan of Care Expires:  05/27/19    Subjective     Chief Complaint: none  Patient/Family Comments/goals: to get stronger  Pain/Comfort:  · Pain Rating 1: 0/10      Objective:     Communicated with nurse Benoit prior to session.  Patient found HOB elevated with peripheral IV, oxygen, telemetry upon PT entry to room.     General Precautions: Standard, aspiration, fall, respiratory   Orthopedic Precautions:N/A   Braces: N/A     Functional Mobility:  · Bed Mobility:     · Supine to Sit: supervision  · Transfers:     · Sit to Stand:  contact guard assistance with rolling walker  · Gait: 250ft with RW and CGA on 2L.   · Balance: fair      AM-PAC 6 CLICK MOBILITY  Turning over in bed (including  adjusting bedclothes, sheets and blankets)?: 4  Sitting down on and standing up from a chair with arms (e.g., wheelchair, bedside commode, etc.): 3  Moving from lying on back to sitting on the side of the bed?: 4  Moving to and from a bed to a chair (including a wheelchair)?: 3  Need to walk in hospital room?: 3  Climbing 3-5 steps with a railing?: 3  Basic Mobility Total Score: 20       Patient left up in chair with all lines intact and call button in reach..    GOALS:   Multidisciplinary Problems     Physical Therapy Goals        Problem: Physical Therapy Goal    Goal Priority Disciplines Outcome Goal Variances Interventions   Physical Therapy Goal     PT, PT/OT Ongoing (interventions implemented as appropriate)     Description:  1.Pt will be independent with bed mobility and transfers.  2. Pt will perform sit<>Stand with RW and SBA  3.Pt will ambulate 250' with RW and Supervision.   4. Pt will perform LE exercises x 10-20 reps independently.                     Time Tracking:     PT Received On: 05/14/19  PT Start Time: 1515     PT Stop Time: 1530  PT Total Time (min): 15 min     Billable Minutes: Gait Training 15    Treatment Type: Treatment  PT/PTA: PT     PTA Visit Number: 1     Norma Salomon, PT  05/14/2019

## 2019-05-15 NOTE — PLAN OF CARE
05/15/19 0726   Patient Assessment/Suction   Level of Consciousness (AVPU) alert   Respiratory Effort Normal;Unlabored   Expansion/Accessory Muscles/Retractions no use of accessory muscles;no retractions;expansion symmetric   All Lung Fields Breath Sounds diminished   Rhythm/Pattern, Respiratory depth regular;pattern regular;unlabored   Cough Frequency no cough   PRE-TX-O2   O2 Device (Oxygen Therapy) nasal cannula   $ Is the patient on Low Flow Oxygen? Yes   Flow (L/min) 2   SpO2 98 %   Pulse Oximetry Type Intermittent   $ Pulse Oximetry - Multiple Charge Pulse Oximetry - Multiple   Pulse 64   Resp 16   Aerosol Therapy   $ Aerosol Therapy Charges Aerosol Treatment   Respiratory Treatment Status (SVN) given   Treatment Route (SVN) mask   Patient Position (SVN) HOB elevated   Post Treatment Assessment (SVN) increased aeration   Signs of Intolerance (SVN) none   Breath Sounds Post-Respiratory Treatment   Throughout All Fields Post-Treatment All Fields   Throughout All Fields Post-Treatment aeration increased   Post-treatment Heart Rate (beats/min) 68   Post-treatment Resp Rate (breaths/min) 16   Incentive Spirometer   $ Incentive Spirometer Charges done with encouragement   Administration (IS) instruction provided, follow-up   Number of Repetitions (IS) 10   Level Incentive Spirometer (mL) 1000   Patient Tolerance (IS) good       Aerosol treatments q8 with Xopenex and IS q2. Patient encouraged to do deep breathing exercises independently.

## 2019-05-15 NOTE — ASSESSMENT & PLAN NOTE
RESOLVED .  No normal sinus rhythm on telemetry.    Orders as per Cardiology  Continue beta-blockade  On asa, plavix for  PAD  anticoag not indicated

## 2019-05-15 NOTE — ASSESSMENT & PLAN NOTE
With cardiorenal syndrome.  Cardiovascular surgery consulted for tunneled cath.  Nephrology consulted.  He has been initiated on hemodialysis for ultrafiltration and volume removal.  Trend renal function closely

## 2019-05-15 NOTE — ASSESSMENT & PLAN NOTE
Monitor closely-history of bladder cancer  Discontinue Lovenox-Huynh catheter dc 5/12      Per Urology consult--Dr Vasquez  As follows:  In terms of the transitional cell carcinoma of the   bladder we will have to hold off any further treatment until he has fully recovered from his current   condition and we will need to make a decision following consultation with   Oncology concerning the possibility of other treatment options for his   transitional cell carcinoma of the bladder.    Huynh removed.

## 2019-05-15 NOTE — ASSESSMENT & PLAN NOTE
Fall and skin precautions  Continue physical therapy and occupational therapy.  They are recommending home with home health PT.

## 2019-05-15 NOTE — ASSESSMENT & PLAN NOTE
Was in ICU  for pulm edema /hypoventilation due to cardiorenal syndrome and obesity hypoventilation which is now much improved after volume removal on HD for CKD 4  Orders as per Cardiology-Monitor electrolyte and renal status-stable   Echocardiogram results noted-EF 45%  Daily weights. Strict I/Os. Fluid restriction.   Na restriction <2g/d.

## 2019-05-15 NOTE — PLAN OF CARE
1024  Call received from Bria with [employer] stating she is working on getting patient a chair time, and will call back when she has it.  Faxed Bria the result of the Hep B Core Total Antibody.       05/15/19 1026   Discharge Reassessment   Assessment Type Discharge Planning Reassessment

## 2019-05-15 NOTE — ASSESSMENT & PLAN NOTE
Monitor and transfuse if he becomes hemodynamically unstable or H/H < 7/21  epo per nephrology   No transfusion needed

## 2019-05-15 NOTE — PLAN OF CARE
05/14/19 1900   Patient Assessment/Suction   Level of Consciousness (AVPU) alert   PRE-TX-O2   O2 Device (Oxygen Therapy) nasal cannula   Flow (L/min) 1   SpO2 100 %   Pulse Oximetry Type Intermittent   Incentive Spirometer   $ Incentive Spirometer Charges done independently per patient   Administration (IS) self-administered

## 2019-05-15 NOTE — PT/OT/SLP PROGRESS
Physical Therapy      Patient Name:  Frank Zayas   MRN:  [id number]    Patient not seen today secondary to Dialysis. Will follow-up 5/16/19.    Susanne Ferrari PTA

## 2019-05-15 NOTE — PROGRESS NOTES
[facility] Hospital Medicine  Progress Note    Patient Name: Frank Zayas  MRN: [id number]  Patient Class: IP- Inpatient   Admission Date: 5/4/2019  Length of Stay: 11 days  Attending Physician: Breanne Parrish MD  Primary Care Provider: Mikhail Shields MD        Subjective:     Principal Problem:Acute on chronic diastolic congestive heart failure    HPI:  This is a 79-year-old male with PMHx significant for HTN, HLD, MI, CHF, COPD, DM, CKD, gout, arthritis, and bladder CA.  He presented to the ED with a progressively worsening SOB.  He stated that his SOB has been going on for the past few months, but has worsened in the last 3 days.  Symptoms are associated with bilateral lower extremity leg swelling. Exertion exacerbates his symptoms.  He states that he has been compliant with his medications and dietary restrictions.  He was given Lasix intravenously in the ED reports that he does feel better.  He reports that he has had a cough productive of clear mucus and felt nauseated earlier today.  He denied fever, chills, sweats, chest pain, wheezing, abdominal pain, vomiting, diarrhea, dysuria, and any other symptoms at this time.  The patient was evaluated in the emergency room where he was noted to have signs of acute heart failure and new onset of atrial fib.  Patient was admitted for further evaluation and treatment.    Hospital Course:      He was placed on continuous telemetry monitor.  Cardiology was consulted.  Patient was placed on supplemental O2 and IV diuretics for his acute heart failure.  An echocardiogram was -noted EF 45% and DD.  He was continued on metoprolol for his new onset of atrial fib which later converted back to sinus rhythm. He was transferred to ICU 5/7 overnight for bipap for hypercarbic resp failure and monitored in intensive care while hemodialysis was done to her remove volume.  His respiratory status significantly improved as did his anasarca.   Nephrology was  consulted for chronic kidney disease stage 4 with acute of volume overload. He had HD for several days for volume removal after failure with diuresis.  And symptomatic we a significantly improved and was ambulating and on room air.  He no longer desired to wear BiPAP.  He is scheduled for tunnel catheter placement on 05/14 and then scheduled for outpatient dialysis.  Initially the plan was skilled nursing however his ambulation and strength improved significantly so he will be discharged home with home health.    Patient also noted to have some new onset of hematuria and Urology was consulted.  He has a history of bladder cancer.  The Huynh remained in place until hematuria resolved and was discontinued.  He had no further symptomatology.  He needs follow-up with Dr. yelena Grove or the bladder cancer.  Physical therapy was consulted for debility.        Interval History:  Patient seen and examined.  Denies any complaints at this time.  Discussed with Nephrology.  Awaiting tunneled cath placement.  Awaiting outpatient HD bed.    Review of Systems   Constitutional: Negative for chills and fever.   Respiratory: Negative for cough and shortness of breath.    Cardiovascular: Positive for leg swelling. Negative for chest pain and palpitations.   Gastrointestinal: Negative for abdominal distention, abdominal pain, constipation, diarrhea and vomiting.   Genitourinary: Negative for flank pain and frequency.   Musculoskeletal: Negative for neck pain and neck stiffness.   Neurological: Negative for dizziness and weakness.   Psychiatric/Behavioral: Negative for behavioral problems and confusion.     Objective:     Vital Signs (Most Recent):  Temp: 96.9 °F (36.1 °C) (05/15/19 1516)  Pulse: 65 (05/15/19 1516)  Resp: 18 (05/15/19 1516)  BP: (!) 108/58 (05/15/19 1516)  SpO2: 99 % (05/15/19 1516) Vital Signs (24h Range):  Temp:  [96.3 °F (35.7 °C)-98.9 °F (37.2 °C)] 96.9 °F (36.1 °C)  Pulse:  [58-78] 65  Resp:  [16-20] 18  SpO2:  [95  %-100 %] 99 %  BP: (107-136)/(57-85) 108/58     Weight: 122.9 kg (271 lb)  Body mass index is 34.79 kg/m².    Intake/Output Summary (Last 24 hours) at 5/15/2019 1529  Last data filed at 5/15/2019 1230  Gross per 24 hour   Intake 1150 ml   Output 3450 ml   Net -2300 ml      Physical Exam   Constitutional: He is oriented to person, place, and time. He appears well-developed and well-nourished. No distress.   Obese.   HENT:   Head: Normocephalic and atraumatic.   Eyes: Pupils are equal, round, and reactive to light. Conjunctivae and EOM are normal. Right eye exhibits no discharge. Left eye exhibits no discharge.   Neck: Normal range of motion. Neck supple.   Cardiovascular: Normal rate, regular rhythm, normal heart sounds and intact distal pulses.   Pulses:       Dorsalis pedis pulses are 2+ on the right side, and 2+ on the left side.   Pulmonary/Chest: Effort normal. No accessory muscle usage. He has decreased breath sounds in the right lower field and the left lower field. He has no wheezes. He has no rhonchi. He has no rales.   Abdominal: Soft. Bowel sounds are normal. He exhibits no distension. There is no tenderness. There is no guarding.   Musculoskeletal: Normal range of motion. He exhibits edema.   Bilateral lower extremity edema      Neurological: He is alert and oriented to person, place, and time. He has normal strength. No cranial nerve deficit.   Skin: Skin is warm, dry and intact. Capillary refill takes less than 2 seconds. He is not diaphoretic.   Right lower extremity dressing intact  Areas of dry scaly skin and excoriations anterior tibial a bilaterally without signs of infection   Psychiatric: He has a normal mood and affect. His speech is normal and behavior is normal. Judgment and thought content normal.   Nursing note and vitals reviewed.      Significant Labs: All pertinent labs within the past 24 hours have been reviewed.    Significant Imaging: I have reviewed and interpreted all pertinent  imaging results/findings within the past 24 hours.    Assessment/Plan:      * Acute on chronic diastolic congestive heart failure  Was in ICU  for pulm edema /hypoventilation due to cardiorenal syndrome and obesity hypoventilation which is now much improved after volume removal on HD for CKD 4  Orders as per Cardiology-Monitor electrolyte and renal status-stable   Echocardiogram results noted-EF 45%  Daily weights. Strict I/Os. Fluid restriction.   Na restriction <2g/d.              Dysphagia, pharyngeal  Improved continue speech therapy      Acute hypercapnic respiratory failure  Resolved with BiPAP and volume removal.    Continue BiPAP q.h.s. and p.r.n..  Continue incentive spirometry    Hyperkalemia  Resolved.  Renal diet.  Trend renal function.  K bath adjusted with HD      Encephalopathy, metabolic  Due to worsening of renal function.-resolved with improvement of renal function and dialysis as well as treatment of hypercarbia    Fall and delirium precautions    MOLLY (acute kidney injury)  With cardiorenal syndrome.  Cardiovascular surgery consulted for tunneled cath.  Nephrology consulted.  He has been initiated on hemodialysis for ultrafiltration and volume removal.  Trend renal function closely      Morbid (severe) obesity with alveolar hypoventilation  Body mass index is 34.79 kg/m².  General weight loss/lifestyle modification strategies discussed (elicit support from others; identify saboteurs; non-food rewards, etc).           Skin ulcer of left lower leg, limited to breakdown of skin  Wound care      BRANDEN (obstructive sleep apnea)  Patient reports he cannot tolerate CPAP or BiPAP-at home. Tolerating BIpap here       Debility  Fall and skin precautions  Continue physical therapy and occupational therapy.  They are recommending home with home health PT.    Hematuria  Monitor closely-history of bladder cancer  Discontinue Lovenox-Hyunh catheter dc 5/12      Per Urology consult--Dr Vasquez  As follows:  In  terms of the transitional cell carcinoma of the   bladder we will have to hold off any further treatment until he has fully recovered from his current   condition and we will need to make a decision following consultation with   Oncology concerning the possibility of other treatment options for his   transitional cell carcinoma of the bladder.    Huynh removed.    Elevated brain natriuretic peptide (BNP) level        Elevated troponin  Workup as per Cardiology      Mild malnutrition  Continue renal multivitamin/supplements recom.mended by dietician as indicated  Continue p.o. Supplement-      Hyperphosphatemia  Monitor  Orders as per renal      Anemia, chronic disease  Monitor and transfuse if he becomes hemodynamically unstable or H/H < 7/21  epo per nephrology   No transfusion needed     New onset atrial fibrillation  RESOLVED .  No normal sinus rhythm on telemetry.    Orders as per Cardiology  Continue beta-blockade  On asa, plavix for  PAD  anticoag not indicated         COPD (chronic obstructive pulmonary disease)  Monitor O2 sats, supplement O2 as needed  Continue t.i.d. Xopenex aerosol treatments        Coronary artery disease  Telemetry  Continue home medications- including  ASA, Plavix, Metoprolol and Zetia.  Monitor closely for s/s of ACS.  Pt takes plavix only 3 x per week       DM type 2 with diabetic mixed hyperlipidemia  Chronic, continue Zetia.   Lipid panel noted        Type 2 diabetes mellitus with kidney complication, with long-term current use of insulin  Chronic, stable-on  levermir and ISS ;   Nephrology consulted due to volume overload with history of chronic kidney disease stage 4 on IV diuretics          Hypertension associated with stage 4 chronic kidney disease due to type 2 diabetes mellitus  Continue antihypertensives per home regimen, monitor BP closely, and titrate medication for sustained BP control.  On hemodialysis for fluid removal  Hypertension Medications             metoprolol  tartrate (LOPRESSOR) 25 MG tablet Take 25 mg by mouth 2 (two) times daily.    nitroGLYCERIN (NITROSTAT) 0.4 MG SL tablet Place 0.4 mg under the tongue every 5 (five) minutes as needed.     torsemide (DEMADEX) 20 MG tablet Take 20 mg by mouth once daily.       Hospital Medications             metoprolol tartrate (LOPRESSOR) tablet 25 mg Take 1 tablet (25 mg total) by mouth 2 (two) times daily.            Type 2 diabetes mellitus  DM diet  Accu-Cheks with correctional sliding scale insulin  HGBA1C is 5.6  Blood sugars running 113-180      Malignant neoplasm of bladder  Follows with urology.  Had Huynh because of hematuria monitoring of INR, it has now been discontinued.  Urine culture no growth.      VTE Risk Mitigation (From admission, onward)        Ordered     heparin (porcine) injection 4,000 Units  Once      05/15/19 1013     Place JACK hose  Until discontinued      05/05/19 1120     IP VTE HIGH RISK PATIENT  Once      05/04/19 5064              Breanne Parrish MD  Department of Hospital Medicine   [facility]

## 2019-05-15 NOTE — NURSING
Pt lying in bed, respirations even and unlabored, no acute distress noted.  He had dialysis today, 2.5 liters removed, tolerated treatment well.  Vitals stable.  He denies pain and discomfort at this time.  Encouraged to use incentive spirometer to expand his lungs and encouraged to sit in chair for meals and while awake.  Awaiting tunnel catheter placement by Dr. Rodriguez before he can discharge home with home health.  Side rails up times two, bed low and locked, call light within reach.

## 2019-05-15 NOTE — ASSESSMENT & PLAN NOTE
Body mass index is 34.79 kg/m².  General weight loss/lifestyle modification strategies discussed (elicit support from others; identify saboteurs; non-food rewards, etc).

## 2019-05-15 NOTE — ASSESSMENT & PLAN NOTE
Follows with urology.  Had Huyhn because of hematuria monitoring of INR, it has now been discontinued.  Urine culture no growth.

## 2019-05-15 NOTE — ASSESSMENT & PLAN NOTE
Resolved with BiPAP and volume removal.    Continue BiPAP q.h.s. and p.r.n..  Continue incentive spirometry

## 2019-05-15 NOTE — SUBJECTIVE & OBJECTIVE
Interval History:  Patient seen and examined.  Denies any complaints at this time.  Discussed with Nephrology.  Awaiting tunneled cath placement.  Awaiting outpatient HD bed.    Review of Systems   Constitutional: Negative for chills and fever.   Respiratory: Negative for cough and shortness of breath.    Cardiovascular: Positive for leg swelling. Negative for chest pain and palpitations.   Gastrointestinal: Negative for abdominal distention, abdominal pain, constipation, diarrhea and vomiting.   Genitourinary: Negative for flank pain and frequency.   Musculoskeletal: Negative for neck pain and neck stiffness.   Neurological: Negative for dizziness and weakness.   Psychiatric/Behavioral: Negative for behavioral problems and confusion.     Objective:     Vital Signs (Most Recent):  Temp: 96.9 °F (36.1 °C) (05/15/19 1516)  Pulse: 65 (05/15/19 1516)  Resp: 18 (05/15/19 1516)  BP: (!) 108/58 (05/15/19 1516)  SpO2: 99 % (05/15/19 1516) Vital Signs (24h Range):  Temp:  [96.3 °F (35.7 °C)-98.9 °F (37.2 °C)] 96.9 °F (36.1 °C)  Pulse:  [58-78] 65  Resp:  [16-20] 18  SpO2:  [95 %-100 %] 99 %  BP: (107-136)/(57-85) 108/58     Weight: 122.9 kg (271 lb)  Body mass index is 34.79 kg/m².    Intake/Output Summary (Last 24 hours) at 5/15/2019 1529  Last data filed at 5/15/2019 1230  Gross per 24 hour   Intake 1150 ml   Output 3450 ml   Net -2300 ml      Physical Exam   Constitutional: He is oriented to person, place, and time. He appears well-developed and well-nourished. No distress.   Obese.   HENT:   Head: Normocephalic and atraumatic.   Eyes: Pupils are equal, round, and reactive to light. Conjunctivae and EOM are normal. Right eye exhibits no discharge. Left eye exhibits no discharge.   Neck: Normal range of motion. Neck supple.   Cardiovascular: Normal rate, regular rhythm, normal heart sounds and intact distal pulses.   Pulses:       Dorsalis pedis pulses are 2+ on the right side, and 2+ on the left side.   Pulmonary/Chest:  Effort normal. No accessory muscle usage. He has decreased breath sounds in the right lower field and the left lower field. He has no wheezes. He has no rhonchi. He has no rales.   Abdominal: Soft. Bowel sounds are normal. He exhibits no distension. There is no tenderness. There is no guarding.   Musculoskeletal: Normal range of motion. He exhibits edema.   Bilateral lower extremity edema      Neurological: He is alert and oriented to person, place, and time. He has normal strength. No cranial nerve deficit.   Skin: Skin is warm, dry and intact. Capillary refill takes less than 2 seconds. He is not diaphoretic.   Right lower extremity dressing intact  Areas of dry scaly skin and excoriations anterior tibial a bilaterally without signs of infection   Psychiatric: He has a normal mood and affect. His speech is normal and behavior is normal. Judgment and thought content normal.   Nursing note and vitals reviewed.      Significant Labs: All pertinent labs within the past 24 hours have been reviewed.    Significant Imaging: I have reviewed and interpreted all pertinent imaging results/findings within the past 24 hours.

## 2019-05-15 NOTE — PROGRESS NOTES
"INPATIENT NEPHROLOGY PROGRESS NOTE  [facility] NEPHROLOGY    Patient Name: Frank Zayas  Date: 05/15/2019    Reason for consultation: MOLLY    Chief Complaint:   Chief Complaint   Patient presents with    Shortness of Breath     "due to increasing fluid"       History of Present Illness:  Patient known to Dr. Quinonez for CKD4, HTN, hyperkalemia on [place] is admitted for CHF exacerbation, fluid overload and gross hematuria, consulted for renal management.    · Interval History/Subjective:    -5/11 seen on dialysis.  No chest pain, sob, nausea.  He states he feels good.  His appetite is good.  -5/12 sleeping,  No distress.  Urine output not recorded  -5/13 still requires HD, will do HD today.  -5/14 VSS, tolerated HD well yesterday. Plan to get tunneled cath by Dr. Rodriguez and start dispo planning with HD placement as he is not improving sufficiently at this time. HD tomorrow.  -5/15 VSS, seen and examined on HD today, tolerating well, no complains. Needs tunneled cath and dispo planing.    Plan of Care:    Assessment:  Rodrigo, baseline stage IV CKD- suspect CRS + ischemic ATN  HTN/CHF (EF 45%) with volume overload/HHRF (+ underlying BRANDEN) requiring BIPAP   SHPT  Anemia 2/2 SHANDRA + CKD  Gross hematuria/Hx of bladder cancer    Plan:    - Initiated on dialysis on 5/8 for worsening renal function with metabolic derangements, symptomatic volume overload and oliguria. Has not recovered yet and remains dialysis dependent. Asked Dr. Rodriguez to place a tunneled HD cath and will prepare patient for outpatient HD.  - No NSAIDs or IV contrast.  - Phos is at goal. He doesn't need a phos binder now.  - Hgb is stable. Continue iron tabs BID. RAFAEL with HD PRN.     Thank you for allowing us to participate in this patient's care. We will continue to follow.    Medications:  No current facility-administered medications on file prior to encounter.      Current Outpatient Medications on File Prior to Encounter   Medication Sig Dispense " Refill    alfuzosin (UROXATRAL) 10 mg Tb24 Take 10 mg by mouth nightly.       allopurinol (ZYLOPRIM) 300 MG tablet 1 tablet      aspirin (ECOTRIN) 81 MG EC tablet Take 81 mg by mouth once daily.      clopidogrel (PLAVIX) 75 mg tablet Take 75 mg by mouth once daily.      coenzyme Q10 (CO Q-10) 100 mg capsule Take 100 mg by mouth once daily.      ezetimibe (ZETIA) 10 mg tablet Take 10 mg by mouth every evening.       FLUoxetine 20 MG capsule every evening.       gabapentin (NEURONTIN) 100 MG capsule every evening.       gemfibrozil (LOPID) 600 MG tablet Take 600 mg by mouth 2 (two) times daily.       insulin glargine (LANTUS) 100 unit/mL injection Inject 27 Units into the skin every evening.       metoprolol tartrate (LOPRESSOR) 25 MG tablet Take 25 mg by mouth 2 (two) times daily.      nateglinide (STARLIX) 60 MG tablet Take 60 mg by mouth every evening.       ropinirole (REQUIP) 2 MG tablet Take 2 mg by mouth nightly.       torsemide (DEMADEX) 20 MG tablet Take 20 mg by mouth once daily.       TRADJENTA 5 mg Tab tablet Take 5 mg by mouth once daily.  0    VELTASSA 16.8 gram PwPk MIX 1 PACKET IN LIQUID AND TAKE PO QD UTD  4    zinc 50 mg Tab Take 50 mg by mouth once daily. 1/2 tab daily      nitroGLYCERIN (NITROSTAT) 0.4 MG SL tablet Place 0.4 mg under the tongue every 5 (five) minutes as needed.        Scheduled Meds:   alfuzosin  10 mg Oral Nightly    aspirin  81 mg Oral QHS    clopidogrel  75 mg Oral Daily    ezetimibe  10 mg Oral QHS    ferrous sulfate  325 mg Oral BID WM    FLUoxetine  20 mg Oral QHS    gemfibrozil  600 mg Oral BID    heparin (porcine)  4,000 Units Intra-Catheter Once    insulin detemir U-100  20 Units Subcutaneous QHS    levalbuterol  0.63 mg Nebulization Q8H    metoprolol tartrate  25 mg Oral BID    mupirocin   Nasal BID    rOPINIRole  2 mg Oral Nightly    senna-docusate 8.6-50 mg  1 tablet Oral BID    vitamin renal formula (B-complex-vitamin c-folic acid)  1  capsule Oral Daily     Continuous Infusions:  PRN Meds:.sodium chloride 0.9%, acetaminophen, dextrose 50%, dextrose 50%, glucagon (human recombinant), glucose, glucose, insulin aspart U-100, magnesium oxide, nitroGLYCERIN, ondansetron, potassium chloride 10%, ramelteon, sodium chloride 0.9%    Allergies:  Lidocaine and Statins-hmg-coa reductase inhibitors    Vital Signs:  Temp Readings from Last 3 Encounters:   05/15/19 98.6 °F (37 °C) (Tympanic)   04/24/19 98.6 °F (37 °C) (Oral)   04/08/19 97.9 °F (36.6 °C)       Pulse Readings from Last 3 Encounters:   05/15/19 73   04/24/19 76   04/08/19 63       BP Readings from Last 3 Encounters:   05/15/19 115/66   04/24/19 131/74   04/08/19 (!) 153/84       Weight:  Wt Readings from Last 3 Encounters:   05/15/19 122.9 kg (271 lb)   04/24/19 129.3 kg (285 lb 0.9 oz)   04/08/19 129.3 kg (285 lb)       INS/OUTS:  I/O last 3 completed shifts:  In: 900 [P.O.:900]  Out: 350 [Urine:350]  No intake/output data recorded.    Physical Exam:  Constitutional: nad, aao x 3  Heart: rrr, estela, no r/g, wwp, anasarca  Lungs: coarse, no w/r/r/c, no lb  Abdomen: s/nt/d, +BS    Results:  Lab Results   Component Value Date     (L) 05/15/2019    K 4.3 05/15/2019    CL 99 05/15/2019    CO2 25 05/15/2019    BUN 49 (H) 05/15/2019    CREATININE 4.3 (H) 05/15/2019    CALCIUM 9.0 05/15/2019    ANIONGAP 11 05/15/2019    ESTGFRAFRICA 14 (A) 05/15/2019    EGFRNONAA 12 (A) 05/15/2019       Lab Results   Component Value Date    CALCIUM 9.0 05/15/2019    PHOS 6.6 (H) 05/15/2019       Recent Labs   Lab 05/15/19  0513   WBC 6.00   RBC 3.16*   HGB 9.7*   HCT 30.7*   *   MCV 97   MCH 30.7   MCHC 31.6*       I have personally reviewed pertinent radiological imaging and reports.    Reyes Yoo MD  Nephrology  [facility]  [phone]

## 2019-05-16 NOTE — PLAN OF CARE
Patient alert and oriented resting in bed. NAD. Denies pain or SOB. VSS. Afib on monitor. Up with assistance. Npo after MN for tunnel cath placement. Plan of care reviewed with patient. Verbalizes understanding.Call light in reach. Pt free from fall or injury. Will monitor.

## 2019-05-16 NOTE — TRANSFER OF CARE
"Anesthesia Transfer of Care Note    Patient: Frank Zayas    Procedure(s) Performed: Procedure(s) (LRB):  Insertion,catheter,tunneled (N/A)    Patient location: PACU    Anesthesia Type: general    Transport from OR: Transported from OR on 2-3 L/min O2 by NC with adequate spontaneous ventilation    Post pain: adequate analgesia    Post assessment: no apparent anesthetic complications and tolerated procedure well    Post vital signs: stable    Level of consciousness: sedated and responds to stimulation    Nausea/Vomiting: no nausea/vomiting    Complications: none    Transfer of care protocol was followed      Last vitals:   Visit Vitals  /65   Pulse 62   Temp 36.5 °C (97.7 °F) (Temporal)   Resp 16   Ht 6' 2" (1.88 m)   Wt 123.4 kg (272 lb)   SpO2 98%   BMI 34.92 kg/m²     "

## 2019-05-16 NOTE — PLAN OF CARE
Pre op assessment in progress, clipped left chest wall, right lower leg edema with redness and sweeping, changed sheet, wiped leg with bath wipe/NO SCD placed on the lower right leg.  Only the left leg,  Left lower leg with healing scabs.  Telemetry off placed in belonging bag with wipes etc.  Will bring to pacu

## 2019-05-16 NOTE — PT/OT/SLP EVAL
"Occupational Therapy   Evaluation    Name: Frank Zayas  MRN: [id number]  Admitting Diagnosis:  Acute on chronic diastolic congestive heart failure Day of Surgery    Recommendations:     Discharge Recommendations: home health PT  Discharge Equipment Recommendations:  walker, rolling, tub bench, commode, grab bar, tub/shower  Barriers to discharge:  None    Assessment:     Frank Zayas is a 79 y.o. male with a medical diagnosis of Acute on chronic diastolic congestive heart failure.  He presents with a decline in functional status due to the listed impairments, impacting ADLs and functional mobility. PTA, pt was (I)/Mod I with ADL's and most IADL's and driving. Pt displayed fair tolerance for functional mobility and standing ADL tasks in room and bathroom with 1.5 L O2. Pt reported hx of multiple falls but displayed good balance with all standing tasks. Recommend OT treatment to maximize endurance, safety & independence with ADL's & functional mobility.  Performance deficits affecting function: weakness, gait instability, impaired endurance, impaired functional mobilty, impaired self care skills, impaired skin, impaired cardiopulmonary response to activity.    Pt will benefit from HH PT to increase activity tolerance, independence and safety with functional mobility in pt's home environment.    Rehab Prognosis: Good; patient would benefit from acute skilled OT services to address these deficits and reach maximum level of function.       Plan:     Patient to be seen 2 x/week to address the above listed problems via self-care/home management, therapeutic activities, therapeutic exercises  · Plan of Care Expires: 06/13/19  · Plan of Care Reviewed with: patient    Subjective     Chief Complaint: None stated  Patient/Family Comments/goals: " I am ready to be home with my family."    Occupational Profile:  Living Environment: Pt lives with his wife in a [facility] with 0 LIGIA and a step-over tub/shower.  Previous level of " function: Patient was Mod I to Independent with all I/ADL's at home and in the community and driving.  Roles and Routines: Pt enjoys sitting on his porch and reading.  Equipment Used at Home:  cane, straight  Assistance upon Discharge: Family will assist pt at home.      Pain/Comfort:  · Pain Rating 1: 0/10  · Pain Rating Post-Intervention 1: 0/10    Patients cultural, spiritual, Hinduism conflicts given the current situation:      Objective:     Communicated with: Shannon, nurse prior to session.  Patient found HOB elevated with PICC line, telemetry, oxygen upon OT entry to room.    General Precautions: Standard, aspiration, fall, respiratory   Orthopedic Precautions:N/A   Braces: N/A     Occupational Performance:    Bed Mobility:    · Patient completed Scooting/Bridging with stand by assistance and with side rail  · Patient completed Supine to Sit with stand by assistance and with side rail  · Patient completed Sit to Supine with stand by assistance and with side rail    Functional Mobility/Transfers:  · Patient completed Sit <> Stand Transfer with contact guard assistance and cues for hand placement  with  rolling walker   · Patient completed Toilet Transfer Stand Pivot technique with contact guard assistance with  rolling walker and grab bars  · Functional Mobility: Pt walked from bedside to bathroom, then to sink & back to bedside using walker with CGA & no LOB noted.      Activities of Daily Living:  · Grooming: stand by assistance standing at sink with walker  · Upper Body Dressing: supervision seated EOB  · Lower Body Dressing: stand by assistance and to don/doff socks at EOB    · Toileting: stand by assistance at toilet for all tasks    Cognitive/Visual Perceptual:  Cognitive/Psychosocial Skills:     -       Oriented to: Person, Place, Time and Situation   -       Follows Commands/attention:Follows multistep  commands  -       Communication: clear/fluent  -       Safety awareness/insight to disability: intact  "  -       Mood/Affect/Coping skills/emotional control: Appropriate to situation and Pleasant    Physical Exam:  Postural examination/scapula alignment:    -       Rounded shoulders  -       Forward head  Skin integrity: B lower legs red with small, open wounds  Edema:  Moderate B lower legs  Upper Extremity Range of Motion:     -       Right Upper Extremity: WNL  -       Left Upper Extremity: WNL  Upper Extremity Strength:    -       Right Upper Extremity: WNL  -       Left Upper Extremity: WNL   Strength:    -       Right Upper Extremity: WNL  -       Left Upper Extremity: WNL  Fine Motor Coordination:    -       Intact  Gross motor coordination:   WFL    AMPAC 6 Click ADL:  AMPAC Total Score: 19    Treatment & Education:  OT ed pt on OT role & POC as well as discharge recommendations.  OT ed patient on safety with walker use for functional mobility with cues for hand placement & sequencing.   OT educated patient on toilet transfer techniques using rolling walker.  OT ed pt on fall risk and strongly advised pt to call for help for all OOB mobility.  Pt refused an UE HEP, stating, "I only do what I want and I don't like exercise."  OT ed pt & family on keeping HOB raised and sitting up in chair as pt will tolerate to counteract effects of bedrest.  Pt verbalized understanding.  Education:    Patient left HOB elevated with all lines intact, call button in reach and Lilli, nurse notified    GOALS:   Multidisciplinary Problems     Occupational Therapy Goals        Problem: Occupational Therapy Goal    Goal Priority Disciplines Outcome Interventions   Occupational Therapy Goal     OT, PT/OT Ongoing (interventions implemented as appropriate)    Description:  Goals to be met by: 5/23/19     Patient will increase functional independence with ADLs by performing:    LE Dressing with Supervision and Assistive Devices as needed.  Grooming while standing at sink with Supervision and Assistive Devices as needed.  Toileting " from toilet with Supervision and Assistive Devices as needed for hygiene and clothing management.   Toilet transfer to toilet with Supervision.                        History:     Past Medical History:   Diagnosis Date    Anticoagulant long-term use     Arthritis     Bladder cancer     Blood transfusion     CHF (congestive heart failure)     Chronic kidney disease     COPD (chronic obstructive pulmonary disease)     Coronary artery disease     Diabetes mellitus     Gout     [place]     HAS BILAT AIDS BUT DOES NOT WEAR    Hyperlipidemia     Hypertension     Myocardial infarction     RLS (restless legs syndrome)     Sleep apnea     NO MACHINE    Wears glasses        Past Surgical History:   Procedure Laterality Date    CARDIAC SURGERY      CABG X 2 1997    CATARACT EXTRACTION, BILATERAL      CHOLECYSTECTOMY      COLONOSCOPY      CORONARY ARTERY BYPASS GRAFT      x 2    coronary stents      CYSTOSCOPY      CYSTOSCOPY N/A 4/8/2019    [facility]    EXCISION-BLADDER TUMOR-TRANSURETHRAL (TURBT) N/A 9/8/2014    [facility] at [facility] OR    EXCISION-BLADDER TUMOR-TRANSURETHRAL (TURBT) N/A 11/18/2013    [facility] by [facility] at [facility] OR    EYE SURGERY Bilateral     CATARACT    TURBT (TRANSURETHRAL RESECTION OF BLADDER TUMOR) N/A 4/8/2019    [facility] by [facility] at [facility] OR    VASECTOMY         Time Tracking:     OT Date of Treatment: 05/16/19  OT Start Time: 0922  OT Stop Time: 0954  OT Total Time (min): 32 min    Billable Minutes:Evaluation 9  Self Care/Home Management 13  Therapeutic Activity 10    RUPINDER Lundy  5/16/2019

## 2019-05-16 NOTE — PLAN OF CARE
Pt awake, sleeping in between care, vss, denies pain, chest xray done for hemosplit placement, SCD to L leg only due to weeping on RLE. Ok per Dr. Carnes to transfer the pt back to room 213. No family present. Call light within reach. Tele monitor [id number] placed back on pt. Report given to DANIAL Reeder.

## 2019-05-16 NOTE — PLAN OF CARE
Call received from Bria with [facility] patient is arranged for outpatient HD:    [facility] [address].  TTS @ 11:00, beginning Saturday 05/18/19  Patient to arrive at 10:30 his 1st visit on 05/18 05/16/19 1156   Post-Acute Status   Post-Acute Authorization Dialysis   Diaylsis Status Set-up Complete

## 2019-05-16 NOTE — BRIEF OP NOTE
[facility]  Brief Operative Note    SUMMARY     Surgery Date: 5/16/2019     Surgeon(s) and Role:     * Wade Rodriguez MD - Primary    Assisting Surgeon: None    Pre-op Diagnosis:  MOLLY (acute kidney injury) [N17.9]    Post-op Diagnosis:  Post-Op Diagnosis Codes:     * MOLLY (acute kidney injury) [N17.9]    Procedure(s) (LRB):  Insertion,catheter,tunneled (N/A)    Anesthesia: Local MAC    Description of Procedure:   R subclavian    Estimated Blood Loss: 3cc         Specimens:   Specimen (12h ago, onward)    None

## 2019-05-16 NOTE — PLAN OF CARE
Problem: Occupational Therapy Goal  Goal: Occupational Therapy Goal  Goals to be met by: 5/23/19     Patient will increase functional independence with ADLs by performing:    LE Dressing with Supervision and Assistive Devices as needed.  Grooming while standing at sink with Supervision and Assistive Devices as needed.  Toileting from toilet with Supervision and Assistive Devices as needed for hygiene and clothing management.   Toilet transfer to toilet with Supervision.      Outcome: Ongoing (interventions implemented as appropriate)  OT evaluation completed today. Goals & care plan established.    RUPINDER Rodriguez  5/16/2019  \

## 2019-05-16 NOTE — ANESTHESIA PREPROCEDURE EVALUATION
2019  Frank Zayas is a 79 y.o., male.    Anesthesia Evaluation    I have reviewed the Patient Summary Reports.     I have reviewed the Nursing Notes.   I have reviewed the Medications.     Review of Systems  Social:  Former Smoker Smoking Status: Former Smoker - 5 pack years  Quit Smokin82  Smokeless Tobacco Status: Never Used  Alcohol use: No  Drug use: No       Cardiovascular:   Hypertension Past MI CAD   CHF    Pulmonary:   COPD, moderate Sleep Apnea    Renal/:   Chronic Renal Disease, Dialysis, ESRD    Endocrine:   Diabetes, well controlled        Physical Exam  General:  Morbid Obesity    Airway/Jaw/Neck:  Airway Findings: Mouth Opening: Normal Tongue: Normal  General Airway Assessment: Adult, Good  Mallampati: II  Improves to II with phonation.  TM Distance: 4-6 cm      Dental:  Dental Findings: In tact   Chest/Lungs:  Chest/Lungs Findings: Clear to auscultation, Normal Respiratory Rate     Heart/Vascular:  Heart Findings: Rate: Normal  Rhythm: Regular Rhythm  Sounds: Normal  Heart murmur: negative       Mental Status:  Mental Status Findings:  Cooperative, Alert and Oriented         Anesthesia Plan  Type of Anesthesia, risks & benefits discussed:  Anesthesia Type:  general  Patient's Preference:   Intra-op Monitoring Plan: standard ASA monitors  Intra-op Monitoring Plan Comments:   Post Op Pain Control Plan:   Post Op Pain Control Plan Comments:   Induction:   IV  Beta Blocker:  Patient is on a Beta-Blocker and has received one dose within the past 24 hours (No further documentation required).       Informed Consent: Patient understands risks and agrees with Anesthesia plan.  Questions answered. Anesthesia consent signed with patient.  ASA Score: 4     Day of Surgery Review of History & Physical: I have interviewed and examined the patient. I have reviewed the patient's H&P dated:   There are no significant changes.  H&P update referred to the surgeon.         Ready For Surgery From Anesthesia Perspective.

## 2019-05-16 NOTE — PT/OT/SLP PROGRESS
Physical Therapy      Patient Name:  Frank Zayas   MRN:  [id number]    Patient not seen today secondary to Other (Comment)(Patient refused 1st attempt. Having procedure at this time.). Will follow-up 5/17/19.    Susanne Ferrari PTA

## 2019-05-16 NOTE — PT/OT/SLP PROGRESS
Speech Language Pathology      Frank Zayas  MRN: [id number]    Patient not seen today secondary to (NPO and out of room for procedure). Will follow-up 5/17/19.    Shannon Burt, SHORTY-SLP

## 2019-05-16 NOTE — PROGRESS NOTES
"INPATIENT NEPHROLOGY PROGRESS NOTE  [facility] NEPHROLOGY    Patient Name: Frank Zayas  Date: 05/16/2019    Reason for consultation: MOLLY    Chief Complaint:   Chief Complaint   Patient presents with    Shortness of Breath     "due to increasing fluid"       History of Present Illness:  Patient known to Dr. Quinonez for CKD4, HTN, hyperkalemia on [place] is admitted for CHF exacerbation, fluid overload and gross hematuria, consulted for renal management.    · Interval History/Subjective:    -5/11 seen on dialysis.  No chest pain, sob, nausea.  He states he feels good.  His appetite is good.  -5/12 sleeping,  No distress.  Urine output not recorded  -5/13 still requires HD, will do HD today.  -5/14 VSS, tolerated HD well yesterday. Plan to get tunneled cath by Dr. Rodriguez and start dispo planning with HD placement as he is not improving sufficiently at this time. HD tomorrow.  -5/15 VSS, seen and examined on HD today, tolerating well, no complains. Needs tunneled cath and dispo planing.  -5/16 VSS, tolerated HD well yesterday. Getting tunneled cath by Dr. Rodriguez, ongoing dispo planning with HD placemen. HD tomorrow.    Plan of Care:    Assessment:  AoCKI, baseline stage IV CKD- suspect CRS + ischemic ATN  HTN/CHF (EF 45%) with volume overload/HHRF (+ underlying BRANDEN) requiring BIPAP   SHPT  Anemia 2/2 SHANDRA + CKD  Gross hematuria/Hx of bladder cancer    Plan:    - Initiated on dialysis on 5/8 for worsening renal function with metabolic derangements, symptomatic volume overload and oliguria. Has not recovered yet and remains dialysis dependent. Dr. Rodriguez to place a tunneled HD cath 5/16 and prepare patient for outpatient HD.  - No NSAIDs or IV contrast.  - Phos is at goal. He doesn't need a phos binder now.  - Hgb is stable. Continue iron tabs BID. RAFAEL with HD PRN.     Thank you for allowing us to participate in this patient's care. We will continue to follow.    Medications:  No current facility-administered " medications on file prior to encounter.      Current Outpatient Medications on File Prior to Encounter   Medication Sig Dispense Refill    alfuzosin (UROXATRAL) 10 mg Tb24 Take 10 mg by mouth nightly.       allopurinol (ZYLOPRIM) 300 MG tablet 1 tablet      aspirin (ECOTRIN) 81 MG EC tablet Take 81 mg by mouth once daily.      clopidogrel (PLAVIX) 75 mg tablet Take 75 mg by mouth once daily.      coenzyme Q10 (CO Q-10) 100 mg capsule Take 100 mg by mouth once daily.      ezetimibe (ZETIA) 10 mg tablet Take 10 mg by mouth every evening.       FLUoxetine 20 MG capsule every evening.       gabapentin (NEURONTIN) 100 MG capsule every evening.       gemfibrozil (LOPID) 600 MG tablet Take 600 mg by mouth 2 (two) times daily.       insulin glargine (LANTUS) 100 unit/mL injection Inject 27 Units into the skin every evening.       metoprolol tartrate (LOPRESSOR) 25 MG tablet Take 25 mg by mouth 2 (two) times daily.      nateglinide (STARLIX) 60 MG tablet Take 60 mg by mouth every evening.       ropinirole (REQUIP) 2 MG tablet Take 2 mg by mouth nightly.       torsemide (DEMADEX) 20 MG tablet Take 20 mg by mouth once daily.       TRADJENTA 5 mg Tab tablet Take 5 mg by mouth once daily.  0    VELTASSA 16.8 gram PwPk MIX 1 PACKET IN LIQUID AND TAKE PO QD UTD  4    zinc 50 mg Tab Take 50 mg by mouth once daily. 1/2 tab daily      nitroGLYCERIN (NITROSTAT) 0.4 MG SL tablet Place 0.4 mg under the tongue every 5 (five) minutes as needed.        Scheduled Meds:   alfuzosin  10 mg Oral Nightly    aspirin  81 mg Oral QHS    clopidogrel  75 mg Oral Daily    ezetimibe  10 mg Oral QHS    ferrous sulfate  325 mg Oral BID WM    FLUoxetine  20 mg Oral QHS    gemfibrozil  600 mg Oral BID    heparin (porcine)  4,000 Units Intra-Catheter Once    insulin detemir U-100  20 Units Subcutaneous QHS    levalbuterol  0.63 mg Nebulization Q8H    lidocaine (PF) 10 mg/ml (1%)  1 mL Intradermal Once    metoprolol tartrate   25 mg Oral BID    mupirocin   Nasal BID    ondansetron  4 mg Intravenous Once    rOPINIRole  2 mg Oral Nightly    senna-docusate 8.6-50 mg  1 tablet Oral BID    sodium chloride 0.9%  3 mL Intravenous Q8H    vitamin renal formula (B-complex-vitamin c-folic acid)  1 capsule Oral Daily     Continuous Infusions:   sodium chloride 0.9% 10 mL/hr at 05/16/19 1330    lactated ringers       PRN Meds:.sodium chloride 0.9%, acetaminophen, dextrose 50%, dextrose 50%, diphenhydrAMINE, fentaNYL, glucagon (human recombinant), glucose, glucose, HYDROmorphone, insulin aspart U-100, magnesium oxide, meperidine, nitroGLYCERIN, ondansetron, oxyCODONE, potassium chloride 10%, promethazine (PHENERGAN) IVPB, ramelteon, sodium chloride 0.9%, sodium chloride 0.9%    Allergies:  Lidocaine and Statins-hmg-coa reductase inhibitors    Vital Signs:  Temp Readings from Last 3 Encounters:   05/16/19 98.1 °F (36.7 °C) (Skin)   04/24/19 98.6 °F (37 °C) (Oral)   04/08/19 97.9 °F (36.6 °C)       Pulse Readings from Last 3 Encounters:   05/16/19 74   04/24/19 76   04/08/19 63       BP Readings from Last 3 Encounters:   05/16/19 (!) 111/57   04/24/19 131/74   04/08/19 (!) 153/84       Weight:  Wt Readings from Last 3 Encounters:   05/16/19 123.4 kg (272 lb)   04/24/19 129.3 kg (285 lb 0.9 oz)   04/08/19 129.3 kg (285 lb)       INS/OUTS:  I/O last 3 completed shifts:  In: 1600 [P.O.:900; Other:700]  Out: 3650 [Urine:450; Other:3200]  I/O this shift:  In: 100 [I.V.:100]  Out: 5 [Blood:5]    Physical Exam:  Constitutional: nad, aao x 3  Heart: rrr, estela, no r/g, wwp, anasarca  Lungs: coarse, no w/r/r/c, no lb  Abdomen: s/nt/d, +BS    Results:  Lab Results   Component Value Date     (L) 05/16/2019    K 4.2 05/16/2019     05/16/2019    CO2 24 05/16/2019    BUN 40 (H) 05/16/2019    CREATININE 4.0 (H) 05/16/2019    CALCIUM 8.8 05/16/2019    ANIONGAP 10 05/16/2019    ESTGFRAFRICA 16 (A) 05/16/2019    EGFRNONAA 13 (A) 05/16/2019       Lab  Results   Component Value Date    CALCIUM 8.8 05/16/2019    PHOS 5.3 (H) 05/16/2019       Recent Labs   Lab 05/16/19  0522   WBC 5.80   RBC 3.12*   HGB 9.6*   HCT 30.2*   *   MCV 97   MCH 30.7   MCHC 31.7*       I have personally reviewed pertinent radiological imaging and reports.    Reyes Yoo MD  Nephrology  [facility]  [phone]

## 2019-05-16 NOTE — ANESTHESIA POSTPROCEDURE EVALUATION
Anesthesia Post Evaluation    Patient: Frank Zayas    Procedure(s) Performed: Procedure(s) (LRB):  Insertion,catheter,tunneled (N/A)    Final Anesthesia Type: MAC  Patient location during evaluation: PACU  Patient participation: Yes- Able to Participate  Level of consciousness: awake and alert  Post-procedure vital signs: reviewed and stable  Pain management: adequate  Airway patency: patent  PONV status at discharge: No PONV  Anesthetic complications: no      Cardiovascular status: blood pressure returned to baseline and hemodynamically stable  Respiratory status: unassisted  Hydration status: euvolemic  Follow-up not needed.          Vitals Value Taken Time   /50 5/16/2019  4:56 PM   Temp 36.7 °C (98.1 °F) 5/16/2019  4:15 PM   Pulse 66 5/16/2019  4:56 PM   Resp 15 5/16/2019  4:56 PM   SpO2 97 % 5/16/2019  4:56 PM   Vitals shown include unvalidated device data.      No case tracking events are documented in the log.      Pain/Giorgi Score: Giorgi Score: 9 (5/16/2019  4:55 PM)

## 2019-05-16 NOTE — PROGRESS NOTES
05/16/19 0728   Patient Assessment/Suction   Level of Consciousness (AVPU) alert   Respiratory Effort Normal;Unlabored   All Lung Fields Breath Sounds diminished   PRE-TX-O2   O2 Device (Oxygen Therapy) nasal cannula   Flow (L/min) 2   Oxygen Concentration (%) 28   SpO2 95 %   Pulse Oximetry Type Intermittent   $ Pulse Oximetry - Multiple Charge Pulse Oximetry - Multiple   Pulse 61   Resp 18   Aerosol Therapy   $ Aerosol Therapy Charges Aerosol Treatment   Daily Review of Necessity (SVN) completed   Respiratory Treatment Status (SVN) given   Treatment Route (SVN) mask   Patient Position (SVN) HOB elevated   Post Treatment Assessment (SVN) breath sounds improved   Signs of Intolerance (SVN) none   Breath Sounds Post-Respiratory Treatment   Throughout All Fields Post-Treatment All Fields   Throughout All Fields Post-Treatment aeration increased   Post-treatment Heart Rate (beats/min) 65   Post-treatment Resp Rate (breaths/min) 16   Incentive Spirometer   $ Incentive Spirometer Charges done with encouragement   Administration (IS) instruction provided, follow-up   Number of Repetitions (IS) 10   Level Incentive Spirometer (mL) 1000   Patient Tolerance (IS) good   Xop .63 Q8, IS Q2, tolerates tx well, vitals are as charted.

## 2019-05-16 NOTE — PLAN OF CARE
05/15/19 2004   Patient Assessment/Suction   Level of Consciousness (AVPU) alert   Respiratory Effort Normal;Unlabored   PRE-TX-O2   O2 Device (Oxygen Therapy) nasal cannula   $ Is the patient on Low Flow Oxygen? Yes   Flow (L/min) 2   Oxygen Concentration (%) 28   SpO2 97 %   Pulse Oximetry Type Intermittent   $ Pulse Oximetry - Multiple Charge Pulse Oximetry - Multiple   Pulse 72   Resp 18   Ready to Wean/Extubation Screen   FIO2<=50 (chart decimal) 0.28

## 2019-05-16 NOTE — PROGRESS NOTES
[facility] Hospital Medicine  Progress Note    Patient Name: Frank Zayas  MRN: [id number]  Patient Class: IP- Inpatient   Admission Date: 5/4/2019  Length of Stay: 12 days  Attending Physician: Breanne Parrish MD  Primary Care Provider: Mikhail Shields MD        Subjective:     Principal Problem:Acute on chronic diastolic congestive heart failure    HPI:  This is a 79-year-old male with PMHx significant for HTN, HLD, MI, CHF, COPD, DM, CKD, gout, arthritis, and bladder CA.  He presented to the ED with a progressively worsening SOB.  He stated that his SOB has been going on for the past few months, but has worsened in the last 3 days.  Symptoms are associated with bilateral lower extremity leg swelling. Exertion exacerbates his symptoms.  He states that he has been compliant with his medications and dietary restrictions.  He was given Lasix intravenously in the ED reports that he does feel better.  He reports that he has had a cough productive of clear mucus and felt nauseated earlier today.  He denied fever, chills, sweats, chest pain, wheezing, abdominal pain, vomiting, diarrhea, dysuria, and any other symptoms at this time.  The patient was evaluated in the emergency room where he was noted to have signs of acute heart failure and new onset of atrial fib.  Patient was admitted for further evaluation and treatment.    Hospital Course:      He was placed on continuous telemetry monitor.  Cardiology was consulted.  Patient was placed on supplemental O2 and IV diuretics for his acute heart failure.  An echocardiogram was -noted EF 45% and DD.  He was continued on metoprolol for his new onset of atrial fib which later converted back to sinus rhythm. He was transferred to ICU 5/7 overnight for bipap for hypercarbic resp failure and monitored in intensive care while hemodialysis was done to her remove volume.  His respiratory status significantly improved as did his anasarca.   Nephrology was  consulted for chronic kidney disease stage 4 with acute of volume overload. He had HD for several days for volume removal after failure with diuresis.  And symptomatic we a significantly improved and was ambulating and on room air.  He no longer desired to wear BiPAP.  He is scheduled for tunnel catheter placement on 05/14 and then scheduled for outpatient dialysis.  Initially the plan was skilled nursing however his ambulation and strength improved significantly so he will be discharged home with home health.    Patient also noted to have some new onset of hematuria and Urology was consulted.  He has a history of bladder cancer.  The Huynh remained in place until hematuria resolved and was discontinued.  He had no further symptomatology.  He needs follow-up with Dr. yelena Grove or the bladder cancer.  Physical therapy was consulted for debility.        Interval History:  Patient seen and examined.  NPO for tunneled cath placement today.  Only complaint is hunger.  Otherwise feels well.    Review of Systems   Constitutional: Negative for chills and fever.   Respiratory: Negative for cough and shortness of breath.    Cardiovascular: Positive for leg swelling. Negative for chest pain and palpitations.   Gastrointestinal: Negative for abdominal distention, abdominal pain, constipation, diarrhea and vomiting.   Genitourinary: Negative for flank pain and frequency.   Musculoskeletal: Negative for neck pain and neck stiffness.   Neurological: Negative for dizziness and weakness.   Psychiatric/Behavioral: Negative for behavioral problems and confusion.     Objective:     Vital Signs (Most Recent):  Temp: 97.7 °F (36.5 °C) (05/16/19 1254)  Pulse: 62 (05/16/19 1254)  Resp: 16 (05/16/19 1254)  BP: 108/65 (05/16/19 1300)  SpO2: 98 % (05/16/19 1254) Vital Signs (24h Range):  Temp:  [96.1 °F (35.6 °C)-97.7 °F (36.5 °C)] 97.7 °F (36.5 °C)  Pulse:  [56-78] 62  Resp:  [16-20] 16  SpO2:  [93 %-100 %] 98 %  BP: (102-120)/(53-65)  108/65     Weight: 123.4 kg (272 lb)  Body mass index is 34.92 kg/m².    Intake/Output Summary (Last 24 hours) at 5/16/2019 1302  Last data filed at 5/15/2019 1630  Gross per 24 hour   Intake 150 ml   Output 250 ml   Net -100 ml      Physical Exam   Constitutional: He is oriented to person, place, and time. He appears well-developed and well-nourished. No distress.   Obese.   HENT:   Head: Normocephalic and atraumatic.   Eyes: Pupils are equal, round, and reactive to light. Conjunctivae and EOM are normal. Right eye exhibits no discharge. Left eye exhibits no discharge.   Neck: Normal range of motion. Neck supple.   Cardiovascular: Normal rate, regular rhythm, normal heart sounds and intact distal pulses.   Pulses:       Dorsalis pedis pulses are 2+ on the right side, and 2+ on the left side.   Pulmonary/Chest: Effort normal. No accessory muscle usage. He has decreased breath sounds in the right lower field and the left lower field. He has no wheezes. He has no rhonchi. He has no rales.   Abdominal: Soft. Bowel sounds are normal. He exhibits no distension. There is no tenderness. There is no guarding.   Musculoskeletal: Normal range of motion. He exhibits edema.   Bilateral lower extremity edema      Neurological: He is alert and oriented to person, place, and time. He has normal strength. No cranial nerve deficit.   Skin: Skin is warm, dry and intact. Capillary refill takes less than 2 seconds. He is not diaphoretic.   Right lower extremity dressing intact  Areas of dry scaly skin and excoriations anterior tibial a bilaterally without signs of infection   Psychiatric: He has a normal mood and affect. His speech is normal and behavior is normal. Judgment and thought content normal.   Nursing note and vitals reviewed.      Significant Labs: All pertinent labs within the past 24 hours have been reviewed.    Significant Imaging: I have reviewed and interpreted all pertinent imaging results/findings within the past 24  hours.    Assessment/Plan:      * Acute on chronic diastolic congestive heart failure  Was in ICU  for pulm edema /hypoventilation due to cardiorenal syndrome and obesity hypoventilation which is now much improved after volume removal on HD for CKD 4  Orders as per Cardiology-Monitor electrolyte and renal status-stable   Echocardiogram results noted-EF 45%  Daily weights. Strict I/Os. Fluid restriction.   Na restriction <2g/d.              Dysphagia, pharyngeal  Improved continue speech therapy      Acute hypercapnic respiratory failure  Resolved with BiPAP and volume removal.    Continue BiPAP q.h.s. and p.r.n..  Continue incentive spirometry    Hyperkalemia  Resolved.  Renal diet.  Trend renal function.  K bath adjusted with HD      Encephalopathy, metabolic  Due to worsening of renal function.-resolved with improvement of renal function and dialysis as well as treatment of hypercarbia    Fall and delirium precautions    MOLLY (acute kidney injury)  With cardiorenal syndrome.  Cardiovascular surgery to place tunnel cath today  Nephrology following.  He has been initiated on hemodialysis for ultrafiltration and volume removal.  Trend renal function closely.      Morbid (severe) obesity with alveolar hypoventilation  Body mass index is 34.79 kg/m².  General weight loss/lifestyle modification strategies discussed (elicit support from others; identify saboteurs; non-food rewards, etc).           Skin ulcer of left lower leg, limited to breakdown of skin  Wound care      BRANDEN (obstructive sleep apnea)  Patient reports he cannot tolerate CPAP or BiPAP-at home. Tolerating BIpap here       Debility  Fall and skin precautions  Continue physical therapy and occupational therapy.  They are recommending home with home health PT.    Hematuria  Monitor closely-history of bladder cancer  Discontinue Lovenox-Huynh catheter dc 5/12      Per Urology consult--Dr Vasquez  As follows:  In terms of the transitional cell carcinoma of the    bladder we will have to hold off any further treatment until he has fully recovered from his current   condition and we will need to make a decision following consultation with   Oncology concerning the possibility of other treatment options for his   transitional cell carcinoma of the bladder.    Huynh removed.    Elevated brain natriuretic peptide (BNP) level        Elevated troponin  Workup as per Cardiology      Mild malnutrition  Continue renal multivitamin/supplements recom.mended by dietician as indicated  Continue p.o. Supplement-      Hyperphosphatemia  Monitor  Orders as per renal      Anemia, chronic disease  Monitor and transfuse if he becomes hemodynamically unstable or H/H < 7/21  epo per nephrology   No transfusion needed     New onset atrial fibrillation  RESOLVED .  No normal sinus rhythm on telemetry.    Orders as per Cardiology  Continue beta-blockade  On asa, plavix for  PAD  anticoag not indicated         COPD (chronic obstructive pulmonary disease)  Monitor O2 sats, supplement O2 as needed  Continue t.i.d. Xopenex aerosol treatments        Coronary artery disease  Telemetry  Continue home medications- including  ASA, Plavix, Metoprolol and Zetia.  Monitor closely for s/s of ACS.  Pt takes plavix only 3 x per week       DM type 2 with diabetic mixed hyperlipidemia  Chronic, continue Zetia.   Lipid panel noted        Type 2 diabetes mellitus with kidney complication, with long-term current use of insulin  Chronic, stable-on  levermir and ISS ;   Nephrology following          Hypertension associated with stage 4 chronic kidney disease due to type 2 diabetes mellitus  Continue antihypertensives per home regimen, monitor BP closely, and titrate medication for sustained BP control.  On hemodialysis for fluid removal  Hypertension Medications             metoprolol tartrate (LOPRESSOR) 25 MG tablet Take 25 mg by mouth 2 (two) times daily.    nitroGLYCERIN (NITROSTAT) 0.4 MG SL tablet Place 0.4 mg  under the tongue every 5 (five) minutes as needed.     torsemide (DEMADEX) 20 MG tablet Take 20 mg by mouth once daily.       Hospital Medications             metoprolol tartrate (LOPRESSOR) tablet 25 mg Take 1 tablet (25 mg total) by mouth 2 (two) times daily.            Type 2 diabetes mellitus  Patient's FSGs are controlled on current hypoglycemics.   Last A1c reviewed-   Lab Results   Component Value Date    HGBA1C 5.6 05/04/2019     Most recent fingerstick glucose reviewed-   Recent Labs   Lab 05/15/19  1627 05/15/19  2105 05/16/19  0520 05/16/19  1124   POCTGLUCOSE 107 130* 104 81     Current correctional scale Low  Maintain anti-hyperglycemic dose as follows-   Antihyperglycemics (From admission, onward)    Start     Stop Route Frequency Ordered    05/04/19 2230  insulin detemir U-100 pen 20 Units      -- SubQ Nightly 05/04/19 2225    05/04/19 2225  insulin aspart U-100 pen 0-5 Units      -- SubQ Before meals & nightly PRN 05/04/19 2225              Malignant neoplasm of bladder  Follows with urology.  Had Huynh because of hematuria monitoring of INR, it has now been discontinued.  Urine culture no growth.      VTE Risk Mitigation (From admission, onward)        Ordered     heparin (porcine) injection 4,000 Units  Once      05/15/19 1013     Place JACK hose  Until discontinued      05/05/19 1120     IP VTE HIGH RISK PATIENT  Once      05/04/19 2225              Breanne Parrish MD  Department of Hospital Medicine   [facility]

## 2019-05-16 NOTE — SUBJECTIVE & OBJECTIVE
Interval History:  Patient seen and examined.  NPO for tunneled cath placement today.  Only complaint is hunger.  Otherwise feels well.    Review of Systems   Constitutional: Negative for chills and fever.   Respiratory: Negative for cough and shortness of breath.    Cardiovascular: Positive for leg swelling. Negative for chest pain and palpitations.   Gastrointestinal: Negative for abdominal distention, abdominal pain, constipation, diarrhea and vomiting.   Genitourinary: Negative for flank pain and frequency.   Musculoskeletal: Negative for neck pain and neck stiffness.   Neurological: Negative for dizziness and weakness.   Psychiatric/Behavioral: Negative for behavioral problems and confusion.     Objective:     Vital Signs (Most Recent):  Temp: 97.7 °F (36.5 °C) (05/16/19 1254)  Pulse: 62 (05/16/19 1254)  Resp: 16 (05/16/19 1254)  BP: 108/65 (05/16/19 1300)  SpO2: 98 % (05/16/19 1254) Vital Signs (24h Range):  Temp:  [96.1 °F (35.6 °C)-97.7 °F (36.5 °C)] 97.7 °F (36.5 °C)  Pulse:  [56-78] 62  Resp:  [16-20] 16  SpO2:  [93 %-100 %] 98 %  BP: (102-120)/(53-65) 108/65     Weight: 123.4 kg (272 lb)  Body mass index is 34.92 kg/m².    Intake/Output Summary (Last 24 hours) at 5/16/2019 1302  Last data filed at 5/15/2019 1630  Gross per 24 hour   Intake 150 ml   Output 250 ml   Net -100 ml      Physical Exam   Constitutional: He is oriented to person, place, and time. He appears well-developed and well-nourished. No distress.   Obese.   HENT:   Head: Normocephalic and atraumatic.   Eyes: Pupils are equal, round, and reactive to light. Conjunctivae and EOM are normal. Right eye exhibits no discharge. Left eye exhibits no discharge.   Neck: Normal range of motion. Neck supple.   Cardiovascular: Normal rate, regular rhythm, normal heart sounds and intact distal pulses.   Pulses:       Dorsalis pedis pulses are 2+ on the right side, and 2+ on the left side.   Pulmonary/Chest: Effort normal. No accessory muscle usage. He  has decreased breath sounds in the right lower field and the left lower field. He has no wheezes. He has no rhonchi. He has no rales.   Abdominal: Soft. Bowel sounds are normal. He exhibits no distension. There is no tenderness. There is no guarding.   Musculoskeletal: Normal range of motion. He exhibits edema.   Bilateral lower extremity edema      Neurological: He is alert and oriented to person, place, and time. He has normal strength. No cranial nerve deficit.   Skin: Skin is warm, dry and intact. Capillary refill takes less than 2 seconds. He is not diaphoretic.   Right lower extremity dressing intact  Areas of dry scaly skin and excoriations anterior tibial a bilaterally without signs of infection   Psychiatric: He has a normal mood and affect. His speech is normal and behavior is normal. Judgment and thought content normal.   Nursing note and vitals reviewed.      Significant Labs: All pertinent labs within the past 24 hours have been reviewed.    Significant Imaging: I have reviewed and interpreted all pertinent imaging results/findings within the past 24 hours.

## 2019-05-16 NOTE — ASSESSMENT & PLAN NOTE
Patient's FSGs are controlled on current hypoglycemics.   Last A1c reviewed-   Lab Results   Component Value Date    HGBA1C 5.6 05/04/2019     Most recent fingerstick glucose reviewed-   Recent Labs   Lab 05/15/19  1627 05/15/19  2105 05/16/19  0520 05/16/19  1124   POCTGLUCOSE 107 130* 104 81     Current correctional scale Low  Maintain anti-hyperglycemic dose as follows-   Antihyperglycemics (From admission, onward)    Start     Stop Route Frequency Ordered    05/04/19 2230  insulin detemir U-100 pen 20 Units      -- SubQ Nightly 05/04/19 2225 05/04/19 2225  insulin aspart U-100 pen 0-5 Units      -- SubQ Before meals & nightly PRN 05/04/19 2225

## 2019-05-16 NOTE — ASSESSMENT & PLAN NOTE
With cardiorenal syndrome.  Cardiovascular surgery to place tunnel cath today  Nephrology following.  He has been initiated on hemodialysis for ultrafiltration and volume removal.  Trend renal function closely.

## 2019-05-17 NOTE — PLAN OF CARE
Patient alert and oriented resting in bed. NAD. Denies pain or SOB. VSS. Afib on monitor. Up with assistance.  tunnel cath placement today site clean and intact. Plan of care reviewed with patient. Verbalizes understanding.Call light in reach. Pt free from fall or injury. Will monitor.

## 2019-05-17 NOTE — PLAN OF CARE
I sent the pts FS, H&P orders for a home walker, 3 in 1 and transfer bench to [facility]. Tish Jamison, DORIE      05/17/19 1004   Post-Acute Status   Post-Acute Authorization E   E Status Referrals Sent

## 2019-05-17 NOTE — PLAN OF CARE
05/17/19 1243   Final Note   Assessment Type Final Discharge Note   Anticipated Discharge Disposition Home-Health   Hospital Follow Up  Appt(s) scheduled? Yes

## 2019-05-17 NOTE — PLAN OF CARE
1020  Contacted Dr Shields's office to request a hospital f/u; they will call me back.    1040  Scheduled f/u with Dr Vasquez, and added patient to wait list; appt on AVS.       05/17/19 1022   Discharge Reassessment   Assessment Type Discharge Planning Reassessment

## 2019-05-17 NOTE — PLAN OF CARE
Date Status/Notes Created By   · 5/17/2019 10:05:42 AM Completed  Tish Jamison  · 5/17/2019 10:03:25 AM Accepted: Thanks for the referral!  Mk [email or web address]  · 5/17/2019 10:00:51 AM New: Pt is discharging home today and needs new HH setup. Thanks ! Tish [phone]  Tish Jamison    The pt is accepted by [facility] via [facility]. Discharge destination booked. Tish Jamison LCSW     11:24- I confirmed with Elva at [facility] [phone] that she has the DME orders and is working on it now. Tish Jamison LCSW       05/17/19 1004   Post-Acute Status   Post-Acute Authorization Home Health/Hospice   HME Status Referrals Sent   Home Health/Hospice Status Set-up Complete

## 2019-05-17 NOTE — PT/OT/SLP PROGRESS
Physical Therapy      Patient Name:  Frank Zayas   MRN:  [id number]    Patient not seen today secondary to Dialysis. Will follow-up 5/18/19.    Susanne Ferrari PTA

## 2019-05-17 NOTE — PLAN OF CARE
05/17/19 0732   Patient Assessment/Suction   Level of Consciousness (AVPU) alert   All Lung Fields Breath Sounds diminished   PRE-TX-O2   O2 Device (Oxygen Therapy) nasal cannula   $ Is the patient on Low Flow Oxygen? Yes   Flow (L/min) 2   Oxygen Concentration (%) 28   SpO2 95 %   Pulse Oximetry Type Intermittent   $ Pulse Oximetry - Multiple Charge Pulse Oximetry - Multiple   Pulse 62   Resp 18   Aerosol Therapy   $ Aerosol Therapy Charges Aerosol Treatment   Respiratory Treatment Status (SVN) given   Treatment Route (SVN) mask   Patient Position (SVN) HOB elevated   Post Treatment Assessment (SVN) breath sounds unchanged   Signs of Intolerance (SVN) none   Breath Sounds Post-Respiratory Treatment   Throughout All Fields Post-Treatment All Fields   Throughout All Fields Post-Treatment aeration increased   Post-treatment Heart Rate (beats/min) 78   Post-treatment Resp Rate (breaths/min) 18   Ready to Wean/Extubation Screen   FIO2<=50 (chart decimal) 0.28

## 2019-05-17 NOTE — PROGRESS NOTES
HD:  Pt stable, tx complete, lines reinfused, catheter capped and clamped, dressing changed.  Pt tolerated tx well.    NET UF:  3000mL

## 2019-05-17 NOTE — DISCHARGE SUMMARY
[facility] Medicine  Discharge Summary      Patient Name: Frank Zayas  MRN: [id number]  Admission Date: 5/4/2019  Hospital Length of Stay: 13 days  Discharge Date and Time: 5/17/2019  2:57 PM  Attending Physician: No att. providers found   Discharging Provider: Breanne Parrish MD  Primary Care Provider: Mikhail Shields MD      HPI:   This is a 79-year-old male with PMHx significant for HTN, HLD, MI, CHF, COPD, DM, CKD, gout, arthritis, and bladder CA.  He presented to the ED with a progressively worsening SOB.  He stated that his SOB has been going on for the past few months, but has worsened in the last 3 days.  Symptoms are associated with bilateral lower extremity leg swelling. Exertion exacerbates his symptoms.  He states that he has been compliant with his medications and dietary restrictions.  He was given Lasix intravenously in the ED reports that he does feel better.  He reports that he has had a cough productive of clear mucus and felt nauseated earlier today.  He denied fever, chills, sweats, chest pain, wheezing, abdominal pain, vomiting, diarrhea, dysuria, and any other symptoms at this time.  The patient was evaluated in the emergency room where he was noted to have signs of acute heart failure and new onset of atrial fib.  Patient was admitted for further evaluation and treatment.    Procedure(s) (LRB):  Insertion,catheter,tunneled (N/A)      Hospital Course:   Patient was admitted to the hospital medicine service    He was placed on continuous telemetry monitor.  Cardiology was consulted.  Patient was placed on supplemental O2 and IV diuretics for his acute heart failure.  An echocardiogram was -noted EF 45% and DD.  He was continued on metoprolol for his new onset of atrial fib which later converted back to sinus rhythm. He was transferred to ICU 5/7 overnight for bipap for hypercarbic resp failure and monitored in intensive care while hemodialysis was done to her  remove volume.  His respiratory status significantly improved as did his anasarca.   Nephrology was consulted for chronic kidney disease stage 4 with acute of volume overload. He had HD for several days for volume removal after failure with diuresis.  Symptoms were significantly improved and was ambulating and on room air.  He no longer desired to wear BiPAP.  He underwent tunneled catheter placement on 05/16.  He was set up with an outpatient dialysis unit.  Physical therapy and occupational therapy were consulted for debility.  Initially the plan was skilled nursing however his ambulation and strength improved significantly so he was discharged home with home health.    Patient also noted to have some new onset of hematuria and Urology was consulted.   He has a history of bladder cancer.  The Huynh remained in place until hematuria resolved and was discontinued.  He had no further symptomatology.  He will follow-up with  for  the bladder cancer.         Consults:   Consults (From admission, onward)        Status Ordering Provider     Inpatient consult to Nephrology  Once     Provider:  Tu Yoo MD    Completed TERESSA BLAKELY     Inpatient consult to Social Work/Case Management  Once     Provider:  (Not yet assigned)    Completed PAM SANTOYO     Inpatient consult to Social Work/Case Management  Once     Provider:  (Not yet assigned)    Completed TU YOO new Assessment & Plan notes have been filed under this hospital service since the last note was generated.  Service: Hospital Medicine    Final Active Diagnoses:    Diagnosis Date Noted POA    PRINCIPAL PROBLEM:  MOLLY (acute kidney injury) [N17.9] 05/08/2019 Yes    Dysphagia, pharyngeal [R13.13] 05/10/2019 Yes    Morbid (severe) obesity with alveolar hypoventilation [E66.2] 05/08/2019 Yes    Encephalopathy, metabolic [G93.41] 05/08/2019 No    Hyperkalemia [E87.5] 05/08/2019 No    Acute hypercapnic respiratory  failure [J96.02] 05/08/2019 No    Type 2 diabetes mellitus [E11.9] 05/05/2019 Yes    Hypertension associated with stage 4 chronic kidney disease due to type 2 diabetes mellitus [E11.22, I12.9, N18.4] 05/05/2019 Yes    Type 2 diabetes mellitus with kidney complication, with long-term current use of insulin [E11.29, Z79.4] 05/05/2019 Not Applicable    DM type 2 with diabetic mixed hyperlipidemia [E11.69, E78.2] 05/05/2019 Yes    Coronary artery disease [I25.10] 05/05/2019 Yes    COPD (chronic obstructive pulmonary disease) [J44.9] 05/05/2019 Yes    New onset atrial fibrillation [I48.91] 05/05/2019 Yes    Anemia, chronic disease [D63.8] 05/05/2019 Yes    Hyperphosphatemia [E83.39] 05/05/2019 Yes    Mild malnutrition [E44.1] 05/05/2019 Yes    Elevated brain natriuretic peptide (BNP) level [R79.89] 05/05/2019 Yes    Hematuria [R31.9] 05/05/2019 Yes    Debility [R53.81] 05/05/2019 Yes    BRANDEN (obstructive sleep apnea) [G47.33] 05/05/2019 Yes    Skin ulcer of left lower leg, limited to breakdown of skin [L97.921] 05/05/2019 Yes    Acute on chronic diastolic congestive heart failure [I50.33] 05/04/2019 Yes    Malignant neoplasm of bladder [C67.9] 11/18/2013 Yes      Problems Resolved During this Admission:       Discharged Condition: good    Disposition: Home or Self Care    Follow Up:  Follow-up Information     [facility] On 5/18/2019.    Why:  arrive at 10:30 on 5/18 to complete paperwork before your dialysis; your regular schedule will be Tuesday, Thursday, Saturday at 11:00  Contact information:  [address]  [facility] [address] [phone]           Mikhail Shields MD In 2 weeks.    Specialty:  Family Medicine  Contact information:  [address]  [address]  [facility] [address]  [phone]             Kaylee Vasquez MD In 2 weeks.    Specialty:  Urology  Contact information:  [address] [place] [address] DR  [address]  [place]  "[id number]  [phone]             [facility].    Specialty:  Home Health Services  Why:  Home Health  Contact information:  [address]  [address]  [facility] [id number] [phone]             [facility].    Specialty:  DME Provider  Why:  DME  Contact information:  [address]  [address]  [place] [address]  [phone]                 Patient Instructions:      WALKER FOR HOME USE     Order Specific Question Answer Comments   Type of Walker: Rollator    With wheels? Yes    Height: 6' 2" (1.88 m)    Weight: 121.5 kg (267 lb 14.4 oz)    Length of need (1-99 months): 12    Does patient have medical equipment at home? cane, straight    Please check all that apply: Patient's condition impairs ambulation.      TRANSFER TUB BENCH FOR HOME USE     Order Specific Question Answer Comments   Type of Transfer Tub Bench: Unpadded    Height: 6' 2" (1.88 m)    Weight: 121.5 kg (267 lb 14.4 oz)    Does patient have medical equipment at home? cane, straight    Length of need (1-99 months): 12      3 IN 1 COMMODE FOR HOME USE     Order Specific Question Answer Comments   Type: Standard    Height: 6' 2" (1.88 m)    Weight: 121.5 kg (267 lb 14.4 oz)    Does patient have medical equipment at home? cane, straight    Length of need (1-99 months): 12      Referral to Home health   Referral Priority: Routine Referral Type: Home Health   Referral Reason: Specialty Services Required   Requested Specialty: Home Health Services   Number of Visits Requested: 1     Diet diabetic     Diet renal     Diet Cardiac     Notify your health care provider if you experience any of the following:  temperature >100.4     Notify your health care provider if you experience any of the following:  persistent nausea and vomiting or diarrhea     Notify your health care provider if you experience any of the following:  severe uncontrolled pain     Notify your health care provider if you experience any of the following:  redness, " tenderness, or signs of infection (pain, swelling, redness, odor or green/yellow discharge around incision site)     Notify your health care provider if you experience any of the following:  difficulty breathing or increased cough     Notify your health care provider if you experience any of the following:  increased confusion or weakness     Notify your health care provider if you experience any of the following:  persistent dizziness, light-headedness, or visual disturbances     Activity as tolerated       Significant Diagnostic Studies: Labs:   BMP:   Recent Labs   Lab 05/16/19 0522 05/17/19  0516   GLU 91 85   * 135*   K 4.2 4.7    99   CO2 24 24   BUN 40* 53*   CREATININE 4.0* 5.1*   CALCIUM 8.8 9.1   , CMP   Recent Labs   Lab 05/16/19 0522 05/17/19  0516   * 135*   K 4.2 4.7    99   CO2 24 24   GLU 91 85   BUN 40* 53*   CREATININE 4.0* 5.1*   CALCIUM 8.8 9.1   ALBUMIN 2.9* 3.0*   ANIONGAP 10 12   ESTGFRAFRICA 16* 12*   EGFRNONAA 13* 10*    and CBC   Recent Labs   Lab 05/16/19 0522 05/17/19 0516   WBC 5.80 6.90   HGB 9.6* 10.1*   HCT 30.2* 31.6*   * 120*     Radiology Results (last 7 days)     Procedure Component Value Units Date/Time   X-Ray Chest AP Portable [[id number]] Resulted: 05/16/19 1635   Order Status: Completed Updated: 05/16/19 1638   Narrative:     EXAMINATION:  XR CHEST AP PORTABLE    CLINICAL HISTORY:  s/p tunneled hemodialysis catheter;    TECHNIQUE:  Single frontal view of the chest was performed.    COMPARISON:  5/8/2019    FINDINGS:  Right basilar opacification consistent with combination of small pleural effusion and infiltrate atelectasis appearing very slightly further decreased compared to the prior exam.  The left lung remains clear.  The cardiomediastinal silhouette appears stable.  The heart is enlarged.  Central venous catheter remains in place.   Impression:       Continued opacification right lung base appearing very slightly further decreased  compared to the prior exam.      Electronically signed by: Anahi Galvez MD  Date: 05/16/2019  Time: 16:35   SURG FL Surgery Fluoro Usage [[id number]] Resulted: 05/16/19 1612   Order Status: Completed Updated: 05/16/19 1612   Narrative:     See OP Notes for results.    Impression:     See OP Notes for results.             This procedure was auto-finalized by: Virtual Radiologist         Pending Diagnostic Studies:     None         Medications:  Reconciled Home Medications:      Medication List      START taking these medications    ferrous sulfate 325 (65 FE) MG EC tablet  Take 1 tablet (325 mg total) by mouth 2 (two) times daily with meals.     vitamin renal formula (B-complex-vitamin c-folic acid) 1 mg Cap  Commonly known as:  NEPHROCAP  Take 1 capsule by mouth once daily.        CONTINUE taking these medications    alfuzosin 10 mg Tb24  Commonly known as:  UROXATRAL  Take 10 mg by mouth nightly.     aspirin 81 MG EC tablet  Commonly known as:  ECOTRIN  Take 81 mg by mouth once daily.     clopidogrel 75 mg tablet  Commonly known as:  PLAVIX  Take 75 mg by mouth once daily.     CO Q-10 100 mg capsule  Generic drug:  coenzyme Q10  Take 100 mg by mouth once daily.     ezetimibe 10 mg tablet  Commonly known as:  ZETIA  Take 10 mg by mouth every evening.     FLUoxetine 20 MG capsule  every evening.     gabapentin 100 MG capsule  Commonly known as:  NEURONTIN  every evening.     gemfibrozil 600 MG tablet  Commonly known as:  LOPID  Take 600 mg by mouth 2 (two) times daily.     insulin glargine 100 unit/mL injection  Commonly known as:  LANTUS  Inject 27 Units into the skin every evening.     metoprolol tartrate 25 MG tablet  Commonly known as:  LOPRESSOR  Take 25 mg by mouth 2 (two) times daily.     nateglinide 60 MG tablet  Commonly known as:  STARLIX  Take 60 mg by mouth every evening.     nitroGLYCERIN 0.4 MG SL tablet  Commonly known as:  NITROSTAT  Place 0.4 mg under the tongue every 5 (five) minutes as needed.      rOPINIRole 2 MG tablet  Commonly known as:  REQUIP  Take 2 mg by mouth nightly.     TRADJENTA 5 mg Tab tablet  Generic drug:  linagliptin  Take 5 mg by mouth once daily.     zinc 50 mg Tab  Take 50 mg by mouth once daily. 1/2 tab daily        STOP taking these medications    allopurinol 300 MG tablet  Commonly known as:  ZYLOPRIM     torsemide 20 MG Tab  Commonly known as:  DEMADEX     VELTASSA 16.8 gram Pwpk  Generic drug:  patiromer calcium sorbitex            Indwelling Lines/Drains at time of discharge:   Lines/Drains/Airways     Central Venous Catheter Line                 Hemodialysis Catheter 05/16/19 1600 right subclavian 1 day                Time spent on the discharge of patient: 35 minutes  Patient was seen and examined on the date of discharge and determined to be suitable for discharge.  Case discussed with Nephrology in detail on day of discharge.          Breanne Parrish MD  Department of Hospital Medicine  [facility]

## 2019-05-17 NOTE — PROGRESS NOTES
"INPATIENT NEPHROLOGY PROGRESS NOTE  [facility] NEPHROLOGY    Patient Name: Frank Zayas  Date: 05/17/2019    Reason for consultation: MOLLY    Chief Complaint:   Chief Complaint   Patient presents with    Shortness of Breath     "due to increasing fluid"       History of Present Illness:  Patient known to Dr. Quinonez for CKD4, HTN, hyperkalemia on Veltassa is admitted for CHF exacerbation, fluid overload and gross hematuria, consulted for renal management.    · Interval History/Subjective:    -5/11 seen on dialysis.  No chest pain, sob, nausea.  He states he feels good.  His appetite is good.  -5/12 sleeping,  No distress.  Urine output not recorded  -5/13 still requires HD, will do HD today.  -5/14 VSS, tolerated HD well yesterday. Plan to get tunneled cath by Dr. Rodriguez and start dispo planning with HD placement as he is not improving sufficiently at this time. HD tomorrow.  -5/15 VSS, seen and examined on HD today, tolerating well, no complains. Needs tunneled cath and dispo planing.  -5/16 VSS, tolerated HD well yesterday. Getting tunneled cath by Dr. Rodriguez, ongoing dispo planning with HD placemen. HD tomorrow.  -5/17 VSS, tolerated HD well, got tunneled cath by Dr. Rodriguez, ok t oc once outpatient HD arrangements in place.    Plan of Care:    Assessment:  BernardoCKI, baseline stage IV CKD- suspect CRS + ischemic ATN  HTN/CHF (EF 45%) with volume overload/HHRF (+ underlying BRANDEN) requiring BIPAP   SHPT  Anemia 2/2 SHANDRA + CKD  Gross hematuria/Hx of bladder cancer    Plan:    - Initiated on dialysis on 5/8 for worsening renal function with metabolic derangements, symptomatic volume overload and oliguria. Has not recovered yet and remains dialysis dependent. Dr. Rodriguez placed a tunneled HD cath 5/16.  - No NSAIDs or IV contrast.  - Phos is at goal. He doesn't need a phos binder now.  - Hgb is stable. Continue iron tabs BID. RAFAEL with HD PRN.     Thank you for allowing us to participate in this patient's care. We will " continue to follow.    Medications:  No current facility-administered medications on file prior to encounter.      Current Outpatient Medications on File Prior to Encounter   Medication Sig Dispense Refill    alfuzosin (UROXATRAL) 10 mg Tb24 Take 10 mg by mouth nightly.       aspirin (ECOTRIN) 81 MG EC tablet Take 81 mg by mouth once daily.      clopidogrel (PLAVIX) 75 mg tablet Take 75 mg by mouth once daily.      coenzyme Q10 (CO Q-10) 100 mg capsule Take 100 mg by mouth once daily.      ezetimibe (ZETIA) 10 mg tablet Take 10 mg by mouth every evening.       FLUoxetine 20 MG capsule every evening.       gabapentin (NEURONTIN) 100 MG capsule every evening.       gemfibrozil (LOPID) 600 MG tablet Take 600 mg by mouth 2 (two) times daily.       insulin glargine (LANTUS) 100 unit/mL injection Inject 27 Units into the skin every evening.       metoprolol tartrate (LOPRESSOR) 25 MG tablet Take 25 mg by mouth 2 (two) times daily.      nateglinide (STARLIX) 60 MG tablet Take 60 mg by mouth every evening.       ropinirole (REQUIP) 2 MG tablet Take 2 mg by mouth nightly.       TRADJENTA 5 mg Tab tablet Take 5 mg by mouth once daily.  0    zinc 50 mg Tab Take 50 mg by mouth once daily. 1/2 tab daily      [DISCONTINUED] allopurinol (ZYLOPRIM) 300 MG tablet 1 tablet      [DISCONTINUED] torsemide (DEMADEX) 20 MG tablet Take 20 mg by mouth once daily.       [DISCONTINUED] VELTASSA 16.8 gram PwPk MIX 1 PACKET IN LIQUID AND TAKE PO QD UTD  4    nitroGLYCERIN (NITROSTAT) 0.4 MG SL tablet Place 0.4 mg under the tongue every 5 (five) minutes as needed.        Scheduled Meds:   alfuzosin  10 mg Oral Nightly    aspirin  81 mg Oral QHS    clopidogrel  75 mg Oral Daily    ezetimibe  10 mg Oral QHS    ferrous sulfate  325 mg Oral BID WM    FLUoxetine  20 mg Oral QHS    gemfibrozil  600 mg Oral BID    heparin (porcine)  4,000 Units Intra-Catheter Once    insulin detemir U-100  20 Units Subcutaneous QHS     levalbuterol  0.63 mg Nebulization Q8H    metoprolol tartrate  25 mg Oral BID    mupirocin   Nasal BID    rOPINIRole  2 mg Oral Nightly    senna-docusate 8.6-50 mg  1 tablet Oral BID    vitamin renal formula (B-complex-vitamin c-folic acid)  1 capsule Oral Daily     Continuous Infusions:    PRN Meds:.sodium chloride 0.9%, acetaminophen, dextrose 50%, dextrose 50%, glucagon (human recombinant), glucose, glucose, heparin (porcine), insulin aspart U-100, magnesium oxide, nitroGLYCERIN, ondansetron, potassium chloride 10%, ramelteon, sodium chloride 0.9%    Allergies:  Lidocaine and Statins-hmg-coa reductase inhibitors    Vital Signs:  Temp Readings from Last 3 Encounters:   05/17/19 96.3 °F (35.7 °C) (Tympanic)   04/24/19 98.6 °F (37 °C) (Oral)   04/08/19 97.9 °F (36.6 °C)       Pulse Readings from Last 3 Encounters:   05/17/19 (!) 55   04/24/19 76   04/08/19 63       BP Readings from Last 3 Encounters:   05/17/19 (!) 119/50   04/24/19 131/74   04/08/19 (!) 153/84       Weight:  Wt Readings from Last 3 Encounters:   05/17/19 121.5 kg (267 lb 14.4 oz)   04/24/19 129.3 kg (285 lb 0.9 oz)   04/08/19 129.3 kg (285 lb)       INS/OUTS:  I/O last 3 completed shifts:  In: 108 [I.V.:108]  Out: 105 [Urine:100; Blood:5]  I/O this shift:  In: 500 [Other:500]  Out: 3500 [Other:3500]    Physical Exam:  Constitutional: nad, aao x 3  Heart: rrr, estela, no r/g, wwp, anasarca  Lungs: coarse, no w/r/r/c, no lb  Abdomen: s/nt/d, +BS    Results:  Lab Results   Component Value Date     (L) 05/17/2019    K 4.7 05/17/2019    CL 99 05/17/2019    CO2 24 05/17/2019    BUN 53 (H) 05/17/2019    CREATININE 5.1 (H) 05/17/2019    CALCIUM 9.1 05/17/2019    ANIONGAP 12 05/17/2019    ESTGFRAFRICA 12 (A) 05/17/2019    EGFRNONAA 10 (A) 05/17/2019       Lab Results   Component Value Date    CALCIUM 9.1 05/17/2019    PHOS 7.1 (H) 05/17/2019       Recent Labs   Lab 05/17/19  0516   WBC 6.90   RBC 3.26*   HGB 10.1*   HCT 31.6*   *   MCV 97    [facility] 31.0   [facility] 32.0       I have personally reviewed pertinent radiological imaging and reports.    Reyes Yoo MD  Nephrology  [facility]  [phone]

## 2019-05-17 NOTE — PLAN OF CARE
Per Elva with [facility]- she spoke to the pts wife and she only wants the tub transfer bench which they are going to deliver to the pts home. Pt is clear for discharge from CM standpoint. Tish Jamison, MARYW     05/17/19 2490   Post-Acute Status   Post-Acute Authorization HME   E Status Set-up Complete

## 2019-05-19 NOTE — OP NOTE
DATE OF PROCEDURE:  05/16/2019.    PREOPERATIVE DIAGNOSIS:  Renal failure.    POSTOPERATIVE DIAGNOSIS:  Renal failure.    TITLE OF OPERATION:  Placement of tunneled hemodialysis catheter.    SURGEON:  Wade Rodriguez M.D.    PROCEDURE IN DETAIL:  The patient was taken to the operating room, placed in   supine position, and prepped and draped in usual sterile fashion.  Percutaneous   access was achieved to the vein in usual fashion.  Guidewire was inserted and   position confirmed on fluoroscopy.  A #15 blade was used to enlarge the   percutaneous access site and then using a blade, a second incision was made   inferior to this and the chest wall.  Using the tunneling device, the tunneled   catheter was brought from the second incision out through the percutaneous   access site.  With the catheter now in position, the graduated dilators were   placed over the guidewire until the dilator sheath assembly was inserted, at   which point the dilator and guidewire were removed and the catheter was inserted   through the sheath in its entirety, at which point the sheath was split and   removed in the usual fashion.  Using fluoroscopic guidance, the catheter was   then pulled back until the tip was in the appropriate position, at which point   the catheter was secured in position.  Both ports were flushed and fabrizio back   easily.  The ports were then flushed with saline and then subsequently flushed   with heparin per protocol.  At this point, the site was cleaned.  Sterile   dressing was applied.  The patient tolerated the procedure well and was   transported to the recovery room in stable condition.    Unique to this operation, right subclavian vein was accessed and a 23 catheter   was placed.      EMIGDIO/TERRENCE  dd: 05/19/2019 09:09:30 (CDT)  td: 05/19/2019 10:43:26 (CD)  Doc ID   #[id number]  Job ID #[id number]    CC:

## 2019-05-20 NOTE — PHYSICIAN QUERY
PT Name: Frank Zayas  MR #: [id number]     Physician Query Form - Diagnosis Clarification      CDS/: Tammy Villalpando               Contact information:[email or web address]    This form is a permanent document in the medical record.     Query Date: May 20, 2019    By submitting this query, we are merely seeking further clarification of documentation.  Please utilize your independent clinical judgment when addressing the question(s) below.     The medical record contains the following:      Findings Supporting Clinical Information Location in Medical Record   ATN AoCKI, baseline stage IV CKD- suspect CRS + ischemic ATN    HTN/CHF (EF 45%)    SHPT    Anemia of CKD     Bun=35 to 87  Creatinine=2.4 to 4.3       Nephrology pn 5-7            Lab 5-4 to 5-10     Please clarify if the _ATN diagnosis has been:    [  ] Ruled In   [  ] Ruled Out   [  x] Other/Clarification of findings (please specify): defer to nephrologist     [  ] Clinically undetermined     Please document in your progress notes daily for the duration of treatment, until resolved, and include in your discharge summary.

## 2019-05-20 NOTE — PHYSICIAN QUERY
PT Name: Farnk Zayas  MR #: [id number]     Physician Query Form - Documentation Clarification      CDS/: Tammy Villalpando               Contact information:[email or web address]    This form is a permanent document in the medical record.     Query Date: May 20, 2019    By submitting this query, we are merely seeking further clarification of documentation. Please utilize your independent clinical judgment when addressing the question(s) below.    The Medical record reflects the following:    Supporting Clinical Findings Location in Medical Record   · Mild left ventricular enlargement.  · Mildly decreased left ventricular systolic function.The estimated ejection fraction is 45%   · Normal LV diastolic function.  · Normal right ventricular systolic function.  · Mild left atrial enlargement.  · Mild right atrial enlargement.  · Mild mitral regurgitation.  · Mild tricuspid regurgitation.  · The estimated PA systolic pressure is 38 mm Hg       TTE 5-5   Acute on chronic systolic heart failure       Acute on chronic diastolic congestive heart failure       Cardiology  5-5    Discharge summary under final active diagnosis                                                                            Doctor, Please specify diagnosis or diagnoses associated with above clinical findings.    Please further clarify the conflicting CHF diagnosis.    Provider Use Only      [   x ]  Acute on chronic systolic CHF      [    ]  Other:please specify _____________________                                                                                                                   [  ] Clinically Undetermined

## 2019-05-23 NOTE — PHYSICIAN QUERY
PT Name: Frank Zayas  MR #: [id number]     Physician Query Form - Diagnosis Clarification      CDS/: Tammy Villalpando               Contact information:[email or web address]    This form is a permanent document in the medical record.     Query Date: May 23, 2019    By submitting this query, we are merely seeking further clarification of documentation.  Please utilize your independent clinical judgment when addressing the question(s) below.     The medical record contains the following:      Findings Supporting Clinical Information Location in Medical Record   ATN     AoCKI, baseline stage IV CKD- suspect CRS + ischemic ATN    HTN/CHF (EF 45%)    SHPT    Anemia of CKD      Bun=35 to 87  Creatinine=2.4 to 4.3            Nephrology pn 5-7            Lab 5-4 to 5-10     Please clarify if the ATN diagnosis has been:    [+] Ruled In   [  ] Ruled Out   [  ] Other/Clarification of findings (please specify):     [  ] Clinically undetermined     Please document in your progress notes daily for the duration of treatment, until resolved, and include in your discharge summary.

## 2019-05-30 PROBLEM — K92.2 GIB (GASTROINTESTINAL BLEEDING): Status: ACTIVE | Noted: 2019-01-01

## 2019-05-30 NOTE — CONSULTS
INPATIENT NEPHROLOGY CONSULT   [facility] NEPHROLOGY    Patient Name: Frank Zayas  Date: 05/30/2019    Reason for consultation: ESRD    Chief Complaint:   Chief Complaint   Patient presents with    Melena       History of Present Illness:  78 y/o M with hx of CAD s/p CABG, CHF, COPD, HTN, DM, morbid obesity and ESRD on HD TTS who p/w black stool x 5 hours, associated with nausea, dyspnea and generalized weakness. He denies CP. He is on plavix. There are no exac/reliev factors. We are consulted for dialysis.    CXR: Right basilar opacification consistent with right basilar infiltrate and small pleural effusion with infiltrate not appearing significantly changed compared to the past couple exams    Plan of Care:    Assessment:  ESRD  CHF  Hyperkalemia  SHPT  GIB/Anemia of CKD    Plan:    - Ordered HD for today but if transfers to [facility], can be done there.  - Ordered 2-3L UF.  - Ordered 2K bath.  - Check phos- resume binders.  - He is getting 2 units of blood. Ordered  units/kg. Spoke with Dr. Wilkins- Dr. Reed wants transfer to [facility] for EGD due to elevated troponin. Suspect this is demand ischemia. If patient remains inpatient here, we will dialyze him here.    Thank you for allowing us to participate in this patient's care. We will continue to follow.    Vital Signs:  Temp Readings from Last 3 Encounters:   05/30/19 97.9 °F (36.6 °C) (Oral)   05/17/19 96.3 °F (35.7 °C) (Tympanic)   04/24/19 98.6 °F (37 °C) (Oral)       Pulse Readings from Last 3 Encounters:   05/30/19 88   05/17/19 (!) 55   04/24/19 76       BP Readings from Last 3 Encounters:   05/30/19 (!) 108/56   05/17/19 (!) 119/50   04/24/19 131/74       Weight:  Wt Readings from Last 3 Encounters:   05/30/19 121.5 kg (267 lb 13.7 oz)   05/17/19 121.5 kg (267 lb 14.4 oz)   04/24/19 129.3 kg (285 lb 0.9 oz)       Past Medical & Surgical History:  Past Medical History:   Diagnosis Date    Anticoagulant long-term use     Arthritis     Bladder cancer      Blood transfusion     CHF (congestive heart failure)     Chronic kidney disease     COPD (chronic obstructive pulmonary disease)     Coronary artery disease     Diabetes mellitus     Gout     [place] (hard of hearing)     HAS BILAT AIDS BUT DOES NOT WEAR    Hyperlipidemia     Hypertension     Myocardial infarction     RLS (restless legs syndrome)     Sleep apnea     NO MACHINE    Wears glasses        Past Surgical History:   Procedure Laterality Date    CARDIAC SURGERY      CABG X 2     CATARACT EXTRACTION, BILATERAL      CHOLECYSTECTOMY      COLONOSCOPY      CORONARY ARTERY BYPASS GRAFT      x 2    coronary stents      CYSTOSCOPY      CYSTOSCOPY N/A 2019    [facility]    EXCISION-BLADDER TUMOR-TRANSURETHRAL (TURBT) N/A 2014    [facility]    EXCISION-BLADDER TUMOR-TRANSURETHRAL (TURBT) N/A 2013    [facility]    EYE SURGERY Bilateral     CATARACT    Insertion,catheter,tunneled N/A 2019    [facility]    TURBT (TRANSURETHRAL RESECTION OF BLADDER TUMOR) N/A 2019    [facility]    VASECTOMY         Past Social History:  Social History     Socioeconomic History    Marital status:      Spouse name: Not on file    Number of children: Not on file    Years of education: Not on file    Highest education level: Not on file   Occupational History    Not on file   Social Needs    Financial resource strain: Not on file    Food insecurity:     Worry: Not on file     Inability: Not on file    Transportation needs:     Medical: Not on file     Non-medical: Not on file   Tobacco Use    Smoking status: Former Smoker     Packs/day: 0.25     Years: 20.00     Pack years: 5.00     Types: Cigars     Last attempt to quit: 1982     Years since quittin.3    Smokeless tobacco: Never Used   Substance and  Sexual Activity    Alcohol use: No    Drug use: No    Sexual activity: Not on file   Lifestyle    Physical activity:     Days per week: Not on file     Minutes per session: Not on file    Stress: Not on file   Relationships    Social connections:     Talks on phone: Not on file     Gets together: Not on file     Attends Orthodoxy service: Not on file     Active member of club or organization: Not on file     Attends meetings of clubs or organizations: Not on file     Relationship status: Not on file   Other Topics Concern    Not on file   Social History Narrative    Not on file       Medications:  No current facility-administered medications on file prior to encounter.      Current Outpatient Medications on File Prior to Encounter   Medication Sig Dispense Refill    alfuzosin (UROXATRAL) 10 mg Tb24 Take 10 mg by mouth nightly.       aspirin (ECOTRIN) 81 MG EC tablet Take 81 mg by mouth once daily.      clopidogrel (PLAVIX) 75 mg tablet Take 75 mg by mouth once daily.      coenzyme Q10 (CO Q-10) 100 mg capsule Take 100 mg by mouth once daily.      ezetimibe (ZETIA) 10 mg tablet Take 10 mg by mouth every evening.       ferrous sulfate 325 (65 FE) MG EC tablet Take 1 tablet (325 mg total) by mouth 2 (two) times daily with meals.  0    FLUoxetine 20 MG capsule every evening.       gabapentin (NEURONTIN) 100 MG capsule every evening.       gemfibrozil (LOPID) 600 MG tablet Take 600 mg by mouth 2 (two) times daily.       insulin glargine (LANTUS) 100 unit/mL injection Inject 27 Units into the skin every evening.       metoprolol tartrate (LOPRESSOR) 25 MG tablet Take 25 mg by mouth 2 (two) times daily.      nateglinide (STARLIX) 60 MG tablet Take 60 mg by mouth every evening.       nitroGLYCERIN (NITROSTAT) 0.4 MG SL tablet Place 0.4 mg under the tongue every 5 (five) minutes as needed.       ropinirole (REQUIP) 2 MG tablet Take 2 mg by mouth nightly.       TRADJENTA 5 mg Tab tablet Take 5 mg by  mouth once daily.  0    vitamin renal formula, B-complex-vitamin c-folic acid, (NEPHROCAP) 1 mg Cap Take 1 capsule by mouth once daily. 30 capsule 2    zinc 50 mg Tab Take 50 mg by mouth once daily. 1/2 tab daily       Scheduled Meds:   sodium bicarbonate  50 mEq Intravenous ED 1 Time     Continuous Infusions:  PRN Meds:.    Allergies:  Lidocaine and Statins-hmg-coa reductase inhibitors    Past Family History:  Reviewed; refer to Hospitalist Admission Note    Review of Systems:  Review of Systems - All 14 systems reviewed and negative, except as noted in HPI    Physical Exam:  General Appearance:    NAD, AAO x 3, cooperative, appears stated age   Head:    Normocephalic, atraumatic   Eyes:    PER, EOMI, and conjunctiva/sclera clear bilaterally       Throat:   Moist mucus membranes, no thrush or oral lesions, normal      dentition   Lungs:     Coarse to auscultation bilaterally, no wheezes, crackles, rales    or rhonchi, symmetric air movement, respirations unlabored   Chest wall:    No tenderness or deformity   Heart:    Regular rate and rhythm, S1 and S2 normal, no murmur, rub   or gallop   Abdomen:     Soft, non-tender, non-distended, bowel sounds active all four   quadrants, no RT or guarding, no masses, no organomegaly   Extremities:   Erythema over chins, + pitting edema   MSK:   No joint or muscle swelling, tenderness or deformity   Skin:   Skin color, texture, turgor normal, no rashes or lesions   Neurologic/Psychiatric:   CNII-XII intact, normal strength and sensation       throughout, no asterixis; normal affect, memory, judgement     and insight      Results:  Lab Results   Component Value Date     05/30/2019    K 5.7 (H) 05/30/2019     05/30/2019    CO2 23 05/30/2019    BUN 94 (H) 05/30/2019    CREATININE 6.7 (H) 05/30/2019    CALCIUM 8.6 (L) 05/30/2019    ANIONGAP 16 05/30/2019    ESTGFRAFRICA 8 (A) 05/30/2019    EGFRNONAA 7 (A) 05/30/2019       Lab Results   Component Value Date    CALCIUM  8.6 (L) 05/30/2019    PHOS 7.1 (H) 05/17/2019       Recent Labs   Lab 05/30/19  1013   WBC 8.90   RBC 2.51*   HGB 7.9*   HCT 24.3*      MCV 97   MCH 31.4*   MCHC 32.4       I have personally reviewed pertinent radiological imaging and reports.    Marivel Quinonez MD MPH  [facility] [address] [place] [address]  [phone] (p)  [phone] (f)

## 2019-05-30 NOTE — ED NOTES
"Here for eval of tarry dark stools this AM (denies known trauma or illness); "Took Kaopectate prior [to arrival]". Reports weakness with activity; none at rest - usual activity is "sitting around watching TV". Recently had Rt upper chest wall dialysis catheter placed. Reports dialysis T,Th & Sat (none today PTA)  "

## 2019-05-30 NOTE — ED NOTES
Report called to DERRICK Stevenson (receiving nurse @ [facility] in the ED) @ this time. No sig change. #[phone]; EMS transport arranged by [facility]. Awaiting transport

## 2019-05-30 NOTE — ED NOTES
"ER MD  @ bedside for re-eval & discussion of tx options. "Will admit for GI consultation & dialysis". PT/family aware & agree with plan  "

## 2019-05-30 NOTE — ED NOTES
Departs in [facility] via [facility] with PRBCs transfusing via pump en route.

## 2019-05-31 PROBLEM — I46.9 PEA (PULSELESS ELECTRICAL ACTIVITY): Status: ACTIVE | Noted: 2019-01-01

## 2019-05-31 PROBLEM — N18.6 ESRD (END STAGE RENAL DISEASE): Status: ACTIVE | Noted: 2019-01-01

## 2019-05-31 PROBLEM — K92.2 ACUTE GASTROINTESTINAL BLEEDING: Status: ACTIVE | Noted: 2019-01-01

## 2019-05-31 NOTE — H&P (VIEW-ONLY)
[facility]  Gastroenterology  Consult Note    Patient Name: Frank Zayas  MRN: [id number]  Admission Date: 5/31/2019  Hospital Length of Stay: 0 days  Code Status: Full Code   Attending Provider: Fidencio Naidu MD   Consulting Provider: Kalyan Mesa MD  Primary Care Physician: Mikhail Shields MD  Principal Problem:GIB (gastrointestinal bleeding)    Gastroenterology  Consult performed by: Kalyan Mesa MD  Consult ordered by: Behram Khan, MD        Subjective:     HPI:  79 year old man with history of T2DM, ESRD on HD (started 1-2 ms ago), HFpEF, CAD s/p CABG on plavix, who is presenting from outside hospital due to concern for duodenal ulcer.    No family present, patient intubated and sedated. History per chart review.    He presented to OSH ([facility]) after an episode of abdominal pain and hematochezia which awoken him from rest. He was transfused 1u pRBC at OSH and transferred to [facility]. At OSH was started on levophed for shock and suffered brief episode of PEA arrest which resolved with resuscitation. At OSH underwent EGD which was notable for actively bleeding duodenal ulcer s/p hemostasis with epi and clip. Due to concern for rebleeding he was transferred to [facility] for potential IR therapy.    On arrival he is intubated and sedated. On levophed with decreasing requirement. Underwent CTA which was negative on arrival.    H&H on arrival 10.1 (7.9 on arrival to OSH on 5/30, unclear baseline), required ~5u pRBC total.    EGD. (5/30/19). Dr. BRITTNEE Garcia. Indication:Hematochezia.  - stomach with 6mm ulceration concerning for NGT trauma (non-bleeding).  - no blood in stomach  - duodenal ulcer with 'pumpin' from a thick stalked vessel in duodenal sweep along posteiror wall. 4cc epi and then clipped without further bleeding  - photos reviewed in chart      Past Medical History:   Diagnosis Date    Anticoagulant long-term use     Arthritis     Bladder cancer     Blood  transfusion     CHF (congestive heart failure)     Chronic kidney disease     COPD (chronic obstructive pulmonary disease)     Coronary artery disease     Diabetes mellitus     Gout     [place] (hard of hearing)     HAS BILAT AIDS BUT DOES NOT WEAR    Hyperlipidemia     Hypertension     Myocardial infarction     RLS (restless legs syndrome)     Sleep apnea     NO MACHINE    Wears glasses        Past Surgical History:   Procedure Laterality Date    CARDIAC SURGERY      CABG X 2     CATARACT EXTRACTION, BILATERAL      CHOLECYSTECTOMY      COLONOSCOPY      CORONARY ARTERY BYPASS GRAFT      x 2    coronary stents      CYSTOSCOPY      CYSTOSCOPY N/A 2019    [facility] OR    EXCISION-BLADDER TUMOR-TRANSURETHRAL (TURBT) N/A 2014    [facility]e [facility] OR    EXCISION-BLADDER TUMOR-TRANSURETHRAL (TURBT) N/A 2013    [facility] OR    EYE SURGERY Bilateral     CATARACT    Insertion,catheter,tunneled N/A 2019    [facility] OR    TURBT (TRANSURETHRAL RESECTION OF BLADDER TUMOR) N/A 2019    [facility] OR    VASECTOMY         Review of patient's allergies indicates:   Allergen Reactions    Lidocaine Nausea Only     NOVACAINE --  Severe nausea    Statins-hmg-coa reductase inhibitors Anxiety     Family History     Problem Relation (Age of Onset)    Cancer Father, Sister, Brother    Heart disease Father        Tobacco Use    Smoking status: Former Smoker     Packs/day: 0.25     Years: 20.00     Pack years: 5.00     Types: Cigars     Last attempt to quit: 1982     Years since quittin.3    Smokeless tobacco: Never Used   Substance and Sexual Activity    Alcohol use: No    Drug use: No    Sexual activity: Not on file     Review of Systems   Unable to perform ROS: Intubated     Objective:     Vital Signs (Most Recent):  Temp: 97.7 °F  (36.5 °C) (05/31/19 1100)  Pulse: 60 (05/31/19 1511)  Resp: 18 (05/31/19 1511)  BP: 108/62 (05/31/19 1400)  SpO2: 97 % (05/31/19 1511) Vital Signs (24h Range):  Temp:  [96 °F (35.6 °C)-99.7 °F (37.6 °C)] 97.7 °F (36.5 °C)  Pulse:  [] 60  Resp:  [11-22] 18  SpO2:  [96 %-100 %] 97 %  BP: ()/(44-77) 108/62  Arterial Line BP: (113-145)/(48-65) 113/54     Weight: 125 kg (275 lb 9.2 oz) (05/31/19 0700)  Body mass index is 35.38 kg/m².      Intake/Output Summary (Last 24 hours) at 5/31/2019 1521  Last data filed at 5/31/2019 1410  Gross per 24 hour   Intake 403.41 ml   Output 155 ml   Net 248.41 ml       Lines/Drains/Airways     Central Venous Catheter Line                 Hemodialysis Catheter 05/16/19 1600 right subclavian 14 days         Percutaneous Central Line Insertion/Assessment - triple lumen  05/31/19 0700 left internal jugular less than 1 day          Drain                 Urethral Catheter 05/31/19 0700 less than 1 day          Airway                 Airway - Non-Surgical -- days          Arterial Line                 Arterial Line 05/31/19 0700 Left Radial less than 1 day          Peripheral Intravenous Line                 Peripheral IV - Single Lumen 05/30/19 20 G Right Forearm 1 day                Physical Exam   Constitutional: No distress.   Intubated, sedated.    HENT:   Head: Normocephalic and atraumatic.   Mouth/Throat: Oropharynx is clear and moist. No oropharyngeal exudate.   Eyes: Conjunctivae are normal. No scleral icterus.   Neck: No JVD present.   Cardiovascular: Normal rate, regular rhythm, normal heart sounds and intact distal pulses.   Pulmonary/Chest: Effort normal and breath sounds normal. No respiratory distress.   Abdominal: Soft. Bowel sounds are normal. He exhibits no distension and no mass. There is no tenderness. There is no rebound and no guarding.   Soft abdomen. Melena present.   Musculoskeletal: He exhibits no edema or tenderness.   Lymphadenopathy:     He has no  cervical adenopathy.   Neurological: He is alert.   Unable to assess.   Skin: Skin is warm. Capillary refill takes less than 2 seconds. He is not diaphoretic.   Psychiatric:   Unable to assess.   Nursing note and vitals reviewed.      Significant Labs:  All pertinent lab results from the last 24 hours have been reviewed.    Significant Imaging:  Imaging results within the past 24 hours have been reviewed.    Assessment/Plan:     * GIB (gastrointestinal bleeding)  79 year old man with history of T2DM, ESRD on HD (started 1-2 ms ago), HFpEF, CAD s/p CABG on plavix, who is presenting from outside hospital due to concern for duodenal ulcer. Hemostasis obtained for actively bleeding duodenal ulcer at OSH (5/30), currently without signs of ongoing bleeding.    Plan  - CTA without signs of ongoing bleeding, H&H stable, decreasing vasopressor requirement.  - recommend protonix gtt x72hr then 40mg IV BID  - repeat EGD vs IR therapy if rebleeds  - no evidence of varices on EGD, does not require octreotide  - maintain 2 large bore peripheral IV  - maintain Hgb > 7  - call if any changes in patient's clinical status          Thank you for your consult. I will follow-up with patient. Please contact us if you have any additional questions.    Kalyan Mesa MD  Gastroenterology  [facility]

## 2019-05-31 NOTE — HPI
80 yo M with morbid obesity, ESRD on HD (TTS, 2 months), Afib, CHF, DM2 (on insulin), CAD s/p CABG, HLD, COPD, HTN transferred from [facility] ([facility]).  He presented with hematochezia and abdominal pain.  He went to [facility] ER for lower GI bleed and received 1 u PRBC and then was transferred to [facility] for cardiac evaluation when trop was 0.39.  At [facility] he had continued hyperkalemia (K of 6.1) after being treated for it at [facility].  He was started on levophed for hypovolemic shock, and an art line and central line was placed.  During line placement, he briefly lost a pulse (PEA) and CPR was started.  He was given epinephrine, calcium gluconate, sodium bicarb, and intubated emergently.  He achieved ROSC.  He received IVF and 3-4 units PRBC.  He underwent emergent upper and lower endoscopy.  They found a large vessel in the duodenum, actively oozing with copious amounts of fresh bright red blood pumping out.  GI placed a clop on the artery and injected epinephrine, achieving hemostasis.  There was concern that the vessel could be directly assosiated with a larger vessel (ie GDA).  There were no varices seen.  Both GI and Gen Surg recommended transfer to [facility] for arteriovenous embolization which couldn't be performed there.      Patient admitted to Critical Care Medicine on 5/31/2019 for evaluation and management of acute upper GI bleed (s/p EGD with clipping of bleeding visible vessel at OSH).  Pt arrived intubated following PEA arrest at OSH.  Interventional Radiology consulted and recommended CTA abdomen/pelvis, however no evidence of active GI bleeding on imaging.  Gastroenterology consulted and recommended continue IV Protonix gtt for a total of 72 hours.  Nephrology consulted for dialysis in setting of ESRD.  Patient passed SAT/SBT and was successfully extubated on the afternoon of 5/31.  On 6/1, as pt remained hemodynamically stable and his hemoglobin remained stable, pt stepped down to Hospital  Medicine.    The evening of 6/2 into 6/3 the patient had several melanotic stools with slight downtrend in Hgb (10 > 8), however hemodynamics remained stable. Repeat CTA at that time did not show any evidence of active bleeding, however duodenal clip was noted to have migrated into distal small bowel. GI with plans to repeat EGD on 6/4.     Around 1am on 6/4, the patient became hypotensive (70s/40s). Bedside POC showed Hct 17. Decision made to move patient back to ICU for further management.

## 2019-05-31 NOTE — ASSESSMENT & PLAN NOTE
79 year old man with history of T2DM, ESRD on HD (started 1-2 ms ago), HFpEF, CAD s/p CABG on plavix, who is presenting from outside hospital due to concern for duodenal ulcer. Nephrology consulted for ESRD and HD management. Last dialyze at the outside hospital per family.     Patient intubated and sedated on pressor support (Levophed) secondary to hypovolemic shock and PEA arrest at OSH.     Plan:  -will plan for SLED today for metabolic clearance and volume management, target -350 ml/hr as tolerated, keep MAP >65.  -will reassess in the AM for further CRRT requirements  -continue Levophed, please adjust as needed with CRRT  -recommend serial labs with CRRT for dialysate adjustments as needed and/or electrolytes replacements   -strict I/Os and daily weights   -currently on 3 pressors : Levophed  -continue on mechanical ventilation with FiO at 40%  - Avoid nephrotoxic medication and renal dose medications to GFR  - Will discuss with staff

## 2019-05-31 NOTE — PLAN OF CARE
Problem: Adult Inpatient Plan of Care  Goal: Plan of Care Review  Outcome: Ongoing (interventions implemented as appropriate)  Afib on telemetry, MAP > 65 on levophed, 2+ pulses BUE, 1+ BLE  Follows commands, sedated. SBT to be performed, sedation vacation at this time  ETT in place, FiO2 40%, PEEP 5  1 blood BM this shift, BGC < 200  Huynh in place and draining, UOP ~10 cc/hr, CRRT to be started  Levophed, and propofol gtts continued, protonix gtt started    VS and assessment per flowsheets

## 2019-05-31 NOTE — SUBJECTIVE & OBJECTIVE
Past Medical History:   Diagnosis Date    Anticoagulant long-term use     Arthritis     Bladder cancer     Blood transfusion     CHF (congestive heart failure)     Chronic kidney disease     COPD (chronic obstructive pulmonary disease)     Coronary artery disease     Diabetes mellitus     Gout     [place] (hard of hearing)     HAS BILAT AIDS BUT DOES NOT WEAR    Hyperlipidemia     Hypertension     Myocardial infarction     RLS (restless legs syndrome)     Sleep apnea     NO MACHINE    Wears glasses        Past Surgical History:   Procedure Laterality Date    CARDIAC SURGERY      CABG X 2     CATARACT EXTRACTION, BILATERAL      CHOLECYSTECTOMY      COLONOSCOPY      CORONARY ARTERY BYPASS GRAFT      x 2    coronary stents      CYSTOSCOPY      CYSTOSCOPY N/A 2019    [facility]    EXCISION-BLADDER TUMOR-TRANSURETHRAL (TURBT) N/A 2014    [facility]    EXCISION-BLADDER TUMOR-TRANSURETHRAL (TURBT) N/A 2013    [facility] OR    EYE SURGERY Bilateral     CATARACT    Insertion,catheter,tunneled N/A 2019    [facility] Wade [facility] OR    TURBT (TRANSURETHRAL RESECTION OF BLADDER TUMOR) N/A 2019    [facility] OR    VASECTOMY         Review of patient's allergies indicates:   Allergen Reactions    Lidocaine Nausea Only     NOVACAINE --  Severe nausea    Statins-hmg-coa reductase inhibitors Anxiety     Family History     Problem Relation (Age of Onset)    Cancer Father, Sister, Brother    Heart disease Father        Tobacco Use    Smoking status: Former Smoker     Packs/day: 0.25     Years: 20.00     Pack years: 5.00     Types: Cigars     Last attempt to quit: 1982     Years since quittin.3    Smokeless tobacco: Never Used   Substance and Sexual Activity    Alcohol use: No    Drug use: No    Sexual activity: Not on file      Review of Systems   Unable to perform ROS: Intubated     Objective:     Vital Signs (Most Recent):  Temp: 97.7 °F (36.5 °C) (05/31/19 1100)  Pulse: 60 (05/31/19 1511)  Resp: 18 (05/31/19 1511)  BP: 108/62 (05/31/19 1400)  SpO2: 97 % (05/31/19 1511) Vital Signs (24h Range):  Temp:  [96 °F (35.6 °C)-99.7 °F (37.6 °C)] 97.7 °F (36.5 °C)  Pulse:  [] 60  Resp:  [11-22] 18  SpO2:  [96 %-100 %] 97 %  BP: ()/(44-77) 108/62  Arterial Line BP: (113-145)/(48-65) 113/54     Weight: 125 kg (275 lb 9.2 oz) (05/31/19 0700)  Body mass index is 35.38 kg/m².      Intake/Output Summary (Last 24 hours) at 5/31/2019 1521  Last data filed at 5/31/2019 1410  Gross per 24 hour   Intake 403.41 ml   Output 155 ml   Net 248.41 ml       Lines/Drains/Airways     Central Venous Catheter Line                 Hemodialysis Catheter 05/16/19 1600 right subclavian 14 days         Percutaneous Central Line Insertion/Assessment - triple lumen  05/31/19 0700 left internal jugular less than 1 day          Drain                 Urethral Catheter 05/31/19 0700 less than 1 day          Airway                 Airway - Non-Surgical -- days          Arterial Line                 Arterial Line 05/31/19 0700 Left Radial less than 1 day          Peripheral Intravenous Line                 Peripheral IV - Single Lumen 05/30/19 20 G Right Forearm 1 day                Physical Exam   Constitutional: No distress.   Intubated, sedated.    HENT:   Head: Normocephalic and atraumatic.   Mouth/Throat: Oropharynx is clear and moist. No oropharyngeal exudate.   Eyes: Conjunctivae are normal. No scleral icterus.   Neck: No JVD present.   Cardiovascular: Normal rate, regular rhythm, normal heart sounds and intact distal pulses.   Pulmonary/Chest: Effort normal and breath sounds normal. No respiratory distress.   Abdominal: Soft. Bowel sounds are normal. He exhibits no distension and no mass. There is no tenderness. There is no rebound and no guarding.   Soft  abdomen. Melena present.   Musculoskeletal: He exhibits no edema or tenderness.   Lymphadenopathy:     He has no cervical adenopathy.   Neurological: He is alert.   Unable to assess.   Skin: Skin is warm. Capillary refill takes less than 2 seconds. He is not diaphoretic.   Psychiatric:   Unable to assess.   Nursing note and vitals reviewed.      Significant Labs:  All pertinent lab results from the last 24 hours have been reviewed.    Significant Imaging:  Imaging results within the past 24 hours have been reviewed.

## 2019-05-31 NOTE — PLAN OF CARE
Mikhail Shields MD  [address] [facility] / [place]*    [employer]  for [facility], [place] - [address]  [address] [facility] [address]  Phone: [phone] Fax: [phone]    [facility] [id number]  [place] - [address] AT [facility] [address]  [address]  [facility] [address]  Phone: [phone] Fax: [phone]    Payor: MEDICARE / Plan: MEDICARE PART A & B / Product Type: Government /     Extended Emergency Contact Information  Primary Emergency Contact: Yue Zayas  Address: [address] [place] [address]           [place] [address] [facility]  Home Phone: [phone]  Relation: Spouse  Preferred language: English   needed? No    Future Appointments   Date Time Provider Department Center   7/2/2019  2:15 PM Kaylee Vasquez MD [facility] UROLOGY [facility]        05/31/19 1531   Discharge Assessment   Assessment Type Discharge Planning Assessment   Confirmed/corrected address and phone number on facesheet? Yes   Assessment information obtained from? Patient;Caregiver   Communicated expected length of stay with patient/caregiver no   Prior to hospitilization cognitive status: Alert/Oriented   Prior to hospitalization functional status: Needs Assistance;Assistive Equipment   Current cognitive status: Coma/Sedated/Intubated   Current Functional Status: Completely Dependent   Facility Arrived From: [facility] transfer to [facility] transfer [facility] With spouse   Able to Return to Prior Arrangements other (see comments)  (TBD)   Is patient able to care for self after discharge? Unable to determine at this time (comments)   Who are your caregiver(s) and their phone number(s)? Yue Zayas (Spouse) [phone],  Mariah Coelho (daughter) [phone]    Patient's perception of discharge disposition other (comments)  (Patient intubate/sedated)   Readmission Within the Last 30 Days no  previous admission in last 30 days   Patient currently being followed by outpatient case management? No   Patient currently receives any other outside agency services? Yes   Name and contact number of agency or person providing outside services [facility]     Is it the patient/care giver preference to resume care with the current outside agency? Yes   Equipment Currently Used at Home cane, straight;walker, rolling;shower chair;grab bar   Do you have any problems affording any of your prescribed medications? No   Is the patient taking medications as prescribed? yes   Does the patient have transportation home? Yes   Transportation Anticipated family or friend will provide   Dialysis Name and Scheduled days [facility] (T,Th,S)    Discharge Plan A Home Health;Home with family   Discharge Plan B Skilled Nursing Facility   DME Needed Upon Discharge  other (see comments)  (TBD)   Patient/Family in Agreement with Plan yes     Patient lives with his wife in a single story home (1 step to the front entrance). CM able to verify demographics in chart are correct with patient's wife at the bedside. [facility] Packet given to patient with understanding verbalized. CM name and number written on white board in patient's room, informed pt's wife to call for any questions or concerns. CM will continue to follow for needs.       Minda Parikh RN, [facility]  Case Management-Critical Care     [phone]

## 2019-05-31 NOTE — HPI
79 year old man with history of T2DM, ESRD on HD (started 1-2 ms ago), HFpEF, and CAD s/p CABG on plavix who initially presented to [facility] for complaints of abdominal pain and hematochezia. He was transfused 1u pRBC at OSH and transferred to [facility] for a cardiac evaluation after being found with a troponin of 0.39. At [facility], he was started on levophed for shock and suffered brief episode of PEA arrest which resolved with resuscitation. At OSH underwent EGD which was notable for actively bleeding duodenal ulcer s/p hemostasis with epi and clip. Due to concern for rebleeding he was transferred to [facility] for potential IR therapy. According to the family, the patient was last dialyze on yesterday, 5/30, at the OSH. He was recently started on dialysis about 1-2 months ago. His renal failure is likely due to his co morbidities. He typically dialyze on T/Th/Sat for 4 hrs at [facility] in [place] via a L permacath. Nephrology consulted for management of ESRD and HD treatment.

## 2019-05-31 NOTE — SUBJECTIVE & OBJECTIVE
Past Medical History:   Diagnosis Date    Anticoagulant long-term use     Arthritis     Bladder cancer     Blood transfusion     CHF (congestive heart failure)     Chronic kidney disease     COPD (chronic obstructive pulmonary disease)     Coronary artery disease     Diabetes mellitus     Gout     [place] (hard of hearing)     HAS BILAT AIDS BUT DOES NOT WEAR    Hyperlipidemia     Hypertension     Myocardial infarction     RLS (restless legs syndrome)     Sleep apnea     NO MACHINE    Wears glasses        Past Surgical History:   Procedure Laterality Date    CARDIAC SURGERY      CABG X 2 1997    CATARACT EXTRACTION, BILATERAL      CHOLECYSTECTOMY      COLONOSCOPY      CORONARY ARTERY BYPASS GRAFT      x 2    coronary stents      CYSTOSCOPY      CYSTOSCOPY N/A 4/8/2019    [facility]    EXCISION-BLADDER TUMOR-TRANSURETHRAL (TURBT) N/A 9/8/2014    [facility] OR    EXCISION-BLADDER TUMOR-TRANSURETHRAL (TURBT) N/A 11/18/2013    [facility] OR    EYE SURGERY Bilateral     CATARACT    Insertion,catheter,tunneled N/A 5/16/2019    [facility]formed [facility] Wade [facility] OR    TURBT (TRANSURETHRAL RESECTION OF BLADDER TUMOR) N/A 4/8/2019    [facility] OR    VASECTOMY         Review of patient's allergies indicates:   Allergen Reactions    Lidocaine Nausea Only     NOVACAINE --  Severe nausea    Statins-hmg-coa reductase inhibitors Anxiety     Current Facility-Administered Medications   Medication Frequency    0.9%  NaCl infusion (CRRT USE ONLY) Continuous    albuterol-ipratropium 2.5 mg-0.5 mg/3 mL nebulizer solution 3 mL Q6H PRN    chlorhexidine 0.12 % solution 15 mL BID    dextrose 50% injection 12.5 g PRN    glucagon (human recombinant) injection 1 mg PRN    insulin aspart U-100 pen 1-10 Units Q6H PRN    magnesium sulfate 2g in water 50mL IVPB (premix) PRN     nitroGLYCERIN SL tablet 0.4 mg Q5 Min PRN    norepinephrine 1 mg/mL injection     norepinephrine 4 mg in dextrose 5% 250 mL infusion (premix) (titrating) Continuous    ondansetron disintegrating tablet 8 mg Q8H PRN    pantoprazole (PROTONIX) 40 mg in dextrose 5 % 100 mL infusion Continuous    piperacillin-tazobactam 4.5 g in sodium chloride 0.9% 100 mL IVPB (ready to mix system) Q12H    propofol (DIPRIVAN) 10 mg/mL infusion Continuous    ramelteon tablet 8 mg Nightly PRN    sodium chloride 0.9% flush 10 mL PRN    sodium phosphate 20.01 mmol in dextrose 5 % 250 mL IVPB PRN    sodium phosphate 30 mmol in dextrose 5 % 250 mL IVPB PRN    sodium phosphate 39.99 mmol in dextrose 5 % 250 mL IVPB PRN     Family History     Problem Relation (Age of Onset)    Cancer Father, Sister, Brother    Heart disease Father        Tobacco Use    Smoking status: Former Smoker     Packs/day: 0.25     Years: 20.00     Pack years: 5.00     Types: Cigars     Last attempt to quit: 1982     Years since quittin.3    Smokeless tobacco: Never Used   Substance and Sexual Activity    Alcohol use: No    Drug use: No    Sexual activity: Not on file     Review of Systems   Unable to perform ROS: Intubated     Objective:     Vital Signs (Most Recent):  Temp: 97.5 °F (36.4 °C) (19 1500)  Pulse: 80 (19 1600)  Resp: 18 (19 1600)  BP: 109/64 (19 1600)  SpO2: 97 % (19 1600)  O2 Device (Oxygen Therapy): ventilator (19 1600) Vital Signs (24h Range):  Temp:  [96 °F (35.6 °C)-99.7 °F (37.6 °C)] 97.5 °F (36.4 °C)  Pulse:  [] 80  Resp:  [11-22] 18  SpO2:  [96 %-100 %] 97 %  BP: ()/(44-77) 109/64  Arterial Line BP: (104-145)/(48-65) 104/51     Weight: 125 kg (275 lb 9.2 oz) (19 0700)  Body mass index is 35.38 kg/m².  Body surface area is 2.55 meters squared.    I/O last 3 completed shifts:  In: -   Out: 75 [Urine:75]    Physical Exam   Constitutional: No distress.   Intubated, sedated.     HENT:   Head: Normocephalic and atraumatic.   Mouth/Throat: Oropharynx is clear and moist. No oropharyngeal exudate.   Eyes: Conjunctivae are normal. No scleral icterus.   Neck: No JVD present.   Cardiovascular: Normal rate, regular rhythm, normal heart sounds and intact distal pulses.   Pulmonary/Chest: Effort normal and breath sounds normal. No respiratory distress.   Abdominal: Soft. Bowel sounds are normal. He exhibits no distension and no mass. There is no tenderness. There is no rebound and no guarding.   Soft abdomen. Melena present.   Musculoskeletal: He exhibits no edema or tenderness.   Lymphadenopathy:     He has no cervical adenopathy.   Neurological: He is alert.   Unable to assess.   Skin: Skin is warm. Capillary refill takes less than 2 seconds. He is not diaphoretic.   Psychiatric:   Unable to assess.   Nursing note and vitals reviewed.    Significant Labs:  CBC:   Recent Labs   Lab 05/31/19  1348   WBC 14.62*   RBC 3.26*   HGB 10.1*   HCT 29.5*      MCV 91   MCH 31.0   MCHC 34.2     CMP:   Recent Labs   Lab 05/31/19  0650 05/31/19  1344   * 150*   CALCIUM 8.8 8.6*   ALBUMIN 2.3* 2.2*   PROT 5.3*  --     138   K 5.4* 5.2*   CO2 19* 20*    103   BUN 71* 72*   CREATININE 4.9* 5.2*   ALKPHOS 74  --    ALT 9*  --    AST 19  --    BILITOT 0.5  --

## 2019-05-31 NOTE — CONSULTS
Radiology Consult    Frank Zayas is a 79 y.o. male with a history of GI bleed s/p clipping of oozing vessel the duodenum.    Past Medical History:   Diagnosis Date    Anticoagulant long-term use     Arthritis     Bladder cancer     Blood transfusion     CHF (congestive heart failure)     Chronic kidney disease     COPD (chronic obstructive pulmonary disease)     Coronary artery disease     Diabetes mellitus     Gout     [place] (hard of hearing)     HAS BILAT AIDS BUT DOES NOT WEAR    Hyperlipidemia     Hypertension     Myocardial infarction     RLS (restless legs syndrome)     Sleep apnea     NO MACHINE    Wears glasses      Past Surgical History:   Procedure Laterality Date    CARDIAC SURGERY      CABG X 2 1997    CATARACT EXTRACTION, BILATERAL      CHOLECYSTECTOMY      COLONOSCOPY      CORONARY ARTERY BYPASS GRAFT      x 2    coronary stents      CYSTOSCOPY      CYSTOSCOPY N/A 4/8/2019    [facility]    EXCISION-BLADDER TUMOR-TRANSURETHRAL (TURBT) N/A 9/8/2014    [facility] at [facility] OR    EXCISION-BLADDER TUMOR-TRANSURETHRAL (TURBT) N/A 11/18/2013    [facility]formed by [facility] at [facility] OR    EYE SURGERY Bilateral     CATARACT    Insertion,catheter,tunneled N/A 5/16/2019    Performed by Wade [facility] at [facility] OR    TURBT (TRANSURETHRAL RESECTION OF BLADDER TUMOR) N/A 4/8/2019    [facility]formed [facility] at [facility] OR    VASECTOMY         Discussed with primary team.    Imaging reviewed with Radiology staff, Dr. Nieves.     Procedure: embolization of artery    Scheduled Meds:    chlorhexidine  15 mL Mouth/Throat BID    norepinephrine         Continuous Infusions:    sodium chloride 0.9%      norepinephrine bitartrate-D5W 0.04 mcg/kg/min (05/31/19 1100)    pantoprozole (PROTONIX) IV infusion 8 mg/hr (05/31/19 1100)    propofol 25.067 mcg/kg/min (05/31/19 1100)     PRN Meds:albuterol-ipratropium, dextrose  "50%, glucagon (human recombinant), insulin aspart U-100, magnesium sulfate IVPB, nitroGLYCERIN, ondansetron, ramelteon, sodium chloride 0.9%, sodium phosphate IVPB, sodium phosphate IVPB, sodium phosphate IVPB    Allergies:   Review of patient's allergies indicates:   Allergen Reactions    Lidocaine Nausea Only     NOVACAINE --  Severe nausea    Statins-hmg-coa reductase inhibitors Anxiety       Labs:  Recent Labs   Lab 05/31/19  0650   INR 1.2       Recent Labs   Lab 05/31/19  0822   WBC 16.73*   HGB 10.4*   HCT 30.9*   MCV 91         Recent Labs   Lab 05/31/19  0650   *      K 5.4*      CO2 19*   BUN 71*   CREATININE 4.9*   CALCIUM 8.8   ALT 9*   AST 19   ALBUMIN 2.3*   BILITOT 0.5         Vitals (Most Recent):  Temp: 97.7 °F (36.5 °C) (05/31/19 1100)  Pulse: 66 (05/31/19 1112)  Resp: 20 (05/31/19 1112)  BP: 109/61 (05/31/19 1100)  SpO2: 98 % (05/31/19 1112)    Plan:   No active bleed noted CTA, noting that a delayed phase of imaging was not obtained. Note mentions that hemostasis was achieved with clipping of vessel at OSH. No intervention at this time as no active hemorrhage appears to be present. If patient rebleeds, recommend speaking with GI first as the may be able scope and clip source. IR happy to help, but would like positive CTA prior to intervention. Please reconsult as needed.    Pop Lovell MD ([facility])  Radiology PGY-5  [phone]      "

## 2019-05-31 NOTE — ASSESSMENT & PLAN NOTE
--2/2 bleeding duodenal vessel s/p clipped at OSH prior to transfer  --serial CBCs, IV protonix BID, NPO, transfuse PRN  --has L IJ central line  --GI consulted  --hold all home AC and aspirin  --there was concern for possible cirrhosis at OSH  --start SBP ppx.  --IR consulted:  CTA abdomen pelvis ordered per their recommendations.  They'd like GI to see the patient first.

## 2019-05-31 NOTE — ASSESSMENT & PLAN NOTE
--obtain BNP  --echo (5/5/19) - EF 45%, mildly decreased LV systolic function, no diastolic dysfunction  --diurese as needed  --CXR at OSH (5/30):  Right basilar opacification consistent with right basilar infiltrate and small pleural effusion

## 2019-05-31 NOTE — CONSULTS
[facility]  Nephrology  Consult Note    Patient Name: Frank Zayas  MRN: [id number]  Admission Date: 5/31/2019  Hospital Length of Stay: 0 days  Attending Provider: Fidencio Naidu MD   Primary Care Physician: Mikhail Shields MD  Principal Problem:GIB (gastrointestinal bleeding)    Inpatient consult to Nephrology  Consult performed by: Carolina Edwards NP  Consult ordered by: Behram Khan, MD  Reason for consult: ESRD Management         Subjective:     HPI: 79 year old man with history of T2DM, ESRD on HD (started 1-2 ms ago), HFpEF, and CAD s/p CABG on plavix who initially presented to [facility] for complaints of abdominal pain and hematochezia. He was transfused 1u pRBC at OSH and transferred to [facility] for a cardiac evaluation after being found with a troponin of 0.39. At [facility], he was started on levophed for shock and suffered brief episode of PEA arrest which resolved with resuscitation. At OSH underwent EGD which was notable for actively bleeding duodenal ulcer s/p hemostasis with epi and clip. Due to concern for rebleeding he was transferred to [facility] for potential IR therapy. According to the family, the patient was last dialyze on yesterday, 5/30, at the OSH. He was recently started on dialysis about 1-2 months ago. His renal failure is likely due to his co morbidities. He typically dialyze on T/Th/Sat for 4 hrs at [facility] in [place] via a L permacath. Nephrology consulted for management of ESRD and HD treatment.        Past Medical History:   Diagnosis Date    Anticoagulant long-term use     Arthritis     Bladder cancer     Blood transfusion     CHF (congestive heart failure)     Chronic kidney disease     COPD (chronic obstructive pulmonary disease)     Coronary artery disease     Diabetes mellitus     Gout     [place] (hard of hearing)     HAS BILAT AIDS BUT DOES NOT WEAR    Hyperlipidemia     Hypertension     Myocardial infarction     RLS (restless  legs syndrome)     Sleep apnea     NO MACHINE    Wears glasses        Past Surgical History:   Procedure Laterality Date    CARDIAC SURGERY      CABG X 2 1997    CATARACT EXTRACTION, BILATERAL      CHOLECYSTECTOMY      COLONOSCOPY      CORONARY ARTERY BYPASS GRAFT      x 2    coronary stents      CYSTOSCOPY      CYSTOSCOPY N/A 4/8/2019    [facility] OR    EXCISION-BLADDER TUMOR-TRANSURETHRAL (TURBT) N/A 9/8/2014    [facility]formed [facility] at [facility] OR    EXCISION-BLADDER TUMOR-TRANSURETHRAL (TURBT) N/A 11/18/2013    [facility]formed by [facility] at [facility] OR    EYE SURGERY Bilateral     CATARACT    Insertion,catheter,tunneled N/A 5/16/2019    Performed by Wade Rodriguez [facility] at [facility] OR    TURBT (TRANSURETHRAL RESECTION OF BLADDER TUMOR) N/A 4/8/2019    [facility]formed by [facility] at [facility] OR    VASECTOMY         Review of patient's allergies indicates:   Allergen Reactions    Lidocaine Nausea Only     NOVACAINE --  Severe nausea    Statins-hmg-coa reductase inhibitors Anxiety     Current Facility-Administered Medications   Medication Frequency    0.9%  NaCl infusion (CRRT USE ONLY) Continuous    albuterol-ipratropium 2.5 mg-0.5 mg/3 mL nebulizer solution 3 mL Q6H PRN    chlorhexidine 0.12 % solution 15 mL BID    dextrose 50% injection 12.5 g PRN    glucagon (human recombinant) injection 1 mg PRN    insulin aspart U-100 pen 1-10 Units Q6H PRN    magnesium sulfate 2g in water 50mL IVPB (premix) PRN    nitroGLYCERIN SL tablet 0.4 mg Q5 Min PRN    norepinephrine 1 mg/mL injection     norepinephrine 4 mg in dextrose 5% 250 mL infusion (premix) (titrating) Continuous    ondansetron disintegrating tablet 8 mg Q8H PRN    pantoprazole (PROTONIX) 40 mg in dextrose 5 % 100 mL infusion Continuous    piperacillin-tazobactam 4.5 g in sodium chloride 0.9% 100 mL IVPB (ready to mix system) Q12H    propofol (DIPRIVAN) 10 mg/mL infusion Continuous    ramelteon  tablet 8 mg Nightly PRN    sodium chloride 0.9% flush 10 mL PRN    sodium phosphate 20.01 mmol in dextrose 5 % 250 mL IVPB PRN    sodium phosphate 30 mmol in dextrose 5 % 250 mL IVPB PRN    sodium phosphate 39.99 mmol in dextrose 5 % 250 mL IVPB PRN     Family History     Problem Relation (Age of Onset)    Cancer Father, Sister, Brother    Heart disease Father        Tobacco Use    Smoking status: Former Smoker     Packs/day: 0.25     Years: 20.00     Pack years: 5.00     Types: Cigars     Last attempt to quit: 1982     Years since quittin.3    Smokeless tobacco: Never Used   Substance and Sexual Activity    Alcohol use: No    Drug use: No    Sexual activity: Not on file     Review of Systems   Unable to perform ROS: Intubated     Objective:     Vital Signs (Most Recent):  Temp: 97.5 °F (36.4 °C) (19 1500)  Pulse: 80 (19 1600)  Resp: 18 (19 1600)  BP: 109/64 (19 1600)  SpO2: 97 % (19 1600)  O2 Device (Oxygen Therapy): ventilator (19 1600) Vital Signs (24h Range):  Temp:  [96 °F (35.6 °C)-99.7 °F (37.6 °C)] 97.5 °F (36.4 °C)  Pulse:  [] 80  Resp:  [11-22] 18  SpO2:  [96 %-100 %] 97 %  BP: ()/(44-77) 109/64  Arterial Line BP: (104-145)/(48-65) 104/51     Weight: 125 kg (275 lb 9.2 oz) (19 0700)  Body mass index is 35.38 kg/m².  Body surface area is 2.55 meters squared.    I/O last 3 completed shifts:  In: -   Out: 75 [Urine:75]    Physical Exam   Constitutional: No distress.   Intubated, sedated.    HENT:   Head: Normocephalic and atraumatic.   Mouth/Throat: Oropharynx is clear and moist. No oropharyngeal exudate.   Eyes: Conjunctivae are normal. No scleral icterus.   Neck: No JVD present.   Cardiovascular: Normal rate, regular rhythm, normal heart sounds and intact distal pulses.   Pulmonary/Chest: Effort normal and breath sounds normal. No respiratory distress.   Abdominal: Soft. Bowel sounds are normal. He exhibits no distension and no mass.  There is no tenderness. There is no rebound and no guarding.   Soft abdomen. Melena present.   Musculoskeletal: He exhibits no edema or tenderness.   Lymphadenopathy:     He has no cervical adenopathy.   Neurological: He is alert.   Unable to assess.   Skin: Skin is warm. Capillary refill takes less than 2 seconds. He is not diaphoretic.   Psychiatric:   Unable to assess.   Nursing note and vitals reviewed.    Significant Labs:  CBC:   Recent Labs   Lab 05/31/19  1348   WBC 14.62*   RBC 3.26*   HGB 10.1*   HCT 29.5*      MCV 91   MCH 31.0   MCHC 34.2     CMP:   Recent Labs   Lab 05/31/19  0650 05/31/19  1344   * 150*   CALCIUM 8.8 8.6*   ALBUMIN 2.3* 2.2*   PROT 5.3*  --     138   K 5.4* 5.2*   CO2 19* 20*    103   BUN 71* 72*   CREATININE 4.9* 5.2*   ALKPHOS 74  --    ALT 9*  --    AST 19  --    BILITOT 0.5  --            Assessment/Plan:     * GIB (gastrointestinal bleeding)  - Being follow by admitting service and GI    ESRD (end stage renal disease)  79 year old man with history of T2DM, ESRD on HD (started 1-2 ms ago), HFpEF, CAD s/p CABG on plavix, who is presenting from outside hospital due to concern for duodenal ulcer. Nephrology consulted for ESRD and HD management. Last dialyze at the outside hospital per family.     Patient intubated and sedated on pressor support (Levophed) secondary to hypovolemic shock and PEA arrest at OSH.     Plan:  -will plan for SLED today for metabolic clearance and volume management, target -350 ml/hr as tolerated, keep MAP >65.  -will reassess in the AM for further CRRT requirements  -continue Levophed, please adjust as needed with CRRT  -recommend serial labs with CRRT for dialysate adjustments as needed and/or electrolytes replacements   -strict I/Os and daily weights   -currently on 3 pressors : Levophed  -continue on mechanical ventilation with FiO at 40%  - Avoid nephrotoxic medication and renal dose medications to GFR  - Will discuss with  staff          Thank you for your consult. I will follow-up with patient. Please contact us if you have any additional questions.    Carolina Edwards NP  Nephrology  [facility]

## 2019-05-31 NOTE — ASSESSMENT & PLAN NOTE
--went into PEA at OSH and was coded and intubated emergently, on propofol  --cardiac monitoring  --correct electrolytes PRN  --likely related to underlying blood loss

## 2019-05-31 NOTE — ASSESSMENT & PLAN NOTE
--with hyperkalemia, trend and correct as needed  --missed last HD on 5/30, likely last HD was 5/28  --nephrology consulted  --may need volume removal  --strict I/Os  --does make urine

## 2019-05-31 NOTE — PROGRESS NOTES
Received intubated patient from EMS. 7.5 ETT secured at 23cm at the lip. Placed on mechanical ventilation at documented settings. Pt tolerated well.

## 2019-05-31 NOTE — PROGRESS NOTES
[facility]  Critical Care Medicine  Progress Note    Patient Name: Frank Zayas  MRN: [id number]  Admission Date: 5/31/2019  Hospital Length of Stay: 0 days  Code Status: Full Code  Attending Provider: Fidencio Naidu MD  Primary Care Provider: Mikhail Shields MD   Principal Problem: GIB (gastrointestinal bleeding)    Subjective:     HPI:  80 yo M with morbid obesity, ESRD on HD (TTS, 2 months), Afib, CHF, DM2 (on insulin), CAD s/p CABG, HLD, COPD, HTN transferred from [facility]  He presented with hematochezia and abdominal pain.  He went to [facility] ER for lower GI bleed and received 1 u PRBC and then was transferred to [facility] for cardiac evaluation when trop was 0.39.  At [facility] he had continued hyperkalemia (K of 6.1) after being treated for it at [facility].  He was started on levophed for hypovolemic shock, and an art line and central line was placed.  During line placement, he briefly lost a pulse (PEA) and CPR was started.  He was given epinephrine, calcium gluconate, sodium bicarb, and intubated emergently.  He achieved ROSC.  He received IVF and 3-4 units PRBC.  He underwent emergent upper and lower endoscopy.  They found a large vessel in the duodenum, actively oozing with copious amounts of fresh bright red blood pumping out.  GI placed a clop on the artery and injected epinephrine, achieving hemostasis.  There was concern that the vessel could be directly assosiated with a larger vessel (ie GDA).  There were no varices seen.  Both GI and Gen Surg recommended transfer to [facility] for arteriovenous embolization which couldn't be performed there.        Hospital/ICU Course:  No notes on file    Past Medical History:   Diagnosis Date    Anticoagulant long-term use     Arthritis     Bladder cancer     Blood transfusion     CHF (congestive heart failure)     Chronic kidney disease     COPD (chronic obstructive pulmonary disease)     Coronary artery disease      Diabetes mellitus     Gout     [place] (hard of hearing)     HAS BILAT AIDS BUT DOES NOT WEAR    Hyperlipidemia     Hypertension     Myocardial infarction     RLS (restless legs syndrome)     Sleep apnea     NO MACHINE    Wears glasses        Past Surgical History:   Procedure Laterality Date    CARDIAC SURGERY      CABG X 2     CATARACT EXTRACTION, BILATERAL      CHOLECYSTECTOMY      COLONOSCOPY      CORONARY ARTERY BYPASS GRAFT      x 2    coronary stents      CYSTOSCOPY      CYSTOSCOPY N/A 2019    [facility]    EXCISION-BLADDER TUMOR-TRANSURETHRAL (TURBT) N/A 2014    [facility] at [facility] OR    EXCISION-BLADDER TUMOR-TRANSURETHRAL (TURBT) N/A 2013    [facility] at [facility] OR    EYE SURGERY Bilateral     CATARACT    Insertion,catheter,tunneled N/A 2019    [facility]formed by Wade [facility] at [facility] OR    TURBT (TRANSURETHRAL RESECTION OF BLADDER TUMOR) N/A 2019    [facility]formed by [facility] at [facility] OR    VASECTOMY         Review of patient's allergies indicates:   Allergen Reactions    Lidocaine Nausea Only     NOVACAINE --  Severe nausea    Statins-hmg-coa reductase inhibitors Anxiety       Family History     Problem Relation (Age of Onset)    Cancer Father, Sister, Brother    Heart disease Father        Tobacco Use    Smoking status: Former Smoker     Packs/day: 0.25     Years: 20.00     Pack years: 5.00     Types: Cigars     Last attempt to quit: 1982     Years since quittin.3    Smokeless tobacco: Never Used   Substance and Sexual Activity    Alcohol use: No    Drug use: No    Sexual activity: Not on file      Review of Systems   Unable to perform ROS: Intubated     Objective:     Vital Signs (Most Recent):  Pulse: 98 (19)  Resp: 20 (19)  BP: 94/77 (19) Vital Signs (24h Range):  Temp:  [96 °F (35.6 °C)-98 °F (36.7 °C)] 98 °F (36.7 °C)  Pulse:   [] 98  Resp:  [16-20] 20  SpO2:  [96 %-100 %] 96 %  BP: ()/(44-77) 94/77      There is no height or weight on file to calculate BMI.    No intake or output data in the 24 hours ending 05/31/19 0651    Physical Exam   Constitutional: He appears well-developed and well-nourished.   HENT:   Head: Normocephalic and atraumatic.   Right Ear: External ear normal.   Left Ear: External ear normal.   Eyes: Conjunctivae and EOM are normal. Right eye exhibits no discharge. Left eye exhibits no discharge.   Small pupils bilaterally   Neck: Normal range of motion. No tracheal deviation present.   Cardiovascular: Normal rate.   Irregularly irregular rhythm   Pulmonary/Chest:   Intubated, breath sounds obscured by ventilator sounds   Abdominal: Soft. Bowel sounds are normal.   Distended abdomen   Musculoskeletal: Normal range of motion.   2+ bilateral pitting edema   Neurological:   sedated   Skin: Skin is warm and dry.       Vents:  Vent Mode: A/C (05/31/19 0625)  Ventilator Initiated: Yes (05/31/19 0625)  Set Rate: 20 bmp (05/31/19 0625)  Vt Set: 600 mL (05/31/19 0625)  PEEP/CPAP: 5 cmH20 (05/31/19 0625)  Oxygen Concentration (%): 40 (05/31/19 0625)  Peak Airway Pressure: 41 cmH2O (05/31/19 0625)  Plateau Pressure: 0 cmH20 (05/31/19 0625)  Total Ve: 12.2 mL (05/31/19 0625)  F/VT Ratio<105 (RSBI): (!) 32.89 (05/31/19 0625)  Lines/Drains/Airways     Central Venous Catheter Line                 Hemodialysis Catheter 05/16/19 1600 right subclavian 14 days          Peripheral Intravenous Line                 Peripheral IV - Single Lumen 05/30/19 20 G Right Forearm 1 day         Peripheral IV - Single Lumen 05/30/19 20 G Right Wrist 1 day              Significant Labs:    CBC/Anemia Profile:  Recent Labs   Lab 05/30/19  1013   WBC 8.90   HGB 7.9*   HCT 24.3*      MCV 97   RDW 18.3*        Chemistries:  Recent Labs   Lab 05/30/19  1013      K 5.7*      CO2 23   BUN 94*   CREATININE 6.7*   CALCIUM 8.6*    ALBUMIN 2.4*   PROT 5.7*   BILITOT 0.3   ALKPHOS 84   ALT 13   AST 13           Significant Imaging: I have reviewed all pertinent imaging results/findings within the past 24 hours.      ABG  No results for input(s): PH, PO2, PCO2, HCO3, BE in the last 168 hours.  Assessment/Plan:     Cardiac/Vascular  PEA (Pulseless electrical activity)  --went into PEA at OSH and was coded and intubated emergently, on propofol  --cardiac monitoring  --correct electrolytes PRN  --likely related to underlying blood loss    A-fib  --hold any home ACs for now  --clarify if any home rate or rhythm control agents    Coronary artery disease  --clarify if patient is on home statin for CAD and HLD      DM type 2 with diabetic mixed hyperlipidemia  --NPO for now  --SSI for now      HTN (hypertension)  --hold any home antihypertensives while on pressors for now    CHF (congestive heart failure)  --obtain BNP  --echo (5/5/19) - EF 45%, mildly decreased LV systolic function, no diastolic dysfunction  --diurese as needed  --CXR at OSH (5/30):  Right basilar opacification consistent with right basilar infiltrate and small pleural effusion       Renal/  ESRD (end stage renal disease)  --with hyperkalemia, trend and correct as needed  --missed last HD on 5/30, likely last HD was 5/28  --nephrology consulted  --may need volume removal  --strict I/Os  --does make urine      GI  * GIB (gastrointestinal bleeding)  --2/2 bleeding duodenal vessel s/p clipped at OSH prior to transfer  --serial CBCs, IV protonix BID, NPO, transfuse PRN  --has L IJ central line  --GI consulted  --hold all home AC and aspirin  --there was concern for possible cirrhosis at OSH  --start SBP ppx.  --IR consulted:  CTA abdomen pelvis ordered per their recommendations.  They'd like GI to see the patient first.       Critical Care Daily Checklist:    A: Awake: RASS Goal/Actual Goal: RASS Goal: 0-->alert and calm  Actual:     B: Spontaneous Breathing Trial Performed?     C:  SAT & SBT Coordinated?  n/a                   D: Delirium: CAM-ICU     E: Early Mobility Performed? Yes   F: Feeding Goal:    Status:     Current Diet Order   Procedures    Diet NPO Except for: Medication, Ice Chips, Sips with Medication     Order Specific Question:   Except for     Answer:   Medication     Order Specific Question:   Except for     Answer:   Ice Chips     Order Specific Question:   Except for     Answer:   Sips with Medication      AS: Analgesia/Sedation yes   T: Thromboembolic Prophylaxis no   H: HOB > 300 Yes   U: Stress Ulcer Prophylaxis (if needed) yes   G: Glucose Control yes   B: Bowel Function     I: Indwelling Catheter (Lines & Huynh) Necessity Huynh, R IJ central line, art line   D: De-escalation of Antimicrobials/Pharmacotherapies     Plan for the day/ETD GI and IR consults    Code Status:  Family/Goals of Care: Full Code            Aida Kevin MD  Critical Care Medicine  [facility]

## 2019-05-31 NOTE — HPI
79 year old man with history of T2DM, ESRD on HD (started 1-2 ms ago), HFpEF, CAD s/p CABG on plavix, who is presenting from outside hospital due to concern for duodenal ulcer.    No family present, patient intubated and sedated. History per chart review.    He presented to OSH ([facility]) after an episode of abdominal pain and hematochezia which awoken him from rest. He was transfused 1u pRBC at OSH and transferred to [facility]. At OSH was started on levophed for shock and suffered brief episode of PEA arrest which resolved with resuscitation. At OSH underwent EGD which was notable for actively bleeding duodenal ulcer s/p hemostasis with epi and clip. Due to concern for rebleeding he was transferred to [facility] for potential IR therapy.    On arrival he is intubated and sedated. On levophed with decreasing requirement. Underwent CTA which was negative on arrival.    H&H on arrival 10.1 (7.9 on arrival to OSH on 5/30, unclear baseline), required ~5u pRBC total.    EGD. (5/30/19). Dr. BRITTNEE Garcia. Indication:Hematochezia.  - stomach with 6mm ulceration concerning for NGT trauma (non-bleeding).  - no blood in stomach  - duodenal ulcer with 'pumpin' from a thick stalked vessel in duodenal sweep along posteiror wall. 4cc epi and then clipped without further bleeding  - photos reviewed in chart

## 2019-05-31 NOTE — CONSULTS
[facility]  Gastroenterology  Consult Note    Patient Name: Frank Zayas  MRN: [id number]  Admission Date: 5/31/2019  Hospital Length of Stay: 0 days  Code Status: Full Code   Attending Provider: Fidencio Naidu MD   Consulting Provider: Kalyan Mesa MD  Primary Care Physician: Mikhail Shields MD  Principal Problem:GIB (gastrointestinal bleeding)    Gastroenterology  Consult performed by: Kalyan Mesa MD  Consult ordered by: Behram Khan, MD        Subjective:     HPI:  79 year old man with history of T2DM, ESRD on HD (started 1-2 ms ago), HFpEF, CAD s/p CABG on plavix, who is presenting from outside hospital due to concern for duodenal ulcer.    No family present, patient intubated and sedated. History per chart review.    He presented to OSH ([facility]) after an episode of abdominal pain and hematochezia which awoken him from rest. He was transfused 1u pRBC at OSH and transferred to [facility]. At OSH was started on levophed for shock and suffered brief episode of PEA arrest which resolved with resuscitation. At OSH underwent EGD which was notable for actively bleeding duodenal ulcer s/p hemostasis with epi and clip. Due to concern for rebleeding he was transferred to [facility] for potential IR therapy.    On arrival he is intubated and sedated. On levophed with decreasing requirement. Underwent CTA which was negative on arrival.    H&H on arrival 10.1 (7.9 on arrival to OSH on 5/30, unclear baseline), required ~5u pRBC total.    EGD. (5/30/19). Dr. BRITTNEE Garcia. Indication:Hematochezia.  - stomach with 6mm ulceration concerning for NGT trauma (non-bleeding).  - no blood in stomach  - duodenal ulcer with 'pumpin' from a thick stalked vessel in duodenal sweep along posteiror wall. 4cc epi and then clipped without further bleeding  - photos reviewed in chart      Past Medical History:   Diagnosis Date    Anticoagulant long-term use     Arthritis     Bladder cancer     Blood  transfusion     CHF (congestive heart failure)     Chronic kidney disease     COPD (chronic obstructive pulmonary disease)     Coronary artery disease     Diabetes mellitus     Gout     [place] (hard of hearing)     HAS BILAT AIDS BUT DOES NOT WEAR    Hyperlipidemia     Hypertension     Myocardial infarction     RLS (restless legs syndrome)     Sleep apnea     NO MACHINE    Wears glasses        Past Surgical History:   Procedure Laterality Date    CARDIAC SURGERY      CABG X 2     CATARACT EXTRACTION, BILATERAL      CHOLECYSTECTOMY      COLONOSCOPY      CORONARY ARTERY BYPASS GRAFT      x 2    coronary stents      CYSTOSCOPY      CYSTOSCOPY N/A 2019    [facility] OR    EXCISION-BLADDER TUMOR-TRANSURETHRAL (TURBT) N/A 2014    [facility]formed [facility] at [facility] OR    EXCISION-BLADDER TUMOR-TRANSURETHRAL (TURBT) N/A 2013    [facility] at [facility] OR    EYE SURGERY Bilateral     CATARACT    Insertion,catheter,tunneled N/A 2019    Performed by Wade Rodriguez MD at [facility] OR    TURBT (TRANSURETHRAL RESECTION OF BLADDER TUMOR) N/A 2019    [facility] at [facility] OR    VASECTOMY         Review of patient's allergies indicates:   Allergen Reactions    Lidocaine Nausea Only     NOVACAINE --  Severe nausea    Statins-hmg-coa reductase inhibitors Anxiety     Family History     Problem Relation (Age of Onset)    Cancer Father, Sister, Brother    Heart disease Father        Tobacco Use    Smoking status: Former Smoker     Packs/day: 0.25     Years: 20.00     Pack years: 5.00     Types: Cigars     Last attempt to quit: 1982     Years since quittin.3    Smokeless tobacco: Never Used   Substance and Sexual Activity    Alcohol use: No    Drug use: No    Sexual activity: Not on file     Review of Systems   Unable to perform ROS: Intubated     Objective:     Vital Signs (Most Recent):  Temp: 97.7 °F  (36.5 °C) (05/31/19 1100)  Pulse: 60 (05/31/19 1511)  Resp: 18 (05/31/19 1511)  BP: 108/62 (05/31/19 1400)  SpO2: 97 % (05/31/19 1511) Vital Signs (24h Range):  Temp:  [96 °F (35.6 °C)-99.7 °F (37.6 °C)] 97.7 °F (36.5 °C)  Pulse:  [] 60  Resp:  [11-22] 18  SpO2:  [96 %-100 %] 97 %  BP: ()/(44-77) 108/62  Arterial Line BP: (113-145)/(48-65) 113/54     Weight: 125 kg (275 lb 9.2 oz) (05/31/19 0700)  Body mass index is 35.38 kg/m².      Intake/Output Summary (Last 24 hours) at 5/31/2019 1521  Last data filed at 5/31/2019 1410  Gross per 24 hour   Intake 403.41 ml   Output 155 ml   Net 248.41 ml       Lines/Drains/Airways     Central Venous Catheter Line                 Hemodialysis Catheter 05/16/19 1600 right subclavian 14 days         Percutaneous Central Line Insertion/Assessment - triple lumen  05/31/19 0700 left internal jugular less than 1 day          Drain                 Urethral Catheter 05/31/19 0700 less than 1 day          Airway                 Airway - Non-Surgical -- days          Arterial Line                 Arterial Line 05/31/19 0700 Left Radial less than 1 day          Peripheral Intravenous Line                 Peripheral IV - Single Lumen 05/30/19 20 G Right Forearm 1 day                Physical Exam   Constitutional: No distress.   Intubated, sedated.    HENT:   Head: Normocephalic and atraumatic.   Mouth/Throat: Oropharynx is clear and moist. No oropharyngeal exudate.   Eyes: Conjunctivae are normal. No scleral icterus.   Neck: No JVD present.   Cardiovascular: Normal rate, regular rhythm, normal heart sounds and intact distal pulses.   Pulmonary/Chest: Effort normal and breath sounds normal. No respiratory distress.   Abdominal: Soft. Bowel sounds are normal. He exhibits no distension and no mass. There is no tenderness. There is no rebound and no guarding.   Soft abdomen. Melena present.   Musculoskeletal: He exhibits no edema or tenderness.   Lymphadenopathy:     He has no  cervical adenopathy.   Neurological: He is alert.   Unable to assess.   Skin: Skin is warm. Capillary refill takes less than 2 seconds. He is not diaphoretic.   Psychiatric:   Unable to assess.   Nursing note and vitals reviewed.      Significant Labs:  All pertinent lab results from the last 24 hours have been reviewed.    Significant Imaging:  Imaging results within the past 24 hours have been reviewed.    Assessment/Plan:     * GIB (gastrointestinal bleeding)  79 year old man with history of T2DM, ESRD on HD (started 1-2 ms ago), HFpEF, CAD s/p CABG on plavix, who is presenting from outside hospital due to concern for duodenal ulcer. Hemostasis obtained for actively bleeding duodenal ulcer at OSH (5/30), currently without signs of ongoing bleeding.    Plan  - CTA without signs of ongoing bleeding, H&H stable, decreasing vasopressor requirement.  - recommend protonix gtt x72hr then 40mg IV BID  - repeat EGD vs IR therapy if rebleeds  - no evidence of varices on EGD, does not require octreotide  - maintain 2 large bore peripheral IV  - maintain Hgb > 7  - call if any changes in patient's clinical status          Thank you for your consult. I will follow-up with patient. Please contact us if you have any additional questions.    Kalyan Mesa MD  Gastroenterology  [facility]

## 2019-05-31 NOTE — SUBJECTIVE & OBJECTIVE
Past Medical History:   Diagnosis Date    Anticoagulant long-term use     Arthritis     Bladder cancer     Blood transfusion     CHF (congestive heart failure)     Chronic kidney disease     COPD (chronic obstructive pulmonary disease)     Coronary artery disease     Diabetes mellitus     Gout     [place] (hard of hearing)     HAS BILAT AIDS BUT DOES NOT WEAR    Hyperlipidemia     Hypertension     Myocardial infarction     RLS (restless legs syndrome)     Sleep apnea     NO MACHINE    Wears glasses        Past Surgical History:   Procedure Laterality Date    CARDIAC SURGERY      CABG X 2     CATARACT EXTRACTION, BILATERAL      CHOLECYSTECTOMY      COLONOSCOPY      CORONARY ARTERY BYPASS GRAFT      x 2    coronary stents      CYSTOSCOPY      CYSTOSCOPY N/A 2019    [facility] OR    EXCISION-BLADDER TUMOR-TRANSURETHRAL (TURBT) N/A 2014    [facility] at [facility] OR    EXCISION-BLADDER TUMOR-TRANSURETHRAL (TURBT) N/A 2013    [facility] at [facility] OR    EYE SURGERY Bilateral     CATARACT    Insertion,catheter,tunneled N/A 2019    Performed by Wade Rodriguez MD at [facility] OR    TURBT (TRANSURETHRAL RESECTION OF BLADDER TUMOR) N/A 2019    Performed by [facility] at [facility] OR    VASECTOMY         Review of patient's allergies indicates:   Allergen Reactions    Lidocaine Nausea Only     NOVACAINE --  Severe nausea    Statins-hmg-coa reductase inhibitors Anxiety       Family History     Problem Relation (Age of Onset)    Cancer Father, Sister, Brother    Heart disease Father        Tobacco Use    Smoking status: Former Smoker     Packs/day: 0.25     Years: 20.00     Pack years: 5.00     Types: Cigars     Last attempt to quit: 1982     Years since quittin.3    Smokeless tobacco: Never Used   Substance and Sexual Activity    Alcohol use: No    Drug use: No    Sexual activity: Not on file       Review of Systems   Unable to perform ROS: Intubated     Objective:     Vital Signs (Most Recent):  Pulse: 98 (05/31/19 0625)  Resp: 20 (05/31/19 0625)  BP: 94/77 (05/31/19 0625) Vital Signs (24h Range):  Temp:  [96 °F (35.6 °C)-98 °F (36.7 °C)] 98 °F (36.7 °C)  Pulse:  [] 98  Resp:  [16-20] 20  SpO2:  [96 %-100 %] 96 %  BP: ()/(44-77) 94/77      There is no height or weight on file to calculate BMI.    No intake or output data in the 24 hours ending 05/31/19 0651    Physical Exam   Constitutional: He appears well-developed and well-nourished.   HENT:   Head: Normocephalic and atraumatic.   Right Ear: External ear normal.   Left Ear: External ear normal.   Eyes: Conjunctivae and EOM are normal. Right eye exhibits no discharge. Left eye exhibits no discharge.   Small pupils bilaterally   Neck: Normal range of motion. No tracheal deviation present.   Cardiovascular: Normal rate.   Irregularly irregular rhythm   Pulmonary/Chest:   Intubated, breath sounds obscured by ventilator sounds   Abdominal: Soft. Bowel sounds are normal.   Distended abdomen   Musculoskeletal: Normal range of motion.   2+ bilateral pitting edema   Neurological:   sedated   Skin: Skin is warm and dry.       Vents:  Vent Mode: A/C (05/31/19 0625)  Ventilator Initiated: Yes (05/31/19 0625)  Set Rate: 20 bmp (05/31/19 0625)  Vt Set: 600 mL (05/31/19 0625)  PEEP/CPAP: 5 cmH20 (05/31/19 0625)  Oxygen Concentration (%): 40 (05/31/19 0625)  Peak Airway Pressure: 41 cmH2O (05/31/19 0625)  Plateau Pressure: 0 cmH20 (05/31/19 0625)  Total Ve: 12.2 mL (05/31/19 0625)  F/VT Ratio<105 (RSBI): (!) 32.89 (05/31/19 0625)  Lines/Drains/Airways     Central Venous Catheter Line                 Hemodialysis Catheter 05/16/19 1600 right subclavian 14 days          Peripheral Intravenous Line                 Peripheral IV - Single Lumen 05/30/19 20 G Right Forearm 1 day         Peripheral IV - Single Lumen 05/30/19 20 G Right Wrist 1 day               Significant Labs:    CBC/Anemia Profile:  Recent Labs   Lab 05/30/19  1013   WBC 8.90   HGB 7.9*   HCT 24.3*      MCV 97   RDW 18.3*        Chemistries:  Recent Labs   Lab 05/30/19  1013      K 5.7*      CO2 23   BUN 94*   CREATININE 6.7*   CALCIUM 8.6*   ALBUMIN 2.4*   PROT 5.7*   BILITOT 0.3   ALKPHOS 84   ALT 13   AST 13           Significant Imaging: I have reviewed all pertinent imaging results/findings within the past 24 hours.

## 2019-06-01 PROBLEM — R57.8 HEMORRHAGIC SHOCK: Status: ACTIVE | Noted: 2019-01-01

## 2019-06-01 PROBLEM — G25.81 RESTLESS LEG SYNDROME: Status: ACTIVE | Noted: 2019-01-01

## 2019-06-01 PROBLEM — J69.0 ASPIRATION PNEUMONIA: Status: ACTIVE | Noted: 2019-01-01

## 2019-06-01 NOTE — PLAN OF CARE
Problem: Adult Inpatient Plan of Care  Goal: Plan of Care Review  Outcome: Ongoing (interventions implemented as appropriate)  Afib on telemetry, MAP > 65, 2+ pulses BUE, 1+ BLE  AAOx4, moves all extremities, PERRLA  1L NC, SpO2 88 - 92% per order  BGC < 200, no bloody BM this shift  Huynh removed, oliguric  LIJ TLC, L rad A-line removed    VS and assessment per flowsheets

## 2019-06-01 NOTE — H&P
[facility]  Critical Care Medicine  History & Physical    Patient Name: Frank Zayas  MRN: [id number]  Admission Date: 5/31/2019  Hospital Length of Stay: 0 days  Code Status: Full Code  Attending Physician: Fidencio Naidu MD   Primary Care Provider: Mikhail Shields MD   Principal Problem: GIB (gastrointestinal bleeding)    Subjective:     HPI:  80 yo M with morbid obesity, ESRD on HD (TTS, 2 months), Afib, CHF, DM2 (on insulin), CAD s/p CABG, HLD, COPD, HTN transferred from [facility] ([facility]  He presented with hematochezia and abdominal pain.  He went to [facility] ER for lower GI bleed and received 1 u PRBC and then was transferred to [facility] for cardiac evaluation when trop was 0.39.  At [facility] he had continued hyperkalemia (K of 6.1) after being treated for it at [facility].  He was started on levophed for hypovolemic shock, and an art line and central line was placed.  During line placement, he briefly lost a pulse (PEA) and CPR was started.  He was given epinephrine, calcium gluconate, sodium bicarb, and intubated emergently.  He achieved ROSC.  He received IVF and 3-4 units PRBC.  He underwent emergent upper and lower endoscopy.  They found a large vessel in the duodenum, actively oozing with copious amounts of fresh bright red blood pumping out.  GI placed a clop on the artery and injected epinephrine, achieving hemostasis.  There was concern that the vessel could be directly assosiated with a larger vessel (ie GDA).  There were no varices seen.  Both GI and Gen Surg recommended transfer to [facility] for arteriovenous embolization which couldn't be performed there.        Hospital/ICU Course:  No notes on file     Past Medical History:   Diagnosis Date    Anticoagulant long-term use     Arthritis     Bladder cancer     Blood transfusion     CHF (congestive heart failure)     Chronic kidney disease     COPD (chronic obstructive pulmonary disease)     Coronary artery  disease     Diabetes mellitus     Gout     [place] (hard of hearing)     HAS BILAT AIDS BUT DOES NOT WEAR    Hyperlipidemia     Hypertension     Myocardial infarction     RLS (restless legs syndrome)     Sleep apnea     NO MACHINE    Wears glasses        Past Surgical History:   Procedure Laterality Date    CARDIAC SURGERY      CABG X 2     CATARACT EXTRACTION, BILATERAL      CHOLECYSTECTOMY      COLONOSCOPY      CORONARY ARTERY BYPASS GRAFT      x 2    coronary stents      CYSTOSCOPY      CYSTOSCOPY N/A 2019    [facility] OR    EXCISION-BLADDER TUMOR-TRANSURETHRAL (TURBT) N/A 2014    [facility]formed by [facility] at [facility] OR    EXCISION-BLADDER TUMOR-TRANSURETHRAL (TURBT) N/A 2013    Performed by [facility] at [facility] OR    EYE SURGERY Bilateral     CATARACT    Insertion,catheter,tunneled N/A 2019    Performed by Wade Rodriguez MD at [facility] OR    TURBT (TRANSURETHRAL RESECTION OF BLADDER TUMOR) N/A 2019    Performed by [facility] at [facility] OR    VASECTOMY         Review of patient's allergies indicates:   Allergen Reactions    Lidocaine Nausea Only     NOVACAINE --  Severe nausea    Statins-hmg-coa reductase inhibitors Anxiety       Family History     Problem Relation (Age of Onset)    Cancer Father, Sister, Brother    Heart disease Father        Tobacco Use    Smoking status: Former Smoker     Packs/day: 0.25     Years: 20.00     Pack years: 5.00     Types: Cigars     Last attempt to quit: 1982     Years since quittin.3    Smokeless tobacco: Never Used   Substance and Sexual Activity    Alcohol use: No    Drug use: No    Sexual activity: Not on file      Review of Systems   Unable to perform ROS: Intubated     Objective:     Vital Signs (Most Recent):  Temp: 97.7 °F (36.5 °C) (19)  Pulse: (!) 112 (19)  Resp: 18 (19)  BP: 107/66 (19)  SpO2: 100 % (19)  Vital Signs (24h Range):  Temp:  [97.5 °F (36.4 °C)-99.7 °F (37.6 °C)] 97.7 °F (36.5 °C)  Pulse:  [] 112  Resp:  [11-27] 18  SpO2:  [96 %-100 %] 100 %  BP: ()/(44-82) 107/66  Arterial Line BP: (104-157)/(48-71) 131/57   Weight: 125 kg (275 lb 9.2 oz)  Body mass index is 35.38 kg/m².      Intake/Output Summary (Last 24 hours) at 5/31/2019 2208  Last data filed at 5/31/2019 1800  Gross per 24 hour   Intake 552.9 ml   Output 210 ml   Net 342.9 ml       Physical Exam   Constitutional: He appears well-developed and well-nourished.   HENT:   Head: Normocephalic and atraumatic.   Right Ear: External ear normal.   Left Ear: External ear normal.   Eyes: Conjunctivae and EOM are normal. Right eye exhibits no discharge. Left eye exhibits no discharge.   Small pupils bilaterally   Neck: Normal range of motion. No tracheal deviation present.   Cardiovascular: Normal rate.   Irregularly irregular rhythm   Pulmonary/Chest:   Intubated, breath sounds obscured by ventilator sounds   Abdominal: Soft. Bowel sounds are normal.   Distended abdomen   Musculoskeletal: Normal range of motion.   2+ bilateral pitting edema   Neurological:   sedated   Skin: Skin is warm and dry.       Vents:  Vent Mode: Spont (05/31/19 1631)  Ventilator Initiated: Yes (05/31/19 0625)  Set Rate: 18 bmp (05/31/19 1631)  Vt Set: 480 mL (05/31/19 1631)  Pressure Support: 10 cmH20 (05/31/19 1631)  PEEP/CPAP: 5 cmH20 (05/31/19 1631)  Oxygen Concentration (%): 40 (05/31/19 1700)  Peak Airway Pressure: 15 cmH2O (05/31/19 1631)  Plateau Pressure: 0 cmH20 (05/31/19 1631)  Total Ve: 3.66 mL (05/31/19 1631)  Negative Inspiratory Force (cm H2O): -27 (05/31/19 1751)  F/VT Ratio<105 (RSBI): (!) 84.68 (05/31/19 1631)  Lines/Drains/Airways     Central Venous Catheter Line                 Hemodialysis Catheter 05/16/19 1600 right subclavian 15 days         Percutaneous Central Line Insertion/Assessment - triple lumen  05/31/19 0700 left internal jugular less than 1  day          Drain                 Urethral Catheter 05/31/19 0700 less than 1 day          Arterial Line                 Arterial Line 05/31/19 0700 Left Radial less than 1 day          Peripheral Intravenous Line                 Peripheral IV - Single Lumen 05/30/19 20 G Right Forearm 1 day              Significant Labs:    CBC/Anemia Profile:  Recent Labs   Lab 05/31/19  0650 05/31/19  0822 05/31/19  1348   WBC 16.09* 16.73* 14.62*   HGB 10.7* 10.4* 10.1*   HCT 30.8* 30.9* 29.5*    205 179   MCV 90 91 91   RDW 17.5* 17.4* 17.5*        Chemistries:  Recent Labs   Lab 05/30/19  1013 05/31/19  0650 05/31/19  1344    137 138   K 5.7* 5.4* 5.2*    102 103   CO2 23 19* 20*   BUN 94* 71* 72*   CREATININE 6.7* 4.9* 5.2*   CALCIUM 8.6* 8.8 8.6*   ALBUMIN 2.4* 2.3* 2.2*   PROT 5.7* 5.3*  --    BILITOT 0.3 0.5  --    ALKPHOS 84 74  --    ALT 13 9*  --    AST 13 19  --    PHOS  --   --  6.6*           Significant Imaging: I have reviewed all pertinent imaging results/findings within the past 24 hours.    Assessment/Plan:     Cardiac/Vascular  PEA (Pulseless electrical activity)  --went into PEA at OSH and was coded and intubated emergently, on propofol  --cardiac monitoring  --correct electrolytes PRN  --likely related to underlying blood loss    A-fib  --hold any home ACs for now  --clarify if any home rate or rhythm control agents    Coronary artery disease  --clarify if patient is on home statin for CAD and HLD      DM type 2 with diabetic mixed hyperlipidemia  --NPO for now  --SSI for now      HTN (hypertension)  --hold any home antihypertensives while on pressors for now    CHF (congestive heart failure)  --obtain BNP  --echo (5/5/19) - EF 45%, mildly decreased LV systolic function, no diastolic dysfunction  --diurese as needed  --CXR at OSH (5/30):  Right basilar opacification consistent with right basilar infiltrate and small pleural effusion       Renal/  ESRD (end stage renal disease)  --with  hyperkalemia, trend and correct as needed  --missed last HD on 5/30, likely last HD was 5/28  --nephrology consulted  --may need volume removal  --strict I/Os  --does make urine      GI  * GIB (gastrointestinal bleeding)  --2/2 bleeding duodenal vessel s/p clipped at OSH prior to transfer  --serial CBCs, IV protonix BID, NPO, transfuse PRN  --has L IJ central line  --GI consulted  --hold all home AC and aspirin  --there was concern for possible cirrhosis at OSH  --start SBP ppx.  --IR consulted:  CTA abdomen pelvis ordered per their recommendations.  They'd like GI to see the patient first.       (this is an H&P generated because a progress note was generated unintentionally earlier today when it should have been an H&P.  This note does not reflect updates from today).    Aida Kevin MD  Critical Care Medicine  [facility]

## 2019-06-01 NOTE — ASSESSMENT & PLAN NOTE
- Pt on HD TThSat as outpatient via right Permacath  - Nephrology following, appreciate assistance  - Strict I/Os, makes urine  - Daily RFP

## 2019-06-01 NOTE — ASSESSMENT & PLAN NOTE
- CTA from 5/31/19 with evidence of bibasilar airspace opacities and atelectasis with concern for aspiration PNA  - May be 2/2 compressions from PEA arrest and/or vomiting from upper GI bleed  - Continue Zosyn

## 2019-06-01 NOTE — PROGRESS NOTES
Frank Zayas is a 79 y.o. male patient.  On SLED currently  Pt making conversation  Scheduled Meds:   gabapentin  100 mg Oral BID    piperacillin-tazobactam (ZOSYN) IVPB  4.5 g Intravenous Q12H    rOPINIRole  2 mg Oral QHS       Review of patient's allergies indicates:   Allergen Reactions    Lidocaine Nausea Only     NOVACAINE --  Severe nausea    Statins-hmg-coa reductase inhibitors Anxiety       Past Medical History:   Diagnosis Date    Anticoagulant long-term use     Arthritis     Bladder cancer     Blood transfusion     CHF (congestive heart failure)     Chronic kidney disease     COPD (chronic obstructive pulmonary disease)     Coronary artery disease     Diabetes mellitus     Gout     [place] (hard of hearing)     HAS BILAT AIDS BUT DOES NOT WEAR    Hyperlipidemia     Hypertension     Myocardial infarction     RLS (restless legs syndrome)     Sleep apnea     NO MACHINE    Wears glasses      Past Surgical History:   Procedure Laterality Date    CARDIAC SURGERY      CABG X 2 1997    CATARACT EXTRACTION, BILATERAL      CHOLECYSTECTOMY      COLONOSCOPY      CORONARY ARTERY BYPASS GRAFT      x 2    coronary stents      CYSTOSCOPY      CYSTOSCOPY N/A 4/8/2019    [facility] at [facility]    EXCISION-BLADDER TUMOR-TRANSURETHRAL (TURBT) N/A 9/8/2014    [facility] OR    EXCISION-BLADDER TUMOR-TRANSURETHRAL (TURBT) N/A 11/18/2013    [facility] at [facility] OR    EYE SURGERY Bilateral     CATARACT    Insertion,catheter,tunneled N/A 5/16/2019    [facility]formed by Wade [facility] at [facility] OR    TURBT (TRANSURETHRAL RESECTION OF BLADDER TUMOR) N/A 4/8/2019    [facility] OR    VASECTOMY        reports that he quit smoking about 37 years ago. His smoking use included cigars. He has a 5.00 pack-year smoking history. He has never used smokeless tobacco. He reports that he does not drink alcohol or  use drugs.   Family History   Problem Relation Age of Onset    Cancer Father     Heart disease Father     Cancer Sister     Cancer Brother           Vital Signs Range (Last 24H):  Temp:  [97.5 °F (36.4 °C)-98 °F (36.7 °C)]   Pulse:  []   Resp:  [16-27]   BP: (104-125)/(57-82)   SpO2:  [93 %-100 %]   Arterial Line BP: (104-157)/(51-71)     I & O (Last 24H):    Intake/Output Summary (Last 24 hours) at 6/1/2019 1133  Last data filed at 6/1/2019 1100  Gross per 24 hour   Intake 3252.17 ml   Output 4061 ml   Net -808.83 ml           Physical Exam:  Constitutional: No distress.   Intubated, sedated.    Head: Normocephalic and atraumatic.   Mouth/Throat: Oropharynx is clear and moist. No oropharyngeal exudate.   Eyes: Conjunctivae are normal. No scleral icterus.   Neck: No JVD present.   Cardiovascular: Normal rate, regular rhythm, normal heart sounds and intact distal pulses.   Pulmonary/Chest: Effort normal and breath sounds normal. No respiratory distress.   Abdominal: Soft. Bowel sounds are normal. He exhibits no distension and no mass. There is no tenderness. There is no rebound and no guarding.   Soft abdomen. Melena present.   Musculoskeletal: He exhibits no edema or tenderness.   Lymphadenopathy:     He has no cervical adenopathy.   Neurological: He is alert.    Skin: Skin is warm. Capillary refill takes less than 2 seconds. He is not diaphoretic.   Psychiatric:      Nursing note and vitals reviewed.    Laboratory:  CBC:   Recent Labs   Lab 06/01/19  0758   WBC 10.88   RBC 3.12*   HGB 9.8*   HCT 30.8*   *   MCV 99*   MCH 31.4*   MCHC 31.8*     BMP:   Recent Labs   Lab 06/01/19  0344   GLU 87  87     137   K 4.9  4.9     106   CO2 22*  22*   BUN 37*  37*   CREATININE 3.2*  3.2*   CALCIUM 8.3*  8.3*   MG 1.9  1.9     ABGs:   Recent Labs   Lab 05/31/19  1112   PH 7.470*   PCO2 33.2*   PO2 75*   HCO3 24.2   POCSATURATED 96   BE 0         Diagnostic Results:    GIB (gastrointestinal  bleeding)  - Being follow by admitting service and GI     ESRD (end stage renal disease)  79 year old man with history of T2DM, ESRD on HD (started 1-2 ms ago), HFpEF, CAD s/p CABG on plavix, who is presenting from outside hospital due to concern for duodenal ulcer. Nephrology consulted for ESRD and HD management. Last dialyze at the outside hospital per family.      Patient intubated and sedated on pressor support (Levophed) secondary to hypovolemic shock and PEA arrest at OSH.      Plan:  -continue SLED or today, can consider switch to IHD when hemodynamically stable  -will reassess in the AM for further CRRT requirements  -recommend serial labs with CRRT for dialysate adjustments as needed and/or electrolytes replacements   -strict I/Os and daily weights   -continue on mechanical ventilation with FiO at 40%  - Avoid nephrotoxic medication and renal dose medications to GFR             Tres Cardona  6/1/2019

## 2019-06-01 NOTE — ASSESSMENT & PLAN NOTE
- Pt noted to have elevated troponin of 0.39 at OSH  - Likely secondary to GI hemorrhage and PEA arrest  - No acute ischemic changes  - Troponin flat  - Trend: 0.386 > 1.841 > 1.765

## 2019-06-01 NOTE — NURSING
Blood Flow setting ordered is 150 ml/min but set to 200 ml/min by Dialysis Nurse (Pop). Just had a discussion with him and said that since the access is a tunneled line 200 ml/hr blood flow will give a better pressure and flow.

## 2019-06-01 NOTE — ASSESSMENT & PLAN NOTE
- s/p CABG in 1997  - s/p PCI approx. 10 years ago  - Holding home ASA and Plavix in setting of GI bleed  - Resume antiplatelet agents when appropriate

## 2019-06-01 NOTE — TREATMENT PLAN
GI Treatment Plan    Frank Zayas is a 79 y.o. male admitted to hospital 5/31/2019 (Hospital Day: 2) due to GIB (gastrointestinal bleeding).     Interval History  - H&h remains stable without evidence of ongoing bleeding  - extubated yesterday  - off pressers  - feels well without any complaints. Denies any abdominal pain, n/v. Per nursing stools melenic.  - on history he denies NSAID though endorses taking donn seltzer for abdominal discomfort prior to hospitalizaiton    General: Alert, Oriented x3, no distress. Extubated. Talkative. Friendly and in no distress. Family at bedside.  Abdomen: Normoactive bowel sounds. Non-distended. Normal tympany. Soft. Non-tender. No peritoneal signs.      Laboratory    Recent Labs   Lab 06/01/19  0344 06/01/19  0758 06/01/19  1514   HGB 9.8* 9.8* 9.9*     Plan  - transferred for GI bleed s/p endoscopic therapy at OSH without evidence of rebleeding  - recommend protonix gtt x 72 hr, then 40mg BID x 4 weeks then daily  - HP IGG pending, treat if positive  - CTA concerning for ESLD, normal platelets, no evidence of varices on EGD. Should see hepatology as outpatient  - complete 5 days of antibiotics due to GI bleed in setting of ESLD  - discussed ESLD on imaging with patient and family. Emphasized seeing hepatologist on discharge  - reviewed avoidance of nsaids with patient and family  - Plan of care was discussed with primary team.    Thank you for involving us in the care of Frank Zayas. Please call with any additional questions, concerns or changes in the patient's clinical status.    Kalyan Mesa MD  Gastroenterology Fellow, PGY IV  Spectralink: [id number]

## 2019-06-01 NOTE — RESIDENT HANDOFF
Handoff     Primary Team: [facility] CRITICAL CARE MEDICINE Room Number: [id number] A     Patient Name: rFank Zayas MRN: [id number]     Date of Birth: [id number] Allergies: Lidocaine and Statins-hmg-coa reductase inhibitors     Age: 79 y.o. Admit Date: 5/31/2019     Sex: male  BMI: Body mass index is 35.86 kg/m².     Code Status: Full Code        Illness Level (current clinical status): Watcher - Yes - GI re-bleed possible    Reason for Admission: GIB (gastrointestinal bleeding)    Brief HPI (pertinent PMH and diagnosis or differential diagnosis):   80 yo M with morbid obesity, ESRD on HD (TTS, 2 months), Afib, CHF, DM2 (on insulin), CAD s/p CABG, HLD, COPD, HTN transferred from [facility] ([facility]  He presented with hematochezia and abdominal pain.  He went to [facility] ER for lower GI bleed and received 1 u PRBC and then was transferred to [facility] for cardiac evaluation when trop was 0.39.  At [facility] he had continued hyperkalemia (K of 6.1) after being treated for it at [facility].  He was started on levophed for hypovolemic shock, and an art line and central line was placed.  During line placement, he briefly lost a pulse (PEA) and CPR was started.  He was given epinephrine, calcium gluconate, sodium bicarb, and intubated emergently.  He achieved ROSC.  He received IVF and 3-4 units PRBC.  He underwent emergent upper and lower endoscopy.  They found a large vessel in the duodenum, actively oozing with copious amounts of fresh bright red blood pumping out.  GI placed a clop on the artery and injected epinephrine, achieving hemostasis.  There was concern that the vessel could be directly assosiated with a larger vessel (ie GDA).  There were no varices seen.  Both GI and Gen Surg recommended transfer to [facility] for arteriovenous embolization which couldn't be performed there.      Procedure Date: EGD at OSH with bleeding visible vessel clipped (5/30/2019)    Hospital Course (updated, brief assessment by system or problem,  significant events):   Patient admitted to Critical Care Medicine on 5/31/2019 for evaluation and management of acute upper GI bleed (s/p EGD with clipping of bleeding visible vessel at OSH).  Pt arrived intubated following PEA arrest at OSH.  Interventional Radiology consulted and recommended CTA abdomen/pelvis, however no evidence of active GI bleeding on imaging.  Gastroenterology consulted and recommended continue IV Protonix gtt for a total of 72 hours.  Nephrology consulted for dialysis in setting of ESRD.  Patient passed SAT/SBT and was successfully extubated on the afternoon of 5/31.  On 6/1, as pt remained hemodynamically stable and his hemoglobin remained stable, pt stepped down to Hospital Medicine.     Tasks (specific, using if-then statements):   1. Discontinue Protonix gtt after 72 hours and start Protonix 40mg IV BID  2. Follow up with GI regarding repeat endoscopy  3. Resume home ASA and Plavix when safe  4. Pt with A-fib - may need to start oral anticoagulation  5. PT/OT    Contingency Plan (special circumstances anticipated and plan):   1. If pt with signs of re-bleeding, get STAT CBC and consider STAT CTA with IR consultation for embolization and call GI.    Estimated Discharge Date: TBD    Discharge Disposition: TBD    Mentored By: Dr. Fidencio Naidu

## 2019-06-01 NOTE — SUBJECTIVE & OBJECTIVE
Interval History/Significant Events: No acute events overnight.  Pt c/o difficulty sleeping due to restless legs.  Pt denies nausea/vomiting.    Review of Systems   Constitutional: Negative for chills, diaphoresis and fever.   HENT: Negative for trouble swallowing and voice change.    Eyes: Negative for photophobia and visual disturbance.   Respiratory: Negative for cough and shortness of breath.    Cardiovascular: Negative for palpitations and leg swelling.   Gastrointestinal: Negative for abdominal pain, constipation, diarrhea, nausea and vomiting.   Genitourinary: Negative for dysuria and hematuria.   Musculoskeletal: Negative for back pain and myalgias.   Skin: Negative for color change and rash.   Neurological: Negative for light-headedness and headaches.     Objective:     Vital Signs (Most Recent):  Temp: 97.5 °F (36.4 °C) (06/01/19 1100)  Pulse: (!) 123 (06/01/19 1500)  Resp: (!) 26 (06/01/19 1500)  BP: 102/62 (06/01/19 1500)  SpO2: 97 % (06/01/19 1500) Vital Signs (24h Range):  Temp:  [97.5 °F (36.4 °C)-98 °F (36.7 °C)] 97.5 °F (36.4 °C)  Pulse:  [] 123  Resp:  [16-33] 26  SpO2:  [89 %-100 %] 97 %  BP: (102-133)/(59-82) 102/62  Arterial Line BP: (104-157)/(51-71) 130/62   Weight: 126.7 kg (279 lb 5.2 oz)  Body mass index is 35.86 kg/m².      Intake/Output Summary (Last 24 hours) at 6/1/2019 1517  Last data filed at 6/1/2019 1500  Gross per 24 hour   Intake 3914.62 ml   Output 5322 ml   Net -1407.38 ml       Physical Exam   Constitutional: He is oriented to person, place, and time. No distress. Nasal cannula in place.   HENT:   Head: Normocephalic and atraumatic.   Eyes: Conjunctivae are normal. No scleral icterus.   Neck: Neck supple. No JVD present.   Cardiovascular: Normal rate and normal heart sounds. An irregularly irregular rhythm present.   Pulmonary/Chest: Effort normal. No respiratory distress. He has decreased breath sounds in the right lower field. He has no wheezes. He exhibits tenderness  (chest wall).   Abdominal: Soft. There is no tenderness. There is no rebound and no guarding.   Musculoskeletal: He exhibits edema (trace edema of bilateral LE). He exhibits no tenderness.   Neurological: He is alert and oriented to person, place, and time.   Skin: Skin is warm and dry. He is not diaphoretic.   Psychiatric: He has a normal mood and affect.   Vitals reviewed.      Vents:  Vent Mode: Spont (05/31/19 1631)  Ventilator Initiated: Yes (05/31/19 0625)  Set Rate: 18 bmp (05/31/19 1631)  Vt Set: 480 mL (05/31/19 1631)  Pressure Support: 10 cmH20 (05/31/19 1631)  PEEP/CPAP: 5 cmH20 (05/31/19 1631)  Oxygen Concentration (%): 40 (05/31/19 1700)  Peak Airway Pressure: 15 cmH2O (05/31/19 1631)  Plateau Pressure: 0 cmH20 (05/31/19 1631)  Total Ve: 3.66 mL (05/31/19 1631)  Negative Inspiratory Force (cm H2O): -27 (05/31/19 1751)  F/VT Ratio<105 (RSBI): (!) 84.68 (05/31/19 1631)     Lines/Drains/Airways     Central Venous Catheter Line                 Hemodialysis Catheter 05/16/19 1600 right subclavian 15 days          Peripheral Intravenous Line                 Peripheral IV - Single Lumen 05/30/19 20 G Right Forearm 2 days         Peripheral IV - Single Lumen 06/01/19 1309 22 G Right Forearm less than 1 day              Significant Labs:    CBC/Anemia Profile:  Recent Labs   Lab 05/31/19 2131 06/01/19  0344 06/01/19  0758   WBC 12.85* 11.73 10.88   HGB 10.2* 9.8* 9.8*   HCT 30.8* 31.2* 30.8*    144* 137*   MCV 96 99* 99*   RDW 18.7* 18.5* 18.3*        Chemistries:  Recent Labs   Lab 05/31/19  0650 05/31/19  1344 05/31/19 2131 06/01/19  0344    138 137 137  137   K 5.4* 5.2* 5.4* 4.9  4.9    103 102 106  106   CO2 19* 20* 21* 22*  22*   BUN 71* 72* 77* 37*  37*   CREATININE 4.9* 5.2* 5.6* 3.2*  3.2*   CALCIUM 8.8 8.6* 8.7 8.3*  8.3*   ALBUMIN 2.3* 2.2* 2.3* 2.4*  2.4*   PROT 5.3*  --   --  5.5*   BILITOT 0.5  --   --  0.7   ALKPHOS 74  --   --  82   ALT 9*  --   --  10   AST 19  --    --  19   MG  --   --  2.1 1.9  1.9   PHOS  --  6.6* 8.0* 4.7*  4.7*       Coagulation:   Recent Labs   Lab 05/31/19  0650 06/01/19  0344   INR 1.2 1.1   APTT 24.5  --      Lactic Acid:   Recent Labs   Lab 05/31/19  0650   LACTATE 1.7     Troponin:   Recent Labs   Lab 05/31/19  0650 06/01/19  0344   TROPONINI 1.841* 1.765*     All pertinent labs within the past 24 hours have been reviewed.    Significant Imaging:  I have reviewed all pertinent imaging results/findings within the past 24 hours.

## 2019-06-01 NOTE — ASSESSMENT & PLAN NOTE
Pt is a 78 yo male with ESRD (HD TThSat), T2DM, HTN, HLD, CAD (s/p CABG 1997, s/p PCI), HFrEF, who presented to [facility] as a transfer from [facility] for further monitoring and evaluation of upper GI bleed with concern for high probability of re-bleeding.  There, hospital course complicated by PEA arrest with intubation.  Pt subsequently underwent EGD which showed duodenal ulcer with visible vessel actively bleeding, which was successfully clipped.  Pt transferred here for possible need for IR intervention given likelihood of re-bleeding.    Plan:  - s/p EGD at OSH on 5/30/19 with clip of bleeding visible vessel from duodenal ulcer  - IR consulted, recommended CTA  - CTA abdomen/pelvis on 5/31 without evidence of active GI bleed  - Gastroenterology consulted, appreciate recs  - Continue IV Protonix gtt for total of 72 hours, then 40mg IV BID  - Continue to hold anticoagulation and antiplatelet agents  - Maintain IV access with 2 large-bore peripheral IVs  - Transfuse to maintain Hgb >7  - H. pylori serum IgG pending  - CBC q8h

## 2019-06-01 NOTE — ASSESSMENT & PLAN NOTE
- BNP on admission 552  - Echo from approx. 1 month ago with mildly decreased LV systolic function  - Although not seen on echo, likely some element of diastolic function given hx of HTN and obesity  - CXR on admission and CTA with right pleural effusion  - Daily weights, strict I/Os  - Careful with diuresis in setting of recent volume loss from GI bleed    TTE from 5/5/2019:  · Mild left ventricular enlargement.  · Mildly decreased left ventricular systolic function. The estimated ejection fraction is 45%  · Normal LV diastolic function.  · Normal right ventricular systolic function.  · Mild left atrial enlargement.  · Mild right atrial enlargement.  · Mild mitral regurgitation.  · Mild tricuspid regurgitation.  · The estimated PA systolic pressure is 38 mm Hg

## 2019-06-01 NOTE — SUBJECTIVE & OBJECTIVE
Past Medical History:   Diagnosis Date    Anticoagulant long-term use     Arthritis     Bladder cancer     Blood transfusion     CHF (congestive heart failure)     Chronic kidney disease     COPD (chronic obstructive pulmonary disease)     Coronary artery disease     Diabetes mellitus     Gout     [place] (hard of hearing)     HAS BILAT AIDS BUT DOES NOT WEAR    Hyperlipidemia     Hypertension     Myocardial infarction     RLS (restless legs syndrome)     Sleep apnea     NO MACHINE    Wears glasses        Past Surgical History:   Procedure Laterality Date    CARDIAC SURGERY      CABG X 2     CATARACT EXTRACTION, BILATERAL      CHOLECYSTECTOMY      COLONOSCOPY      CORONARY ARTERY BYPASS GRAFT      x 2    coronary stents      CYSTOSCOPY      CYSTOSCOPY N/A 2019    [facility] OR    EXCISION-BLADDER TUMOR-TRANSURETHRAL (TURBT) N/A 2014    [facility]    EXCISION-BLADDER TUMOR-TRANSURETHRAL (TURBT) N/A 2013    [facility] OR    EYE SURGERY Bilateral     CATARACT    Insertion,catheter,tunneled N/A 2019    [facility]formed [facility] at [facility] OR    TURBT (TRANSURETHRAL RESECTION OF BLADDER TUMOR) N/A 2019    [facility] OR    VASECTOMY         Review of patient's allergies indicates:   Allergen Reactions    Lidocaine Nausea Only     NOVACAINE --  Severe nausea    Statins-hmg-coa reductase inhibitors Anxiety       Family History     Problem Relation (Age of Onset)    Cancer Father, Sister, Brother    Heart disease Father        Tobacco Use    Smoking status: Former Smoker     Packs/day: 0.25     Years: 20.00     Pack years: 5.00     Types: Cigars     Last attempt to quit: 1982     Years since quittin.3    Smokeless tobacco: Never Used   Substance and Sexual Activity    Alcohol use: No    Drug use: No    Sexual activity: Not on file       Review of Systems   Unable to perform ROS: Intubated     Objective:     Vital Signs (Most Recent):  Temp: 97.7 °F (36.5 °C) (05/31/19 1905)  Pulse: (!) 112 (05/31/19 2100)  Resp: 18 (05/31/19 2100)  BP: 107/66 (05/31/19 2000)  SpO2: 100 % (05/31/19 2100) Vital Signs (24h Range):  Temp:  [97.5 °F (36.4 °C)-99.7 °F (37.6 °C)] 97.7 °F (36.5 °C)  Pulse:  [] 112  Resp:  [11-27] 18  SpO2:  [96 %-100 %] 100 %  BP: ()/(44-82) 107/66  Arterial Line BP: (104-157)/(48-71) 131/57   Weight: 125 kg (275 lb 9.2 oz)  Body mass index is 35.38 kg/m².      Intake/Output Summary (Last 24 hours) at 5/31/2019 2208  Last data filed at 5/31/2019 1800  Gross per 24 hour   Intake 552.9 ml   Output 210 ml   Net 342.9 ml       Physical Exam   Constitutional: He appears well-developed and well-nourished.   HENT:   Head: Normocephalic and atraumatic.   Right Ear: External ear normal.   Left Ear: External ear normal.   Eyes: Conjunctivae and EOM are normal. Right eye exhibits no discharge. Left eye exhibits no discharge.   Small pupils bilaterally   Neck: Normal range of motion. No tracheal deviation present.   Cardiovascular: Normal rate.   Irregularly irregular rhythm   Pulmonary/Chest:   Intubated, breath sounds obscured by ventilator sounds   Abdominal: Soft. Bowel sounds are normal.   Distended abdomen   Musculoskeletal: Normal range of motion.   2+ bilateral pitting edema   Neurological:   sedated   Skin: Skin is warm and dry.       Vents:  Vent Mode: Spont (05/31/19 1631)  Ventilator Initiated: Yes (05/31/19 0625)  Set Rate: 18 bmp (05/31/19 1631)  Vt Set: 480 mL (05/31/19 1631)  Pressure Support: 10 cmH20 (05/31/19 1631)  PEEP/CPAP: 5 cmH20 (05/31/19 1631)  Oxygen Concentration (%): 40 (05/31/19 1700)  Peak Airway Pressure: 15 cmH2O (05/31/19 1631)  Plateau Pressure: 0 cmH20 (05/31/19 1631)  Total Ve: 3.66 mL (05/31/19 1631)  Negative Inspiratory Force (cm H2O): -27 (05/31/19 1751)  F/VT Ratio<105 (RSBI): (!) 84.68  (05/31/19 1631)  Lines/Drains/Airways     Central Venous Catheter Line                 Hemodialysis Catheter 05/16/19 1600 right subclavian 15 days         Percutaneous Central Line Insertion/Assessment - triple lumen  05/31/19 0700 left internal jugular less than 1 day          Drain                 Urethral Catheter 05/31/19 0700 less than 1 day          Arterial Line                 Arterial Line 05/31/19 0700 Left Radial less than 1 day          Peripheral Intravenous Line                 Peripheral IV - Single Lumen 05/30/19 20 G Right Forearm 1 day              Significant Labs:    CBC/Anemia Profile:  Recent Labs   Lab 05/31/19  0650 05/31/19  0822 05/31/19  1348   WBC 16.09* 16.73* 14.62*   HGB 10.7* 10.4* 10.1*   HCT 30.8* 30.9* 29.5*    205 179   MCV 90 91 91   RDW 17.5* 17.4* 17.5*        Chemistries:  Recent Labs   Lab 05/30/19  1013 05/31/19  0650 05/31/19  1344    137 138   K 5.7* 5.4* 5.2*    102 103   CO2 23 19* 20*   BUN 94* 71* 72*   CREATININE 6.7* 4.9* 5.2*   CALCIUM 8.6* 8.8 8.6*   ALBUMIN 2.4* 2.3* 2.2*   PROT 5.7* 5.3*  --    BILITOT 0.3 0.5  --    ALKPHOS 84 74  --    ALT 13 9*  --    AST 13 19  --    PHOS  --   --  6.6*           Significant Imaging: I have reviewed all pertinent imaging results/findings within the past 24 hours.

## 2019-06-01 NOTE — HOSPITAL COURSE
Pt was admitted to MICU once more on the night of 6/3-6/4 d/t concerns for hypotension requiring vasopressors. Hemoglobin returned at 5.7 mg/dL. He was transfused two units. Repeat hemoglobin of 7.6 mg/dL. On bedside echo this morning, IVC was not collapsible with sniff and patient had complained of cough productive of clear white sputum. Anesthesia arrived and performed RSI with etomidate and succinylcholine. Pt became hypotensive and bradycardic. He eventually underwent cardiac arrest. Epinephrine was given and chest compressions were started. Almost immediately after chest compressions were initiated, end-tital Co2 was positive and ROSC was achieved. He was intubated during his arrest. Since his airway was secured, gastroenterology proceeded with endoscopy. They found a non-obstructing oozing duodenal ulcer with a visible vessel. It was injected with epinephrine and treated with bipolar cautery. Unfortunately, oozing continued. GI recommended evaluation by interventional radiology for possible embolization. A clip was placed for marking.     Overnight, 6/4, patient had no major events other than occasionally clotting off his dialysis port. Does have what appears to be occasional sinus pauses on the monitor, however has had no other cardiac arrests since yesterday morning. Is still on levophed currently, trending H/H, has not required additional transfusion. Could not completely address patient's oozing vessel yesterday, will likely need consult with IR to address his continued bleeding as multiple EGDs could not completely tamponade his bleeding.

## 2019-06-01 NOTE — PROGRESS NOTES
[facility]  Critical Care Medicine  Progress Note    Patient Name: Frank Zayas  MRN: [id number]  Admission Date: 5/31/2019  Hospital Length of Stay: 1 days  Code Status: Full Code  Attending Provider: Fidencio Naidu MD  Primary Care Provider: Mikhail Shields MD   Principal Problem: GIB (gastrointestinal bleeding)    Subjective:     HPI:  78 yo M with morbid obesity, ESRD on HD (TTS, 2 months), Afib, CHF, DM2 (on insulin), CAD s/p CABG, HLD, COPD, HTN transferred from [facility] ([facility]).  He presented with hematochezia and abdominal pain.  He went to [facility] ER for lower GI bleed and received 1 u PRBC and then was transferred to [facility] for cardiac evaluation when trop was 0.39.  At [facility] he had continued hyperkalemia (K of 6.1) after being treated for it at [facility].  He was started on levophed for hypovolemic shock, and an art line and central line was placed.  During line placement, he briefly lost a pulse (PEA) and CPR was started.  He was given epinephrine, calcium gluconate, sodium bicarb, and intubated emergently.  He achieved ROSC.  He received IVF and 3-4 units PRBC.  He underwent emergent upper and lower endoscopy.  They found a large vessel in the duodenum, actively oozing with copious amounts of fresh bright red blood pumping out.  GI placed a clop on the artery and injected epinephrine, achieving hemostasis.  There was concern that the vessel could be directly assosiated with a larger vessel (ie GDA).  There were no varices seen.  Both GI and Gen Surg recommended transfer to [facility] for arteriovenous embolization which couldn't be performed there.        Hospital/ICU Course:  Patient admitted to Critical Care Medicine on 5/31/2019 for evaluation and management of acute upper GI bleed (s/p EGD with clipping of bleeding visible vessel at OSH).  Pt arrived intubated following PEA arrest at OSH.  Interventional Radiology consulted and recommended CTA abdomen/pelvis,  however no evidence of active GI bleeding on imaging.  Gastroenterology consulted and recommended continue IV Protonix gtt for a total of 72 hours.  Nephrology consulted for dialysis in setting of ESRD.  Patient passed SAT/SBT and was successfully extubated on the afternoon of 5/31.  On 6/1, as pt remained hemodynamically stable and his hemoglobin remained stable, pt stepped down to Hospital Medicine.    Interval History/Significant Events: No acute events overnight.  Pt c/o difficulty sleeping due to restless legs.  Pt denies nausea/vomiting.    Review of Systems   Constitutional: Negative for chills, diaphoresis and fever.   HENT: Negative for trouble swallowing and voice change.    Eyes: Negative for photophobia and visual disturbance.   Respiratory: Negative for cough and shortness of breath.    Cardiovascular: Negative for palpitations and leg swelling.   Gastrointestinal: Negative for abdominal pain, constipation, diarrhea, nausea and vomiting.   Genitourinary: Negative for dysuria and hematuria.   Musculoskeletal: Negative for back pain and myalgias.   Skin: Negative for color change and rash.   Neurological: Negative for light-headedness and headaches.     Objective:     Vital Signs (Most Recent):  Temp: 97.5 °F (36.4 °C) (06/01/19 1100)  Pulse: (!) 123 (06/01/19 1500)  Resp: (!) 26 (06/01/19 1500)  BP: 102/62 (06/01/19 1500)  SpO2: 97 % (06/01/19 1500) Vital Signs (24h Range):  Temp:  [97.5 °F (36.4 °C)-98 °F (36.7 °C)] 97.5 °F (36.4 °C)  Pulse:  [] 123  Resp:  [16-33] 26  SpO2:  [89 %-100 %] 97 %  BP: (102-133)/(59-82) 102/62  Arterial Line BP: (104-157)/(51-71) 130/62   Weight: 126.7 kg (279 lb 5.2 oz)  Body mass index is 35.86 kg/m².      Intake/Output Summary (Last 24 hours) at 6/1/2019 1517  Last data filed at 6/1/2019 1500  Gross per 24 hour   Intake 3914.62 ml   Output 5322 ml   Net -1407.38 ml       Physical Exam   Constitutional: He is oriented to person, place, and time. No distress. Nasal  cannula in place.   HENT:   Head: Normocephalic and atraumatic.   Eyes: Conjunctivae are normal. No scleral icterus.   Neck: Neck supple. No JVD present.   Cardiovascular: Normal rate and normal heart sounds. An irregularly irregular rhythm present.   Pulmonary/Chest: Effort normal. No respiratory distress. He has decreased breath sounds in the right lower field. He has no wheezes. He exhibits tenderness (chest wall).   Abdominal: Soft. There is no tenderness. There is no rebound and no guarding.   Musculoskeletal: He exhibits edema (trace edema of bilateral LE). He exhibits no tenderness.   Neurological: He is alert and oriented to person, place, and time.   Skin: Skin is warm and dry. He is not diaphoretic.   Psychiatric: He has a normal mood and affect.   Vitals reviewed.      Vents:  Vent Mode: Spont (05/31/19 1631)  Ventilator Initiated: Yes (05/31/19 0625)  Set Rate: 18 bmp (05/31/19 1631)  Vt Set: 480 mL (05/31/19 1631)  Pressure Support: 10 cmH20 (05/31/19 1631)  PEEP/CPAP: 5 cmH20 (05/31/19 1631)  Oxygen Concentration (%): 40 (05/31/19 1700)  Peak Airway Pressure: 15 cmH2O (05/31/19 1631)  Plateau Pressure: 0 cmH20 (05/31/19 1631)  Total Ve: 3.66 mL (05/31/19 1631)  Negative Inspiratory Force (cm H2O): -27 (05/31/19 1751)  F/VT Ratio<105 (RSBI): (!) 84.68 (05/31/19 1631)     Lines/Drains/Airways     Central Venous Catheter Line                 Hemodialysis Catheter 05/16/19 1600 right subclavian 15 days          Peripheral Intravenous Line                 Peripheral IV - Single Lumen 05/30/19 20 G Right Forearm 2 days         Peripheral IV - Single Lumen 06/01/19 1309 22 G Right Forearm less than 1 day              Significant Labs:    CBC/Anemia Profile:  Recent Labs   Lab 05/31/19  2131 06/01/19  0344 06/01/19  0758   WBC 12.85* 11.73 10.88   HGB 10.2* 9.8* 9.8*   HCT 30.8* 31.2* 30.8*    144* 137*   MCV 96 99* 99*   RDW 18.7* 18.5* 18.3*        Chemistries:  Recent Labs   Lab 05/31/19  0650  05/31/19  1344 05/31/19  2131 06/01/19  0344    138 137 137  137   K 5.4* 5.2* 5.4* 4.9  4.9    103 102 106  106   CO2 19* 20* 21* 22*  22*   BUN 71* 72* 77* 37*  37*   CREATININE 4.9* 5.2* 5.6* 3.2*  3.2*   CALCIUM 8.8 8.6* 8.7 8.3*  8.3*   ALBUMIN 2.3* 2.2* 2.3* 2.4*  2.4*   PROT 5.3*  --   --  5.5*   BILITOT 0.5  --   --  0.7   ALKPHOS 74  --   --  82   ALT 9*  --   --  10   AST 19  --   --  19   MG  --   --  2.1 1.9  1.9   PHOS  --  6.6* 8.0* 4.7*  4.7*       Coagulation:   Recent Labs   Lab 05/31/19  0650 06/01/19  0344   INR 1.2 1.1   APTT 24.5  --      Lactic Acid:   Recent Labs   Lab 05/31/19  0650   LACTATE 1.7     Troponin:   Recent Labs   Lab 05/31/19  0650 06/01/19  0344   TROPONINI 1.841* 1.765*     All pertinent labs within the past 24 hours have been reviewed.    Significant Imaging:  I have reviewed all pertinent imaging results/findings within the past 24 hours.      ABG  Recent Labs   Lab 05/31/19  1112   PH 7.470*   PO2 75*   PCO2 33.2*   HCO3 24.2   BE 0     Assessment/Plan:     Neuro  Restless leg syndrome  - Continue home ropinirole 2mg qHS    Pulmonary  Aspiration pneumonia  - CTA from 5/31/19 with evidence of bibasilar airspace opacities and atelectasis with concern for aspiration PNA  - May be 2/2 compressions from PEA arrest and/or vomiting from upper GI bleed  - Continue Zosyn    Cardiac/Vascular  PEA (Pulseless electrical activity)  - Pt went into PEA at OSH during central line placement and was coded and intubated emergently  - Continue telemetry  - Correct electrolytes PRN  - Likely 2/2 acute blood loss and hypotension    Elevated troponin I level  - Pt noted to have elevated troponin of 0.39 at OSH  - Likely secondary to GI hemorrhage and PEA arrest  - No acute ischemic changes  - Troponin flat  - Trend: 0.386 > 1.841 > 1.765     A-fib  - Holding anticoagulation in setting of GI bleed  - Resume Lopressor when stable    Coronary artery disease  - s/p CABG in  1997  - s/p PCI approx. 10 years ago  - Holding home ASA and Plavix in setting of GI bleed  - Resume antiplatelet agents when appropriate    DM type 2 with diabetic mixed hyperlipidemia  - Accuchecks qAC/qHS  - LDSSI    HTN (hypertension)  - Holding antihypertensives in setting of recent hypotension and GI bleed    CHF (congestive heart failure)  - BNP on admission 552  - Echo from approx. 1 month ago with mildly decreased LV systolic function  - Although not seen on echo, likely some element of diastolic function given hx of HTN and obesity  - CXR on admission and CTA with right pleural effusion  - Daily weights, strict I/Os  - Careful with diuresis in setting of recent volume loss from GI bleed    TTE from 5/5/2019:  · Mild left ventricular enlargement.  · Mildly decreased left ventricular systolic function. The estimated ejection fraction is 45%  · Normal LV diastolic function.  · Normal right ventricular systolic function.  · Mild left atrial enlargement.  · Mild right atrial enlargement.  · Mild mitral regurgitation.  · Mild tricuspid regurgitation.  · The estimated PA systolic pressure is 38 mm Hg    Renal/  ESRD (end stage renal disease)  - Pt on HD TThSat as outpatient via right Permacath  - Nephrology following, appreciate assistance  - Strict I/Os, makes urine  - Daily RFP      GI  * GIB (gastrointestinal bleeding)  Pt is a 78 yo male with ESRD (HD TThSat), T2DM, HTN, HLD, CAD (s/p CABG 1997, s/p PCI), HFrEF, who presented to [facility] as a transfer from [facility] for further monitoring and evaluation of upper GI bleed with concern for high probability of re-bleeding.  There, hospital course complicated by PEA arrest with intubation.  Pt subsequently underwent EGD which showed duodenal ulcer with visible vessel actively bleeding, which was successfully clipped.  Pt transferred here for possible need for IR intervention given likelihood of re-bleeding.    Plan:  - s/p EGD at OSH on 5/30/19 with  clip of bleeding visible vessel from duodenal ulcer  - IR consulted, recommended CTA  - CTA abdomen/pelvis on 5/31 without evidence of active GI bleed  - Gastroenterology consulted, appreciate recs  - Continue IV Protonix gtt for total of 72 hours, then 40mg IV BID  - Continue to hold anticoagulation and antiplatelet agents  - Maintain IV access with 2 large-bore peripheral IVs  - Transfuse to maintain Hgb >7  - H. pylori serum IgG pending  - CBC q8h    Acute gastrointestinal bleeding  - See GIB (gastrointestinal bleeding)    Other  Hemorrhagic shock  - See GIB (gastrointestinal bleeding)  - Pt off vasopressors since 4 PM on 5/31       Critical Care Daily Checklist:    A: Awake: RASS Goal/Actual Goal: RASS Goal: 0-->alert and calm  Actual: [place] Agitation Sedation Scale (RASS): Alert and calm   B: Spontaneous Breathing Trial Performed? Spon. Breathing Trial Initiated?: Initiated (05/31/19 2091)   C: SAT & SBT Coordinated?  N/A                      D: Delirium: CAM-ICU Overall CAM-ICU: Negative   E: Early Mobility Performed? Yes   F: Feeding Goal:    Status:     Current Diet Order   Procedures    Diet clear liquid [facility]; Renal     Order Specific Question:   Indicate patient location for additional diet options:     Answer:   [facility]     Order Specific Question:   Additional Diet Options:     Answer:   Renal      AS: Analgesia/Sedation    T: Thromboembolic Prophylaxis SCDs   H: HOB > 300 Yes   U: Stress Ulcer Prophylaxis (if needed) Protonix gtt   G: Glucose Control LDSSI   B: Bowel Function Stool Occurrence: 1   I: Indwelling Catheter (Lines & Huynh) Necessity Discontinue A-line, central line, Huynh   D: De-escalation of Antimicrobials/Pharmacotherapies Continue Zosyn    Plan for the day/ETD Step down to floor    Code Status:  Family/Goals of Care: Full Code  Ongoing       Critical secondary to Patient has a condition that poses threat to life and bodily function: Acute GI hemorrhage       Critical care was time spent personally by me on the following activities: development of treatment plan with patient or surrogate and bedside caregivers, discussions with consultants, evaluation of patient's response to treatment, examination of patient, ordering and performing treatments and interventions, ordering and review of laboratory studies, ordering and review of radiographic studies, pulse oximetry, re-evaluation of patient's condition. This critical care time did not overlap with that of any other provider or involve time for any procedures.     Eriberto Hope MD  Critical Care Medicine  [facility]

## 2019-06-01 NOTE — PLAN OF CARE
Problem: Adult Inpatient Plan of Care  Goal: Plan of Care Review  Outcome: Ongoing (interventions implemented as appropriate)   See vital signs and assessments in flowsheets. See below for updates on today's progress.     Pulmonary: On 8L high flow O2 via nasal cannula achieving goal SpO2 of > 92%; no use of accessory muscle noted.    Cardiovascular: Afib 105-115 bpm; -120 mmHg via Arterial line.    Neurological: AAOx4; PERRLA; apyrexial.     Gastrointestinal: bowels opened x2--loose and still dark red.    Genitourinary: Huynh Catheter--oliguric 15-25 ml/hr; CRRT (SLED)    Endocrine: BG monitoring--within defined level    Integumentary/Other: wound on right lower leg, some scabs and bruise, pressure injury left buttock.    Infusions: Pantoprazole    Patient progressing towards goals as tolerated, plan of care communicated and reviewed with Frank Zayas and family. All concerns addressed. Will continue to monitor.

## 2019-06-01 NOTE — ASSESSMENT & PLAN NOTE
- Pt went into PEA at OSH during central line placement and was coded and intubated emergently  - Continue telemetry  - Correct electrolytes PRN  - Likely 2/2 acute blood loss and hypotension

## 2019-06-02 PROBLEM — K92.2 ACUTE GASTROINTESTINAL BLEEDING: Status: RESOLVED | Noted: 2019-01-01 | Resolved: 2019-01-01

## 2019-06-02 PROBLEM — R57.8 HEMORRHAGIC SHOCK: Status: RESOLVED | Noted: 2019-01-01 | Resolved: 2019-01-01

## 2019-06-02 PROBLEM — J69.0 ASPIRATION PNEUMONIA: Status: RESOLVED | Noted: 2019-01-01 | Resolved: 2019-01-01

## 2019-06-02 PROBLEM — R79.89 ELEVATED TROPONIN I LEVEL: Status: RESOLVED | Noted: 2019-01-01 | Resolved: 2019-01-01

## 2019-06-02 PROBLEM — I46.9 PEA (PULSELESS ELECTRICAL ACTIVITY): Status: RESOLVED | Noted: 2019-01-01 | Resolved: 2019-01-01

## 2019-06-02 NOTE — PLAN OF CARE
Problem: Occupational Therapy Goal  Goal: Occupational Therapy Goal  Goals to be met by: 6/9     Patient will increase functional independence with ADLs by performing:    Bed mobility and supine to sit with HOB flat and CGA.  UE Dressing while seated EOB with Set-up Assistance and Stand-by Assistance.  LE Dressing (brief) with Minimal Assistance.  Grooming while standing at sink with Minimal Assistance.  Toileting from toilet with Minimal Assistance for hygiene and clothing management.   Toilet transfer to toilet with Minimal Assistance.  Functional mobility at short household distance for ADL task with RW and min(A).    Outcome: Ongoing (interventions implemented as appropriate)  OT eval completed. Rec SS SNF for d/c planning. OT to follow up 3x/w in acute setting. RUPINDER Garcia 6/2/2019

## 2019-06-02 NOTE — PT/OT/SLP EVAL
Occupational Therapy   Evaluation    Name: Frank Zayas  MRN: [id number]  Admitting Diagnosis:  GIB (gastrointestinal bleeding)     S/p extubation : 5/31/2019    Recommendations:     Discharge Recommendations: nursing facility, skilled(short stay)  Discharge Equipment Recommendations:  none  Barriers to discharge:  (fall risk; level of assistance required)    Assessment:     Frank Zayas is a 79 y.o. male with a medical diagnosis of GIB (gastrointestinal bleeding).  He presents s/p extubation with generalized weakness and debility. He requires added assistance and modifications for all mobility and ADL tasks at this time. He would greatly benefit from  SNF at post acute level of care to max functional outcomes and safety prior to return home. Performance deficits affecting function: impaired endurance, weakness, impaired self care skills, impaired functional mobilty, gait instability, impaired balance, impaired skin, edema, impaired cardiopulmonary response to activity.      Rehab Prognosis: Good; patient would benefit from acute skilled OT services to address these deficits and reach maximum level of function.       Plan:     Patient to be seen 3 x/week to address the above listed problems via self-care/home management, therapeutic activities, therapeutic exercises  · Plan of Care Expires: 07/01/19  · Plan of Care Reviewed with: patient    Subjective     Chief Complaint: being in the bed  Patient/Family Comments/goals: to get better; to work hard    Occupational Profile:  Living Environment: Pt lives with spouse of 9 years in [facility] with threshold to enter. Tub/shower combo.   Previous level of function: reported ~10 falls within past 3 months (reported 2/2 weakness). Using a rollator as primary means of mobility. Mod(I) with ADLs- showers while seated; occasional assist from spouse with socks/shoes. Wears reading glasses. No longer driving  Roles and Routines: spouse, care taker to self, home and community  dweller  Equipment Used at Home:  cane, straight, rollator, shower chair, grab bar  Assistance upon Discharge: yes, but limited physical assistance     Pain/Comfort:  · Pain Rating 1: 0/10  · Pain Rating Post-Intervention 1: 0/10    Patients cultural, spiritual, Tenriism conflicts given the current situation: no    Objective:     Communicated with: RN prior to session.  Patient found HOB elevated with peripheral IV, telemetry(1 peripheral IV dislodged and laying in bed) upon OT entry to room.    General Precautions: Standard, fall, diabetic, hearing impaired   Orthopedic Precautions:N/A   Braces: N/A     Occupational Performance:    Bed Mobility:  HOB flat  · Patient completed Rolling/Turning to Right with minimum assistance and with side rail  · Patient completed Supine to Sit with moderate assistance    Functional Mobility/Transfers:  · Patient completed Sit <> Stand Transfer with minimum assistance  with  no assistive device   · Patient completed Bed <> Chair Transfer using Step Transfer technique with minimum assistance with no assistive device  · Functional Mobility: 5 steps to chair with min(A) and no AD    Activities of Daily Living:  · Feeding:  modified independence with set up (clear liquid)  · Grooming: unable to complete in standing 2/2 fatigue; completed seated in chair with set up (oral care, washing face); min(A) to comb hair    · Upper Body Dressing: minimum assistance seated EOB  · Lower Body Dressing: maximal assistance B socks    Cognitive/Visual Perceptual:  Cognitive/Psychosocial Skills:     -       Oriented to: Person, Place, Time and Situation   -       Follows Commands/attention:Follows multistep  commands  -       Safety awareness/insight to disability: intact   -       Mood/Affect/Coping skills/emotional control: Cooperative  Visual/Perceptual:      -Intact  tracking /scanning- functionally    Physical Exam:  Postural examination/scapula alignment:    -       Rounded shoulders  -        Forward head  -       Posterior pelvic tilt  Skin integrity: impaired  Edema:  Mild B UE  Dominant hand:    -       R  Upper Extremity Range of Motion:     -       Right Upper Extremity: WFL  -       Left Upper Extremity: WFL  Upper Extremity Strength:    -       Right Upper Extremity: 4+/5  -       Left Upper Extremity: 4+/5    AMPAC 6 Click ADL:  AMPAC Total Score: 16  Body mass index is 36.97 kg/m².  Vitals:    06/02/19 0822   BP:    Pulse: 95   Resp: 18   Temp:        Treatment & Education:  -Pt alert and agreeable to therapy session/eval; edu on OT role in care  -edu on being OOB to chair for meals/throughout the day as tolerated to be in upright position; edu on use of spirometer and importance with teachback understanding  -Communication board updated; questions/concerns addressed within OT scope of practice  -no family at bedside for education   -edu on use of call light and need for staff supervision/assistance for transfers OOB; discussed mobility with RN following  Education:    Patient left up in chair with all lines intact, call button in reach and RN notified    GOALS:   Multidisciplinary Problems     Occupational Therapy Goals        Problem: Occupational Therapy Goal    Goal Priority Disciplines Outcome Interventions   Occupational Therapy Goal     OT, PT/OT Ongoing (interventions implemented as appropriate)    Description:  Goals to be met by: 6/9     Patient will increase functional independence with ADLs by performing:    Bed mobility and supine to sit with HOB flat and CGA.  UE Dressing while seated EOB with Set-up Assistance and Stand-by Assistance.  LE Dressing (brief) with Minimal Assistance.  Grooming while standing at sink with Minimal Assistance.  Toileting from toilet with Minimal Assistance for hygiene and clothing management.   Toilet transfer to toilet with Minimal Assistance.  Functional mobility at short household distance for ADL task with RW and min(A).                      History:      Past Medical History:   Diagnosis Date    Anticoagulant long-term use     Arthritis     Bladder cancer     Blood transfusion     CHF (congestive heart failure)     Chronic kidney disease     COPD (chronic obstructive pulmonary disease)     Coronary artery disease     Diabetes mellitus     Gout     [place] (hard of hearing)     HAS BILAT AIDS BUT DOES NOT WEAR    Hyperlipidemia     Hypertension     Myocardial infarction     RLS (restless legs syndrome)     Sleep apnea     NO MACHINE    Wears glasses        Past Surgical History:   Procedure Laterality Date    CARDIAC SURGERY      CABG X 2 1997    CATARACT EXTRACTION, BILATERAL      CHOLECYSTECTOMY      COLONOSCOPY      CORONARY ARTERY BYPASS GRAFT      x 2    coronary stents      CYSTOSCOPY      CYSTOSCOPY N/A 4/8/2019    Performed by [facility] at [facility]    EXCISION-BLADDER TUMOR-TRANSURETHRAL (TURBT) N/A 9/8/2014    Performed by [facility]lee [facility]quez MD at [facility] OR    EXCISION-BLADDER TUMOR-TRANSURETHRAL (TURBT) N/A 11/18/2013    Performed by [facility]lee [facility]quez MD at [facility] OR    EYE SURGERY Bilateral     CATARACT    Insertion,catheter,tunneled N/A 5/16/2019    Performed by Wade Rodriguez MD at [facility] OR    TURBT (TRANSURETHRAL RESECTION OF BLADDER TUMOR) N/A 4/8/2019    Performed by Kaylee Vasquez MD at [facility] OR    VASECTOMY         Time Tracking:     OT Date of Treatment: 06/02/19  OT Start Time: 0907  OT Stop Time: 0930  OT Total Time (min): 23 min    Billable Minutes:Evaluation 23    RUPINDER Garcia  6/2/2019

## 2019-06-02 NOTE — PROGRESS NOTES
[facility] Hospital Medicine  Progress Note    Patient Name: Frank Zayas  MRN: [id number]  Patient Class: IP- Inpatient   Admission Date: 5/31/2019  Length of Stay: 2 days  Attending Physician: Lisha Driver MD  Primary Care Provider: Mikhail Shields MD    [facility] Medicine Team: [facility] HOSP MED  Sofie Driver MD    Subjective:     Principal Problem:GIB (gastrointestinal bleeding)    HPI:  No notes on file    Hospital Course:  No notes on file    Interval History: Stepped down from MICU after being transferred for GIB and cardiac arrest. Transfused 3-4 units PRBC. Found to have oozing duodenal vessel. Underwent epinephrine injection and clipping by GI. H&H stable. Did not require IR embolization. Off SLED. Mild hyperkalemia. Reports bowel incontinence today.     Review of Systems   Constitutional: Positive for fatigue.   HENT: Negative for congestion and trouble swallowing.    Respiratory: Negative for cough and shortness of breath.    Cardiovascular: Positive for leg swelling. Negative for chest pain.   Gastrointestinal: Positive for diarrhea. Negative for abdominal pain, blood in stool and nausea.   Skin: Negative for rash.   Neurological: Negative for light-headedness and headaches.   Hematological: Does not bruise/bleed easily.   Psychiatric/Behavioral: Negative for hallucinations. The patient is not nervous/anxious.      Objective:     Vital Signs (Most Recent):  Temp: 96.3 °F (35.7 °C) (06/02/19 1148)  Pulse: 95 (06/02/19 1513)  Resp: 18 (06/02/19 1148)  BP: (!) 110/59 (06/02/19 1148)  SpO2: (!) 90 % (06/02/19 1148) Vital Signs (24h Range):  Temp:  [96.3 °F (35.7 °C)-99.2 °F (37.3 °C)] 96.3 °F (35.7 °C)  Pulse:  [] 95  Resp:  [18-39] 18  SpO2:  [88 %-100 %] 90 %  BP: (106-146)/(58-75) 110/59     Weight: 130.6 kg (287 lb 14.7 oz)  Body mass index is 36.97 kg/m².    Intake/Output Summary (Last 24 hours) at 6/2/2019 1541  Last data filed at 6/1/2019 2125  Gross per 24 hour   Intake  228.33 ml   Output --   Net 228.33 ml      Physical Exam   Constitutional: He is oriented to person, place, and time. He appears well-developed and well-nourished. No distress.   Sitting up comfortably in chair   HENT:   Head: Normocephalic and atraumatic.   Eyes: EOM are normal. No scleral icterus.   Neck: Neck supple.   Cardiovascular: Normal rate.   Pulmonary/Chest: Effort normal and breath sounds normal.   Abdominal: Soft. Bowel sounds are normal. He exhibits no distension. There is no tenderness.   Obese abdomen   Musculoskeletal: He exhibits edema (LE ).   Neurological: He is alert and oriented to person, place, and time.   Skin: No erythema.   Dressing over foot wound    Vitals reviewed.      Significant Labs: All pertinent labs within the past 24 hours have been reviewed.    Significant Imaging: I have reviewed all pertinent imaging results/findings within the past 24 hours.    Assessment/Plan:      * GIB (gastrointestinal bleeding)  Secondary to oozing duodenal ulcer and underwent clipping and epi injection at outise facility  No evidence of rebleeding and did not require IR embolization  Transfused about 3-4 units with stabilization of H&H  GI recommendations as follows:  - recommend protonix gtt x 72 hr, then 40mg BID x 4 weeks then daily; start PO on 6/3  - HP IGG pending, treat if positive  - CTA concerning for ESLD, normal platelets, no evidence of varices on EGD. Should see hepatology as outpatient  - complete 5 days of antibiotics due to GI bleed in setting of ESLD  - discussed ESLD on imaging with patient and family. Emphasized seeing hepatologist on discharge  - reviewed avoidance of nsaids with patient and family           Restless leg syndrome  Continue ropinirole       ESRD (end stage renal disease)  Nephrology  No need for HD today  Plan to resume IHD next week  Usual schedule is TTS  Kayexalate for mild hyperkalemia on labs from 6/2      A-fib  Resume metoprolol  Will discuss with GI when ok  to resume anticoagulation       Coronary artery disease  Holding ASA and plavix due to GI bleed  Will discuss with GI when ok to resume       DM type 2 with diabetic mixed hyperlipidemia  Low dose correction scale   Cont blood glucose monitoring   BG goal:  Preprandial blood glucose target <140 mg/dL  Random glucoses <180 mg/dL  Avoid hypoglycemia -  Reduce antihyperglycemic therapy when caloric intake is reduced. Avoid insulin stacking as a result of repeated injection of prandial insulin at close intervals.   Avoid severe hyperglycemia  Ensure adequate nutrition   ADA diet        HTN (hypertension)  Antihypertensives held due to shock initially   BP controlled        CHF (congestive heart failure)  Maintain euvolemia by monitoring I/O's and daily weights.  Fluid restriction (2 liters/24 hours)  Low Na diet  Monitor for signs of fluid overload: RR>30, O2 sat<92%, weight gain of >3 lbs, or urinary output <160ml/8hr  Maintain oxygen sats >92% via NC if supplemental oxygen needed.   Volume removal via HD     Patient Vitals for the past 72 hrs (Last 3 readings):   Weight   06/02/19 0300 130.6 kg (287 lb 14.7 oz)   06/01/19 0300 126.7 kg (279 lb 5.2 oz)   05/31/19 0700 125 kg (275 lb 9.2 oz)           VTE Risk Mitigation (From admission, onward)        Ordered     Place sequential compression device  Until discontinued      05/31/19 0650     Reason for No Pharmacological VTE Prophylaxis  Once      05/31/19 0650     IP VTE HIGH RISK PATIENT  Once      05/31/19 0650              Sofie Driver MD  Department of Hospital Medicine   [facility]

## 2019-06-02 NOTE — PLAN OF CARE
Problem: Adult Inpatient Plan of Care  Goal: Plan of Care Review  Outcome: Ongoing (interventions implemented as appropriate)     06/02/19 0515   Plan of Care Review   Plan of Care Reviewed With patient     Pt had no falls. HR remained elevated throughout the shift. Medication and nursing care completed per MD order.

## 2019-06-02 NOTE — ASSESSMENT & PLAN NOTE
Maintain euvolemia by monitoring I/O's and daily weights.  Fluid restriction (2 liters/24 hours)  Low Na diet  Monitor for signs of fluid overload: RR>30, O2 sat<92%, weight gain of >3 lbs, or urinary output <160ml/8hr  Maintain oxygen sats >92% via NC if supplemental oxygen needed.   Volume removal via HD     Patient Vitals for the past 72 hrs (Last 3 readings):   Weight   06/02/19 0300 130.6 kg (287 lb 14.7 oz)   06/01/19 0300 126.7 kg (279 lb 5.2 oz)   05/31/19 0700 125 kg (275 lb 9.2 oz)

## 2019-06-02 NOTE — PROGRESS NOTES
Frank Zayas is a 79 y.o. male patient.  Pt stable today  Scheduled Meds:   gabapentin  100 mg Oral BID    piperacillin-tazobactam (ZOSYN) IVPB  4.5 g Intravenous Q12H    rOPINIRole  2 mg Oral QHS       Review of patient's allergies indicates:   Allergen Reactions    Lidocaine Nausea Only     NOVACAINE --  Severe nausea    Statins-hmg-coa reductase inhibitors Anxiety       Past Medical History:   Diagnosis Date    Anticoagulant long-term use     Arthritis     Bladder cancer     Blood transfusion     CHF (congestive heart failure)     Chronic kidney disease     COPD (chronic obstructive pulmonary disease)     Coronary artery disease     Diabetes mellitus     Gout     [place] (hard of hearing)     HAS BILAT AIDS BUT DOES NOT WEAR    Hyperlipidemia     Hypertension     Myocardial infarction     RLS (restless legs syndrome)     Sleep apnea     NO MACHINE    Wears glasses      Past Surgical History:   Procedure Laterality Date    CARDIAC SURGERY      CABG X 2 1997    CATARACT EXTRACTION, BILATERAL      CHOLECYSTECTOMY      COLONOSCOPY      CORONARY ARTERY BYPASS GRAFT      x 2    coronary stents      CYSTOSCOPY      CYSTOSCOPY N/A 4/8/2019    [facility]    EXCISION-BLADDER TUMOR-TRANSURETHRAL (TURBT) N/A 9/8/2014    [facility]    EXCISION-BLADDER TUMOR-TRANSURETHRAL (TURBT) N/A 11/18/2013    [facility]    EYE SURGERY Bilateral     CATARACT    Insertion,catheter,tunneled N/A 5/16/2019    [facility] Wade [facility]    TURBT (TRANSURETHRAL RESECTION OF BLADDER TUMOR) N/A 4/8/2019    [facility]    VASECTOMY        reports that he quit smoking about 37 years ago. His smoking use included cigars. He has a 5.00 pack-year smoking history. He has never used smokeless tobacco. He reports that he does not drink alcohol or use drugs.   Family History    Problem Relation Age of Onset    Cancer Father     Heart disease Father     Cancer Sister     Cancer Brother           Vital Signs Range (Last 24H):  Temp:  [97.5 °F (36.4 °C)-99.2 °F (37.3 °C)]   Pulse:  []   Resp:  [18-39]   BP: (102-146)/(58-75)   SpO2:  [88 %-100 %]   Arterial Line BP: (125-130)/(61-62)     I & O (Last 24H):    Intake/Output Summary (Last 24 hours) at 6/2/2019 0937  Last data filed at 6/1/2019 2125  Gross per 24 hour   Intake 1745 ml   Output 2245 ml   Net -500 ml           Physical Exam:  Constitutional: No distress.     Head: Normocephalic and atraumatic.   Mouth/Throat: Oropharynx is clear and moist. No oropharyngeal exudate.   Eyes: Conjunctivae are normal. No scleral icterus.   Neck: No JVD present.   Cardiovascular: Normal rate, regular rhythm, normal heart sounds and intact distal pulses.   Pulmonary/Chest: Effort normal and breath sounds normal. No respiratory distress.   Abdominal: Soft. Bowel sounds are normal. He exhibits no distension and no mass. There is no tenderness. There is no rebound and no guarding.   Soft abdomen. Melena present.   Musculoskeletal: He exhibits no edema or tenderness.   Lymphadenopathy:     He has no cervical adenopathy.   Neurological: He is alert.    Skin: Skin is warm. Capillary refill takes less than 2 seconds. He is not diaphoretic.   Psychiatric:   Laboratory:  CBC:   Recent Labs   Lab 06/02/19  0019   WBC 12.92*   RBC 3.14*   HGB 9.8*   HCT 32.0*   *   *   MCH 31.2*   MCHC 30.6*     BMP:   Recent Labs   Lab 06/02/19  0509   *      K 5.3*      CO2 19*   BUN 26*   CREATININE 3.5*   CALCIUM 8.7   MG 2.2     ABGs:   Recent Labs   Lab 05/31/19  1112   PH 7.470*   PCO2 33.2*   PO2 75*   HCO3 24.2   POCSATURATED 96   BE 0         Diagnostic Results:    GIB (gastrointestinal bleeding)  - Being follow by admitting service and GI     ESRD (end stage renal disease)  79 year old man with history of T2DM, ESRD on HD  (started 1-2 ms ago), HFpEF, CAD s/p CABG on plavix, who is presenting from outside hospital due to concern for duodenal ulcer. Nephrology consulted for ESRD and HD management. Last dialyze at the outside hospital per family.      Patient intubated and sedated on pressor support (Levophed) secondary to hypovolemic shock and PEA arrest at OSH.      Plan:  -Hold SLED today  IHD next week  -strict I/Os and daily weights   -continue on mechanical ventilation with FiO at 40%  - Avoid nephrotoxic medication and renal dose medications to GFR        Tres Cardona  6/2/2019

## 2019-06-02 NOTE — ASSESSMENT & PLAN NOTE
Secondary to oozing duodenal ulcer and underwent clipping and epi injection at outise facility  No evidence of rebleeding and did not require IR embolization  Transfused about 3-4 units with stabilization of H&H  GI recommendations as follows:  - recommend protonix gtt x 72 hr, then 40mg BID x 4 weeks then daily; start PO on 6/3  - HP IGG pending, treat if positive  - CTA concerning for ESLD, normal platelets, no evidence of varices on EGD. Should see hepatology as outpatient  - complete 5 days of antibiotics due to GI bleed in setting of ESLD  - discussed ESLD on imaging with patient and family. Emphasized seeing hepatologist on discharge  - reviewed avoidance of nsaids with patient and family

## 2019-06-02 NOTE — SUBJECTIVE & OBJECTIVE
Interval History: Stepped down from MICU after being transferred for GIB and cardiac arrest. Transfused 3-4 units PRBC. Found to have oozing duodenal vessel. Underwent epinephrine injection and clipping by GI. H&H stable. Did not require IR embolization. Off SLED. Mild hyperkalemia. Reports bowel incontinence today.     Review of Systems   Constitutional: Positive for fatigue.   HENT: Negative for congestion and trouble swallowing.    Respiratory: Negative for cough and shortness of breath.    Cardiovascular: Positive for leg swelling. Negative for chest pain.   Gastrointestinal: Positive for diarrhea. Negative for abdominal pain, blood in stool and nausea.   Skin: Negative for rash.   Neurological: Negative for light-headedness and headaches.   Hematological: Does not bruise/bleed easily.   Psychiatric/Behavioral: Negative for hallucinations. The patient is not nervous/anxious.      Objective:     Vital Signs (Most Recent):  Temp: 96.3 °F (35.7 °C) (06/02/19 1148)  Pulse: 95 (06/02/19 1513)  Resp: 18 (06/02/19 1148)  BP: (!) 110/59 (06/02/19 1148)  SpO2: (!) 90 % (06/02/19 1148) Vital Signs (24h Range):  Temp:  [96.3 °F (35.7 °C)-99.2 °F (37.3 °C)] 96.3 °F (35.7 °C)  Pulse:  [] 95  Resp:  [18-39] 18  SpO2:  [88 %-100 %] 90 %  BP: (106-146)/(58-75) 110/59     Weight: 130.6 kg (287 lb 14.7 oz)  Body mass index is 36.97 kg/m².    Intake/Output Summary (Last 24 hours) at 6/2/2019 1541  Last data filed at 6/1/2019 2125  Gross per 24 hour   Intake 228.33 ml   Output --   Net 228.33 ml      Physical Exam   Constitutional: He is oriented to person, place, and time. He appears well-developed and well-nourished. No distress.   Sitting up comfortably in chair   HENT:   Head: Normocephalic and atraumatic.   Eyes: EOM are normal. No scleral icterus.   Neck: Neck supple.   Cardiovascular: Normal rate.   Pulmonary/Chest: Effort normal and breath sounds normal.   Abdominal: Soft. Bowel sounds are normal. He exhibits no  distension. There is no tenderness.   Obese abdomen   Musculoskeletal: He exhibits edema (LE ).   Neurological: He is alert and oriented to person, place, and time.   Skin: No erythema.   Dressing over foot wound    Vitals reviewed.      Significant Labs: All pertinent labs within the past 24 hours have been reviewed.    Significant Imaging: I have reviewed all pertinent imaging results/findings within the past 24 hours.

## 2019-06-02 NOTE — NURSING TRANSFER
Nursing Transfer Note      6/1/2019     Transfer From: [id number]    Transfer via bed    Transfer with Protonix gtt to 7096 O2, cardiac monitoring in place, tele box with    Transported by Courtney Trejo RN    Medicines sent: Protonix gtt    Chart send with patient: Yes    Notified: spouse    Patient reassessed at: 6/1/19 8680 Moraima receiving RN at BS    Upon arrival to floor: cardiac monitor applied, patient oriented to room, call bell in reach and bed in lowest position.    O2 2 L NC was on. Pt awake alert, talkative. Respirations even and unlabored.  Receiving RN made aware of skin alterations to left buttock and right lower leg and HS accu check due. 2100 medications given.

## 2019-06-02 NOTE — ASSESSMENT & PLAN NOTE
Low dose correction scale   Cont blood glucose monitoring   BG goal:  Preprandial blood glucose target <140 mg/dL  Random glucoses <180 mg/dL  Avoid hypoglycemia -  Reduce antihyperglycemic therapy when caloric intake is reduced. Avoid insulin stacking as a result of repeated injection of prandial insulin at close intervals.   Avoid severe hyperglycemia  Ensure adequate nutrition   ADA diet

## 2019-06-02 NOTE — ASSESSMENT & PLAN NOTE
Nephrology  No need for HD today  Plan to resume IHD next week  Usual schedule is TTS  Kayexalate for mild hyperkalemia on labs from 6/2

## 2019-06-03 PROBLEM — T14.8XXA MULTIPLE SKIN TEARS: Status: ACTIVE | Noted: 2019-01-01

## 2019-06-03 PROBLEM — L30.8 DERMATITIS ASSOCIATED WITH MOISTURE: Status: ACTIVE | Noted: 2019-01-01

## 2019-06-03 NOTE — TREATMENT PLAN
GI Treatment Plan    Frank Zayas is a 79 y.o. male admitted to hospital 5/31/2019 (Hospital Day: 4) due to GIB (gastrointestinal bleeding).     Interval History  - overnight developped recurrent bleeding (reportedly dark red) with decline in H&H 10 -> 8.1. No transfusions. Remains HDS; no vasopressor requirement  - feels well without any complaints. Denies any abdominal pain, n/v  - ate eggs this morning without issue (~8AM)  - no complaints otherwise on review  - CTA overnight negative with exception of migration of duodenal clip    Objective  Temp:  [96.3 °F (35.7 °C)-98.7 °F (37.1 °C)] 97.5 °F (36.4 °C) (06/03 0811)  Pulse:  [] 52 (06/03 0811)  BP: ()/(56-65) 96/59 (06/03 0811)  Resp:  [14-20] 14 (06/03 0811)  SpO2:  [90 %-100 %] 99 % (06/03 0811)    General: Alert, Oriented x3, no distress  Abdomen: Normoactive bowel sounds. Non-distended. Normal tympany. Soft. Non-tender. No peritoneal signs.  Rectal tube with melena    Laboratory    Recent Labs   Lab 06/02/19  2300 06/03/19  0345 06/03/19  0804   HGB 8.9* 8.3* 8.1*       Plan  - concern for recurrent bleeding from duodenal ulcer. CTA negative but clip migrated. Currently HDS  - recommend continuing protonix gtt  - continue supportive care, will plan for EGD in AM unless further evidence of ongoing/recurrent bleeding  - do not advance beyond clears NPO at [facility]  - continue to trend H&H, maintain Hgb > 7  - maintain 2 large bore peripheral IV  - call if any changes in patient's clinical status  - Plan of care was discussed with primary team.  - We will continue to follow.    Thank you for involving us in the care of Frank Zayas. Please call with any additional questions, concerns or changes in the patient's clinical status.    Kalyan Mesa MD  Gastroenterology Fellow, PGY IV  Spectralink: [id number]

## 2019-06-03 NOTE — PT/OT/SLP EVAL
"Physical Therapy Evaluation    Patient Name:  Frank Zayas   MRN:  [id number]    Recommendations:     Discharge Recommendations:  nursing facility, skilled   Discharge Equipment Recommendations: none   Barriers to discharge: Inaccessible home and Decreased caregiver support    Assessment:     Frank Zayas is a 79 y.o. male admitted with a medical diagnosis of GIB (gastrointestinal bleeding).  He presents with the following impairments/functional limitations:  impaired self care skills, impaired balance, weakness, impaired functional mobilty, impaired endurance, gait instability, impaired cardiopulmonary response to activity.  Pt demo decreased functional mobility secondary to weakness and limited endurance.  Pt c c/o dizziness and lightheadedness while in long sitting and sitting EOB limiting activity on this date.  Performed bed mobility c min-mod A. Demo fair sitting balance requiring CGA while EOB.  Pt would benefit from continued skilled acute PT 3x/wk to improve functional mobility.  Recommending pt receive PT services in SNF setting following d/c from hospital once medically cleared.      Rehab Prognosis: Good; patient would benefit from acute skilled PT services to address these deficits and reach maximum level of function.    Recent Surgery: Procedure(s) (LRB):  EGD (ESOPHAGOGASTRODUODENOSCOPY) (N/A)      Plan:     During this hospitalization, patient to be seen 3 x/week to address the identified rehab impairments via gait training, therapeutic activities, therapeutic exercises, neuromuscular re-education and progress toward the following goals:    · Plan of Care Expires:  06/28/19    Subjective     Chief Complaint: dizziness; fatigue  Patient/Family Comments/goals: "I think I'm leaking right now." - re to rectal tube  Pain/Comfort:  · Pain Rating 1: 0/10    Patients cultural, spiritual, [employer] conflicts given the current situation: no    Living Environment:  Pt lives c wife in [facility] c [place].  PTA not " driving for ~1wk; no falls  Prior to admission, patients level of function was independent and using rollator for mobility.  Equipment used at home: cane, straight, rollator, walker, rolling, walker, standard, shower chair, grab bar.  DME owned (not currently used): none.  Upon discharge, patient will have assistance from family.    Objective:     Communicated with RN prior to session.  Patient found HOB elevated with peripheral IV, telemetry, SCD, bowel management system  upon PT entry to room.    General Precautions: Standard, fall, diabetic, hearing impaired   Orthopedic Precautions:N/A   Braces: N/A     Exams:  · Cognitive Exam:  Patient is oriented to Person, Place, Time and Situation  · RLE ROM: WFL  · RLE Strength: grossly 4/5  · LLE ROM: WFL  · LLE Strength: grossly 4/5    Functional Mobility:  · Bed Mobility:     · Rolling Right: minimum assistance  · Scooting: minimum assistance  · Supine to Sit: moderate assistance  · Sit to Supine: moderate assistance  · Balance: sitting (CGA)\      Therapeutic Activities and Exercises:  Pt educated on: PT role/POC; safety c mobility; benefits of OOB activities; performing therex; d/c recs - v/u      AM-PAC 6 CLICK MOBILITY  Total Score:13     Patient left HOB elevated with all lines intact, call button in reach and RN notified.    GOALS:   Multidisciplinary Problems     Physical Therapy Goals        Problem: Physical Therapy Goal    Goal Priority Disciplines Outcome Goal Variances Interventions   Physical Therapy Goal     PT, PT/OT Ongoing (interventions implemented as appropriate)     Description:  Goals to be met by: 2019     Patient will increase functional independence with mobility by performin. Supine to sit with Modified [place]  2. Sit to supine with Modified [place]  3. Sit to stand transfer with Contact Guard Assistance  4. Gait  x 50 feet with Supervision using Rolling Walker.   5. Ascend/Descend 4 inch curb step with Supervision using  Rolling Walker.                      History:     Past Medical History:   Diagnosis Date    Anticoagulant long-term use     Arthritis     Bladder cancer     Blood transfusion     CHF (congestive heart failure)     Chronic kidney disease     COPD (chronic obstructive pulmonary disease)     Coronary artery disease     Diabetes mellitus     Gout     [place] (hard of hearing[place]     HAS BILAT AIDS BUT DOES NOT WEAR    Hyperlipidemia     Hypertension     Myocardial infarction     RLS (restless legs syndrome)     Sleep apnea     NO MACHINE    Wears glasses        Past Surgical History:   Procedure Laterality Date    CARDIAC SURGERY      CABG X 2 1997    CATARACT EXTRACTION, BILATERAL      CHOLECYSTECTOMY      COLONOSCOPY      CORONARY ARTERY BYPASS GRAFT      x 2    coronary stents      CYSTOSCOPY      CYSTOSCOPY N/A 4/8/2019    [facility]    EXCISION-BLADDER TUMOR-TRANSURETHRAL (TURBT) N/A 9/8/2014    [facility]    EXCISION-BLADDER TUMOR-TRANSURETHRAL (TURBT) N/A 11/18/2013    [facility]    EYE SURGERY Bilateral     CATARACT    Insertion,catheter,tunneled N/A 5/16/2019    [facility] Wade [facility]    TURBT (TRANSURETHRAL RESECTION OF BLADDER TUMOR) N/A 4/8/2019    [facility]    VASECTOMY         Time Tracking:     PT Received On: 06/03/19  PT Start Time: 1400     PT Stop Time: 1415  PT Total Time (min): 15 min     Billable Minutes: Evaluation 15 min      Kalyan Dobbs, PT  06/03/2019

## 2019-06-03 NOTE — PROGRESS NOTES
[facility] Hospital Medicine  Progress Note    Patient Name: Frank Zayas  MRN: [id number]  Patient Class: IP- Inpatient   Admission Date: 5/31/2019  Length of Stay: 3 days  Attending Physician: Sofie Driver MD  Primary Care Provider: Mikhail Shields MD    [facility] Medicine Team: [facility] G Sofie Driver MD    Subjective:     Principal Problem:GIB (gastrointestinal bleeding)    HPI:  No notes on file    Hospital Course:  No notes on file    Interval History: Rapid response called overnight due to patient having several dark loose BMs. Rectal tube placed. Remained HDS. CTA done without evidence of bleeding but duodenal clip has migrated. Patient feels tired due to lack of sleep. Still with black loose output in the rectal tube. Denies CP or SOB.     Review of Systems   Constitutional: Positive for fatigue.   HENT: Negative for congestion and trouble swallowing.    Respiratory: Negative for cough and shortness of breath.    Cardiovascular: Positive for leg swelling. Negative for chest pain.   Gastrointestinal: Positive for diarrhea. Negative for abdominal pain, blood in stool and nausea.   Skin: Negative for rash.   Neurological: Negative for light-headedness and headaches.   Hematological: Does not bruise/bleed easily.   Psychiatric/Behavioral: Negative for hallucinations. The patient is not nervous/anxious.      Objective:     Vital Signs (Most Recent):  Temp: 97.5 °F (36.4 °C) (06/03/19 0811)  Pulse: 75 (06/03/19 1159)  Resp: 14 (06/03/19 0811)  BP: (!) 96/59 (06/03/19 0811)  SpO2: 99 % (06/03/19 0811) Vital Signs (24h Range):  Temp:  [97.4 °F (36.3 °C)-98.7 °F (37.1 °C)] 97.5 °F (36.4 °C)  Pulse:  [] 75  Resp:  [14-20] 14  SpO2:  [91 %-100 %] 99 %  BP: ()/(56-65) 96/59     Weight: 130.6 kg (287 lb 14.7 oz)  Body mass index is 36.97 kg/m².    Intake/Output Summary (Last 24 hours) at 6/3/2019 1208  Last data filed at 6/3/2019 0600  Gross per 24 hour   Intake --   Output  300 ml   Net -300 ml      Physical Exam   Constitutional: He is oriented to person, place, and time. He appears well-developed and well-nourished. No distress.   HENT:   Head: Normocephalic and atraumatic.   Eyes: EOM are normal. No scleral icterus.   Neck: Neck supple.   Cardiovascular: Normal rate.   Pulmonary/Chest: Effort normal and breath sounds normal.   Abdominal: Soft. Bowel sounds are normal. He exhibits no distension. There is no tenderness.   Obese abdomen   Genitourinary:   Genitourinary Comments: Rectal tube with loose black output   Musculoskeletal: He exhibits edema (LE ).   Neurological: He is alert and oriented to person, place, and time.   Skin: No erythema.   Dressing over foot wound    Vitals reviewed.      Significant Labs: All pertinent labs within the past 24 hours have been reviewed.    Significant Imaging: I have reviewed all pertinent imaging results/findings within the past 24 hours.    Assessment/Plan:      * GIB (gastrointestinal bleeding)  Secondary to oozing duodenal ulcer and underwent clipping and epi injection at outise facility  Transfused about 3-4 units with stabilization of H&H  Overnight had several black loose BMs and Hb down from 9.9 to 8.1  Case rediscussed with GI and will plan for EGD on 6/4  Continue PPI gtt for now and clear liquids with NPO at midnight  Will continue to trend H&H BID and transfuse if < 7  Hemodynamically stable     Restless leg syndrome  Continue ropinirole       ESRD (end stage renal disease)  Nephrology following   Plan to resume IHD this week  Usual schedule is TTS        Debility  PTOT      A-fib  Resume metoprolol  Will discuss with GI when ok to resume anticoagulation       Coronary artery disease  Holding ASA and plavix due to GI bleed  Will discuss with GI when ok to resume       DM type 2 with diabetic mixed hyperlipidemia  Controlled on low dose correction scale   Cont blood glucose monitoring   BG goal:  Preprandial blood glucose target <140  mg/dL  Random glucoses <180 mg/dL  Avoid hypoglycemia -  Reduce antihyperglycemic therapy when caloric intake is reduced. Avoid insulin stacking as a result of repeated injection of prandial insulin at close intervals.   Avoid severe hyperglycemia  Ensure adequate nutrition   ADA diet        HTN (hypertension)  Antihypertensives held due to shock initially   BP controlled  Metoprolol resumed due to tachycardia which may be due to lack of beta blockage but also due to recent GIB   Continue to monitor         CHF (congestive heart failure)  Maintain euvolemia by monitoring I/O's and daily weights.  Fluid restriction (2 liters/24 hours)  Low Na diet  Monitor for signs of fluid overload: RR>30, O2 sat<92%, weight gain of >3 lbs, or urinary output <160ml/8hr  Maintain oxygen sats >92% via NC if supplemental oxygen needed.   Volume removal via HD     Patient Vitals for the past 72 hrs (Last 3 readings):   Weight   06/02/19 0300 130.6 kg (287 lb 14.7 oz)   06/01/19 0300 126.7 kg (279 lb 5.2 oz)   05/31/19 0700 125 kg (275 lb 9.2 oz)           VTE Risk Mitigation (From admission, onward)        Ordered     Place sequential compression device  Until discontinued      05/31/19 0650     Reason for No Pharmacological VTE Prophylaxis  Once      05/31/19 0650     IP VTE HIGH RISK PATIENT  Once      05/31/19 0650              Sofie Driver MD  Department of Hospital Medicine   [facility]

## 2019-06-03 NOTE — ASSESSMENT & PLAN NOTE
Secondary to oozing duodenal ulcer and underwent clipping and epi injection at outise facility  Transfused about 3-4 units with stabilization of H&H  Overnight had several black loose BMs and Hb down from 9.9 to 8.1  Case rediscussed with GI and will plan for EGD on 6/4  Continue PPI gtt for now and clear liquids with NPO at midnight  Will continue to trend H&H BID and transfuse if < 7  Hemodynamically stable

## 2019-06-03 NOTE — CARE UPDATE
Rapid Response Nurse Follow-up Note     Pt attached to portable monitor, transported to CT scan with primary RN. BP remains stable. Returned to room, bed lowered, call bell with in reach. RRT to follow up.     No acute issues at this time. Reviewed plan of care with primary RN,Lucina.   Please call Rapid Response RN, Doug Chaparro RN with any questions or concerns at [phone].

## 2019-06-03 NOTE — ANESTHESIA PREPROCEDURE EVALUATION
[facility]  Anesthesia Pre-Operative Evaluation         Patient Name: Frank Zayas  YOB: 1940  MRN: [id number]    SUBJECTIVE:     06/03/2019    Pre-operative evaluation for Procedure(s) (LRB):  EGD (ESOPHAGOGASTRODUODENOSCOPY) (N/A)    Frank Zayas is a 79 y.o. male with  ESRD on HD (TTS, 2 months), Afib, CHF, DM2 (on insulin), CAD s/p CABG, HLD, COPD, HTN transferred from [facility] after presenting with hematochezia and abdominal pain.  Patient received 1 unit of pRBC for GI bleed and was transferred to [facility] for a cardiology evaluation after he was found to have a troponin of 0.39.  He was started on levophed for shock.  Central line and arterial line was place during which patient coded. ROSC was achieved after CPR, epinephrine, calcium gluconate and bicarb.  He was emergently intubated at that time.  Endoscopy revealed large vessel in the duodenum with active oozing.  At that time he was [place] [facility] [place]iovenous embolization.       Patient initially admitted to the MICU.  He underwent epi injection with clipping by GI.  Patient now presents for the above procedure(s).      LDA:        Hemodialysis Catheter 05/16/19 1600 right subclavian (Active)   Site Assessment Clean;Dry;Intact;No redness;No swelling;No warmth;No drainage 6/2/2019  8:15 PM   Status Deaccessed 6/1/2019  7:30 PM   Dressing Status Biopatch in place;Clean;Dry;Intact 6/1/2019  7:30 PM   Dressing Change Due 06/06/19 6/1/2019  7:30 PM   Verification by X-ray Yes 6/1/2019  7:30 PM   Site Condition No complications 6/1/2019  7:30 PM   Dressing Occlusive 6/1/2019  7:30 PM   Daily Line Review Performed 6/1/2019  7:30 PM   Number of days: 17            Peripheral IV - Single Lumen 06/01/19 1309 22 G Right Forearm (Active)   Site Assessment Clean;Dry;Intact;No redness;No swelling;No warmth;No drainage 6/2/2019  8:15 PM   Line Status Infusing 6/2/2019  8:15 PM    Dressing Status Clean;Dry;Intact 6/2/2019  8:15 PM   Site Change Due 06/05/19 6/1/2019  7:30 PM   Reason Not Rotated Not due 6/1/2019  7:30 PM   Number of days: 1            Rectal Tube 06/03/19 0000 rectal tube w/ balloon (indicate number of mLs) (Active)   Rectal Tube Output 100 mL 6/3/2019  6:00 AM   Number of days: 0       Previous airway:   Method of Intubation: Manriquez; Inserted by: CRNA; Airway Device: Endotracheal Tube; Mask Ventilation: Mod Diff - oral; Intubated: Postinduction; Blade: Joaquín #4; Airway Device Size: 8.0; Style: Cuffed; Cuff Inflation: Minimal occlusive pressure; Placement Verified By: Auscultation, Capnometry, ETT Condensation; Grade: Grade I; Complicating Factors: Obesity      Drips:   None documented.      Patient Active Problem List   Diagnosis    Malignant neoplasm of bladder    Malignant neoplasm of anterior wall of urinary bladder    Bladder cancer    CHF (congestive heart failure)    Type 2 diabetes mellitus    HTN (hypertension)    Type 2 diabetes mellitus with kidney complication, with long-term current use of insulin    DM type 2 with diabetic mixed hyperlipidemia    Coronary artery disease    COPD (chronic obstructive pulmonary disease)    A-fib    Anemia, chronic disease    Hyperphosphatemia    Mild malnutrition    Elevated brain natriuretic peptide (BNP) level    Hematuria    Debility    BRANDEN (obstructive sleep apnea)    Skin ulcer of left lower leg, limited to breakdown of skin    Morbid (severe) obesity with alveolar hypoventilation    MOLLY (acute kidney injury)    Encephalopathy, metabolic    Hyperkalemia    Acute hypercapnic respiratory failure    Dysphagia, pharyngeal    GIB (gastrointestinal bleeding)    ESRD (end stage renal disease)    Restless leg syndrome       Review of patient's allergies indicates:   Allergen Reactions    Lidocaine Nausea Only     NOVACAINE --  Severe nausea    Statins-hmg-coa reductase inhibitors Anxiety        Current Inpatient Medications:   gabapentin  100 mg Oral BID    hydromorphone (PF)        metoprolol tartrate  25 mg Oral BID    pantoprazole  40 mg Intravenous BID    piperacillin-tazobactam (ZOSYN) IVPB  4.5 g Intravenous Q12H    rOPINIRole  2 mg Oral QHS    sodium polystyrene  30 g Oral Once       No current facility-administered medications on file prior to encounter.      Current Outpatient Medications on File Prior to Encounter   Medication Sig Dispense Refill    alfuzosin (UROXATRAL) 10 mg Tb24 Take 10 mg by mouth nightly.       coenzyme Q10 (CO Q-10) 100 mg capsule Take 100 mg by mouth once daily.      ezetimibe (ZETIA) 10 mg tablet Take 10 mg by mouth every evening.       ferrous sulfate 325 (65 FE) MG EC tablet Take 1 tablet (325 mg total) by mouth 2 (two) times daily with meals.  0    FLUoxetine 20 MG capsule every evening.       gabapentin (NEURONTIN) 100 MG capsule every evening.       gemfibrozil (LOPID) 600 MG tablet Take 600 mg by mouth 2 (two) times daily.       insulin glargine (LANTUS) 100 unit/mL injection Inject 27 Units into the skin every evening.       metoprolol tartrate (LOPRESSOR) 25 MG tablet Take 25 mg by mouth 2 (two) times daily.      nateglinide (STARLIX) 60 MG tablet Take 60 mg by mouth every evening.       nitroGLYCERIN (NITROSTAT) 0.4 MG SL tablet Place 0.4 mg under the tongue every 5 (five) minutes as needed.       ropinirole (REQUIP) 2 MG tablet Take 2 mg by mouth nightly.       TRADJENTA 5 mg Tab tablet Take 5 mg by mouth once daily.  0    vitamin renal formula, B-complex-vitamin c-folic acid, (NEPHROCAP) 1 mg Cap Take 1 capsule by mouth once daily. 30 capsule 2    zinc 50 mg Tab Take 50 mg by mouth once daily. 1/2 tab daily         Past Surgical History:   Procedure Laterality Date    CARDIAC SURGERY      CABG X 2 1997    CATARACT EXTRACTION, BILATERAL      CHOLECYSTECTOMY      COLONOSCOPY      CORONARY ARTERY BYPASS GRAFT      x 2    coronary  stents      CYSTOSCOPY      CYSTOSCOPY N/A 2019    [facility]    EXCISION-BLADDER TUMOR-TRANSURETHRAL (TURBT) N/A 2014    [facility]formed by [facility] at [facility] OR    EXCISION-BLADDER TUMOR-TRANSURETHRAL (TURBT) N/A 2013    [facility]formed by [facility] at [facility] OR    EYE SURGERY Bilateral     CATARACT    Insertion,catheter,tunneled N/A 2019    Performed by Wade Rodriguez MD at [facility] OR    TURBT (TRANSURETHRAL RESECTION OF BLADDER TUMOR) N/A 2019    [facility] at [facility] OR    VASECTOMY         Social History     Socioeconomic History    Marital status:      Spouse name: Not on file    Number of children: Not on file    Years of education: Not on file    Highest education level: Not on file   Occupational History    Not on file   Social Needs    Financial resource strain: Not on file    Food insecurity:     Worry: Not on file     Inability: Not on file    Transportation needs:     Medical: Not on file     Non-medical: Not on file   Tobacco Use    Smoking status: Former Smoker     Packs/day: 0.25     Years: 20.00     Pack years: 5.00     Types: Cigars     Last attempt to quit: 1982     Years since quittin.3    Smokeless tobacco: Never Used   Substance and Sexual Activity    Alcohol use: No    Drug use: No    Sexual activity: Not on file   Lifestyle    Physical activity:     Days per week: Not on file     Minutes per session: Not on file    Stress: Not on file   Relationships    Social connections:     Talks on phone: Not on file     Gets together: Not on file     Attends Hinduism service: Not on file     Active member of club or organization: Not on file     Attends meetings of clubs or organizations: Not on file     Relationship status: Not on file   Other Topics Concern    Not on file   Social History Narrative    Not on file       OBJECTIVE:     Vital Signs Range (Last 24H):  Temp:  [35.7 °C  (96.3 °F)-37.1 °C (98.7 °F)]   Pulse:  []   Resp:  [14-20]   BP: ()/(56-65)   SpO2:  [90 %-100 %]       Significant Labs:  Lab Results   Component Value Date    WBC 13.41 (H) 06/03/2019    HGB 8.1 (L) 06/03/2019    HCT 26.7 (L) 06/03/2019     (L) 06/03/2019    CHOL 104 (L) 05/05/2019    TRIG 121 05/05/2019    HDL 24 (L) 05/05/2019    ALT 9 (L) 06/03/2019    AST 17 06/03/2019     06/03/2019    K 5.2 (H) 06/03/2019     06/03/2019    CREATININE 4.7 (H) 06/03/2019    BUN 39 (H) 06/03/2019    CO2 18 (L) 06/03/2019    INR 1.1 06/03/2019    HGBA1C 5.6 05/04/2019         Diagnostic Studies:  CT abdomen and pelvis    1. No evidence of active gastrointestinal bleeding.  Interval migration of presumed duodenal ulcer clip into the distal small bowel.  2. Scattered colonic diverticulosis without evidence for diverticulitis.  3. Moderate-sized right pleural effusion, stable when compared to CTA 05/31/2019.  4. Stable right renal hypodensity demonstrating attenuation higher than simple fluid and additional subcentimeter renal hypodensities bilaterally.  No solid renal mass was demonstrated on prior renal ultrasound from 04/12/2019 (per outside report).  5. Anasarca.      Cardiac Studies:  EKG:   Vent. Rate : 085 BPM     Atrial Rate : 094 BPM     P-R Int : 000 ms          QRS Dur : 138 ms      QT Int : 426 ms       P-R-T Axes : 000 -55 160 degrees     QTc Int : 506 ms    Atrial fibrillation with premature ventricular or aberrantly conducted  complexes  Right bundle branch block  Left anterior fascicular block  ** Bifascicular block **  T wave abnormality, consider inferolateral ischemia  Abnormal ECG  When compared with ECG of 10-MAY-2019 12:07,  Atrial fibrillation has replaced Sinus rhythm  QRS duration has decreased  T wave inversion now evident in Inferior leads  Confirmed by BRITTNEE HENLEY MD ([id number]) on 5/31/2019 1:54:04 PM    2D Echo:  · Mild left ventricular enlargement.  · Mildly decreased  left ventricular systolic function. The estimated ejection fraction is 45%  · Normal LV diastolic function.  · Normal right ventricular systolic function.  · Mild left atrial enlargement.  · Mild right atrial enlargement.  · Mild mitral regurgitation.  · Mild tricuspid regurgitation.  · The estimated PA systolic pressure is 38 mm Hg    ASSESSMENT/PLAN:         Anesthesia Evaluation    I have reviewed the Patient Summary Reports.    I have reviewed the Nursing Notes.   I have reviewed the Medications.     Review of Systems  Social:  Former Smoker Smoking Status: Former Smoker - 5 pack years  Quit Smokin82  Smokeless Tobacco Status: Never Used  Alcohol use: No  Drug use: No       Cardiovascular:   Hypertension Past MI CAD   CHF    Pulmonary:   COPD, moderate Sleep Apnea    Renal/:   Chronic Renal Disease, Dialysis, ESRD    Endocrine:   Diabetes, well controlled        Physical Exam  General:  Morbid Obesity    Airway/Jaw/Neck:  Airway Findings: Mouth Opening: Normal Tongue: Normal  General Airway Assessment: Adult, Good  Mallampati: II  Improves to II with phonation.  TM Distance: 4-6 cm  Jaw/Neck Findings:  Neck ROM: Normal ROM  Neck Findings:      Dental:  Dental Findings: In tact   Chest/Lungs:  Chest/Lungs Findings: Normal Respiratory Rate  Upper airway sound, cough productive of white sputum     Heart/Vascular:  Heart Findings: Rate: Normal  Rhythm: Regular Rhythm  Sounds: Normal  Heart murmur: negative       Mental Status:  Mental Status Findings:  Cooperative, Alert and Oriented         Anesthesia Plan  Type of Anesthesia, risks & benefits discussed:  Anesthesia Type:  general, MAC  Patient's Preference:   Intra-op Monitoring Plan: standard ASA monitors  Intra-op Monitoring Plan Comments:   Post Op Pain Control Plan: per primary service following discharge from PACU  Post Op Pain Control Plan Comments:   Induction:   IV  Beta Blocker:  Patient is not currently on a Beta-Blocker (No further  documentation required).       Informed Consent: Patient understands risks and agrees with Anesthesia plan.  Questions answered. Anesthesia consent signed with patient.  ASA Score: 4     Day of Surgery Review of History & Physical:    H&P update referred to the provider.         Ready For Surgery From Anesthesia Perspective.

## 2019-06-03 NOTE — PLAN OF CARE
Problem: Physical Therapy Goal  Goal: Physical Therapy Goal  Goals to be met by: 2019     Patient will increase functional independence with mobility by performin. Supine to sit with Modified [place]  2. Sit to supine with Modified [place]  3. Sit to stand transfer with Contact Guard Assistance  4. Gait  x 50 feet with Supervision using Rolling Walker.   5. Ascend/Descend 4 inch curb step with Supervision using Rolling Walker.    Outcome: Ongoing (interventions implemented as appropriate)  Eval completed and POC established    Kalyan Dobbs, PT,DPT  6/3/2019

## 2019-06-03 NOTE — NURSING
Spoke with Dr Driver regarding medication administration.  Will hold PO PPI and administer it per IV.  Pt will begin NPO until seen by GI.

## 2019-06-03 NOTE — ASSESSMENT & PLAN NOTE
Antihypertensives held due to shock initially   BP controlled  Metoprolol resumed due to tachycardia which may be due to lack of beta blockage but also due to recent GIB   Continue to monitor

## 2019-06-03 NOTE — PROGRESS NOTES
Wound care consult for leg wound and right leg.     PMH:    Patient with large loose stool with fecal management system in place. Patient continues to leak around tube. Patient with small wound to the buttock cheek and excoriation thru the perineal area. Excoriation appears fungal in nature. Patient cleansed and linens changed with assistance of nurse tech.  Patient on EHOB overlay and heels are intact.   Patient with +3 pitting edema to the BLEs. Area to the right shin with some skin tears noted, wound appear clean and granulating. Area cleansed and foam dressing applied/     Recommend:  Barrier antifungal cream BID to the perineal area, groins, buttocks, scrotum  EHOB overlay  Heel offloading boots  Foam dressing twice per week to skin tears    Wound care to follow PRN.  Mehnaz Kirk RN [facility]   [phone]             06/03/19 0905        Wound Abrasion(s) lower Leg   No Date First Assessed or Time First Assessed found.   Primary Wound Type: Abrasion(s)  Side: Right  Orientation: lower  Location: Leg   Wound Image    Wound WDL ex   Dressing Appearance Open to air   Drainage Amount Scant   Drainage Characteristics/Odor Serosanguineous   Appearance Pink;Granulating   Red (%), Wound Tissue Color 100 %   Periwound Area Pink   Wound Edges Undefined   Wound Length (cm) 9 cm  (entire area)   Wound Width (cm) 5 cm   Wound Depth (cm) 0.1 cm   Wound Volume (cm^3) 4.5 cm^3   Wound Surface Area (cm^2) 45 cm^2   Care Cleansed with:;Sterile normal saline   Dressing Removed;Applied;Changed;Foam   Dressing Change Due 06/03/19        Pressure Injury 05/31/19 0700 Left Buttocks #1 Stage 2   Date First Assessed/Time First Assessed: 05/31/19 0700   Pressure Injury Present on Admission: yes  Side: Left  Location: Buttocks  Wound/PI Number (optional): #1  Is this injury device related?: No  Staging: Stage 2   Wound Image    Staging Stage 2   Dressing Appearance Open to air   Drainage Amount None   Drainage Characteristics/Odor No  odor   Appearance Pink   Tissue loss description Partial thickness   Red (%), Wound Tissue Color 100 %   Periwound Area Excoriated   Wound Length (cm) 1 cm   Wound Width (cm) 0.4 cm   Wound Depth (cm) 0.1 cm   Wound Volume (cm^3) 0.04 cm^3   Wound Surface Area (cm^2) 0.4 cm^2   Care Cleansed with:;Other (see comments)  (wipes)   Dressing Change Due 06/03/19

## 2019-06-03 NOTE — NURSING
PA contacted because pt had another large BM of Dark red blood spilling onto the floor. PA given vitals and instructed to call about CBC. PA also asked if she still wants additional Protonix to be given and she stated to hold off on that. Will continue to monitor.

## 2019-06-03 NOTE — ASSESSMENT & PLAN NOTE
Controlled on low dose correction scale   Cont blood glucose monitoring   BG goal:  Preprandial blood glucose target <140 mg/dL  Random glucoses <180 mg/dL  Avoid hypoglycemia -  Reduce antihyperglycemic therapy when caloric intake is reduced. Avoid insulin stacking as a result of repeated injection of prandial insulin at close intervals.   Avoid severe hyperglycemia  Ensure adequate nutrition   ADA diet

## 2019-06-03 NOTE — NURSING
PA on duty contacted because pt had a large Dark red BM . Orders will be placed will continue to monitor.

## 2019-06-03 NOTE — CARE UPDATE
"RAPID RESPONSE NURSE PROACTIVE ROUNDING NOTE     Time of Visit: 005    Admit Date: 2019  LOS: 3  Code Status: Full Code   Date of Visit: 2019  : 1940  Age: 79 y.o.  Sex: male  Race: White  Bed: 7096/7096 A:   MRN: [id number]  Was the patient discharged from an ICU this admission? yes   Was the patient discharged from a PACU within last 24 hours?  no  Did the patient receive conscious sedation/general anesthesia in last 24 hours?  no  Was the patient in the ED within the past 24 hours?  no  Was the patient started on NIPPV within the past 24 hours?  no  Attending Physician: Lisha Driver MD  Primary Service: [facility]     ASSESSMENT     Diagnosis: GIB (gastrointestinal bleeding)    Abnormal Vital Signs: BP (!) 114/57   Pulse (!) 120   Temp 97.4 °F (36.3 °C) (Oral)   Resp 17   Ht 6' 2" (1.88 m)   Wt 130.6 kg (287 lb 14.7 oz)   SpO2 97%   BMI 36.97 kg/m²      Clinical Issues: Circulatory    Patient  has a past medical history of Anticoagulant long-term use, Arthritis, Bladder cancer, Blood transfusion, CHF (congestive heart failure), Chronic kidney disease, COPD (chronic obstructive pulmonary disease), Coronary artery disease, Diabetes mellitus, Gout, [place] (hard of hearing), Hyperlipidemia, Hypertension, Myocardial infarction, RLS (restless legs syndrome), Sleep apnea, and Wears glasses.    Proactive round performed for multiple bloody BM's. Per primary RN pt with multiple large BM's with dark red blood. Flexiseal placed by primary RN with ~100cc dark red blood on arrival to bedside. Pt denies dizziness or lightheadedness, but does endorse increased weakness. CBC showed drop in H/H from 9.8=>8.9. STAT CTA ordered per primary team. BP Stable -120, HR 120s, SPo2 97% on 2L/NC.      INTERVENTIONS/ RECOMMENDATIONS     Will help obtain STAT CTA, monitor for increased bleeding, output to flexi, increased HR, decreased BP. Trend CBC's.     Discussed plan of care with RNLucina.    PHYSICIAN " ESCALATION     Yes/No  yes    Orders received and case discussed with KASSIE Patel.    Disposition: Remain in room 7096.    FOLLOW-UP     Call back the Rapid Response Nurse, Doug Chaparro RN at [phone] for additional questions or concerns.

## 2019-06-03 NOTE — SUBJECTIVE & OBJECTIVE
Interval History: Rapid response called overnight due to patient having several dark loose BMs. Rectal tube placed. Remained HDS. CTA done without evidence of bleeding but duodenal clip has migrated. Patient feels tired due to lack of sleep. Still with black loose output in the rectal tube. Denies CP or SOB.     Review of Systems   Constitutional: Positive for fatigue.   HENT: Negative for congestion and trouble swallowing.    Respiratory: Negative for cough and shortness of breath.    Cardiovascular: Positive for leg swelling. Negative for chest pain.   Gastrointestinal: Positive for diarrhea. Negative for abdominal pain, blood in stool and nausea.   Skin: Negative for rash.   Neurological: Negative for light-headedness and headaches.   Hematological: Does not bruise/bleed easily.   Psychiatric/Behavioral: Negative for hallucinations. The patient is not nervous/anxious.      Objective:     Vital Signs (Most Recent):  Temp: 97.5 °F (36.4 °C) (06/03/19 0811)  Pulse: 75 (06/03/19 1159)  Resp: 14 (06/03/19 0811)  BP: (!) 96/59 (06/03/19 0811)  SpO2: 99 % (06/03/19 0811) Vital Signs (24h Range):  Temp:  [97.4 °F (36.3 °C)-98.7 °F (37.1 °C)] 97.5 °F (36.4 °C)  Pulse:  [] 75  Resp:  [14-20] 14  SpO2:  [91 %-100 %] 99 %  BP: ()/(56-65) 96/59     Weight: 130.6 kg (287 lb 14.7 oz)  Body mass index is 36.97 kg/m².    Intake/Output Summary (Last 24 hours) at 6/3/2019 1208  Last data filed at 6/3/2019 0600  Gross per 24 hour   Intake --   Output 300 ml   Net -300 ml      Physical Exam   Constitutional: He is oriented to person, place, and time. He appears well-developed and well-nourished. No distress.   HENT:   Head: Normocephalic and atraumatic.   Eyes: EOM are normal. No scleral icterus.   Neck: Neck supple.   Cardiovascular: Normal rate.   Pulmonary/Chest: Effort normal and breath sounds normal.   Abdominal: Soft. Bowel sounds are normal. He exhibits no distension. There is no tenderness.   Obese abdomen    Genitourinary:   Genitourinary Comments: Rectal tube with loose black output   Musculoskeletal: He exhibits edema (LE ).   Neurological: He is alert and oriented to person, place, and time.   Skin: No erythema.   Dressing over foot wound    Vitals reviewed.      Significant Labs: All pertinent labs within the past 24 hours have been reviewed.    Significant Imaging: I have reviewed all pertinent imaging results/findings within the past 24 hours.

## 2019-06-03 NOTE — NURSING
Pt had a total of 6 large(10pm-12am), dark red BMs. PA on duty aware and okayed an order for a rectal tube. Tube placed. CBC order (11pm). Pt transferred to have CTA completed. Vital signs stable. Pt did not verbalize any complaints will continue to monitor.

## 2019-06-04 PROBLEM — D63.1 ANEMIA OF RENAL DISEASE: Status: ACTIVE | Noted: 2019-01-01

## 2019-06-04 PROBLEM — E87.5 HYPERKALEMIA: Status: RESOLVED | Noted: 2019-01-01 | Resolved: 2019-01-01

## 2019-06-04 PROBLEM — N18.9 ANEMIA OF RENAL DISEASE: Status: ACTIVE | Noted: 2019-01-01

## 2019-06-04 PROBLEM — K92.2 ACUTE GASTROINTESTINAL BLEEDING: Status: ACTIVE | Noted: 2019-01-01

## 2019-06-04 NOTE — NURSING
"Katerin CCRN, called to bedside to evaluate patients low blood pressure. Patient is AAO x's 4, denies pain. Patient reports visual changes, "I'm seeing an explosion, like flashing lights".  "

## 2019-06-04 NOTE — CONSULTS
[facility]  Cardiology  Consult Note    Patient Name: Frank Zayas  MRN: [id number]  Admission Date: 5/31/2019  Hospital Length of Stay: 4 days  Code Status: Full Code   Attending Provider: Rubio Yu MD   Consulting Provider: Patrick Oh MD  Primary Care Physician: Mikhail Shields MD  Principal Problem:GIB (gastrointestinal bleeding)    Patient information was obtained from spouse/SO and medical team.     Inpatient consult to Cardiology  Consult performed by: Patrick Oh MD  Consult ordered by: Moises Mcdonald MD        Subjective:     Chief Complaint:  GI bleeding and arrest     HPI:   79 year old man with history of CAD s/p CABG (1997 - unclear anatomy), HTN, HLD, HFpEF with LVEF 45-50%, ESRD on HD, atrial fibrillation who presented with GI bleed and was admitted to ICU 6/3 with hemorrhagic shock. He went for EGD for second time today for treatment. He became bradycardic and briefly lost pulse during case. CPR initiated with ROSC. Intubated, on levophed. Resonding to commands. Cardiology consulted for recommendations for hemorrhagic shock and demand ischemia.    Past Medical History:   Diagnosis Date    Anticoagulant long-term use     Arthritis     Bladder cancer     Blood transfusion     CHF (congestive heart failure)     Chronic kidney disease     COPD (chronic obstructive pulmonary disease)     Coronary artery disease     Diabetes mellitus     Gout     [place] (hard of hearing)     HAS BILAT AIDS BUT DOES NOT WEAR    Hyperlipidemia     Hypertension     Myocardial infarction     RLS (restless legs syndrome)     Sleep apnea     NO MACHINE    Wears glasses        Past Surgical History:   Procedure Laterality Date    CARDIAC SURGERY      CABG X 2 1997    CATARACT EXTRACTION, BILATERAL      CHOLECYSTECTOMY      COLONOSCOPY      CORONARY ARTERY BYPASS GRAFT      x 2    coronary stents      CYSTOSCOPY      CYSTOSCOPY N/A 4/8/2019    Performed by Kaylee  [facility]    EXCISION-BLADDER TUMOR-TRANSURETHRAL (TURBT) N/A 9/8/2014    Performed by Kaylee Vasquez MD at [facility] OR    EXCISION-BLADDER TUMOR-TRANSURETHRAL (TURBT) N/A 11/18/2013    Performed by Kaylee [facility]quez MD at [facility] OR    EYE SURGERY Bilateral     CATARACT    Insertion,catheter,tunneled N/A 5/16/2019    Performed by Wade Rodriguez MD at [facility] OR    TURBT (TRANSURETHRAL RESECTION OF BLADDER TUMOR) N/A 4/8/2019    Performed by Kaylee [facility]quez MD at [facility] OR    VASECTOMY         Review of patient's allergies indicates:   Allergen Reactions    Lidocaine Nausea Only     NOVACAINE --  Severe nausea    Statins-hmg-coa reductase inhibitors Anxiety       No current facility-administered medications on file prior to encounter.      Current Outpatient Medications on File Prior to Encounter   Medication Sig    alfuzosin (UROXATRAL) 10 mg Tb24 Take 10 mg by mouth nightly.     coenzyme Q10 (CO Q-10) 100 mg capsule Take 100 mg by mouth once daily.    ezetimibe (ZETIA) 10 mg tablet Take 10 mg by mouth every evening.     ferrous sulfate 325 (65 FE) MG EC tablet Take 1 tablet (325 mg total) by mouth 2 (two) times daily with meals.    FLUoxetine 20 MG capsule every evening.     gabapentin (NEURONTIN) 100 MG capsule every evening.     gemfibrozil (LOPID) 600 MG tablet Take 600 mg by mouth 2 (two) times daily.     insulin glargine (LANTUS) 100 unit/mL injection Inject 27 Units into the skin every evening.     metoprolol tartrate (LOPRESSOR) 25 MG tablet Take 25 mg by mouth 2 (two) times daily.    nateglinide (STARLIX) 60 MG tablet Take 60 mg by mouth every evening.     nitroGLYCERIN (NITROSTAT) 0.4 MG SL tablet Place 0.4 mg under the tongue every 5 (five) minutes as needed.     ropinirole (REQUIP) 2 MG tablet Take 2 mg by mouth nightly.     TRADJENTA 5 mg Tab tablet Take 5 mg by mouth once daily.    vitamin renal formula, B-complex-vitamin c-folic acid, (NEPHROCAP) 1 mg Cap Take 1 capsule by  mouth once daily.    zinc 50 mg Tab Take 50 mg by mouth once daily. 1/2 tab daily     Family History     Problem Relation (Age of Onset)    Cancer Father, Sister, Brother    Heart disease Father        Tobacco Use    Smoking status: Former Smoker     Packs/day: 0.25     Years: 20.00     Pack years: 5.00     Types: Cigars     Last attempt to quit: 1982     Years since quittin.3    Smokeless tobacco: Never Used   Substance and Sexual Activity    Alcohol use: No    Drug use: No    Sexual activity: Not on file     Review of Systems   Reason unable to perform ROS: intubated.     Objective:     Vital Signs (Most Recent):  Temp: 97.8 °F (36.6 °C) (19 0701)  Pulse: 84 (19 1300)  Resp: 20 (19 1300)  BP: 132/81 (19 1200)  SpO2: 99 % (19 1300) Vital Signs (24h Range):  Temp:  [97.5 °F (36.4 °C)-98.8 °F (37.1 °C)] 97.8 °F (36.6 °C)  Pulse:  [] 84  Resp:  [17-32] 20  SpO2:  [87 %-100 %] 99 %  BP: ()/(43-81) 132/81  Arterial Line BP: (100-152)/(55-62) 129/56     Weight: 126 kg (277 lb 12.5 oz)  Body mass index is 35.66 kg/m².    SpO2: 99 %  O2 Device (Oxygen Therapy): ventilator      Intake/Output Summary (Last 24 hours) at 2019 1302  Last data filed at 2019 1300  Gross per 24 hour   Intake 3692.5 ml   Output --   Net 3692.5 ml       Lines/Drains/Airways     Central Venous Catheter Line                 Hemodialysis Catheter 19 1600 right subclavian 18 days          Drain                 Rectal Tube 19 0000 rectal tube w/ balloon (indicate number of mLs) 1 day          Airway                 Airway - Non-Surgical 19 0903 Endotracheal Tube less than 1 day          Arterial Line                 Arterial Line 19 0900 Right Radial less than 1 day          Peripheral Intravenous Line                 Peripheral IV - Single Lumen 19 1309 22 G Right Forearm 2 days         Peripheral IV - Single Lumen 19 1100 20 G;1 3/4 in Left Forearm 1  day                Physical Exam  Gen: ill, intubated, sedated but arousable  HEENT: EOMI  Neck: no JVD, no carotid bruits  CV: RRR, systolic flow murmur at LSB, no S3  Resp: intubated, coarse breath sounds anterioly  GI: soft, nontender nondistended, normal bowel sounds  Ext: warm well perfused, no edema, no clubbing or cyanosis    Significant Labs:   All pertinent lab results from the last 24 hours have been reviewed. and   Recent Lab Results       06/04/19  1233   06/04/19  1227   06/04/19  1033   06/04/19  0957   06/04/19  0925        Provider Notified:     JONATHON EDGAR       Albumin         2.1     Alkaline Phosphatase         65     Allens Test N/A   N/A N/A       ALT         7     Anion Gap         12     Aniso               Appearance, UA               aPTT               AST         13     Bacteria, UA               BANDS               Baso #               Basophilic Stippling               Basophil%               Bilirubin (UA)               BILIRUBIN TOTAL         0.5  Comment:  For infants and newborns, interpretation of results should be based  on gestational age, weight and in agreement with clinical  observations.  Premature Infant recommended reference ranges:  Up to 24 hours.............<8.0 mg/dL  Up to 48 hours............<12.0 mg/dL  3-5 days..................<15.0 mg/dL  6-29 days.................<15.0 mg/dL       Blood Culture, Routine               Site Rob/UAC   Rob/UAC Rob/UAC       BUN, Bld         62     Calcium         8.0     Chloride         108     CO2         12     Color, UA               Creatinine         6.2     DelSys Adult Vent   Adult Vent Adult Vent       Differential Method               Dohle Bodies               eGFR if          9.1     eGFR if non          7.9  Comment:  Calculation used to obtain the estimated glomerular filtration  rate (eGFR) is the CKD-EPI equation.        Eos #               Eosinophil%               FiO2 40   50 75        Flow               Glucose         216     Glucose, UA               Gran # (ANC)               Gran%               Hematocrit               Hemoglobin               Hyaline Casts, UA               Immature Grans (Abs)               Immature Granulocytes               Coumadin Monitoring INR               Ketones, UA               Lactate, Ravinder               Leukocytes, UA               Lymph #               Lymph%               Magnesium         2.2     MCH               MCHC               MCV               Metamyelocytes               Microscopic Comment               Mode AC/PRVC   AC/PRVC AC/PRVC       Mono #               Mono%               MPV               NITRITE UA               Non-Squam Epith               nRBC               Occult Blood UA               Ovalocytes               PEEP 10   5 5       pH, UA               Phosphorus               PiP 20   33 33       Platelet Estimate               Platelets               POC BE -10   -11 -11       POC HCO3 16.3   16.0 19.2       POC Hematocrit               POC Ionized Calcium               POC Lactate               POC PCO2 31.0   35.3 63.5       POC PH 7.328   7.265 7.088       POC PO2 176   101 308       POC Potassium               POC SATURATED O2 100   97 100       POC Sodium               POC TCO2 17   17 21       POCT Glucose   216           Poik               Poly               Potassium         5.2     PROTEIN TOTAL         4.9     Protein, UA               Protime               Provider Credentials:     MD VERDUZCO       Rate 20   24 24       RBC               RBC, UA               RDW               Sample ARTERIAL   ARTERIAL ARTERIAL       Sodium         132     Specific [place], UA               Specimen UA               Squam Epithel, UA               Toxic Granulation               Vt               WBC, UA               WBC               Yeast, UA                                06/04/19  0919   06/04/19  0821   06/04/19  0816   06/04/19  0559    06/04/19  0308        Provider Notified: JEANCARLOS HOLT         Albumin               Alkaline Phosphatase               Allens Test N/A   N/A         ALT               Anion Gap               Aniso               Appearance, UA         Cloudy     aPTT               AST               Bacteria, UA         Many     BANDS               Baso #   0.10           Basophilic Stippling               Basophil%   0.6           Bilirubin (UA)         Negative     BILIRUBIN TOTAL               Blood Culture, Routine               Site Rob/UAC   Other         BUN, Bld               Calcium               Chloride               CO2               Color, UA         Francie     Creatinine               DelSys Adult Vent   Nasal Can         Differential Method   Automated           Dohle Bodies               eGFR if                eGFR if non                Eos #   0.1           Eosinophil%   0.4           FiO2 100             Flow     3         Glucose               Glucose, UA         Negative     Gran # (ANC)   14.2           Gran%   83.4           Hematocrit   27.2           Hemoglobin   8.2           Hyaline Casts, UA         0     Immature Grans (Abs)   0.80  Comment:  Mild elevation in immature granulocytes is non specific and   can be seen in a variety of conditions including stress response,   acute inflammation, trauma and pregnancy. Correlation with other   laboratory and clinical findings is essential.             Immature Granulocytes   4.7           Coumadin Monitoring INR               Ketones, UA         Negative     Lactate, Ravinder               Leukocytes, UA         2+     Lymph #   0.9           Lymph%   5.2           Magnesium               MCH   30.4           MCHC   30.1           MCV   101           Metamyelocytes               Microscopic Comment         SEE COMMENT  Comment:  Other formed elements not mentioned in the report are not   present in the microscopic examination.        Mode  AC/PRVC   SPONT         Mono #   1.0           Mono%   5.7           MPV   10.5           NITRITE UA         Negative     Non-Squam Epith         1     nRBC   0           Occult Blood UA         Negative     Ovalocytes               PEEP 5             pH, UA         5.0     Phosphorus               PiP               Platelet Estimate               Platelets   134           POC BE -10   -11         POC HCO3 18.6   18.7         POC Hematocrit 24             POC Ionized Calcium 1.19             POC Lactate               POC PCO2 51.5   60.4         POC PH 7.166   7.100         POC PO2 307   43         POC Potassium 5.3             POC SATURATED O2 100   61         POC Sodium 138             POC TCO2 20   21         POCT Glucose       230       Poik               Poly               Potassium               PROTEIN TOTAL               Protein, UA         2+  Comment:  Recommend a 24 hour urine protein or a urine   protein/creatinine ratio if globulin induced proteinuria is  clinically suspected.       Protime               Provider Credentials: MD VERDUZCO         Rate 20             RBC   2.70           RBC, UA         11     RDW   18.2           Sample ARTERIAL   VENOUS         Sodium               Specific Gravity, UA         >=1.030     Specimen UA         Urine, Unspecified  Comment:  straight cath     Squam Epithel, UA         5     Toxic Granulation               Vt 490             WBC, UA         15     WBC   17.01           Yeast, UA         Many                      06/04/19  0252   06/04/19  0222   06/04/19  0118   06/04/19  0112   06/04/19  0110        Provider Notified:               Albumin       2.0       Alkaline Phosphatase       67       Allens Test     N/A   N/A     ALT       8       Anion Gap       10       Aniso       Slight       Appearance, UA               aPTT       27.1  Comment:  aPTT therapeutic range = 39-69 seconds       AST       16       Bacteria, UA               BANDS               Baso #        0.02       Basophilic Stippling       Occasional       Basophil%       0.2       Bilirubin (UA)               BILIRUBIN TOTAL       0.4  Comment:  For infants and newborns, interpretation of results should be based  on gestational age, weight and in agreement with clinical  observations.  Premature Infant recommended reference ranges:  Up to 24 hours.............<8.0 mg/dL  Up to 48 hours............<12.0 mg/dL  3-5 days..................<15.0 mg/dL  6-29 days.................<15.0 mg/dL         Blood Culture, Routine No Growth to date[P] No Growth to date[P]           Site     Other   Other     BUN, Bld       58       Calcium       8.1       Chloride       109       CO2       18       Color, UA               Creatinine       6.0       DelSys     Nasal Can   Nasal Can     Differential Method       Automated       Dohle Bodies       Present       eGFR if        9.5       eGFR if non        8.2  Comment:  Calculation used to obtain the estimated glomerular filtration  rate (eGFR) is the CKD-EPI equation.          Eos #       0.1       Eosinophil%       0.7       FiO2               Flow     2   2     Glucose       151       Glucose, UA               Gran # (ANC)       7.0       Gran%       77.7       Hematocrit       18.9  Comment:  hct and hgb critical result(s) called and verbal readback obtained   from PRESLEY Fonseca RN, 06/04/2019 02:32         Hemoglobin       5.7  Comment:  hct and hgb critical result(s) called and verbal readback obtained   from PRESLEY Fonseca RN, 06/04/2019 02:32         Hyaline Casts, UA               Immature Grans (Abs)       0.40  Comment:  Mild elevation in immature granulocytes is non specific and   can be seen in a variety of conditions including stress response,   acute inflammation, trauma and pregnancy. Correlation with other   laboratory and clinical findings is essential.         Immature Granulocytes       4.4       Coumadin Monitoring INR        1.2  Comment:  Coumadin Therapy:  2.0 - 3.0 for INR for all indicators except mechanical heart valves  and antiphospholipid syndromes which should use 2.5 - 3.5.         Ketones, UA               Lactate, Ravinder       1.0  Comment:  Falsely low lactic acid results can be found in samples   containing >=13.0 mg/dL total bilirubin and/or >=3.5 mg/dL   direct bilirubin.         Leukocytes, UA               Lymph #       0.8       Lymph%       8.7       Magnesium       2.3       MCH       31.7       MCHC       30.2       MCV       105       Metamyelocytes               Microscopic Comment               Mode     SPONT   SPONT     Mono #       0.8       Mono%       8.3       MPV       10.4       NITRITE UA               Non-Squam Epith               nRBC       0       Occult Blood UA               Ovalocytes       Occasional       PEEP               pH, UA               Phosphorus       8.7       PiP               Platelet Estimate       Decreased       Platelets       112       POC BE         -9     POC HCO3         20.5     POC Hematocrit         17     POC Ionized Calcium         1.22     POC Lactate     0.87         POC PCO2         64.9     POC PH         7.108     POC PO2         29     POC Potassium         5.0     POC SATURATED O2         35     POC Sodium         140     POC TCO2         22     POCT Glucose               Poik       Slight       Poly       Occasional       Potassium       5.0       PROTEIN TOTAL       4.8       Protein, UA               Protime       12.5       Provider Credentials:               Rate               RBC       1.80       RBC, UA               RDW       17.8       Sample     VENOUS   VENOUS     Sodium       137       Specific [place], UA               Specimen UA               Squam Epithel, UA               Toxic Granulation       Present       Vt               WBC, UA               WBC       9.04       Yeast, UA                                06/03/19 2055 06/03/19  1737         Provider Notified:         Albumin         Alkaline Phosphatase         Allens Test         ALT         Anion Gap         Aniso   Slight     Appearance, UA         aPTT         AST         Bacteria, UA         BANDS   1.0     Baso #   CANCELED  Comment:  Result canceled by the ancillary.     Basophilic Stippling         Basophil%   1.0     Bilirubin (UA)         BILIRUBIN TOTAL         Blood Culture, Routine         Site         BUN, Bld         Calcium         Chloride         CO2         Color, UA         Creatinine         DelSys         Differential Method   Manual     Dohle Bodies         eGFR if          eGFR if non          Eos #   CANCELED  Comment:  Result canceled by the ancillary.     Eosinophil%   0.0     FiO2         Flow         Glucose         Glucose, UA         Gran # (ANC)         Gran%   84.0     Hematocrit   39.1     Hemoglobin   11.6     Hyaline Casts, UA         Immature Grans (Abs)   CANCELED  Comment:  Mild elevation in immature granulocytes is non specific and   can be seen in a variety of conditions including stress response,   acute inflammation, trauma and pregnancy. Correlation with other   laboratory and clinical findings is essential.    Result canceled by the ancillary.       Immature Granulocytes   CANCELED  Comment:  Result canceled by the ancillary.     Coumadin Monitoring INR         Ketones, UA         Lactate, Ravinder         Leukocytes, UA         Lymph #   CANCELED  Comment:  Result canceled by the ancillary.     Lymph%   8.0     Magnesium         MCH   31.4     MCHC   29.7     MCV   106     Metamyelocytes   1.0     Microscopic Comment         Mode         Mono #   CANCELED  Comment:  Result canceled by the ancillary.     Mono%   5.0     MPV   10.8     NITRITE UA         Non-Squam Epith         nRBC   0     Occult Blood UA         Ovalocytes   Occasional     PEEP         pH, UA         Phosphorus         PiP         Platelet Estimate   Decreased      Platelets   131     POC BE         POC HCO3         POC Hematocrit         POC Ionized Calcium         POC Lactate         POC PCO2         POC PH         POC PO2         POC Potassium         POC SATURATED O2         POC Sodium         POC TCO2         POCT Glucose 173       Poik   Slight     Poly   Occasional     Potassium         PROTEIN TOTAL         Protein, UA         Protime         Provider Credentials:         Rate         RBC   3.70     RBC, UA         RDW   18.4     Sample         Sodium         Specific Gravity, UA         Specimen UA         Squam Epithel, UA         Toxic Granulation   Present     Vt         WBC, UA         WBC   13.80     Yeast, UA               Significant Imaging:  EKG: none on file today    Echo (5/9/2019)   · Mild left ventricular enlargement.  · Mildly decreased left ventricular systolic function. The estimated ejection fraction is 45%  · Normal LV diastolic function.  · Normal right ventricular systolic function.  · Mild left atrial enlargement.  · Mild right atrial enlargement.  · Mild mitral regurgitation.  · Mild tricuspid regurgitation.  · The estimated PA systolic pressure is 38 mm Hg      Assessment and Plan:     Cardiac arrest  79 year old man with CAD s/p remote CABG, HTN, HLD, ESRD on HD, PAF and HFpEF who presented with GI bleed c/b hemorrhagic shock leading to secondary cardiac arrest. Per reports, possibly PEA? This was highly unlikely an acute plaque rupture given that patient is being treated for hemorrhagic shock. This is likely demand ischemia and patient needs to be transfused and treated as such. Agree with holding antiplatelets and anticoagulation for now. On statin    --hold antiplatelets and anticoagulation  --once cleared from GI, restart ASA 81mg  --continue statin  --once hemodynamically stable and off pressors, restart metoprolol  --check echo with CFD once acute bleeding issues resolve    Discussed with primary team. We will follow peripherally, please  call with questions.        VTE Risk Mitigation (From admission, onward)        Ordered     Place sequential compression device  Until discontinued      05/31/19 0650     Reason for No Pharmacological VTE Prophylaxis  Once      05/31/19 0650     IP VTE HIGH RISK PATIENT  Once      05/31/19 0650          Thank you for your consult. Will follow peripherally    Patrick Oh MD  Cardiology   [facility]

## 2019-06-04 NOTE — PHYSICIAN QUERY
"PT Name: Frank Zayas  MR #: [id number]     PHYSICIAN QUERY -  ACUITY OF CONDITION CLARIFICATION      Tory Shen RN, CCDS  Desk # [phone]; [facility] # [phone] [email or web address]    This form is a permanent document in the medical record.     Query Date: June 4, 2019    By submitting this query, we are merely seeking further clarification of documentation to reflect the severity of illness of your patient. Please utilize your independent clinical judgment when addressing the question(s) below.    The Medical record reflects the following:     Indicators   Supporting Clinical Findings Location in Medical Record   x Documentation of condition recurrent bleeding from duodenal ulcer.   UGIB. Duodenal Ulcer  w/visible vessel clipped  GI MD 6/3  CC MD PN 5/31   x Lab Value(s) recurrent bleeding w/decline in H&H 10 -> 8.1  GI MD 6/3    Radiology Findings     x Treatment                                 Medication 5u pRBC total    hypotensive with decline in H&H 8.1 -> 5.7.  PRBC & Levo started to >    H&P View Only    gi trx plan 6/4  Hosp Sig event 6/4   x Other EGD done today  Impression:             - Normal esophagus.  - A medium amount of food (residue) in the stomach.   - One non-obstructing oozing duodenal ulcer with a    visible vessel. There is no evidence of perforation. Injected. Treatment not successful. Treated with bipolar cautery.   - No specimens collected.    emergent upper and lower endoscopy.  They found a large vessel in the duodenum, actively oozing with copious amounts of fresh bright red blood pumping out.  GI placed a clop on the artery and injected epinephrine, achieving hemostasis.  There was concern that the vessel could be directly assosiated with a larger vessel (ie GDA).  There were no varices seen.   GI Trx Plan 6/4                        CC PN 5/31     Provider, please specify the acuity/chronicity of  "Duodenal Ulcer":    [  x ] Acute   [   ] Chronic   [   ] Acute and/on " chronic   [   ]  Clinically Undetermined     Please document in your progress notes daily for the duration of treatment until resolved, and include in your discharge summary.

## 2019-06-04 NOTE — ASSESSMENT & PLAN NOTE
79 year old man with history of T2DM, ESRD on HD (started 1-2 ms ago), HFpEF, CAD s/p CABG on plavix, who is presenting from outside hospital due to concern for duodenal ulcer. Nephrology consulted for ESRD and HD management. Last dialyze at the outside hospital per family.     Patient intubated and sedated on pressor support (Levophed)     S/P EGD with oozing duodenal ulcer     Plan:  - Will start patient on SLED for clearance and volume removal,  cc/hr, bath adjusted to chem   - Continue with pressor to maintain MAP> 65

## 2019-06-04 NOTE — SUBJECTIVE & OBJECTIVE
Past Medical History:   Diagnosis Date    Anticoagulant long-term use     Arthritis     Bladder cancer     Blood transfusion     CHF (congestive heart failure)     Chronic kidney disease     COPD (chronic obstructive pulmonary disease)     Coronary artery disease     Diabetes mellitus     Gout     [place] (hard of hearing)     HAS BILAT AIDS BUT DOES NOT WEAR    Hyperlipidemia     Hypertension     Myocardial infarction     RLS (restless legs syndrome)     Sleep apnea     NO MACHINE    Wears glasses        Past Surgical History:   Procedure Laterality Date    CARDIAC SURGERY      CABG X 2     CATARACT EXTRACTION, BILATERAL      CHOLECYSTECTOMY      COLONOSCOPY      CORONARY ARTERY BYPASS GRAFT      x 2    coronary stents      CYSTOSCOPY      CYSTOSCOPY N/A 2019    [facility]    EXCISION-BLADDER TUMOR-TRANSURETHRAL (TURBT) N/A 2014    [facility]    EXCISION-BLADDER TUMOR-TRANSURETHRAL (TURBT) N/A 2013    [facility] OR    EYE SURGERY Bilateral     CATARACT    Insertion,catheter,tunneled N/A 2019    [facility]formed by Wade Rodriguez [facility] at [facility]    TURBT (TRANSURETHRAL RESECTION OF BLADDER TUMOR) N/A 2019    [facility] OR    VASECTOMY         Review of patient's allergies indicates:   Allergen Reactions    Lidocaine Nausea Only     NOVACAINE --  Severe nausea    Statins-hmg-coa reductase inhibitors Anxiety       Family History     Problem Relation (Age of Onset)    Cancer Father, Sister, Brother    Heart disease Father        Tobacco Use    Smoking status: Former Smoker     Packs/day: 0.25     Years: 20.00     Pack years: 5.00     Types: Cigars     Last attempt to quit: 1982     Years since quittin.3    Smokeless tobacco: Never Used   Substance and Sexual Activity    Alcohol use: No    Drug use: No    Sexual activity: Not on file       Review of Systems   Constitutional: Positive for fatigue. Negative for chills and fever.   Respiratory: Positive for shortness of breath. Negative for cough, chest tightness and wheezing.    Gastrointestinal: Positive for blood in stool, diarrhea and nausea. Negative for abdominal pain and vomiting.   Genitourinary: Negative for decreased urine volume, flank pain and hematuria.   Musculoskeletal: Negative for joint swelling, neck pain and neck stiffness.   Skin: Negative for pallor, rash and wound.   Neurological: Positive for dizziness, weakness and light-headedness. Negative for syncope and speech difficulty.   Psychiatric/Behavioral: Negative for agitation and confusion. The patient is not hyperactive.      Objective:     Vital Signs (Most Recent):  Temp: 98.8 °F (37.1 °C) (06/04/19 0345)  Pulse: 93 (06/04/19 0400)  Resp: (!) 25 (06/04/19 0400)  BP: (!) 103/56 (06/04/19 0400)  SpO2: 100 % (06/04/19 0400) Vital Signs (24h Range):  Temp:  [97.5 °F (36.4 °C)-98.8 °F (37.1 °C)] 98.8 °F (37.1 °C)  Pulse:  [] 93  Resp:  [14-29] 25  SpO2:  [92 %-100 %] 100 %  BP: ()/(43-59) 103/56  Arterial Line BP: (100)/(57) 100/57   Weight: 126 kg (277 lb 12.5 oz)  Body mass index is 35.66 kg/m².      Intake/Output Summary (Last 24 hours) at 6/4/2019 0428  Last data filed at 6/4/2019 0400  Gross per 24 hour   Intake 2458 ml   Output 100 ml   Net 2358 ml       Physical Exam   Constitutional: He is oriented to person, place, and time. He appears well-developed and well-nourished. No distress.   HENT:   Head: Normocephalic and atraumatic.   Right Ear: External ear normal.   Left Ear: External ear normal.   Nose: Nose normal.   Mouth/Throat: Oropharynx is clear and moist.   Eyes: Pupils are equal, round, and reactive to light. Conjunctivae and EOM are normal.   Neck: Normal range of motion. Neck supple.   Cardiovascular: Normal rate, regular rhythm, normal heart sounds and intact distal pulses.   Pulmonary/Chest: Effort  normal and breath sounds normal. No respiratory distress. He has no wheezes. He has no rales.   Abdominal: Soft. Bowel sounds are normal. He exhibits no distension. There is no tenderness.   Musculoskeletal: He exhibits edema. He exhibits no tenderness or deformity.   Neurological: He is alert and oriented to person, place, and time.   Skin: Skin is warm and dry. Capillary refill takes 2 to 3 seconds. He is not diaphoretic.   Psychiatric: He has a normal mood and affect. His behavior is normal. Judgment and thought content normal.   Nursing note and vitals reviewed.      Vents:  Vent Mode: Spont (05/31/19 1631)  Ventilator Initiated: Yes (05/31/19 0625)  Set Rate: 18 bmp (05/31/19 1631)  Vt Set: 480 mL (05/31/19 1631)  Pressure Support: 10 cmH20 (05/31/19 1631)  PEEP/CPAP: 5 cmH20 (05/31/19 1631)  Oxygen Concentration (%): 30 (06/04/19 0400)  Peak Airway Pressure: 15 cmH2O (05/31/19 1631)  Plateau Pressure: 0 cmH20 (05/31/19 1631)  Total Ve: 3.66 mL (05/31/19 1631)  Negative Inspiratory Force (cm H2O): -27 (05/31/19 1751)  F/VT Ratio<105 (RSBI): (!) 84.68 (05/31/19 1631)  Lines/Drains/Airways     Central Venous Catheter Line                 Hemodialysis Catheter 05/16/19 1600 right subclavian 18 days          Drain                 Rectal Tube 06/03/19 0000 rectal tube w/ balloon (indicate number of mLs) 1 day          Peripheral Intravenous Line                 Peripheral IV - Single Lumen 06/01/19 1309 22 G Right Forearm 2 days         Peripheral IV - Single Lumen 06/03/19 1100 20 G;1 3/4 in Left Forearm less than 1 day              Significant Labs:    CBC/Anemia Profile:  Recent Labs   Lab 06/03/19  0804 06/03/19  1737 06/04/19  0110 06/04/19  0112   WBC 13.41* 13.80*  --  9.04   HGB 8.1* 11.6*  --  5.7*   HCT 26.7* 39.1* 17* 18.9*   * 131*  --  112*   * 106*  --  105*   RDW 17.9* 18.4*  --  17.8*        Chemistries:  Recent Labs   Lab 06/02/19  0509 06/03/19  0345 06/04/19  0112    137 137    K 5.3* 5.2* 5.0    107 109   CO2 19* 18* 18*   BUN 26* 39* 58*   CREATININE 3.5* 4.7* 6.0*   CALCIUM 8.7 8.4* 8.1*   ALBUMIN 2.5* 2.4* 2.0*   PROT 6.1 5.6* 4.8*   BILITOT 0.7 0.5 0.4   ALKPHOS 94 96 67   ALT 11 9* 8*   AST 20 17 16   MG 2.2 2.3 2.3   PHOS 6.5* 7.7* 8.7*       All pertinent labs within the past 24 hours have been reviewed.    Significant Imaging: I have reviewed and interpreted all pertinent imaging results/findings within the past 24 hours.

## 2019-06-04 NOTE — PROGRESS NOTES
Patient received in room 6077. Team notified and proceeded @ bedside. Patient O2, leads, BP cuff, connected to monitor. Bed wheels locked, in low position. Call light within reach. Patient needs assessed. BARBIE.

## 2019-06-04 NOTE — TREATMENT PLAN
EGD done today    Impression:           - Normal esophagus.                        - A medium amount of food (residue) in the stomach.                        - One non-obstructing oozing duodenal ulcer with a                         visible vessel. There is no evidence of                         perforation. Injected. Treatment not successful.                         Treated with bipolar cautery.                        - No specimens collected.  Recommendation:       - Return patient to ICU for ongoing care.                        - If bleeding worsens refer to interventional                         radiology for embolization as this has been treated                         endoscopically twice. Clip placed for marking.

## 2019-06-04 NOTE — CONSULTS
Patient evaluated by and admitted to Critical Care Medicine. Full H&P to follow.    Laurie Marin NP  Critical Care Medicine

## 2019-06-04 NOTE — ASSESSMENT & PLAN NOTE
--see GIB  --started on levo; titrate off as tolerated as blood products go in  --in event picture is complicated by possible septic shock, pt re-cultured  --continuing zosyn started for previous suspicion of aspiration pna (day #5)  --as leukocytosis is improving and hemorrhage is most likely source of shock, will not escalate antibiotic therapy at this time

## 2019-06-04 NOTE — TRANSFER OF CARE
"Anesthesia Transfer of Care Note    Patient: Frank Zayas    Procedure(s) Performed: Procedure(s) (LRB):  EGD (ESOPHAGOGASTRODUODENOSCOPY) (N/A)    Patient location: ICU    Anesthesia Type: general    Post pain: adequate analgesia    Post vital signs: stable    Level of consciousness: sedated    Nausea/Vomiting: no nausea/vomiting    Complications: cardiovascular complications    Transfer of care protocol was followed      Last vitals:   Visit Vitals  /61 (BP Location: Left arm, Patient Position: Lying)   Pulse 86   Temp 36.6 °C (97.8 °F) (Axillary)   Resp (!) 32   Ht 6' 2" (1.88 m)   Wt 126 kg (277 lb 12.5 oz)   SpO2 100%   BMI 35.66 kg/m²     "

## 2019-06-04 NOTE — SIGNIFICANT EVENT
Significant Event  Hospital Medicine    CC:  GIB (gastrointestinal bleeding)  Date:  06/04/2019  Admit Date:  5/31/2019  Hospital Length of Stay:  4    Code Status:  Full Code     SUBJECTIVE:     Significant Events:  Called urgently to the bedside by nurse to evaluate patient for hypotension.  Patient with BP 74/48.  Patient alert and oriented.  Complains of pain at rectal tube site.  Stool melanic.  IV bolus started.  CBC, CMP, coags ordered.  POCT Hct 17.  CCM at bedside and will take patient.        OBJECTIVE:     Physical Exam:  Last Vitals:   Vitals:    06/04/19 0215   BP: (!) 94/51   Pulse: (!) 118   Resp: (!) 29   Temp: 98.2 °F (36.8 °C)     GA:  No acute distress.  HEENT:  PERRL.  No scleral icterus or JVD.   Pulmonary:  Clear to auscultation A/P/L.  No wheezing, crackles, or rhonchi.  Cardiac:  RRR, S1 & S2 w/o rubs/murmurs/gallops.   Abdominal:  Bowel sounds present x 4. No appreciable hepatosplenomegaly.  Neuro:  --GCS:  15  --CN II-XII grossly intact.  Corneal reflex, gag, cough intact.  --Extremity strength and sensation intact x 4.     ABG  Recent Labs   Lab 06/04/19  0110   PH 7.108*   PO2 29*   PCO2 64.9*   HCO3 20.5*   BE -9         ASSESSMENT AND PLAN/INTERVENTIONS:     1) Hypotension  Plan/Interventions:  - IV bolus initiated with no improvement.  - VBG noted above.  BiPAP ordered.  - CCM to take patient.  - PRBC and levo started by CCM with improvement to .    Uninterrupted Critical Care/Counseling Time (not including procedures):  60 minutes    DANTE Haddad, PA-C  Hospital Medicine Department  Staff:  Dr. Bourgeois

## 2019-06-04 NOTE — SIGNIFICANT EVENT
Rapid response nurse called for VBG.  VBG done with results reported to LISET FERNANDO and ANA Marin NP. Pt care escalated and pt sent to CCU [id number].  Pt placed on BiPAP on arrival, and report given to JOSE ALFREDO Bland.

## 2019-06-04 NOTE — ASSESSMENT & PLAN NOTE
79 year old man with CAD s/p remote CABG, HTN, HLD, ESRD on HD, PAF and HFpEF who presented with GI bleed c/b hemorrhagic shock leading to secondary cardiac arrest. Per reports, possibly PEA? This was highly unlikely an acute plaque rupture given that patient is being treated for hemorrhagic shock. This is likely demand ischemia and patient needs to be transfused and treated as such. Agree with holding antiplatelets and anticoagulation for now. On statin    --hold antiplatelets and anticoagulation  --once cleared from GI, restart ASA 81mg  --continue statin  --once hemodynamically stable and off pressors, restart metoprolol  --check echo with CFD once acute bleeding issues resolve    Discussed with primary team. We will follow peripherally, please call with questions.

## 2019-06-04 NOTE — PLAN OF CARE
Problem: Adult Inpatient Plan of Care  Goal: Patient-Specific Goal (Individualization)  Outcome: Ongoing (interventions implemented as appropriate)      Pulmonary: Pt. Was intubated today prior to EGD. Pt. Remains intubated due to acute changes in condition during procedure. AC; RR 20; FIO2 40%; PEEP of 10. SPO2 %.    Cardiovascular: Pt. Remains NSR. HR 70s-80s. Pt. Experiences several pauses throughout this shift. Pt. Experienced brief cardiac arrest prior to EDG procedure. ROSC shortly achieved. SBP 100s-120s. MAP > 65.    Neurological: Pt. Is able to follow command. Pt. Is lightly sedated on Fentanyl. Pt. Is afebrile throughout shift.     Gastrointestinal: Pt. Has flexi seal in place. Loose BM evacuated from flexi.     Genitourinary: Pt. Anuric.     Endocrine: accuchecks q. 6hrs.     Skin/Bath: skin breakdown noted to bilateral shins.     Date of last CHG bath given: 6/3 am shift    Infusions: Levo, Fentanyl, Protonix     Patient progressing towards goals as tolerated, plan of care communicated and reviewed with Frank Zayas and family. All concerns addressed. Will continue to monitor.

## 2019-06-04 NOTE — ANESTHESIA POSTPROCEDURE EVALUATION
Anesthesia Post Evaluation    Patient: Frank Zayas    Procedure(s) Performed: Procedure(s) (LRB):  EGD (ESOPHAGOGASTRODUODENOSCOPY) (N/A)    Final Anesthesia Type: general  Patient location during evaluation: ICU  Patient participation: No - Unable to Participate, Intubation  Level of consciousness: sedated  Post-procedure vital signs: reviewed and stable  Pain management: adequate  Airway patency: patent  PONV status at discharge: No PONV  Anesthetic complications: yes  Perioperative Events: arrhythmias requiring treatment and cardiac arrest    Cardiovascular status: hemodynamically stable (Pt on baseline infusion of vasopressors prior to procedure. )  Respiratory status: ventilator and ETT  Hydration status: euvolemic  Follow-up not needed.          Vitals Value Taken Time   /81 6/4/2019 12:01 PM   Temp 36.6 °C (97.8 °F) 6/4/2019  7:01 AM   Pulse 78 6/4/2019 12:38 PM   Resp 20 6/4/2019 12:38 PM   SpO2 97 % 6/4/2019 12:38 PM   Vitals shown include unvalidated device data.      No case tracking events are documented in the log.      Pain/Giorgi Score: Pain Rating Prior to Med Admin: 0 (6/4/2019 11:00 AM)  Pain Rating Post Med Admin: 0 (6/4/2019 10:41 AM)

## 2019-06-04 NOTE — SUBJECTIVE & OBJECTIVE
Interval History:   Patient evaluated at bedside, required to be intubated this morning after EGD procedure.     Review of patient's allergies indicates:   Allergen Reactions    Lidocaine Nausea Only     NOVACAINE --  Severe nausea    Statins-hmg-coa reductase inhibitors Anxiety     Current Facility-Administered Medications   Medication Frequency    0.9%  NaCl infusion (CRRT USE ONLY) Continuous    0.9%  NaCl infusion (for blood administration) Q24H PRN    0.9%  NaCl infusion (for blood administration) Q24H PRN    albuterol-ipratropium 2.5 mg-0.5 mg/3 mL nebulizer solution 3 mL Q6H PRN    atorvastatin tablet 40 mg Daily    chlorhexidine 0.12 % solution 15 mL BID    dextrose 50% injection 12.5 g PRN    fentaNYL 2500 mcg in 0.9% sodium chloride 250 mL infusion premix (titrating) Continuous    FLUoxetine capsule 20 mg QHS    glucagon (human recombinant) injection 1 mg PRN    insulin aspart U-100 pen 0-5 Units Q6H PRN    magnesium sulfate 2g in water 50mL IVPB (premix) PRN    miconazole nitrate 2% ointment BID    norepinephrine 4 mg in dextrose 5% 250 mL infusion (premix) (titrating) Continuous    ondansetron injection 4 mg Q6H PRN    pantoprazole (PROTONIX) 40 mg in dextrose 5 % 100 mL infusion Continuous    piperacillin-tazobactam 4.5 g in sodium chloride 0.9% 100 mL IVPB (ready to mix system) Q12H    ramelteon tablet 8 mg Nightly PRN    rOPINIRole tablet 2 mg QHS    sodium chloride 0.9% flush 10 mL PRN    sodium phosphate 20.01 mmol in dextrose 5 % 250 mL IVPB PRN    sodium phosphate 30 mmol in dextrose 5 % 250 mL IVPB PRN    sodium phosphate 39.99 mmol in dextrose 5 % 250 mL IVPB PRN       Objective:     Vital Signs (Most Recent):  Temp: 97.8 °F (36.6 °C) (06/04/19 0701)  Pulse: 84 (06/04/19 1300)  Resp: 20 (06/04/19 1300)  BP: 121/74 (06/04/19 1300)  SpO2: 99 % (06/04/19 1300)  O2 Device (Oxygen Therapy): ventilator (06/04/19 1300) Vital Signs (24h Range):  Temp:  [97.5 °F (36.4 °C)-98.8 °F  (37.1 °C)] 97.8 °F (36.6 °C)  Pulse:  [] 84  Resp:  [17-32] 20  SpO2:  [87 %-100 %] 99 %  BP: ()/(43-81) 121/74  Arterial Line BP: (100-152)/(55-62) 129/56     Weight: 126 kg (277 lb 12.5 oz) (06/04/19 0215)  Body mass index is 35.66 kg/m².  Body surface area is 2.56 meters squared.    I/O last 3 completed shifts:  In: 2625.4 [P.O.:240; I.V.:1316.4; Blood:269; NG/GT:700; IV Piggyback:100]  Out: 300 [Stool:300]    Physical Exam   Constitutional: No distress.   Intubated, sedated.    HENT:   Head: Normocephalic and atraumatic.   Mouth/Throat: Oropharynx is clear and moist. No oropharyngeal exudate.   Eyes: Conjunctivae are normal. No scleral icterus.   Neck: No JVD present.   Cardiovascular: Normal rate, regular rhythm, normal heart sounds and intact distal pulses.   Pulmonary/Chest: Effort normal and breath sounds normal. No respiratory distress.   Abdominal: Soft. Bowel sounds are normal. He exhibits no distension. There is no tenderness.   Musculoskeletal: He exhibits no edema or tenderness.   Lymphadenopathy:     He has no cervical adenopathy.   Neurological: He is alert.   Skin: Skin is warm. Capillary refill takes less than 2 seconds. He is not diaphoretic.   Nursing note and vitals reviewed.      Significant Labs:  ABGs:   Recent Labs   Lab 06/04/19  1233   PH 7.328*   PCO2 31.0*   HCO3 16.3*   POCSATURATED 100   BE -10     BMP:   Recent Labs   Lab 06/04/19  0925   *      CO2 12*   BUN 62*   CREATININE 6.2*   CALCIUM 8.0*   MG 2.2     CBC:   Recent Labs   Lab 06/04/19  1358   WBC 18.36*   RBC 2.47*   HGB 7.6*   HCT 23.6*      MCV 96   MCH 30.8   MCHC 32.2     CMP:   Recent Labs   Lab 06/04/19  0925   *   CALCIUM 8.0*   ALBUMIN 2.1*   PROT 4.9*   *   K 5.2*   CO2 12*      BUN 62*   CREATININE 6.2*   ALKPHOS 65   ALT 7*   AST 13   BILITOT 0.5     All labs within the past 24 hours have been reviewed.

## 2019-06-04 NOTE — [employer] PATIENT INSTRUCTIONS
Discharge Summary/Instructions after an Endoscopic Procedure  Patient Name: Frank Zayas  Patient MRN: [id number]  Patient YOB: 1940 Tuesday, June 04, 2019  Clint Dietz MD  RESTRICTIONS:  During your procedure today, you received medications for sedation.  These   medications may affect your judgment, balance and coordination.  Therefore,   for 24 hours, you have the following restrictions:   - DO NOT drive a car, operate machinery, make legal/financial decisions,   sign important papers or drink alcohol.    ACTIVITY:  Today: no heavy lifting, straining or running due to procedural   sedation/anesthesia.  The following day: return to full activity including work.  DIET:  Eat and drink normally unless instructed otherwise.     TREATMENT FOR COMMON SIDE EFFECTS:  - Mild abdominal pain, nausea, belching, bloating or excessive gas:  rest,   eat lightly and use a heating pad.  - Sore Throat: treat with throat lozenges and/or gargle with warm salt   water.  - Because air was used during the procedure, expelling large amounts of air   from your rectum or belching is normal.  - If a bowel prep was taken, you may not have a bowel movement for 1-3 days.    This is normal.  SYMPTOMS TO WATCH FOR AND REPORT TO YOUR PHYSICIAN:  1. Abdominal pain or bloating, other than gas cramps.  2. Chest pain.  3. Back pain.  4. Signs of infection such as: chills or fever occurring within 24 hours   after the procedure.  5. Rectal bleeding, which would show as bright red, maroon, or black stools.   (A tablespoon of blood from the rectum is not serious, especially if   hemorrhoids are present.)  6. Vomiting.  7. Weakness or dizziness.  GO DIRECTLY TO THE NEAREST EMERGENCY ROOM IF YOU HAVE ANY OF THE FOLLOWING:      Difficulty breathing              Chills and/or fever over 101 F   Persistent vomiting and/or vomiting blood   Severe abdominal pain   Severe chest pain   Black, tarry stools   Bleeding- more than one tablespoon   Any  other symptom or condition that you feel may need urgent attention  Your doctor recommends these additional instructions:  If any biopsies were taken, your doctors clinic will contact you in 1 to 2   weeks with any results.  - Return patient to ICU for ongoing care.   - If bleeding worsens refer to interventional radiology for embolization as   this has been treated endoscopically twice. Clip placed for marking.  - The findings and recommendations were discussed with the referring   physician.  For questions, problems or results please call your physician - Clint Dietz MD at Work:  [phone].  [facility] [place] [facility], EMERGENCY ROOM PHONE NUMBER: [phone]  IF A COMPLICATION OR EMERGENCY SITUATION ARISES AND YOU ARE UNABLE TO REACH   YOUR PHYSICIAN - GO DIRECTLY TO THE EMERGENCY ROOM.  Clint Dietz MD  6/4/2019 10:04:24 AM  This report has been verified and signed electronically.  [employer]

## 2019-06-04 NOTE — PROGRESS NOTES
[facility]  Nephrology  Progress Note    Patient Name: Frank Zayas  MRN: [id number]  Admission Date: 5/31/2019  Hospital Length of Stay: 4 days  Attending Provider: Rubio Yu MD   Primary Care Physician: Mikhail Shields MD  Principal Problem:GIB (gastrointestinal bleeding)    Subjective:     HPI: 79 year old man with history of T2DM, ESRD on HD (started 1-2 ms ago), HFpEF, and CAD s/p CABG on plavix who initially presented to [facility] for complaints of abdominal pain and hematochezia. He was transfused 1u pRBC at OSH and transferred to [facility] for a cardiac evaluation after being found with a troponin of 0.39. At [facility], he was started on levophed for shock and suffered brief episode of PEA arrest which resolved with resuscitation. At OSH underwent EGD which was notable for actively bleeding duodenal ulcer s/p hemostasis with epi and clip. Due to concern for rebleeding he was transferred to [facility] for potential IR therapy. According to the family, the patient was last dialyze on yesterday, 5/30, at the OSH. He was recently started on dialysis about 1-2 months ago. His renal failure is likely due to his co morbidities. He typically dialyze on T/Th/Sat for 4 hrs at [facility] in [place] via a L permacath. Nephrology consulted for management of ESRD and HD treatment.        Interval History:   Patient evaluated at bedside, required to be intubated this morning after EGD procedure.     Review of patient's allergies indicates:   Allergen Reactions    Lidocaine Nausea Only     NOVACAINE --  Severe nausea    Statins-hmg-coa reductase inhibitors Anxiety     Current Facility-Administered Medications   Medication Frequency    0.9%  NaCl infusion (CRRT USE ONLY) Continuous    0.9%  NaCl infusion (for blood administration) Q24H PRN    0.9%  NaCl infusion (for blood administration) Q24H PRN    albuterol-ipratropium 2.5 mg-0.5 mg/3 mL nebulizer solution 3 mL Q6H PRN    atorvastatin  tablet 40 mg Daily    chlorhexidine 0.12 % solution 15 mL BID    dextrose 50% injection 12.5 g PRN    fentaNYL 2500 mcg in 0.9% sodium chloride 250 mL infusion premix (titrating) Continuous    FLUoxetine capsule 20 mg QHS    glucagon (human recombinant) injection 1 mg PRN    insulin aspart U-100 pen 0-5 Units Q6H PRN    magnesium sulfate 2g in water 50mL IVPB (premix) PRN    miconazole nitrate 2% ointment BID    norepinephrine 4 mg in dextrose 5% 250 mL infusion (premix) (titrating) Continuous    ondansetron injection 4 mg Q6H PRN    pantoprazole (PROTONIX) 40 mg in dextrose 5 % 100 mL infusion Continuous    piperacillin-tazobactam 4.5 g in sodium chloride 0.9% 100 mL IVPB (ready to mix system) Q12H    ramelteon tablet 8 mg Nightly PRN    rOPINIRole tablet 2 mg QHS    sodium chloride 0.9% flush 10 mL PRN    sodium phosphate 20.01 mmol in dextrose 5 % 250 mL IVPB PRN    sodium phosphate 30 mmol in dextrose 5 % 250 mL IVPB PRN    sodium phosphate 39.99 mmol in dextrose 5 % 250 mL IVPB PRN       Objective:     Vital Signs (Most Recent):  Temp: 97.8 °F (36.6 °C) (06/04/19 0701)  Pulse: 84 (06/04/19 1300)  Resp: 20 (06/04/19 1300)  BP: 121/74 (06/04/19 1300)  SpO2: 99 % (06/04/19 1300)  O2 Device (Oxygen Therapy): ventilator (06/04/19 1300) Vital Signs (24h Range):  Temp:  [97.5 °F (36.4 °C)-98.8 °F (37.1 °C)] 97.8 °F (36.6 °C)  Pulse:  [] 84  Resp:  [17-32] 20  SpO2:  [87 %-100 %] 99 %  BP: ()/(43-81) 121/74  Arterial Line BP: (100-152)/(55-62) 129/56     Weight: 126 kg (277 lb 12.5 oz) (06/04/19 0215)  Body mass index is 35.66 kg/m².  Body surface area is 2.56 meters squared.    I/O last 3 completed shifts:  In: 2625.4 [P.O.:240; I.V.:1316.4; Blood:269; NG/GT:700; IV Piggyback:100]  Out: 300 [Stool:300]    Physical Exam   Constitutional: No distress.   Intubated, sedated.    HENT:   Head: Normocephalic and atraumatic.   Mouth/Throat: Oropharynx is clear and moist. No oropharyngeal exudate.    Eyes: Conjunctivae are normal. No scleral icterus.   Neck: No JVD present.   Cardiovascular: Normal rate, regular rhythm, normal heart sounds and intact distal pulses.   Pulmonary/Chest: Effort normal and breath sounds normal. No respiratory distress.   Abdominal: Soft. Bowel sounds are normal. He exhibits no distension. There is no tenderness.   Musculoskeletal: He exhibits no edema or tenderness.   Lymphadenopathy:     He has no cervical adenopathy.   Neurological: He is alert.   Skin: Skin is warm. Capillary refill takes less than 2 seconds. He is not diaphoretic.   Nursing note and vitals reviewed.      Significant Labs:  ABGs:   Recent Labs   Lab 06/04/19  1233   PH 7.328*   PCO2 31.0*   HCO3 16.3*   POCSATURATED 100   BE -10     BMP:   Recent Labs   Lab 06/04/19  0925   *      CO2 12*   BUN 62*   CREATININE 6.2*   CALCIUM 8.0*   MG 2.2     CBC:   Recent Labs   Lab 06/04/19  1358   WBC 18.36*   RBC 2.47*   HGB 7.6*   HCT 23.6*      MCV 96   MCH 30.8   MCHC 32.2     CMP:   Recent Labs   Lab 06/04/19  0925   *   CALCIUM 8.0*   ALBUMIN 2.1*   PROT 4.9*   *   K 5.2*   CO2 12*      BUN 62*   CREATININE 6.2*   ALKPHOS 65   ALT 7*   AST 13   BILITOT 0.5     All labs within the past 24 hours have been reviewed.       Assessment/Plan:     Anemia of renal disease  - S/P EGD with oozing duodenal ulcer   - Hgb has dropped down to 5.7 (has received 2 units PRBC)    ESRD (end stage renal disease)  79 year old man with history of T2DM, ESRD on HD (started 1-2 ms ago), HFpEF, CAD s/p CABG on plavix, who is presenting from outside hospital due to concern for duodenal ulcer. Nephrology consulted for ESRD and HD management. Last dialyze at the outside hospital per family.     Patient intubated and sedated on pressor support (Levophed)     S/P EGD with oozing duodenal ulcer     Plan:  - Will start patient on SLED for clearance and volume removal,  cc/hr, bath adjusted to chem   -  Continue with pressor to maintain MAP> 65       Jovan Henley  Nephrology  Fellow  [facility]    Pager [phone]

## 2019-06-04 NOTE — CARE UPDATE
"RAPID RESPONSE NURSE PROACTIVE ROUNDING NOTE     Time of Visit: 0100    Admit Date: 2019  LOS: 4  Code Status: Full Code   Date of Visit: 2019  : 1940  Age: 79 y.o.  Sex: male  Race: White  Bed: 6077/6077 A:   MRN: [id number]  Was the patient discharged from an ICU this admission? yes   Was the patient discharged from a PACU within last 24 hours?  no  Did the patient receive conscious sedation/general anesthesia in last 24 hours?  no  Was the patient in the ED within the past 24 hours?  no  Was the patient started on NIPPV within the past 24 hours?  no  Attending Physician: Rubio Yu MD  Primary Service: [facility]ITICAL CARE MEDICINE    ASSESSMENT     Diagnosis: GIB (gastrointestinal bleeding)    Abnormal Vital Signs: BP (!) 108/57   Pulse 85   Temp 98.8 °F (37.1 °C) (Axillary)   Resp 18   Ht 6' 2" (1.88 m)   Wt 126 kg (277 lb 12.5 oz)   SpO2 100%   BMI 35.66 kg/m²      Clinical Issues: Circulatory    Patient  has a past medical history of Anticoagulant long-term use, Arthritis, Bladder cancer, Blood transfusion, CHF (congestive heart failure), Chronic kidney disease, COPD (chronic obstructive pulmonary disease), Coronary artery disease, Diabetes mellitus, Gout, [place] (hard of hearing), Hyperlipidemia, Hypertension, Myocardial infarction, RLS (restless legs syndrome), Sleep apnea, and Wears glasses.    Called by the primary RN to the bedside for hypotension.  Upon arrival pt's BP 70's/40's.  Pt alert and oriented.       INTERVENTIONS/ RECOMMENDATIONS     KASSIE Madrigal called and made aware of pt's BP.  LISET Patel called ANA Marin, critical care NP.  Both providers came to bedside.  NS bolus started.  Labs drawn including lactate.  VBG done. Both providers aware of results.  Pt's BP did not respond to fluid.  Levophed gtt started.      Discussed plan of care with RNLucina.    PHYSICIAN ESCALATION     Yes/No  yes    Orders received and case discussed with KASSIE Madrigal, and ANA Marin, " NP.    Disposition: Tx in ICU bed 6077.    FOLLOW-UP     Call back the Rapid Response Nurse, Katerin Parker RN at [phone] for additional questions or concerns.

## 2019-06-04 NOTE — INTERVAL H&P NOTE
The patient has been examined and the H&P has been reviewed:    I concur with the findings and no changes have occurred since H&P was written.     Drop in Hb last night. Unable to scope yesterday earlier in the day as he had eaten and as stable at the time.  Came up this morning and during intubation patient had brief cardiac arrest and resuscitated by ICU team.  Now stable, will attempt EGD to see if we can slow down bleeding, noting that he is critically ill and this is high risk.    Procedure - EGD  Neck - supple  Plan of anesthesia - General  ASA - per anesthesia  Mallampati - per anesthesia  Anesthesia problems - no  Family history of anesthesia problems - no      Anesthesia/Surgery risks, benefits and alternative options discussed and understood by patient/family.          Active Hospital Problems    Diagnosis  POA    *GIB (gastrointestinal bleeding) [K92.2]  Yes    Acute gastrointestinal bleeding [K92.2]  Yes    Hypotension due to blood loss [I95.89]  Unknown    Dermatitis associated with moisture [L30.8]  Yes    Multiple skin tears [T14.8XXA]  Yes    Hemorrhagic shock [R57.8]  Yes    Restless leg syndrome [G25.81]  Yes    ESRD (end stage renal disease) [N18.6]  Yes    Acute hypercapnic respiratory failure [J96.02]  Yes    A-fib [I48.91]  Yes    DM type 2 with diabetic mixed hyperlipidemia [E11.69, E78.2]  Yes    Coronary artery disease [I25.10]  Yes    HTN (hypertension) [I10]  Yes    Debility [R53.81]  Yes    CHF (congestive heart failure) [I50.9]  Yes      Resolved Hospital Problems    Diagnosis Date Resolved POA    Aspiration pneumonia [J69.0] 06/02/2019 Yes    Acute gastrointestinal bleeding [K92.2] 06/02/2019 Yes    PEA (Pulseless electrical activity) [I46.9] 06/02/2019 No    Elevated troponin I level [R74.8] 06/02/2019 Unknown

## 2019-06-04 NOTE — H&P
[facility]  Critical Care Medicine  History & Physical    Patient Name: Frank Zayas  MRN: [id number]  Admission Date: 5/31/2019  Hospital Length of Stay: 4 days  Code Status: Full Code  Attending Physician: Rubio Yu MD   Primary Care Provider: Mikhail Shields MD   Principal Problem: GIB (gastrointestinal bleeding)    Subjective:     HPI:  80 yo M with morbid obesity, ESRD on HD (TTS, 2 months), Afib, CHF, DM2 (on insulin), CAD s/p CABG, HLD, COPD, HTN transferred from [facility] ([facility]  He presented with hematochezia and abdominal pain.  He went to [facility] ER for lower GI bleed and received 1 u PRBC and then was transferred to [facility] for cardiac evaluation when trop was 0.39.  At [facility] he had continued hyperkalemia (K of 6.1) after being treated for it at [facility].  He was started on levophed for hypovolemic shock, and an art line and central line was placed.  During line placement, he briefly lost a pulse (PEA) and CPR was started.  He was given epinephrine, calcium gluconate, sodium bicarb, and intubated emergently.  He achieved ROSC.  He received IVF and 3-4 units PRBC.  He underwent emergent upper and lower endoscopy.  They found a large vessel in the duodenum, actively oozing with copious amounts of fresh bright red blood pumping out.  GI placed a clop on the artery and injected epinephrine, achieving hemostasis.  There was concern that the vessel could be directly assosiated with a larger vessel (ie GDA).  There were no varices seen.  Both GI and Gen Surg recommended transfer to [facility] for arteriovenous embolization which couldn't be performed there.      Patient admitted to Critical Care Medicine on 5/31/2019 for evaluation and management of acute upper GI bleed (s/p EGD with clipping of bleeding visible vessel at OSH).  Pt arrived intubated following PEA arrest at OSH.  Interventional Radiology consulted and recommended CTA abdomen/pelvis, however no evidence of active  GI bleeding on imaging.  Gastroenterology consulted and recommended continue IV Protonix gtt for a total of 72 hours.  Nephrology consulted for dialysis in setting of ESRD.  Patient passed SAT/SBT and was successfully extubated on the afternoon of 5/31.  On 6/1, as pt remained hemodynamically stable and his hemoglobin remained stable, pt stepped down to Hospital Medicine.    The evening of 6/2 into 6/3 the patient had several melanotic stools with slight downtrend in Hgb (10 > 8), however hemodynamics remained stable. Repeat CTA at that time did not show any evidence of active bleeding, however duodenal clip was noted to have migrated into distal small bowel. GI with plans to repeat EGD on 6/4.     Around 1am on 6/4, the patient became hypotensive (70s/40s). Bedside POC showed Hct 17. Decision made to move patient back to ICU for further management.       Hospital/ICU Course:  No notes on file     Past Medical History:   Diagnosis Date    Anticoagulant long-term use     Arthritis     Bladder cancer     Blood transfusion     CHF (congestive heart failure)     Chronic kidney disease     COPD (chronic obstructive pulmonary disease)     Coronary artery disease     Diabetes mellitus     Gout     [place] (hard of hearing)     HAS BILAT AIDS BUT DOES NOT WEAR    Hyperlipidemia     Hypertension     Myocardial infarction     RLS (restless legs syndrome)     Sleep apnea     NO MACHINE    Wears glasses        Past Surgical History:   Procedure Laterality Date    CARDIAC SURGERY      CABG X 2 1997    CATARACT EXTRACTION, BILATERAL      CHOLECYSTECTOMY      COLONOSCOPY      CORONARY ARTERY BYPASS GRAFT      x 2    coronary stents      CYSTOSCOPY      CYSTOSCOPY N/A 4/8/2019    [facility] at [facility]    EXCISION-BLADDER TUMOR-TRANSURETHRAL (TURBT) N/A 9/8/2014    [facility]formed [facility] at [facility] OR    EXCISION-BLADDER TUMOR-TRANSURETHRAL (TURBT) N/A 11/18/2013    Performed by  [facility] at [facility] OR    EYE SURGERY Bilateral     CATARACT    Insertion,catheter,tunneled N/A 2019    Performed by Wade Rodriguez [facility] at [facility] OR    TURBT (TRANSURETHRAL RESECTION OF BLADDER TUMOR) N/A 2019    Performed by [facility] at [facility] OR    VASECTOMY         Review of patient's allergies indicates:   Allergen Reactions    Lidocaine Nausea Only     NOVACAINE --  Severe nausea    Statins-hmg-coa reductase inhibitors Anxiety       Family History     Problem Relation (Age of Onset)    Cancer Father, Sister, Brother    Heart disease Father        Tobacco Use    Smoking status: Former Smoker     Packs/day: 0.25     Years: 20.00     Pack years: 5.00     Types: Cigars     Last attempt to quit: 1982     Years since quittin.3    Smokeless tobacco: Never Used   Substance and Sexual Activity    Alcohol use: No    Drug use: No    Sexual activity: Not on file      Review of Systems   Constitutional: Positive for fatigue. Negative for chills and fever.   Respiratory: Positive for shortness of breath. Negative for cough, chest tightness and wheezing.    Gastrointestinal: Positive for blood in stool, diarrhea and nausea. Negative for abdominal pain and vomiting.   Genitourinary: Negative for decreased urine volume, flank pain and hematuria.   Musculoskeletal: Negative for joint swelling, neck pain and neck stiffness.   Skin: Negative for pallor, rash and wound.   Neurological: Positive for dizziness, weakness and light-headedness. Negative for syncope and speech difficulty.   Psychiatric/Behavioral: Negative for agitation and confusion. The patient is not hyperactive.      Objective:     Vital Signs (Most Recent):  Temp: 98.8 °F (37.1 °C) (19 0345)  Pulse: 93 (19 040)  Resp: (!) 25 (19 040)  BP: (!) 103/56 (19 0400)  SpO2: 100 % (19) Vital Signs (24h Range):  Temp:  [97.5 °F (36.4 °C)-98.8 °F (37.1 °C)] 98.8 °F (37.1 °C)  Pulse:  []  93  Resp:  [14-29] 25  SpO2:  [92 %-100 %] 100 %  BP: ()/(43-59) 103/56  Arterial Line BP: (100)/(57) 100/57   Weight: 126 kg (277 lb 12.5 oz)  Body mass index is 35.66 kg/m².      Intake/Output Summary (Last 24 hours) at 6/4/2019 0428  Last data filed at 6/4/2019 0400  Gross per 24 hour   Intake 2458 ml   Output 100 ml   Net 2358 ml       Physical Exam   Constitutional: He is oriented to person, place, and time. He appears well-developed and well-nourished. No distress.   HENT:   Head: Normocephalic and atraumatic.   Right Ear: External ear normal.   Left Ear: External ear normal.   Nose: Nose normal.   Mouth/Throat: Oropharynx is clear and moist.   Eyes: Pupils are equal, round, and reactive to light. Conjunctivae and EOM are normal.   Neck: Normal range of motion. Neck supple.   Cardiovascular: Normal rate, regular rhythm, normal heart sounds and intact distal pulses.   Pulmonary/Chest: Effort normal and breath sounds normal. No respiratory distress. He has no wheezes. He has no rales.   Abdominal: Soft. Bowel sounds are normal. He exhibits no distension. There is no tenderness.   Musculoskeletal: He exhibits edema. He exhibits no tenderness or deformity.   Neurological: He is alert and oriented to person, place, and time.   Skin: Skin is warm and dry. Capillary refill takes 2 to 3 seconds. He is not diaphoretic.   Psychiatric: He has a normal mood and affect. His behavior is normal. Judgment and thought content normal.   Nursing note and vitals reviewed.      Vents:  Vent Mode: Spont (05/31/19 1631)  Ventilator Initiated: Yes (05/31/19 0625)  Set Rate: 18 bmp (05/31/19 1631)  Vt Set: 480 mL (05/31/19 1631)  Pressure Support: 10 cmH20 (05/31/19 1631)  PEEP/CPAP: 5 cmH20 (05/31/19 1631)  Oxygen Concentration (%): 30 (06/04/19 0400)  Peak Airway Pressure: 15 cmH2O (05/31/19 1631)  Plateau Pressure: 0 cmH20 (05/31/19 1631)  Total Ve: 3.66 mL (05/31/19 1631)  Negative Inspiratory Force (cm H2O): -27 (05/31/19  1751)  F/VT Ratio<105 (RSBI): (!) 84.68 (05/31/19 1631)  Lines/Drains/Airways     Central Venous Catheter Line                 Hemodialysis Catheter 05/16/19 1600 right subclavian 18 days          Drain                 Rectal Tube 06/03/19 0000 rectal tube w/ balloon (indicate number of mLs) 1 day          Peripheral Intravenous Line                 Peripheral IV - Single Lumen 06/01/19 1309 22 G Right Forearm 2 days         Peripheral IV - Single Lumen 06/03/19 1100 20 G;1 3/4 in Left Forearm less than 1 day              Significant Labs:    CBC/Anemia Profile:  Recent Labs   Lab 06/03/19  0804 06/03/19  1737 06/04/19  0110 06/04/19  0112   WBC 13.41* 13.80*  --  9.04   HGB 8.1* 11.6*  --  5.7*   HCT 26.7* 39.1* 17* 18.9*   * 131*  --  112*   * 106*  --  105*   RDW 17.9* 18.4*  --  17.8*        Chemistries:  Recent Labs   Lab 06/02/19  0509 06/03/19  0345 06/04/19  0112    137 137   K 5.3* 5.2* 5.0    107 109   CO2 19* 18* 18*   BUN 26* 39* 58*   CREATININE 3.5* 4.7* 6.0*   CALCIUM 8.7 8.4* 8.1*   ALBUMIN 2.5* 2.4* 2.0*   PROT 6.1 5.6* 4.8*   BILITOT 0.7 0.5 0.4   ALKPHOS 94 96 67   ALT 11 9* 8*   AST 20 17 16   MG 2.2 2.3 2.3   PHOS 6.5* 7.7* 8.7*       All pertinent labs within the past 24 hours have been reviewed.    Significant Imaging: I have reviewed and interpreted all pertinent imaging results/findings within the past 24 hours.    Assessment/Plan:     Neuro  Restless leg syndrome  --Continue home ropinirole 2mg qHS    Pulmonary  Acute hypercapnic respiratory failure  --secondary to CHF, right pleural effusion, based on body habitus, suspect component of likely BRANDEN/OHS  --VBG with combined respiratory & metabolic acidosis ( 7.1/65/29/21)  --will start on bipap; titrate as tolerated  --currently on day #5 of zosyn started for possible aspiration pna; previous sputum cx with normal respiratory urvashi; given decompensation, will leave abx on for now, however can likely d/c  soon    Cardiac/Vascular  A-fib  - Holding anticoagulation in setting of GI bleed  - Resume Lopressor when stable    Coronary artery disease  - s/p CABG in 1997  - s/p PCI approx. 10 years ago  - Holding home ASA and Plavix in setting of GI bleed  - Resume antiplatelet agents when appropriate    DM type 2 with diabetic mixed hyperlipidemia  --accuchecks Q6 with low dose correctional ssi  --holding home oral antiglycemics while in hospital    HTN (hypertension)  --holding metoprolol in setting of hemorrhagic shock    CHF (congestive heart failure)  --echo (5/5/19) - EF 45%, mildly decreased LV systolic function  --right pleural effusion   --will be cautious with volume given with blood products    Renal/  ESRD (end stage renal disease)  - Pt on HD TThSat as outpatient via right Permacath  - Nephrology following, appreciate assistance  - dialysis per nephro; no indication for emergent dialysis overnight      GI  * GIB (gastrointestinal bleeding)  --EGD @ OSH which showed duodenal ulcer with visible vessel actively bleeding, which was successfully clipped.    --CTA 5/31 & 6/3 without evidence of active GI bleed; duodenal clip has migrated to distal small bowel  -- Continue IV Protonix gtt for total of 72 hours, then 40mg IV BID  --Continue to hold anticoagulation and antiplatelet agents  --H. pylori serum IgG negative  --CBC q4; transfuse for < 7   --discussed decompensation with GI; plan for repeat EGD in am, however if patient unstable, would discuss with IR for possible embolization     Other  Hemorrhagic shock  --see GIB  --started on levo; titrate off as tolerated as blood products go in  --in event picture is complicated by possible septic shock, pt re-cultured  --continuing zosyn started for previous suspicion of aspiration pna (day #5)  --as leukocytosis is improving and hemorrhage is most likely source of shock, will not escalate antibiotic therapy at this time      Critical Care Daily Checklist:    A:  Awake: RASS Goal/Actual Goal: RASS Goal: 0-->alert and calm  Actual: [place] Agitation Sedation Scale (RASS): Alert and calm   B: Spontaneous Breathing Trial Performed? Spon. Breathing Trial Initiated?: Initiated (05/31/19 5490)   C: SAT & SBT Coordinated?  n/a                    D: Delirium: CAM-ICU Overall CAM-ICU: Negative   E: Early Mobility Performed? Yes   F: Feeding Goal:    Status:     Current Diet Order   Procedures    Diet NPO Except for: Medication, Ice Chips, Sips with Medication     Order Specific Question:   Except for     Answer:   Medication     Order Specific Question:   Except for     Answer:   Ice Chips     Order Specific Question:   Except for     Answer:   Sips with Medication      AS: Analgesia/Sedation    T: Thromboembolic Prophylaxis scds   H: HOB > 300 Yes   U: Stress Ulcer Prophylaxis (if needed) ppi   G: Glucose Control ssi   B: Bowel Function Stool Occurrence: 2   I: Indwelling Catheter (Lines & Huynh) Necessity piv x2, permacath, flexiseal   D: De-escalation of Antimicrobials/Pharmacotherapies Zosyn day #5    Plan for the day/ETD EGD    Code Status:  Family/Goals of Care: Full Code  Pt stated he did not want his wife called and informed of transfer back to ICU     Case discussed with [facility] Fellow Dr. Nadira Kamara.     Critical Care Time: 60 minutes  Critical secondary to Patient has a condition that poses threat to life and bodily function: hemorrhagic shock     Critical care was time spent personally by me on the following activities: development of treatment plan with patient or surrogate and bedside caregivers, discussions with consultants, evaluation of patient's response to treatment, examination of patient, ordering and performing treatments and interventions, ordering and review of laboratory studies, ordering and review of radiographic studies, pulse oximetry, re-evaluation of patient's condition. This critical care time did not overlap with that of any other provider or involve  time for any procedures.     Laurie Marin NP  Critical Care Medicine  [facility]

## 2019-06-04 NOTE — ASSESSMENT & PLAN NOTE
--EGD @ OSH which showed duodenal ulcer with visible vessel actively bleeding, which was successfully clipped.    --CTA 5/31 & 6/3 without evidence of active GI bleed; duodenal clip has migrated to distal small bowel  -- Continue IV Protonix gtt for total of 72 hours, then 40mg IV BID  --Continue to hold anticoagulation and antiplatelet agents  --H. pylori serum IgG negative  --CBC q4; transfuse for < 7   --discussed decompensation with GI; plan for repeat EGD in am, however if patient unstable, would discuss with IR for possible embolization

## 2019-06-04 NOTE — ASSESSMENT & PLAN NOTE
--secondary to CHF, right pleural effusion, based on body habitus, suspect component of likely BRANDEN/OHS  --VBG with combined respiratory & metabolic acidosis ( 7.1/65/29/21)  --will start on bipap; titrate as tolerated  --currently on day #5 of zosyn started for possible aspiration pna; previous sputum cx with normal respiratory urvashi; given decompensation, will leave abx on for now, however can likely d/c soon

## 2019-06-04 NOTE — NURSING
MD at bedside to intubate prior to EGD procedure. After sedation and paralytic drugs were given patient became bradycardic and hypotensive. Pt. Experienced brief cardiac arrest. CPR was initiated. ROSC was shortly achieved. VSS. Will continue to monitor. Plans to proceed with EGD procedure.

## 2019-06-04 NOTE — ASSESSMENT & PLAN NOTE
--accuchecks Q6 with low dose correctional ssi  --holding home oral antiglycemics while in hospital

## 2019-06-04 NOTE — TREATMENT PLAN
GI Treatment Plan    Frank Zayas is a 79 y.o. male admitted to hospital 5/31/2019 (Hospital Day: 5) due to GIB (gastrointestinal bleeding).     Interval History  - called by icu team due to patient's transfer to icu  - overnight found to be hypotensive with decline in H&H 8.1 -> 5.7. Ongoing melena.   - currently in ICU on vasopresser (levo 0.12) pending tranfusion    Laboratory    Recent Labs   Lab 06/03/19  0804 06/03/19  1737 06/04/19  0112   HGB 8.1* 11.6* 5.7*       Lab Results   Component Value Date    WBC 9.04 06/04/2019    HGB 5.7 (LL) 06/04/2019    HCT 18.9 (LL) 06/04/2019     (H) 06/04/2019     (L) 06/04/2019       Lab Results   Component Value Date     06/04/2019    K 5.0 06/04/2019     06/04/2019    CO2 18 (L) 06/04/2019    BUN 58 (H) 06/04/2019    CREATININE 6.0 (H) 06/04/2019    CALCIUM 8.1 (L) 06/04/2019    ANIONGAP 10 06/04/2019    ESTGFRAFRICA 9.5 (A) 06/04/2019    EGFRNONAA 8.2 (A) 06/04/2019       Lab Results   Component Value Date    ALT 8 (L) 06/04/2019    AST 16 06/04/2019    ALKPHOS 67 06/04/2019    BILITOT 0.4 06/04/2019       Lab Results   Component Value Date    INR 1.2 06/04/2019    INR 1.1 06/03/2019    INR 1.1 06/02/2019       Plan  - recommend initial management with resuscitation and transfusion with further management pending patient's response  - protonix gtt  - plan for EGD in AM unless patient failing to respond to initial resuscitation  - call with any change in patient's clinical status  - Plan of care was discussed with primary team.  - We will continue to follow.    Thank you for involving us in the care of Frank Zayas. Please call with any additional questions, concerns or changes in the patient's clinical status.    Kalyan Mesa MD  Gastroenterology Fellow, PGY IV  Spectralink: [id number]

## 2019-06-04 NOTE — ASSESSMENT & PLAN NOTE
--echo (5/5/19) - EF 45%, mildly decreased LV systolic function  --right pleural effusion   --will be cautious with volume given with blood products

## 2019-06-04 NOTE — NURSING
Called to bedside to administer Echo contrast.  Pt intubated.  Patient identified by 2 identifiers on armband, allergies reviewed in EPIC.  20g IV in place, flushed w/ 10cc NS pre & post contrast administration.  3cc Optison administered, echo images obtained.  Pt tolerated procedure well.

## 2019-06-04 NOTE — ASSESSMENT & PLAN NOTE
- Pt on HD TThSat as outpatient via right Permacath  - Nephrology following, appreciate assistance  - dialysis per nephro; no indication for emergent dialysis overnight

## 2019-06-04 NOTE — HPI
79 year old man with history of CAD s/p CABG (1997 - unclear anatomy), HTN, HLD, HFpEF with LVEF 45-50%, ESRD on HD, atrial fibrillation who presented with GI bleed and was admitted to ICU 6/3 with hemorrhagic shock. He went for EGD for second time today for treatment. He became bradycardic and briefly lost pulse during case. CPR initiated with ROSC. Intubated, on levophed. Resonding to commands. Cardiology consulted for recommendations for hemorrhagic shock and demand ischemia.

## 2019-06-04 NOTE — PLAN OF CARE
CM discussed pt D/C POC with CCM team in IDT rounds this afternoon. Per CCM team, patient not medically stable for discharge from MICU at this time. Rapid Response was called and patient was coded this morning r/t GI bleed and Hemorrhagic shock. CM remains available for any patient needs/concerns.        06/04/19 1444   Discharge Reassessment   Assessment Type Discharge Planning Reassessment   Provided patient/caregiver education on the expected discharge date and the discharge plan No   Do you have any problems affording any of your prescribed medications? No       Minda Parikh RN, [facility]  Case Management-Critical Care     [phone]

## 2019-06-04 NOTE — PHYSICIAN QUERY
PT Name: Frank Zayas  MR #: [id number]    Physician Query Form - Atrial Fibrillation Specificity     Tory Shen RN, CCDS  Desk # [phone]; [facility] # [phone] [email or web address]       This form is a permanent document in the medical record.     Query Date: June 4, 2019    By submitting this query, we are merely seeking further clarification of documentation. Please utilize your independent clinical judgment when addressing the question(s) below.    The medical record contains the following:   Indicator    Supporting Clinical Findings Location in Medical Record   x Atrial Fibrillation A-fib H&P 5/31    EKG results      Medication     x Treatment --hold any home ACs for now  --clarify if any home rate or rhythm control agents    Resume metoprolol  Will discuss with GI when ok to resume anticoagulation   H&P 5/31        Hosp PN 6/3    Other       Provider, please further specify the Atrial Fibrillation diagnosis.    [  ] Chronic   [ x ] Paroxysmal   [  ] Permanent   [  ] Persistent   [  ] Other (please specify):   [  ] Clinically Undetermined       Please document in your progress notes daily for the duration of treatment until resolved, and include in your discharge summary.

## 2019-06-04 NOTE — SUBJECTIVE & OBJECTIVE
Past Medical History:   Diagnosis Date    Anticoagulant long-term use     Arthritis     Bladder cancer     Blood transfusion     CHF (congestive heart failure)     Chronic kidney disease     COPD (chronic obstructive pulmonary disease)     Coronary artery disease     Diabetes mellitus     Gout     [place] (hard of hearing)     HAS BILAT AIDS BUT DOES NOT WEAR    Hyperlipidemia     Hypertension     Myocardial infarction     RLS (restless legs syndrome)     Sleep apnea     NO MACHINE    Wears glasses        Past Surgical History:   Procedure Laterality Date    CARDIAC SURGERY      CABG X 2 1997    CATARACT EXTRACTION, BILATERAL      CHOLECYSTECTOMY      COLONOSCOPY      CORONARY ARTERY BYPASS GRAFT      x 2    coronary stents      CYSTOSCOPY      CYSTOSCOPY N/A 4/8/2019    Performed by [facility] at [facility]    EXCISION-BLADDER TUMOR-TRANSURETHRAL (TURBT) N/A 9/8/2014    Performed by [facility] at [facility] OR    EXCISION-BLADDER TUMOR-TRANSURETHRAL (TURBT) N/A 11/18/2013    Performed by [facility] at [facility] OR    EYE SURGERY Bilateral     CATARACT    Insertion,catheter,tunneled N/A 5/16/2019    Performed by Wade Rodriguez MD at [facility] OR    TURBT (TRANSURETHRAL RESECTION OF BLADDER TUMOR) N/A 4/8/2019    Performed by [facility] at [facility] OR    VASECTOMY         Review of patient's allergies indicates:   Allergen Reactions    Lidocaine Nausea Only     NOVACAINE --  Severe nausea    Statins-hmg-coa reductase inhibitors Anxiety       No current facility-administered medications on file prior to encounter.      Current Outpatient Medications on File Prior to Encounter   Medication Sig    alfuzosin (UROXATRAL) 10 mg Tb24 Take 10 mg by mouth nightly.     coenzyme Q10 (CO Q-10) 100 mg capsule Take 100 mg by mouth once daily.    ezetimibe (ZETIA) 10 mg tablet Take 10 mg by mouth every evening.     ferrous sulfate 325 (65 FE) MG EC tablet Take 1 tablet (325 mg total)  by mouth 2 (two) times daily with meals.    FLUoxetine 20 MG capsule every evening.     gabapentin (NEURONTIN) 100 MG capsule every evening.     gemfibrozil (LOPID) 600 MG tablet Take 600 mg by mouth 2 (two) times daily.     insulin glargine (LANTUS) 100 unit/mL injection Inject 27 Units into the skin every evening.     metoprolol tartrate (LOPRESSOR) 25 MG tablet Take 25 mg by mouth 2 (two) times daily.    nateglinide (STARLIX) 60 MG tablet Take 60 mg by mouth every evening.     nitroGLYCERIN (NITROSTAT) 0.4 MG SL tablet Place 0.4 mg under the tongue every 5 (five) minutes as needed.     ropinirole (REQUIP) 2 MG tablet Take 2 mg by mouth nightly.     TRADJENTA 5 mg Tab tablet Take 5 mg by mouth once daily.    vitamin renal formula, B-complex-vitamin c-folic acid, (NEPHROCAP) 1 mg Cap Take 1 capsule by mouth once daily.    zinc 50 mg Tab Take 50 mg by mouth once daily. 1/2 tab daily     Family History     Problem Relation (Age of Onset)    Cancer Father, Sister, Brother    Heart disease Father        Tobacco Use    Smoking status: Former Smoker     Packs/day: 0.25     Years: 20.00     Pack years: 5.00     Types: Cigars     Last attempt to quit: 1982     Years since quittin.3    Smokeless tobacco: Never Used   Substance and Sexual Activity    Alcohol use: No    Drug use: No    Sexual activity: Not on file     Review of Systems   Reason unable to perform ROS: intubated.     Objective:     Vital Signs (Most Recent):  Temp: 97.8 °F (36.6 °C) (19 0701)  Pulse: 84 (19 1300)  Resp: 20 (19 1300)  BP: 132/81 (19 1200)  SpO2: 99 % (19 1300) Vital Signs (24h Range):  Temp:  [97.5 °F (36.4 °C)-98.8 °F (37.1 °C)] 97.8 °F (36.6 °C)  Pulse:  [] 84  Resp:  [17-32] 20  SpO2:  [87 %-100 %] 99 %  BP: ()/(43-81) 132/81  Arterial Line BP: (100-152)/(55-62) 129/56     Weight: 126 kg (277 lb 12.5 oz)  Body mass index is 35.66 kg/m².    SpO2: 99 %  O2 Device (Oxygen  Therapy): ventilator      Intake/Output Summary (Last 24 hours) at 6/4/2019 1302  Last data filed at 6/4/2019 1300  Gross per 24 hour   Intake 3692.5 ml   Output --   Net 3692.5 ml       Lines/Drains/Airways     Central Venous Catheter Line                 Hemodialysis Catheter 05/16/19 1600 right subclavian 18 days          Drain                 Rectal Tube 06/03/19 0000 rectal tube w/ balloon (indicate number of mLs) 1 day          Airway                 Airway - Non-Surgical 06/04/19 0903 Endotracheal Tube less than 1 day          Arterial Line                 Arterial Line 06/04/19 0900 Right Radial less than 1 day          Peripheral Intravenous Line                 Peripheral IV - Single Lumen 06/01/19 1309 22 G Right Forearm 2 days         Peripheral IV - Single Lumen 06/03/19 1100 20 G;1 3/4 in Left Forearm 1 day                Physical Exam  Gen: ill, intubated, sedated but arousable  HEENT: EOMI  Neck: no JVD, no carotid bruits  CV: RRR, systolic flow murmur at LSB, no S3  Resp: intubated, coarse breath sounds anterioly  GI: soft, nontender nondistended, normal bowel sounds  Ext: warm well perfused, no edema, no clubbing or cyanosis    Significant Labs:   All pertinent lab results from the last 24 hours have been reviewed. and   Recent Lab Results       06/04/19  1233   06/04/19  1227   06/04/19  1033   06/04/19  0957   06/04/19  0925        Provider Notified:     JONATHON EDGAR       Albumin         2.1     Alkaline Phosphatase         65     Allens Test N/A   N/A N/A       ALT         7     Anion Gap         12     Aniso               Appearance, UA               aPTT               AST         13     Bacteria, UA               BANDS               Baso #               Basophilic Stippling               Basophil%               Bilirubin (UA)               BILIRUBIN TOTAL         0.5  Comment:  For infants and newborns, interpretation of results should be based  on gestational age, weight and in agreement with  clinical  observations.  Premature Infant recommended reference ranges:  Up to 24 hours.............<8.0 mg/dL  Up to 48 hours............<12.0 mg/dL  3-5 days..................<15.0 mg/dL  6-29 days.................<15.0 mg/dL       Blood Culture, Routine               Site Rob/UAC   Rob/UAC Rob/UAC       BUN, Bld         62     Calcium         8.0     Chloride         108     CO2         12     Color, UA               Creatinine         6.2     DelSys Adult Vent   Adult Vent Adult Vent       Differential Method               Dohle Bodies               eGFR if          9.1     eGFR if non          7.9  Comment:  Calculation used to obtain the estimated glomerular filtration  rate (eGFR) is the CKD-EPI equation.        Eos #               Eosinophil%               FiO2 40   50 75       Flow               Glucose         216     Glucose, UA               Gran # (ANC)               Gran%               Hematocrit               Hemoglobin               Hyaline Casts, UA               Immature Grans (Abs)               Immature Granulocytes               Coumadin Monitoring INR               Ketones, UA               Lactate, Ravinder               Leukocytes, UA               Lymph #               Lymph%               Magnesium         2.2     MCH               MCHC               MCV               Metamyelocytes               Microscopic Comment               Mode AC/PRVC   AC/PRVC AC/PRVC       Mono #               Mono%               MPV               NITRITE UA               Non-Squam Epith               nRBC               Occult Blood UA               Ovalocytes               PEEP 10   5 5       pH, UA               Phosphorus               PiP 20   33 33       Platelet Estimate               Platelets               POC BE -10   -11 -11       POC HCO3 16.3   16.0 19.2       POC Hematocrit               POC Ionized Calcium               POC Lactate               POC PCO2 31.0   35.3  63.5       POC PH 7.328   7.265 7.088       POC PO2 176   101 308       POC Potassium               POC SATURATED O2 100   97 100       POC Sodium               POC TCO2 17   17 21       POCT Glucose   216           Poik               Poly               Potassium         5.2     PROTEIN TOTAL         4.9     Protein, UA               Protime               Provider Credentials:     MD VERDUZCO       Rate 20   24 24       RBC               RBC, UA               RDW               Sample ARTERIAL   ARTERIAL ARTERIAL       Sodium         132     Specific [place], UA               Specimen UA               Squam Epithel, UA               Toxic Granulation               Vt               WBC, UA               WBC               Yeast, UA                                06/04/19  0919   06/04/19  0821   06/04/19  0816   06/04/19  0559   06/04/19  0308        Provider Notified: JEANCARLOS HOLT         Albumin               Alkaline Phosphatase               Allens Test N/A   N/A         ALT               Anion Gap               Aniso               Appearance, UA         Cloudy     aPTT               AST               Bacteria, UA         Many     BANDS               Baso #   0.10           Basophilic Stippling               Basophil%   0.6           Bilirubin (UA)         Negative     BILIRUBIN TOTAL               Blood Culture, Routine               Site Rob/UAC   Other         BUN, Bld               Calcium               Chloride               CO2               Color, UA         Francie     Creatinine               DelSys Adult Vent   Nasal Can         Differential Method   Automated           Dohle Bodies               eGFR if                eGFR if non                Eos #   0.1           Eosinophil%   0.4           FiO2 100             Flow     3         Glucose               Glucose, UA         Negative     Gran # (ANC)   14.2           Gran%   83.4           Hematocrit   27.2           Hemoglobin    8.2           Hyaline Casts, UA         0     Immature Grans (Abs)   0.80  Comment:  Mild elevation in immature granulocytes is non specific and   can be seen in a variety of conditions including stress response,   acute inflammation, trauma and pregnancy. Correlation with other   laboratory and clinical findings is essential.             Immature Granulocytes   4.7           Coumadin Monitoring INR               Ketones, UA         Negative     Lactate, Ravinder               Leukocytes, UA         2+     Lymph #   0.9           Lymph%   5.2           Magnesium               MCH   30.4           MCHC   30.1           MCV   101           Metamyelocytes               Microscopic Comment         SEE COMMENT  Comment:  Other formed elements not mentioned in the report are not   present in the microscopic examination.        Mode AC/PRVC   SPONT         Mono #   1.0           Mono%   5.7           MPV   10.5           NITRITE UA         Negative     Non-Squam Epith         1     nRBC   0           Occult Blood UA         Negative     Ovalocytes               PEEP 5             pH, UA         5.0     Phosphorus               PiP               Platelet Estimate               Platelets   134           POC BE -10   -11         POC HCO3 18.6   18.7         POC Hematocrit 24             POC Ionized Calcium 1.19             POC Lactate               POC PCO2 51.5   60.4         POC PH 7.166   7.100         POC PO2 307   43         POC Potassium 5.3             POC SATURATED O2 100   61         POC Sodium 138             POC TCO2 20   21         POCT Glucose       230       Poik               Poly               Potassium               PROTEIN TOTAL               Protein, UA         2+  Comment:  Recommend a 24 hour urine protein or a urine   protein/creatinine ratio if globulin induced proteinuria is  clinically suspected.       Protime               Provider Credentials: MD VERDUZCO         Rate 20             RBC   2.70           RBC,  UA         11     RDW   18.2           Sample ARTERIAL   VENOUS         Sodium               Specific Gravity, UA         >=1.030     Specimen UA         Urine, Unspecified  Comment:  straight cath     Squam Epithel, UA         5     Toxic Granulation               Vt 490             WBC, UA         15     WBC   17.01           Yeast, UA         Many                      06/04/19  0252   06/04/19  0222   06/04/19  0118   06/04/19  0112   06/04/19  0110        Provider Notified:               Albumin       2.0       Alkaline Phosphatase       67       Allens Test     N/A   N/A     ALT       8       Anion Gap       10       Aniso       Slight       Appearance, UA               aPTT       27.1  Comment:  aPTT therapeutic range = 39-69 seconds       AST       16       Bacteria, UA               BANDS               Baso #       0.02       Basophilic Stippling       Occasional       Basophil%       0.2       Bilirubin (UA)               BILIRUBIN TOTAL       0.4  Comment:  For infants and newborns, interpretation of results should be based  on gestational age, weight and in agreement with clinical  observations.  Premature Infant recommended reference ranges:  Up to 24 hours.............<8.0 mg/dL  Up to 48 hours............<12.0 mg/dL  3-5 days..................<15.0 mg/dL  6-29 days.................<15.0 mg/dL         Blood Culture, Routine No Growth to date[P] No Growth to date[P]           Site     Other   Other     BUN, Bld       58       Calcium       8.1       Chloride       109       CO2       18       Color, UA               Creatinine       6.0       DelSys     Nasal Can   Nasal Can     Differential Method       Automated       Dohle Bodies       Present       eGFR if        9.5       eGFR if non        8.2  Comment:  Calculation used to obtain the estimated glomerular filtration  rate (eGFR) is the CKD-EPI equation.          Eos #       0.1       Eosinophil%       0.7       FiO2                Flow     2   2     Glucose       151       Glucose, UA               Gran # (ANC)       7.0       Gran%       77.7       Hematocrit       18.9  Comment:  hct and hgb critical result(s) called and verbal readback obtained   from PRESLEY Fonseca RN, 06/04/2019 02:32         Hemoglobin       5.7  Comment:  hct and hgb critical result(s) called and verbal readback obtained   from PRESLEY Fonseca RN, 06/04/2019 02:32         Hyaline Casts, UA               Immature Grans (Abs)       0.40  Comment:  Mild elevation in immature granulocytes is non specific and   can be seen in a variety of conditions including stress response,   acute inflammation, trauma and pregnancy. Correlation with other   laboratory and clinical findings is essential.         Immature Granulocytes       4.4       Coumadin Monitoring INR       1.2  Comment:  Coumadin Therapy:  2.0 - 3.0 for INR for all indicators except mechanical heart valves  and antiphospholipid syndromes which should use 2.5 - 3.5.         Ketones, UA               Lactate, Ravinder       1.0  Comment:  Falsely low lactic acid results can be found in samples   containing >=13.0 mg/dL total bilirubin and/or >=3.5 mg/dL   direct bilirubin.         Leukocytes, UA               Lymph #       0.8       Lymph%       8.7       Magnesium       2.3       MCH       31.7       MCHC       30.2       MCV       105       Metamyelocytes               Microscopic Comment               Mode     SPONT   SPONT     Mono #       0.8       Mono%       8.3       MPV       10.4       NITRITE UA               Non-Squam Epith               nRBC       0       Occult Blood UA               Ovalocytes       Occasional       PEEP               pH, UA               Phosphorus       8.7       PiP               Platelet Estimate       Decreased       Platelets       112       POC BE         -9     POC HCO3         20.5     POC Hematocrit         17     POC Ionized Calcium         1.22     POC Lactate     0.87          POC PCO2         64.9     POC PH         7.108     POC PO2         29     POC Potassium         5.0     POC SATURATED O2         35     POC Sodium         140     POC TCO2         22     POCT Glucose               Poik       Slight       Poly       Occasional       Potassium       5.0       PROTEIN TOTAL       4.8       Protein, UA               Protime       12.5       Provider Credentials:               Rate               RBC       1.80       RBC, UA               RDW       17.8       Sample     VENOUS   VENOUS     Sodium       137       Specific [place], UA               Specimen UA               Squam Epithel, UA               Toxic Granulation       Present       Vt               WBC, UA               WBC       9.04       Yeast, UA                                06/03/19 2055   06/03/19  1737        Provider Notified:         Albumin         Alkaline Phosphatase         Allens Test         ALT         Anion Gap         Aniso   Slight     Appearance, UA         aPTT         AST         Bacteria, UA         BANDS   1.0     Baso #   CANCELED  Comment:  Result canceled by the ancillary.     Basophilic Stippling         Basophil%   1.0     Bilirubin (UA)         BILIRUBIN TOTAL         Blood Culture, Routine         Site         BUN, Bld         Calcium         Chloride         CO2         Color, UA         Creatinine         DelSys         Differential Method   Manual     Dohle Bodies         eGFR if          eGFR if non          Eos #   CANCELED  Comment:  Result canceled by the ancillary.     Eosinophil%   0.0     FiO2         Flow         Glucose         Glucose, UA         Gran # (ANC)         Gran%   84.0     Hematocrit   39.1     Hemoglobin   11.6     Hyaline Casts, UA         Immature Grans (Abs)   CANCELED  Comment:  Mild elevation in immature granulocytes is non specific and   can be seen in a variety of conditions including stress response,   acute inflammation,  trauma and pregnancy. Correlation with other   laboratory and clinical findings is essential.    Result canceled by the ancillary.       Immature Granulocytes   CANCELED  Comment:  Result canceled by the ancillary.     Coumadin Monitoring INR         Ketones, UA         Lactate, Ravinder         Leukocytes, UA         Lymph #   CANCELED  Comment:  Result canceled by the ancillary.     Lymph%   8.0     Magnesium         MCH   31.4     MCHC   29.7     MCV   106     Metamyelocytes   1.0     Microscopic Comment         Mode         Mono #   CANCELED  Comment:  Result canceled by the ancillary.     Mono%   5.0     MPV   10.8     NITRITE UA         Non-Squam Epith         nRBC   0     Occult Blood UA         Ovalocytes   Occasional     PEEP         pH, UA         Phosphorus         PiP         Platelet Estimate   Decreased     Platelets   131     POC BE         POC HCO3         POC Hematocrit         POC Ionized Calcium         POC Lactate         POC PCO2         POC PH         POC PO2         POC Potassium         POC SATURATED O2         POC Sodium         POC TCO2         POCT Glucose 173       Poik   Slight     Poly   Occasional     Potassium         PROTEIN TOTAL         Protein, UA         Protime         Provider Credentials:         Rate         RBC   3.70     RBC, UA         RDW   18.4     Sample         Sodium         Specific Gravity, UA         Specimen UA         Squam Epithel, UA         Toxic Granulation   Present     Vt         WBC, UA         WBC   13.80     Yeast, UA               Significant Imaging:  EKG: none on file today    Echo (5/9/2019)   · Mild left ventricular enlargement.  · Mildly decreased left ventricular systolic function. The estimated ejection fraction is 45%  · Normal LV diastolic function.  · Normal right ventricular systolic function.  · Mild left atrial enlargement.  · Mild right atrial enlargement.  · Mild mitral regurgitation.  · Mild tricuspid regurgitation.  · The estimated PA  systolic pressure is 38 mm Hg

## 2019-06-04 NOTE — PT/OT/SLP DISCHARGE
Occupational Therapy Discharge Summary    Frank Zayas  MRN: [id number]   Principal Problem: GIB (gastrointestinal bleeding)      Patient Discharged from acute Occupational Therapy on 6/4/2019  .  Please refer to prior OT note dated 6/2/19 for functional status.    Assessment:      Patient appropriate for care in another setting.    Objective:     GOALS:   Multidisciplinary Problems     Occupational Therapy Goals        Problem: Occupational Therapy Goal    Goal Priority Disciplines Outcome Interventions   Occupational Therapy Goal     OT, PT/OT Ongoing (interventions implemented as appropriate)    Description:  Goals to be met by: 6/9     Patient will increase functional independence with ADLs by performing:    Bed mobility and supine to sit with HOB flat and CGA.  UE Dressing while seated EOB with Set-up Assistance and Stand-by Assistance.  LE Dressing (brief) with Minimal Assistance.  Grooming while standing at sink with Minimal Assistance.  Toileting from toilet with Minimal Assistance for hygiene and clothing management.   Toilet transfer to toilet with Minimal Assistance.  Functional mobility at short household distance for ADL task with RW and min(A).                      Reasons for Discontinuation of Therapy Services  Transfer to alternate level of care.      Plan:     Patient Discharged to: Pt t/f to ICU 6/4/19 with Gi Bleed and declining H&H to 5.7 and 18.9. T/F to ICU for further monitoring.     RUPINDER Weldon  6/4/2019

## 2019-06-05 NOTE — ASSESSMENT & PLAN NOTE
--started on levo; titrate off as tolerated as blood products transfused; currently on low dose of levo at 0.16 mcg/kg/min  --did receive PRBCs yesterday, H/H currently at goal over 8  --Will transfuse as needed for decreasing H/H  --EGD yesterday demonstrated ongoing active ooze from duodenal ulcer, however could not completely control bleeding; marked with clips and will likely need IR

## 2019-06-05 NOTE — SUBJECTIVE & OBJECTIVE
Interval History/Significant Events: No major events overnight, no additional cardiac arrest; he was noted to have occasional sinus pauses, but never lost a pulse, never had a change in vital signs with this. He is able to respond to questions this morning. Denies pain.    Review of Systems   Unable to perform ROS: Intubated     Objective:     Vital Signs (Most Recent):  Temp: 98.7 °F (37.1 °C) (06/05/19 0701)  Pulse: 85 (06/05/19 0722)  Resp: 18 (06/05/19 0722)  BP: 115/62 (06/05/19 0722)  SpO2: 98 % (06/05/19 0722) Vital Signs (24h Range):  Temp:  [98.7 °F (37.1 °C)-99.1 °F (37.3 °C)] 98.7 °F (37.1 °C)  Pulse:  [65-92] 85  Resp:  [18-28] 18  SpO2:  [94 %-100 %] 98 %  BP: (113-143)/(57-81) 115/62  Arterial Line BP: ()/(44-78) 130/57   Weight: 132.4 kg (291 lb 14.2 oz)  Body mass index is 37.48 kg/m².      Intake/Output Summary (Last 24 hours) at 6/5/2019 0810  Last data filed at 6/5/2019 0701  Gross per 24 hour   Intake 5588.59 ml   Output 1659 ml   Net 3929.59 ml       Physical Exam   Constitutional: He appears well-developed and well-nourished.   Intubated, sedated; nodding yes/no to questions, non toxic appearing, does appear somewhat pale   HENT:   Head: Normocephalic and atraumatic.   ETT and OGT in place   Eyes: Pupils are equal, round, and reactive to light. EOM are normal.   +Conj pallor   Neck: No JVD present. No tracheal deviation present.   Small areas of ecchymosis over the neck   Cardiovascular: Normal rate and regular rhythm. Exam reveals no friction rub.   No murmur heard.  Pulmonary/Chest: No respiratory distress.   Mechanically ventilated; breath sounds bilaterally, no wheezing; no vent dyssynchrony noted   Abdominal: Soft. There is no tenderness. There is no guarding.   Obese abdomen, mildly distended; rectal tube in place with small amount of dark colored stool in bag   Neurological:   Awakens to voice, nods head to yes/no questions, follows commands   Skin: Skin is warm.   Pale skin,  scattered areas of ecchymosis noted   Psychiatric:   Cannot assess   Nursing note and vitals reviewed.      Vents:  Vent Mode: A/C (06/05/19 0722)  Ventilator Initiated: Yes (06/04/19 1033)  Set Rate: 20 bmp (06/05/19 0722)  Vt Set: 480 mL (05/31/19 1631)  Pressure Support: 10 cmH20 (05/31/19 1631)  PEEP/CPAP: 10 cmH20 (06/05/19 0722)  Oxygen Concentration (%): 40 (06/05/19 0722)  Peak Airway Pressure: 30 cmH2O (06/05/19 0722)  Plateau Pressure: 0 cmH20 (06/05/19 0722)  Total Ve: 8.21 mL (06/05/19 0722)  Negative Inspiratory Force (cm H2O): -27 (05/31/19 1751)  F/VT Ratio<105 (RSBI): (!) 43.9 (06/05/19 0722)  Lines/Drains/Airways     Central Venous Catheter Line                 Hemodialysis Catheter 05/16/19 1600 right subclavian 19 days          Drain                 Rectal Tube 06/03/19 0000 rectal tube w/ balloon (indicate number of mLs) 2 days         NG/OG Tube 06/04/19 1300 Center mouth less than 1 day          Airway                 Airway - Non-Surgical 06/04/19 0903 Endotracheal Tube less than 1 day          Arterial Line                 Arterial Line 06/04/19 0900 Right Radial less than 1 day          Peripheral Intravenous Line                 Peripheral IV - Single Lumen 06/01/19 1309 22 G Right Forearm 3 days         Peripheral IV - Single Lumen 06/03/19 1100 20 G;1 3/4 in Left Forearm 1 day              Significant Labs:    CBC/Anemia Profile:  Recent Labs   Lab 06/04/19  1358 06/04/19 2110 06/05/19 0222   WBC 18.36* 18.23* 20.75*   HGB 7.6* 8.3* 8.3*   HCT 23.6* 25.4* 25.4*    191 164   MCV 96 93 93   RDW 17.8* 17.8* 18.1*        Chemistries:  Recent Labs   Lab 06/04/19  0112 06/04/19  0925 06/04/19 2110 06/05/19 0222    132* 136 137  137   K 5.0 5.2* 4.2 4.2  4.2    108 108 108  108   CO2 18* 12* 17* 19*  19*   BUN 58* 62* 61* 43*  43*   CREATININE 6.0* 6.2* 6.2* 4.7*  4.7*   CALCIUM 8.1* 8.0* 8.3* 8.0*  8.0*   ALBUMIN 2.0* 2.1* 2.1* 2.2*  2.2*   PROT 4.8* 4.9*  --   5.1*   BILITOT 0.4 0.5  --  0.6   ALKPHOS 67 65  --  65   ALT 8* 7*  --  9*   AST 16 13  --  16   MG 2.3 2.2 2.0 1.9  1.9   PHOS 8.7*  --  6.2* 4.7*  4.7*       All pertinent labs within the past 24 hours have been reviewed.    Significant Imaging:  I have reviewed and interpreted all pertinent imaging results/findings within the past 24 hours.

## 2019-06-05 NOTE — ASSESSMENT & PLAN NOTE
- Pt on HD TThSat as outpatient via right Permacath  - Nephrology following, appreciate assistance  - dialysis per nephro; currently /hr  - Will speak with nephro regarding length of patient's continued SLED  - no longer hyperK, will continue to trend

## 2019-06-05 NOTE — PROGRESS NOTES
[facility]  Cardiology  Progress Note    Patient Name: Frank Zayas  MRN: [id number]  Admission Date: 5/31/2019  Hospital Length of Stay: 5 days  Code Status: Full Code   Attending Physician: Rubio Yu MD   Primary Care Physician: Mikhail Shields MD  Expected Discharge Date: 6/5/2019  Principal Problem:GIB (gastrointestinal bleeding)    Subjective:     Interim History: No acute issues overnight. No further episodes of bleeding per nursing staff and Hgb has remained stable. Patient still intubated and on pressor support with Levophed up to 0.16 mcg/kg/min.     2D echocardiogram reviewed with newly depressed EF 25% and global hypokinesis.     Troponin has remained flat and trending down , 3.1 <-- 3.3 <-- 3.2 <-- 2.8    No events noted on telemetry.       Review of Systems   Reason unable to perform ROS: intubated.     Objective:     Vital Signs (Most Recent):  Temp: 97.8 °F (36.6 °C) (06/05/19 1100)  Pulse: 99 (06/05/19 1207)  Resp: 14 (06/05/19 1207)  BP: 135/71 (06/05/19 1200)  SpO2: 96 % (06/05/19 1207) Vital Signs (24h Range):  Temp:  [97.8 °F (36.6 °C)-99.1 °F (37.3 °C)] 97.8 °F (36.6 °C)  Pulse:  [] 99  Resp:  [11-25] 14  SpO2:  [94 %-100 %] 96 %  BP: (109-143)/(57-79) 135/71  Arterial Line BP: ()/(44-78) 132/53     Weight: 132.4 kg (291 lb 14.2 oz)  Body mass index is 37.48 kg/m².    SpO2: 96 %  O2 Device (Oxygen Therapy): ventilator      Intake/Output Summary (Last 24 hours) at 6/5/2019 1229  Last data filed at 6/5/2019 1200  Gross per 24 hour   Intake 4858.99 ml   Output 1659 ml   Net 3199.99 ml       Lines/Drains/Airways     Central Venous Catheter Line                 Hemodialysis Catheter 05/16/19 1600 right subclavian 19 days          Drain                 Rectal Tube 06/03/19 0000 rectal tube w/ balloon (indicate number of mLs) 2 days         NG/OG Tube 06/04/19 1300 Center mouth less than 1 day          Airway                 Airway - Non-Surgical 06/04/19 0903  Endotracheal Tube 1 day          Arterial Line                 Arterial Line 06/04/19 0900 Right Radial 1 day          Peripheral Intravenous Line                 Peripheral IV - Single Lumen 06/01/19 1309 22 G Right Forearm 3 days         Peripheral IV - Single Lumen 06/03/19 1100 20 G;1 3/4 in Left Forearm 2 days                Physical Exam    Gen: ill, intubated, sedated but arousable  HEENT: EOMI  Neck: no JVD, no carotid bruits  CV: RRR, systolic flow murmur at LSB, no S3  Resp: intubated, coarse breath sounds anterioly  GI: soft, nontender nondistended, normal bowel sounds  Ext: warm well perfused, no edema, no clubbing or cyanosis    Significant Labs:   All pertinent lab results from the last 24 hours have been reviewed. and   Recent Lab Results       06/05/19  1112   06/05/19  1109   06/05/19  0809   06/05/19  0617   06/05/19  0308        Albumin               Alkaline Phosphatase               Allens Test         N/A     ALT               Anion Gap               Aniso               Ascending aorta               Ao peak coreen               Ao VTI               AST               AV valve area               AV mean gradient               AV peak gradient               AV Velocity Ratio               BANDS               Baso #     0.13         Basophilic Stippling               Basophil%     0.7         BILIRUBIN TOTAL               Site         Rob/UAC     BSA               BUN, Bld               Leroy Cells               Calcium               Chloride               CO2               Creatinine               Left Ventricle Relative Wall Thickness               DelSys         Adult Vent     Differential Method     Automated         AV index (prosthetic)               Dohle Bodies               E/A ratio               E/E' ratio               eGFR if                eGFR if non                Eos #     0.3         Eosinophil%     1.8         E wave decelartion time                FiO2         40     FS               Glucose               Gran # (ANC)     14.2         Gran%     77.9         Hematocrit     25.4         Hemoglobin     8.3         Hypo               Immature Grans (Abs)     0.74  Comment:  Mild elevation in immature granulocytes is non specific and   can be seen in a variety of conditions including stress response,   acute inflammation, trauma and pregnancy. Correlation with other   laboratory and clinical findings is essential.           Immature Granulocytes     4.1         Coumadin Monitoring INR               IP         20     IT         .9     IVS               LA WIDTH               Left Atrium Major Axis               Left Atrium Minor Axis               LA size               LA volume               LA Volume Index               LVOT area               LV LATERAL E/E' RATIO               LV SEPTAL E/E' RATIO               LV Diastolic Volume               LV Diastolic Volume Index               LVIDD               LVIDS               LV mass               LV Mass Index               LVOT diameter               LVOT peak jim               LVOT stroke volume               LVOT peak VTI               LV Systolic Volume               LV Systolic Volume Index               Lymph #     1.5         Lymph%     8.3         Magnesium               MAP         16     MCH     30.9         MCHC     32.7         MCV     94         Mean e'               Metamyelocytes               Mode         PCV     Mono #     1.3         Mono%     7.2         MPV     9.9         MV Peak A Jim               MV Peak E Jim               Myelocytes               nRBC     0         Ovalocytes               PEEP         10     Phosphorus               PiP         30     Platelet Estimate               Platelets     163         POC BE         -7     POC HCO3         18.6     POC PCO2         31.4     POC PH         7.381     POC PO2         122     POC SATURATED O2         99     POC TCO2         20      POCT Glucose 161     183       Poik               Poly               Potassium               Promyelocytes               PROTEIN TOTAL               Protime               PW               RA Major Axis               Rate         20     RA Width               RBC     2.69         RDW     18.1         RV S'               RVDD               Sample         ARTERIAL     Sinus               Sodium               Sp02         97     STJ               TAPSE               TDI SEPTAL               TDI LATERAL               Toxic Granulation               Triscuspid Valve Regurgitation Peak Gradient               TR Max Jim               Troponin I   3.095  Comment:  The reference interval for Troponin I represents the 99th percentile   cutoff   for our facility and is consistent with 3rd generation assay   performance.             Vt         575     WBC     18.18                          06/05/19  0222   06/05/19  0007   06/04/19  2304   06/04/19  2110   06/04/19  1741        Albumin 2.2     2.1        2.2             Alkaline Phosphatase 65             Allens Test               ALT 9             Anion Gap 10     11        10             Aniso Slight     Slight       Ascending aorta               Ao peak jim               Ao VTI               AST 16             AV valve area               AV mean gradient               AV peak gradient               AV Velocity Ratio               BANDS 2.0     1.0       Baso # CANCELED  Comment:  Result canceled by the ancillary.             Basophilic Stippling Occasional     Occasional       Basophil% 1.0     0.0       BILIRUBIN TOTAL 0.6  Comment:  For infants and newborns, interpretation of results should be based  on gestational age, weight and in agreement with clinical  observations.  Premature Infant recommended reference ranges:  Up to 24 hours.............<8.0 mg/dL  Up to 48 hours............<12.0 mg/dL  3-5 days..................<15.0 mg/dL  6-29 days.................<15.0 mg/dL                Site               BSA               BUN, Bld 43     61        43             [place]ee Cells Occasional             Calcium 8.0     8.3        8.0             Chloride 108     108        108             CO2 19     17        19             Creatinine 4.7     6.2        4.7             Left Ventricle Relative Wall Thickness               DelSys               Differential Method Manual     Manual       AV index (prosthetic)               Dohle Bodies       Present       E/A ratio               E/E' ratio               eGFR if  12.8     9.1        12.8             eGFR if non  11.1  Comment:  Calculation used to obtain the estimated glomerular filtration  rate (eGFR) is the CKD-EPI equation.        7.9  Comment:  Calculation used to obtain the estimated glomerular filtration  rate (eGFR) is the CKD-EPI equation.           11.1  Comment:  Calculation used to obtain the estimated glomerular filtration  rate (eGFR) is the CKD-EPI equation.                Eos # CANCELED  Comment:  Result canceled by the ancillary.             Eosinophil% 2.0     2.0       E wave decelartion time               FiO2               FS               Glucose 175     174        175             Gran # (ANC)               Gran% 76.0     80.0       Hematocrit 25.4     25.4       Hemoglobin 8.3     8.3       Hypo Occasional             Immature Grans (Abs) CANCELED  Comment:  Mild elevation in immature granulocytes is non specific and   can be seen in a variety of conditions including stress response,   acute inflammation, trauma and pregnancy. Correlation with other   laboratory and clinical findings is essential.    Result canceled by the ancillary.       CANCELED  Comment:  Mild elevation in immature granulocytes is non specific and   can be seen in a variety of conditions including stress response,   acute inflammation, trauma and pregnancy. Correlation with other   laboratory and clinical findings is  essential.    Result canceled by the ancillary.         Immature Granulocytes CANCELED  Comment:  Result canceled by the ancillary.     CANCELED  Comment:  Result canceled by the ancillary.       Coumadin Monitoring INR 1.3  Comment:  Coumadin Therapy:  2.0 - 3.0 for INR for all indicators except mechanical heart valves  and antiphospholipid syndromes which should use 2.5 - 3.5.               IP               IT               IVS               LA WIDTH               Left Atrium Major Axis               Left Atrium Minor Axis               LA size               LA volume               LA Volume Index               LVOT area               LV LATERAL E/E' RATIO               LV SEPTAL E/E' RATIO               LV Diastolic Volume               LV Diastolic Volume Index               LVIDD               LVIDS               LV mass               LV Mass Index               LVOT diameter               LVOT peak jim               LVOT stroke volume               LVOT peak VTI               LV Systolic Volume               LV Systolic Volume Index               Lymph # CANCELED  Comment:  Result canceled by the ancillary.             Lymph% 9.0     10.0       Magnesium 1.9     2.0        1.9             MAP               MCH 30.3     30.5       MCHC 32.7     32.7       MCV 93     93       Mean e'               Metamyelocytes 1.0     2.0       Mode               Mono # CANCELED  Comment:  Result canceled by the ancillary.             Mono% 6.0     3.0       MPV 10.1     9.9       MV Peak A Jim               MV Peak E Jim               Myelocytes 3.0     1.0       nRBC 0     0       Ovalocytes Occasional     Occasional       PEEP               Phosphorus 4.7     6.2        4.7             PiP               Platelet Estimate Appears normal     Appears normal       Platelets 164     191       POC BE               POC HCO3               POC PCO2               POC PH               POC PO2               POC SATURATED O2                POC TCO2               POCT Glucose   172     166     Poik Slight     Slight       Poly Occasional     Occasional       Potassium 4.2     4.2        4.2             Promyelocytes       1.0       PROTEIN TOTAL 5.1             Protime 12.6             PW               RA Major Axis               Rate               RA Width               RBC 2.74     2.72       RDW 18.1     17.8       RV S'               RVDD               Sample               Sinus               Sodium 137     136        137             Sp02               STJ               TAPSE               TDI SEPTAL               TDI LATERAL               Toxic Granulation Present     Present       Triscuspid Valve Regurgitation Peak Gradient               TR Max Jim               Troponin I 3.269  Comment:  The reference interval for Troponin I represents the 99th percentile   cutoff   for our facility and is consistent with 3rd generation assay   performance.     3.164  Comment:  The reference interval for Troponin I represents the 99th percentile   cutoff   for our facility and is consistent with 3rd generation assay   performance.           Vt               WBC 20.75     18.23                        06/04/19  1739   06/04/19  1627   06/04/19  1358   06/04/19  1233        Albumin             Alkaline Phosphatase             Allens Test       N/A     ALT             Anion Gap             Aniso     Slight       Ascending aorta   3.37         Ao peak jim   1.28         Ao VTI   22.86         AST             AV valve area   2.64         AV mean gradient   4.15         AV peak gradient   6.55         AV Velocity Ratio   0.48         BANDS     2.0       Baso #             Basophilic Stippling             Basophil%     0.0       BILIRUBIN TOTAL             Site       Rob/UAC     BSA   2.56         BUN, Bld             Leroy Cells             Calcium             Chloride             CO2             Creatinine             Left Ventricle Relative Wall Thickness    0.28         DelSys       Adult Vent     Differential Method     Manual       AV index (prosthetic)   0.50         Dohle Bodies             E/A ratio   2.12         E/E' ratio   21.17         eGFR if              eGFR if non              Eos #             Eosinophil%     3.0       E wave decelartion time   132.79         FiO2       40     FS   7         Glucose             Gran # (ANC)             Gran%     85.0       Hematocrit     23.6       Hemoglobin     7.6       Hypo             Immature Grans (Abs)     CANCELED  Comment:  Mild elevation in immature granulocytes is non specific and   can be seen in a variety of conditions including stress response,   acute inflammation, trauma and pregnancy. Correlation with other   laboratory and clinical findings is essential.    Result canceled by the ancillary.         Immature Granulocytes     CANCELED  Comment:  Result canceled by the ancillary.       Coumadin Monitoring INR             IP             IT             IVS   0.62         LA WIDTH   4.24         Left Atrium Major Axis   6.54         Left Atrium Minor Axis   6.37         LA size   4.88         LA volume   113.51         LA Volume Index   45.5         LVOT area   5.31         LV LATERAL E/E' RATIO   15.88         LV SEPTAL E/E' RATIO   31.75         LV Diastolic Volume   179.82         LV Diastolic Volume Index   72.05         LVIDD   6.00         LVIDS   5.60         LV mass   167.69         LV Mass Index   67.2         LVOT diameter   2.60         LVOT peak coreen   [id number]         LVOT stroke volume   60.44         LVOT peak VTI   11.39         LV Systolic Volume   153.92         LV Systolic Volume Index   61.7         Lymph #             Lymph%     6.0       Magnesium             MAP             MCH     30.8       MCHC     32.2       MCV     96       Mean e'   0.06         Metamyelocytes     1.0       Mode       AC/PRVC     Mono #             Mono%     3.0       MPV      10.3       MV Peak A Jim   0.60         MV Peak E Jim   1.27         Myelocytes             nRBC     0       Ovalocytes             PEEP       10     Phosphorus             PiP       20     Platelet Estimate     Appears normal       Platelets     182       POC BE       -10     POC HCO3       16.3     POC PCO2       31.0     POC PH       7.328     POC PO2       176     POC SATURATED O2       100     POC TCO2       17     POCT Glucose             Poik             Poly     Occasional       Potassium             Promyelocytes             PROTEIN TOTAL             Protime             PW   0.85         RA Major Axis   5.85         Rate       20     RA Width   4.86         RBC     2.47       RDW     17.8       RV S'   5.76         RVDD   5.48         Sample       ARTERIAL     Sinus   3.52         Sodium             Sp02             STJ   3.16         TAPSE   0.95         TDI SEPTAL   0.04         TDI LATERAL   0.08         Toxic Granulation             Triscuspid Valve Regurgitation Peak Gradient   31.81         TR Max Jim   2.82         Troponin I 2.767  Comment:  The reference interval for Troponin I represents the 99th percentile   cutoff   for our facility and is consistent with 3rd generation assay   performance.             Vt             WBC     18.36             Significant Imaging:  EKG: none on file today    Echo (5/9/2019)   · Mild left ventricular enlargement.  · Mildly decreased left ventricular systolic function. The estimated ejection fraction is 45%  · Normal LV diastolic function.  · Normal right ventricular systolic function.  · Mild left atrial enlargement.  · Mild right atrial enlargement.  · Mild mitral regurgitation.  · Mild tricuspid regurgitation.  · The estimated PA systolic pressure is 38 mm Hg      Assessment and Plan:         1- Cardiac arrest  2- Cardiomyopathy, new onset  3- Hx CAD s/p CABG   79 year old man with CAD s/p remote CABG, HTN, HLD, ESRD on HD, PAF and HFpEF who presented with GI  bleed c/b hemorrhagic shock leading to secondary cardiac arrest. Per reports, possibly PEA? This was highly unlikely an acute plaque rupture given that patient is being treated for hemorrhagic shock. This is likely demand ischemia and patient needs to be transfused and treated as such. Agree with holding antiplatelets and anticoagulation for now. On statin therapy.     2D echo with new LV systolic dysfunction and global hypokinesis likely related to stress cardiomyopathy following cardiac arrest. Troponin flat. EKGs unchanged.     Recs:  -- No indications for coronary angiogram at this time as suspect issues all secondary to demand ischemia.   -- Resume aspirin 81 mg daily once cleared by GI.   -- Continue with high intensity statin therapy.   -- Once hemodynamically stable and off pressors needs to be started on goal directed medical therapy for his newly depressed EF, recommend the following:    - Lisinopril 2.5 mg daily (titrate up to BP < 130/80 mmHg)    - Toprol XL 12.5 mg daily (titrate up to HR 55-60s as BP allows for his underlying CAD)    - Will need repeat echo in 3 months after initiation of GDMT to assess for EF recovery.  -- Needs follow up with outpatient Cardiologist within 1-2 weeks from discharge.       Discussed with primary team. We will follow sign off, please call with questions.      Shannon Puri MD  Cardiology  [facility]

## 2019-06-05 NOTE — PROGRESS NOTES
[facility]  Nephrology  Progress Note    Patient Name: Frank Zayas  MRN: [id number]  Admission Date: 5/31/2019  Hospital Length of Stay: 5 days  Attending Provider: Rubio Yu MD   Primary Care Physician: Mikhail Shields MD  Principal Problem:GIB (gastrointestinal bleeding)    Subjective:     HPI: 79 year old man with history of T2DM, ESRD on HD (started 1-2 ms ago), HFpEF, and CAD s/p CABG on plavix who initially presented to [facility] for complaints of abdominal pain and hematochezia. He was transfused 1u pRBC at OSH and transferred to [facility] for a cardiac evaluation after being found with a troponin of 0.39. At [facility], he was started on levophed for shock and suffered brief episode of PEA arrest which resolved with resuscitation. At OSH underwent EGD which was notable for actively bleeding duodenal ulcer s/p hemostasis with epi and clip. Due to concern for rebleeding he was transferred to [facility] [place] potential IR therapy. According to the family, the patient was last dialyze on yesterday, 5/30, at the OSH. He was recently started on dialysis about 1-2 months ago. His renal failure is likely due to his co morbidities. He typically dialyze on T/Th/Sat for 4 hrs at [facility] in [place] via a L permacath. Nephrology consulted for management of ESRD and HD treatment.        Interval History:   Patient evaluated at bedside, has been on SLED for clearance, had presented with two clotting issues yesterday.     Review of patient's allergies indicates:   Allergen Reactions    Lidocaine Nausea Only     NOVACAINE --  Severe nausea    Statins-hmg-coa reductase inhibitors Anxiety     Current Facility-Administered Medications   Medication Frequency    0.9%  NaCl infusion (CRRT USE ONLY) Continuous    albuterol-ipratropium 2.5 mg-0.5 mg/3 mL nebulizer solution 3 mL Q6H PRN    atorvastatin tablet 40 mg Daily    chlorhexidine 0.12 % solution 15 mL BID    dextrose 50% injection 12.5 g PRN     fentaNYL 2500 mcg in 0.9% sodium chloride 250 mL infusion premix (titrating) Continuous    FLUoxetine capsule 20 mg QHS    glucagon (human recombinant) injection 1 mg PRN    insulin aspart U-100 pen 0-5 Units Q6H PRN    magnesium sulfate 2g in water 50mL IVPB (premix) PRN    miconazole nitrate 2% ointment BID    norepinephrine 32 mg in dextrose 5 % 250 mL infusion Continuous    ondansetron injection 4 mg Q6H PRN    pantoprazole injection 40 mg BID    ramelteon tablet 8 mg Nightly PRN    rOPINIRole tablet 2 mg QHS    sodium chloride 0.9% flush 10 mL PRN    sodium phosphate 20.01 mmol in dextrose 5 % 250 mL IVPB PRN    sodium phosphate 30 mmol in dextrose 5 % 250 mL IVPB PRN    sodium phosphate 39.99 mmol in dextrose 5 % 250 mL IVPB PRN       Objective:     Vital Signs (Most Recent):  Temp: 98.7 °F (37.1 °C) (06/05/19 0701)  Pulse: 80 (06/05/19 0800)  Resp: 20 (06/05/19 0800)  BP: 125/70 (06/05/19 0800)  SpO2: 98 % (06/05/19 0800)  O2 Device (Oxygen Therapy): ventilator (06/05/19 0800) Vital Signs (24h Range):  Temp:  [98.7 °F (37.1 °C)-99.1 °F (37.3 °C)] 98.7 °F (37.1 °C)  Pulse:  [65-92] 80  Resp:  [18-28] 20  SpO2:  [94 %-100 %] 98 %  BP: (113-143)/(57-81) 125/70  Arterial Line BP: ()/(44-78) 133/57     Weight: 132.4 kg (291 lb 14.2 oz) (06/05/19 0300)  Body mass index is 37.48 kg/m².  Body surface area is 2.63 meters squared.    I/O last 3 completed shifts:  In: 8403.8 [P.O.:240; I.V.:6460.8; Blood:523; NG/GT:880; IV Piggyback:300]  Out: 1659 [Other:809; Stool:850]    Physical Exam   Constitutional: No distress. He is intubated.   Intubated, sedated.    HENT:   Head: Normocephalic and atraumatic.   Mouth/Throat: Oropharynx is clear and moist. No oropharyngeal exudate.   Eyes: Conjunctivae are normal. No scleral icterus.   Neck: No JVD present.   Cardiovascular: Normal rate, regular rhythm, normal heart sounds and intact distal pulses.   Pulmonary/Chest: Effort normal and breath sounds  normal. He is intubated. No respiratory distress.   Abdominal: Soft. Bowel sounds are normal. He exhibits no distension. There is no tenderness.   Musculoskeletal: He exhibits no edema or tenderness.   Lymphadenopathy:     He has no cervical adenopathy.   Skin: Skin is warm. Capillary refill takes less than 2 seconds. He is not diaphoretic.   Nursing note and vitals reviewed.      Significant Labs:  ABGs:   Recent Labs   Lab 06/05/19  0308   PH 7.381   PCO2 31.4*   HCO3 18.6*   POCSATURATED 99   BE -7     BMP:   Recent Labs   Lab 06/05/19  0222   *  175*     108   CO2 19*  19*   BUN 43*  43*   CREATININE 4.7*  4.7*   CALCIUM 8.0*  8.0*   MG 1.9  1.9     CBC:   Recent Labs   Lab 06/05/19  0809   WBC 18.18*   RBC 2.69*   HGB 8.3*   HCT 25.4*      MCV 94   MCH 30.9   MCHC 32.7     CMP:   Recent Labs   Lab 06/05/19  0222   *  175*   CALCIUM 8.0*  8.0*   ALBUMIN 2.2*  2.2*   PROT 5.1*     137   K 4.2  4.2   CO2 19*  19*     108   BUN 43*  43*   CREATININE 4.7*  4.7*   ALKPHOS 65   ALT 9*   AST 16   BILITOT 0.6     All labs within the past 24 hours have been reviewed.       Assessment/Plan:     ESRD (end stage renal disease)  79 year old man with history of T2DM, ESRD on HD (started 1-2 ms ago), HFpEF, CAD s/p CABG on plavix, who is presenting from outside hospital due to concern for duodenal ulcer. Nephrology consulted for ESRD and HD management. Last dialyze at the outside hospital per family.     Patient intubated and sedated on pressor support (Levophed)     S/P EGD with oozing duodenal ulcer     Plan:  - Will start patient on SLED for clearance and volume removal, -250 cc/hr, bath adjusted to chem  - Will start on citrate pre filter since has been presenting with clotting issues, monitor ionize Ca every 8 hrs     - Continue with pressor to maintain MAP> 65   - On mechanical ventilation with low parameter setting, plan for extubation today   - Continue  monitoring intake and output          Jovan Henley  Nephrology  Fellow  [facility]    Pager [phone]

## 2019-06-05 NOTE — ASSESSMENT & PLAN NOTE
--echo from yesterday demonstrates severely decreased EF, 15%, likely 2/2 patient's recent arrest in combination with hemorrhagic shock from his GIB  --trop continues to slowly rise, again suspect type II MI as most likely etiology  --judicious volume resusc given his known decreased EF with biventricular failure

## 2019-06-05 NOTE — ASSESSMENT & PLAN NOTE
- Pt went into PEA at OSH during central line placement and was coded per ACLS protocol, intubated emergently  - Continue telemetry  - Correct electrolytes PRN  - Likely 2/2 acute blood loss and hypotension, type II MI  - Had arrest yesterday during RSI, likely from decreased circulating volume and transient hypotension with induction

## 2019-06-05 NOTE — NURSING
MD at bedside to place right femoral central line. Pain medications administered prior to procedure. Consent obtained. Time out performed. VSS. Will continue to monitor.

## 2019-06-05 NOTE — ASSESSMENT & PLAN NOTE
- s/p CABG in 1997  - s/p PCI approx. 10 years ago  - Holding home ASA and Plavix in setting of GI bleed  - Resume antiplatelet agents when appropriate  - Per cardiology, do not feel that this represents type I, occlusive lesion as cause of his arrest; feel this represents type II MI

## 2019-06-05 NOTE — PROGRESS NOTES
[facility]  Critical Care Medicine  Progress Note    Patient Name: Frank Zayas  MRN: [id number]  Admission Date: 5/31/2019  Hospital Length of Stay: 5 days  Code Status: Full Code  Attending Provider: Rubio Yu MD  Primary Care Provider: Mikhail Shields MD   Principal Problem: GIB (gastrointestinal bleeding)    Subjective:     HPI:  78 yo M with morbid obesity, ESRD on HD (TTS, 2 months), Afib, CHF, DM2 (on insulin), CAD s/p CABG, HLD, COPD, HTN transferred from [facility]  He presented with hematochezia and abdominal pain.  He went to [facility] ER for lower GI bleed and received 1 u PRBC and then was transferred to [facility] for cardiac evaluation when trop was 0.39.  At [facility] he had continued hyperkalemia (K of 6.1) after being treated for it at [facility].  He was started on levophed for hypovolemic shock, and an art line and central line was placed.  During line placement, he briefly lost a pulse (PEA) and CPR was started.  He was given epinephrine, calcium gluconate, sodium bicarb, and intubated emergently.  He achieved ROSC.  He received IVF and 3-4 units PRBC.  He underwent emergent upper and lower endoscopy.  They found a large vessel in the duodenum, actively oozing with copious amounts of fresh bright red blood pumping out.  GI placed a clop on the artery and injected epinephrine, achieving hemostasis.  There was concern that the vessel could be directly assosiated with a larger vessel (ie GDA).  There were no varices seen.  Both GI and Gen Surg recommended transfer to [facility] for arteriovenous embolization which couldn't be performed there.      Patient admitted to Critical Care Medicine on 5/31/2019 for evaluation and management of acute upper GI bleed (s/p EGD with clipping of bleeding visible vessel at OSH).  Pt arrived intubated following PEA arrest at OSH.  Interventional Radiology consulted and recommended CTA abdomen/pelvis, however no evidence of active GI  bleeding on imaging.  Gastroenterology consulted and recommended continue IV Protonix gtt for a total of 72 hours.  Nephrology consulted for dialysis in setting of ESRD.  Patient passed SAT/SBT and was successfully extubated on the afternoon of 5/31.  On 6/1, as pt remained hemodynamically stable and his hemoglobin remained stable, pt stepped down to Hospital Medicine.    The evening of 6/2 into 6/3 the patient had several melanotic stools with slight downtrend in Hgb (10 > 8), however hemodynamics remained stable. Repeat CTA at that time did not show any evidence of active bleeding, however duodenal clip was noted to have migrated into distal small bowel. GI with plans to repeat EGD on 6/4.     Around 1am on 6/4, the patient became hypotensive (70s/40s). Bedside POC showed Hct 17. Decision made to move patient back to ICU for further management.       Hospital/ICU Course:  Pt was admitted to MICU once more on the night of 6/3-6/4 d/t concerns for hypotension requiring vasopressors. Hemoglobin returned at 5.7 mg/dL. He was transfused two units. Repeat hemoglobin of 7.6 mg/dL. On bedside echo this morning, IVC was not collapsible with sniff and patient had complained of cough productive of clear white sputum. Anesthesia arrived and performed RSI with etomidate and succinylcholine. Pt became hypotensive and bradycardic. He eventually underwent cardiac arrest. Epinephrine was given and chest compressions were started. Almost immediately after chest compressions were initiated, end-tital Co2 was positive and ROSC was achieved. He was intubated during his arrest. Since his airway was secured, gastroenterology proceeded with endoscopy. They found a non-obstructing oozing duodenal ulcer with a visible vessel. It was injected with epinephrine and treated with bipolar cautery. Unfortunately, oozing continued. GI recommended evaluation by interventional radiology for possible embolization. A clip was placed for marking.      Overnight, 6/4, patient had no major events other than occasionally clotting off his dialysis port. Does have what appears to be occasional sinus pauses on the monitor, however has had no other cardiac arrests since yesterday morning. Is still on levophed currently, trending H/H, has not required additional transfusion. Could not completely address patient's oozing vessel yesterday, will likely need consult with IR to address his continued bleeding as multiple EGDs could not completely tamponade his bleeding.     Interval History/Significant Events: No major events overnight, no additional cardiac arrest; he was noted to have occasional sinus pauses, but never lost a pulse, never had a change in vital signs with this. He is able to respond to questions this morning. Denies pain.    Review of Systems   Unable to perform ROS: Intubated     Objective:     Vital Signs (Most Recent):  Temp: 98.7 °F (37.1 °C) (06/05/19 0701)  Pulse: 85 (06/05/19 0722)  Resp: 18 (06/05/19 0722)  BP: 115/62 (06/05/19 0722)  SpO2: 98 % (06/05/19 0722) Vital Signs (24h Range):  Temp:  [98.7 °F (37.1 °C)-99.1 °F (37.3 °C)] 98.7 °F (37.1 °C)  Pulse:  [65-92] 85  Resp:  [18-28] 18  SpO2:  [94 %-100 %] 98 %  BP: (113-143)/(57-81) 115/62  Arterial Line BP: ()/(44-78) 130/57   Weight: 132.4 kg (291 lb 14.2 oz)  Body mass index is 37.48 kg/m².      Intake/Output Summary (Last 24 hours) at 6/5/2019 0810  Last data filed at 6/5/2019 0701  Gross per 24 hour   Intake 5588.59 ml   Output 1659 ml   Net 3929.59 ml       Physical Exam   Constitutional: He appears well-developed and well-nourished.   Intubated, sedated; nodding yes/no to questions, non toxic appearing, does appear somewhat pale   HENT:   Head: Normocephalic and atraumatic.   ETT and OGT in place   Eyes: Pupils are equal, round, and reactive to light. EOM are normal.   +Conj pallor   Neck: No JVD present. No tracheal deviation present.   Small areas of ecchymosis over the neck    Cardiovascular: Normal rate and regular rhythm. Exam reveals no friction rub.   No murmur heard.  Pulmonary/Chest: No respiratory distress.   Mechanically ventilated; breath sounds bilaterally, no wheezing; no vent dyssynchrony noted   Abdominal: Soft. There is no tenderness. There is no guarding.   Obese abdomen, mildly distended; rectal tube in place with small amount of dark colored stool in bag   Neurological:   Awakens to voice, nods head to yes/no questions, follows commands   Skin: Skin is warm.   Pale skin, scattered areas of ecchymosis noted   Psychiatric:   Cannot assess   Nursing note and vitals reviewed.      Vents:  Vent Mode: A/C (06/05/19 0722)  Ventilator Initiated: Yes (06/04/19 1033)  Set Rate: 20 bmp (06/05/19 0722)  Vt Set: 480 mL (05/31/19 1631)  Pressure Support: 10 cmH20 (05/31/19 1631)  PEEP/CPAP: 10 cmH20 (06/05/19 0722)  Oxygen Concentration (%): 40 (06/05/19 0722)  Peak Airway Pressure: 30 cmH2O (06/05/19 0722)  Plateau Pressure: 0 cmH20 (06/05/19 0722)  Total Ve: 8.21 mL (06/05/19 0722)  Negative Inspiratory Force (cm H2O): -27 (05/31/19 1751)  F/VT Ratio<105 (RSBI): (!) 43.9 (06/05/19 0722)  Lines/Drains/Airways     Central Venous Catheter Line                 Hemodialysis Catheter 05/16/19 1600 right subclavian 19 days          Drain                 Rectal Tube 06/03/19 0000 rectal tube w/ balloon (indicate number of mLs) 2 days         NG/OG Tube 06/04/19 1300 Center mouth less than 1 day          Airway                 Airway - Non-Surgical 06/04/19 0903 Endotracheal Tube less than 1 day          Arterial Line                 Arterial Line 06/04/19 0900 Right Radial less than 1 day          Peripheral Intravenous Line                 Peripheral IV - Single Lumen 06/01/19 1309 22 G Right Forearm 3 days         Peripheral IV - Single Lumen 06/03/19 1100 20 G;1 3/4 in Left Forearm 1 day              Significant Labs:    CBC/Anemia Profile:  Recent Labs   Lab 06/04/19  1358  06/04/19 2110 06/05/19 0222   WBC 18.36* 18.23* 20.75*   HGB 7.6* 8.3* 8.3*   HCT 23.6* 25.4* 25.4*    191 164   MCV 96 93 93   RDW 17.8* 17.8* 18.1*        Chemistries:  Recent Labs   Lab 06/04/19  0112 06/04/19  0925 06/04/19 2110 06/05/19 0222    132* 136 137  137   K 5.0 5.2* 4.2 4.2  4.2    108 108 108  108   CO2 18* 12* 17* 19*  19*   BUN 58* 62* 61* 43*  43*   CREATININE 6.0* 6.2* 6.2* 4.7*  4.7*   CALCIUM 8.1* 8.0* 8.3* 8.0*  8.0*   ALBUMIN 2.0* 2.1* 2.1* 2.2*  2.2*   PROT 4.8* 4.9*  --  5.1*   BILITOT 0.4 0.5  --  0.6   ALKPHOS 67 65  --  65   ALT 8* 7*  --  9*   AST 16 13  --  16   MG 2.3 2.2 2.0 1.9  1.9   PHOS 8.7*  --  6.2* 4.7*  4.7*       All pertinent labs within the past 24 hours have been reviewed.    Significant Imaging:  I have reviewed and interpreted all pertinent imaging results/findings within the past 24 hours.      ABG  Recent Labs   Lab 06/05/19  0308   PH 7.381   PO2 122*   PCO2 31.4*   HCO3 18.6*   BE -7     Assessment/Plan:     Neuro  Restless leg syndrome  --Continue home ropinirole 2mg qHS    Pulmonary  Acute hypercapnic respiratory failure  --Was likely multifactorial with likely BRANDEN; intubated at this time  --Improving ABG currently; on PC at this time, P 20, R 20, PEEP 10, pulling volumes in 500s, FiO2 30%; will attempt to wean with SBT  --Stopping patient's zosyn as patient not clinically infected at this time; believe his WBC represents demargination in setting of cardiac arrest    Cardiac/Vascular  NSTEMI (non-ST elevated myocardial infarction)  --Spoke with cardiology, do not feel that this represents STEMI  --Troponin leveling off, will continue to trend  --EKG not showing ST elevation, does have QRS prolongation, sinus rhythm; T wave inversions noted throughout; appears to have had bifascicular block on old EKGs    Cardiac arrest  - Pt went into PEA at OSH during central line placement and was coded per ACLS protocol, intubated emergently  -  Continue telemetry  - Correct electrolytes PRN  - Likely 2/2 acute blood loss and hypotension, type II MI  - Had arrest yesterday during RSI, likely from decreased circulating volume and transient hypotension with induction    A-fib  - Holding anticoagulation in setting of GI bleed  - Resume Lopressor when stable    Coronary artery disease  - s/p CABG in 1997  - s/p PCI approx. 10 years ago  - Holding home ASA and Plavix in setting of GI bleed  - Resume antiplatelet agents when appropriate  - Per cardiology, do not feel that this represents type I, occlusive lesion as cause of his arrest; feel this represents type II MI    DM type 2 with diabetic mixed hyperlipidemia  --accuchecks Q6 with low dose correctional ssi  --holding home oral antiglycemics while in hospital    HTN (hypertension)  --holding metoprolol in setting of hemorrhagic shock  --currently requiring levophed for maintenance of his pressures    Heart failure  --echo from yesterday demonstrates severely decreased EF, 15%, likely 2/2 patient's recent arrest in combination with hemorrhagic shock from his GIB  --trop continues to slowly rise, again suspect type II MI as most likely etiology  --judicious volume resusc given his known decreased EF with biventricular failure    Renal/  ESRD (end stage renal disease)  - Pt on HD TThSat as outpatient via right Permacath  - Nephrology following, appreciate assistance  - dialysis per nephro; currently /hr  - Will speak with nephro regarding length of patient's continued SLED  - no longer hyperK, will continue to trend      GI  * GIB (gastrointestinal bleeding)  --EGD @ OSH which showed duodenal ulcer with visible vessel actively bleeding, which was successfully clipped.    --CTA 5/31 & 6/3 without evidence of active GI bleed; duodenal clip has migrated to distal small bowel  -- Continue IV Protonix gtt for total of 72 hours, then 40mg IV BID  --Continue to hold anticoagulation and antiplatelet agents  --H.  pylori serum IgG negative  --CBC q4; transfuse for < 7   --discussed decompensation with GI; plan for repeat EGD in am, however if patient unstable, would discuss with IR for possible embolization     Acute gastrointestinal bleeding  --Will continue q6h CBCs while patient noted to still have active bleeding  --BID PPI for now  --TEG if possible for coagulation  --IR vs GI for ultimate management of ongoing bleeding    Other  Hemorrhagic shock  --started on levo; titrate off as tolerated as blood products transfused; currently on low dose of levo at 0.16 mcg/kg/min  --did receive PRBCs yesterday, H/H currently at goal over 8  --Will transfuse as needed for decreasing H/H  --EGD yesterday demonstrated ongoing active ooze from duodenal ulcer, however could not completely control bleeding; marked with clips and will likely need IR       Critical Care Daily Checklist:    A: Awake: RASS Goal/Actual Goal: RASS Goal: 0-->alert and calm  Actual: [place] Agitation Sedation Scale (RASS): Light sedation   B: Spontaneous Breathing Trial Performed? Spon. Breathing Trial Initiated?: Initiated (05/31/19 6974)   C: SAT & SBT Coordinated?  Yes                      D: Delirium: CAM-ICU Overall CAM-ICU: Positive   E: Early Mobility Performed? Yes   F: Feeding Goal:    Status:     Current Diet Order   Procedures    Diet NPO Except for: Medication, Ice Chips, Sips with Medication     Order Specific Question:   Except for     Answer:   Medication     Order Specific Question:   Except for     Answer:   Ice Chips     Order Specific Question:   Except for     Answer:   Sips with Medication      AS: Analgesia/Sedation Fentanyl gtt   T: Thromboembolic Prophylaxis Holding in setting of GIB   H: HOB > 300 Yes   U: Stress Ulcer Prophylaxis (if needed) PPI   G: Glucose Control SSI ordered   B: Bowel Function Stool Occurrence: 2   I: Indwelling Catheter (Lines & Huynh) Necessity Anuric; no Huynh   D: De-escalation of  Antimicrobials/Pharmacotherapies Stopping zosyn    Plan for the day/ETD Attempt to  intubate    Code Status:  Family/Goals of Care: Full Code         Critical secondary to Patient has a condition that poses threat to life and bodily function: Hemorrhagic shock, NSTEMI      Critical care was time spent personally by me on the following activities: development of treatment plan with patient or surrogate and bedside caregivers, discussions with consultants, evaluation of patient's response to treatment, examination of patient, ordering and performing treatments and interventions, ordering and review of laboratory studies, ordering and review of radiographic studies, pulse oximetry, re-evaluation of patient's condition. This critical care time did not overlap with that of any other provider or involve time for any procedures.     Moises Mcdonald MD  Critical Care Medicine  [facility]

## 2019-06-05 NOTE — PLAN OF CARE
Problem: Adult Inpatient Plan of Care  Goal: Plan of Care Review  Outcome: Ongoing (interventions implemented as appropriate)    No acute events throughout day. See vital signs and assessments in flowsheets. See below for updates on today's progress.     Pulmonary:  Pt. Remains intubated. AC; RR 20; FIO2 40%; PEEP of 10. SPO2 %.     Cardiovascular: Pt.'s Rhythm goes between NSR and ST. HR 80s-90s. Pt. Experiences several pauses throughout this shift. SBP 100s-120s. MAP > 65.     Neurological: Pt. Is able to follow command. Pt. Is lightly sedated on Fentanyl. Pt. Is afebrile throughout shift.      Gastrointestinal: Pt. Has flexi seal in place. Loose BM evacuated from flexi.      Genitourinary: Pt. Anuric.      Endocrine: accuchecks q. 6hrs. CRRT restarted today. Citrate given to prevent clotting.      Skin/Bath: skin breakdown noted to bilateral shins.                           Date of last CHG bath given: 6/4 pm shift     Infusions: Levo and Fentanyl      Patient progressing towards goals as tolerated, plan of care communicated and reviewed with Frank Zayas and family. All concerns addressed. Will continue to monitor.

## 2019-06-05 NOTE — TREATMENT PLAN
GI Treatment Plan    Frank Zayas is a 79 y.o. male admitted to hospital 5/31/2019 (Hospital Day: 6) due to GIB (gastrointestinal bleeding).     Interval History  - remains intubated and sedated, no family at bedside  - per nursing melena overnight  - H&H stable overnight, no further transfusion requirement    Objective  Temp:  [97.8 °F (36.6 °C)-99.1 °F (37.3 °C)] 97.8 °F (36.6 °C) (06/05 1100)  Pulse:  [] 84 (06/05 1400)  BP: (109-143)/(57-79) 120/63 (06/05 1400)  Resp:  [11-25] 18 (06/05 1400)  SpO2:  [94 %-100 %] 95 % (06/05 1400)  Arterial Line BP: ()/(44-78) 135/54 (06/05 1400)    General: intubated and sedated.  Abdomen: Normoactive bowel sounds. Non-distended. Normal tympany. Soft. Non-tender. No peritoneal signs.    Laboratory    Recent Labs   Lab 06/05/19  0222 06/05/19  0809 06/05/19  1337   HGB 8.3* 8.3* 8.3*       Lab Results   Component Value Date    WBC 19.14 (H) 06/05/2019    HGB 8.3 (L) 06/05/2019    HCT 25.8 (L) 06/05/2019    MCV 96 06/05/2019     06/05/2019       Lab Results   Component Value Date     06/05/2019    K 4.4 06/05/2019     06/05/2019    CO2 20 (L) 06/05/2019    BUN 42 (H) 06/05/2019    CREATININE 4.8 (H) 06/05/2019    CALCIUM 7.8 (L) 06/05/2019    ANIONGAP 8 06/05/2019    ESTGFRAFRICA 12.5 (A) 06/05/2019    EGFRNONAA 10.8 (A) 06/05/2019       Lab Results   Component Value Date    ALT 9 (L) 06/05/2019    AST 16 06/05/2019    ALKPHOS 65 06/05/2019    BILITOT 0.6 06/05/2019       Lab Results   Component Value Date    INR 1.3 (H) 06/05/2019    INR 1.2 06/04/2019    INR 1.1 06/03/2019       Plan  - continue protonix gtt x 72 hr, then protonix 40mg BID  - continue to monitor for signs of recurrent bleeding, will need IR therapy if recurs      Thank you for involving us in the care of Frank Zayas. Please call with any additional questions, concerns or changes in the patient's clinical status.    Kalyan Mesa MD  Gastroenterology Fellow, PGY  IV  Spectralink: [id number]

## 2019-06-05 NOTE — SUBJECTIVE & OBJECTIVE
Review of Systems   Reason unable to perform ROS: intubated.     Objective:     Vital Signs (Most Recent):  Temp: 97.8 °F (36.6 °C) (06/05/19 1100)  Pulse: 99 (06/05/19 1207)  Resp: 14 (06/05/19 1207)  BP: 135/71 (06/05/19 1200)  SpO2: 96 % (06/05/19 1207) Vital Signs (24h Range):  Temp:  [97.8 °F (36.6 °C)-99.1 °F (37.3 °C)] 97.8 °F (36.6 °C)  Pulse:  [] 99  Resp:  [11-25] 14  SpO2:  [94 %-100 %] 96 %  BP: (109-143)/(57-79) 135/71  Arterial Line BP: ()/(44-78) 132/53     Weight: 132.4 kg (291 lb 14.2 oz)  Body mass index is 37.48 kg/m².    SpO2: 96 %  O2 Device (Oxygen Therapy): ventilator      Intake/Output Summary (Last 24 hours) at 6/5/2019 1229  Last data filed at 6/5/2019 1200  Gross per 24 hour   Intake 4858.99 ml   Output 1659 ml   Net 3199.99 ml       Lines/Drains/Airways     Central Venous Catheter Line                 Hemodialysis Catheter 05/16/19 1600 right subclavian 19 days          Drain                 Rectal Tube 06/03/19 0000 rectal tube w/ balloon (indicate number of mLs) 2 days         NG/OG Tube 06/04/19 1300 Center mouth less than 1 day          Airway                 Airway - Non-Surgical 06/04/19 0903 Endotracheal Tube 1 day          Arterial Line                 Arterial Line 06/04/19 0900 Right Radial 1 day          Peripheral Intravenous Line                 Peripheral IV - Single Lumen 06/01/19 1309 22 G Right Forearm 3 days         Peripheral IV - Single Lumen 06/03/19 1100 20 G;1 3/4 in Left Forearm 2 days                Physical Exam    Gen: ill, intubated, sedated but arousable  HEENT: EOMI  Neck: no JVD, no carotid bruits  CV: RRR, systolic flow murmur at LSB, no S3  Resp: intubated, coarse breath sounds anterioly  GI: soft, nontender nondistended, normal bowel sounds  Ext: warm well perfused, no edema, no clubbing or cyanosis    Significant Labs:   All pertinent lab results from the last 24 hours have been reviewed. and   Recent Lab Results       06/05/19  1112    06/05/19  1109   06/05/19  0809   06/05/19  0617   06/05/19  0308        Albumin               Alkaline Phosphatase               Allens Test         N/A     ALT               Anion Gap               Aniso               Ascending aorta               Ao peak coreen               Ao VTI               AST               AV valve area               AV mean gradient               AV peak gradient               AV Velocity Ratio               BANDS               Baso #     0.13         Basophilic Stippling               Basophil%     0.7         BILIRUBIN TOTAL               Site         Rob/UAC     BSA               BUN, Bld               Leroy Cells               Calcium               Chloride               CO2               Creatinine               Left Ventricle Relative Wall Thickness               DelSys         Adult Vent     Differential Method     Automated         AV index (prosthetic)               Dohle Bodies               E/A ratio               E/E' ratio               eGFR if                eGFR if non                Eos #     0.3         Eosinophil%     1.8         E wave decelartion time               FiO2         40     FS               Glucose               Gran # (ANC)     14.2         Gran%     77.9         Hematocrit     25.4         Hemoglobin     8.3         Hypo               Immature Grans (Abs)     0.74  Comment:  Mild elevation in immature granulocytes is non specific and   can be seen in a variety of conditions including stress response,   acute inflammation, trauma and pregnancy. Correlation with other   laboratory and clinical findings is essential.           Immature Granulocytes     4.1         Coumadin Monitoring INR               IP         20     IT         .9     IVS               LA WIDTH               Left Atrium Major Axis               Left Atrium Minor Axis               LA size               LA volume               LA Volume Index               LVOT  area               LV LATERAL E/E' RATIO               LV SEPTAL E/E' RATIO               LV Diastolic Volume               LV Diastolic Volume Index               LVIDD               LVIDS               LV mass               LV Mass Index               LVOT diameter               LVOT peak jim               LVOT stroke volume               LVOT peak VTI               LV Systolic Volume               LV Systolic Volume Index               Lymph #     1.5         Lymph%     8.3         Magnesium               MAP         16     MCH     30.9         MCHC     32.7         MCV     94         Mean e'               Metamyelocytes               Mode         PCV     Mono #     1.3         Mono%     7.2         MPV     9.9         MV Peak A Jim               MV Peak E Jim               Myelocytes               nRBC     0         Ovalocytes               PEEP         10     Phosphorus               PiP         30     Platelet Estimate               Platelets     163         POC BE         -7     POC HCO3         18.6     POC PCO2         31.4     POC PH         7.381     POC PO2         122     POC SATURATED O2         99     POC TCO2         20     POCT Glucose 161     183       Poik               Poly               Potassium               Promyelocytes               PROTEIN TOTAL               Protime               PW               RA Major Axis               Rate         20     RA Width               RBC     2.69         RDW     18.1         RV S'               RVDD               Sample         ARTERIAL     Sinus               Sodium               Sp02         97     STJ               TAPSE               TDI SEPTAL               TDI LATERAL               Toxic Granulation               Triscuspid Valve Regurgitation Peak Gradient               TR Max Jim               Troponin I   3.095  Comment:  The reference interval for Troponin I represents the 99th percentile   cutoff   for our facility and is consistent with 3rd  generation assay   performance.             Vt         575     WBC     18.18                          06/05/19  0222   06/05/19  0007   06/04/19  2304   06/04/19  2110   06/04/19  1741        Albumin 2.2     2.1        2.2             Alkaline Phosphatase 65             Allens Test               ALT 9             Anion Gap 10     11        10             Aniso Slight     Slight       Ascending aorta               Ao peak coreen               Ao VTI               AST 16             AV valve area               AV mean gradient               AV peak gradient               AV Velocity Ratio               BANDS 2.0     1.0       Baso # CANCELED  Comment:  Result canceled by the ancillary.             Basophilic Stippling Occasional     Occasional       Basophil% 1.0     0.0       BILIRUBIN TOTAL 0.6  Comment:  For infants and newborns, interpretation of results should be based  on gestational age, weight and in agreement with clinical  observations.  Premature Infant recommended reference ranges:  Up to 24 hours.............<8.0 mg/dL  Up to 48 hours............<12.0 mg/dL  3-5 days..................<15.0 mg/dL  6-29 days.................<15.0 mg/dL               Site               BSA               BUN, Bld 43     61        43             Leroy Cells Occasional             Calcium 8.0     8.3        8.0             Chloride 108     108        108             CO2 19     17        19             Creatinine 4.7     6.2        4.7             Left Ventricle Relative Wall Thickness               DelSys               Differential Method Manual     Manual       AV index (prosthetic)               Dohle Bodies       Present       E/A ratio               E/E' ratio               eGFR if  12.8     9.1        12.8             eGFR if non  11.1  Comment:  Calculation used to obtain the estimated glomerular filtration  rate (eGFR) is the CKD-EPI equation.        7.9  Comment:  Calculation used to obtain  the estimated glomerular filtration  rate (eGFR) is the CKD-EPI equation.           11.1  Comment:  Calculation used to obtain the estimated glomerular filtration  rate (eGFR) is the CKD-EPI equation.                Eos # CANCELED  Comment:  Result canceled by the ancillary.             Eosinophil% 2.0     2.0       E wave decelartion time               FiO2               FS               Glucose 175     174        175             Gran # (ANC)               Gran% 76.0     80.0       Hematocrit 25.4     25.4       Hemoglobin 8.3     8.3       Hypo Occasional             Immature Grans (Abs) CANCELED  Comment:  Mild elevation in immature granulocytes is non specific and   can be seen in a variety of conditions including stress response,   acute inflammation, trauma and pregnancy. Correlation with other   laboratory and clinical findings is essential.    Result canceled by the ancillary.       CANCELED  Comment:  Mild elevation in immature granulocytes is non specific and   can be seen in a variety of conditions including stress response,   acute inflammation, trauma and pregnancy. Correlation with other   laboratory and clinical findings is essential.    Result canceled by the ancillary.         Immature Granulocytes CANCELED  Comment:  Result canceled by the ancillary.     CANCELED  Comment:  Result canceled by the ancillary.       Coumadin Monitoring INR 1.3  Comment:  Coumadin Therapy:  2.0 - 3.0 for INR for all indicators except mechanical heart valves  and antiphospholipid syndromes which should use 2.5 - 3.5.               IP               IT               IVS               LA WIDTH               Left Atrium Major Axis               Left Atrium Minor Axis               LA size               LA volume               LA Volume Index               LVOT area               LV LATERAL E/E' RATIO               LV SEPTAL E/E' RATIO               LV Diastolic Volume               LV Diastolic Volume Index                LVIDD               LVIDS               LV mass               LV Mass Index               LVOT diameter               LVOT peak jim               LVOT stroke volume               LVOT peak VTI               LV Systolic Volume               LV Systolic Volume Index               Lymph # CANCELED  Comment:  Result canceled by the ancillary.             Lymph% 9.0     10.0       Magnesium 1.9     2.0        1.9             MAP               MCH 30.3     30.5       MCHC 32.7     32.7       MCV 93     93       Mean e'               Metamyelocytes 1.0     2.0       Mode               Mono # CANCELED  Comment:  Result canceled by the ancillary.             Mono% 6.0     3.0       MPV 10.1     9.9       MV Peak A Jim               MV Peak E Jim               Myelocytes 3.0     1.0       nRBC 0     0       Ovalocytes Occasional     Occasional       PEEP               Phosphorus 4.7     6.2        4.7             PiP               Platelet Estimate Appears normal     Appears normal       Platelets 164     191       POC BE               POC HCO3               POC PCO2               POC PH               POC PO2               POC SATURATED O2               POC TCO2               POCT Glucose   172     166     Poik Slight     Slight       Poly Occasional     Occasional       Potassium 4.2     4.2        4.2             Promyelocytes       1.0       PROTEIN TOTAL 5.1             Protime 12.6             PW               RA Major Axis               Rate               RA Width               RBC 2.74     2.72       RDW 18.1     17.8       RV S'               RVDD               Sample               Sinus               Sodium 137     136        137             Sp02               STJ               TAPSE               TDI SEPTAL               TDI LATERAL               Toxic Granulation Present     Present       Triscuspid Valve Regurgitation Peak Gradient               TR Max Jim               Troponin I 3.269  Comment:  The reference  interval for Troponin I represents the 99th percentile   cutoff   for our facility and is consistent with 3rd generation assay   performance.     3.164  Comment:  The reference interval for Troponin I represents the 99th percentile   cutoff   for our facility and is consistent with 3rd generation assay   performance.           Vt               WBC 20.75     18.23                        06/04/19  1739   06/04/19  1627   06/04/19  1358   06/04/19  1233        Albumin             Alkaline Phosphatase             Allens Test       N/A     ALT             Anion Gap             Aniso     Slight       Ascending aorta   3.37         Ao peak coreen   1.28         Ao VTI   22.86         AST             AV valve area   2.64         AV mean gradient   4.15         AV peak gradient   6.55         AV Velocity Ratio   0.48         BANDS     2.0       Baso #             Basophilic Stippling             Basophil%     0.0       BILIRUBIN TOTAL             Site       Rob/UAC     BSA   2.56         BUN, Bld             Leroy Cells             Calcium             Chloride             CO2             Creatinine             Left Ventricle Relative Wall Thickness   0.28         DelSys       Adult Vent     Differential Method     Manual       AV index (prosthetic)   0.50         Dohle Bodies             E/A ratio   2.12         E/E' ratio   21.17         eGFR if              eGFR if non              Eos #             Eosinophil%     3.0       E wave decelartion time   132.79         FiO2       40     FS   7         Glucose             Gran # (ANC)             Gran%     85.0       Hematocrit     23.6       Hemoglobin     7.6       Hypo             Immature Grans (Abs)     CANCELED  Comment:  Mild elevation in immature granulocytes is non specific and   can be seen in a variety of conditions including stress response,   acute inflammation, trauma and pregnancy. Correlation with other   laboratory and clinical  findings is essential.    Result canceled by the ancillary.         Immature Granulocytes     CANCELED  Comment:  Result canceled by the ancillary.       Coumadin Monitoring INR             IP             IT             IVS   0.62         LA WIDTH   4.24         Left Atrium Major Axis   6.54         Left Atrium Minor Axis   6.37         LA size   4.88         LA volume   113.51         LA Volume Index   45.5         LVOT area   5.31         LV LATERAL E/E' RATIO   15.88         LV SEPTAL E/E' RATIO   31.75         LV Diastolic Volume   179.82         LV Diastolic Volume Index   72.05         LVIDD   6.00         LVIDS   5.60         LV mass   167.69         LV Mass Index   67.2         LVOT diameter   2.60         LVOT peak jim   [id number]         LVOT stroke volume   60.44         LVOT peak VTI   11.39         LV Systolic Volume   153.92         LV Systolic Volume Index   61.7         Lymph #             Lymph%     6.0       Magnesium             MAP             MCH     30.8       MCHC     32.2       MCV     96       Mean e'   0.06         Metamyelocytes     1.0       Mode       AC/PRVC     Mono #             Mono%     3.0       MPV     10.3       MV Peak A Jim   0.60         MV Peak E Jim   1.27         Myelocytes             nRBC     0       Ovalocytes             PEEP       10     Phosphorus             PiP       20     Platelet Estimate     Appears normal       Platelets     182       POC BE       -10     POC HCO3       16.3     POC PCO2       31.0     POC PH       7.328     POC PO2       176     POC SATURATED O2       100     POC TCO2       17     POCT Glucose             Poik             Poly     Occasional       Potassium             Promyelocytes             PROTEIN TOTAL             Protime             PW   0.85         RA Major Axis   5.85         Rate       20     RA Width   4.86         RBC     2.47       RDW     17.8       RV S'   5.76         RVDD   5.48         Sample       ARTERIAL     Sinus    3.52         Sodium             Sp02             STJ   3.16         TAPSE   0.95         TDI SEPTAL   0.04         TDI LATERAL   0.08         Toxic Granulation             Triscuspid Valve Regurgitation Peak Gradient   31.81         TR Max Jim   2.82         Troponin I 2.767  Comment:  The reference interval for Troponin I represents the 99th percentile   cutoff   for our facility and is consistent with 3rd generation assay   performance.             Vt             WBC     18.36             Significant Imaging:  EKG: none on file today    Echo (5/9/2019)   · Mild left ventricular enlargement.  · Mildly decreased left ventricular systolic function. The estimated ejection fraction is 45%  · Normal LV diastolic function.  · Normal right ventricular systolic function.  · Mild left atrial enlargement.  · Mild right atrial enlargement.  · Mild mitral regurgitation.  · Mild tricuspid regurgitation.  · The estimated PA systolic pressure is 38 mm Hg

## 2019-06-05 NOTE — PROGRESS NOTES
CRRT:     SLED restarted via right subclavian catheter (reversed); sluggish arterial port, with good flow on venous port.   UF rate set to 250ml/hr    Patient is on Citrate 200ml/hr pre-filter, Calcium Gluconate 3g 10ml/hr connected to the venous port via 3-way stop cock.

## 2019-06-05 NOTE — ASSESSMENT & PLAN NOTE
--Was likely multifactorial with likely BRANDEN; intubated at this time  --Improving ABG currently; on PC at this time, P 20, R 20, PEEP 10, pulling volumes in 500s, FiO2 30%; will attempt to wean with SBT  --Stopping patient's zosyn as patient not clinically infected at this time; believe his WBC represents demargination in setting of cardiac arrest

## 2019-06-05 NOTE — SUBJECTIVE & OBJECTIVE
Interval History:   Patient evaluated at bedside, has been on SLED for clearance, had presented with two clotting issues yesterday.     Review of patient's allergies indicates:   Allergen Reactions    Lidocaine Nausea Only     NOVACAINE --  Severe nausea    Statins-hmg-coa reductase inhibitors Anxiety     Current Facility-Administered Medications   Medication Frequency    0.9%  NaCl infusion (CRRT USE ONLY) Continuous    albuterol-ipratropium 2.5 mg-0.5 mg/3 mL nebulizer solution 3 mL Q6H PRN    atorvastatin tablet 40 mg Daily    chlorhexidine 0.12 % solution 15 mL BID    dextrose 50% injection 12.5 g PRN    fentaNYL 2500 mcg in 0.9% sodium chloride 250 mL infusion premix (titrating) Continuous    FLUoxetine capsule 20 mg QHS    glucagon (human recombinant) injection 1 mg PRN    insulin aspart U-100 pen 0-5 Units Q6H PRN    magnesium sulfate 2g in water 50mL IVPB (premix) PRN    miconazole nitrate 2% ointment BID    norepinephrine 32 mg in dextrose 5 % 250 mL infusion Continuous    ondansetron injection 4 mg Q6H PRN    pantoprazole injection 40 mg BID    ramelteon tablet 8 mg Nightly PRN    rOPINIRole tablet 2 mg QHS    sodium chloride 0.9% flush 10 mL PRN    sodium phosphate 20.01 mmol in dextrose 5 % 250 mL IVPB PRN    sodium phosphate 30 mmol in dextrose 5 % 250 mL IVPB PRN    sodium phosphate 39.99 mmol in dextrose 5 % 250 mL IVPB PRN       Objective:     Vital Signs (Most Recent):  Temp: 98.7 °F (37.1 °C) (06/05/19 0701)  Pulse: 80 (06/05/19 0800)  Resp: 20 (06/05/19 0800)  BP: 125/70 (06/05/19 0800)  SpO2: 98 % (06/05/19 0800)  O2 Device (Oxygen Therapy): ventilator (06/05/19 0800) Vital Signs (24h Range):  Temp:  [98.7 °F (37.1 °C)-99.1 °F (37.3 °C)] 98.7 °F (37.1 °C)  Pulse:  [65-92] 80  Resp:  [18-28] 20  SpO2:  [94 %-100 %] 98 %  BP: (113-143)/(57-81) 125/70  Arterial Line BP: ()/(44-78) 133/57     Weight: 132.4 kg (291 lb 14.2 oz) (06/05/19 0300)  Body mass index is 37.48  kg/m².  Body surface area is 2.63 meters squared.    I/O last 3 completed shifts:  In: 8403.8 [P.O.:240; I.V.:6460.8; Blood:523; NG/GT:880; IV Piggyback:300]  Out: 1659 [Other:809; Stool:850]    Physical Exam   Constitutional: No distress. He is intubated.   Intubated, sedated.    HENT:   Head: Normocephalic and atraumatic.   Mouth/Throat: Oropharynx is clear and moist. No oropharyngeal exudate.   Eyes: Conjunctivae are normal. No scleral icterus.   Neck: No JVD present.   Cardiovascular: Normal rate, regular rhythm, normal heart sounds and intact distal pulses.   Pulmonary/Chest: Effort normal and breath sounds normal. He is intubated. No respiratory distress.   Abdominal: Soft. Bowel sounds are normal. He exhibits no distension. There is no tenderness.   Musculoskeletal: He exhibits no edema or tenderness.   Lymphadenopathy:     He has no cervical adenopathy.   Skin: Skin is warm. Capillary refill takes less than 2 seconds. He is not diaphoretic.   Nursing note and vitals reviewed.      Significant Labs:  ABGs:   Recent Labs   Lab 06/05/19  0308   PH 7.381   PCO2 31.4*   HCO3 18.6*   POCSATURATED 99   BE -7     BMP:   Recent Labs   Lab 06/05/19  0222   *  175*     108   CO2 19*  19*   BUN 43*  43*   CREATININE 4.7*  4.7*   CALCIUM 8.0*  8.0*   MG 1.9  1.9     CBC:   Recent Labs   Lab 06/05/19  0809   WBC 18.18*   RBC 2.69*   HGB 8.3*   HCT 25.4*      MCV 94   MCH 30.9   MCHC 32.7     CMP:   Recent Labs   Lab 06/05/19 0222   *  175*   CALCIUM 8.0*  8.0*   ALBUMIN 2.2*  2.2*   PROT 5.1*     137   K 4.2  4.2   CO2 19*  19*     108   BUN 43*  43*   CREATININE 4.7*  4.7*   ALKPHOS 65   ALT 9*   AST 16   BILITOT 0.6     All labs within the past 24 hours have been reviewed.

## 2019-06-05 NOTE — PT/OT/SLP DISCHARGE
Physical Therapy Discharge Summary    Name: Frank Zayas  MRN: [id number]   Principal Problem: GIB (gastrointestinal bleeding)     Patient Discharged from acute Physical Therapy on 2019.  Please refer to prior PT noted date on 2019 for functional status.     Assessment:     Pt t/f to ICU 19 with Gi Bleed and declining H&H to 5.7 and 18.9. T/F to ICU for further monitoring    Objective:     GOALS:   Multidisciplinary Problems     Physical Therapy Goals        Problem: Physical Therapy Goal    Goal Priority Disciplines Outcome Goal Variances Interventions   Physical Therapy Goal     PT, PT/OT Ongoing (interventions implemented as appropriate)     Description:  Goals to be met by: 2019     Patient will increase functional independence with mobility by performin. Supine to sit with Modified [place]  2. Sit to supine with Modified [place]  3. Sit to stand transfer with Contact Guard Assistance  4. Gait  x 50 feet with Supervision using Rolling Walker.   5. Ascend/Descend 4 inch curb step with Supervision using Rolling Walker.                      Reasons for Discontinuation of Therapy Services  Transfer to ICU       Plan:     Patient Discharged to: ICU.    Tish Meraz, PT  2019

## 2019-06-05 NOTE — ASSESSMENT & PLAN NOTE
--holding metoprolol in setting of hemorrhagic shock  --currently requiring levophed for maintenance of his pressures

## 2019-06-05 NOTE — ASSESSMENT & PLAN NOTE
79 year old man with history of T2DM, ESRD on HD (started 1-2 ms ago), HFpEF, CAD s/p CABG on plavix, who is presenting from outside hospital due to concern for duodenal ulcer. Nephrology consulted for ESRD and HD management. Last dialyze at the outside hospital per family.     Patient intubated and sedated on pressor support (Levophed)     S/P EGD with oozing duodenal ulcer     Plan:  - Will start patient on SLED for clearance and volume removal, -250 cc/hr, bath adjusted to chem  - Will start on citrate pre filter since has been presenting with clotting issues, monitor ionize Ca every 8 hrs     - Continue with pressor to maintain MAP> 65   - On mechanical ventilation with low parameter setting, plan for extubation today   - Continue monitoring intake and output

## 2019-06-05 NOTE — ASSESSMENT & PLAN NOTE
--Will continue q6h CBCs while patient noted to still have active bleeding  --BID PPI for now  --TEG if possible for coagulation  --IR vs GI for ultimate management of ongoing bleeding

## 2019-06-05 NOTE — PROGRESS NOTES
Extubated to bipap 10/5 40%. Discontinued vent. No complications at this time. Will continue to monitor pt.

## 2019-06-05 NOTE — ASSESSMENT & PLAN NOTE
--Spoke with cardiology, do not feel that this represents STEMI  --Troponin leveling off, will continue to trend  --EKG not showing ST elevation, does have QRS prolongation, sinus rhythm; T wave inversions noted throughout; appears to have had bifascicular block on old EKGs

## 2019-06-05 NOTE — PHYSICIAN QUERY
PT Name: Frank Zayas  MR #: [id number]    Physician Query Form - Heart  Condition Clarification     Tory Shen RN, CCDS  Desk # [phone]; [facility] # [phone] [email or web address]    This form is a permanent document in the medical record.     Query Date: June 5, 2019    By submitting this query, we are merely seeking further clarification of documentation. Please utilize your independent clinical judgment when addressing the question(s) below.    The medical record contains the following   Indicators    Supporting Clinical Findings Location in Medical Record   x  on 5/31 Lab   x EF 25% [facility] 6/4   x Radiology findings Heart is enlarged.  Pulmonary vascularity is similar.  Increased ground-glass opacity on the right presumed layering of pleural fluid.     Moderate to large right pleural effusion with adjacent passive atelectasis.  Subsegmental atelectasis at the left lung base.     --CXR at OSH (5/30):  Right basilar opacification consistent with right basilar infiltrate and small pleural effusion    5/31 Radiology        6/4 Radiology    CC MD 5/31   x Echo Results · Severely decreased left ventricular systolic function. The estimated ejection fraction is 25%. severe global hypokinesis. No LV apical thrombus.  · Mild left ventricular enlargement.  · Grade III (severe) left ventricular diastolic dysfunction consistent with restrictive physiology.  · Elevated left atrial pressure.  · Moderately to severely reduced right ventricular systolic function.  · Moderate left atrial enlargement.  · Mild tricuspid regurgitation; Mild mitral regurgitation.  · Mechanically ventilated; cannot use inferior caval vein diameter to estimate central venous pressure.    TTE from 5/5/2019:  · Mild left ventricular enlargement.  · Mildly decreased left ventricular systolic function. The estimated ejection fraction is 45%  · Normal LV diastolic function.  · Normal right ventricular systolic function.  · Mild left atrial  "enlargement; · Mild right atrial enlargement.  · Mild mitral regurgitation; · Mild tricuspid regurgitation.  · The estimated PA systolic pressure is 38 mm Hg [facility] 6/4                                    CC MD PN 6/1    "Ascites" documented      "SOB" or "KELLOGG" documented      "Hypoxia" documented     x Heart Failure documented  CHF; EF 45% no dd, mild decreased LV sys fxn (5/5/19)    CHF (congestive heart failure)  - BNP on admission 552  - Echo from approx. 1 month ago with mildly decreased LV systolic function  - Although not seen on echo, likely some element of diastolic function given hx of HTN and obesity    Acute hypercapnic resp failure    2/2 chf, R pleural effusion, based on body habitus, suspected likely Component of likely BRANDEN/OHS.      Hx: HFpEF CC MD PN 5/31        CC MD 6/1            CC MD H&P 6/4      REnal PN 6/4   x "Edema" documented  2+ bilateral pitting edema   H&P CC MD   x Diuretics/Meds Hemodynamically unstable for regular HD  diurese as needed Renal PN 6/4  CC MD PN 5/31   x Treatment: 8L hi flow O2 NC. SpO2>92%.  - Daily weights, strict I/Os  - Careful with diuresis in setting of recent volume loss from GI bleed  once hemodynamically stable and off pressors, restart metoprolol  TALON RN PoC 6/1  CC MD PN 6/1      Cards PN 6/4  Orders   x Other:           GIB 2/2 bleeding duodenal vessel s/p clipped @ OSH pta           ABLA           ESRD on HD           Hemorrhagic shock. On Vasopressors             Transferred to [facility] for Cards Eval           HyperK; hypovolemic shock on Levo           [place]; [place]           CARDIAC ARREST while line placement (@[facility])           --Rec for transfer to [facility] [place] for AV Embolization           Sedated/Vent           PEA @ OSH likely r/t underlying blood loss           AFIB; CAD; HLD; DMT2;            CHF; EF 45% no dd, mild decreased LV sys fxn (5/5/19)           Demand Ischemia; not plaque rupture            hemorrhagic shock leading to secondary " cardiac arrest. Per                  reports, possibly PEA?            respiratory & metabolic acidosis            Poss aspiration pna CC MD PN                          Cards PN 6/4      CC MD H&P 6/4   Heart failure (HF) can be acute, chronic or both. It is generally further specificed as systolic, diastolic, or combined. Lastly, it is important to identify an underlying etiology if known or suspected.     Common clues to acute exacerbation:  Rapidly progressive symptoms (w/in 2 weeks of presentation), using IV diuretics to treat, using supplemental O2, pulmonary edema on Xray, MI w/in 4 weeks, and/or BNP >500    Systolic Heart Failure: is defined as chart documentation of a left ventricular ejection fraction (LVEF) less than 40%     Diastolic Heart Failure: is defined as a left ventricular ejection fraction (LVEF) greater than 40%   +      Evidence of diastolic dysfunction on echocardiography OR    Right heart catheterization wedge pressure above 12 mm Hg OR    Left heart catheterization left ventricular end diastolic pressure 18 mm Hg or above.    References: *American Heart Association    The clinical guidelines noted below are only system guidelines, and do not replace the providers clinical judgment.     Provider, please specify the diagnosis associated with above clinical findings      [   ] Acute on Chronic Systolic Heart Failure- Pre-existing systolic HF diagnosis.  EF < 40%  and acute HF symptoms documented  [   ] Chronic Systolic Heart Failure - Pre-existing systolic HF diagnosis.  EF < 40%  without  acute HF symptoms documented    [x] Acute on Chronic Diastolic Heart Failure -    Pre-existing diastoic HF diagnosis.  EF > 40%  and acute HF symptoms documented                                 [   ] Chronic Diastolic Heart Failure - Pre-existing diastolic HF diagnosis.  EF > 40%  without  acute HF symptoms documented    [   ] Acute on Chronic Combined Systolic and Diastolic Heart Failure            [   ]  Chronic Combined Systolic and Diastolic Heart Failure    [   ] Other Type of Heart Failure (please specify type): _________________________  [   ] Other (please specify): ___________________________________  [  ] Clinically Undetermined                          Please document in your progress notes daily for the duration of treatment until resolved and include in your discharge summary.   “You can access the Follow[facility] Patient Portal, offered by [facility], by registering with the following website: [email or web address]”

## 2019-06-05 NOTE — PLAN OF CARE
Problem: Adult Inpatient Plan of Care  Goal: Plan of Care Review  Outcome: Ongoing (interventions implemented as appropriate)  No acute events throughout shift. Please see VS and assessments in flowsheets. Patient responds to voice, occasionally opens eyes spontaneously, follows commands, lightly sedated with fentanyl. Pt NSR, Map >65 with pressor support. Patient remains intubated. CRRT initiated clotted twice. Team notified and recommended for central line to be placed for Levophed gtt and limited IV access, but orders to monitor pressor requirements. If pressor requirements increases then line placement will be considered as per MD order. Patient  Flexi seal remains in place, stool evacuated. Levo, Fentanyl, and Protonix gtt maintained. Patient progressing towards goal. Plan of care reviewed with patient and family. All questions and concerns addressed. Patient and family verbalizes understanding. WCTM.

## 2019-06-06 NOTE — PLAN OF CARE
Problem: Adult Inpatient Plan of Care  Goal: Plan of Care Review  Outcome: Ongoing (interventions implemented as appropriate)    No acute events throughout day. See vital signs and assessments in flowsheets. See below for updates on today's progress.     Pulmonary:  Pt. Remains on 2 L NC. SPO2 %.     Cardiovascular: Pt. Remains ST. HR 100s-120s. Pt. Experiences several pauses throughout this shift. SBP 100s-120s. SBP goal >100; wean levo accordingly.      Neurological: Pt. Is AAOx4. Pt. Is afebrile throughout shift.      Gastrointestinal: Pt. Has flexi seal in place. Loose BM evacuated from flexi.      Genitourinary: Pt. Oliguric. 2 unmeasured urine occurences.       Endocrine: accuchecks q. 6hrs. CRRT stopped today; will reevaluate need tomorrow.      Skin/Bath: skin breakdown noted to bilateral shins. Stage 1 to sacrum. Scrotal edema and redness; cream applied.                          Date of last CHG bath given: 6/5 pm shift     Infusions: Levo     Patient progressing towards goals as tolerated, plan of care communicated and reviewed with Frank Zayas and family. All concerns addressed. Will continue to monitor.

## 2019-06-06 NOTE — SUBJECTIVE & OBJECTIVE
Interval History/Significant Events: No acute overnight events. Hb stable. Continuing to wean levo.    Review of Systems  Objective:     Vital Signs (Most Recent):  Temp: 99.2 °F (37.3 °C) (06/06/19 1500)  Pulse: (!) 112 (06/06/19 1519)  Resp: (!) 22 (06/06/19 1519)  BP: (!) 120/57 (06/06/19 1500)  SpO2: 96 % (06/06/19 1519) Vital Signs (24h Range):  Temp:  [98 °F (36.7 °C)-99.4 °F (37.4 °C)] 99.2 °F (37.3 °C)  Pulse:  [] 112  Resp:  [20-37] 22  SpO2:  [90 %-99 %] 96 %  BP: (120-148)/(57-80) 120/57  Arterial Line BP: (121-140)/(43-57) 122/43   Weight: 128.9 kg (284 lb 2.8 oz)  Body mass index is 36.49 kg/m².      Intake/Output Summary (Last 24 hours) at 6/6/2019 1849  Last data filed at 6/6/2019 1500  Gross per 24 hour   Intake 3971.78 ml   Output 5138 ml   Net -1166.22 ml       Physical Exam   Constitutional: No distress. Nasal cannula in place.   HENT:   Head: Normocephalic and atraumatic.   Mouth/Throat: No oropharyngeal exudate.   Eyes: Conjunctivae are normal. Right eye exhibits no discharge. Left eye exhibits no discharge. No scleral icterus.   Neck: No JVD present.   Cardiovascular: Normal rate, normal heart sounds and intact distal pulses.   Irregularly irregular   Pulmonary/Chest: Effort normal and breath sounds normal. No respiratory distress.   Abdominal: Soft. Bowel sounds are normal. He exhibits no distension. There is no tenderness. There is no rebound.   Musculoskeletal: He exhibits edema. He exhibits no tenderness.   Lymphadenopathy:     He has no cervical adenopathy.   Neurological: He is alert.   Skin: Skin is warm. No rash noted. He is not diaphoretic. No erythema.   Psychiatric: He has a normal mood and affect.   Nursing note and vitals reviewed.      Vents:  Vent Mode: Spont (06/05/19 1720)  Ventilator Initiated: Yes (06/04/19 1033)  Set Rate: 12 bmp (06/05/19 1502)  Vt Set: 480 mL (05/31/19 1631)  Pressure Support: 5 cmH20 (06/05/19 1720)  PEEP/CPAP: 5 cmH20 (06/05/19 1720)  Oxygen  Concentration (%): 28 (06/06/19 0745)  Peak Airway Pressure: 9.5 cmH2O (06/05/19 1720)  Plateau Pressure: 0 cmH20 (06/05/19 1720)  Total Ve: 0 mL (06/05/19 1720)  Negative Inspiratory Force (cm H2O): -27 (05/31/19 1751)  F/VT Ratio<105 (RSBI): (!) 35.71 (06/05/19 1502)  Lines/Drains/Airways     Central Venous Catheter Line                 Hemodialysis Catheter 05/16/19 1600 right subclavian 21 days         Percutaneous Central Line Insertion/Assessment - quad lumen  06/05/19 right femoral vein;right femoral 1 day          Drain                 Rectal Tube 06/03/19 0000 rectal tube w/ balloon (indicate number of mLs) 3 days          Arterial Line                 Arterial Line 06/04/19 0900 Right Radial 2 days          Peripheral Intravenous Line                 Peripheral IV - Single Lumen 06/03/19 1100 20 G;1 3/4 in Left Forearm 3 days              Significant Labs:    CBC/Anemia Profile:  Recent Labs   Lab 06/06/19  0248 06/06/19  0825 06/06/19  1342   WBC 17.57* 15.04* 11.94   HGB 8.0* 8.0* 7.5*   HCT 25.3* 24.9* 24.0*    144* 122*   MCV 97 94 99*   RDW 18.4* 18.5* 18.6*        Chemistries:  Recent Labs   Lab 06/05/19  0222  06/05/19  2126 06/06/19  0248 06/06/19  1059 06/06/19  1342     137   < > 139 139  139  --  137   K 4.2  4.2   < > 4.6 4.4  4.4  --  4.5     108   < > 108 108  108  --  108   CO2 19*  19*   < > 23 23  23  --  23   BUN 43*  43*   < > 15 9  9  --  5*   CREATININE 4.7*  4.7*   < > 2.1* 1.5*  1.5*  --  1.2   CALCIUM 8.0*  8.0*   < > 7.7* 7.8*  7.8*  --  7.6*   ALBUMIN 2.2*  2.2*   < > 2.2* 2.2*  2.2*  --  2.0*   PROT 5.1*  --   --  5.4*  --   --    BILITOT 0.6  --   --  0.9  --   --    ALKPHOS 65  --   --  81  --   --    ALT 9*  --   --  10  --   --    AST 16  --   --  21  --   --    MG 1.9  1.9   < > 1.7 1.8  1.8 1.8 1.8   PHOS 4.7*  4.7*   < > 2.4* 1.9*  1.9*  --  2.3*    < > = values in this interval not displayed.       CMP:   Recent Labs   Lab  06/05/19 0222 06/05/19 2126 06/06/19  0248 06/06/19  1342     137   < > 139 139  139 137   K 4.2  4.2   < > 4.6 4.4  4.4 4.5     108   < > 108 108  108 108   CO2 19*  19*   < > 23 23  23 23   *  175*   < > 133* 131*  131* 107   BUN 43*  43*   < > 15 9  9 5*   CREATININE 4.7*  4.7*   < > 2.1* 1.5*  1.5* 1.2   CALCIUM 8.0*  8.0*   < > 7.7* 7.8*  7.8* 7.6*   PROT 5.1*  --   --  5.4*  --    ALBUMIN 2.2*  2.2*   < > 2.2* 2.2*  2.2* 2.0*   BILITOT 0.6  --   --  0.9  --    ALKPHOS 65  --   --  81  --    AST 16  --   --  21  --    ALT 9*  --   --  10  --    ANIONGAP 10  10   < > 8 8  8 6*   EGFRNONAA 11.1*  11.1*   < > 29.3* 44.0*  44.0* 57.6*    < > = values in this interval not displayed.     Lactic Acid: No results for input(s): LACTATE in the last 48 hours.    Significant Imaging:  I have reviewed all pertinent imaging results/findings within the past 24 hours.

## 2019-06-06 NOTE — ASSESSMENT & PLAN NOTE
79 year old man with history of T2DM, ESRD on HD (started 1-2 ms ago), HFpEF, CAD s/p CABG on plavix, who is presenting from outside hospital due to concern for duodenal ulcer. Nephrology consulted for ESRD and HD management. Last dialyze at the outside hospital per family.     Patient intubated and sedated on pressor support (Levophed)     S/P EGD with oozing duodenal ulcer     Plan:  - Will plan to hold SLED for today, most likely require HD treatment for tomorrow     - Continue with pressor to maintain MAP> 65 but have been weaning levophed   - Continue monitoring intake and output

## 2019-06-06 NOTE — PROGRESS NOTES
Daily checks done    UF rate at 300cc/hr. Citrate and Calcium infusing well, 200cc/hr and 10cc/hr respectively.

## 2019-06-06 NOTE — SUBJECTIVE & OBJECTIVE
Interval History:   Patient evaluated at bedside, was extubated yesterday, currently on oxygen requirement via nasal canula.     Review of patient's allergies indicates:   Allergen Reactions    Lidocaine Nausea Only     NOVACAINE --  Severe nausea    Statins-hmg-coa reductase inhibitors Anxiety     Current Facility-Administered Medications   Medication Frequency    albuterol-ipratropium 2.5 mg-0.5 mg/3 mL nebulizer solution 3 mL Q6H PRN    atorvastatin tablet 40 mg Daily    calcium gluconate 3,000 mg in dextrose 5 % 100 mL IVPB Continuous    chlorhexidine 0.12 % solution 15 mL BID    dextrose 50% injection 12.5 g PRN    dextrose-sod citrate-citric ac 2.45-2.2 gram- 800 mg/100 mL Soln Continuous    fentaNYL 2500 mcg in 0.9% sodium chloride 250 mL infusion premix (titrating) Continuous    FLUoxetine capsule 20 mg QHS    glucagon (human recombinant) injection 1 mg PRN    insulin aspart U-100 pen 0-5 Units Q6H PRN    magnesium sulfate 2g in water 50mL IVPB (premix) PRN    miconazole nitrate 2% ointment BID    norepinephrine 32 mg in dextrose 5 % 250 mL infusion Continuous    ondansetron injection 4 mg Q6H PRN    pantoprazole injection 40 mg BID    ramelteon tablet 8 mg Nightly PRN    rOPINIRole tablet 2 mg QHS    sodium chloride 0.9% flush 10 mL PRN    sodium phosphate 20.01 mmol in dextrose 5 % 250 mL IVPB PRN    sodium phosphate 30 mmol in dextrose 5 % 250 mL IVPB PRN    sodium phosphate 39.99 mmol in dextrose 5 % 250 mL IVPB PRN       Objective:     Vital Signs (Most Recent):  Temp: 98.5 °F (36.9 °C) (06/06/19 0701)  Pulse: (!) 121 (06/06/19 0900)  Resp: (!) 33 (06/06/19 0900)  BP: 134/62 (06/06/19 0900)  SpO2: 99 % (06/06/19 0900)  O2 Device (Oxygen Therapy): nasal cannula (06/06/19 0900) Vital Signs (24h Range):  Temp:  [97.8 °F (36.6 °C)-99.4 °F (37.4 °C)] 98.5 °F (36.9 °C)  Pulse:  [] 121  Resp:  [11-33] 33  SpO2:  [91 %-99 %] 99 %  BP: (120-148)/(57-80) 134/62  Arterial Line BP:  (117-142)/(43-57) 122/43     Weight: 128.9 kg (284 lb 2.8 oz) (06/06/19 0300)  Body mass index is 36.49 kg/m².  Body surface area is 2.59 meters squared.    I/O last 3 completed shifts:  In: 7859.9 [I.V.:7325.9; Blood:254; NG/GT:180; IV Piggyback:100]  Out: 6684 [Drains:50; Other:5834; Stool:800]    Physical Exam   Constitutional: He is oriented to person, place, and time. No distress. Nasal cannula in place.   HENT:   Head: Normocephalic and atraumatic.   Mouth/Throat: Oropharynx is clear and moist. No oropharyngeal exudate.   Eyes: Conjunctivae are normal. No scleral icterus.   Neck: No JVD present.   Cardiovascular: Normal rate, regular rhythm, normal heart sounds and intact distal pulses.   Pulmonary/Chest: Effort normal and breath sounds normal. No respiratory distress.   Abdominal: Soft. Bowel sounds are normal. He exhibits no distension. There is no tenderness.   Musculoskeletal: He exhibits edema. He exhibits no tenderness.   Lymphadenopathy:     He has no cervical adenopathy.   Neurological: He is alert and oriented to person, place, and time.   Skin: He is not diaphoretic.   Nursing note and vitals reviewed.      Significant Labs:  ABGs:   Recent Labs   Lab 06/06/19  0824   PH 7.321*   PCO2 47.5*   HCO3 24.5   POCSATURATED 98   BE -2     BMP:   Recent Labs   Lab 06/06/19  0248   *  131*     108   CO2 23  23   BUN 9  9   CREATININE 1.5*  1.5*   CALCIUM 7.8*  7.8*   MG 1.8  1.8     CBC:   Recent Labs   Lab 06/06/19  0825   WBC 15.04*   RBC 2.65*   HGB 8.0*   HCT 24.9*   *   MCV 94   MCH 30.2   MCHC 32.1     CMP:   Recent Labs   Lab 06/06/19  0248   *  131*   CALCIUM 7.8*  7.8*   ALBUMIN 2.2*  2.2*   PROT 5.4*     139   K 4.4  4.4   CO2 23  23     108   BUN 9  9   CREATININE 1.5*  1.5*   ALKPHOS 81   ALT 10   AST 21   BILITOT 0.9     All labs within the past 24 hours have been reviewed.

## 2019-06-06 NOTE — PLAN OF CARE
Problem: Adult Inpatient Plan of Care  Goal: Plan of Care Review  Outcome: Ongoing (interventions implemented as appropriate)  No acute events throughout day. See vital signs and assessments in flowsheets. See below for updates on today's progress.   Pulmonary: Bipap 28%   Cardiovascular: NSR - ST HR 80-130s, titrating levo to keep MAP >65  Neurological: AAOx4  Gastrointestinal: NPO except sips of water with meds and ice chips, rectal   Genitourinary: pt on CRRT, , on citrate and Ca gluconate   Endocrine: Q 6 Accu check, nomorglycemic, WCTM and gve insulin per sliding scale   Skin/Bath: foam dressing on shin wounds, foam dressing on sacrum and heels   Date of last CHG bath given: 6/5 per night shift  Infusions: Levo  Patient progressing towards goals as tolerated, plan of care communicated and reviewed with Frank Zayas and family. All concerns addressed. Will continue to monitor.

## 2019-06-06 NOTE — TREATMENT PLAN
GI Treatment Plan    Frank Zayas is a 79 y.o. male admitted to hospital 5/31/2019 (Hospital Day: 7) due to GIB (gastrointestinal bleeding).     Interval History  - extubated yesterday  - denies abdominal pain, n/v  - melena overnight per nursing  - H&H stable: 8.5 -> 8.0 -> 8.0; no transfusion requirement since day of EGD (6/4)    Objective  Temp:  [97.8 °F (36.6 °C)-99.4 °F (37.4 °C)] 98.5 °F (36.9 °C) (06/06 0701)  Pulse:  [] 123 (06/06 0824)  BP: (109-148)/(57-80) 139/70 (06/06 0800)  Resp:  [11-32] 23 (06/06 0824)  SpO2:  [91 %-99 %] 99 % (06/06 0824)  Arterial Line BP: ()/(43-57) 122/43 (06/06 0800)    General: Alert, Oriented x3, no distress  Abdomen: Normoactive bowel sounds. Non-distended. Normal tympany. Soft. Non-tender. No peritoneal signs.    Laboratory    Recent Labs   Lab 06/05/19 2032 06/06/19  0248 06/06/19  0825   HGB 8.5* 8.0* 8.0*       Lab Results   Component Value Date    WBC 15.04 (H) 06/06/2019    HGB 8.0 (L) 06/06/2019    HCT 24.9 (L) 06/06/2019    MCV 94 06/06/2019     (L) 06/06/2019       Lab Results   Component Value Date     06/06/2019     06/06/2019    K 4.4 06/06/2019    K 4.4 06/06/2019     06/06/2019     06/06/2019    CO2 23 06/06/2019    CO2 23 06/06/2019    BUN 9 06/06/2019    BUN 9 06/06/2019    CREATININE 1.5 (H) 06/06/2019    CREATININE 1.5 (H) 06/06/2019    CALCIUM 7.8 (L) 06/06/2019    CALCIUM 7.8 (L) 06/06/2019    ANIONGAP 8 06/06/2019    ANIONGAP 8 06/06/2019    ESTGFRAFRICA 50.8 (A) 06/06/2019    ESTGFRAFRICA 50.8 (A) 06/06/2019    EGFRNONAA 44.0 (A) 06/06/2019    EGFRNONAA 44.0 (A) 06/06/2019       Lab Results   Component Value Date    ALT 10 06/06/2019    AST 21 06/06/2019    ALKPHOS 81 06/06/2019    BILITOT 0.9 06/06/2019       Lab Results   Component Value Date    INR 1.2 06/06/2019    INR 1.3 (H) 06/05/2019    INR 1.2 06/04/2019       Plan  - complete 72hr protonix gtt, then protonix 40mg BID  - call if any signs of  recurrent bleeding but will likely require IR intervention    Thank you for involving us in the care of Frank Zayas. Please call with any additional questions, concerns or changes in the patient's clinical status.    Kalyan Mesa MD  Gastroenterology Fellow, PGY IV  Spectralink: [id number]

## 2019-06-06 NOTE — PROGRESS NOTES
[facility]  Nephrology  Progress Note    Patient Name: Frank Zayas  MRN: [id number]  Admission Date: 5/31/2019  Hospital Length of Stay: 6 days  Attending Provider: Rubio Yu MD   Primary Care Physician: Mikhail Shields MD  Principal Problem:GIB (gastrointestinal bleeding)    Subjective:     HPI: 79 year old man with history of T2DM, ESRD on HD (started 1-2 ms ago), HFpEF, and CAD s/p CABG on plavix who initially presented to [facility] for complaints of abdominal pain and hematochezia. He was transfused 1u pRBC at OSH and transferred to [facility] for a cardiac evaluation after being found with a troponin of 0.39. At [facility], he was started on levophed for shock and suffered brief episode of PEA arrest which resolved with resuscitation. At OSH underwent EGD which was notable for actively bleeding duodenal ulcer s/p hemostasis with epi and clip. Due to concern for rebleeding he was transferred to [facility] for potential IR therapy. According to the family, the patient was last dialyze on yesterday, 5/30, at the OSH. He was recently started on dialysis about 1-2 months ago. His renal failure is likely due to his co morbidities. He typically dialyze on T/Th/Sat for 4 hrs at [facility] in [place] via a L permacath. Nephrology consulted for management of ESRD and HD treatment.        Interval History:   Patient evaluated at bedside, was extubated yesterday, currently on oxygen requirement via nasal canula.     Review of patient's allergies indicates:   Allergen Reactions    Lidocaine Nausea Only     NOVACAINE --  Severe nausea    Statins-hmg-coa reductase inhibitors Anxiety     Current Facility-Administered Medications   Medication Frequency    albuterol-ipratropium 2.5 mg-0.5 mg/3 mL nebulizer solution 3 mL Q6H PRN    atorvastatin tablet 40 mg Daily    calcium gluconate 3,000 mg in dextrose 5 % 100 mL IVPB Continuous    chlorhexidine 0.12 % solution 15 mL BID    dextrose 50%  injection 12.5 g PRN    dextrose-sod citrate-citric ac 2.45-2.2 gram- 800 mg/100 mL Soln Continuous    fentaNYL 2500 mcg in 0.9% sodium chloride 250 mL infusion premix (titrating) Continuous    FLUoxetine capsule 20 mg QHS    glucagon (human recombinant) injection 1 mg PRN    insulin aspart U-100 pen 0-5 Units Q6H PRN    magnesium sulfate 2g in water 50mL IVPB (premix) PRN    miconazole nitrate 2% ointment BID    norepinephrine 32 mg in dextrose 5 % 250 mL infusion Continuous    ondansetron injection 4 mg Q6H PRN    pantoprazole injection 40 mg BID    ramelteon tablet 8 mg Nightly PRN    rOPINIRole tablet 2 mg QHS    sodium chloride 0.9% flush 10 mL PRN    sodium phosphate 20.01 mmol in dextrose 5 % 250 mL IVPB PRN    sodium phosphate 30 mmol in dextrose 5 % 250 mL IVPB PRN    sodium phosphate 39.99 mmol in dextrose 5 % 250 mL IVPB PRN       Objective:     Vital Signs (Most Recent):  Temp: 98.5 °F (36.9 °C) (06/06/19 0701)  Pulse: (!) 121 (06/06/19 0900)  Resp: (!) 33 (06/06/19 0900)  BP: 134/62 (06/06/19 0900)  SpO2: 99 % (06/06/19 0900)  O2 Device (Oxygen Therapy): nasal cannula (06/06/19 0900) Vital Signs (24h Range):  Temp:  [97.8 °F (36.6 °C)-99.4 °F (37.4 °C)] 98.5 °F (36.9 °C)  Pulse:  [] 121  Resp:  [11-33] 33  SpO2:  [91 %-99 %] 99 %  BP: (120-148)/(57-80) 134/62  Arterial Line BP: (117-142)/(43-57) 122/43     Weight: 128.9 kg (284 lb 2.8 oz) (06/06/19 0300)  Body mass index is 36.49 kg/m².  Body surface area is 2.59 meters squared.    I/O last 3 completed shifts:  In: 7859.9 [I.V.:7325.9; Blood:254; NG/GT:180; IV Piggyback:100]  Out: 6684 [Drains:50; Other:5834; Stool:800]    Physical Exam   Constitutional: He is oriented to person, place, and time. No distress. Nasal cannula in place.   HENT:   Head: Normocephalic and atraumatic.   Mouth/Throat: Oropharynx is clear and moist. No oropharyngeal exudate.   Eyes: Conjunctivae are normal. No scleral icterus.   Neck: No JVD present.    Cardiovascular: Normal rate, regular rhythm, normal heart sounds and intact distal pulses.   Pulmonary/Chest: Effort normal and breath sounds normal. No respiratory distress.   Abdominal: Soft. Bowel sounds are normal. He exhibits no distension. There is no tenderness.   Musculoskeletal: He exhibits edema. He exhibits no tenderness.   Lymphadenopathy:     He has no cervical adenopathy.   Neurological: He is alert and oriented to person, place, and time.   Skin: He is not diaphoretic.   Nursing note and vitals reviewed.      Significant Labs:  ABGs:   Recent Labs   Lab 06/06/19  0824   PH 7.321*   PCO2 47.5*   HCO3 24.5   POCSATURATED 98   BE -2     BMP:   Recent Labs   Lab 06/06/19  0248   *  131*     108   CO2 23  23   BUN 9  9   CREATININE 1.5*  1.5*   CALCIUM 7.8*  7.8*   MG 1.8  1.8     CBC:   Recent Labs   Lab 06/06/19  0825   WBC 15.04*   RBC 2.65*   HGB 8.0*   HCT 24.9*   *   MCV 94   MCH 30.2   MCHC 32.1     CMP:   Recent Labs   Lab 06/06/19  0248   *  131*   CALCIUM 7.8*  7.8*   ALBUMIN 2.2*  2.2*   PROT 5.4*     139   K 4.4  4.4   CO2 23  23     108   BUN 9  9   CREATININE 1.5*  1.5*   ALKPHOS 81   ALT 10   AST 21   BILITOT 0.9     All labs within the past 24 hours have been reviewed.       Assessment/Plan:     ESRD (end stage renal disease)  79 year old man with history of T2DM, ESRD on HD (started 1-2 ms ago), HFpEF, CAD s/p CABG on plavix, who is presenting from outside hospital due to concern for duodenal ulcer. Nephrology consulted for ESRD and HD management. Last dialyze at the outside hospital per family.     Patient intubated and sedated on pressor support (Levophed)     S/P EGD with oozing duodenal ulcer     Plan:  - Will plan to hold SLED for today, most likely require HD treatment for tomorrow     - Continue with pressor to maintain MAP> 65 but have been weaning levophed   - Continue monitoring intake and output        Jovan Hogan  Lory  Nephrology  Fellow  [facility]    Pager [phone]

## 2019-06-06 NOTE — ASSESSMENT & PLAN NOTE
--echo from yesterday demonstrates severely decreased EF, 15%, likely 2/2 patient's recent arrest in combination with hemorrhagic shock from his GIB  --trop elevated, likely 2/2 type II MI as most likely etiology, now downtrended  C/w levo, goal SBP >100

## 2019-06-07 NOTE — ASSESSMENT & PLAN NOTE
- s/p CABG in 1997  - s/p PCI approx. 10 years ago  - Holding home ASA and Plavix in setting of GI bleed  - Resume antiplatelet agents when appropriate  - Per cardiology, do not feel that this represents type I, occlusive lesion as cause of his arrest; feel this represents type II MSTEMI

## 2019-06-07 NOTE — PLAN OF CARE
Problem: Adult Inpatient Plan of Care  Goal: Plan of Care Review  Outcome: Ongoing (interventions implemented as appropriate)  See vital signs and assessments in flowsheets. See below for updates on today's progress.     Pulmonary: Pt on room air. O2 sat at 93-97%.    Cardiovascular:Sinus rhythm with occassional S. Tach. 70s-110s HR. SBP 90s-120s. SBP goal of 100.    Neurological: AAOx4. Pt moves all extremities.     Gastrointestinal:Regular diet. 200cc Flexi output, black, gel like stool.      Genitourinary: Pt oliguric, 1 unmeasured occurrence of urine. Plan for CRRT tonight.     Endocrine: Accuchecks q6h.    Integumentary/Other: Pt has tissue injury to lower extremities, scrotal edema.     Infusions: Levophed gtt.    Patient progressing towards goals as tolerated, plan of care communicated and reviewed with Frank Zayas and wife. All questions and concerns addressed. Will continue to monitor.

## 2019-06-07 NOTE — ASSESSMENT & PLAN NOTE
--wean levo as tolerated  --Will transfuse as needed for H/H <7  --EGD demonstrated ongoing active ooze from duodenal ulcer, however could not completely control bleeding; marked with clips; may need IR if rebleeds

## 2019-06-07 NOTE — ASSESSMENT & PLAN NOTE
--Spoke with cardiology, do not feel that this represents STEMI  --Troponin leveled off  --EKG not showing ST elevation, does have QRS prolongation, sinus rhythm; T wave inversions noted throughout; appears to have had bifascicular block on old EKGs

## 2019-06-07 NOTE — ASSESSMENT & PLAN NOTE
--Was likely multifactorial with likely BRANDEN; s/p intubation, now extubated  --Resolving, now on 2L NC  --Stopping patient's zosyn as patient not clinically infected at this time; believe his WBC represents demargination in setting of cardiac arrest

## 2019-06-07 NOTE — ASSESSMENT & PLAN NOTE
--Was likely multifactorial with likely BRANDEN; intubated at this time  --Resolving, now on 2L NC  --Stopping patient's zosyn as patient not clinically infected at this time; believe his WBC represents demargination in setting of cardiac arrest

## 2019-06-07 NOTE — PLAN OF CARE
Problem: Adult Inpatient Plan of Care  Goal: Plan of Care Review  Outcome: Ongoing (interventions implemented as appropriate)  No acute events throughout day. See vital signs and assessments in flowsheets. See below for updates on today's progress.   Pulmonary: 2 L NC  Cardiovascular: ST HR 120s, titrating levo to keep SBP >100  Neurological: AAOx4  Gastrointestinal: regular diet no BM   Endocrine: Q 6 Accu check, nomorglycemic, WCTM and gve insulin per sliding scale   Date of last CHG bath given: 6/6 per night shift  Infusions: Levo  Patient progressing towards goals as tolerated, plan of care communicated and reviewed with Frank Zayas and family. All concerns addressed. Will continue to monitor.

## 2019-06-07 NOTE — ASSESSMENT & PLAN NOTE
- Pt went into PEA at OSH during central line placement and was coded per ACLS protocol, intubated emergently  - Continue telemetry  - Correct electrolytes PRN  - Likely 2/2 acute blood loss and hypotension, type II MI  - Had arrest during RSI, likely from decreased circulating volume and transient hypotension with induction

## 2019-06-07 NOTE — SUBJECTIVE & OBJECTIVE
Interval History:   Patient evaluated at bedside, no significant event overnight. Continue with levophed. Oxygen requirement via nasal canula.     Review of patient's allergies indicates:   Allergen Reactions    Lidocaine Nausea Only     NOVACAINE --  Severe nausea    Statins-hmg-coa reductase inhibitors Anxiety     Current Facility-Administered Medications   Medication Frequency    0.9%  NaCl infusion (CRRT USE ONLY) Continuous    albuterol-ipratropium 2.5 mg-0.5 mg/3 mL nebulizer solution 3 mL Q6H PRN    atorvastatin tablet 40 mg Daily    dextrose 50% injection 12.5 g PRN    FLUoxetine capsule 20 mg QHS    glucagon (human recombinant) injection 1 mg PRN    insulin aspart U-100 pen 0-5 Units QID (AC + HS) PRN    magnesium sulfate 2g in water 50mL IVPB (premix) PRN    miconazole nitrate 2% ointment BID    norepinephrine 32 mg in dextrose 5 % 250 mL infusion Continuous    ondansetron injection 4 mg Q6H PRN    pantoprazole EC tablet 40 mg BID AC    ramelteon tablet 8 mg Nightly PRN    rOPINIRole tablet 2 mg QHS    sodium chloride 0.9% flush 10 mL PRN    sodium phosphate 20.01 mmol in dextrose 5 % 250 mL IVPB PRN    sodium phosphate 30 mmol in dextrose 5 % 250 mL IVPB PRN    sodium phosphate 39.99 mmol in dextrose 5 % 250 mL IVPB PRN       Objective:     Vital Signs (Most Recent):  Temp: 98.6 °F (37 °C) (06/07/19 1100)  Pulse: (!) 121 (06/07/19 1100)  Resp: (!) 24 (06/07/19 1100)  BP: 107/62 (06/07/19 1100)  SpO2: (!) 93 % (06/07/19 1100)  O2 Device (Oxygen Therapy): room air (06/07/19 1100) Vital Signs (24h Range):  Temp:  [98 °F (36.7 °C)-99.2 °F (37.3 °C)] 98.6 °F (37 °C)  Pulse:  [] 121  Resp:  [22-40] 24  SpO2:  [92 %-100 %] 93 %  BP: (107-148)/(56-80) 107/62  Arterial Line BP: (107-145)/(40-65) 130/60     Weight: 129.2 kg (284 lb 13.4 oz) (06/07/19 0300)  Body mass index is 36.57 kg/m².  Body surface area is 2.6 meters squared.    I/O last 3 completed shifts:  In: 4089.8 [P.O.:240;  I.V.:3849.8]  Out: 5138 [Other:4938; Stool:200]    Physical Exam   Constitutional: He is oriented to person, place, and time. No distress. Nasal cannula in place.   HENT:   Head: Normocephalic and atraumatic.   Mouth/Throat: Oropharynx is clear and moist. No oropharyngeal exudate.   Eyes: Conjunctivae are normal. No scleral icterus.   Neck: No JVD present.   Cardiovascular: Normal rate, regular rhythm, normal heart sounds and intact distal pulses.   Pulmonary/Chest: Effort normal and breath sounds normal. No respiratory distress.   Abdominal: Soft. Bowel sounds are normal. He exhibits no distension. There is no tenderness.   Musculoskeletal: He exhibits edema. He exhibits no tenderness.   Lymphadenopathy:     He has no cervical adenopathy.   Neurological: He is alert and oriented to person, place, and time.   Skin: He is not diaphoretic.   Nursing note and vitals reviewed.      Significant Labs:  ABGs:   Recent Labs   Lab 06/06/19  1155   PH 7.326*   PCO2 49.3*   HCO3 25.8   POCSATURATED 97   BE 0     BMP:   Recent Labs   Lab 06/07/19  0419   *  145*     106   CO2 24  24   BUN 11  11   CREATININE 2.4*  2.4*   CALCIUM 8.4*  8.4*   MG 2.2  2.2     CBC:   Recent Labs   Lab 06/07/19  0753   WBC 11.41   RBC 2.49*   HGB 7.6*   HCT 24.7*   *   MCV 99*   MCH 30.5   MCHC 30.8*     CMP:   Recent Labs   Lab 06/07/19  0419   *  145*   CALCIUM 8.4*  8.4*   ALBUMIN 2.3*  2.3*   PROT 5.5*     137   K 4.5  4.5   CO2 24  24     106   BUN 11  11   CREATININE 2.4*  2.4*   ALKPHOS 92   ALT 12   AST 21   BILITOT 0.6     All labs within the past 24 hours have been reviewed.

## 2019-06-07 NOTE — ASSESSMENT & PLAN NOTE
- Pt on HD TThSat as outpatient via right Permacath  - Nephrology following, appreciate assistance  - no longer hyperK, will continue to trend

## 2019-06-07 NOTE — ASSESSMENT & PLAN NOTE
- Pt on HD TThSat as outpatient via right Permacath  - Nephrology following, appreciate assistance  - Will speak with nephro regarding length of patient's continued SLED  - no longer hyperK, will continue to trend

## 2019-06-07 NOTE — ASSESSMENT & PLAN NOTE
--Spoke with cardiology, do not feel that this represents type I STEMI  --Troponin leveled off  --EKG not showing ST elevation, does have QRS prolongation, sinus rhythm; T wave inversions noted throughout; appears to have had bifascicular block on old EKGs

## 2019-06-07 NOTE — ASSESSMENT & PLAN NOTE
79 year old man with history of T2DM, ESRD on HD (started 1-2 ms ago), HFpEF, CAD s/p CABG on plavix, who is presenting from outside hospital due to concern for duodenal ulcer. Nephrology consulted for ESRD and HD management. Last dialyze at the outside hospital per family.     Patient intubated and sedated on pressor support (Levophed)     S/P EGD with oozing duodenal ulcer     Plan:  - Will plan for SCUF for today for volume assistance, -400 cc/hr as tolerated by patient duration 10 hrs.    - Continue with pressor (levophed) to maintain MAP> 65   - Continue monitoring intake and output

## 2019-06-07 NOTE — PROGRESS NOTES
[facility]  Critical Care Medicine  Progress Note    Patient Name: Frank Zayas  MRN: [id number]  Admission Date: 5/31/2019  Hospital Length of Stay: 6 days  Code Status: Full Code  Attending Provider: Rubio Yu MD  Primary Care Provider: Mikhail Shields MD   Principal Problem: GIB (gastrointestinal bleeding)    Subjective:     HPI:  78 yo M with morbid obesity, ESRD on HD (TTS, 2 months), Afib, CHF, DM2 (on insulin), CAD s/p CABG, HLD, COPD, HTN transferred from [facility] ([facility]).  He presented with hematochezia and abdominal pain.  He went to [facility] ER for lower GI bleed and received 1 u PRBC and then was transferred to [facility] for cardiac evaluation when trop was 0.39.  At [facility] he had continued hyperkalemia (K of 6.1) after being treated for it at [facility].  He was started on levophed for hypovolemic shock, and an art line and central line was placed.  During line placement, he briefly lost a pulse (PEA) and CPR was started.  He was given epinephrine, calcium gluconate, sodium bicarb, and intubated emergently.  He achieved ROSC.  He received IVF and 3-4 units PRBC.  He underwent emergent upper and lower endoscopy.  They found a large vessel in the duodenum, actively oozing with copious amounts of fresh bright red blood pumping out.  GI placed a clop on the artery and injected epinephrine, achieving hemostasis.  There was concern that the vessel could be directly assosiated with a larger vessel (ie GDA).  There were no varices seen.  Both GI and Gen Surg [place] [facility] [place] which couldn't be performed there.      Patient admitted to Critical Care Medicine on 5/31/2019 for evaluation and management of acute upper GI bleed (s/p EGD with clipping of bleeding visible vessel at OSH).  Pt arrived intubated following PEA arrest at OSH.  Interventional Radiology consulted and recommended CTA abdomen/pelvis, however no evidence of active GI  bleeding on imaging.  Gastroenterology consulted and recommended continue IV Protonix gtt for a total of 72 hours.  Nephrology consulted for dialysis in setting of ESRD.  Patient passed SAT/SBT and was successfully extubated on the afternoon of 5/31.  On 6/1, as pt remained hemodynamically stable and his hemoglobin remained stable, pt stepped down to Hospital Medicine.    The evening of 6/2 into 6/3 the patient had several melanotic stools with slight downtrend in Hgb (10 > 8), however hemodynamics remained stable. Repeat CTA at that time did not show any evidence of active bleeding, however duodenal clip was noted to have migrated into distal small bowel. GI with plans to repeat EGD on 6/4.     Around 1am on 6/4, the patient became hypotensive (70s/40s). Bedside POC showed Hct 17. Decision made to move patient back to ICU for further management.       Hospital/ICU Course:  Pt was admitted to MICU once more on the night of 6/3-6/4 d/t concerns for hypotension requiring vasopressors. Hemoglobin returned at 5.7 mg/dL. He was transfused two units. Repeat hemoglobin of 7.6 mg/dL. On bedside echo this morning, IVC was not collapsible with sniff and patient had complained of cough productive of clear white sputum. Anesthesia arrived and performed RSI with etomidate and succinylcholine. Pt became hypotensive and bradycardic. He eventually underwent cardiac arrest. Epinephrine was given and chest compressions were started. Almost immediately after chest compressions were initiated, end-tital Co2 was positive and ROSC was achieved. He was intubated during his arrest. Since his airway was secured, gastroenterology proceeded with endoscopy. They found a non-obstructing oozing duodenal ulcer with a visible vessel. It was injected with epinephrine and treated with bipolar cautery. Unfortunately, oozing continued. GI recommended evaluation by interventional radiology for possible embolization. A clip was placed for marking.      Overnight, 6/4, patient had no major events other than occasionally clotting off his dialysis port. Does have what appears to be occasional sinus pauses on the monitor, however has had no other cardiac arrests since yesterday morning. Is still on levophed currently, trending H/H, has not required additional transfusion. Could not completely address patient's oozing vessel yesterday, will likely need consult with IR to address his continued bleeding as multiple EGDs could not completely tamponade his bleeding.     Interval History/Significant Events: No acute overnight events. Hb stable. Continuing to wean levo.    Review of Systems  Objective:     Vital Signs (Most Recent):  Temp: 99.2 °F (37.3 °C) (06/06/19 1500)  Pulse: (!) 112 (06/06/19 1519)  Resp: (!) 22 (06/06/19 1519)  BP: (!) 120/57 (06/06/19 1500)  SpO2: 96 % (06/06/19 1519) Vital Signs (24h Range):  Temp:  [98 °F (36.7 °C)-99.4 °F (37.4 °C)] 99.2 °F (37.3 °C)  Pulse:  [] 112  Resp:  [20-37] 22  SpO2:  [90 %-99 %] 96 %  BP: (120-148)/(57-80) 120/57  Arterial Line BP: (121-140)/(43-57) 122/43   Weight: 128.9 kg (284 lb 2.8 oz)  Body mass index is 36.49 kg/m².      Intake/Output Summary (Last 24 hours) at 6/6/2019 1849  Last data filed at 6/6/2019 1500  Gross per 24 hour   Intake 3971.78 ml   Output 5138 ml   Net -1166.22 ml       Physical Exam   Constitutional: No distress. Nasal cannula in place.   HENT:   Head: Normocephalic and atraumatic.   Mouth/Throat: No oropharyngeal exudate.   Eyes: Conjunctivae are normal. Right eye exhibits no discharge. Left eye exhibits no discharge. No scleral icterus.   Neck: No JVD present.   Cardiovascular: Normal rate, normal heart sounds and intact distal pulses.   Irregularly irregular   Pulmonary/Chest: Effort normal and breath sounds normal. No respiratory distress.   Abdominal: Soft. Bowel sounds are normal. He exhibits no distension. There is no tenderness. There is no rebound.   Musculoskeletal: He exhibits  edema. He exhibits no tenderness.   Lymphadenopathy:     He has no cervical adenopathy.   Neurological: He is alert.   Skin: Skin is warm. No rash noted. He is not diaphoretic. No erythema.   Psychiatric: He has a normal mood and affect.   Nursing note and vitals reviewed.      Vents:  Vent Mode: Spont (06/05/19 1720)  Ventilator Initiated: Yes (06/04/19 1033)  Set Rate: 12 bmp (06/05/19 1502)  Vt Set: 480 mL (05/31/19 1631)  Pressure Support: 5 cmH20 (06/05/19 1720)  PEEP/CPAP: 5 cmH20 (06/05/19 1720)  Oxygen Concentration (%): 28 (06/06/19 0745)  Peak Airway Pressure: 9.5 cmH2O (06/05/19 1720)  Plateau Pressure: 0 cmH20 (06/05/19 1720)  Total Ve: 0 mL (06/05/19 1720)  Negative Inspiratory Force (cm H2O): -27 (05/31/19 1751)  F/VT Ratio<105 (RSBI): (!) 35.71 (06/05/19 1502)  Lines/Drains/Airways     Central Venous Catheter Line                 Hemodialysis Catheter 05/16/19 1600 right subclavian 21 days         Percutaneous Central Line Insertion/Assessment - quad lumen  06/05/19 right femoral vein;right femoral 1 day          Drain                 Rectal Tube 06/03/19 0000 rectal tube w/ balloon (indicate number of mLs) 3 days          Arterial Line                 Arterial Line 06/04/19 0900 Right Radial 2 days          Peripheral Intravenous Line                 Peripheral IV - Single Lumen 06/03/19 1100 20 G;1 3/4 in Left Forearm 3 days              Significant Labs:    CBC/Anemia Profile:  Recent Labs   Lab 06/06/19  0248 06/06/19  0825 06/06/19  1342   WBC 17.57* 15.04* 11.94   HGB 8.0* 8.0* 7.5*   HCT 25.3* 24.9* 24.0*    144* 122*   MCV 97 94 99*   RDW 18.4* 18.5* 18.6*        Chemistries:  Recent Labs   Lab 06/05/19  0222  06/05/19  2126 06/06/19  0248 06/06/19  1059 06/06/19  1342     137   < > 139 139  139  --  137   K 4.2  4.2   < > 4.6 4.4  4.4  --  4.5     108   < > 108 108  108  --  108   CO2 19*  19*   < > 23 23  23  --  23   BUN 43*  43*   < > 15 9  9  --  5*    CREATININE 4.7*  4.7*   < > 2.1* 1.5*  1.5*  --  1.2   CALCIUM 8.0*  8.0*   < > 7.7* 7.8*  7.8*  --  7.6*   ALBUMIN 2.2*  2.2*   < > 2.2* 2.2*  2.2*  --  2.0*   PROT 5.1*  --   --  5.4*  --   --    BILITOT 0.6  --   --  0.9  --   --    ALKPHOS 65  --   --  81  --   --    ALT 9*  --   --  10  --   --    AST 16  --   --  21  --   --    MG 1.9  1.9   < > 1.7 1.8  1.8 1.8 1.8   PHOS 4.7*  4.7*   < > 2.4* 1.9*  1.9*  --  2.3*    < > = values in this interval not displayed.       CMP:   Recent Labs   Lab 06/05/19 0222 06/05/19  2126 06/06/19  0248 06/06/19  1342     137   < > 139 139  139 137   K 4.2  4.2   < > 4.6 4.4  4.4 4.5     108   < > 108 108  108 108   CO2 19*  19*   < > 23 23  23 23   *  175*   < > 133* 131*  131* 107   BUN 43*  43*   < > 15 9  9 5*   CREATININE 4.7*  4.7*   < > 2.1* 1.5*  1.5* 1.2   CALCIUM 8.0*  8.0*   < > 7.7* 7.8*  7.8* 7.6*   PROT 5.1*  --   --  5.4*  --    ALBUMIN 2.2*  2.2*   < > 2.2* 2.2*  2.2* 2.0*   BILITOT 0.6  --   --  0.9  --    ALKPHOS 65  --   --  81  --    AST 16  --   --  21  --    ALT 9*  --   --  10  --    ANIONGAP 10  10   < > 8 8  8 6*   EGFRNONAA 11.1*  11.1*   < > 29.3* 44.0*  44.0* 57.6*    < > = values in this interval not displayed.     Lactic Acid: No results for input(s): LACTATE in the last 48 hours.    Significant Imaging:  I have reviewed all pertinent imaging results/findings within the past 24 hours.      ABG  Recent Labs   Lab 06/06/19  1155   PH 7.326*   PO2 101*   PCO2 49.3*   HCO3 25.8   BE 0     Assessment/Plan:     Neuro  Restless leg syndrome  --Continue home ropinirole 2mg qHS    Pulmonary  Acute hypercapnic respiratory failure  --Was likely multifactorial with likely BRANDEN; intubated at this time  --Resolving, now on 2L NC  --Stopping patient's zosyn as patient not clinically infected at this time; believe his WBC represents demargination in setting of cardiac arrest    Cardiac/Vascular  NSTEMI (non-ST  elevated myocardial infarction)  --Spoke with cardiology, do not feel that this represents STEMI  --Troponin leveled off  --EKG not showing ST elevation, does have QRS prolongation, sinus rhythm; T wave inversions noted throughout; appears to have had bifascicular block on old EKGs    Cardiac arrest  - Pt went into PEA at OSH during central line placement and was coded per ACLS protocol, intubated emergently  - Continue telemetry  - Correct electrolytes PRN  - Likely 2/2 acute blood loss and hypotension, type II MI  - Had arrest yesterday during RSI, likely from decreased circulating volume and transient hypotension with induction    A-fib  - Holding anticoagulation in setting of GI bleed  - Resume Lopressor when stable    Coronary artery disease  - s/p CABG in 1997  - s/p PCI approx. 10 years ago  - Holding home ASA and Plavix in setting of GI bleed  - Resume antiplatelet agents when appropriate  - Per cardiology, do not feel that this represents type I, occlusive lesion as cause of his arrest; feel this represents type II MI    DM type 2 with diabetic mixed hyperlipidemia  --accuchecks with low dose correctional ssi  --holding home oral antiglycemics while in hospital    HTN (hypertension)  --holding metoprolol in setting of hemorrhagic shock  --currently requiring levophed for maintenance of his pressures    Heart failure  --echo from yesterday demonstrates severely decreased EF, 15%, likely 2/2 patient's recent arrest in combination with hemorrhagic shock from his GIB  --trop elevated, likely 2/2 type II MI as most likely etiology, now downtrended  C/w levo, goal SBP >100    Renal/  ESRD (end stage renal disease)  - Pt on HD TThSat as outpatient via right Permacath  - Nephrology following, appreciate assistance  - Will speak with nephro regarding length of patient's continued SLED  - no longer hyperK, will continue to trend      GI  * GIB (gastrointestinal bleeding)  --EGD @ OSH which showed duodenal ulcer  with visible vessel actively bleeding, which was successfully clipped.    --CTA 5/31 & 6/3 without evidence of active GI bleed; duodenal clip has migrated to distal small bowel  -- Continue IV Protonix gtt for total of 72 hours, then 40mg IV BID  --Continue to hold anticoagulation and antiplatelet agents  --H. pylori serum IgG negative  --CBC q8; transfuse for < 7   --GI does not plan to repeat EGD, if patient unstable, would discuss with IR for possible embolization     Acute gastrointestinal bleeding  --Will continue q8h CBCs  --BID PPI for now   --IR if patient rebleeds    Other  Hemorrhagic shock  --wean levo as tolerated  --Will transfuse as needed for H/H <7  --EGD demonstrated ongoing active ooze from duodenal ulcer, however could not completely control bleeding; marked with clips; may need IR if rebleeds       Critical Care Daily Checklist:    A: Awake: RASS Goal/Actual Goal: RASS Goal: 0-->alert and calm  Actual: [place] Agitation Sedation Scale (RASS): Alert and calm   B: Spontaneous Breathing Trial Performed? Spon. Breathing Trial Initiated?: Initiated (05/31/19 0299)   C: SAT & SBT Coordinated?  n/a                      D: Delirium: CAM-ICU Overall CAM-ICU: Negative   E: Early Mobility Performed? Yes   F: Feeding Goal:    Status:     Current Diet Order   Procedures    Diet renal [facility]; Low Sodium,2gm; Fluid - 1500mL     Order Specific Question:   Indicate patient location for additional diet options:     Answer:   [facility]     Order Specific Question:   Additional Diet Options:     Answer:   Low Sodium,2gm     Order Specific Question:   Fluid restriction:     Answer:   Fluid - 1500mL      AS: Analgesia/Sedation none   T: Thromboembolic Prophylaxis SCDs   H: HOB > 300 Yes   U: Stress Ulcer Prophylaxis (if needed) protonix   G: Glucose Control aspart sliding scale   B: Bowel Function Stool Occurrence: 1(leaking around rectal tube )   I: Indwelling Catheter (Lines & Huynh) Necessity  PIVs   D: De-escalation of Antimicrobials/Pharmacotherapies     Plan for the day/ETD n/a    Code Status:  Family/Goals of Care: Full Code         Critical secondary to Patient has a condition that poses threat to life and bodily function: GI bleed      Critical care was time spent personally by me on the following activities: development of treatment plan with patient or surrogate and bedside caregivers, discussions with consultants, evaluation of patient's response to treatment, examination of patient, ordering and performing treatments and interventions, ordering and review of laboratory studies, ordering and review of radiographic studies, pulse oximetry, re-evaluation of patient's condition. This critical care time did not overlap with that of any other provider or involve time for any procedures.     Jayro Moreno MD  Critical Care Medicine  [facility]

## 2019-06-07 NOTE — PROGRESS NOTES
[facility]  Critical Care Medicine  Progress Note    Patient Name: Frank Zayas  MRN: [id number]  Admission Date: 5/31/2019  Hospital Length of Stay: 7 days  Code Status: Full Code  Attending Provider: Rubio Yu MD  Primary Care Provider: Mikhail Shields MD   Principal Problem: GIB (gastrointestinal bleeding)    Subjective:     HPI:  80 yo M with morbid obesity, ESRD on HD (TTS, 2 months), Afib, CHF, DM2 (on insulin), CAD s/p CABG, HLD, COPD, HTN transferred from [facility] ([facility]).  He presented with hematochezia and abdominal pain.  He went to [facility] ER for lower GI bleed and received 1 u PRBC and then was transferred to [facility] for cardiac evaluation when trop was 0.39.  At [facility] he had continued hyperkalemia (K of 6.1) after being treated for it at [facility].  He was started on levophed for hypovolemic shock, and an art line and central line was placed.  During line placement, he briefly lost a pulse (PEA) and CPR was started.  He was given epinephrine, calcium gluconate, sodium bicarb, and intubated emergently.  He achieved ROSC.  He received IVF and 3-4 units PRBC.  He underwent emergent upper and lower endoscopy.  They found a large vessel in the duodenum, actively oozing with copious amounts of fresh bright red blood pumping out.  GI placed a clop on the artery and injected epinephrine, achieving hemostasis.  There was concern that the vessel could be directly assosiated with a larger vessel (ie GDA).  There were no varices seen.  Both GI and Gen Surg recommended transfer to [facility] for arteriovenous embolization which couldn't be performed there.      Patient admitted to Critical Care Medicine on 5/31/2019 for evaluation and management of acute upper GI bleed (s/p EGD with clipping of bleeding visible vessel at OSH).  Pt arrived intubated following PEA arrest at OSH.  Interventional Radiology consulted and recommended CTA abdomen/pelvis, however no evidence of active GI  bleeding on imaging.  Gastroenterology consulted and recommended continue IV Protonix gtt for a total of 72 hours.  Nephrology consulted for dialysis in setting of ESRD.  Patient passed SAT/SBT and was successfully extubated on the afternoon of 5/31.  On 6/1, as pt remained hemodynamically stable and his hemoglobin remained stable, pt stepped down to Hospital Medicine.    The evening of 6/2 into 6/3 the patient had several melanotic stools with slight downtrend in Hgb (10 > 8), however hemodynamics remained stable. Repeat CTA at that time did not show any evidence of active bleeding, however duodenal clip was noted to have migrated into distal small bowel. GI with plans to repeat EGD on 6/4.     Around 1am on 6/4, the patient became hypotensive (70s/40s). Bedside POC showed Hct 17. Decision made to move patient back to ICU for further management.       Hospital/ICU Course:  Pt was admitted to MICU once more on the night of 6/3-6/4 d/t concerns for hypotension requiring vasopressors. Hemoglobin returned at 5.7 mg/dL. He was transfused two units. Repeat hemoglobin of 7.6 mg/dL. On bedside echo this morning, IVC was not collapsible with sniff and patient had complained of cough productive of clear white sputum. Anesthesia arrived and performed RSI with etomidate and succinylcholine. Pt became hypotensive and bradycardic. He eventually underwent cardiac arrest. Epinephrine was given and chest compressions were started. Almost immediately after chest compressions were initiated, end-tital Co2 was positive and ROSC was achieved. He was intubated during his arrest. Since his airway was secured, gastroenterology proceeded with endoscopy. They found a non-obstructing oozing duodenal ulcer with a visible vessel. It was injected with epinephrine and treated with bipolar cautery. Unfortunately, oozing continued. GI recommended evaluation by interventional radiology for possible embolization. A clip was placed for marking.      Overnight, 6/4, patient had no major events other than occasionally clotting off his dialysis port. Does have what appears to be occasional sinus pauses on the monitor, however has had no other cardiac arrests since yesterday morning. Is still on levophed currently, trending H/H, has not required additional transfusion. Could not completely address patient's oozing vessel yesterday, will likely need consult with IR to address his continued bleeding as multiple EGDs could not completely tamponade his bleeding.     Interval History/Significant Events: No acute overnight events. H/H continues to slowly downtrend, however, levo slowly being weaned down.    Review of Systems  Objective:     Vital Signs (Most Recent):  Temp: 98 °F (36.7 °C) (06/07/19 0700)  Pulse: (!) 121 (06/07/19 0600)  Resp: (!) 23 (06/07/19 0600)  BP: 135/68 (06/07/19 0600)  SpO2: 99 % (06/07/19 0600) Vital Signs (24h Range):  Temp:  [98 °F (36.7 °C)-99.2 °F (37.3 °C)] 98 °F (36.7 °C)  Pulse:  [] 121  Resp:  [22-40] 23  SpO2:  [90 %-99 %] 99 %  BP: (112-148)/(56-80) 135/68  Arterial Line BP: (107-145)/(40-65) 130/60   Weight: 129.2 kg (284 lb 13.4 oz)  Body mass index is 36.57 kg/m².      Intake/Output Summary (Last 24 hours) at 6/7/2019 0844  Last data filed at 6/7/2019 0629  Gross per 24 hour   Intake 1005.4 ml   Output 837 ml   Net 168.4 ml       Physical Exam   Constitutional: No distress.   HENT:   Head: Normocephalic and atraumatic.   Eyes: Conjunctivae are normal. Right eye exhibits no discharge. Left eye exhibits no discharge. No scleral icterus.   Neck: Normal range of motion. Neck supple.   Cardiovascular: Regular rhythm.   tachycardic   Pulmonary/Chest: No stridor. No respiratory distress. He has no wheezes. He has rales.   Abdominal: Soft. He exhibits distension. There is no tenderness. There is no guarding.   Musculoskeletal: He exhibits edema. He exhibits no tenderness.   Neurological: He is alert.   Skin: Skin is warm. No rash  noted. No erythema.   Psychiatric: He has a normal mood and affect.       Vents:  Vent Mode: Spont (06/05/19 1720)  Ventilator Initiated: Yes (06/04/19 1033)  Set Rate: 12 bmp (06/05/19 1502)  Vt Set: 480 mL (05/31/19 1631)  Pressure Support: 5 cmH20 (06/05/19 1720)  PEEP/CPAP: 5 cmH20 (06/05/19 1720)  Oxygen Concentration (%): 28 (06/06/19 0745)  Peak Airway Pressure: 9.5 cmH2O (06/05/19 1720)  Plateau Pressure: 0 cmH20 (06/05/19 1720)  Total Ve: 0 mL (06/05/19 1720)  Negative Inspiratory Force (cm H2O): -27 (05/31/19 1751)  F/VT Ratio<105 (RSBI): (!) 35.71 (06/05/19 1502)  Lines/Drains/Airways     Central Venous Catheter Line                 Hemodialysis Catheter 05/16/19 1600 right subclavian 21 days         Percutaneous Central Line Insertion/Assessment - quad lumen  06/05/19 right femoral vein;right femoral 2 days          Drain                 Rectal Tube 06/03/19 0000 rectal tube w/ balloon (indicate number of mLs) 4 days          Arterial Line                 Arterial Line 06/04/19 0900 Right Radial 2 days          Peripheral Intravenous Line                 Peripheral IV - Single Lumen 06/03/19 1100 20 G;1 3/4 in Left Forearm 3 days              Significant Labs:    CBC/Anemia Profile:  Recent Labs   Lab 06/06/19  1342 06/06/19  2335 06/07/19  0753   WBC 11.94 11.65 11.41   HGB 7.5* 7.3* 7.6*   HCT 24.0* 23.6* 24.7*   * 112* 113*   MCV 99* 99* 99*   RDW 18.6* 18.8* 19.2*        Chemistries:  Recent Labs   Lab 06/06/19  0248  06/06/19  1342 06/06/19  2335 06/07/19  0419     139  --  137 137 137  137   K 4.4  4.4  --  4.5 4.5 4.5  4.5     108  --  108 107 106  106   CO2 23  23  --  23 23 24  24   BUN 9  9  --  5* 9 11  11   CREATININE 1.5*  1.5*  --  1.2 2.0* 2.4*  2.4*   CALCIUM 7.8*  7.8*  --  7.6* 8.0* 8.4*  8.4*   ALBUMIN 2.2*  2.2*  --  2.0* 2.0* 2.3*  2.3*   PROT 5.4*  --   --   --  5.5*   BILITOT 0.9  --   --   --  0.6   ALKPHOS 81  --   --   --  92   ALT 10  --   --    --  12   AST 21  --   --   --  21   MG 1.8  1.8   < > 1.8 1.9 2.2  2.2   PHOS 1.9*  1.9*  --  2.3* 2.8 3.3  3.3    < > = values in this interval not displayed.       CMP:   Recent Labs   Lab 06/06/19  0248 06/06/19  1342 06/06/19  2335 06/07/19  0419     139 137 137 137  137   K 4.4  4.4 4.5 4.5 4.5  4.5     108 108 107 106  106   CO2 23  23 23 23 24  24   *  131* 107 148* 145*  145*   BUN 9  9 5* 9 11  11   CREATININE 1.5*  1.5* 1.2 2.0* 2.4*  2.4*   CALCIUM 7.8*  7.8* 7.6* 8.0* 8.4*  8.4*   PROT 5.4*  --   --  5.5*   ALBUMIN 2.2*  2.2* 2.0* 2.0* 2.3*  2.3*   BILITOT 0.9  --   --  0.6   ALKPHOS 81  --   --  92   AST 21  --   --  21   ALT 10  --   --  12   ANIONGAP 8  8 6* 7* 7*  7*   EGFRNONAA 44.0*  44.0* 57.6* 31.0* 24.9*  24.9*       Significant Imaging:  I have reviewed all pertinent imaging results/findings within the past 24 hours.      ABG  Recent Labs   Lab 06/06/19  1155   PH 7.326*   PO2 101*   PCO2 49.3*   HCO3 25.8   BE 0     Assessment/Plan:     Neuro  Restless leg syndrome  --Continue home ropinirole 2mg qHS    Pulmonary  Acute hypercapnic respiratory failure  --Was likely multifactorial with likely BRANDEN; s/p intubation, now extubated  --Resolving, now on 2L NC  --Stopping patient's zosyn as patient not clinically infected at this time; believe his WBC represents demargination in setting of cardiac arrest    Cardiac/Vascular  NSTEMI (non-ST elevated myocardial infarction)  --Spoke with cardiology, do not feel that this represents type I STEMI  --Troponin leveled off  --EKG not showing ST elevation, does have QRS prolongation, sinus rhythm; T wave inversions noted throughout; appears to have had bifascicular block on old EKGs    Cardiac arrest  - Pt went into PEA at OSH during central line placement and was coded per ACLS protocol, intubated emergently  - Continue telemetry  - Correct electrolytes PRN  - Likely 2/2 acute blood loss and hypotension, type II  MI  - Had arrest during RSI, likely from decreased circulating volume and transient hypotension with induction    A-fib  - Holding anticoagulation in setting of GI bleed  - Resume Lopressor when stable    Coronary artery disease  - s/p CABG in 1997  - s/p PCI approx. 10 years ago  - Holding home ASA and Plavix in setting of GI bleed  - Resume antiplatelet agents when appropriate  - Per cardiology, do not feel that this represents type I, occlusive lesion as cause of his arrest; feel this represents type II MSTEMI    DM type 2 with diabetic mixed hyperlipidemia  --accuchecks with low dose correctional ssi  --holding home oral antiglycemics while in hospital    HTN (hypertension)  --holding metoprolol in setting of hemorrhagic shock  --currently requiring levophed for maintenance of his pressures    Heart failure  --echo from yesterday demonstrates severely decreased EF, 15%, likely 2/2 patient's recent arrest in combination with hemorrhagic shock from his GIB  --trop elevated, likely 2/2 type II MI as most likely etiology, now downtrended  C/w levo, goal SBP >100    Renal/  ESRD (end stage renal disease)  - Pt on HD TThSat as outpatient via right Permacath  - Nephrology following, appreciate assistance  - no longer hyperK, will continue to trend      GI  * GIB (gastrointestinal bleeding)  --EGD @ OSH which showed duodenal ulcer with visible vessel actively bleeding, which was successfully clipped.    --CTA 5/31 & 6/3 without evidence of active GI bleed; duodenal clip has migrated to distal small bowel  -- Continue IV Protonix gtt for total of 72 hours, then 40mg IV BID  --Continue to hold anticoagulation and antiplatelet agents  --H. pylori serum IgG negative  --CBC q8; transfuse for < 7   --GI does not plan to repeat EGD, if patient unstable, would discuss with IR for possible embolization     Acute gastrointestinal bleeding  --Will continue q8h CBCs  --BID PPI for now   --IR if patient  rebleeds    Other  Hemorrhagic shock  --wean levo as tolerated  --Will transfuse as needed for H/H <7  --EGD demonstrated ongoing active ooze from duodenal ulcer, however could not completely control bleeding; marked with clips; may need IR if rebleeds       Critical Care Daily Checklist:    A: Awake: RASS Goal/Actual Goal: RASS Goal: 0-->alert and calm  Actual: [place] Agitation Sedation Scale (RASS): Alert and calm   B: Spontaneous Breathing Trial Performed? Spon. Breathing Trial Initiated?: Initiated (05/31/19 2138)   C: SAT & SBT Coordinated?  n/a                      D: Delirium: CAM-ICU Overall CAM-ICU: Negative   E: Early Mobility Performed? Yes   F: Feeding Goal:    Status:     Current Diet Order   Procedures    Diet renal [facility]; Low Sodium,2gm; Fluid - 1500mL     Order Specific Question:   Indicate patient location for additional diet options:     Answer:   [facility]     Order Specific Question:   Additional Diet Options:     Answer:   Low Sodium,2gm     Order Specific Question:   Fluid restriction:     Answer:   Fluid - 1500mL      AS: Analgesia/Sedation none   T: Thromboembolic Prophylaxis SCDs   H: HOB > 300 Yes   U: Stress Ulcer Prophylaxis (if needed) protonix   G: Glucose Control    B: Bowel Function Stool Occurrence: 1(leaking around rectal tube )   I: Indwelling Catheter (Lines & Huynh) Necessity PIVs, rectal tube, A-line, R subclavian dialysis catheter, femoral catheter   D: De-escalation of Antimicrobials/Pharmacotherapies n/a    Plan for the day/ETD Wean off levo, monitor CBCs    Code Status:  Family/Goals of Care: Full Code         Critical secondary to Patient has a condition that poses threat to life and bodily function: hemorrhagic shock       Critical care was time spent personally by me on the following activities: development of treatment plan with patient or surrogate and bedside caregivers, discussions with consultants, evaluation of patient's response to treatment,  examination of patient, ordering and performing treatments and interventions, ordering and review of laboratory studies, ordering and review of radiographic studies, pulse oximetry, re-evaluation of patient's condition. This critical care time did not overlap with that of any other provider or involve time for any procedures.     Jayro Moreno MD  Critical Care Medicine  [facility]

## 2019-06-07 NOTE — SUBJECTIVE & OBJECTIVE
Interval History/Significant Events: No acute overnight events. H/H continues to slowly downtrend, however, levo slowly being weaned down.    Review of Systems  Objective:     Vital Signs (Most Recent):  Temp: 98 °F (36.7 °C) (06/07/19 0700)  Pulse: (!) 121 (06/07/19 0600)  Resp: (!) 23 (06/07/19 0600)  BP: 135/68 (06/07/19 0600)  SpO2: 99 % (06/07/19 0600) Vital Signs (24h Range):  Temp:  [98 °F (36.7 °C)-99.2 °F (37.3 °C)] 98 °F (36.7 °C)  Pulse:  [] 121  Resp:  [22-40] 23  SpO2:  [90 %-99 %] 99 %  BP: (112-148)/(56-80) 135/68  Arterial Line BP: (107-145)/(40-65) 130/60   Weight: 129.2 kg (284 lb 13.4 oz)  Body mass index is 36.57 kg/m².      Intake/Output Summary (Last 24 hours) at 6/7/2019 0844  Last data filed at 6/7/2019 0629  Gross per 24 hour   Intake 1005.4 ml   Output 837 ml   Net 168.4 ml       Physical Exam   Constitutional: No distress.   HENT:   Head: Normocephalic and atraumatic.   Eyes: Conjunctivae are normal. Right eye exhibits no discharge. Left eye exhibits no discharge. No scleral icterus.   Neck: Normal range of motion. Neck supple.   Cardiovascular: Regular rhythm.   tachycardic   Pulmonary/Chest: No stridor. No respiratory distress. He has no wheezes. He has rales.   Abdominal: Soft. He exhibits distension. There is no tenderness. There is no guarding.   Musculoskeletal: He exhibits edema. He exhibits no tenderness.   Neurological: He is alert.   Skin: Skin is warm. No rash noted. No erythema.   Psychiatric: He has a normal mood and affect.       Vents:  Vent Mode: Spont (06/05/19 1720)  Ventilator Initiated: Yes (06/04/19 1033)  Set Rate: 12 bmp (06/05/19 1502)  Vt Set: 480 mL (05/31/19 1631)  Pressure Support: 5 cmH20 (06/05/19 1720)  PEEP/CPAP: 5 cmH20 (06/05/19 1720)  Oxygen Concentration (%): 28 (06/06/19 0745)  Peak Airway Pressure: 9.5 cmH2O (06/05/19 1720)  Plateau Pressure: 0 cmH20 (06/05/19 1720)  Total Ve: 0 mL (06/05/19 1720)  Negative Inspiratory Force (cm H2O): -27  (05/31/19 1751)  F/VT Ratio<105 (RSBI): (!) 35.71 (06/05/19 1502)  Lines/Drains/Airways     Central Venous Catheter Line                 Hemodialysis Catheter 05/16/19 1600 right subclavian 21 days         Percutaneous Central Line Insertion/Assessment - quad lumen  06/05/19 right femoral vein;right femoral 2 days          Drain                 Rectal Tube 06/03/19 0000 rectal tube w/ balloon (indicate number of mLs) 4 days          Arterial Line                 Arterial Line 06/04/19 0900 Right Radial 2 days          Peripheral Intravenous Line                 Peripheral IV - Single Lumen 06/03/19 1100 20 G;1 3/4 in Left Forearm 3 days              Significant Labs:    CBC/Anemia Profile:  Recent Labs   Lab 06/06/19  1342 06/06/19  2335 06/07/19  0753   WBC 11.94 11.65 11.41   HGB 7.5* 7.3* 7.6*   HCT 24.0* 23.6* 24.7*   * 112* 113*   MCV 99* 99* 99*   RDW 18.6* 18.8* 19.2*        Chemistries:  Recent Labs   Lab 06/06/19  0248  06/06/19  1342 06/06/19  2335 06/07/19  0419     139  --  137 137 137  137   K 4.4  4.4  --  4.5 4.5 4.5  4.5     108  --  108 107 106  106   CO2 23  23  --  23 23 24  24   BUN 9  9  --  5* 9 11  11   CREATININE 1.5*  1.5*  --  1.2 2.0* 2.4*  2.4*   CALCIUM 7.8*  7.8*  --  7.6* 8.0* 8.4*  8.4*   ALBUMIN 2.2*  2.2*  --  2.0* 2.0* 2.3*  2.3*   PROT 5.4*  --   --   --  5.5*   BILITOT 0.9  --   --   --  0.6   ALKPHOS 81  --   --   --  92   ALT 10  --   --   --  12   AST 21  --   --   --  21   MG 1.8  1.8   < > 1.8 1.9 2.2  2.2   PHOS 1.9*  1.9*  --  2.3* 2.8 3.3  3.3    < > = values in this interval not displayed.       CMP:   Recent Labs   Lab 06/06/19  0248 06/06/19  1342 06/06/19  2335 06/07/19  0419     139 137 137 137  137   K 4.4  4.4 4.5 4.5 4.5  4.5     108 108 107 106  106   CO2 23  23 23 23 24  24   *  131* 107 148* 145*  145*   BUN 9  9 5* 9 11  11   CREATININE 1.5*  1.5* 1.2 2.0* 2.4*  2.4*   CALCIUM 7.8*  7.8*  7.6* 8.0* 8.4*  8.4*   PROT 5.4*  --   --  5.5*   ALBUMIN 2.2*  2.2* 2.0* 2.0* 2.3*  2.3*   BILITOT 0.9  --   --  0.6   ALKPHOS 81  --   --  92   AST 21  --   --  21   ALT 10  --   --  12   ANIONGAP 8  8 6* 7* 7*  7*   EGFRNONAA 44.0*  44.0* 57.6* 31.0* 24.9*  24.9*       Significant Imaging:  I have reviewed all pertinent imaging results/findings within the past 24 hours.

## 2019-06-07 NOTE — PROGRESS NOTES
[facility]  Nephrology  Progress Note    Patient Name: Frank Zayas  MRN: [id number]  Admission Date: 5/31/2019  Hospital Length of Stay: 7 days  Attending Provider: Rubio Yu MD   Primary Care Physician: Mikhail Shields MD  Principal Problem:GIB (gastrointestinal bleeding)    Subjective:     HPI: 79 year old man with history of T2DM, ESRD on HD (started 1-2 ms ago), HFpEF, and CAD s/p CABG on plavix who initially presented to [facility] for complaints of abdominal pain and hematochezia. He was transfused 1u pRBC at OSH and transferred to [facility] for a cardiac evaluation after being found with a troponin of 0.39. At [facility], he was started on levophed for shock and suffered brief episode of PEA arrest which resolved with resuscitation. At OSH underwent EGD which was notable for actively bleeding duodenal ulcer s/p hemostasis with epi and clip. Due to concern for rebleeding he was transferred to [facility] for potential IR therapy. According to the family, the patient was last dialyze on yesterday, 5/30, at the OSH. He was recently started on dialysis about 1-2 months ago. His renal failure is likely due to his co morbidities. He typically dialyze on T/Th/Sat for 4 hrs at [facility] in [place] via a L permacath. Nephrology consulted for management of ESRD and HD treatment.        Interval History:   Patient evaluated at bedside, no significant event overnight. Continue with levophed. Oxygen requirement via nasal canula.     Review of patient's allergies indicates:   Allergen Reactions    Lidocaine Nausea Only     NOVACAINE --  Severe nausea    Statins-hmg-coa reductase inhibitors Anxiety     Current Facility-Administered Medications   Medication Frequency    0.9%  NaCl infusion (CRRT USE ONLY) Continuous    albuterol-ipratropium 2.5 mg-0.5 mg/3 mL nebulizer solution 3 mL Q6H PRN    atorvastatin tablet 40 mg Daily    dextrose 50% injection 12.5 g PRN    FLUoxetine capsule 20 mg QHS     glucagon (human recombinant) injection 1 mg PRN    insulin aspart U-100 pen 0-5 Units QID (AC + HS) PRN    magnesium sulfate 2g in water 50mL IVPB (premix) PRN    miconazole nitrate 2% ointment BID    norepinephrine 32 mg in dextrose 5 % 250 mL infusion Continuous    ondansetron injection 4 mg Q6H PRN    pantoprazole EC tablet 40 mg BID AC    ramelteon tablet 8 mg Nightly PRN    rOPINIRole tablet 2 mg QHS    sodium chloride 0.9% flush 10 mL PRN    sodium phosphate 20.01 mmol in dextrose 5 % 250 mL IVPB PRN    sodium phosphate 30 mmol in dextrose 5 % 250 mL IVPB PRN    sodium phosphate 39.99 mmol in dextrose 5 % 250 mL IVPB PRN       Objective:     Vital Signs (Most Recent):  Temp: 98.6 °F (37 °C) (06/07/19 1100)  Pulse: (!) 121 (06/07/19 1100)  Resp: (!) 24 (06/07/19 1100)  BP: 107/62 (06/07/19 1100)  SpO2: (!) 93 % (06/07/19 1100)  O2 Device (Oxygen Therapy): room air (06/07/19 1100) Vital Signs (24h Range):  Temp:  [98 °F (36.7 °C)-99.2 °F (37.3 °C)] 98.6 °F (37 °C)  Pulse:  [] 121  Resp:  [22-40] 24  SpO2:  [92 %-100 %] 93 %  BP: (107-148)/(56-80) 107/62  Arterial Line BP: (107-145)/(40-65) 130/60     Weight: 129.2 kg (284 lb 13.4 oz) (06/07/19 0300)  Body mass index is 36.57 kg/m².  Body surface area is 2.6 meters squared.    I/O last 3 completed shifts:  In: 4089.8 [P.O.:240; I.V.:3849.8]  Out: 5138 [Other:4938; Stool:200]    Physical Exam   Constitutional: He is oriented to person, place, and time. No distress. Nasal cannula in place.   HENT:   Head: Normocephalic and atraumatic.   Mouth/Throat: Oropharynx is clear and moist. No oropharyngeal exudate.   Eyes: Conjunctivae are normal. No scleral icterus.   Neck: No JVD present.   Cardiovascular: Normal rate, regular rhythm, normal heart sounds and intact distal pulses.   Pulmonary/Chest: Effort normal and breath sounds normal. No respiratory distress.   Abdominal: Soft. Bowel sounds are normal. He exhibits no distension. There is no  tenderness.   Musculoskeletal: He exhibits edema. He exhibits no tenderness.   Lymphadenopathy:     He has no cervical adenopathy.   Neurological: He is alert and oriented to person, place, and time.   Skin: He is not diaphoretic.   Nursing note and vitals reviewed.      Significant Labs:  ABGs:   Recent Labs   Lab 06/06/19  1155   PH 7.326*   PCO2 49.3*   HCO3 25.8   POCSATURATED 97   BE 0     BMP:   Recent Labs   Lab 06/07/19  0419   *  145*     106   CO2 24  24   BUN 11  11   CREATININE 2.4*  2.4*   CALCIUM 8.4*  8.4*   MG 2.2  2.2     CBC:   Recent Labs   Lab 06/07/19  0753   WBC 11.41   RBC 2.49*   HGB 7.6*   HCT 24.7*   *   MCV 99*   MCH 30.5   MCHC 30.8*     CMP:   Recent Labs   Lab 06/07/19  0419   *  145*   CALCIUM 8.4*  8.4*   ALBUMIN 2.3*  2.3*   PROT 5.5*     137   K 4.5  4.5   CO2 24  24     106   BUN 11  11   CREATININE 2.4*  2.4*   ALKPHOS 92   ALT 12   AST 21   BILITOT 0.6     All labs within the past 24 hours have been reviewed.       Assessment/Plan:     ESRD (end stage renal disease)  79 year old man with history of T2DM, ESRD on HD (started 1-2 ms ago), HFpEF, CAD s/p CABG on plavix, who is presenting from outside hospital due to concern for duodenal ulcer. Nephrology consulted for ESRD and HD management. Last dialyze at the outside hospital per family.     Patient intubated and sedated on pressor support (Levophed)     S/P EGD with oozing duodenal ulcer     Plan:  - Will plan for SCUF for today for volume assistance, -400 cc/hr as tolerated by patient duration 10 hrs.    - Continue with pressor (levophed) to maintain MAP> 65   - Continue monitoring intake and output          Jovan Henley  Nephrology  Fellow  [facility]    Pager [phone]

## 2019-06-07 NOTE — ASSESSMENT & PLAN NOTE
--EGD @ OSH which showed duodenal ulcer with visible vessel actively bleeding, which was successfully clipped.    --CTA 5/31 & 6/3 without evidence of active GI bleed; duodenal clip has migrated to distal small bowel  -- Continue IV Protonix gtt for total of 72 hours, then 40mg IV BID  --Continue to hold anticoagulation and antiplatelet agents  --H. pylori serum IgG negative  --CBC q8; transfuse for < 7   --GI does not plan to repeat EGD, if patient unstable, would discuss with IR for possible embolization

## 2019-06-08 NOTE — ASSESSMENT & PLAN NOTE
--Resolved  --Was likely multifactorial with likely BRANDEN; s/p intubation, extubated (6/5)  --believe leukocytosis on presentation to ICU represented demargination in setting of cardiac arrest therefore antibiotics were d/c

## 2019-06-08 NOTE — PROGRESS NOTES
Bedside HD treatment initiated via R chestwall permacth. Arterial side sluggish drawback and flush venous side works well. Lines reversed. BFR started at 300 after 20 minutes venous pressure high BFR decreased to 250.

## 2019-06-08 NOTE — ASSESSMENT & PLAN NOTE
- Pt went into PEA at OSH during central line placement and code performed, intubated emergently  - Continue telemetry  - Likely 2/2 acute blood loss and hypotension, type II MI  - Had arrest during RSI, likely from decreased circulating volume and transient hypotension with induction

## 2019-06-08 NOTE — PLAN OF CARE
Problem: Adult Inpatient Plan of Care  Goal: Plan of Care Review  Outcome: Ongoing (interventions implemented as appropriate)  See vital signs and assessments in flowsheets. See below for updates on today's progress.     Pulmonary: Room air when awake. 2L O2 while sleeping.    Cardiovascular: HR sinus tachycardia/afib. Pt vacillates between ST and afib. SBP maintaned above 100 without levo all night.    Neurological: AAOx4. Afebrile. Denies pain.    Gastrointestinal: BM x1 this shift, no evidence melena/hematochezia.     Genitourinary: 1 unmeasured void this shift. CRRT attempted, only 4 hours completed d/t pt clotting off x3.    Integumentary/Other: CHG bath given    Patient progressing towards goals as tolerated, plan of care communicated and reviewed with Frank Zayas and family. All concerns addressed. Will continue to monitor.

## 2019-06-08 NOTE — ASSESSMENT & PLAN NOTE
"- s/p CABG in 1997  - s/p PCI approx. 10 years ago  - Per cardiology, do not feel that this represents type I, occlusive lesion as cause of his arrest; feel this represents type II MSTEMI  Restarted ASA on 6/8  Patient adamantly refuses statins because they make him feel "sick"  "

## 2019-06-08 NOTE — NURSING
Critical care notified of patient HR change to atrial fibrillation from previous sinus rhythm. No new orders at this time, WCTM.

## 2019-06-08 NOTE — ASSESSMENT & PLAN NOTE
- Pt on HD TThSat as outpatient via right Permacath  - Nephrology following, appreciate assistance

## 2019-06-08 NOTE — ASSESSMENT & PLAN NOTE
79 year old man with history of T2DM, ESRD on HD (started 1-2 ms ago), HFpEF, CAD s/p CABG on plavix, who is presenting from outside hospital due to concern for duodenal ulcer. Nephrology consulted for ESRD and HD management. Last dialyze at the outside hospital per family.     Patient intubated and sedated on pressor support (Levophed)     S/P EGD with oozing duodenal ulcer     Plan:  - Will plan for HD treatment today for clearance and volume removal. UF 1-1.5 L as tolerated by patient maintain MAP> 65   - Will need midodrine 30 minutes before dialysis   - Continue monitoring intake and output

## 2019-06-08 NOTE — ASSESSMENT & PLAN NOTE
--EGD @ OSH which showed duodenal ulcer with visible vessel actively bleeding, which was successfully clipped.    --CTA 5/31 & 6/3 without evidence of active GI bleed; duodenal clip has migrated to distal small bowel  -- Continue IV Protonix 40mg IV BID  --H. pylori serum IgG negative  --CBC q8; transfuse for < 7   --GI does not plan to repeat EGD, if patient unstable, would discuss with IR for possible embolization

## 2019-06-08 NOTE — ASSESSMENT & PLAN NOTE
--echo from yesterday demonstrates severely decreased EF, 15%, likely 2/2 patient's recent arrest in combination with hemorrhagic shock from his GIB  --trop elevated, likely 2/2 type II MI as most likely etiology, now downtrended  Weaned of levo  Needs to be started on guidline directed medical therapy w/ BB, consider initiating tomorrow if tolerates HD

## 2019-06-08 NOTE — SUBJECTIVE & OBJECTIVE
Interval History:   Patient evaluated at bedside. Patient has been on SCUF yesterday but presented with 2 episodes were he clotted and was not able to be rinsed back. NET even yesterday. Has been taken off levophed.     Review of patient's allergies indicates:   Allergen Reactions    Lidocaine Nausea Only     NOVACAINE --  Severe nausea    Statins-hmg-coa reductase inhibitors Anxiety     Current Facility-Administered Medications   Medication Frequency    0.9%  NaCl infusion (CRRT USE ONLY) Continuous    0.9%  NaCl infusion (for blood administration) Q24H PRN    albuterol-ipratropium 2.5 mg-0.5 mg/3 mL nebulizer solution 3 mL Q6H PRN    atorvastatin tablet 40 mg Daily    dextrose 50% injection 12.5 g PRN    FLUoxetine capsule 20 mg QHS    glucagon (human recombinant) injection 1 mg PRN    insulin aspart U-100 pen 0-5 Units QID (AC + HS) PRN    magnesium sulfate 2g in water 50mL IVPB (premix) PRN    miconazole nitrate 2% ointment BID    norepinephrine 32 mg in dextrose 5 % 250 mL infusion Continuous    ondansetron injection 4 mg Q6H PRN    pantoprazole EC tablet 40 mg BID AC    ramelteon tablet 8 mg Nightly PRN    rOPINIRole tablet 2 mg QHS    sodium chloride 0.9% flush 10 mL PRN    sodium phosphate 20.01 mmol in dextrose 5 % 250 mL IVPB PRN    sodium phosphate 30 mmol in dextrose 5 % 250 mL IVPB PRN    sodium phosphate 39.99 mmol in dextrose 5 % 250 mL IVPB PRN       Objective:     Vital Signs (Most Recent):  Temp: 98.6 °F (37 °C) (06/08/19 0300)  Pulse: 103 (06/08/19 0714)  Resp: (!) 36 (06/08/19 0714)  BP: (!) 112/58 (06/07/19 1915)  SpO2: 99 % (06/08/19 0714)  O2 Device (Oxygen Therapy): nasal cannula (06/08/19 0714) Vital Signs (24h Range):  Temp:  [98.5 °F (36.9 °C)-98.7 °F (37.1 °C)] 98.6 °F (37 °C)  Pulse:  [] 103  Resp:  [21-40] 36  SpO2:  [64 %-100 %] 99 %  BP: (107-121)/(58-67) 112/58  Arterial Line BP: (102-143)/(39-67) 115/57     Weight: 129.2 kg (284 lb 13.4 oz) (06/07/19  0300)  Body mass index is 36.57 kg/m².  Body surface area is 2.6 meters squared.    I/O last 3 completed shifts:  In: 1667.5 [P.O.:770; I.V.:897.5]  Out: 1486 [Other:1286; Stool:200]    Physical Exam   Constitutional: He is oriented to person, place, and time. No distress. Nasal cannula in place.   HENT:   Head: Normocephalic and atraumatic.   Mouth/Throat: Oropharynx is clear and moist. No oropharyngeal exudate.   Eyes: Conjunctivae are normal. No scleral icterus.   Neck: No JVD present.   Cardiovascular: Normal rate, regular rhythm, normal heart sounds and intact distal pulses.   Pulmonary/Chest: Effort normal and breath sounds normal. No respiratory distress.   Abdominal: Soft. Bowel sounds are normal. He exhibits no distension. There is no tenderness.   Musculoskeletal: He exhibits edema. He exhibits no tenderness.   Lymphadenopathy:     He has no cervical adenopathy.   Neurological: He is alert and oriented to person, place, and time.   Skin: He is not diaphoretic.   Nursing note and vitals reviewed.      Significant Labs:  ABGs:   Recent Labs   Lab 06/06/19  1155   PH 7.326*   PCO2 49.3*   HCO3 25.8   POCSATURATED 97   BE 0     BMP:   Recent Labs   Lab 06/08/19  0335   *  138*     108   CO2 22*  22*   BUN 17  17   CREATININE 3.4*  3.4*   CALCIUM 8.1*  8.1*   MG 2.1  2.1     CBC:   Recent Labs   Lab 06/08/19  0750   WBC 9.73   RBC 2.33*   HGB 7.1*   HCT 22.8*   *   MCV 98   MCH 30.5   MCHC 31.1*     CMP:   Recent Labs   Lab 06/08/19  0335   *  138*   CALCIUM 8.1*  8.1*   ALBUMIN 2.2*  2.2*   PROT 5.4*     137   K 4.5  4.5   CO2 22*  22*     108   BUN 17  17   CREATININE 3.4*  3.4*   ALKPHOS 95   ALT 12   AST 17   BILITOT 0.4     All labs within the past 24 hours have been reviewed.

## 2019-06-08 NOTE — PROGRESS NOTES
[facility]  Critical Care Medicine  Progress Note    Patient Name: Frank Zayas  MRN: [id number]  Admission Date: 5/31/2019  Hospital Length of Stay: 8 days  Code Status: Full Code  Attending Provider: Rubio Yu MD  Primary Care Provider: Mikhail Shields MD   Principal Problem: GIB (gastrointestinal bleeding)    Subjective:     HPI:  78 yo M with morbid obesity, ESRD on HD (TTS, 2 months), Afib, CHF, DM2 (on insulin), CAD s/p CABG, HLD, COPD, HTN transferred from [facility] ([facility]).  He presented with hematochezia and abdominal pain.  He went to [facility] ER for lower GI bleed and received 1 u PRBC and then was transferred to [facility] for cardiac evaluation when trop was 0.39.  At [facility] he had continued hyperkalemia (K of 6.1) after being treated for it at [facility].  He was started on levophed for hypovolemic shock, and an art line and central line was placed.  During line placement, he briefly lost a pulse (PEA) and CPR was started.  He was given epinephrine, calcium gluconate, sodium bicarb, and intubated emergently.  He achieved ROSC.  He received IVF and 3-4 units PRBC.  He underwent emergent upper and lower endoscopy.  They found a large vessel in the duodenum, actively oozing with copious amounts of fresh bright red blood pumping out.  GI placed a clop on the artery and injected epinephrine, achieving hemostasis.  There was concern that the vessel could be directly assosiated with a larger vessel (ie GDA).  There were no varices seen.  Both GI and Gen Surg recommended transfer to [facility] for arteriovenous embolization which couldn't be performed there.      Patient admitted to Critical Care Medicine on 5/31/2019 for evaluation and management of acute upper GI bleed (s/p EGD with clipping of bleeding visible vessel at OSH).  Pt arrived intubated following PEA arrest at OSH.  Interventional Radiology consulted and recommended CTA abdomen/pelvis, however no evidence of active GI  bleeding on imaging.  Gastroenterology consulted and recommended continue IV Protonix gtt for a total of 72 hours.  Nephrology consulted for dialysis in setting of ESRD.  Patient passed SAT/SBT and was successfully extubated on the afternoon of 5/31.  On 6/1, as pt remained hemodynamically stable and his hemoglobin remained stable, pt stepped down to Hospital Medicine.    The evening of 6/2 into 6/3 the patient had several melanotic stools with slight downtrend in Hgb (10 > 8), however hemodynamics remained stable. Repeat CTA at that time did not show any evidence of active bleeding, however duodenal clip was noted to have migrated into distal small bowel. GI with plans to repeat EGD on 6/4.     Around 1am on 6/4, the patient became hypotensive (70s/40s). Bedside POC showed Hct 17. Decision made to move patient back to ICU for further management.       Hospital/ICU Course:  Pt was admitted to MICU once more on the night of 6/3-6/4 d/t concerns for hypotension requiring vasopressors. Hemoglobin returned at 5.7 mg/dL. He was transfused two units. Repeat hemoglobin of 7.6 mg/dL. On bedside echo this morning, IVC was not collapsible with sniff and patient had complained of cough productive of clear white sputum. Anesthesia arrived and performed RSI with etomidate and succinylcholine. Pt became hypotensive and bradycardic. He eventually underwent cardiac arrest. Epinephrine was given and chest compressions were started. Almost immediately after chest compressions were initiated, end-tital Co2 was positive and ROSC was achieved. He was intubated during his arrest. Since his airway was secured, gastroenterology proceeded with endoscopy. They found a non-obstructing oozing duodenal ulcer with a visible vessel. It was injected with epinephrine and treated with bipolar cautery. Unfortunately, oozing continued. GI recommended evaluation by interventional radiology for possible embolization. A clip was placed for marking.      Overnight, 6/4, patient had no major events other than occasionally clotting off his dialysis port. Does have what appears to be occasional sinus pauses on the monitor, however has had no other cardiac arrests since yesterday morning. Is still on levophed currently, trending H/H, has not required additional transfusion. Could not completely address patient's oozing vessel yesterday, will likely need consult with IR to address his continued bleeding as multiple EGDs could not completely tamponade his bleeding.     Interval History/Significant Events: No acute overnight events.     Review of Systems  Objective:     Vital Signs (Most Recent):  Temp: 98.6 °F (37 °C) (06/08/19 0300)  Pulse: 103 (06/08/19 0714)  Resp: (!) 36 (06/08/19 0714)  BP: (!) 112/58 (06/07/19 1915)  SpO2: 99 % (06/08/19 0714) Vital Signs (24h Range):  Temp:  [98.5 °F (36.9 °C)-98.7 °F (37.1 °C)] 98.6 °F (37 °C)  Pulse:  [] 103  Resp:  [21-40] 36  SpO2:  [64 %-100 %] 99 %  BP: (107-121)/(58-67) 112/58  Arterial Line BP: (102-143)/(39-67) 115/57   Weight: 129.2 kg (284 lb 13.4 oz)  Body mass index is 36.57 kg/m².      Intake/Output Summary (Last 24 hours) at 6/8/2019 0844  Last data filed at 6/8/2019 0300  Gross per 24 hour   Intake 1301.42 ml   Output 1486 ml   Net -184.58 ml       Physical Exam   Constitutional: No distress.   HENT:   Head: Normocephalic and atraumatic.   Eyes: Conjunctivae are normal. Right eye exhibits no discharge. Left eye exhibits no discharge. No scleral icterus.   Neck: Normal range of motion. Neck supple.   Cardiovascular: Regular rhythm.   Tachycardic  Femoral quad lumen     Pulmonary/Chest: No stridor. No respiratory distress. He has no wheezes. He has rales (bibasilar).   Abdominal: Soft. He exhibits distension. There is no tenderness. There is no guarding.   Musculoskeletal: He exhibits edema (+3 b/l LE, +2 b/l LE). He exhibits no tenderness.   Neurological: He is alert.   Skin: Skin is warm. No rash noted. No  erythema.   Psychiatric: He has a normal mood and affect.       Vents:  Vent Mode: Spont (06/05/19 1720)  Ventilator Initiated: Yes (06/04/19 1033)  Set Rate: 12 bmp (06/05/19 1502)  Vt Set: 480 mL (05/31/19 1631)  Pressure Support: 5 cmH20 (06/05/19 1720)  PEEP/CPAP: 5 cmH20 (06/05/19 1720)  Oxygen Concentration (%): 28 (06/06/19 0745)  Peak Airway Pressure: 9.5 cmH2O (06/05/19 1720)  Plateau Pressure: 0 cmH20 (06/05/19 1720)  Total Ve: 0 mL (06/05/19 1720)  Negative Inspiratory Force (cm H2O): -27 (05/31/19 1751)  F/VT Ratio<105 (RSBI): (!) 35.71 (06/05/19 1502)  Lines/Drains/Airways     Central Venous Catheter Line                 Hemodialysis Catheter 05/16/19 1600 right subclavian 22 days         Percutaneous Central Line Insertion/Assessment - quad lumen  06/05/19 right femoral vein;right femoral 3 days          Arterial Line                 Arterial Line 06/04/19 0900 Right Radial 3 days          Peripheral Intravenous Line                 Peripheral IV - Single Lumen 06/03/19 1100 20 G;1 3/4 in Left Forearm 4 days              Significant Labs:    CBC/Anemia Profile:  Recent Labs   Lab 06/08/19  0016 06/08/19  0335 06/08/19  0750   WBC 10.13 9.86 9.73   HGB 7.2* 6.9* 7.1*   HCT 23.4* 22.5* 22.8*   * 102* 110*   * 101* 98   RDW 19.3* 19.5* 19.6*        Chemistries:  Recent Labs   Lab 06/07/19  0419 06/07/19  1310 06/08/19  0016 06/08/19  0335     137 136 138 137  137   K 4.5  4.5 4.7 4.4 4.5  4.5     106 107 109 108  108   CO2 24  24 23 22* 22*  22*   BUN 11  11 13 17 17  17   CREATININE 2.4*  2.4* 2.7* 3.3* 3.4*  3.4*   CALCIUM 8.4*  8.4* 8.1* 8.2* 8.1*  8.1*   ALBUMIN 2.3*  2.3* 2.2* 2.2* 2.2*  2.2*   PROT 5.5*  --   --  5.4*   BILITOT 0.6  --   --  0.4   ALKPHOS 92  --   --  95   ALT 12  --   --  12   AST 21  --   --  17   MG 2.2  2.2 2.0 2.0 2.1  2.1   PHOS 3.3  3.3 3.3 3.9 4.3  4.3       CMP:   Recent Labs   Lab 06/07/19  0419 06/07/19  1310 06/08/19  0016  06/08/19  0335     137 136 138 137  137   K 4.5  4.5 4.7 4.4 4.5  4.5     106 107 109 108  108   CO2 24  24 23 22* 22*  22*   *  145* 171* 142* 138*  138*   BUN 11  11 13 17 17  17   CREATININE 2.4*  2.4* 2.7* 3.3* 3.4*  3.4*   CALCIUM 8.4*  8.4* 8.1* 8.2* 8.1*  8.1*   PROT 5.5*  --   --  5.4*   ALBUMIN 2.3*  2.3* 2.2* 2.2* 2.2*  2.2*   BILITOT 0.6  --   --  0.4   ALKPHOS 92  --   --  95   AST 21  --   --  17   ALT 12  --   --  12   ANIONGAP 7*  7* 6* 7* 7*  7*   EGFRNONAA 24.9*  24.9* 21.6* 16.9* 16.3*  16.3*     Cardiac Markers: No results for input(s): CKMB, TROPONINT, MYOGLOBIN in the last 48 hours.    Significant Imaging:  I have reviewed all pertinent imaging results/findings within the past 24 hours.      ABG  Recent Labs   Lab 06/06/19  1155   PH 7.326*   PO2 101*   PCO2 49.3*   HCO3 25.8   BE 0     Assessment/Plan:     Neuro  Restless leg syndrome  --Continue home ropinirole 2mg qHS    Pulmonary  Acute hypercapnic respiratory failure  --Resolved  --Was likely multifactorial with likely BRANDEN; s/p intubation, extubated (6/5)  --believe leukocytosis on presentation to ICU represented demargination in setting of cardiac arrest therefore antibiotics were d/c    Cardiac/Vascular  NSTEMI (non-ST elevated myocardial infarction)  --Spoke with cardiology, do not feel that this represents type I STEMI  --Troponin leveled off  --EKG not showing ST elevation, does have QRS prolongation, sinus rhythm; T wave inversions noted throughout; appears to have had bifascicular block on old EKGs    Cardiac arrest  - Pt went into PEA at OSH during central line placement and code performed, intubated emergently  - Continue telemetry  - Likely 2/2 acute blood loss and hypotension, type II MI  - Had arrest during RSI, likely from decreased circulating volume and transient hypotension with induction    A-fib  - Holding anticoagulation in setting of GI bleed  Consider restarting BB tomorrow if  "tolerated HD    Coronary artery disease  - s/p CABG in 1997  - s/p PCI approx. 10 years ago  - Per cardiology, do not feel that this represents type I, occlusive lesion as cause of his arrest; feel this represents type II MSTEMI  Restarted ASA on 6/8  Patient adamantly refuses statins because they make him feel "sick"    DM type 2 with diabetic mixed hyperlipidemia  --accuchecks with low dose correctional ssi  Has not required insulin over last several days  --holding home oral antiglycemics while in hospital    HTN (hypertension)  Has been in shock requiring levo, weaned off on evening of 6/7    Heart failure  --echo from yesterday demonstrates severely decreased EF, 15%, likely 2/2 patient's recent arrest in combination with hemorrhagic shock from his GIB  --trop elevated, likely 2/2 type II MI as most likely etiology, now downtrended  Weaned of levo  Needs to be started on guidline directed medical therapy w/ BB, consider initiating tomorrow if tolerates HD    Renal/  ESRD (end stage renal disease)  - Pt on HD TThSat as outpatient via right Permacath  - Nephrology following, appreciate assistance      GI  * GIB (gastrointestinal bleeding)  --EGD @ OSH which showed duodenal ulcer with visible vessel actively bleeding, which was successfully clipped.    --CTA 5/31 & 6/3 without evidence of active GI bleed; duodenal clip has migrated to distal small bowel  -- Continue IV Protonix 40mg IV BID  --H. pylori serum IgG negative  --CBC q8; transfuse for < 7   --GI does not plan to repeat EGD, if patient unstable, would discuss with IR for possible embolization     Acute gastrointestinal bleeding  --Will continue q8h CBCs  --BID PPI for now   --IR if patient rebleeds    Other  Hemorrhagic shock  Resolved       Critical Care Daily Checklist:    A: Awake: RASS Goal/Actual Goal: RASS Goal: 0-->alert and calm  Actual: [place] Agitation Sedation Scale (RASS): Alert and calm   B: Spontaneous Breathing Trial Performed? Spon. " Breathing Trial Initiated?: Initiated (05/31/19 7295)   C: SAT & SBT Coordinated?  n/a                      D: Delirium: CAM-ICU Overall CAM-ICU: Negative   E: Early Mobility Performed? Yes   F: Feeding Goal:    Status:     Current Diet Order   Procedures    Diet renal [facility]; Low Sodium,2gm; Fluid - 1500mL     Order Specific Question:   Indicate patient location for additional diet options:     Answer:   [facility]     Order Specific Question:   Additional Diet Options:     Answer:   Low Sodium,2gm     Order Specific Question:   Fluid restriction:     Answer:   Fluid - 1500mL      AS: Analgesia/Sedation none   T: Thromboembolic Prophylaxis SCDs   H: HOB > 300 Yes   U: Stress Ulcer Prophylaxis (if needed) protonix   G: Glucose Control aspart sliding scale   B: Bowel Function Stool Occurrence: 1(leaking around rectal tube )   I: Indwelling Catheter (Lines & Huynh) Necessity Fem quad lumen, A-line, R subclavian   D: De-escalation of Antimicrobials/Pharmacotherapies none    Plan for the day/ETD Step down to hospital medicine, remove A line and quad lumen,     Code Status:  Family/Goals of Care: Full Code         Critical secondary to Patient has a condition that poses threat to life and bodily function: GI bleed      Critical care was time spent personally by me on the following activities: development of treatment plan with patient or surrogate and bedside caregivers, discussions with consultants, evaluation of patient's response to treatment, examination of patient, ordering and performing treatments and interventions, ordering and review of laboratory studies, ordering and review of radiographic studies, pulse oximetry, re-evaluation of patient's condition. This critical care time did not overlap with that of any other provider or involve time for any procedures.     Jayro Moreno MD  Critical Care Medicine  [facility]

## 2019-06-08 NOTE — NURSING
CRRT clotted off venous at 2345, unable to restart or rinse back venous side. Dialysis RN notified, CRRT paused for now.    Pt clotted off a total of 3 times. Per dialysis RN, to stop treatment for now.

## 2019-06-08 NOTE — SUBJECTIVE & OBJECTIVE
Interval History/Significant Events: No acute overnight events.     Review of Systems  Objective:     Vital Signs (Most Recent):  Temp: 98.6 °F (37 °C) (06/08/19 0300)  Pulse: 103 (06/08/19 0714)  Resp: (!) 36 (06/08/19 0714)  BP: (!) 112/58 (06/07/19 1915)  SpO2: 99 % (06/08/19 0714) Vital Signs (24h Range):  Temp:  [98.5 °F (36.9 °C)-98.7 °F (37.1 °C)] 98.6 °F (37 °C)  Pulse:  [] 103  Resp:  [21-40] 36  SpO2:  [64 %-100 %] 99 %  BP: (107-121)/(58-67) 112/58  Arterial Line BP: (102-143)/(39-67) 115/57   Weight: 129.2 kg (284 lb 13.4 oz)  Body mass index is 36.57 kg/m².      Intake/Output Summary (Last 24 hours) at 6/8/2019 0844  Last data filed at 6/8/2019 0300  Gross per 24 hour   Intake 1301.42 ml   Output 1486 ml   Net -184.58 ml       Physical Exam   Constitutional: No distress.   HENT:   Head: Normocephalic and atraumatic.   Eyes: Conjunctivae are normal. Right eye exhibits no discharge. Left eye exhibits no discharge. No scleral icterus.   Neck: Normal range of motion. Neck supple.   Cardiovascular: Regular rhythm.   Tachycardic  Femoral quad lumen     Pulmonary/Chest: No stridor. No respiratory distress. He has no wheezes. He has rales (bibasilar).   Abdominal: Soft. He exhibits distension. There is no tenderness. There is no guarding.   Musculoskeletal: He exhibits edema (+3 b/l LE, +2 b/l LE). He exhibits no tenderness.   Neurological: He is alert.   Skin: Skin is warm. No rash noted. No erythema.   Psychiatric: He has a normal mood and affect.       Vents:  Vent Mode: Spont (06/05/19 1720)  Ventilator Initiated: Yes (06/04/19 1033)  Set Rate: 12 bmp (06/05/19 1502)  Vt Set: 480 mL (05/31/19 1631)  Pressure Support: 5 cmH20 (06/05/19 1720)  PEEP/CPAP: 5 cmH20 (06/05/19 1720)  Oxygen Concentration (%): 28 (06/06/19 0745)  Peak Airway Pressure: 9.5 cmH2O (06/05/19 1720)  Plateau Pressure: 0 cmH20 (06/05/19 1720)  Total Ve: 0 mL (06/05/19 1720)  Negative Inspiratory Force (cm H2O): -27 (05/31/19  6671)  F/VT Ratio<105 (RSBI): (!) 35.71 (06/05/19 1502)  Lines/Drains/Airways     Central Venous Catheter Line                 Hemodialysis Catheter 05/16/19 1600 right subclavian 22 days         Percutaneous Central Line Insertion/Assessment - quad lumen  06/05/19 right femoral vein;right femoral 3 days          Arterial Line                 Arterial Line 06/04/19 0900 Right Radial 3 days          Peripheral Intravenous Line                 Peripheral IV - Single Lumen 06/03/19 1100 20 G;1 3/4 in Left Forearm 4 days              Significant Labs:    CBC/Anemia Profile:  Recent Labs   Lab 06/08/19  0016 06/08/19  0335 06/08/19  0750   WBC 10.13 9.86 9.73   HGB 7.2* 6.9* 7.1*   HCT 23.4* 22.5* 22.8*   * 102* 110*   * 101* 98   RDW 19.3* 19.5* 19.6*        Chemistries:  Recent Labs   Lab 06/07/19 0419 06/07/19  1310 06/08/19  0016 06/08/19  0335     137 136 138 137  137   K 4.5  4.5 4.7 4.4 4.5  4.5     106 107 109 108  108   CO2 24  24 23 22* 22*  22*   BUN 11  11 13 17 17  17   CREATININE 2.4*  2.4* 2.7* 3.3* 3.4*  3.4*   CALCIUM 8.4*  8.4* 8.1* 8.2* 8.1*  8.1*   ALBUMIN 2.3*  2.3* 2.2* 2.2* 2.2*  2.2*   PROT 5.5*  --   --  5.4*   BILITOT 0.6  --   --  0.4   ALKPHOS 92  --   --  95   ALT 12  --   --  12   AST 21  --   --  17   MG 2.2  2.2 2.0 2.0 2.1  2.1   PHOS 3.3  3.3 3.3 3.9 4.3  4.3       CMP:   Recent Labs   Lab 06/07/19 0419 06/07/19  1310 06/08/19  0016 06/08/19  0335     137 136 138 137  137   K 4.5  4.5 4.7 4.4 4.5  4.5     106 107 109 108  108   CO2 24  24 23 22* 22*  22*   *  145* 171* 142* 138*  138*   BUN 11  11 13 17 17  17   CREATININE 2.4*  2.4* 2.7* 3.3* 3.4*  3.4*   CALCIUM 8.4*  8.4* 8.1* 8.2* 8.1*  8.1*   PROT 5.5*  --   --  5.4*   ALBUMIN 2.3*  2.3* 2.2* 2.2* 2.2*  2.2*   BILITOT 0.6  --   --  0.4   ALKPHOS 92  --   --  95   AST 21  --   --  17   ALT 12  --   --  12   ANIONGAP 7*  7* 6* 7* 7*  7*   EGFRNONAA  24.9*  24.9* 21.6* 16.9* 16.3*  16.3*     Cardiac Markers: No results for input(s): CKMB, TROPONINT, MYOGLOBIN in the last 48 hours.    Significant Imaging:  I have reviewed all pertinent imaging results/findings within the past 24 hours.

## 2019-06-08 NOTE — PROGRESS NOTES
[facility]  Nephrology  Progress Note    Patient Name: Frank Zayas  MRN: [id number]  Admission Date: 5/31/2019  Hospital Length of Stay: 8 days  Attending Provider: Rubio Yu MD   Primary Care Physician: Mikhail Shields MD  Principal Problem:GIB (gastrointestinal bleeding)    Subjective:     HPI: 79 year old man with history of T2DM, ESRD on HD (started 1-2 ms ago), HFpEF, and CAD s/p CABG on plavix who initially presented to [facility] for complaints of abdominal pain and hematochezia. He was transfused 1u pRBC at OSH and transferred to [facility] for a cardiac evaluation after being found with a troponin of 0.39. At [facility], he was started on levophed for shock and suffered brief episode of PEA arrest which resolved with resuscitation. At OSH underwent EGD which was notable for actively bleeding duodenal ulcer s/p hemostasis with epi and clip. Due to concern for rebleeding he was transferred to [facility] for potential IR therapy. According to the family, the patient was last dialyze on yesterday, 5/30, at the OSH. He was recently started on dialysis about 1-2 months ago. His renal failure is likely due to his co morbidities. He typically dialyze on T/Th/Sat for 4 hrs [place] [facility] [place] via a L permacath. Nephrology consulted for management of ESRD and HD treatment.        Interval History:   Patient evaluated at bedside. Patient has been on SCUF yesterday but presented with 2 episodes were he clotted and was not able to be rinsed back. NET even yesterday. Has been taken off levophed.     Review of patient's allergies indicates:   Allergen Reactions    Lidocaine Nausea Only     NOVACAINE --  Severe nausea    Statins-hmg-coa reductase inhibitors Anxiety     Current Facility-Administered Medications   Medication Frequency    0.9%  NaCl infusion (CRRT USE ONLY) Continuous    0.9%  NaCl infusion (for blood administration) Q24H PRN    albuterol-ipratropium 2.5 mg-0.5 mg/3 mL  nebulizer solution 3 mL Q6H PRN    atorvastatin tablet 40 mg Daily    dextrose 50% injection 12.5 g PRN    FLUoxetine capsule 20 mg QHS    glucagon (human recombinant) injection 1 mg PRN    insulin aspart U-100 pen 0-5 Units QID (AC + HS) PRN    magnesium sulfate 2g in water 50mL IVPB (premix) PRN    miconazole nitrate 2% ointment BID    norepinephrine 32 mg in dextrose 5 % 250 mL infusion Continuous    ondansetron injection 4 mg Q6H PRN    pantoprazole EC tablet 40 mg BID AC    ramelteon tablet 8 mg Nightly PRN    rOPINIRole tablet 2 mg QHS    sodium chloride 0.9% flush 10 mL PRN    sodium phosphate 20.01 mmol in dextrose 5 % 250 mL IVPB PRN    sodium phosphate 30 mmol in dextrose 5 % 250 mL IVPB PRN    sodium phosphate 39.99 mmol in dextrose 5 % 250 mL IVPB PRN       Objective:     Vital Signs (Most Recent):  Temp: 98.6 °F (37 °C) (06/08/19 0300)  Pulse: 103 (06/08/19 0714)  Resp: (!) 36 (06/08/19 0714)  BP: (!) 112/58 (06/07/19 1915)  SpO2: 99 % (06/08/19 0714)  O2 Device (Oxygen Therapy): nasal cannula (06/08/19 0714) Vital Signs (24h Range):  Temp:  [98.5 °F (36.9 °C)-98.7 °F (37.1 °C)] 98.6 °F (37 °C)  Pulse:  [] 103  Resp:  [21-40] 36  SpO2:  [64 %-100 %] 99 %  BP: (107-121)/(58-67) 112/58  Arterial Line BP: (102-143)/(39-67) 115/57     Weight: 129.2 kg (284 lb 13.4 oz) (06/07/19 0300)  Body mass index is 36.57 kg/m².  Body surface area is 2.6 meters squared.    I/O last 3 completed shifts:  In: 1667.5 [P.O.:770; I.V.:897.5]  Out: 1486 [Other:1286; Stool:200]    Physical Exam   Constitutional: He is oriented to person, place, and time. No distress. Nasal cannula in place.   HENT:   Head: Normocephalic and atraumatic.   Mouth/Throat: Oropharynx is clear and moist. No oropharyngeal exudate.   Eyes: Conjunctivae are normal. No scleral icterus.   Neck: No JVD present.   Cardiovascular: Normal rate, regular rhythm, normal heart sounds and intact distal pulses.   Pulmonary/Chest: Effort normal  and breath sounds normal. No respiratory distress.   Abdominal: Soft. Bowel sounds are normal. He exhibits no distension. There is no tenderness.   Musculoskeletal: He exhibits edema. He exhibits no tenderness.   Lymphadenopathy:     He has no cervical adenopathy.   Neurological: He is alert and oriented to person, place, and time.   Skin: He is not diaphoretic.   Nursing note and vitals reviewed.      Significant Labs:  ABGs:   Recent Labs   Lab 06/06/19  1155   PH 7.326*   PCO2 49.3*   HCO3 25.8   POCSATURATED 97   BE 0     BMP:   Recent Labs   Lab 06/08/19  0335   *  138*     108   CO2 22*  22*   BUN 17  17   CREATININE 3.4*  3.4*   CALCIUM 8.1*  8.1*   MG 2.1  2.1     CBC:   Recent Labs   Lab 06/08/19  0750   WBC 9.73   RBC 2.33*   HGB 7.1*   HCT 22.8*   *   MCV 98   MCH 30.5   MCHC 31.1*     CMP:   Recent Labs   Lab 06/08/19  0335   *  138*   CALCIUM 8.1*  8.1*   ALBUMIN 2.2*  2.2*   PROT 5.4*     137   K 4.5  4.5   CO2 22*  22*     108   BUN 17  17   CREATININE 3.4*  3.4*   ALKPHOS 95   ALT 12   AST 17   BILITOT 0.4     All labs within the past 24 hours have been reviewed.       Assessment/Plan:     Anemia of renal disease  - S/P EGD with oozing duodenal ulcer   - Will receive 1 unit PRBCs today with dialysis     ESRD (end stage renal disease)  79 year old man with history of T2DM, ESRD on HD (started 1-2 ms ago), HFpEF, CAD s/p CABG on plavix, who is presenting from outside hospital due to concern for duodenal ulcer. Nephrology consulted for ESRD and HD management. Last dialyze at the outside hospital per family.     Patient intubated and sedated on pressor support (Levophed)     S/P EGD with oozing duodenal ulcer     Plan:  - Will plan for HD treatment today for clearance and volume removal. UF 1-1.5 L as tolerated by patient maintain MAP> 65   - Will need midodrine 30 minutes before dialysis   - Continue monitoring intake and output          Jovan  Samira Henley  Nephrology  Fellow  [facility]    Pager [phone]

## 2019-06-08 NOTE — PROGRESS NOTES
CRRT clotted 3x within shift. 4hrs completed of 10hrs ordered. Unable to return blood. H & H trending down due to blood loss. Ended tx. Dr. Hogan informed.

## 2019-06-08 NOTE — PROGRESS NOTES
3hr HD treatment stopped after 1.5 hrs due to system clotting and arterial side flushing very sluggishly. 0.7L of fluid removed pt tolerated well. 1U of PRBC given. cathflo instilled into arterial lumen as ordered. Report given to primary nurse at bedside.

## 2019-06-08 NOTE — ASSESSMENT & PLAN NOTE
--accuchecks with low dose correctional ssi  Has not required insulin over last several days  --holding home oral antiglycemics while in hospital

## 2019-06-09 NOTE — PLAN OF CARE
Problem: Physical Therapy Goal  Goal: Physical Therapy Goal  Goals to be met by: 2019     Patient will increase functional independence with mobility by performin. Supine to sit with CGA - not met  2. Sit to supine with CGA - not met  3. Sit to stand transfer with Contact Guard Assistance - not met  4. Gait  x 50 feet with Supervision using Rolling Walker. - not met  5. Ascend/Descend 4 inch curb step with Supervision using Rolling Walker. - not met     Outcome: Ongoing (interventions implemented as appropriate)  Re-eval completed and goals appropriate

## 2019-06-09 NOTE — ASSESSMENT & PLAN NOTE
Maintain euvolemia by monitoring I/O's and daily weights.  Fluid restriction (2 liters/24 hours)  Low Na diet  Monitor for signs of fluid overload: RR>30, O2 sat<92%, weight gain of >3 lbs, or urinary output <160ml/8hr  Maintain oxygen sats >92% via NC if supplemental oxygen needed.   Volume removal via HD     Patient Vitals for the past 72 hrs (Last 3 readings):   Weight   06/09/19 0300 131.7 kg (290 lb 5.5 oz)   06/07/19 0300 129.2 kg (284 lb 13.4 oz)

## 2019-06-09 NOTE — PT/OT/SLP RE-EVAL
"Occupational Therapy   Re-evaluation     Name: Frank Zayas  MRN: [id number]  Admitting Diagnosis:  GIB (gastrointestinal bleeding) 5 Days Post-Op    Recommendations:     Discharge Recommendations: nursing facility, skilled  Discharge Equipment Recommendations:  none  Barriers to discharge:       Assessment:     Frank Zayas is a 79 y.o. male with a medical diagnosis of GIB (gastrointestinal bleeding).  Pt presents with decreased endurance and impaired mobility performance as limited by cardiovascular status and generalized weakness. Pt performed bed mobility SBA-min (A) and took several forward/backward steps with RW with CGA with reported fatigue and decreased endurance. Pt receptive to continued therapy and overall OT POC. Performance deficits affecting function are weakness, impaired endurance, impaired self care skills, gait instability, impaired functional mobilty, impaired balance, impaired cardiopulmonary response to activity, decreased lower extremity function.  Pt would benefit from continued OT skilled services 3x/wk to improve daily living skills to optimize QOL.  Pt is recommended to discharge to -SNF at this time.      Rehab Prognosis:  Good; patient would benefit from acute skilled OT services to address these deficits and reach maximum level of function.       Plan:     Patient to be seen 3 x/week to address the above listed problems via self-care/home management, therapeutic activities, therapeutic exercises  · Plan of Care Expires: 07/01/19  · Plan of Care Reviewed with: patient    Subjective     Chief Complaint: Decreased endurance and generalized weakness  Patient/Family stated goals: Improve strength and endurance with pt stating "My wife can't help me at home. She's 72 and older herself"  Communicated with: RN prior to session.  Pain/Comfort:  · Pain Rating 1: 0/10  · Pain Rating Post-Intervention 1: 0/10    Objective:     Communicated with: RN prior to session.  Patient found HOB " elevated with: peripheral IV, telemetry, SCD, bowel management system upon OT entry to room.    General Precautions: Standard, fall, diabetic, hearing impaired   Orthopedic Precautions:N/A   Braces: N/A     Occupational Performance:    Bed Mobility:    · Patient completed Rolling/Turning to Left with  stand by assistance  · Patient completed Scooting/Bridging with stand by assistance  · Patient completed Supine to Sit with contact guard assistance  · Patient completed Sit to Supine with minimum assistance    Functional Mobility/Transfers:  · Patient completed Sit <> Stand Transfer with contact guard assistance  with  rolling walker   · Functional Mobility: Pt took several steps forward/backward with RW with CGA.    Activities of Daily Living:  · Upper Body Dressing: minimum assistance donning gown EOB   · Lower Body Dressing: total assistance donning socks EOB     Cognitive/Visual Perceptual:  Cognitive/Psychosocial Skills:     -       Oriented to: Person, Place, Time and Situation   -       Follows Commands/attention:Follows multistep  commands  -       Communication: clear/fluent  -       Memory: No Deficits noted  -       Safety awareness/insight to disability: intact   -       Mood/Affect/Coping skills/emotional control: Pleasant    Physical Exam:  Balance:    -       Demo good sitting balance and fair standing balance  Dominant hand:    -       right  Upper Extremity Range of Motion:     -       Right Upper Extremity: WFL  -       Left Upper Extremity: WFL  Upper Extremity Strength:    -       Right Upper Extremity: WFL  -       Left Upper Extremity: WFL   Strength:    -       Right Upper Extremity: WFL  -       Left Upper Extremity: WFL    AMPAC 6 Click:  AMPAC Total Score: 16    Treatment & Education:Education:    Pt educated on role of occupational therapy, POC, and safety during ADLs and functional mobility. Pt and OT discussed importance of safe, continued mobility to optimize daily living skills. Pt  verbalized understanding.  White board updated during session. Pt given instruction to call for medical staff/nurse for assistance.       Patient left HOB elevated with all lines intact, call button in reach and nurse notified    GOALS:   Multidisciplinary Problems     Occupational Therapy Goals        Problem: Occupational Therapy Goal    Goal Priority Disciplines Outcome Interventions   Occupational Therapy Goal     OT, PT/OT Ongoing (interventions implemented as appropriate)    Description:  Goals to be met by: 6/19     Patient will increase functional independence with ADLs by performing:    Bed mobility and supine to sit with HOB flat and CGA.  UE Dressing while seated EOB with Set-up Assistance and Stand-by Assistance.  LE Dressing (brief) with Minimal Assistance.  Grooming while standing at sink with Minimal Assistance.  Toileting from toilet with Minimal Assistance for hygiene and clothing management.   Toilet transfer to toilet with Minimal Assistance.  Functional mobility at short household distance for ADL task with RW and min(A).                       History:     Past Medical History:   Diagnosis Date    Anticoagulant long-term use     Arthritis     Bladder cancer     Blood transfusion     CHF (congestive heart failure)     Chronic kidney disease     COPD (chronic obstructive pulmonary disease)     Coronary artery disease     Diabetes mellitus     Gout     [place] (hard of hearing)     HAS BILAT AIDS BUT DOES NOT WEAR    Hyperlipidemia     Hypertension     Myocardial infarction     RLS (restless legs syndrome)     Sleep apnea     NO MACHINE    Wears glasses        Past Surgical History:   Procedure Laterality Date    CARDIAC SURGERY      CABG X 2 1997    CATARACT EXTRACTION, BILATERAL      CHOLECYSTECTOMY      COLONOSCOPY      CORONARY ARTERY BYPASS GRAFT      x 2    coronary stents      CYSTOSCOPY      CYSTOSCOPY N/A 4/8/2019    [facility]    EGD  (ESOPHAGOGASTRODUODENOSCOPY) N/A 6/4/2019    Performed by Clint Dietz MD at [facility] ENDO (2ND FLR)    EXCISION-BLADDER TUMOR-TRANSURETHRAL (TURBT) N/A 9/8/2014    Per[facility] at [facility] OR    EXCISION-BLADDER TUMOR-TRANSURETHRAL (TURBT) N/A 11/18/2013    Performed by [facility] at [facility] OR    EYE SURGERY Bilateral     CATARACT    Insertion,catheter,tunneled N/A 5/16/2019    Performed by Wade Rodriguez MD at [facility] OR    TURBT (TRANSURETHRAL RESECTION OF BLADDER TUMOR) N/A 4/8/2019    Performed by [facility]lee [facility] at [facility] OR    VASECTOMY         Time Tracking:     OT Date of Treatment: 06/09/19  OT Start Time: 1033  OT Stop Time: 1048  OT Total Time (min): 15 min    Billable Minutes:Evaluation 15 min    Maryse Mcdonald, OT  6/9/2019

## 2019-06-09 NOTE — RESIDENT HANDOFF
Handoff     Primary Team: [facility] CRITICAL CARE MEDICINE Room Number: 1108/1108 A     Patient Name: Frank Zayas MRN: [id number]     Date of Birth: [id number] Allergies: Lidocaine and Statins-hmg-coa reductase inhibitors     Age: 79 y.o. Admit Date: 5/31/2019     Sex: male  BMI: Body mass index is 36.57 kg/m².     Code Status: Full Code        Illness Level (current clinical status): Watcher - Yes     Reason for Admission: GIB (gastrointestinal bleeding)    Brief HPI (pertinent PMH and diagnosis or differential diagnosis): 80 yo M with morbid obesity, ESRD on HD (TTS, 2 months), Afib, CHF, DM2 (on insulin), CAD s/p CABG, HLD, COPD, HTN transferred from Critical access hospital ([facility]).  He presented with hematochezia and abdominal pain.  He went to [facility] ER for lower GI bleed and received 1 u PRBC and then was transferred to [facility] for cardiac evaluation when trop was 0.39.  At [facility] he had continued hyperkalemia (K of 6.1) after being treated for it at [facility].  He was started on levophed for hypovolemic shock, and an art line and central line was placed.  During line placement, he briefly lost a pulse (PEA) and CPR was started.  He was given epinephrine, calcium gluconate, sodium bicarb, and intubated emergently.  He achieved ROSC.  He received IVF and 3-4 units PRBC.  He underwent emergent upper and lower endoscopy.  They found a large vessel in the duodenum, actively oozing with copious amounts of fresh bright red blood pumping out.  GI placed a clop on the artery and injected epinephrine, achieving hemostasis.  There was concern that the vessel could be directly assosiated with a larger vessel (ie GDA).  There were no varices seen.  Both GI and Gen Surg recommended transfer to [facility] for arteriovenous embolization which couldn't be performed there.       Patient admitted to Critical Care Medicine on 5/31/2019 for evaluation and management of acute upper GI bleed (s/p EGD with clipping of bleeding visible vessel at OSH).   Pt arrived intubated following PEA arrest at OSH.  Interventional Radiology consulted and recommended CTA abdomen/pelvis, however no evidence of active GI bleeding on imaging.  Gastroenterology consulted and recommended continue IV Protonix gtt for a total of 72 hours.  Nephrology consulted for dialysis in setting of ESRD.  Patient passed SAT/SBT and was successfully extubated on the afternoon of 5/31.  On 6/1, as pt remained hemodynamically stable and his hemoglobin remained stable, pt stepped down to Hospital Medicine.     The evening of 6/2 into 6/3 the patient had several melanotic stools with slight downtrend in Hgb (10 > 8), however hemodynamics remained stable. Repeat CTA at that time did not show any evidence of active bleeding, however duodenal clip was noted to have migrated into distal small bowel. GI with plans to repeat EGD on 6/4.      Around 1am on 6/4, the patient became hypotensive (70s/40s). Bedside POC showed Hct 17. Decision made to move patient back to ICU for further management.     Procedure Date: EGD 6/4, extubated (6/5)  EGD showed:  A medium amount of food (residue) in the stomach.                        - One non-obstructing oozing duodenal ulcer with a                         visible vessel. There is no evidence of                         perforation. Injected. Treatment not successful.                         Treated with bipolar cautery.                        - If bleeding worsens refer to interventional                         radiology for embolization as this has been treated                         endoscopically twice. Clip placed for marking.    Hospital Course (updated, brief assessment by system or problem, significant events): Patient was admitted to the ICU & intubated. EGD was performed showing one non-obstructing oozing duodenal ulcer with a visible vessel treated w/ injection and bipolar cautery. He was extubated. He continued to require levo until evening of 6/7. He  underwent HD trial today w/ midodrine given prior to. He also received 1 unit pRBC during dialysis. He was restarted on ASA given his CAD.     Tasks (specific, using if-then statements):   Monitor CBCs  Monitor for bleeding  Restart ASA  PT/OT  Start BB when appropriate (if BP able to tolerate)  Start statin if possible (was d/c as patient refused this med)  F/u nephrology recs  Monitor HR (should improve once BB is started)    Contingency Plan (special circumstances anticipated and plan): If patient acutely decompensates please consult the ICU. If patient rebleeds per GI consult IR. He would likely need a CTA abd/pelvis to localize the bleeding as well.

## 2019-06-09 NOTE — NURSING TRANSFER
Nursing Transfer Note      6/8/2019     Transfer To: 1108    Transfer via bed    Transfer with O2, cardiac monitoring    Transported by RN and PCT    Medicines sent: N/A    Chart send with patient: Yes    Notified: spouse        Upon arrival to floor: cardiac monitor applied, patient oriented to room, call bell in reach and bed in lowest position

## 2019-06-09 NOTE — ASSESSMENT & PLAN NOTE
Secondary to oozing duodenal ulcer and underwent clipping and epi injection at outise facility. Transfused about 3-4 units with stabilization of H&H. Transferred to [facility] for GI and general surgery eval. Stepped down to medicine on 6/1.  Started to drop his HCT and became hypotensive with melena on 6/4 and taken back to ICU    The evening of 6/2 into 6/3 the patient had several melanotic stools with slight downtrend in Hgb (10 > 8), however hemodynamics remained stable. Repeat CTA at that time did not show any evidence of active bleeding, however duodenal clip was noted to have migrated into distal small bowel. GI with plans to repeat EGD on 6/4.   Underwent EGD on 6/4 which showed:      One non-obstructing oozing duodenal ulcer with a visible vessel. There is no evidence of perforation. Injected. Treatment not successful. Treated with bipolar cautery.    Plan now as follows per ICU on stepdown:  - If bleeding worsens refer to interventional radiology for embolization as this has been treated endoscopically twice. Clip placed for marking.     Transfused 1 unit PRBC on HD on 6/8  ASA resumed for CAD  Continue PPI BID   Metoprolol 25 mg BID resumed   Continue q8h H&H  Hemodynamically stable otherwise

## 2019-06-09 NOTE — NURSING
Princess care given no changes noted all safety measures effective. Pt made aware of all new medications added this shift

## 2019-06-09 NOTE — ASSESSMENT & PLAN NOTE
Antihypertensives held due to shock initially   BP stable   Metoprolol resumed due to tachycardia which may be due to lack of beta blockage but also due to recent GIB   Continue to monitor

## 2019-06-09 NOTE — PLAN OF CARE
Problem: Occupational Therapy Goal  Goal: Occupational Therapy Goal  Goals to be met by: 6/19     Patient will increase functional independence with ADLs by performing:    Bed mobility and supine to sit with HOB flat and CGA.  UE Dressing while seated EOB with Set-up Assistance and Stand-by Assistance.  LE Dressing (brief) with Minimal Assistance.  Grooming while standing at sink with Minimal Assistance.  Toileting from toilet with Minimal Assistance for hygiene and clothing management.   Toilet transfer to toilet with Minimal Assistance.  Functional mobility at short household distance for ADL task with RW and min(A).     Outcome: Ongoing (interventions implemented as appropriate)  Re-evaluated pt with POC updated. Continue OT POC as able.   Maryse Mcdonald, OT  6/9/2019

## 2019-06-09 NOTE — PT/OT/SLP RE-EVAL
Physical Therapy Re-evaluation    Patient Name:  Frank Zayas   MRN:  [id number]    Recommendations:     Discharge Recommendations:  nursing facility, skilled   Discharge Equipment Recommendations: none   Barriers to discharge: Decreased caregiver support at current functional level     Assessment:     Frank Zayas is a 79 y.o. male admitted with a medical diagnosis of GIB (gastrointestinal bleeding).  He presents with the following impairments/functional limitations:  impaired endurance, impaired cardiopulmonary response to activity, impaired functional mobilty, gait instability, edema.    Rehab Prognosis:  good; patient would benefit from acute skilled PT services to address these deficits and reach maximum level of function.      Recent Surgery: Procedure(s) (LRB):  EGD (ESOPHAGOGASTRODUODENOSCOPY) (N/A) 5 Days Post-Op    Plan:     During this hospitalization, patient to be seen 3 x/week to address the above listed problems via gait training, therapeutic activities, therapeutic exercises, neuromuscular re-education  · Plan of Care Expires:  07/06/19   Plan of Care Reviewed with: patient    Subjective     Communicated with RN prior to session.  Patient found HOB elevated with telemetry, central line, peripheral IV upon PT entry to room, agreeable to evaluation.      Chief Complaint: debility   Patient comments/goals: to get better and return home   Pain/Comfort:  · Pain Rating 1: 0/10  · Pain Rating Post-Intervention 1: 0/10    Patients cultural, spiritual, Gnosticist conflicts given the current situation: no      Objective:     Patient found with: telemetry, central line, peripheral IV     General Precautions: Standard, fall   Orthopedic Precautions:N/A   Braces: N/A     Exams:  · Cognitive Exam:   · AAOx4  · Follows multistep commands  · Communication clear/fluent   · RLE ROM: WFL  · RLE Strength: WFL  · LLE ROM: WFL  · LLE Strength: WFL    Functional Mobility:  · Bed Mobility:     · Scooting: contact  guard assistance  · Supine to Sit: minimum assistance  · Sit to Supine: minimum assistance  · Transfers:     · Sit to Stand:  minimum assistance with rolling walker  · Gait: 4ft forward/backward using RW with CGA from EOB     AM-PAC 6 CLICK MOBILITY  Total Score:16       Therapeutic Activities and Exercises:  Educated pt on PT role/POC  Educated pt on importance of OOB activity   Pt verbalized understanding     Patient left HOB elevated with all lines intact, call button in reach and RN notified.    GOALS:   Multidisciplinary Problems     Physical Therapy Goals        Problem: Physical Therapy Goal    Goal Priority Disciplines Outcome Goal Variances Interventions   Physical Therapy Goal     PT, PT/OT Ongoing (interventions implemented as appropriate)     Description:  Goals to be met by: 2019     Patient will increase functional independence with mobility by performin. Supine to sit with CGA - not met  2. Sit to supine with CGA - not met  3. Sit to stand transfer with Contact Guard Assistance - not met  4. Gait  x 50 feet with Supervision using Rolling Walker. - not met  5. Ascend/Descend 4 inch curb step with Supervision using Rolling Walker. - not met                       History:     Past Medical History:   Diagnosis Date    Anticoagulant long-term use     Arthritis     Bladder cancer     Blood transfusion     CHF (congestive heart failure)     Chronic kidney disease     COPD (chronic obstructive pulmonary disease)     Coronary artery disease     Diabetes mellitus     Gout     [place] (hard of hearing)     HAS BILAT AIDS BUT DOES NOT WEAR    Hyperlipidemia     Hypertension     Myocardial infarction     RLS (restless legs syndrome)     Sleep apnea     NO MACHINE    Wears glasses        Past Surgical History:   Procedure Laterality Date    CARDIAC SURGERY      CABG X 2     CATARACT EXTRACTION, BILATERAL      CHOLECYSTECTOMY      COLONOSCOPY      CORONARY ARTERY BYPASS GRAFT      x  2    coronary stents      CYSTOSCOPY      CYSTOSCOPY N/A 4/8/2019    Performed by [facility]quez [facility] at [facility] OR    EGD (ESOPHAGOGASTRODUODENOSCOPY) N/A 6/4/2019    Per[facility] Clint [facility]z [facility] at [facility] (2ND FLR)    EXCISION-BLADDER TUMOR-TRANSURETHRAL (TURBT) N/A 9/8/2014    Performed by [facility]lee [facility]quez MD at [facility] OR    EXCISION-BLADDER TUMOR-TRANSURETHRAL (TURBT) N/A 11/18/2013    Performed by [facility]lee [facility]quez [facility] at [facility] OR    EYE SURGERY Bilateral     CATARACT    Insertion,catheter,tunneled N/A 5/16/2019    Performed by Wade Rodriguez MD at [facility] OR    TURBT (TRANSURETHRAL RESECTION OF BLADDER TUMOR) N/A 4/8/2019    Performed by [facility]lee [facility]quez MD at [facility] OR    VASECTOMY         Time Tracking:     PT Received On: 06/09/19  PT Start Time: 1035     PT Stop Time: 1051  PT Total Time (min): 16 min     Billable Minutes: Evaluation 16    Joanna Boudreaux, PT, DPT  6/9/2019  [phone]

## 2019-06-09 NOTE — PLAN OF CARE
Problem: Adult Inpatient Plan of Care  Goal: Plan of Care Review  Outcome: Ongoing (interventions implemented as appropriate)  Patient remained in stable condition through shift. Remained free of falls and other injuries. Denies any pain or discomfort. Blood sugars checked per orders, no coverage needed. Bed in locked and lowest position, call light in reach, all questions answered, declines any further needs at this time. Will continue to monitor.

## 2019-06-09 NOTE — NURSING
No changes noted at this time will cont to monitor, safety measures effective, pericare provided with mild exertion noted upon  turning to eft side. Recovered well

## 2019-06-09 NOTE — PROGRESS NOTES
2-hour bedside HD started per Nephrology orders, via rt SC CVC with lines taped securely. Machine settings per orders, pt stable, resting in bed, watching TV, NAD noted. Report received from Primary RN with pre-treatment assessment complete. Will monitor pt closely while on HD. See HD flowsheet for treatment details.

## 2019-06-09 NOTE — PROGRESS NOTES
2-hour treatment complete per Nephrology orders, blood returned. Pt received 1.5 hours treatment earlier this morning with treatment interrupted due clotted CVC. Cathflow 2mg aspirated with ease pre-treatment.     1-liter of fluid removed, pt stable, tolerated treatment well, NAD noted. Rt SC CVC continue to aspirate/flush with ease post TPA dwell with lines flushed with NS, capped, & taped securely. Report given to Primary RN. See HD flowsheet for treatment details.

## 2019-06-09 NOTE — PROGRESS NOTES
[facility] Hospital Medicine  Progress Note    Patient Name: Frank Zayas  MRN: [id number]  Patient Class: IP- Inpatient   Admission Date: 5/31/2019  Length of Stay: 9 days  Attending Physician: Sofie Driver MD  Primary Care Provider: Mikhail Shields MD    [facility] Medicine Team: [facility] HOSP MED  Sofie Driver MD    Subjective:     Principal Problem:GIB (gastrointestinal bleeding)    HPI:  No notes on file    Hospital Course:  No notes on file    Interval History: Stepped back down to medicine after sent back to the ICU in the early am hrs of 6/4 due to hypotension, melena, and drop in HCT. EGD on 6/5 showed an oozing ulcer treated with bioplar cautery. Received HD with 1 unit PRBC transfusion. Given midodrine with HD. HRs remain elevated due to being off BB and also given bleeding. ASA restarted. Feels tired but otherwise no complaints.      Review of Systems   Constitutional: Positive for fatigue. Negative for chills and fever.   Respiratory: Negative for cough and shortness of breath.    Cardiovascular: Negative for chest pain and palpitations.   Gastrointestinal: Negative for abdominal pain, blood in stool, diarrhea, nausea and vomiting.   Musculoskeletal: Negative.    Skin: Negative for rash.   Neurological: Negative for light-headedness and headaches.   Psychiatric/Behavioral: Negative for hallucinations. The patient is not nervous/anxious.      Objective:     Vital Signs (Most Recent):  Temp: 97.8 °F (36.6 °C) (06/09/19 0748)  Pulse: (!) 121 (06/09/19 0748)  Resp: (!) 23 (06/09/19 0748)  BP: 110/73 (06/09/19 0748)  SpO2: 98 % (06/09/19 0748) Vital Signs (24h Range):  Temp:  [97.6 °F (36.4 °C)-98.3 °F (36.8 °C)] 97.8 °F (36.6 °C)  Pulse:  [104-126] 121  Resp:  [20-27] 23  SpO2:  [93 %-100 %] 98 %  BP: (106-131)/(55-84) 110/73  Arterial Line BP: (102-140)/(52-79) 125/72     Weight: 131.7 kg (290 lb 5.5 oz)  Body mass index is 37.28 kg/m².    Intake/Output Summary (Last 24 hours) at  6/9/2019 1033  Last data filed at 6/9/2019 0300  Gross per 24 hour   Intake 1268 ml   Output 2611 ml   Net -1343 ml      Physical Exam   Constitutional: He is oriented to person, place, and time. He appears well-developed and well-nourished. No distress.   Eyes: No scleral icterus.   Cardiovascular:   Tachycardic    Pulmonary/Chest: Effort normal and breath sounds normal.   Abdominal: Soft. Bowel sounds are normal. There is no tenderness.   Musculoskeletal: He exhibits edema (trace).   Neurological: He is alert and oriented to person, place, and time.   Skin: No erythema.   Psychiatric: He has a normal mood and affect.   Vitals reviewed.      Significant Labs: All pertinent labs within the past 24 hours have been reviewed.    Significant Imaging: I have reviewed all pertinent imaging results/findings within the past 24 hours.    Assessment/Plan:      * GIB (gastrointestinal bleeding)  Secondary to oozing duodenal ulcer and underwent clipping and epi injection at [facility] facility. Transfused about 3-4 units with stabilization of H&H. [place] [facility] [place]. Stepped down to medicine on 6/1.  Started to drop his HCT and became hypotensive with melena on 6/4 and taken back to ICU    The evening of 6/2 into 6/3 the patient had several melanotic stools with slight downtrend in Hgb (10 > 8), however hemodynamics remained stable. Repeat CTA at that time did not show any evidence of active bleeding, however duodenal clip was noted to have migrated into distal small bowel. GI with plans to repeat EGD on 6/4.   Underwent EGD on 6/4 which showed:      One non-obstructing oozing duodenal ulcer with a visible vessel. There is no evidence of perforation. Injected. Treatment not successful. Treated with bipolar cautery.    Plan now as follows per ICU on stepdown:  - If bleeding worsens refer to interventional radiology for embolization as this has been treated endoscopically twice. Clip placed for  marking.     Transfused 1 unit PRBC on HD on 6/8  ASA resumed for CAD  Continue PPI BID   Metoprolol 25 mg BID resumed   Continue q8h H&H  Hemodynamically stable otherwise        Multiple skin tears  Per wound care       Dermatitis associated with moisture  Per wound care       Restless leg syndrome  Continue ropinirole       Cardiac arrest  Secondary to hemorrhagic shock from GIB  Hemodynamically stable        ESRD (end stage renal disease)  Nephrology following   Usual schedule is TTS  Received HD with midodrine on Saturday 6/8        Debility  PTOT recommending SNF       A-fib  Resume metoprolol  ASA resumed      Coronary artery disease  ASA resumed by ICU   Metoprolol resumed      DM type 2 with diabetic mixed hyperlipidemia  Controlled on low dose correction scale   Cont blood glucose monitoring   BG goal:  Preprandial blood glucose target <140 mg/dL  Random glucoses <180 mg/dL  Avoid hypoglycemia -  Reduce antihyperglycemic therapy when caloric intake is reduced. Avoid insulin stacking as a result of repeated injection of prandial insulin at close intervals.   Avoid severe hyperglycemia  Ensure adequate nutrition   ADA diet        HTN (hypertension)  Antihypertensives held due to shock initially   BP stable   Metoprolol resumed due to tachycardia which may be due to lack of beta blockage but also due to recent GIB   Continue to monitor         Heart failure  Maintain euvolemia by monitoring I/O's and daily weights.  Fluid restriction (2 liters/24 hours)  Low Na diet  Monitor for signs of fluid overload: RR>30, O2 sat<92%, weight gain of >3 lbs, or urinary output <160ml/8hr  Maintain oxygen sats >92% via NC if supplemental oxygen needed.   Volume removal via HD     Patient Vitals for the past 72 hrs (Last 3 readings):   Weight   06/09/19 0300 131.7 kg (290 lb 5.5 oz)   06/07/19 0300 129.2 kg (284 lb 13.4 oz)           VTE Risk Mitigation (From admission, onward)        Ordered     Place sequential compression  device  Until discontinued      05/31/19 0650     Reason for No Pharmacological VTE Prophylaxis  Once      05/31/19 0650     IP VTE HIGH RISK PATIENT  Once      05/31/19 0650              Sofie Driver MD  Department of Hospital Medicine   [facility]

## 2019-06-09 NOTE — PLAN OF CARE
Problem: Fall Injury Risk  Goal: Absence of Fall and Fall-Related Injury  Outcome: Ongoing (interventions implemented as appropriate)  Intervention: Identify and Manage Contributors to Fall Injury Risk     06/09/19 1828   Identify and Manage Contributors to Fall Injury Risk   Self-Care Promotion BADL personal objects within reach;meal setup provided      06/09/19 1828   Identify and Manage Contributors to Fall Injury Risk   Self-Care Promotion BADL personal objects within reach;meal setup provided

## 2019-06-10 PROBLEM — Z99.2 ESRD (END STAGE RENAL DISEASE) ON DIALYSIS: Status: ACTIVE | Noted: 2019-01-01

## 2019-06-10 NOTE — PROGRESS NOTES
BP 94/62, HR 86. KASSIE Patel, notified. Instructed to hold metoprolol for now. Will continue to monitor.

## 2019-06-10 NOTE — PHYSICIAN QUERY
"PT Name: Frank Zayas  MR #: [id number]    Physician Query Form - Consultant Diagnosis Clarification     Tory Shen RN, CCDS  Desk # [phone]; [facility] # [phone] [email or web address]    This form is a permanent document in the medical record.     Query Date: Jaja 10, 2019    By submitting this query, we are merely seeking further clarification of documentation.  Please utilize your independent clinical judgment when addressing the question(s) below.    The Medical record contains the following:   Diagnosis Supporting Clinical Information Location in Medical Record   Stage 2 pressure injury L Buttock  POA = yes    -- per wound care pn 6/3 Pressure Injury 05/31/19 0700 Left Buttocks #1 Stage 2   Date First Assessed/Time First Assessed: 05/31/19 0700   Pressure Injury Present on Admission: yes  Side: Left  Location: Buttocks  Wound/PI Number (optional): #1  Is this injury device related?: No  Staging: Stage 2        Wound Care PN 6/3     Do you agree with the Consultants diagnosis of "Stage 2 pressure injury L Buttock POA = yes"?    [  ] Yes   [  ] Yes, but it resolved prior to my assessment of the patient   [  ] No   [  ] Other/Clarification of findings:   [ x ] Clinically undetermined                  "

## 2019-06-10 NOTE — PLAN OF CARE
Problem: Adult Inpatient Plan of Care  Goal: Plan of Care Review  Recommendations     Recommendation/Intervention:   1. Recommend Boost Glucose Strawberry BID to promote wound healing, monitor renal labs. If K or P is high discontinue.  2. Recommend Glenn TID.      Goals: PO intake > 75% EEN, EPN.   Nutrition Goal Status: new  Communication of RD Recs: reviewed with RN    Assessment and Plan     Nutrition Problem  Inadequate oral intake     Related to (etiology):   Decreased appetite      Signs and Symptoms (as evidenced by):   Meal intake 25 - 50%     Interventions/Recommendations (treatment strategy):  Collaboration of care.     Nutrition Diagnosis Status:   New

## 2019-06-10 NOTE — PROGRESS NOTES
"[facility]  Adult Nutrition  Progress Note    SUMMARY       Recommendations    Recommendation/Intervention:   1. Recommend Boost Glucose Strawberry BID to promote wound healing, monitor renal labs. If K or P is high discontinue.  2. Recommend Glenn TID.     Goals: PO intake > 75% EEN, EPN.   Nutrition Goal Status: new  Communication of RD Recs: reviewed with RN    Reason for Assessment    Reason For Assessment: length of stay  Diagnosis: other (see comments)(GIB)  Relevant Medical History: ESRD, CAD, DM, HTN, HF  Interdisciplinary Rounds: did not attend  General Information Comments: Pt intake 25 - 50% of meals.  Wife states they follow a low sugar, low sodium diet at home, pt takes BS once per day and follows a home regimen of insulin. Pt does not like vanilla flavored supplements, requests strawberry.  Recommended to pt, if he is unable to eat meal than supplement would be a good way to make sure he is getting enough protein for wound healing. The first time I went to see the pt he was in dialysis.  Pt appears nourished, but unable to discern with edema in some areas. Will monitor.  Nutrition Discharge Planning: Adequate PO intake    Nutrition Risk Screen    Nutrition Risk Screen: no indicators present    Nutrition/Diet History    Patient Reported Diet/Restrictions/Preferences: diabetic diet, heart healthy  Spiritual, Cultural Beliefs, Holiness Practices, Values that Affect Care: no    Anthropometrics    Temp: 97.9 °F (36.6 °C)  Height: 6' 2" (188 cm)  Height (inches): 74 in  Weight Method: Bed Scale  Weight: 129.7 kg (285 lb 15 oz)  Weight (lb): 285.94 lb  Ideal Body Weight (IBW), Male: 190 lb  % Ideal Body Weight, Male (lb): 150.49 lb  BMI (Calculated): 36.8  BMI Grade: 35 - 39.9 - obesity - grade II     Lab/Procedures/Meds    Pertinent Labs Reviewed: reviewed  Pertinent Labs Comments: Cre 4.2, Glu 127, Ca 8.4, P 5.4, Alk Ac 136   Pertinent Medications Reviewed: reviewed  Pertinent " Medications Comments: metoprolol, pantoprazole, insulin    Estimated/Assessed Needs    Weight Used For Calorie Calculations: 129.7 kg (285 lb 15 oz)  Energy Calorie Requirements (kcal): 2,500 kcal/d  Energy Need Method: [place]-St Jeor(PAL 1.2)  Protein Requirements: 129 g/kg  Weight Used For Protein Calculations: 129.7 kg (285 lb 15 oz)  Fluid Requirements (mL): (Per MD or 1 mL/kcal)     RDA Method (mL): 2  CHO requirement: 313g CHO    Nutrition Prescription Ordered    Current Diet Order: Diabeteic 2000 shana, Renal  Nutrition Order Comments: 2000 mL FR     Evaluation of Received Nutrient/Fluid Intake    Comments: LBM: 6/10  Tolerance: tolerating  % Intake of Estimated Energy Needs: 25 - 50 %   % Meal Intake: 25 - 50 %    Nutrition Risk    Level of Risk/Frequency of Follow-up: low     Assessment and Plan    Nutrition Problem  Inadequate oral intake     Related to (etiology):   Decreased appetite      Signs and Symptoms (as evidenced by):   Meal intake 25 - 50%     Interventions/Recommendations (treatment strategy):  Collaboration of care.     Nutrition Diagnosis Status:   New      Monitor and Evaluation    Food and Nutrient Intake: energy intake, food and beverage intake  Food and Nutrient Adminstration: diet order  Physical Activity and Function: nutrition-related ADLs and IADLs  Anthropometric Measurements: height/length, weight, weight change  Biochemical Data, Medical Tests and Procedures: electrolyte and renal panel, glucose/endocrine profile, gastrointestinal profile, lipid profile, inflammatory profile  Nutrition-Focused Physical Findings: overall appearance     Nutrition Follow-Up    RD Follow-up?: Yes    I certify that I directed the dietetic intern in service delivery and guided them using my skilled judgment. As the cosigning dietitian, I have reviewed the dietetic interns documentation and am responsible for the treatment, assessment, and plan.    Halie Martinez, Dietetic Intern

## 2019-06-10 NOTE — ASSESSMENT & PLAN NOTE
Maintain euvolemia by monitoring I/O's and daily weights.  Fluid restriction (2 liters/24 hours)  Low Na diet  Monitor for signs of fluid overload: RR>30, O2 sat<92%, weight gain of >3 lbs, or urinary output <160ml/8hr  Maintain oxygen sats >92% via NC if supplemental oxygen needed.   Volume removal via HD   Metoprolol and ASA resumed    Patient Vitals for the past 72 hrs (Last 3 readings):   Weight   06/10/19 0535 129.7 kg (285 lb 15 oz)   06/09/19 0300 131.7 kg (290 lb 5.5 oz)

## 2019-06-10 NOTE — ASSESSMENT & PLAN NOTE
Antihypertensives held due to shock initially   BP stable   Metoprolol resumed due to tachycardia which may be due to lack of beta blockage but also due to recent GIB; tachycardia improving   Continue to monitor

## 2019-06-10 NOTE — ASSESSMENT & PLAN NOTE
Secondary to oozing duodenal ulcer and underwent clipping and epi injection at [facility] facility. Transfused about 3-4 units with stabilization of H&H. Transferred to [facility] for GI and general surgery eval. Stepped down to medicine on 6/1.  Started to drop his HCT and became hypotensive with melena on 6/4 and taken back to ICU    The evening of 6/2 into 6/3 the patient had several melanotic stools with slight downtrend in Hgb (10 > 8), however hemodynamics remained stable. Repeat CTA at that time did not show any evidence of active bleeding, however duodenal clip was noted to have migrated into distal small bowel. GI with plans to repeat EGD on 6/4.   Underwent EGD on 6/4 which showed:      One non-obstructing oozing duodenal ulcer with a visible vessel. There is no evidence of perforation. Injected. Treatment not successful. Treated with bipolar cautery.    Plan now as follows per ICU on stepdown:  - If bleeding worsens refer to interventional radiology for embolization as this has been treated endoscopically twice. Clip placed for marking.     Transfused 1 unit PRBC on HD on 6/8  ASA resumed for CAD  Continue PPI BID   Metoprolol 25 mg BID resumed and HRs improved  Continue H&H monitoring q12; stable thus far  Hemodynamically stable

## 2019-06-10 NOTE — PROGRESS NOTES
HD initiated , lines reversed for good blood flow/ blood  Pulled from blue port.   / / uf gial set for net 2 liters as tolerated/ received patient alert , vital stable.

## 2019-06-10 NOTE — PROGRESS NOTES
[facility] HEMODIALYSIS NOTE     Patient currently on hemodialysis for removal of uremic toxins and volume.     Patient seen and evaluated on hemodialysis, tolerating treatment, see HD flowsheet for vitals and assessments.      Ultrafiltration goal is 2L as tolerated, keep map >65     Labs have been reviewed and the dialysate bath has been adjusted.     Assessment/Plan:    -Patient seen on HD, tolerating treatment well, w/o complaints   -Renal diet  -Strict I/O's and daily weights  -Daily renal function panels  -Hbg 7.5, will start Epo with HD  -Will obtain outpatient HD records   -Phos 5.4, will start renvela with meals  -Will continue to follow while inpatient       INES England, AGNP-C  Nephrology  Pager:  [phone]

## 2019-06-10 NOTE — SUBJECTIVE & OBJECTIVE
Interval History: No acute events. No melena. H&H stable.      Review of Systems   Constitutional: Negative for chills, fatigue and fever.   Respiratory: Negative for cough and shortness of breath.    Cardiovascular: Negative for chest pain and palpitations.   Gastrointestinal: Negative for abdominal pain, blood in stool, diarrhea, nausea and vomiting.   Musculoskeletal: Negative.    Skin: Negative for rash.   Neurological: Negative for light-headedness and headaches.   Psychiatric/Behavioral: Negative for hallucinations. The patient is not nervous/anxious.      Objective:     Vital Signs (Most Recent):  Temp: 98.2 °F (36.8 °C) (06/10/19 0830)  Pulse: (!) 113 (06/10/19 1215)  Resp: 19 (06/10/19 0324)  BP: 123/69 (06/10/19 1215)  SpO2: 100 % (06/10/19 0324) Vital Signs (24h Range):  Temp:  [98.2 °F (36.8 °C)] 98.2 °F (36.8 °C)  Pulse:  [] 113  Resp:  [0-22] 19  SpO2:  [95 %-100 %] 100 %  BP: ()/(47-75) 123/69     Weight: 129.7 kg (285 lb 15 oz)  Body mass index is 36.71 kg/m².    Intake/Output Summary (Last 24 hours) at 6/10/2019 1243  Last data filed at 6/10/2019 1225  Gross per 24 hour   Intake 525 ml   Output 2100 ml   Net -1575 ml      Physical Exam   Constitutional: He is oriented to person, place, and time. He appears well-developed and well-nourished. No distress.   Seen on HD. Tolerating well.    Eyes: No scleral icterus.   Cardiovascular:   Tachycardia improving    Pulmonary/Chest: Effort normal and breath sounds normal.   Abdominal: Soft. Bowel sounds are normal. There is no tenderness.   Musculoskeletal: He exhibits edema (trace).   Neurological: He is alert and oriented to person, place, and time.   Skin: No erythema.   Psychiatric: He has a normal mood and affect.   Vitals reviewed.      Significant Labs: All pertinent labs within the past 24 hours have been reviewed.    Significant Imaging: I have reviewed all pertinent imaging results/findings within the past 24 hours.

## 2019-06-10 NOTE — PHYSICIAN QUERY
PT Name: Frank Zayas  MR #: [id number]     Physician Query Form - Documentation Clarification      Tory Shen RN, CCDS  Desk # [phone]; renan # [phone] [email or web address]  \    This form is a permanent document in the medical record.     Query Date: Jaja 10, 2019    By submitting this query, we are merely seeking further clarification of documentation. Please utilize your independent clinical judgment when addressing the question(s) below.    The Medical record reflects the following:    Supporting Clinical Findings Location in Medical Record     Wound Abrasion(s) lower Leg   No Date First Assessed or Time First Assessed found.   Primary Wound Type: Abrasion(s)  Side: Right  Orientation: lower  Location: Leg   Wound Image     Wound Care PN 6/3                                                                    Doctor, Please specify diagnosis or diagnoses associated with above clinical findings.    Provider Use Only    (  ) Abrasion R Lower Leg; Present on admit = yes    (  ) Abrasion R Lower Leg;  Present on admit = no    (  ) Abrasion R Lower Leg;  Undeterminable if present on admit    (  ) R Lower Leg - Other wound type - specify wound type & POA status:             Wound type: _________________            Present on admit: (  )yes;  (  )no;  (  )undeterminable    (  ) Other - specify: __________________________                                                                                                           [ x ]  Clinically Undetermined

## 2019-06-10 NOTE — PROGRESS NOTES
HD completed  net uf removed  1500ml. . Blood returned and catheter ports flushed  With normal saline and , heparin instilled into each port as indicated. Ports capped and secure with tape. Vital stable and patient alert and voice no complaints at this time..

## 2019-06-10 NOTE — PROGRESS NOTES
[facility] Hospital Medicine  Progress Note    Patient Name: Frank Zayas  MRN: [id number]  Patient Class: IP- Inpatient   Admission Date: 5/31/2019  Length of Stay: 10 days  Attending Physician: Sofie Driver MD  Primary Care Provider: Mikhail Shields MD    [facility] Medicine Team: [facility] G Sofie Driver MD    Subjective:     Principal Problem:GIB (gastrointestinal bleeding)    HPI:  No notes on file    Hospital Course:  No notes on file    Interval History: No acute events. No melena. H&H stable.      Review of Systems   Constitutional: Negative for chills, fatigue and fever.   Respiratory: Negative for cough and shortness of breath.    Cardiovascular: Negative for chest pain and palpitations.   Gastrointestinal: Negative for abdominal pain, blood in stool, diarrhea, nausea and vomiting.   Musculoskeletal: Negative.    Skin: Negative for rash.   Neurological: Negative for light-headedness and headaches.   Psychiatric/Behavioral: Negative for hallucinations. The patient is not nervous/anxious.      Objective:     Vital Signs (Most Recent):  Temp: 98.2 °F (36.8 °C) (06/10/19 0830)  Pulse: (!) 113 (06/10/19 1215)  Resp: 19 (06/10/19 0324)  BP: 123/69 (06/10/19 1215)  SpO2: 100 % (06/10/19 0324) Vital Signs (24h Range):  Temp:  [98.2 °F (36.8 °C)] 98.2 °F (36.8 °C)  Pulse:  [] 113  Resp:  [0-22] 19  SpO2:  [95 %-100 %] 100 %  BP: ()/(47-75) 123/69     Weight: 129.7 kg (285 lb 15 oz)  Body mass index is 36.71 kg/m².    Intake/Output Summary (Last 24 hours) at 6/10/2019 1243  Last data filed at 6/10/2019 1225  Gross per 24 hour   Intake 525 ml   Output 2100 ml   Net -1575 ml      Physical Exam   Constitutional: He is oriented to person, place, and time. He appears well-developed and well-nourished. No distress.   Seen on HD. Tolerating well.    Eyes: No scleral icterus.   Cardiovascular:   Tachycardia improving    Pulmonary/Chest: Effort normal and breath sounds normal.    Abdominal: Soft. Bowel sounds are normal. There is no tenderness.   Musculoskeletal: He exhibits edema (trace).   Neurological: He is alert and oriented to person, place, and time.   Skin: No erythema.   Psychiatric: He has a normal mood and affect.   Vitals reviewed.      Significant Labs: All pertinent labs within the past 24 hours have been reviewed.    Significant Imaging: I have reviewed all pertinent imaging results/findings within the past 24 hours.    Assessment/Plan:      * GIB (gastrointestinal bleeding)  Secondary to oozing duodenal ulcer and underwent clipping and epi injection at outise facility. Transfused about 3-4 units with stabilization of H&H. Transferred to [facility] for GI and general surgery eval. Stepped down to medicine on 6/1.  Started to drop his HCT and became hypotensive with melena on 6/4 and taken back to ICU    The evening of 6/2 into 6/3 the patient had several melanotic stools with slight downtrend in Hgb (10 > 8), however hemodynamics remained stable. Repeat CTA at that time did not show any evidence of active bleeding, however duodenal clip was noted to have migrated into distal small bowel. GI with plans to repeat EGD on 6/4.   Underwent EGD on 6/4 which showed:      One non-obstructing oozing duodenal ulcer with a visible vessel. There is no evidence of perforation. Injected. Treatment not successful. Treated with bipolar cautery.    Plan now as follows per ICU on stepdown:  - If bleeding worsens refer to interventional radiology for embolization as this has been treated endoscopically twice. Clip placed for marking.     Transfused 1 unit PRBC on HD on 6/8  ASA resumed for CAD  Continue PPI BID   Metoprolol 25 mg BID resumed and HRs improved  Continue H&H monitoring q12; stable thus far  Hemodynamically stable        Multiple skin tears  Per wound care       Dermatitis associated with moisture  Per wound care       Restless leg syndrome  Continue ropinirole       Cardiac  arrest  Secondary to hemorrhagic shock from GIB  Hemodynamically stable        ESRD (end stage renal disease) on dialysis  Nephrology following   Usual schedule is TTS  Received HD with midodrine on Saturday 6/8        Debility  PTOT recommending SNF       A-fib  Resumed metoprolol  ASA resumed      Coronary artery disease  ASA resumed by ICU   Metoprolol resumed      DM type 2 with diabetic mixed hyperlipidemia  Controlled on low dose correction scale   Cont blood glucose monitoring   BG goal:  Preprandial blood glucose target <140 mg/dL  Random glucoses <180 mg/dL  Avoid hypoglycemia -  Reduce antihyperglycemic therapy when caloric intake is reduced. Avoid insulin stacking as a result of repeated injection of prandial insulin at close intervals.   Avoid severe hyperglycemia  Ensure adequate nutrition   ADA diet        HTN (hypertension)  Antihypertensives held due to shock initially   BP stable   Metoprolol resumed due to tachycardia which may be due to lack of beta blockage but also due to recent GIB; tachycardia improving   Continue to monitor         Heart failure  Maintain euvolemia by monitoring I/O's and daily weights.  Fluid restriction (2 liters/24 hours)  Low Na diet  Monitor for signs of fluid overload: RR>30, O2 sat<92%, weight gain of >3 lbs, or urinary output <160ml/8hr  Maintain oxygen sats >92% via NC if supplemental oxygen needed.   Volume removal via HD   Metoprolol and ASA resumed    Patient Vitals for the past 72 hrs (Last 3 readings):   Weight   06/10/19 0535 129.7 kg (285 lb 15 oz)   06/09/19 0300 131.7 kg (290 lb 5.5 oz)           VTE Risk Mitigation (From admission, onward)        Ordered     Place sequential compression device  Until discontinued      05/31/19 0650     Reason for No Pharmacological VTE Prophylaxis  Once      05/31/19 0650     IP VTE HIGH RISK PATIENT  Once      05/31/19 0650              Sofie Driver MD  Department of Hospital Medicine   [facility]

## 2019-06-10 NOTE — PLAN OF CARE
Problem: Adult Inpatient Plan of Care  Goal: Plan of Care Review  Outcome: Ongoing (interventions implemented as appropriate)  Patient remained in stable condition through shift. Remained free of falls and other injuries. Denied any pain or discomfort. Blood sugars checked per orders, no coverage needed. Bed in locked and lowest position, call light in reach, all questions answered, declines any further needs at this time. Will continue to monitor.

## 2019-06-11 PROBLEM — D72.829 LEUKOCYTOSIS: Status: ACTIVE | Noted: 2019-01-01

## 2019-06-11 NOTE — PLAN OF CARE
CM met with patient to discuss discharge planning. PT/OT recommending SNF placement at this time. Mr. Zayas would like referrals sent to [facility], [facility], and [facility] since they are close to his home and he receives dialysis at [facility].      Joseline Dill RN  Ext [phone]

## 2019-06-11 NOTE — PLAN OF CARE
REFERRAL SENT TO      [facility] ? E-Referral  ?     [facility] ? E-Referral  ?     [facility] ? E-Referral  ?

## 2019-06-11 NOTE — PT/OT/SLP PROGRESS
"Occupational Therapy   Treatment    Name: Frank Zayas  MRN: [id number]  Admitting Diagnosis:  GIB (gastrointestinal bleeding)  7 Days Post-Op    Recommendations:     Discharge Recommendations: nursing facility, skilled(short stay )  Discharge Equipment Recommendations:  none  Barriers to discharge:  Decreased caregiver support    Assessment:     Frank Zayas is a 79 y.o. male with a medical diagnosis of GIB (gastrointestinal bleeding).  He presents with performance deficits affecting function are weakness, impaired endurance, gait instability, impaired functional mobilty, impaired self care skills, impaired cardiopulmonary response to activity, impaired balance. Pt would benefit from continued skilled acute OT services in order to maximize independence and safety with ADLs and functional mobility to ensure safe return to PLOF in the least restrictive environment.    Rehab Prognosis:  Good; patient would benefit from acute skilled OT services to address these deficits and reach maximum level of function.       Plan:     Patient to be seen 3 x/week to address the above listed problems via self-care/home management, therapeutic activities, therapeutic exercises  · Plan of Care Expires: 07/01/19  · Plan of Care Reviewed with: patient    Subjective     Pain/Comfort:  · Pain Rating 1: 0/10  · Pain Rating Post-Intervention 1: 0/10    Objective:     Communicated with: RN (Sasha) prior to session.  Patient found HOB elevated with telemetry upon OT entry to room. Pt agreeable to therapy session. Pt stated, " Can I go to the bathroom first." Therapy session interrupted by RN to take vitals and give medication.     General Precautions: Standard, fall   Orthopedic Precautions:N/A   Braces: N/A     Occupational Performance:     Bed Mobility:    · Patient completed Scooting/Bridging with moderate assistance for supine scooting  · Patient completed Supine to Sit with moderate assistance HOB elevated and verbal cues for hand " placement   · Patient completed Sit to Supine with minimum assistance for LEs     Functional Mobility/Transfers:  · Patient completed Sit <> Stand Transfer EOB (height of bed elevated) with minimum assistance  with  rolling walker  · Patient completed Toilet Transfer functional ambulation with step transfer technique technique with contact guard assistance to sit and min A to stand with  rolling walker  · BSC placed over toilet for elevated height and improved sit <>stand quality   · Functional Mobility: Pt engaging in functional mobility to simulate household distances approx ~16ft  with CGA using RW in order to maximize functional activity tolerance and standing balance required for engagement in occupations of choice.   · Pt with forward flexed posturing   · No LOB but slight gait instability noted   · Completed mobility from EOB <> toilet     Activities of Daily Living:  · Feeding:  independence    · Lower Body Dressing: total assistance pt unable to access B LE while seated EOB to pull up socks   · Toileting: total assistance for completion of hygiene in standing while pt held onto RW and max A for clothing management. Pt L handed and unable to access buttock for hygiene in standing     [facility] 6 Click ADL: 17    Treatment & Education:  Pt educated by therapist on:   - Pt educated on role of OT, POC, and goals for therapy.    - Therapy session interrupted by nsg and lab. Pt with increased frustrations with interruption during therapy session   - Educated pt on being appropriate to transfer with nsg and PCT with min A using RW  - RN preferred not to transfer pt to bedside chair even after OT explained pt's level of assistance with transfers and mobility to RN.   - Wound noted to buttock and scrotum - RN aware    - Importance of OOB ax's with staff member assistance and sitting OOB majority of day.   - Pt completed ADLs and functional mobility for treatment session as noted above   - Pt verbalized understanding. Pt  expressed no further concerns/questions.  - whiteboard updated     Patient left HOB elevated with all lines intact, call button in reach and RN notifiedEducation:      GOALS:   Multidisciplinary Problems     Occupational Therapy Goals        Problem: Occupational Therapy Goal    Goal Priority Disciplines Outcome Interventions   Occupational Therapy Goal     OT, PT/OT Ongoing (interventions implemented as appropriate)    Description:  Goals to be met by: 6/19     Patient will increase functional independence with ADLs by performing:    Bed mobility and supine to sit with HOB flat and CGA.  UE Dressing while seated EOB with Set-up Assistance and Stand-by Assistance.  LE Dressing (brief) with Minimal Assistance.  Grooming while standing at sink with Minimal Assistance.  Toileting from toilet with Minimal Assistance for hygiene and clothing management.   Toilet transfer to toilet with Minimal Assistance. - MET 6/11/19  Functional mobility at short household distance for ADL task with RW and min(A).                        Time Tracking:     OT Date of Treatment: 06/11/19  OT Start Time: 1326  OT Stop Time: 1352  OT Total Time (min): 26 min    Billable Minutes:Self Care/Home Management 23    Isabel Vaz OT  6/11/2019

## 2019-06-11 NOTE — PROGRESS NOTES
Hemodialysis Note  Pt seen and evaluated while undergoing dialysis. Tolerating dialysis with current UFR, without complications. Labs reviewed and dialysate bath adjusted. Pt here for add'l UF session.    OP HD careplan: Requested from [facility] on [address]  BFR: 300 via CVC  UF goal: 2-3L as tolerated  Anemia: CBC results pending; Hgb 8.2 yesterday. PMH: miles [place]  MBD: continue sevelamer carbonate  HTN: 100s/60s; will give midodrine 10 mg po x 1 now  Diet: renal with 800 mL FR/day

## 2019-06-11 NOTE — PT/OT/SLP PROGRESS
Physical Therapy  Pt Not Seen    Patient Name:  Frank Zayas   MRN:  [id number]    9:00 am  Patient not seen today secondary to pt  Unavailable (Comment)(Pt MAURO away at ). Will follow-up on next scheduled visit.    Malini Aparicio, PTA  6/11/2019

## 2019-06-11 NOTE — PLAN OF CARE
Problem: Adult Inpatient Plan of Care  Goal: Plan of Care Review  3.0 hour UF only off regular day tx completed w/ 3L removed bld returned and dialyzer cleared well. vss nad pt denies c/o. Cath flushed w/10ml ns to each port and instilled gentamycin 40mg to each port after. Ports capped and taped securely after. Report given and transported to room via bed.

## 2019-06-11 NOTE — ASSESSMENT & PLAN NOTE
ASA resumed by ICU   Metoprolol resumed  Patient was also on plavix; discussed with GI and at this time will continue to hold plavix and have the patient follow up with his cardiologist to determine if he needs to continue to be on plavix or it can be dc'd. Patient states he had cardiac stents placed over 10 years ago.

## 2019-06-11 NOTE — PLAN OF CARE
Problem: Infection (Hemodialysis)  Goal: Absence of Infection Signs/Symptoms    Intervention: Prevent or Manage Infection  Maintained aseptic tech while accessing dialysis catheter to prevent infection.

## 2019-06-11 NOTE — PROGRESS NOTES
[facility] Hospital Medicine  Progress Note    Patient Name: Frank Zayas  MRN: [id number]  Patient Class: IP- Inpatient   Admission Date: 5/31/2019  Length of Stay: 11 days  Attending Physician: Sofie Driver MD  Primary Care Provider: Mikhail Shields MD    [facility] Medicine Team: [facility] HOSP MED G Sofie Driver MD    Subjective:     Principal Problem:GIB (gastrointestinal bleeding)    HPI:  No notes on file    Hospital Course:  No notes on file    Interval History: No acute events. H&H remains stable but mild leukocytosis persists.      Review of Systems   Constitutional: Positive for fatigue. Negative for chills and fever.   Respiratory: Negative for cough and shortness of breath.    Cardiovascular: Negative for chest pain and palpitations.   Gastrointestinal: Negative for abdominal pain, blood in stool, diarrhea, nausea and vomiting.   Musculoskeletal: Negative.    Skin: Negative for rash.   Neurological: Negative for light-headedness and headaches.   Psychiatric/Behavioral: Negative for hallucinations. The patient is not nervous/anxious.      Objective:     Vital Signs (Most Recent):  Temp: 97.5 °F (36.4 °C) (06/11/19 1140)  Pulse: 81 (06/11/19 1140)  Resp: 20 (06/11/19 1140)  BP: (!) 108/56 (06/11/19 1140)  SpO2: 98 % (06/11/19 1140) Vital Signs (24h Range):  Temp:  [97.5 °F (36.4 °C)-98.4 °F (36.9 °C)] 97.5 °F (36.4 °C)  Pulse:  [78-95] 81  Resp:  [17-25] 20  SpO2:  [95 %-100 %] 98 %  BP: ()/(53-78) 108/56     Weight: 129.7 kg (285 lb 15 oz)  Body mass index is 36.71 kg/m².    Intake/Output Summary (Last 24 hours) at 6/11/2019 1336  Last data filed at 6/11/2019 1140  Gross per 24 hour   Intake 1700 ml   Output 3600 ml   Net -1900 ml      Physical Exam   Constitutional: He is oriented to person, place, and time. He appears well-developed and well-nourished. No distress.   Seen on HD. Tolerating well.    Eyes: No scleral icterus.   Cardiovascular: Normal rate.   Pulmonary/Chest:  Effort normal and breath sounds normal.   Abdominal: Soft. Bowel sounds are normal. There is no tenderness.   Musculoskeletal: He exhibits edema (trace).   Neurological: He is alert and oriented to person, place, and time.   Skin: No erythema.   Psychiatric: He has a normal mood and affect.   Vitals reviewed.      Significant Labs: All pertinent labs within the past 24 hours have been reviewed.    Significant Imaging: I have reviewed all pertinent imaging results/findings within the past 24 hours.    Assessment/Plan:      * GIB (gastrointestinal bleeding)  Secondary to oozing duodenal ulcer and underwent clipping and epi injection at [facility] facility. Transfused about 3-4 units with stabilization of H&H. Transferred to [facility] for GI and general surgery eval. Stepped down to medicine on 6/1.  Started to drop his HCT and became hypotensive with melena on 6/4 and taken back to ICU    The evening of 6/2 into 6/3 the patient had several melanotic stools with slight downtrend in Hgb (10 > 8), however hemodynamics remained stable. Repeat CTA at that time did not show any evidence of active bleeding, however duodenal clip was noted to have migrated into distal small bowel. GI with plans to repeat EGD on 6/4.   Underwent EGD on 6/4 which showed:      One non-obstructing oozing duodenal ulcer with a visible vessel. There is no evidence of perforation. Injected. Treatment not successful. Treated with bipolar cautery.    Plan now as follows per ICU on stepdown:  - If bleeding worsens refer to interventional radiology for embolization as this has been treated endoscopically twice. Clip placed for marking.     Transfused 1 unit PRBC on HD on 6/8  ASA resumed for CAD  Continue PPI BID   Metoprolol 25 mg BID resumed and HRs improved  Continue daily H&H monitoring; stable thus far  Hemodynamically stable        Multiple skin tears  Per wound care       Dermatitis associated with moisture  Per wound care       Restless leg  syndrome  Continue ropinirole       Cardiac arrest  Secondary to hemorrhagic shock from GIB  Hemodynamically stable        ESRD (end stage renal disease) on dialysis  Nephrology following   Usual schedule is TTS  Received HD with midodrine on Saturday 6/8        Debility  PTOT recommending SNF; referrals made      A-fib  Resumed metoprolol  ASA resumed  Patient previously was not on any anticoagulation       Coronary artery disease  ASA resumed by ICU   Metoprolol resumed  Patient was also on plavix; discussed with GI and at this time will continue to hold plavix and have the patient follow up with his cardiologist to determine if he needs to continue to be on plavix or it can be dc'd. Patient states he had cardiac stents placed over 10 years ago.       DM type 2 with diabetic mixed hyperlipidemia  Controlled on low dose correction scale   Cont blood glucose monitoring   BG goal:  Preprandial blood glucose target <140 mg/dL  Random glucoses <180 mg/dL  Avoid hypoglycemia -  Reduce antihyperglycemic therapy when caloric intake is reduced. Avoid insulin stacking as a result of repeated injection of prandial insulin at close intervals.   Avoid severe hyperglycemia  Ensure adequate nutrition   ADA diet        HTN (hypertension)  Antihypertensives held due to shock initially   BP stable   Metoprolol resumed due to tachycardia which may be due to lack of beta blockage but also due to recent GIB; tachycardia improving   Continue to monitor         Heart failure  Maintain euvolemia by monitoring I/O's and daily weights.  Fluid restriction (2 liters/24 hours)  Low Na diet  Monitor for signs of fluid overload: RR>30, O2 sat<92%, weight gain of >3 lbs, or urinary output <160ml/8hr  Maintain oxygen sats >92% via NC if supplemental oxygen needed.   Volume removal via HD   Metoprolol and ASA resumed    Patient Vitals for the past 72 hrs (Last 3 readings):   Weight   06/10/19 0535 129.7 kg (285 lb 15 oz)   06/09/19 0300 131.7 kg  (290 lb 5.5 oz)     Leukocytosis  Unclear source  Check cxr and blood cx  Pt on HD therefore will not send UA  Afebrile and clinically stable       VTE Risk Mitigation (From admission, onward)        Ordered     Place sequential compression device  Until discontinued      05/31/19 0650     Reason for No Pharmacological VTE Prophylaxis  Once      05/31/19 0650     IP VTE HIGH RISK PATIENT  Once      05/31/19 0650              Sofie Driver MD  Department of Hospital Medicine   [facility]

## 2019-06-11 NOTE — PROGRESS NOTES
Extra 3.o UF only tx regular day MWF initiated via rscpc w/ lines reversed d/t poor flows. bfr 300-350 achieved w/good flows noted. Lines taped securely and visible at all times.

## 2019-06-11 NOTE — ASSESSMENT & PLAN NOTE
Remains afebrile and clinically stable  Blood cultures negative thus far  Patient states he still makes urine therefore will send UA  CXR on 6/11 suggestive of right atelectasis / PNA  Will start levaquin x 5 doses

## 2019-06-11 NOTE — PLAN OF CARE
Problem: Adult Inpatient Plan of Care  Goal: Plan of Care Review  Outcome: Ongoing (interventions implemented as appropriate)  Patient remained in stable condition through shift. Remained free of falls and other injuries. Denied any pain or discomfort. Assisted in repositioning per orders. Blood sugars checked per orders, no coverage needed. Bed in locked and lowest position, call light in reach, allq uestions answered, declines any further needs at this time. Will continue to monitor.

## 2019-06-11 NOTE — PLAN OF CARE
Problem: Occupational Therapy Goal  Goal: Occupational Therapy Goal  Goals to be met by: 6/19     Patient will increase functional independence with ADLs by performing:    Bed mobility and supine to sit with HOB flat and CGA.  UE Dressing while seated EOB with Set-up Assistance and Stand-by Assistance.  LE Dressing (brief) with Minimal Assistance.  Grooming while standing at sink with Minimal Assistance.  Toileting from toilet with Minimal Assistance for hygiene and clothing management.   Toilet transfer to toilet with Minimal Assistance. - MET 6/11/19  Functional mobility at short household distance for ADL task with RW and min(A).      Outcome: Ongoing (interventions implemented as appropriate)  Pt is progressing well towards all goals   Continue POC     Isabel Vaz OTR/L  Pager: [phone]  6/11/2019

## 2019-06-11 NOTE — SUBJECTIVE & OBJECTIVE
Interval History: No acute events. H&H remains stable but mild leukocytosis persists.      Review of Systems   Constitutional: Positive for fatigue. Negative for chills and fever.   Respiratory: Negative for cough and shortness of breath.    Cardiovascular: Negative for chest pain and palpitations.   Gastrointestinal: Negative for abdominal pain, blood in stool, diarrhea, nausea and vomiting.   Musculoskeletal: Negative.    Skin: Negative for rash.   Neurological: Negative for light-headedness and headaches.   Psychiatric/Behavioral: Negative for hallucinations. The patient is not nervous/anxious.      Objective:     Vital Signs (Most Recent):  Temp: 97.5 °F (36.4 °C) (06/11/19 1140)  Pulse: 81 (06/11/19 1140)  Resp: 20 (06/11/19 1140)  BP: (!) 108/56 (06/11/19 1140)  SpO2: 98 % (06/11/19 1140) Vital Signs (24h Range):  Temp:  [97.5 °F (36.4 °C)-98.4 °F (36.9 °C)] 97.5 °F (36.4 °C)  Pulse:  [78-95] 81  Resp:  [17-25] 20  SpO2:  [95 %-100 %] 98 %  BP: ()/(53-78) 108/56     Weight: 129.7 kg (285 lb 15 oz)  Body mass index is 36.71 kg/m².    Intake/Output Summary (Last 24 hours) at 6/11/2019 1336  Last data filed at 6/11/2019 1140  Gross per 24 hour   Intake 1700 ml   Output 3600 ml   Net -1900 ml      Physical Exam   Constitutional: He is oriented to person, place, and time. He appears well-developed and well-nourished. No distress.   Seen on HD. Tolerating well.    Eyes: No scleral icterus.   Cardiovascular: Normal rate.   Pulmonary/Chest: Effort normal and breath sounds normal.   Abdominal: Soft. Bowel sounds are normal. There is no tenderness.   Musculoskeletal: He exhibits edema (trace).   Neurological: He is alert and oriented to person, place, and time.   Skin: No erythema.   Psychiatric: He has a normal mood and affect.   Vitals reviewed.      Significant Labs: All pertinent labs within the past 24 hours have been reviewed.    Significant Imaging: I have reviewed all pertinent imaging results/findings  within the past 24 hours.

## 2019-06-11 NOTE — ASSESSMENT & PLAN NOTE
Secondary to oozing duodenal ulcer and underwent clipping and epi injection at [facility] facility. Transfused about 3-4 units with stabilization of H&H. Transferred to [facility] for [place] and general surgery eval. Stepped down to medicine on 6/1.  Started to drop his HCT and became hypotensive with melena on 6/4 and taken back to ICU    The evening of 6/2 into 6/3 the patient had several melanotic stools with slight downtrend in Hgb (10 > 8), however hemodynamics remained stable. Repeat CTA at that time did not show any evidence of active bleeding, however duodenal clip was noted to have migrated into distal small bowel. GI with plans to repeat EGD on 6/4.   Underwent EGD on 6/4 which showed:      One non-obstructing oozing duodenal ulcer with a visible vessel. There is no evidence of perforation. Injected. Treatment not successful. Treated with bipolar cautery.    Plan now as follows per ICU on stepdown:  - If bleeding worsens refer to interventional radiology for embolization as this has been treated endoscopically twice. Clip placed for marking.     Transfused 1 unit PRBC on HD on 6/8  ASA resumed for CAD  Continue PPI BID   Metoprolol 25 mg BID resumed and HRs improved  Continue daily H&H monitoring; stable thus far  Hemodynamically stable

## 2019-06-11 NOTE — PLAN OF CARE
SW emailed [facility] to initiate outside SNF placement to [facility].    Liz Garcia LCSW  [id number]     06/11/19 1410   Post-Acute Status   Post-Acute Authorization Placement  (SNF)   Post-Acute Placement Status Referrals Sent

## 2019-06-11 NOTE — PHYSICIAN QUERY
"PT Name: Frank Zayas  MR #: [id number]    Physician Query Form - Nutrition Clarification     Tory Shen RN, CCDS  Desk # [phone]; renan # [phone] [email or web address]      This form is a permanent document in the medical record.     Query Date: June 11, 2019    By submitting this query, we are merely seeking further clarification of documentation.. Please utilize your independent clinical judgment when addressing the question(s) below.    The Medical record contains the following:   Indicators  Supporting Clinical Findings Location in Medical Record   x % of Estimated Energy Intake over a time frame from p.o., TF, or TPN Tolerance: tolerating  % Intake of Estimated Energy Needs: 25 - 50 %   % Meal Intake: 25 - 50 %     Weight Status over a time frame      Subcutaneous Fat and/or Muscle Loss     x Fluid Accumulation or Edema 2+ bilat pitting edema  H&P CC MD 5/31    Reduced  Strength     x Wt / BMI / Usual Body Weight Height: 6' 2" (188 cm)  Height (inches): 74 in  Weight Method: Bed Scale  Weight: 129.7 kg (285 lb 15 oz)  Weight (lb): 285.94 lb  Ideal Body Weight (IBW), Male: 190 lb  % Ideal Body Weight, Male (lb): 150.49 lb  BMI (Calculated): 36.8  BMI Grade: 35 - 39.9 - obesity - grade II    x Delayed Wound Healing / Failure to Thrive  Abrasion R Lower Leg  Stage 2 pressure injury L Buttock  Multiple skin tears  Dermatitis associated with moisture per wound care Wound Care PN 6/3    Hosp PN 6/10   x Acute or Chronic Illness RLS  Cardiac Arrest, secondary to hemorrhagic shock from GIB  ESRD on HD  Debility  AFIB  CAD  HTN  Heart Failure  HyperK  HLD   DMT 2  PEA, Cardiac accrest, intubated @ OSH  Acute hypercapnic resp failure   BRANDEN/OHS  Respiratory & metabolic acidosis  Demand Ischemia  Stress Cardiomyopathy, new onset  Hosp PN 6/10                CC MD PN 5/31        CC MD H&P 6/4      Cards PN 6/4  Cards PN 6/5    Medication     x Treatment Recommendation/Intervention:   1. Recommend Boost " Glucose Strawberry BID to promote wound healing, monitor renal labs. If K or P is high discontinue.  2. Recommend Glenn TID.    Nutrition Problem  Inadequate oral intake   Related to (etiology): Decreased appetite    Signs and Symptoms (as evidenced by): Meal intake 25 - 50% RD PN 6/10   x Other Pt appears nourished, but unable to discern with edema in some areas.  RD PN 6/10     AND / ASPEN Clinical Characteristics (October 2011)  A minimum of two characteristics is recommended for diagnosing either moderate or severe malnutrition   Mild Malnutrition Moderate Malnutrition Severe Malnutrition   Energy Intake from p.o., TF or TPN. < 75% intake of estimated energy needs for less than 7 days < 75% intake of estimated energy needs for greater than 7 days < 50% intake of estimated energy needs for > 5 days   Weight Loss 1-2% in 1 month  5% in 3 months  7.5% in 6 months  10% in 1 year 1-2 % in 1 week  5% in 1 month  7.5% in 3 months  10% in 6 months  20% in 1 year > 2% in 1 week  > 5% in 1 month  > 7.5% in 3 months  > 10% in 6 months  > 20% in 1 year   Physical Findings     None *Mild subcutaneous fat and/or muscle loss  *Mild fluid accumulation  *Stage II decubitus  *Surgical wound or non-healing wound *Mod/severe subcutaneous fat and/or muscle loss  *Mod/severe fluid accumulation  *Stage III or IV decubitus  *Non-healing surgical wound     Provider, please specify diagnosis or diagnoses associated with above clinical findings.    [x  ] Mild Protein-Calorie Malnutrition   [  ] Moderate Protein-Calorie Malnutrition   [  ] Other Nutritional Diagnosis (please specify): ___________   [  ] Other:  _____________________   [  ] Clinically Undetermined     Please document in your progress notes daily for the duration of treatment until resolved and include in your discharge summary.

## 2019-06-12 NOTE — PLAN OF CARE
Problem: Physical Therapy Goal  Goal: Physical Therapy Goal  Goals to be met by: 2019     Patient will increase functional independence with mobility by performin. Supine to sit with CGA - not met  2. Sit to supine with CGA - not met  3. Sit to stand transfer with Contact Guard Assistance - not met  4. Gait  x 50 feet with Supervision using Rolling Walker. - not met  5. Ascend/Descend 4 inch curb step with Supervision using Rolling Walker. - not met      Outcome: Ongoing (interventions implemented as appropriate)  Goals remain appropriate.

## 2019-06-12 NOTE — SUBJECTIVE & OBJECTIVE
Interval History: No acute events. H&H remains stable but mild leukocytosis persists.      Review of Systems   Constitutional: Negative for chills, fatigue and fever.   Respiratory: Negative for cough and shortness of breath.    Cardiovascular: Negative for chest pain and palpitations.   Gastrointestinal: Negative for abdominal pain, blood in stool, diarrhea, nausea and vomiting.   Musculoskeletal: Negative.    Skin: Negative for rash.   Neurological: Negative for light-headedness and headaches.   Psychiatric/Behavioral: Negative for hallucinations. The patient is not nervous/anxious.      Objective:     Vital Signs (Most Recent):  Temp: 98.4 °F (36.9 °C) (06/12/19 1230)  Pulse: 89 (06/12/19 1230)  Resp: 17 (06/12/19 1230)  BP: 124/68 (06/12/19 1230)  SpO2: 100 % (06/12/19 1230) Vital Signs (24h Range):  Temp:  [97.3 °F (36.3 °C)-98.4 °F (36.9 °C)] 98.4 °F (36.9 °C)  Pulse:  [71-94] 89  Resp:  [17-20] 17  SpO2:  [96 %-100 %] 100 %  BP: ()/(48-79) 124/68     Weight: 129.7 kg (285 lb 15 oz)  Body mass index is 36.71 kg/m².    Intake/Output Summary (Last 24 hours) at 6/12/2019 1403  Last data filed at 6/11/2019 1700  Gross per 24 hour   Intake 200 ml   Output --   Net 200 ml      Physical Exam   Constitutional: He is oriented to person, place, and time. He appears well-developed and well-nourished. No distress.   Seen on HD. Tolerating well.    Eyes: No scleral icterus.   Cardiovascular: Normal rate.   Pulmonary/Chest: Effort normal and breath sounds normal.   Abdominal: Soft. Bowel sounds are normal. There is no tenderness.   Musculoskeletal: He exhibits edema (trace).   Neurological: He is alert and oriented to person, place, and time.   Skin: No erythema.   Psychiatric: He has a normal mood and affect.   Vitals reviewed.      Significant Labs: All pertinent labs within the past 24 hours have been reviewed.    Significant Imaging: I have reviewed all pertinent imaging results/findings within the past 24  hours.

## 2019-06-12 NOTE — PLAN OF CARE
[facility]     Department of Hospital Medicine     [address] [place] [address]     [phone] after hours  [phone] fax       NURSING HOME ORDERS    06/17/2019    Admit to Nursing Home:  Skilled Bed                                                  Diagnoses:  Active Hospital Problems    Diagnosis  POA    *GIB (gastrointestinal bleeding) [K92.2]  Yes    Leukocytosis [D72.829]  No    Dermatitis associated with moisture [L30.8]  Yes    Multiple skin tears [T14.8XXA]  Yes    Restless leg syndrome [G25.81]  Yes    ESRD (end stage renal disease) on dialysis [N18.6, Z99.2]  Not Applicable    Cardiac arrest [I46.9]  Yes    A-fib [I48.91]  Yes    DM type 2 with diabetic mixed hyperlipidemia [E11.69, E78.2]  Yes    Coronary artery disease [I25.10]  Yes    HTN (hypertension) [I10]  Yes    Debility [R53.81]  Yes    Heart failure [I50.9]  Yes      Resolved Hospital Problems    Diagnosis Date Resolved POA    Aspiration pneumonia [J69.0] 06/02/2019 Yes    Acute gastrointestinal bleeding [K92.2] 06/02/2019 Yes    Elevated troponin I level [R74.8] 06/02/2019 Unknown       Patient is homebound due to:  GIB (gastrointestinal bleeding)    Allergies:  Review of patient's allergies indicates:   Allergen Reactions    Lidocaine Nausea Only     NOVACAINE --  Severe nausea    Statins-hmg-coa reductase inhibitors Anxiety       Vitals:      Every shift (Skilled Nursing patients)    Diet: Renal diabetic diet 2000 shana)   Supplement:  1 can every three times a day with meals                         Type:     Nepro         Acitivities: As tolerated      LABS:  Per facility protocol    Nursing Precautions: - Aspiration precautions:             - Total assistance with meals            -  Upright 90 degrees befor during and after meals             -  Suction at bedside          - Fall precautions per nursing home protocol   - Decubitus precautions:        -  for  positioning   - Pressure reducing foam mattress   - Turn patient every two hours. Use wedge pillows to anchor patient    CONSULTS:    Physical Therapy to evaluate and treat     Occupational Therapy to evaluate and treat          MISCELLANEOUS CARE:         Routine Skin for Bedridden Patients:  Apply moisture barrier cream to all skin folds and wet areas in perineal area daily and after baths and all bowel movements.                  DIABETES CARE:    Check blood sugar:      Fingerstick blood sugar AC and HS      Report CBG < 60 or > 400 to physician.                                          Insulin Sliding Scale          Glucose  Novolog Insulin Subcutaneous        0 - 60   Orange juice or glucose tablet, hold insulin      No insulin   201-250  2 units   251-300  4 units   301-350  6 units   351-400  8 units   >400   10 units then call physician      Medications: Discontinue all previous medication orders, if any. See new list below.     Yuan Zayas   Home Medication Instructions KARMNE:[id number]    Printed on:06/12/19 1412   Medication Information                      alfuzosin (UROXATRAL) 10 mg Tb24  Take 10 mg by mouth nightly.              aspirin (ECOTRIN) 81 MG EC tablet  Take 1 tablet (81 mg total) by mouth once daily.             coenzyme Q10 (CO Q-10) 100 mg capsule  Take 100 mg by mouth once daily.             ferrous sulfate 325 (65 FE) MG EC tablet  Take 1 tablet (325 mg total) by mouth 2 (two) times daily with meals.             FLUoxetine 20 MG capsule  every evening.              Fluconazole 100 mg by mouth daily x 10 more days              metoprolol tartrate (LOPRESSOR) 25 MG tablet  Take 25 mg by mouth 2 (two) times daily.             miconazole nitrate 2% (MICOTIN) 2 % Oint  Apply topically 2 (two) times daily.             nitroGLYCERIN (NITROSTAT) 0.4 MG SL tablet  Place 0.4 mg under the tongue every 5 (five) minutes as needed.              pantoprazole (PROTONIX) 40 MG tablet  Take 1  tablet (40 mg total) by mouth 2 (two) times daily before meals.             ropinirole (REQUIP) 2 MG tablet  Take 2 mg by mouth nightly.              sevelamer carbonate (RENVELA) 800 mg Tab  Take 1 tablet (800 mg total) by mouth 3 (three) times daily with meals.                       _________________________________  Sofie Driver MD  06/17/2019

## 2019-06-12 NOTE — PROGRESS NOTES
[facility] Hospital Medicine  Progress Note    Patient Name: Frank Zayas  MRN: [id number]  Patient Class: IP- Inpatient   Admission Date: 5/31/2019  Length of Stay: 12 days  Attending Physician: Sofie Driver MD  Primary Care Provider: Mikhail Shields MD    [facility] Medicine Team: [facility] MED  Sofie Driver MD    Subjective:     Principal Problem:GIB (gastrointestinal bleeding)    HPI:  No notes on file    Hospital Course:  No notes on file    Interval History: No acute events. H&H remains stable but mild leukocytosis persists.      Review of Systems   Constitutional: Negative for chills, fatigue and fever.   Respiratory: Negative for cough and shortness of breath.    Cardiovascular: Negative for chest pain and palpitations.   Gastrointestinal: Negative for abdominal pain, blood in stool, diarrhea, nausea and vomiting.   Musculoskeletal: Negative.    Skin: Negative for rash.   Neurological: Negative for light-headedness and headaches.   Psychiatric/Behavioral: Negative for hallucinations. The patient is not nervous/anxious.      Objective:     Vital Signs (Most Recent):  Temp: 98.4 °F (36.9 °C) (06/12/19 1230)  Pulse: 89 (06/12/19 1230)  Resp: 17 (06/12/19 1230)  BP: 124/68 (06/12/19 1230)  SpO2: 100 % (06/12/19 1230) Vital Signs (24h Range):  Temp:  [97.3 °F (36.3 °C)-98.4 °F (36.9 °C)] 98.4 °F (36.9 °C)  Pulse:  [71-94] 89  Resp:  [17-20] 17  SpO2:  [96 %-100 %] 100 %  BP: ()/(48-79) 124/68     Weight: 129.7 kg (285 lb 15 oz)  Body mass index is 36.71 kg/m².    Intake/Output Summary (Last 24 hours) at 6/12/2019 1403  Last data filed at 6/11/2019 1700  Gross per 24 hour   Intake 200 ml   Output --   Net 200 ml      Physical Exam   Constitutional: He is oriented to person, place, and time. He appears well-developed and well-nourished. No distress.   Seen on HD. Tolerating well.    Eyes: No scleral icterus.   Cardiovascular: Normal rate.   Pulmonary/Chest: Effort normal and breath  sounds normal.   Abdominal: Soft. Bowel sounds are normal. There is no tenderness.   Musculoskeletal: He exhibits edema (trace).   Neurological: He is alert and oriented to person, place, and time.   Skin: No erythema.   Psychiatric: He has a normal mood and affect.   Vitals reviewed.      Significant Labs: All pertinent labs within the past 24 hours have been reviewed.    Significant Imaging: I have reviewed all pertinent imaging results/findings within the past 24 hours.    Assessment/Plan:      * GIB (gastrointestinal bleeding)  Secondary to oozing duodenal ulcer and underwent clipping and epi injection at [facility] facility. Transfused about 3-4 units with stabilization of H&H. Transferred to [facility] for GI and general surgery eval. Stepped down to medicine on 6/1.  Started to drop his HCT and became hypotensive with melena on 6/4 and taken back to ICU    The evening of 6/2 into 6/3 the patient had several melanotic stools with slight downtrend in Hgb (10 > 8), however hemodynamics remained stable. Repeat CTA at that time did not show any evidence of active bleeding, however duodenal clip was noted to have migrated into distal small bowel. GI with plans to repeat EGD on 6/4.   Underwent EGD on 6/4 which showed:      One non-obstructing oozing duodenal ulcer with a visible vessel. There is no evidence of perforation. Injected. Treatment not successful. Treated with bipolar cautery.    Plan now as follows per ICU on stepdown:  - If bleeding worsens refer to interventional radiology for embolization as this has been treated endoscopically twice. Clip placed for marking.     Transfused 1 unit PRBC on HD on 6/8  ASA resumed for CAD  Continue PPI BID   Metoprolol 25 mg BID resumed and HRs improved  Continue daily H&H monitoring; stable thus far  Hemodynamically stable        Leukocytosis    Remains afebrile and clinically stable  Blood cultures negative thus far  Patient states he still makes urine therefore will send  UA  CXR on 6/11 suggestive of right atelectasis / PNA  Will start levaquin x 5 doses       Multiple skin tears  Per wound care       Dermatitis associated with moisture  Per wound care       Restless leg syndrome  Continue ropinirole       Cardiac arrest  Secondary to hemorrhagic shock from GIB  Hemodynamically stable        ESRD (end stage renal disease) on dialysis  Nephrology following   Usual schedule is TTS  Received HD with midodrine on Saturday 6/8        Debility  PTOT recommending SNF; referrals made      A-fib  Resumed metoprolol  ASA resumed  Patient previously was not on any anticoagulation       Coronary artery disease  ASA resumed by ICU   Metoprolol resumed  Patient was also on plavix; discussed with GI and at this time will continue to hold plavix and have the patient follow up with his cardiologist to determine if he needs to continue to be on plavix or it can be dc'd. Patient states he had cardiac stents placed over 10 years ago.       DM type 2 with diabetic mixed hyperlipidemia  Controlled on low dose correction scale   Cont blood glucose monitoring   BG goal:  Preprandial blood glucose target <140 mg/dL  Random glucoses <180 mg/dL  Avoid hypoglycemia -  Reduce antihyperglycemic therapy when caloric intake is reduced. Avoid insulin stacking as a result of repeated injection of prandial insulin at close intervals.   Avoid severe hyperglycemia  Ensure adequate nutrition   ADA diet        HTN (hypertension)  Antihypertensives held due to shock initially   BP stable   Metoprolol resumed due to tachycardia which may be due to lack of beta blockage but also due to recent GIB; tachycardia improving   Continue to monitor         Heart failure  Maintain euvolemia by monitoring I/O's and daily weights.  Fluid restriction (2 liters/24 hours)  Low Na diet  Monitor for signs of fluid overload: RR>30, O2 sat<92%, weight gain of >3 lbs, or urinary output <160ml/8hr  Maintain oxygen sats >92% via NC if  supplemental oxygen needed.   Volume removal via HD   Metoprolol and ASA resumed    Patient Vitals for the past 72 hrs (Last 3 readings):   Weight   06/10/19 0535 129.7 kg (285 lb 15 oz)   06/09/19 0300 131.7 kg (290 lb 5.5 oz)           VTE Risk Mitigation (From admission, onward)        Ordered     Place sequential compression device  Until discontinued      05/31/19 0650     Reason for No Pharmacological VTE Prophylaxis  Once      05/31/19 0650     IP VTE HIGH RISK PATIENT  Once      05/31/19 0650              Sofie Driver MD  Department of Hospital Medicine   [facility]

## 2019-06-12 NOTE — PROGRESS NOTES
[facility] HEMODIAL[facility]IS NOTE     Patient currently on hemodialysis for removal of uremic toxins and volume.     Patient seen and evaluated on hemodialysis, tolerating treatment, see HD flowsheet for vitals and assessments.      Ultrafiltration goal is 2.5L as tolerated, keep map >65     Labs have been reviewed and the dialysate bath has been adjusted.     Assessment/Plan:    -Patient seen on HD, tolerating treatment well, w/o complaints   -Midodrine 10mg once now in HD   -Renal diet  -Strict I/O's and daily weights  -Daily renal function panels  -Hbg 7.8, continue Epo with HD  -Patient receives venofer 100mg q treatment outpatient, will hold at this time due to presence of mild leukocytosis   -Phos 6.0, continue renvela with meals   -Will continue to follow while inpatient       INES England, AGNP-C  Nephrology  Pager:  [phone]

## 2019-06-12 NOTE — PT/OT/SLP PROGRESS
"Physical Therapy Treatment    Patient Name:  Frank Zayas   MRN:  [id number]    Recommendations:     Discharge Recommendations:  nursing facility, skilled   Discharge Equipment Recommendations: none   Barriers to discharge: Decreased caregiver support    Assessment:     Frank Zayas is a 79 y.o. male admitted with a medical diagnosis of GIB (gastrointestinal bleeding).  He presents with the following impairments/functional limitations:  weakness, impaired endurance, impaired self care skills, impaired functional mobilty, gait instability, impaired balance, impaired cardiopulmonary response to activity, edema. Pt tolerated session well with focus on short gait trials and transfers. Pt mildly fatigued and lethargic after dialysis in AM. Pt will continue to benefit from therapy services to address impairments listed above.     Rehab Prognosis: Good; patient would benefit from acute skilled PT services to address these deficits and reach maximum level of function.    Recent Surgery: Procedure(s) (LRB):  EGD (ESOPHAGOGASTRODUODENOSCOPY) (N/A) 8 Days Post-Op    Plan:     During this hospitalization, patient to be seen 3 x/week to address the identified rehab impairments via gait training, therapeutic activities, therapeutic exercises, neuromuscular re-education and progress toward the following goals:    · Plan of Care Expires:  07/06/19    Subjective     Chief Complaint: no c/o  Patient/Family Comments/goals: "I feel alright  Pain/Comfort:  · Pain Rating 1: 0/10  · Pain Rating Post-Intervention 1: 0/10      Objective:     Patient found HOB elevated sleeping with telemetry upon PTA entry to room.     General Precautions: Standard, fall   Orthopedic Precautions:N/A   Braces: N/A     Functional Mobility:  · Bed Mobility:     · Scooting: stand by assistance  · Supine to Sit: contact guard assistance  · Transfers:     · Sit to Stand:  minimum assistance with rolling walker  · Bed to Chair: contact guard assistance with  " rolling walker  using  Stand Pivot and Step Transfer  · Gait: Pt ambulates 14 ft and 6 ft in room with RW and CGA. Mild FFP and slow bryson.       AM-PAC 6 CLICK MOBILITY  Turning over in bed (including adjusting bedclothes, sheets and blankets)?: 3  Sitting down on and standing up from a chair with arms (e.g., wheelchair, bedside commode, etc.): 3  Moving from lying on back to sitting on the side of the bed?: 3  Moving to and from a bed to a chair (including a wheelchair)?: 3  Need to walk in hospital room?: 3  Climbing 3-5 steps with a railing?: 1  Basic Mobility Total Score: 16       Therapeutic Activities and Exercises:  Pt assisted with functional mobility as noted above.   Seated rests required after gait trials. Pt with increased assistance to stand from couch in room d/t lack of BUE support to elevate.  Pt educated on safety with all mobility and importance of increased time UIC/OOB.     Patient left up in chair with all lines intact, call button in reach and NSG present.     Pt is assist of one person (Min A) to stand, transfer, and ambulate in room with use of RW and NSG staff assistance.     GOALS:   Multidisciplinary Problems     Physical Therapy Goals        Problem: Physical Therapy Goal    Goal Priority Disciplines Outcome Goal Variances Interventions   Physical Therapy Goal     PT, PT/OT Ongoing (interventions implemented as appropriate)     Description:  Goals to be met by: 2019     Patient will increase functional independence with mobility by performin. Supine to sit with CGA - not met  2. Sit to supine with CGA - not met  3. Sit to stand transfer with Contact Guard Assistance - not met  4. Gait  x 50 feet with Supervision using Rolling Walker. - not met  5. Ascend/Descend 4 inch curb step with Supervision using Rolling Walker. - not met                       Time Tracking:     PT Received On: 19  PT Start Time: 1516     PT Stop Time: 1540  PT Total Time (min): 24 min      Billable Minutes: Gait Training 12 and Therapeutic Activity 12    Treatment Type: Treatment  PT/PTA: PTA     PTA Visit Number: 1     Tomas Mckenzie PTA  06/12/2019

## 2019-06-12 NOTE — ASSESSMENT & PLAN NOTE
Secondary to oozing duodenal ulcer and underwent clipping and epi injection at [facility] facility. Transfused about 3-4 units with stabilization of H&H. Transferred to [facility] for [place] and general surgery eval. Stepped down to medicine on 6/1.  Started to drop his HCT and became hypotensive with melena on 6/4 and taken back to ICU    The evening of 6/2 into 6/3 the patient had several melanotic stools with slight downtrend in Hgb (10 > 8), however hemodynamics remained stable. Repeat CTA at that time did not show any evidence of active bleeding, however duodenal clip was noted to have migrated into distal small bowel. GI with plans to repeat EGD on 6/4.   Underwent EGD on 6/4 which showed:      One non-obstructing oozing duodenal ulcer with a visible vessel. There is no evidence of perforation. Injected. Treatment not successful. Treated with bipolar cautery.    Plan now as follows per ICU on stepdown:  - If bleeding worsens refer to interventional radiology for embolization as this has been treated endoscopically twice. Clip placed for marking.     Transfused 1 unit PRBC on HD on 6/8  ASA resumed for CAD  Continue PPI BID   Metoprolol 25 mg BID resumed and HRs improved  Continue daily H&H monitoring; stable thus far  Hemodynamically stable

## 2019-06-12 NOTE — PROGRESS NOTES
HD treatment complete. Duration of treatment 4 hours and 2 L removed. Treatment was tolerated well and no complications with access to right chest wall catheter. Catheter flushed with NS and locked with heparin. Capped and taped.

## 2019-06-13 NOTE — PLAN OF CARE
Problem: Adult Inpatient Plan of Care  Goal: Plan of Care Review  Outcome: Ongoing (interventions implemented as appropriate)  VS and assessment performed per orders.1BM this shift, no evidence of melena noted.  Patient progressing toward goals as tolerated. Plan of care reviewed with pt, all concerns addressed. Will continue to monitor

## 2019-06-13 NOTE — ASSESSMENT & PLAN NOTE
BP Readings from Last 3 Encounters:   06/13/19 136/69   05/31/19 (!) 108/44   05/30/19 (!) 100/57     Antihypertensives held due to shock initially   BP stable   Metoprolol resumed due to tachycardia which may be due to lack of beta blockage but also due to recent GIB; tachycardia improving   Continue to monitor

## 2019-06-13 NOTE — HPI
78 yo M with morbid obesity, ESRD on HD (TTS, 2 months), Afib, CHF, DM2 (on insulin), CAD s/p CABG, HLD, COPD, HTN transferred from [facility]  He presented with hematochezia and abdominal pain.  He went to [facility] ER for lower GI bleed and received 1 u PRBC and then was transferred to [facility] for cardiac evaluation when trop was 0.39.  At [facility] he had continued hyperkalemia (K of 6.1) after being treated for it at [facility].  He was started on levophed for hypovolemic shock, and an art line and central line was placed.  During line placement, he briefly lost a pulse (PEA) and CPR was started.  He was given epinephrine, calcium gluconate, sodium bicarb, and intubated emergently.  He achieved ROSC.  He received IVF and 3-4 units PRBC.  He underwent emergent upper and lower endoscopy.  They found a large vessel in the duodenum, actively oozing with copious amounts of fresh bright red blood pumping out.  GI placed a clop on the artery and injected epinephrine, achieving hemostasis.  There was concern that the vessel could be directly assosiated with a larger vessel (ie GDA).  There were no varices seen.  Both GI and Gen Surg recommended transfer to [facility] for arteriovenous embolization which couldn't be performed there.       Patient admitted to Critical Care Medicine on 5/31/2019 for evaluation and management of acute upper GI bleed (s/p EGD with clipping of bleeding visible vessel at OSH).  Pt arrived intubated following PEA arrest at OSH.  Interventional Radiology consulted and recommended CTA abdomen/pelvis, however no evidence of active GI bleeding on imaging.  Gastroenterology consulted and recommended continue IV Protonix gtt for a total of 72 hours.  Nephrology consulted for dialysis in setting of ESRD.  Patient passed SAT/SBT and was successfully extubated on the afternoon of 5/31.  On 6/1, as pt remained hemodynamically stable and his hemoglobin remained stable, pt stepped down to Hospital  Medicine.     The evening of 6/2 into 6/3 the patient had several melanotic stools with slight downtrend in Hgb (10 > 8), however hemodynamics remained stable. Repeat CTA at that time did not show any evidence of active bleeding, however duodenal clip was noted to have migrated into distal small bowel. GI with plans to repeat EGD on 6/4.      Around 1am on 6/4, the patient became hypotensive (70s/40s). Bedside POC showed Hct 17. Decision made to move patient back to ICU for further management.

## 2019-06-13 NOTE — PROGRESS NOTES
[facility] Hospital Medicine  Progress Note    Patient Name: Frank Zayas  MRN: [id number]  Patient Class: IP- Inpatient   Admission Date: 5/31/2019  Length of Stay: 13 days  Attending Physician: Lisha Driver MD  Primary Care Provider: Mikhail Shields MD    [facility] Medicine Team: [facility] MED  Lisha Dickey MD    Subjective:     Principal Problem:GIB (gastrointestinal bleeding)      Interval History: No acute events. H&H remains stable but mild leukocytosis persists.      Review of Systems   Constitutional: Negative for chills, fatigue and fever.   Respiratory: Negative for cough and shortness of breath.    Cardiovascular: Negative for chest pain and palpitations.   Gastrointestinal: Negative for abdominal pain, blood in stool, diarrhea, nausea and vomiting.   Musculoskeletal: Negative.    Skin: Negative for rash.   Neurological: Negative for light-headedness and headaches.   Psychiatric/Behavioral: Negative for hallucinations. The patient is not nervous/anxious.      Objective:     Vital Signs (Most Recent):  Temp: 98.1 °F (36.7 °C) (06/13/19 0705)  Pulse: 74 (06/13/19 0707)  Resp: (!) 25 (06/13/19 0705)  BP: 136/69 (06/13/19 0705)  SpO2: 95 % (06/13/19 0705) Vital Signs (24h Range):  Temp:  [97.5 °F (36.4 °C)-98.4 °F (36.9 °C)] 98.1 °F (36.7 °C)  Pulse:  [74-89] 74  Resp:  [13-25] 25  SpO2:  [90 %-100 %] 95 %  BP: (107-136)/(54-69) 136/69     Weight: 129.7 kg (285 lb 15 oz)  Body mass index is 36.71 kg/m².    Intake/Output Summary (Last 24 hours) at 6/13/2019 1051  Last data filed at 6/12/2019 1800  Gross per 24 hour   Intake 1250 ml   Output 2700 ml   Net -1450 ml      Physical Exam   Constitutional: He is oriented to person, place, and time. He appears well-developed and well-nourished. No distress.   Seen on HD. Tolerating well.    Eyes: No scleral icterus.   Cardiovascular: Normal rate.   Pulmonary/Chest: Effort normal and breath sounds normal.   Abdominal: Soft. Bowel sounds are  normal. There is no tenderness.   Musculoskeletal: He exhibits edema (trace).   Neurological: He is alert and oriented to person, place, and time.   Skin: No erythema.   Psychiatric: He has a normal mood and affect.   Vitals reviewed.      Significant Labs: All pertinent labs within the past 24 hours have been reviewed.    Significant Imaging: I have reviewed all pertinent imaging results/findings within the past 24 hours.      Overview/Hospital Course:  No notes on file  Assessment/Plan:      * GIB (gastrointestinal bleeding)  Secondary to oozing duodenal ulcer and underwent clipping and epi injection at [facility] facility. Transfused about 3-4 units with stabilization of H&H.   Transferred to [facility] for GI and general surgery eval.   Stepped down to medicine on 6/1.  Started to drop his HCT and became hypotensive with melena on 6/4 and taken back to ICU    The evening of 6/2 into 6/3 the patient had several melanotic stools with slight downtrend in Hgb (10 > 8), however hemodynamics remained stable.   Repeat CTA at that time did not show any evidence of active bleeding, however duodenal clip was noted to have migrated into distal small bowel.   GI with plans to repeat EGD on 6/4.   Underwent EGD on 6/4 which showed: One non-obstructing oozing duodenal ulcer with a visible vessel. There is no evidence of perforation. Injected. Treatment not successful. Treated with bipolar cautery.    Plan now as follows per ICU on stepdown:  - If bleeding worsens refer to interventional radiology for embolization as this has been treated endoscopically twice. Clip placed for marking.     Transfused 1 unit PRBC on HD on 6/8  ASA resumed for CAD  Continue PPI BID   Metoprolol 25 mg BID resumed and HRs improved  Continue daily H&H monitoring; stable thus far  Hemodynamically stable    Leukocytosis  Remains afebrile and clinically stable  Blood cultures negative thus far  Patient states he still makes urine therefore will send UA  CXR on  6/11 suggestive of right atelectasis / PNA  Will start Zosyn until cultures finalize.     Multiple skin tears  Per wound care       Dermatitis associated with moisture  Per wound care       Restless leg syndrome  Continue ropinirole       Cardiac arrest  Secondary to hemorrhagic shock from GIB  Hemodynamically stable        ESRD (end stage renal disease) on dialysis  Nephrology following   Usual schedule is TTS  Received HD with midodrine on Saturday 6/8        Debility  PTOT recommending SNF; referrals made      A-fib  Resumed metoprolol  ASA resumed  Patient previously was not on any anticoagulation       Coronary artery disease  ASA resumed by ICU   Metoprolol resumed  Patient was also on plavix; discussed with GI and at this time will continue to hold plavix and have the patient follow up with his cardiologist to determine if he needs to continue to be on plavix or it can be dc'd. Patient states he had cardiac stents placed over 10 years ago.       DM type 2 with diabetic mixed hyperlipidemia  Controlled on low dose correction scale   Cont blood glucose monitoring   BG goal:  Preprandial blood glucose target <140 mg/dL  Random glucoses <180 mg/dL  Avoid hypoglycemia -  Reduce antihyperglycemic therapy when caloric intake is reduced. Avoid insulin stacking as a result of repeated injection of prandial insulin at close intervals.   Avoid severe hyperglycemia  Ensure adequate nutrition   ADA diet        HTN (hypertension)  BP Readings from Last 3 Encounters:   06/13/19 136/69   05/31/19 (!) 108/44   05/30/19 (!) 100/57     Antihypertensives held due to shock initially   BP stable   Metoprolol resumed due to tachycardia which may be due to lack of beta blockage but also due to recent GIB; tachycardia improving   Continue to monitor         Heart failure  Maintain euvolemia by monitoring I/O's and daily weights.  Fluid restriction (2 liters/24 hours)  Low Na diet  Monitor for signs of fluid overload  Maintain oxygen  sats >92% via NC if supplemental oxygen needed.   Volume removal via HD   Metoprolol and ASA resumed    Patient Vitals for the past 72 hrs (Last 3 readings):   Weight   06/10/19 1400 129.7 kg (285 lb 15 oz)           VTE Risk Mitigation (From admission, onward)        Ordered     Place sequential compression device  Until discontinued      05/31/19 0650     Reason for No Pharmacological VTE Prophylaxis  Once      05/31/19 0650     IP VTE HIGH RISK PATIENT  Once      05/31/19 0650              Medically ready for discharge:      Lisha Dickey MD  Department of Hospital Medicine   [facility]

## 2019-06-13 NOTE — ASSESSMENT & PLAN NOTE
Maintain euvolemia by monitoring I/O's and daily weights.  Fluid restriction (2 liters/24 hours)  Low Na diet  Monitor for signs of fluid overload  Maintain oxygen sats >92% via NC if supplemental oxygen needed.   Volume removal via HD   Metoprolol and ASA resumed    Patient Vitals for the past 72 hrs (Last 3 readings):   Weight   06/10/19 1400 129.7 kg (285 lb 15 oz)

## 2019-06-13 NOTE — PT/OT/SLP PROGRESS
Occupational Therapy   Treatment    Name: Frank Zayas  MRN: [id number]  Admitting Diagnosis:  GIB (gastrointestinal bleeding)  9 Days Post-Op    Recommendations:     Discharge Recommendations: nursing facility, skilled  Discharge Equipment Recommendations:  none  Barriers to discharge:  Decreased caregiver support    Assessment:     Frank Zayas is a 79 y.o. male with a medical diagnosis of GIB (gastrointestinal bleeding).  He presents with improved mobility and decrease BLE edema. Performance deficits affecting function are weakness, impaired endurance, impaired self care skills, impaired functional mobilty, gait instability, decreased upper extremity function, decreased lower extremity function, decreased ROM, impaired skin, edema, impaired cardiopulmonary response to activity.     Rehab Prognosis:  Fair; patient would benefit from acute skilled OT services to address these deficits and reach maximum level of function.       Plan:     Patient to be seen 3 x/week to address the above listed problems via self-care/home management, therapeutic activities, therapeutic exercises  · Plan of Care Expires: 07/01/19  · Plan of Care Reviewed with: patient    Subjective     Pain/Comfort:  · Pain Rating 1: 0/10  · Pain Rating Post-Intervention 1: 0/10    Objective:     Communicated with: RN prior to session.  Patient found HOB elevated with telemetry, peripheral IV upon OT entry to room.    General Precautions: Standard, fall   Orthopedic Precautions:N/A   Braces: N/A     Occupational Performance:     Bed Mobility:    · Patient completed Rolling/Turning to Left with  supervision  · Patient completed Scooting/Bridging with supervision  · Patient completed Sit to Supine with supervision     Functional Mobility/Transfers:  · Patient completed Sit <> Stand Transfer with minimum assistance  with  hand-held assist and rolling walker   · Patient completed Bed <> Chair Transfer using Step Transfer technique with minimum  assistance with rolling walker  · Functional Mobility: Able to walk around the room w/ SBA and a RW.           [facility] 6 Click ADL: 17    Treatment & Education:  -Pt edu on OT role/POC  -Importance of OOB activity with staff assistance  -Safety during functional t/f and mobility  - White board updated  - Multiple self care tasks/functional mobility completed-- assistance level noted above  - All questions/concerns answered within OT scope of practice      Patient left HOB elevated with all lines intact, call button in reach and RN notifiedEducation:      GOALS:   Multidisciplinary Problems     Occupational Therapy Goals        Problem: Occupational Therapy Goal    Goal Priority Disciplines Outcome Interventions   Occupational Therapy Goal     OT, PT/OT Ongoing (interventions implemented as appropriate)    Description:  Goals to be met by: 6/19     Patient will increase functional independence with ADLs by performing:    Bed mobility and supine to sit with HOB flat and CGA.  UE Dressing while seated EOB with Set-up Assistance and Stand-by Assistance.  LE Dressing (brief) with Minimal Assistance.  Grooming while standing at sink with Minimal Assistance.  Toileting from toilet with Minimal Assistance for hygiene and clothing management.   Toilet transfer to toilet with Minimal Assistance. - MET 6/11/19  Functional mobility at short household distance for ADL task with RW and min(A). Met 6/13/2019                         Time Tracking:     OT Date of Treatment: 06/13/19  OT Start Time: 1451  OT Stop Time: 1524  OT Total Time (min): 33 min    Billable Minutes:Self Care/Home Management 33 Mins    Lee Morales, OT  6/13/2019

## 2019-06-13 NOTE — PLAN OF CARE
Problem: Occupational Therapy Goal  Goal: Occupational Therapy Goal  Goals to be met by: 6/19     Patient will increase functional independence with ADLs by performing:    Bed mobility and supine to sit with HOB flat and CGA.  UE Dressing while seated EOB with Set-up Assistance and Stand-by Assistance.  LE Dressing (brief) with Minimal Assistance.  Grooming while standing at sink with Minimal Assistance.  Toileting from toilet with Minimal Assistance for hygiene and clothing management.   Toilet transfer to toilet with Minimal Assistance. - MET 6/11/19  Functional mobility at short household distance for ADL task with RW and min(A). Met 6/13/2019       Outcome: Ongoing (interventions implemented as appropriate)  Goals are still appropriate, continue w/ OT POC.

## 2019-06-13 NOTE — SUBJECTIVE & OBJECTIVE
Interval History: No acute events. H&H remains stable but mild leukocytosis persists.      Review of Systems   Constitutional: Negative for chills, fatigue and fever.   Respiratory: Negative for cough and shortness of breath.    Cardiovascular: Negative for chest pain and palpitations.   Gastrointestinal: Negative for abdominal pain, blood in stool, diarrhea, nausea and vomiting.   Musculoskeletal: Negative.    Skin: Negative for rash.   Neurological: Negative for light-headedness and headaches.   Psychiatric/Behavioral: Negative for hallucinations. The patient is not nervous/anxious.      Objective:     Vital Signs (Most Recent):  Temp: 98.1 °F (36.7 °C) (06/13/19 0705)  Pulse: 74 (06/13/19 0707)  Resp: (!) 25 (06/13/19 0705)  BP: 136/69 (06/13/19 0705)  SpO2: 95 % (06/13/19 0705) Vital Signs (24h Range):  Temp:  [97.5 °F (36.4 °C)-98.4 °F (36.9 °C)] 98.1 °F (36.7 °C)  Pulse:  [74-89] 74  Resp:  [13-25] 25  SpO2:  [90 %-100 %] 95 %  BP: (107-136)/(54-69) 136/69     Weight: 129.7 kg (285 lb 15 oz)  Body mass index is 36.71 kg/m².    Intake/Output Summary (Last 24 hours) at 6/13/2019 1051  Last data filed at 6/12/2019 1800  Gross per 24 hour   Intake 1250 ml   Output 2700 ml   Net -1450 ml      Physical Exam   Constitutional: He is oriented to person, place, and time. He appears well-developed and well-nourished. No distress.   Seen on HD. Tolerating well.    Eyes: No scleral icterus.   Cardiovascular: Normal rate.   Pulmonary/Chest: Effort normal and breath sounds normal.   Abdominal: Soft. Bowel sounds are normal. There is no tenderness.   Musculoskeletal: He exhibits edema (trace).   Neurological: He is alert and oriented to person, place, and time.   Skin: No erythema.   Psychiatric: He has a normal mood and affect.   Vitals reviewed.      Significant Labs: All pertinent labs within the past 24 hours have been reviewed.    Significant Imaging: I have reviewed all pertinent imaging results/findings within the past  24 hours.

## 2019-06-13 NOTE — ASSESSMENT & PLAN NOTE
Remains afebrile and clinically stable  Blood cultures negative thus far  Patient states he still makes urine therefore will send UA  CXR on 6/11 suggestive of right atelectasis / PNA  Will start Zosyn until cultures finalize.

## 2019-06-13 NOTE — ASSESSMENT & PLAN NOTE
Secondary to oozing duodenal ulcer and underwent clipping and epi injection at [facility] facility. Transfused about 3-4 units with stabilization of H&H.   Transferred to [facility] for GI and general surgery eval.   Stepped down to medicine on 6/1.  Started to drop his HCT and became hypotensive with melena on 6/4 and taken back to ICU    The evening of 6/2 into 6/3 the patient had several melanotic stools with slight downtrend in Hgb (10 > 8), however hemodynamics remained stable.   Repeat CTA at that time did not show any evidence of active bleeding, however duodenal clip was noted to have migrated into distal small bowel.   GI with plans to repeat EGD on 6/4.   Underwent EGD on 6/4 which showed: One non-obstructing oozing duodenal ulcer with a visible vessel. There is no evidence of perforation. Injected. Treatment not successful. Treated with bipolar cautery.    Plan now as follows per ICU on stepdown:  - If bleeding worsens refer to interventional radiology for embolization as this has been treated endoscopically twice. Clip placed for marking.     Transfused 1 unit PRBC on HD on 6/8  ASA resumed for CAD  Continue PPI BID   Metoprolol 25 mg BID resumed and HRs improved  Continue daily H&H monitoring; stable thus far  Hemodynamically stable

## 2019-06-13 NOTE — PLAN OF CARE
Call placed to Tiera at [facility] ([phone]) to discuss admission status. Informed by Tiera that they have accepted Mr. Zayas. She will contact patient's spouse to sign the appropriate paperwork. SNF orders faxed to [facility]. Pending signed paperwork, patient is expected to discharge tomorrow.    Joseline Dill RN  Ext [phone]

## 2019-06-14 NOTE — PLAN OF CARE
06/14/19 1651   Discharge Reassessment   Assessment Type Discharge Planning Reassessment   Provided patient/caregiver education on the expected discharge date and the discharge plan Yes   Do you have any problems affording any of your prescribed medications? No   Discharge Plan A Skilled Nursing Facility  ([facility])   Post-Acute Status   Post-Acute Authorization Placement   Post-Acute Placement Status Awaiting Internal Medical Clearance   Discharge Delays None known at this time

## 2019-06-14 NOTE — PROGRESS NOTES
HD  Treatment complete. Duration of treatment 4 hours and 3 L removed. Treatment was tolerated well. No complications with access to right chest wall. Catheter flushed with NS and locked with gent. Capped and taped.

## 2019-06-14 NOTE — PROGRESS NOTES
Wound care follow up.     Wound to the right leg appears nearly healed. Wound cleansed and new foam dressing applied.     Sacral area is intact. Yeast rash to perineal area improving. Several small areas of full thickness moisture related breakdown noted to the left of the gluteal cleft. Patient able to turn with very little assistance. Patient encouraged to turn frequently.     Recommend:   EHOB Overlay, cushion for chair  Foam dressing to leg every 2-3 days  q2hour turns  Continue barrier antifungal cream BID and PRN to the perineal/gluteal cleft area.     Wound care to follow PRN.   Mehnaz Kirk RN [facility]   [phone]           06/14/19 1144        Wound Abrasion(s) lower Leg   No Date First Assessed or Time First Assessed found.   Primary Wound Type: Abrasion(s)  Side: Right  Orientation: lower  Location: Leg   Wound Image    Wound WDL ex   Dressing Appearance Intact   Drainage Amount Scant   Drainage Characteristics/Odor Serosanguineous   Appearance Pink;Epithelialization   Red (%), Wound Tissue Color 100 %   Periwound Area Moist   Wound Edges Open   Wound Length (cm) 0.2 cm   Wound Width (cm) 0.2 cm   Wound Depth (cm) 0.1 cm   Wound Volume (cm^3) 0 cm^3   Wound Surface Area (cm^2) 0.04 cm^2   Care Cleansed with:;Wound cleanser   Dressing Removed;Applied;Changed;Foam        Wound 06/14/19 0800 Moisture associated dermatitis Gluteal cleft   Date First Assessed/Time First Assessed: 06/14/19 0800   Primary Wound Type: Moisture associated dermatitis  Side: Left  Location: Gluteal cleft   Wound Image    Appearance Yellow;Pink   Tissue loss description Full thickness   Red (%), Wound Tissue Color 50 %   Yellow (%), Wound Tissue Color 50 %   Periwound Area Pink;Moist   Wound Edges Open   Wound Length (cm) 2.5 cm   Wound Width (cm) 0.5 cm   Wound Depth (cm) 0.1 cm   Wound Volume (cm^3) 0.12 cm^3   Wound Surface Area (cm^2) 1.25 cm^2   Care Other (see comments)  (barrier antifungal)

## 2019-06-14 NOTE — SUBJECTIVE & OBJECTIVE
Interval History: No acute events. Labs still pending this morning. Going to HD today. Started on diflucan for yeast in urine.       Review of Systems   Constitutional: Negative for chills, fatigue and fever.   Respiratory: Negative for cough and shortness of breath.    Cardiovascular: Negative for chest pain and palpitations.   Gastrointestinal: Negative for abdominal pain, blood in stool, diarrhea, nausea and vomiting.   Musculoskeletal: Negative.    Skin: Negative for rash.   Neurological: Negative for light-headedness and headaches.   Psychiatric/Behavioral: Negative for hallucinations. The patient is not nervous/anxious.      Objective:     Vital Signs (Most Recent):  Temp: 97.4 °F (36.3 °C) (06/14/19 0830)  Pulse: 79 (06/14/19 0830)  Resp: (!) 22 (06/14/19 0830)  BP: 102/70 (06/14/19 0830)  SpO2: 95 % (06/14/19 0830) Vital Signs (24h Range):  Temp:  [97.4 °F (36.3 °C)-98.4 °F (36.9 °C)] 97.4 °F (36.3 °C)  Pulse:  [] 79  Resp:  [19-29] 22  SpO2:  [95 %-100 %] 95 %  BP: ()/(52-78) 102/70     Weight: 129.8 kg (286 lb 2.5 oz)  Body mass index is 36.74 kg/m².    Intake/Output Summary (Last 24 hours) at 6/14/2019 0851  Last data filed at 6/13/2019 1200  Gross per 24 hour   Intake 340 ml   Output --   Net 340 ml      Physical Exam   Constitutional: He is oriented to person, place, and time. He appears well-developed and well-nourished. No distress.   Morbidly obese   Eyes: No scleral icterus.   Cardiovascular: Normal rate.   Pulmonary/Chest: Effort normal and breath sounds normal.   Abdominal: Soft. Bowel sounds are normal. There is no tenderness.   Musculoskeletal: He exhibits edema (trace).   Neurological: He is alert and oriented to person, place, and time.   Skin: No erythema.   Psychiatric: He has a normal mood and affect.   Vitals reviewed.      Significant Labs: All pertinent labs within the past 24 hours have been reviewed.    Significant Imaging: I have reviewed all pertinent imaging  results/findings within the past 24 hours.

## 2019-06-14 NOTE — PHYSICIAN QUERY
PT Name: Frank Zayas  MR #: [id number]     Physician Query Form - Documentation Clarification      Tory Shen RN, CCDS  Desk # [phone]; renan # [phone] [email or web address]      This form is a permanent document in the medical record.     Query Date: June 14, 2019    By submitting this query, we are merely seeking further clarification of documentation. Please utilize your independent clinical judgment when addressing the question(s) below.    The Medical record reflects the following:    Supporting Clinical Findings Location in Medical Record   Urine Culture, Routine YEAST   > 100,000 cfu/ml   Identification pending        Preferred Collection Type->Urine, Clean Catch      Specimen Collected: 06/12/19         Urine Culture: 6/4 = no growth     5/31/2019 08:38 6/4/2019 03:08 6/12/2019 16:16   Specimen UA Urine, Catheterized Urine, Unspecified Urine, Clean Catch   Color, UA Francie Francie Francie   Appearance, UA Cloudy (A) Cloudy (A) Cloudy (A)   Specific Gravity, UA 1.015 >=1.030 (A) 1.025   pH, UA 5.0 5.0 5.0   Protein, UA 2+ (A) 2+ (A) 3+ (A)   Glucose, UA Negative Negative Negative   Ketones, UA Negative Negative Trace (A)   Occult Blood UA 2+ (A) Negative 1+ (A)   NITRITE UA Negative Negative Negative   Bilirubin (UA) Negative Negative Negative   Leukocytes, UA 3+ (A) 2+ (A) 3+ (A)   RBC, UA 15 (H) 11 (H) 38 (H)   WBC, UA 42 (H) 15 (H) >100 (H)   Bacteria, UA Many (A) Many (A) Moderate (A)   Squam Epithel, UA 1 5 7   WBC Clumps, UA   Few (A)   Hyaline Casts, UA 8 (A) 0 4 (A)   Yeast, UA  Many (A) Many (A)   Non-Squam Epith  1 (A) 1 (A)    Lab   Leukocytosis  Remains afebrile and clinically stable  Blood cultures negative thus far  Urine culture growing Yeast. Will start diflucan  CXR on 6/11 suggestive of right atelectasis / PNA  Cont Zosyn for now until cultures finalize. Farbrett PN 6/   admitted to hospital 5/31/2019 (Hospital Day: 7) due to GIB (gastrointestinal bleeding).  GI Trx Plan 6/6                                                                           Doctor, Please specify diagnosis or diagnoses associated with above clinical findings.    Provider Use Only    ( x ) Yeast UTI ruled in, Not present on admit (POA)    (  ) Yeast UTI ruled in, POA status - undeterminable    (  ) UTI ruled out    (  ) Other diagnosis - specify: _______________________                                                                                                           [  ]  Clinically Undetermined

## 2019-06-14 NOTE — PROGRESS NOTES
[facility] HEMODIALYSIS NOTE     Patient currently on hemodialysis for removal of uremic toxins and volume.     Patient seen and evaluated on hemodialysis, tolerating treatment, see HD flowsheet for vitals and assessments.      Ultrafiltration goal is 3L as tolerated, keep map >65     Labs have been reviewed and the dialysate bath has been adjusted.     Assessment/Plan:    -Patient seen on HD, tolerating treatment well, w/o complaints   -Patient SBP low 100's, will order Midodrine 10mg once now  -3+ pitting edema to bilateral lower extremities   -Recommend that patient's morning dose of metoprolol be held on days he is to receive HD   -Outpatient HD records reviewed   -Renal diet, continue binders with meals  -Strict I/O's and daily weights  -Daily renal function panels  -Hbg 7.8, continue Epo with HD    -Will continue to follow while inpatient       INES England, AGNP-C  Nephrology  Pager:  [phone]

## 2019-06-14 NOTE — PROGRESS NOTES
[facility] Hospital Medicine  Progress Note    Patient Name: Frank Zayas  MRN: [id number]  Patient Class: IP- Inpatient   Admission Date: 5/31/2019  Length of Stay: 14 days  Attending Physician: Lisha Driver MD  Primary Care Provider: Mikhail Shields MD    [facility] Medicine Team: [facility] HOSP MED G Lisha Dickey MD    Subjective:     Principal Problem:GIB (gastrointestinal bleeding)    Overview/Hospital Course:No notes on file    Interval History: No acute events. Labs still pending this morning. Going to HD today. Started on diflucan for yeast in urine.       Review of Systems   Constitutional: Negative for chills, fatigue and fever.   Respiratory: Negative for cough and shortness of breath.    Cardiovascular: Negative for chest pain and palpitations.   Gastrointestinal: Negative for abdominal pain, blood in stool, diarrhea, nausea and vomiting.   Musculoskeletal: Negative.    Skin: Negative for rash.   Neurological: Negative for light-headedness and headaches.   Psychiatric/Behavioral: Negative for hallucinations. The patient is not nervous/anxious.      Objective:     Vital Signs (Most Recent):  Temp: 97.4 °F (36.3 °C) (06/14/19 0830)  Pulse: 79 (06/14/19 0830)  Resp: (!) 22 (06/14/19 0830)  BP: 102/70 (06/14/19 0830)  SpO2: 95 % (06/14/19 0830) Vital Signs (24h Range):  Temp:  [97.4 °F (36.3 °C)-98.4 °F (36.9 °C)] 97.4 °F (36.3 °C)  Pulse:  [] 79  Resp:  [19-29] 22  SpO2:  [95 %-100 %] 95 %  BP: ()/(52-78) 102/70     Weight: 129.8 kg (286 lb 2.5 oz)  Body mass index is 36.74 kg/m².    Intake/Output Summary (Last 24 hours) at 6/14/2019 0851  Last data filed at 6/13/2019 1200  Gross per 24 hour   Intake 340 ml   Output --   Net 340 ml      Physical Exam   Constitutional: He is oriented to person, place, and time. He appears well-developed and well-nourished. No distress.   Morbidly obese   Eyes: No scleral icterus.   Cardiovascular: Normal rate.   Pulmonary/Chest: Effort normal  and breath sounds normal.   Abdominal: Soft. Bowel sounds are normal. There is no tenderness.   Musculoskeletal: He exhibits edema (trace).   Neurological: He is alert and oriented to person, place, and time.   Skin: No erythema.   Psychiatric: He has a normal mood and affect.   Vitals reviewed.      Significant Labs: All pertinent labs within the past 24 hours have been reviewed.    Significant Imaging: I have reviewed all pertinent imaging results/findings within the past 24 hours.        Assessment/Plan:      * GIB (gastrointestinal bleeding)  Secondary to oozing duodenal ulcer and underwent clipping and epi injection at [facility] facility. Transfused about 3-4 units with stabilization of H&H.   Transferred to [facility] for GI and general surgery eval.   Stepped down to medicine on 6/1.  Started to drop his HCT and became hypotensive with melena on 6/4 and taken back to ICU    The evening of 6/2 into 6/3 the patient had several melanotic stools with slight downtrend in Hgb (10 > 8), however hemodynamics remained stable.   Repeat CTA at that time did not show any evidence of active bleeding, however duodenal clip was noted to have migrated into distal small bowel.   GI with plans to repeat EGD on 6/4.   Underwent EGD on 6/4 which showed: One non-obstructing oozing duodenal ulcer with a visible vessel. There is no evidence of perforation. Injected. Treatment not successful. Treated with bipolar cautery.    Plan now as follows per ICU on stepdown:  - If bleeding worsens refer to interventional radiology for embolization as this has been treated endoscopically twice. Clip placed for marking.     Transfused 1 unit PRBC on HD on 6/8  ASA resumed for CAD  Continue PPI BID   Metoprolol 25 mg BID resumed and HRs improved  Continue daily H&H monitoring; stable thus far  Hemodynamically stable    Leukocytosis  Remains afebrile and clinically stable  Blood cultures negative thus far  Urine culture growing Yeast. Will start  diflucan  CXR on 6/11 suggestive of right atelectasis / PNA  Cont Zosyn for now until cultures finalize.     Multiple skin tears  Per wound care       Dermatitis associated with moisture  Per wound care       Restless leg syndrome  Continue ropinirole       Cardiac arrest  Secondary to hemorrhagic shock from GIB  Hemodynamically stable        ESRD (end stage renal disease) on dialysis  Nephrology following   Usual schedule is TTS  Received HD with midodrine on Saturday 6/8        Debility  PTOT recommending SNF; referrals made      A-fib  Resumed metoprolol  ASA resumed  Patient previously was not on any anticoagulation       Coronary artery disease  ASA resumed by ICU   Metoprolol resumed  Patient was also on plavix; discussed with GI and at this time will continue to hold plavix and have the patient follow up with his cardiologist to determine if he needs to continue to be on plavix or it can be dc'd. Patient states he had cardiac stents placed over 10 years ago.       DM type 2 with diabetic mixed hyperlipidemia  Controlled on low dose correction scale   Cont blood glucose monitoring   BG goal:  Preprandial blood glucose target <140 mg/dL  Random glucoses <180 mg/dL  Avoid hypoglycemia -  Reduce antihyperglycemic therapy when caloric intake is reduced. Avoid insulin stacking as a result of repeated injection of prandial insulin at close intervals.   Avoid severe hyperglycemia  Ensure adequate nutrition   ADA diet        HTN (hypertension)  BP Readings from Last 3 Encounters:   06/14/19 102/70   05/31/19 (!) 108/44   05/30/19 (!) 100/57     Antihypertensives held due to shock initially   BP stable   Metoprolol resumed due to tachycardia which may be due to lack of beta blockage but also due to recent GIB; tachycardia improving   Continue to monitor         Heart failure  Maintain euvolemia by monitoring I/O's and daily weights.  Fluid restriction (2 liters/24 hours)  Low Na diet  Monitor for signs of fluid  overload  Maintain oxygen sats >92% via NC if supplemental oxygen needed.   Volume removal via HD   Metoprolol and ASA resumed    Patient Vitals for the past 72 hrs (Last 3 readings):   Weight   06/14/19 0300 129.8 kg (286 lb 2.5 oz)           VTE Risk Mitigation (From admission, onward)        Ordered     Place sequential compression device  Until discontinued      05/31/19 0650     Reason for No Pharmacological VTE Prophylaxis  Once      05/31/19 0650     IP VTE HIGH RISK PATIENT  Once      05/31/19 0650                Lisha Dickey MD  Department of Hospital Medicine   [facility]

## 2019-06-14 NOTE — ASSESSMENT & PLAN NOTE
Secondary to oozing duodenal ulcer and underwent clipping and epi injection at [facility]. Transfused about 3-4 units with stabilization of H&H.   [place] [facility] [place].   Stepped down to medicine on 6/1.  Started to drop his HCT and became hypotensive with melena on 6/4 and taken back to ICU    The evening of 6/2 into 6/3 the patient had several melanotic stools with slight downtrend in Hgb (10 > 8), however hemodynamics remained stable.   Repeat CTA at that time did not show any evidence of active bleeding, however duodenal clip was noted to have migrated into distal small bowel.   GI with plans to repeat EGD on 6/4.   Underwent EGD on 6/4 which showed: One non-obstructing oozing duodenal ulcer with a visible vessel. There is no evidence of perforation. Injected. Treatment not successful. Treated with bipolar cautery.    Plan now as follows per ICU on stepdown:  - If bleeding worsens refer to interventional radiology for embolization as this has been treated endoscopically twice. Clip placed for marking.     Transfused 1 unit PRBC on HD on 6/8  ASA resumed for CAD  Continue PPI BID   Metoprolol 25 mg BID resumed and HRs improved  Continue daily H&H monitoring; stable thus far  Hemodynamically stable

## 2019-06-14 NOTE — PLAN OF CARE
Spoke with Nara from [facility] informing her that Mr. Zayas is not stable for discharge today. Patient is still on IV Zosyn q 12 and MD is still waiting on final cultures. Nara confirmed that patient's HD is already set-up for T/Th/Sat. She requested that a PPD be placed for admission and she stated she would fax over a TB s/s form to be completed per MD. Will continue to follow.      Joseline Dlil RN  Ext [phone]

## 2019-06-14 NOTE — ASSESSMENT & PLAN NOTE
Maintain euvolemia by monitoring I/O's and daily weights.  Fluid restriction (2 liters/24 hours)  Low Na diet  Monitor for signs of fluid overload  Maintain oxygen sats >92% via NC if supplemental oxygen needed.   Volume removal via HD   Metoprolol and ASA resumed    Patient Vitals for the past 72 hrs (Last 3 readings):   Weight   06/14/19 0300 129.8 kg (286 lb 2.5 oz)

## 2019-06-14 NOTE — ASSESSMENT & PLAN NOTE
BP Readings from Last 3 Encounters:   06/14/19 102/70   05/31/19 (!) 108/44   05/30/19 (!) 100/57     Antihypertensives held due to shock initially   BP stable   Metoprolol resumed due to tachycardia which may be due to lack of beta blockage but also due to recent GIB; tachycardia improving   Continue to monitor

## 2019-06-14 NOTE — PROGRESS NOTES
PharmD Consult received for:    1.) Admit medication history/reconciliation: Yes  Pending     2.) Renally adjust all medications: Yes  Estimated Creatinine Clearance: 21.3 mL/min (A) (based on SCr of 4.1 mg/dL (H)).   Patient is on hemodialysis TTS as outpatient, has been getting MWF while inpatient    Last HD 6/12  Patient will receive HD today.   Fluconazole 200 mg daily will be changed to fluconazole 100 mg daily     Thank you for the consult,  Dolores Echeverria, PharmD  PGY-2 Internal Medicine Pharmacy Resident  EXT [phone]      **Note: Consults are reviewed Monday-Friday 7:00am-3:30pm. The above recommendations are only suggested. The recommendations should be considered in conjunction with all patient factors.**

## 2019-06-14 NOTE — ASSESSMENT & PLAN NOTE
Remains afebrile and clinically stable  Blood cultures negative thus far  Urine culture growing Yeast. Will start diflucan  CXR on 6/11 suggestive of right atelectasis / PNA  Cont Zosyn for now until cultures finalize.

## 2019-06-15 NOTE — PLAN OF CARE
Problem: Adult Inpatient Plan of Care  Goal: Plan of Care Review  Outcome: Ongoing (interventions implemented as appropriate)  No acute events throughout shift.  Vitals and assessment completed ,pt turn 2 q hours,bed in low position and locked.feet elevated,pillow use for  Positioning. Pt calm and cooperative. Call light in reach No complains of pain. Pt free from injury or falls

## 2019-06-15 NOTE — NURSING
Left forearm IV placed on  . PICC team consult and fAua with PICC team came to bs and attempted to insert an IV access but pt refused. LFA IV on  is still flushing without resistance or pain and still remain in pt.

## 2019-06-15 NOTE — PROGRESS NOTES
[facility] Hospital Medicine  Progress Note    Patient Name: Frank Zayas  MRN: [id number]  Patient Class: IP- Inpatient   Admission Date: 5/31/2019  Length of Stay: 15 days  Attending Physician: Lisha Driver MD  Primary Care Provider: Mikhail Shields MD    Hospital Medicine Team: [facility] HOSP MED  Lisha Dickey MD    Subjective:     Principal Problem:GIB (gastrointestinal bleeding)      HPI:  78 yo M with morbid obesity, ESRD on HD (TTS, 2 months), Afib, CHF, DM2 (on insulin), CAD s/p CABG, HLD, COPD, HTN transferred from [facility] ([facility]  He presented with hematochezia and abdominal pain.  He went to [facility] ER for lower GI bleed and received 1 u PRBC and then was transferred to [facility] for cardiac evaluation when trop was 0.39.  At [facility] he had continued hyperkalemia (K of 6.1) after being treated for it at [facility].  He was started on levophed for hypovolemic shock, and an art line and central line was placed.  During line placement, he briefly lost a pulse (PEA) and CPR was started.  He was given epinephrine, calcium gluconate, sodium bicarb, and intubated emergently.  He achieved ROSC.  He received IVF and 3-4 units PRBC.  He underwent emergent upper and lower endoscopy.  They found a large vessel in the duodenum, actively oozing with copious amounts of fresh bright red blood pumping out.  GI placed a clop on the artery and injected epinephrine, achieving hemostasis.  There was concern that the vessel could be directly assosiated with a larger vessel (ie GDA).  There were no varices seen.  Both GI and Gen Surg recommended transfer to [facility] for arteriovenous embolization which couldn't be performed there.       Patient admitted to Critical Care Medicine on 5/31/2019 for evaluation and management of acute upper GI bleed (s/p EGD with clipping of bleeding visible vessel at OSH).  Pt arrived intubated following PEA arrest at OSH.  Interventional Radiology consulted and recommended  CTA abdomen/pelvis, however no evidence of active GI bleeding on imaging.  Gastroenterology consulted and recommended continue IV Protonix gtt for a total of 72 hours.  Nephrology consulted for dialysis in setting of ESRD.  Patient passed SAT/SBT and was successfully extubated on the afternoon of 5/31.  On 6/1, as pt remained hemodynamically stable and his hemoglobin remained stable, pt stepped down to Hospital Medicine.     The evening of 6/2 into 6/3 the patient had several melanotic stools with slight downtrend in Hgb (10 > 8), however hemodynamics remained stable. Repeat CTA at that time did not show any evidence of active bleeding, however duodenal clip was noted to have migrated into distal small bowel. GI with plans to repeat EGD on 6/4.      Around 1am on 6/4, the patient became hypotensive (70s/40s). Bedside POC showed Hct 17. Decision made to move patient back to ICU for further management.        Overview/Hospital Course:  No notes on file    Interval History: No acute events. WBC improved. Became hypotensive during HD yesterday requiring one dose of midodrine. Started on diflucan for yeast in urine. Pending placement to SNF in [place].      Review of Systems   Constitutional: Negative for chills, fatigue and fever.   Respiratory: Negative for cough and shortness of breath.    Cardiovascular: Negative for chest pain and palpitations.   Gastrointestinal: Negative for abdominal pain, blood in stool, diarrhea, nausea and vomiting.   Musculoskeletal: Negative.    Skin: Negative for rash.   Neurological: Negative for light-headedness and headaches.   Psychiatric/Behavioral: Negative for hallucinations. The patient is not nervous/anxious.      Objective:     Vital Signs (Most Recent):  Temp: 97.8 °F (36.6 °C) (06/15/19 0349)  Pulse: 73 (06/15/19 0701)  Resp: 17 (06/15/19 0349)  BP: (!) 132/54 (06/15/19 0349)  SpO2: 100 % (06/15/19 0349) Vital Signs (24h Range):  Temp:  [95.9 °F (35.5 °C)-98.7 °F (37.1 °C)] 97.8  °F (36.6 °C)  Pulse:  [71-81] 73  Resp:  [17-22] 17  SpO2:  [95 %-100 %] 100 %  BP: ()/(44-70) 132/54     Weight: 128.8 kg (283 lb 15.2 oz)  Body mass index is 36.46 kg/m².    Intake/Output Summary (Last 24 hours) at 6/15/2019 0823  Last data filed at 6/15/2019 0000  Gross per 24 hour   Intake 1490 ml   Output 3645 ml   Net -2155 ml      Physical Exam   Constitutional: He is oriented to person, place, and time. He appears well-developed and well-nourished. No distress.   Morbidly obese   Eyes: No scleral icterus.   Cardiovascular: Normal rate.   Pulmonary/Chest: Effort normal and breath sounds normal.   Abdominal: Soft. Bowel sounds are normal. There is no tenderness.   Musculoskeletal: He exhibits edema (trace).   Neurological: He is alert and oriented to person, place, and time.   Skin: No erythema.   Psychiatric: He has a normal mood and affect.   Vitals reviewed.      Significant Labs: All pertinent labs within the past 24 hours have been reviewed.    Significant Imaging: I have reviewed all pertinent imaging results/findings within the past 24 hours.      Assessment/Plan:      * GIB (gastrointestinal bleeding)  Secondary to oozing duodenal ulcer and underwent clipping and epi injection at [facility] facility. Transfused about 3-4 units with stabilization of H&H.   Transferred to [facility] for GI and general surgery eval.   Stepped down to medicine on 6/1.  Started to drop his HCT and became hypotensive with melena on 6/4 and taken back to ICU    The evening of 6/2 into 6/3 the patient had several melanotic stools with slight downtrend in Hgb (10 > 8), however hemodynamics remained stable.   Repeat CTA at that time did not show any evidence of active bleeding, however duodenal clip was noted to have migrated into distal small bowel.   GI with plans to repeat EGD on 6/4.   Underwent EGD on 6/4 which showed: One non-obstructing oozing duodenal ulcer with a visible vessel. There is no evidence of perforation. Injected.  Treatment not successful. Treated with bipolar cautery.    Plan now as follows per ICU on stepdown:  - If bleeding worsens refer to interventional radiology for embolization as this has been treated endoscopically twice. Clip placed for marking.     Transfused 1 unit PRBC on HD on 6/8  ASA resumed for CAD  Continue PPI BID   Metoprolol 25 mg BID resumed and HRs improved  Continue daily H&H monitoring; stable thus far  Hemodynamically stable    Leukocytosis  Improved  Remains afebrile and clinically stable  Blood cultures negative thus far  Urine culture growing Candida albicans. Cont treatment with diflucan  CXR on 6/11 suggestive of right atelectasis / PNA  Cont Zosyn for now until cultures finalize.     Multiple skin tears  Per wound care       Dermatitis associated with moisture  Per wound care       Restless leg syndrome  Continue ropinirole       Cardiac arrest  Secondary to hemorrhagic shock from GIB  Hemodynamically stable        ESRD (end stage renal disease) on dialysis  Nephrology following   Usual schedule is TTS  Received HD with midodrine on Saturday 6/8        Debility  PTOT recommending SNF; referrals made      A-fib  Resumed metoprolol  ASA resumed  Patient previously was not on any anticoagulation       Coronary artery disease  ASA resumed by ICU   Metoprolol resumed  Patient was also on plavix; discussed with GI and at this time will continue to hold plavix and have the patient follow up with his cardiologist to determine if he needs to continue to be on plavix or it can be dc'd. Patient states he had cardiac stents placed over 10 years ago.       DM type 2 with diabetic mixed hyperlipidemia  Controlled on low dose correction scale   Cont blood glucose monitoring   BG goal:  Preprandial blood glucose target <140 mg/dL  Random glucoses <180 mg/dL  Avoid hypoglycemia -  Reduce antihyperglycemic therapy when caloric intake is reduced. Avoid insulin stacking as a result of repeated injection of  prandial insulin at close intervals.   Avoid severe hyperglycemia  Ensure adequate nutrition   ADA diet        HTN (hypertension)  BP Readings from Last 3 Encounters:   06/15/19 (!) 132/54   05/31/19 (!) 108/44   05/30/19 (!) 100/57     Antihypertensives held due to shock initially   BP stable   Metoprolol resumed due to tachycardia which may be due to lack of beta blockage but also due to recent GIB; tachycardia improving   Continue to monitor         Heart failure  Maintain euvolemia by monitoring I/O's and daily weights.  Fluid restriction (2 liters/24 hours)  Low Na diet  Monitor for signs of fluid overload  Maintain oxygen sats >92% via NC if supplemental oxygen needed.   Volume removal via HD   Metoprolol and ASA resumed    Patient Vitals for the past 72 hrs (Last 3 readings):   Weight   06/15/19 0300 128.8 kg (283 lb 15.2 oz)   06/14/19 0300 129.8 kg (286 lb 2.5 oz)           VTE Risk Mitigation (From admission, onward)        Ordered     Place sequential compression device  Until discontinued      05/31/19 0650     Reason for No Pharmacological VTE Prophylaxis  Once      05/31/19 0650     IP VTE HIGH RISK PATIENT  Once      05/31/19 0650                Lisha Dickey MD  Department of Hospital Medicine   [facility]

## 2019-06-15 NOTE — ASSESSMENT & PLAN NOTE
BP Readings from Last 3 Encounters:   06/15/19 (!) 132/54   05/31/19 (!) 108/44   05/30/19 (!) 100/57     Antihypertensives held due to shock initially   BP stable   Metoprolol resumed due to tachycardia which may be due to lack of beta blockage but also due to recent GIB; tachycardia improving   Continue to monitor

## 2019-06-15 NOTE — PROGRESS NOTES
"Pt refused to have the waffle mattress pump up. Pt stated that "I cannot turn when the waffle mattress is pumped up."  "

## 2019-06-15 NOTE — SUBJECTIVE & OBJECTIVE
Interval History: No acute events. WBC improved. Became hypotensive during HD yesterday requiring one dose of midodrine. Started on diflucan for yeast in urine. Pending placement to SNF in [facility].      Review of Systems   Constitutional: Negative for chills, fatigue and fever.   Respiratory: Negative for cough and shortness of breath.    Cardiovascular: Negative for chest pain and palpitations.   Gastrointestinal: Negative for abdominal pain, blood in stool, diarrhea, nausea and vomiting.   Musculoskeletal: Negative.    Skin: Negative for rash.   Neurological: Negative for light-headedness and headaches.   Psychiatric/Behavioral: Negative for hallucinations. The patient is not nervous/anxious.      Objective:     Vital Signs (Most Recent):  Temp: 97.8 °F (36.6 °C) (06/15/19 0349)  Pulse: 73 (06/15/19 0701)  Resp: 17 (06/15/19 0349)  BP: (!) 132/54 (06/15/19 0349)  SpO2: 100 % (06/15/19 0349) Vital Signs (24h Range):  Temp:  [95.9 °F (35.5 °C)-98.7 °F (37.1 °C)] 97.8 °F (36.6 °C)  Pulse:  [71-81] 73  Resp:  [17-22] 17  SpO2:  [95 %-100 %] 100 %  BP: ()/(44-70) 132/54     Weight: 128.8 kg (283 lb 15.2 oz)  Body mass index is 36.46 kg/m².    Intake/Output Summary (Last 24 hours) at 6/15/2019 0823  Last data filed at 6/15/2019 0000  Gross per 24 hour   Intake 1490 ml   Output 3645 ml   Net -2155 ml      Physical Exam   Constitutional: He is oriented to person, place, and time. He appears well-developed and well-nourished. No distress.   Morbidly obese   Eyes: No scleral icterus.   Cardiovascular: Normal rate.   Pulmonary/Chest: Effort normal and breath sounds normal.   Abdominal: Soft. Bowel sounds are normal. There is no tenderness.   Musculoskeletal: He exhibits edema (trace).   Neurological: He is alert and oriented to person, place, and time.   Skin: No erythema.   Psychiatric: He has a normal mood and affect.   Vitals reviewed.      Significant Labs: All pertinent labs within the past 24 hours have been  reviewed.    Significant Imaging: I have reviewed all pertinent imaging results/findings within the past 24 hours.

## 2019-06-15 NOTE — PLAN OF CARE
Problem: Physical Therapy Goal  Goal: Physical Therapy Goal  Goals to be met by: 2019     Patient will increase functional independence with mobility by performin. Supine to sit with CGA - not met  2. Sit to supine with CGA - not met  3. Sit to stand transfer with Contact Guard Assistance - not met  4. Gait  x 50 feet with Supervision using Rolling Walker. - not met  5. Ascend/Descend 4 inch curb step with Supervision using Rolling Walker. - not met        Discharge Recommendations: Skilled Nursing    Pt required SBA for bed mobility/transfers/amb in room /c RW /c RN/PCT for safety.    Goals remain appropriate.     Hunter To, SPTA.    6/15/2019

## 2019-06-15 NOTE — ASSESSMENT & PLAN NOTE
Improved  Remains afebrile and clinically stable  Blood cultures negative thus far  Urine culture growing Candida albicans. Cont treatment with diflucan  CXR on 6/11 suggestive of right atelectasis / PNA  Cont Zosyn for now until cultures finalize.

## 2019-06-15 NOTE — ASSESSMENT & PLAN NOTE
Secondary to oozing duodenal ulcer and underwent clipping and epi injection at [facility] facility. Transfused about 3-4 units with stabilization of H&H.   Transfer[place] [facility] [place] and general surgery eval.   Stepped down to medicine on 6/1.  Started to drop his HCT and became hypotensive with melena on 6/4 and taken back to ICU    The evening of 6/2 into 6/3 the patient had several melanotic stools with slight downtrend in Hgb (10 > 8), however hemodynamics remained stable.   Repeat CTA at that time did not show any evidence of active bleeding, however duodenal clip was noted to have migrated into distal small bowel.   GI with plans to repeat EGD on 6/4.   Underwent EGD on 6/4 which showed: One non-obstructing oozing duodenal ulcer with a visible vessel. There is no evidence of perforation. Injected. Treatment not successful. Treated with bipolar cautery.    Plan now as follows per ICU on stepdown:  - If bleeding worsens refer to interventional radiology for embolization as this has been treated endoscopically twice. Clip placed for marking.     Transfused 1 unit PRBC on HD on 6/8  ASA resumed for CAD  Continue PPI BID   Metoprolol 25 mg BID resumed and HRs improved  Continue daily H&H monitoring; stable thus far  Hemodynamically stable

## 2019-06-15 NOTE — PT/OT/SLP PROGRESS
"Physical Therapy Treatment    Patient Name:  Frank Zayas   MRN:  [id number]  Admitting Diagnosis: GIB (gastrointestinal bleeding)  Recent Surgery: Procedure(s) (LRB):  EGD (ESOPHAGOGASTRODUODENOSCOPY) (N/A) 11 Days Post-Op    Recommendations:     Discharge Recommendations:  nursing facility, skilled   Discharge Equipment Recommendations: none   Barriers to discharge: Decreased caregiver support    Plan:     During this hospitalization, patient to be seen 3 x/week to address the above listed problems via gait training, therapeutic activities, therapeutic exercises, neuromuscular re-education  · Plan of Care Expires:  07/06/19   Plan of Care Reviewed with: patient    This Plan of care has been discussed with the patient who was involved in its development and understands and is in agreement with the identified goals and treatment plan    Subjective     Communicated with RN (Nasim) prior to session.     Patient comments: " I need to get up to use restroom."  Pain/Comfort:  · Pain Rating 1: 0/10    Objective:     Patient found with: peripheral IV(telemetry)  Waffle overlay in place but not inflated.  Patient found supine on his bed upon PT entry to room, agreeable to treatment.     General Precautions: Standard, fall   Orthopedic Precautions:N/A   Braces: N/A       BED MOBILITY (vc's for hand placement sequencing of task):        Rolling to the R:  SBA.       Rolling to the L:  NT.        Sup > sit at the EOB:  SBA for safety.       Sit > sup:  SBA for safety.                SITTING AT THE EDGE OF THE BED    Assistance Level Required: Sup for safety        TRANSFERS  (vc's for hand placement, sequencing of task and safety)   Patient completed Sit <> Stand Transfer from bed/commode with SBA for safety with rolling walker x 2 trial(s)   Patient completed Stand <> Sit Transfer to commode/bed with SBA for safety with rolling walker x 2 trial(s)     GAIT: 2nd person managing IV pole   Patient ambulated: 2 x 12-14 ft (Pt " "refused to perform further gait trials stating "I'll do that at rehab"   Patient required: SBA to CGA for safety   Patient used:  Rolling Walker   Gait Pattern observed: reciprocal gait   Gait Deviation(s): increase gait speed, instability, FFP, increased reliance on AD through UE's   Comments: vc's required to slow down to increase time of double support.                **Pt would stop in the middle of trial and lean against wall to the R going towards restroom.  Another time, he stopped and just leaned heavily on AD stating "I'm about to pass out", however, he did not and was able to make it back to EOB    Pt was encouraged to stay UIC at the end of session, however, he refused.      EDUCATION  Pt was educated on the risks of not having Waffle overlay inflated, however, pt still declined to use it.     Whiteboard updated with correct mobility information. RN/PCT notified.  Pt safe to transfer OOB/BTB and amb short distances with RN/PCT using RW with CGA of 1 person    Patient left supine, with  all lines intact, call button in reach and RN present    AM-PAC 6 CLICK MOBILITY  Turning over in bed (including adjusting bedclothes, sheets and blankets)?: 3  Sitting down on and standing up from a chair with arms (e.g., wheelchair, bedside commode, etc.): 3  Moving from lying on back to sitting on the side of the bed?: 3  Moving to and from a bed to a chair (including a wheelchair)?: 3  Need to walk in hospital room?: 3  Climbing 3-5 steps with a railing?: 2  Basic Mobility Total Score: 17     Assessment:     Frank Zayas is a 79 y.o. male admitted with a medical diagnosis of GIB (gastrointestinal bleeding).  He presents with the following impairments/functional limitations:  impaired endurance, weakness, impaired functional mobilty, decreased coordination, decreased safety awareness, impaired cardiopulmonary response to activity. Pt demonstrated functional activities and amb with mild impulsivity and lack of " coordination to perform task. Pt requires vc's and CGA for bed mobility/ transfers/ amb for safety and remains at fall risk. Pt would cont to benefit from skilled PT intervention to address deficits and progress to independence.      Rehab Prognosis:  Fair; patient would benefit from acute skilled PT services to address these deficits and reach maximum level of function.      GOALS:   Multidisciplinary Problems     Physical Therapy Goals        Problem: Physical Therapy Goal    Goal Priority Disciplines Outcome Goal Variances Interventions   Physical Therapy Goal     PT, PT/OT Ongoing (interventions implemented as appropriate)     Description:  Goals to be met by: 2019     Patient will increase functional independence with mobility by performin. Supine to sit with CGA - not met  2. Sit to supine with CGA - not met  3. Sit to stand transfer with Contact Guard Assistance - not met  4. Gait  x 50 feet with Supervision using Rolling Walker. - not met  5. Ascend/Descend 4 inch curb step with Supervision using Rolling Walker. - not met                       Time Tracking:     PT Received On: 06/15/19  PT Start Time: 1406     PT Stop Time: 1435  PT Total Time (min): 29 min     Billable Minutes: Gait Training 12 and Therapeutic Activity 17    Treatment Type: Treatment  PT/PTA: PTA     PTA Visit Number: 2       Hunter To, SPTA    6/15/2019    .

## 2019-06-15 NOTE — PROGRESS NOTES
Wound care on bilateral extremities cleaned and new dressings applied as ordered. Buttocks cleaned with cleansing wipe with chlorhexidine and skin barrier applied as needed per BM and as order.

## 2019-06-15 NOTE — PT/OT/SLP PROGRESS
Physical Therapy  Pt Not Seen    Patient Name:  Frank Zayas   MRN:  [id number]    Patient not seen today secondary to pt MAURO at  Dialysis. Will follow-up on next scheduled visit.    Malini Aparicio, PTA  6/14/2019

## 2019-06-15 NOTE — ASSESSMENT & PLAN NOTE
Maintain euvolemia by monitoring I/O's and daily weights.  Fluid restriction (2 liters/24 hours)  Low Na diet  Monitor for signs of fluid overload  Maintain oxygen sats >92% via NC if supplemental oxygen needed.   Volume removal via HD   Metoprolol and ASA resumed    Patient Vitals for the past 72 hrs (Last 3 readings):   Weight   06/15/19 0300 128.8 kg (283 lb 15.2 oz)   06/14/19 0300 129.8 kg (286 lb 2.5 oz)

## 2019-06-16 NOTE — ASSESSMENT & PLAN NOTE
Secondary to oozing duodenal ulcer and underwent clipping and epi injection at [facility] facility. Transfused about 3-4 units with stabilization of H&H.   [place] [facility] [place].   Stepped down to medicine on 6/1.  Started to drop his HCT and became hypotensive with melena on 6/4 and taken back to ICU    The evening of 6/2 into 6/3 the patient had several melanotic stools with slight downtrend in Hgb (10 > 8), however hemodynamics remained stable.   Repeat CTA at that time did not show any evidence of active bleeding, however duodenal clip was noted to have migrated into distal small bowel.   GI with plans to repeat EGD on 6/4.   Underwent EGD on 6/4 which showed: One non-obstructing oozing duodenal ulcer with a visible vessel. There is no evidence of perforation. Injected. Treatment not successful. Treated with bipolar cautery.    Plan now as follows per ICU on stepdown:  - If bleeding worsens refer to interventional radiology for embolization as this has been treated endoscopically twice. Clip placed for marking.     Transfused 1 unit PRBC on HD on 6/8  ASA resumed for CAD  Continue PPI BID   Metoprolol 25 mg BID resumed and HRs improved  Continue daily H&H monitoring; stable thus far  Hemodynamically stable

## 2019-06-16 NOTE — NURSING
Patient's family members brought patient food and the patient ate without having blood glucose checked.

## 2019-06-16 NOTE — PHARMACY MED REC
"Admission Medication Reconciliation - Pharmacy Consult Note    The home medication history was taken by Christina Rivera Pharmacy Tech.     Based on information gathered and subsequent review by the clinical pharmacist, the items below may need attention.     You may go to "Admission" then "Reconcile Home Medications" tabs to review and/or act upon these items.     Potentially problematic discrepancies with current MAR  o Patient IS taking the following which was not ordered upon admit  o Alfuzosin 10 mg PO nightly  o clopidogrel 75 mg PO daily (holding per discussion w/ GI)  o Ezetimibe 10 mg PO nightly  o Ferrous sulfate 325 mg PO BID w/ meals  o Gabapentin 100 mg PO nightly prn  o Hydralazine 25 mg PO BID  o Patient IS NOT taking the following which was ordered upon admit  o Ropinirole   o Patient is taking a drug DIFFERENTLY than how ordered upon admit  o Fluoxetine 20 mg PO BID  o Metoprolol succinate 50 mg PO BID      Please address this information as you see fit.  Feel free to contact us if you have any questions or require assistance.    Kike Nesbitt, Pharm.D., BCPS  [id number]                    .    .            "

## 2019-06-16 NOTE — NURSING
Dressing intact,buttock clean and barrier cream placed,turned and reposition,on going care thur out the night,no c/o pain, call in reach.

## 2019-06-16 NOTE — ASSESSMENT & PLAN NOTE
Resolved  Blood cultures negative   Urine culture growing Candida albicans and most likely etiology of his leukocytosis  Cont treatment with diflucan x 14 days  CXR on 6/11 suggestive of right atelectasis / PNA and treated with zosyn  DC zosyn   Remains afebrile and clinically stable

## 2019-06-16 NOTE — SUBJECTIVE & OBJECTIVE
Interval History: No acute events. Leukocytosis resolved and remains afebrile. Awaiting placement.     Review of Systems   Constitutional: Negative for chills, fatigue and fever.   Respiratory: Negative for cough and shortness of breath.    Cardiovascular: Negative for chest pain and palpitations.   Gastrointestinal: Negative for abdominal pain, blood in stool, diarrhea, nausea and vomiting.   Musculoskeletal: Negative.    Skin: Negative for rash.   Neurological: Negative for light-headedness and headaches.   Psychiatric/Behavioral: Negative for hallucinations. The patient is not nervous/anxious.      Objective:     Vital Signs (Most Recent):  Temp: 97.9 °F (36.6 °C) (06/15/19 1956)  Pulse: 74 (06/16/19 0720)  Resp: 19 (06/16/19 0720)  BP: 125/72 (06/16/19 0720)  SpO2: 100 % (06/16/19 0720) Vital Signs (24h Range):  Temp:  [97.3 °F (36.3 °C)-97.9 °F (36.6 °C)] 97.9 °F (36.6 °C)  Pulse:  [54-76] 74  Resp:  [18-20] 19  SpO2:  [94 %-100 %] 100 %  BP: (123-136)/(72-80) 125/72     Weight: 119.5 kg (263 lb 7.2 oz)  Body mass index is 33.82 kg/m².    Intake/Output Summary (Last 24 hours) at 6/16/2019 1022  Last data filed at 6/16/2019 0413  Gross per 24 hour   Intake 360 ml   Output --   Net 360 ml      Physical Exam   Constitutional: He is oriented to person, place, and time. He appears well-developed and well-nourished. No distress.   Morbidly obese   Eyes: No scleral icterus.   Cardiovascular: Normal rate.   Pulmonary/Chest: Effort normal and breath sounds normal.   Abdominal: Soft. Bowel sounds are normal. There is no tenderness.   Musculoskeletal: He exhibits edema (trace).   Neurological: He is alert and oriented to person, place, and time.   Skin: No erythema.   Psychiatric: He has a normal mood and affect.   Vitals reviewed.      Significant Labs: All pertinent labs within the past 24 hours have been reviewed.    Significant Imaging: I have reviewed all pertinent imaging results/findings within the past 24 hours.

## 2019-06-16 NOTE — PLAN OF CARE
Problem: Adult Inpatient Plan of Care  Goal: Plan of Care Review  Outcome: Ongoing (interventions implemented as appropriate)  No acute events throughout shift. Bed in low position and locked,each q 2 turn results with pt having uncontrol  bm,,buttocks clean and dry barrier cream applied, Vitals and assessment completed as ordered. Pt calm and cooperative. No complains of pain. Pt free from injury or falls

## 2019-06-16 NOTE — PROGRESS NOTES
[facility] Hospital Medicine  Progress Note    Patient Name: Frank Zayas  MRN: [id number]  Patient Class: IP- Inpatient   Admission Date: 5/31/2019  Length of Stay: 16 days  Attending Physician: Lisha Driver MD  Primary Care Provider: Mikhail Shields MD    Hospital Medicine [place] [facility] Sofie Driver MD    Subjective:     Principal Problem:GIB (gastrointestinal bleeding)      HPI:  78 yo M with morbid obesity, ESRD on HD (TTS, 2 months), Afib, CHF, DM2 (on insulin), CAD s/p CABG, HLD, COPD, HTN transferred from [facility] ([facility]  He presented with hematochezia and abdominal pain.  He went to [facility] ER for lower GI bleed and received 1 u PRBC and then was transferred to [facility] for cardiac evaluation when trop was 0.39.  At [facility] he had continued hyperkalemia (K of 6.1) after being treated for it at [facility].  He was started on levophed for hypovolemic shock, and an art line and central line was placed.  During line placement, he briefly lost a pulse (PEA) and CPR was started.  He was given epinephrine, calcium gluconate, sodium bicarb, and intubated emergently.  He achieved ROSC.  He received IVF and 3-4 units PRBC.  He underwent emergent upper and lower endoscopy.  They found a large vessel in the duodenum, actively oozing with copious amounts of fresh bright red blood pumping out.  GI placed a clop on the artery and injected epinephrine, achieving hemostasis.  There was concern that the vessel could be directly assosiated with a larger vessel (ie GDA).  There were no varices seen.  Both GI and Gen Surg recommended [place] [facility] [place]iovenous embolization which couldn't be performed there.       Patient admitted to Critical Care Medicine on 5/31/2019 for evaluation and management of acute upper GI bleed (s/p EGD with clipping of bleeding visible vessel at OSH).  Pt arrived intubated following PEA arrest at OSH.  Interventional Radiology consulted and  recommended CTA abdomen/pelvis, however no evidence of active GI bleeding on imaging.  Gastroenterology consulted and recommended continue IV Protonix gtt for a total of 72 hours.  Nephrology consulted for dialysis in setting of ESRD.  Patient passed SAT/SBT and was successfully extubated on the afternoon of 5/31.  On 6/1, as pt remained hemodynamically stable and his hemoglobin remained stable, pt stepped down to Hospital Medicine.     The evening of 6/2 into 6/3 the patient had several melanotic stools with slight downtrend in Hgb (10 > 8), however hemodynamics remained stable. Repeat CTA at that time did not show any evidence of active bleeding, however duodenal clip was noted to have migrated into distal small bowel. GI with plans to repeat EGD on 6/4.      Around 1am on 6/4, the patient became hypotensive (70s/40s). Bedside POC showed Hct 17. Decision made to move patient back to ICU for further management.        Overview/Hospital Course:  No notes on file    Interval History: No acute events. Leukocytosis resolved and remains afebrile. Awaiting placement.     Review of Systems   Constitutional: Negative for chills, fatigue and fever.   Respiratory: Negative for cough and shortness of breath.    Cardiovascular: Negative for chest pain and palpitations.   Gastrointestinal: Negative for abdominal pain, blood in stool, diarrhea, nausea and vomiting.   Musculoskeletal: Negative.    Skin: Negative for rash.   Neurological: Negative for light-headedness and headaches.   Psychiatric/Behavioral: Negative for hallucinations. The patient is not nervous/anxious.      Objective:     Vital Signs (Most Recent):  Temp: 97.9 °F (36.6 °C) (06/15/19 1956)  Pulse: 74 (06/16/19 0720)  Resp: 19 (06/16/19 0720)  BP: 125/72 (06/16/19 0720)  SpO2: 100 % (06/16/19 0720) Vital Signs (24h Range):  Temp:  [97.3 °F (36.3 °C)-97.9 °F (36.6 °C)] 97.9 °F (36.6 °C)  Pulse:  [54-76] 74  Resp:  [18-20] 19  SpO2:  [94 %-100 %] 100 %  BP:  (123-136)/(72-80) 125/72     Weight: 119.5 kg (263 lb 7.2 oz)  Body mass index is 33.82 kg/m².    Intake/Output Summary (Last 24 hours) at 6/16/2019 1022  Last data filed at 6/16/2019 0413  Gross per 24 hour   Intake 360 ml   Output --   Net 360 ml      Physical Exam   Constitutional: He is oriented to person, place, and time. He appears well-developed and well-nourished. No distress.   Morbidly obese   Eyes: No scleral icterus.   Cardiovascular: Normal rate.   Pulmonary/Chest: Effort normal and breath sounds normal.   Abdominal: Soft. Bowel sounds are normal. There is no tenderness.   Musculoskeletal: He exhibits edema (trace).   Neurological: He is alert and oriented to person, place, and time.   Skin: No erythema.   Psychiatric: He has a normal mood and affect.   Vitals reviewed.      Significant Labs: All pertinent labs within the past 24 hours have been reviewed.    Significant Imaging: I have reviewed all pertinent imaging results/findings within the past 24 hours.      Assessment/Plan:      * GIB (gastrointestinal bleeding)  Secondary to oozing duodenal ulcer and underwent clipping and epi injection at [facility] facility. Transfused about 3-4 units with stabilization of H&H.   [place] [facility] [place] and general [place] eval.   Stepped down to medicine on 6/1.  Started to drop his HCT and became hypotensive with melena on 6/4 and taken back to ICU    The evening of 6/2 into 6/3 the patient had several melanotic stools with slight downtrend in Hgb (10 > 8), however hemodynamics remained stable.   Repeat CTA at that time did not show any evidence of active bleeding, however duodenal clip was noted to have migrated into distal small bowel.   GI with plans to repeat EGD on 6/4.   Underwent EGD on 6/4 which showed: One non-obstructing oozing duodenal ulcer with a visible vessel. There is no evidence of perforation. Injected. Treatment not successful. Treated with bipolar cautery.    Plan now as follows per ICU on  stepdown:  - If bleeding worsens refer to interventional radiology for embolization as this has been treated endoscopically twice. Clip placed for marking.     Transfused 1 unit PRBC on HD on 6/8  ASA resumed for CAD  Continue PPI BID   Metoprolol 25 mg BID resumed and HRs improved  Continue daily H&H monitoring; stable thus far  Hemodynamically stable    Leukocytosis  Resolved  Blood cultures negative   Urine culture growing Candida albicans and most likely etiology of his leukocytosis  Cont treatment with diflucan x 14 days  CXR on 6/11 suggestive of right atelectasis / PNA and treated with zosyn  DC zosyn   Remains afebrile and clinically stable      Multiple skin tears  Per wound care       Dermatitis associated with moisture  Per wound care       Restless leg syndrome  Continue ropinirole       Cardiac arrest  Secondary to hemorrhagic shock from GIB  Hemodynamically stable        ESRD (end stage renal disease) on dialysis  Nephrology following   Usual schedule is TTS  Received HD with midodrine on Saturday 6/8        Debility  PTOT recommending SNF; referrals made      A-fib  Resumed metoprolol  ASA resumed  Patient previously was not on any anticoagulation       Coronary artery disease  ASA resumed by ICU   Metoprolol resumed  Patient was also on plavix; discussed with GI and at this time will continue to hold plavix and have the patient follow up with his cardiologist to determine if he needs to continue to be on plavix or it can be dc'd. Patient states he had cardiac stents placed over 10 years ago.       DM type 2 with diabetic mixed hyperlipidemia  Controlled on low dose correction scale   Cont blood glucose monitoring   BG goal:  Preprandial blood glucose target <140 mg/dL  Random glucoses <180 mg/dL  Avoid hypoglycemia -  Reduce antihyperglycemic therapy when caloric intake is reduced. Avoid insulin stacking as a result of repeated injection of prandial insulin at close intervals.   Avoid severe  hyperglycemia  Ensure adequate nutrition   ADA diet        HTN (hypertension)  BP Readings from Last 3 Encounters:   06/15/19 (!) 132/54   05/31/19 (!) 108/44   05/30/19 (!) 100/57     Antihypertensives held due to shock initially   BP stable   Metoprolol resumed due to tachycardia which may be due to lack of beta blockage but also due to recent GIB; tachycardia improving   Continue to monitor         Heart failure  Maintain euvolemia by monitoring I/O's and daily weights.  Fluid restriction (2 liters/24 hours)  Low Na diet  Monitor for signs of fluid overload  Maintain oxygen sats >92% via NC if supplemental oxygen needed.   Volume removal via HD   Metoprolol and ASA resumed    Patient Vitals for the past 72 hrs (Last 3 readings):   Weight   06/15/19 0300 128.8 kg (283 lb 15.2 oz)   06/14/19 0300 129.8 kg (286 lb 2.5 oz)           VTE Risk Mitigation (From admission, onward)        Ordered     Place sequential compression device  Until discontinued      05/31/19 0650     Reason for No Pharmacological VTE Prophylaxis  Once      05/31/19 0650     IP VTE HIGH RISK PATIENT  Once      05/31/19 0650                Sofie Driver MD  Department of Hospital Medicine   [facility]

## 2019-06-16 NOTE — PLAN OF CARE
Problem: Adult Inpatient Plan of Care  Goal: Plan of Care Review  Outcome: Ongoing (interventions implemented as appropriate)  Patient had no falls or injuries on day of care. Blood glucose was checked and patient was educated and verbalized understanding. Patient turned q2 with encouragement to prevent break down. All personal belongings within reach and bed locked and in lowest position. Will continue to monitor.

## 2019-06-17 NOTE — ASSESSMENT & PLAN NOTE
Secondary to oozing duodenal ulcer and underwent clipping and epi injection at [facility] facility. Transfused about 3-4 units with stabilization of H&H.   Transferred to [facility] for GI and general surgery eval.   Stepped down to medicine on 6/1.  Started to drop his HCT and became hypotensive with melena on 6/4 and taken back to ICU    The evening of 6/2 into 6/3 the patient had several melanotic stools with slight downtrend in Hgb (10 > 8), however hemodynamics remained stable.   Repeat CTA at that time did not show any evidence of active bleeding, however duodenal clip was noted to have migrated into distal small bowel.   GI with plans to repeat EGD on 6/4.   Underwent EGD on 6/4 which showed: One non-obstructing oozing duodenal ulcer with a visible vessel. There is no evidence of perforation. Injected. Treatment not successful. Treated with bipolar cautery.    Plan now as follows per ICU on stepdown:  - If bleeding worsens refer to interventional radiology for embolization as this has been treated endoscopically twice. Clip placed for marking.     Transfused 1 unit PRBC on HD on 6/8  ASA resumed for CAD  Continue PPI BID   Metoprolol 25 mg BID resumed and HRs improved  Continue daily H&H monitoring; stable thus far  Hemodynamically stable

## 2019-06-17 NOTE — PROGRESS NOTES
[facility] HEMODIALYSIS NOTE     Patient currently on hemodialysis for removal of uremic toxins and volume.     Patient seen and evaluated on hemodialysis, tolerating treatment, see HD flowsheet for vitals and assessments.      Ultrafiltration goal is 3L as tolerated, keep map >65     Labs have been reviewed and the dialysate bath has been adjusted.     Assessment/Plan:    -Patient seen on HD, tolerating treatment well, w/o complaints on 3L nasal canula   -'s, 3+ pitting edema to bilateral lower extremities, ordered 10mg midodrine to help pt tolerate goal UG of 3L   -Renal diet  -Strict I/O's and daily weights  -Daily renal function panels  -Hbg 8.5, continue Epo with HD  -Phos 6.8, continue renvela with meals   -Will continue to follow while inpatient       INES England, AGNP-C  Nephrology  Pager:  [phone]

## 2019-06-17 NOTE — HOSPITAL COURSE
The patient was transferred from [facility] for UGIB. He presented with PEA arrest due to hemorrhagic shock from GIB. He was found to have a bleeding vessel in the duodenum and received about 3-4 units of blood. He was transferred to [facility] for GI and general surgery evaluation and possible need for arterial embolization on 5/31. CTA was done and showed no active bleeding. GI was also consulted and he was continued on a PPI gtt x 72 hrs. He was extubated on 5/31 and transferred to medicine on 6/1. A few days while on the medicine service he stated to have melena with drop in his H&H again and hypotension. CT did not show active bleeding but that the duodenal clip migrated. He was transferred back to the ICU and underwent EGD on 6/4 which showed non-obstructing oozing ulcer with a visible vessel. It was injected and treated with bipolar cautery. ICU recommended IR embolization. He did not have any other re-bleeding. He did require an additional unit on 6/8. ASA was resumed prior to step down from ICU. Discussed with GI regarding resuming plavix and GI advised that he follow up with his cardiologist to determine if plavix is needed and when to resume. He was continued on PPI BID and his metoprolol was resumed with iiprovements in his HRs. He had a mild leukocytosis but was without fever. Patient still made urine and he grew yeast in his urine for which he started treatment with fluconazole with resolution of his leukocytosis. He worked with PTOT and was recommended to SNF and was accepted to a SNF in [place].

## 2019-06-17 NOTE — PT/OT/SLP PROGRESS
Physical Therapy      Patient Name:  Frank Zayas   MRN:  [id number]    Patient not seen today secondary to Dialysis, Other (Comment)(Pt away to dialysis on first attempt in AM. Pt with d/c orders to SNF present in PM. PT to follow up with pt per POC if pt fails to discharge from hospital this day. ).    Tomas Mckenzie, PTA

## 2019-06-17 NOTE — PLAN OF CARE
06/17/19 1439   Post-Acute Status   Post-Acute Authorization Placement   Post-Acute Placement Status Set-up Complete     SRINIVAS[facility] reported they needed updated orders and PPD. SRINIVASA sent the requested information via [employer].  SRINIVASA followed up with Shirin German again and Tiera reported they can accept Patient today. SRNIIVASA arranged wheelchair transport via [facility]. Requested  time is 4:00 p.m. Requested  time does not guarantee arrival time.  Nurse can call report to [phone] Station 1, Room 14.  SW called Patient's wife and left a message requesting a call back. SW spoke with Patient's son and informed him Patient will be going to [facility] today.     Merle Ortiz LMSW  [facility]   Ext. [phone]

## 2019-06-17 NOTE — ASSESSMENT & PLAN NOTE
Maintain euvolemia by monitoring I/O's and daily weights.  Fluid restriction (2 liters/24 hours)  Low Na diet  Monitor for signs of fluid overload  Maintain oxygen sats >92% via NC if supplemental oxygen needed.   Volume removal via HD   Metoprolol and ASA resumed    Patient Vitals for the past 72 hrs (Last 3 readings):   Weight   06/17/19 0300 120.4 kg (265 lb 6.9 oz)   06/16/19 0300 119.5 kg (263 lb 7.2 oz)   06/15/19 0300 128.8 kg (283 lb 15.2 oz)

## 2019-06-17 NOTE — DISCHARGE SUMMARY
[facility] Hospital Medicine  Discharge Summary      Patient Name: Frank Zayas  MRN: [id number]  Admission Date: 5/31/2019  Hospital Length of Stay: 17 days  Discharge Date and Time:  06/17/2019 12:53 PM  Attending Physician: Sofie Driver MD   Discharging Provider: Sofie Driver MD  Primary Care Provider: Mikhail Shields MD  Hospital Medicine Team: [facility] HOSP MED  Sofie Driver MD    HPI:   80 yo M with morbid obesity, ESRD on HD (TTS, 2 months), Afib, CHF, DM2 (on insulin), CAD s/p CABG, HLD, COPD, HTN transferred from [facility] ([facility]).  He presented with hematochezia and abdominal pain.  He went to [facility] ER for lower GI bleed and received 1 u PRBC and then was transferred to [facility] for cardiac evaluation when trop was 0.39.  At [facility] he had continued hyperkalemia (K of 6.1) after being treated for it at [facility].  He was started on levophed for hypovolemic shock, and an art line and central line was placed.  During line placement, he briefly lost a pulse (PEA) and CPR was started.  He was given epinephrine, calcium gluconate, sodium bicarb, and intubated emergently.  He achieved ROSC.  He received IVF and 3-4 units PRBC.  He underwent emergent upper and lower endoscopy.  They found a large vessel in the duodenum, actively oozing with copious amounts of fresh bright red blood pumping out.  GI placed a clop on the artery and injected epinephrine, achieving hemostasis.  There was concern that the vessel could be directly assosiated with a larger vessel (ie GDA).  There were no varices seen.  Both GI and Gen Surg recommended transfer to [facility] for arteriovenous embolization which couldn't be performed there.       Patient admitted to Critical Care Medicine on 5/31/2019 for evaluation and management of acute upper GI bleed (s/p EGD with clipping of bleeding visible vessel at OSH).  Pt arrived intubated following PEA arrest at OSH.  Interventional Radiology consulted and  recommended CTA abdomen/pelvis, however no evidence of active GI bleeding on imaging.  Gastroenterology consulted and recommended continue IV Protonix gtt for a total of 72 hours.  Nephrology consulted for dialysis in setting of ESRD.  Patient passed SAT/SBT and was successfully extubated on the afternoon of 5/31.  On 6/1, as pt remained hemodynamically stable and his hemoglobin remained stable, pt stepped down to Hospital Medicine.     The evening of 6/2 into 6/3 the patient had several melanotic stools with slight downtrend in Hgb (10 > 8), however hemodynamics remained stable. Repeat CTA at that time did not show any evidence of active bleeding, however duodenal clip was noted to have migrated into distal small bowel. GI with plans to repeat EGD on 6/4.      Around 1am on 6/4, the patient became hypotensive (70s/40s). Bedside POC showed Hct 17. Decision made to move patient back to ICU for further management.        Procedure(s) (LRB):  EGD (ESOPHAGOGASTRODUODENOSCOPY) (N/A)      Hospital Course:   The patient was transferred from [facility] for UGIB. He presented with PEA arrest due to hemorrhagic shock from GIB. He was found to have a bleeding vessel in the duodenum and received about 3-4 units of blood. He was transferred to [facility] for GI and general surgery evaluation and possible need for arterial embolization on 5/31. CTA was done and showed no active bleeding. GI was also consulted and he was continued on a PPI gtt x 72 hrs. He was extubated on 5/31 and transferred to medicine on 6/1. A few days while on the medicine service he stated to have melena with drop in his H&H again and hypotension. CT did not show active bleeding but that the duodenal clip migrated. He was transferred back to the ICU and underwent EGD on 6/4 which showed non-obstructing oozing ulcer with a visible vessel. It was injected and treated with bipolar cautery. ICU recommended IR embolization. He did not have any other re-bleeding.  He did require an additional unit on 6/8. ASA was resumed prior to step down from ICU. Discussed with GI regarding resuming plavix and GI advised that he follow up with his cardiologist to determine if plavix is needed and when to resume. He was continued on PPI BID and his metoprolol was resumed with iiprovements in his HRs. He had a mild leukocytosis but was without fever. Patient still made urine and he grew yeast in his urine for which he started treatment with fluconazole with resolution of his leukocytosis. He worked with PTOT and was recommended to SNF and was accepted to a SNF in [place].      Consults:   Consults (From admission, onward)        Status Ordering Provider     Gastroenterology  Once     Provider:  (Not yet assigned)    Completed KHAN, BEHRAM     Inpatient consult to Cardiology  Once     Provider:  (Not yet assigned)    Completed ELMER RUSH     Inpatient consult to Critical Care Medicine  Once     Provider:  (Not yet assigned)    Completed PETE MENDEZ     Inpatient consult to Interventional Radiology  Once     Provider:  (Not yet assigned)    Completed KHAN, BEHRAM     Inpatient consult to Nephrology  Once     Provider:  (Not yet assigned)    Completed KHAN, BEHRAM     Inpatient consult to PICC team (NIAS)  Once     Provider:  (Not yet assigned)    Completed BENIGNO CHAUHAN     Inpatient consult to PICC team (NIAS)  Once     Provider:  (Not yet assigned)    Completed BENIGNO CHAUHAN     Pharmacy to dose Vancomycin consult  Once     Provider:  (Not yet assigned)    Completed LENNOX OVALLES          * GIB (gastrointestinal bleeding)  Secondary to oozing duodenal ulcer and [facility] Transfused about 3-4 units with stabilization of H&H.   Transferred to [facility] for GI and general surgery eval.   Stepped down to medicine on 6/1.  Started to drop his HCT and became hypotensive with melena on 6/4 and taken back to ICU    The evening of 6/2 into  6/3 the patient had several melanotic stools with slight downtrend in Hgb (10 > 8), however hemodynamics remained stable.   Repeat CTA at that time did not show any evidence of active bleeding, however duodenal clip was noted to have migrated into distal small bowel.   GI with plans to repeat EGD on 6/4.   Underwent EGD on 6/4 which showed: One non-obstructing oozing duodenal ulcer with a visible vessel. There is no evidence of perforation. Injected. Treatment not successful. Treated with bipolar cautery.    Plan now as follows per ICU on stepdown:  - If bleeding worsens refer to interventional radiology for embolization as this has been treated endoscopically twice. Clip placed for marking.     Transfused 1 unit PRBC on HD on 6/8  ASA resumed for CAD  Continue PPI BID   Metoprolol 25 mg BID resumed and HRs improved  Continue daily H&H monitoring; stable thus far  Hemodynamically stable    Leukocytosis  Resolved  Blood cultures negative   Urine culture growing Candida albicans and most likely etiology of his leukocytosis  Cont treatment with diflucan x 14 days  CXR on 6/11 suggestive of right atelectasis / PNA and treated with zosyn  DC zosyn   Remains afebrile and clinically stable      Multiple skin tears  Per wound care       Dermatitis associated with moisture  Per wound care       Restless leg syndrome  Continue ropinirole       Cardiac arrest  Secondary to hemorrhagic shock from GIB  Hemodynamically stable        ESRD (end stage renal disease) on dialysis  Nephrology following   Usual schedule is TTS  Received HD with midodrine on Saturday 6/8        Debility  PTOT recommending SNF; referrals made      A-fib  Resumed metoprolol  ASA resumed  Patient previously was not on any anticoagulation       Coronary artery disease  ASA resumed by ICU   Metoprolol resumed  Patient was also on plavix; discussed with GI and at this time will continue to hold plavix and have the patient follow up with his cardiologist to  determine if he needs to continue to be on plavix or it can be dc'd. Patient states he had cardiac stents placed over 10 years ago.       DM type 2 with diabetic mixed hyperlipidemia  Controlled on low dose correction scale   Cont blood glucose monitoring   BG goal:  Preprandial blood glucose target <140 mg/dL  Random glucoses <180 mg/dL  Avoid hypoglycemia -  Reduce antihyperglycemic therapy when caloric intake is reduced. Avoid insulin stacking as a result of repeated injection of prandial insulin at close intervals.   Avoid severe hyperglycemia  Ensure adequate nutrition   ADA diet        HTN (hypertension)  BP Readings from Last 3 Encounters:   06/17/19 (!) 100/58   05/31/19 (!) 108/44   05/30/19 (!) 100/57     Antihypertensives held due to shock initially   BP stable   Metoprolol resumed due to tachycardia which may be due to lack of beta blockage but also due to recent GIB; tachycardia improving   Continue to monitor         Heart failure  Maintain euvolemia by monitoring I/O's and daily weights.  Fluid restriction (2 liters/24 hours)  Low Na diet  Monitor for signs of fluid overload  Maintain oxygen sats >92% via NC if supplemental oxygen needed.   Volume removal via HD   Metoprolol and ASA resumed    Patient Vitals for the past 72 hrs (Last 3 readings):   Weight   06/17/19 0300 120.4 kg (265 lb 6.9 oz)   06/16/19 0300 119.5 kg (263 lb 7.2 oz)   06/15/19 0300 128.8 kg (283 lb 15.2 oz)           Final Active Diagnoses:    Diagnosis Date Noted POA    PRINCIPAL PROBLEM:  GIB (gastrointestinal bleeding) [K92.2] 05/30/2019 Yes    Leukocytosis [D72.829] 06/11/2019 No    Dermatitis associated with moisture [L30.8] 06/03/2019 Yes    Multiple skin tears [T14.8XXA] 06/03/2019 Yes    Restless leg syndrome [G25.81] 06/01/2019 Yes    ESRD (end stage renal disease) on dialysis [N18.6, Z99.2] 05/31/2019 Not Applicable    Cardiac arrest [I46.9] 05/31/2019 Yes    A-fib [I48.91] 05/05/2019 Yes    DM type 2 with  diabetic mixed hyperlipidemia [E11.69, E78.2] 05/05/2019 Yes    Coronary artery disease [I25.10] 05/05/2019 Yes    HTN (hypertension) [I10] 05/05/2019 Yes    Debility [R53.81] 05/05/2019 Yes    Heart failure [I50.9] 05/04/2019 Yes      Problems Resolved During this Admission:    Diagnosis Date Noted Date Resolved POA    Aspiration pneumonia [J69.0] 06/01/2019 06/02/2019 Yes    Acute gastrointestinal bleeding [K92.2] 05/31/2019 06/02/2019 Yes    Elevated troponin I level [R74.8] 05/05/2019 06/02/2019 Unknown       Discharged Condition: good    Disposition: Skilled Nursing Facility    Follow Up:    Patient Instructions:   No discharge procedures on file.    Significant Diagnostic Studies: see endoscopy and imaging     Pending Diagnostic Studies:     Procedure Component Value Units Date/Time    CBC auto differential [[id number]] Collected:  06/12/19 0800    Order Status:  Sent Lab Status:  In process Updated:  06/12/19 0800    Specimen:  Blood     Narrative:       Collection has been rescheduled by LARRY at 06/11/2019 17:57 Reason:   patient refused to both Araseli RN and myself. She is calling doctor    CBC auto differential [[id number]] Collected:  06/07/19 1755    Order Status:  Sent Lab Status:  In process Updated:  06/07/19 1756    Specimen:  Blood     CBC auto differential [[id number]] Collected:  05/31/19 0658    Order Status:  Sent Lab Status:  In process Updated:  05/31/19 0659    Specimen:  Blood     Lactic acid, plasma [[id number]] Collected:  05/31/19 0658    Order Status:  Sent Lab Status:  In process Updated:  05/31/19 0659    Specimen:  Blood     Protime-INR [[id number]] Collected:  05/31/19 0658    Order Status:  Sent Lab Status:  In process Updated:  05/31/19 0659    Specimen:  Blood          Medications:  Reconciled Home Medications:      Medication List      START taking these medications    fluconazole 100 MG tablet  Commonly known as:  DIFLUCAN  Take 1 tablet (100 mg total) by mouth once daily.  for 10 days  Start taking on:  6/18/2019     metoprolol tartrate 25 MG tablet  Commonly known as:  LOPRESSOR  Take 1 tablet (25 mg total) by mouth 2 (two) times daily.     miconazole nitrate 2% 2 % Oint  Commonly known as:  MICOTIN  Apply topically 2 (two) times daily.     pantoprazole 40 MG tablet  Commonly known as:  PROTONIX  Take 1 tablet (40 mg total) by mouth 2 (two) times daily before meals.     sevelamer carbonate 800 mg Tab  Commonly known as:  RENVELA  Take 1 tablet (800 mg total) by mouth 3 (three) times daily with meals.        CHANGE how you take these medications    * aspirin 81 MG EC tablet  Commonly known as:  ECOTRIN  Take 81 mg by mouth once daily.  What changed:  Another medication with the same name was added. Make sure you understand how and when to take each.     * aspirin 81 MG EC tablet  Commonly known as:  ECOTRIN  Take 1 tablet (81 mg total) by mouth once daily.  What changed:  You were already taking a medication with the same name, and this prescription was added. Make sure you understand how and when to take each.     TRADJENTA 5 mg Tab tablet  Generic drug:  linaGLIPtin  Take 5 mg by mouth once daily.  What changed:  Another medication with the same name was removed. Continue taking this medication, and follow the directions you see here.         * This list has 2 medication(s) that are the same as other medications prescribed for you. Read the directions carefully, and ask your doctor or other care provider to review them with you.            CONTINUE taking these medications    alfuzosin 10 mg Tb24  Commonly known as:  UROXATRAL  Take 10 mg by mouth nightly.     CO Q-10 200 mg capsule  Generic drug:  coenzyme Q10  Take 200 mg by mouth once daily.     ferrous sulfate 325 (65 FE) MG EC tablet  Take 1 tablet (325 mg total) by mouth 2 (two) times daily with meals.     FLUoxetine 20 MG capsule  Take 20 mg by mouth 2 (two) times daily.        STOP taking these medications    clopidogrel 75  mg tablet  Commonly known as:  PLAVIX     ezetimibe 10 mg tablet  Commonly known as:  ZETIA     gabapentin 100 MG capsule  Commonly known as:  NEURONTIN     gemfibrozil 600 MG tablet  Commonly known as:  LOPID     hydrALAZINE 25 MG tablet  Commonly known as:  APRESOLINE     insulin glargine 100 unit/mL injection  Commonly known as:  LANTUS     LANTUS SOLOSTAR U-100 INSULIN glargine 100 units/mL (3mL) SubQ pen  Generic drug:  insulin     magnesium oxide 400 mg (241.3 mg magnesium) tablet  Commonly known as:  MAG-OX     metoprolol succinate 50 MG 24 hr tablet  Commonly known as:  TOPROL-XL     nateglinide 60 MG tablet  Commonly known as:  STARLIX     VITAMIN D3 5,000 unit Tab  Generic drug:  cholecalciferol (vitamin D3)     vitamin renal formula (B-complex-vitamin c-folic acid) 1 mg Cap  Commonly known as:  NEPHROCAP     zinc 50 mg Tab     zinc gluconate 50 mg tablet            Indwelling Lines/Drains at time of discharge:   Lines/Drains/Airways     Central Venous Catheter Line                 Hemodialysis Catheter 05/16/19 1600 right subclavian 31 days          Pressure Ulcer                 Pressure Injury 05/31/19 0700 Left Buttocks #1 Stage 2 17 days                   Sofie Driver MD  Department of Hospital Medicine  [facility]

## 2019-06-17 NOTE — PLAN OF CARE
Problem: Adult Inpatient Plan of Care  Goal: Plan of Care Review  Outcome: Ongoing (interventions implemented as appropriate)  No acute events throughout shift.  Ambulated to bathroom with max's assist. Bed in low position and locked.call light in reach. Vitals and assessment completed as ordered. Pt calm and cooperative. No complains of pain. Pt free from injury or falls

## 2019-06-17 NOTE — ASSESSMENT & PLAN NOTE
BP Readings from Last 3 Encounters:   06/17/19 (!) 100/58   05/31/19 (!) 108/44   05/30/19 (!) 100/57     Antihypertensives held due to shock initially   BP stable   Metoprolol resumed due to tachycardia which may be due to lack of beta blockage but also due to recent GIB; tachycardia improving   Continue to monitor

## 2019-06-17 NOTE — PROGRESS NOTES
"[facility]  Adult Nutrition  Progress Note    SUMMARY       Recommendations    Recommendation/Intervention:   1. Continue Renal Diet and fluid restriction per MD.  2. Continue strawberry boost glucose control BID if K or P labs are high recommend Glenn TID to promote wound healing  Goals: PO intake > 75% EEN, EPN. No  Nutrition Goal Status: progressing towards goal  Communication of RD Recs: (plan of care)    Reason for Assessment    Reason For Assessment: RD follow-up  Diagnosis: other (see comments)(GIB)  Relevant Medical History: ESRD, CAD, DM, HTN, HF  Interdisciplinary Rounds: did not attend  General Information Comments: Pt in dialysis upon visit. Per chart review pt has maintained wt x1 month. Po intake varies % per meal. No NFPE warranted at this time. RD Following.   Nutrition Discharge Planning: Adequate PO intake    Nutrition Risk Screen    Nutrition Risk Screen: no indicators present    Nutrition/Diet History    Patient Reported Diet/Restrictions/Preferences: diabetic diet, heart healthy  Spiritual, Cultural Beliefs, Yazidi Practices, Values that Affect Care: no    Anthropometrics    Temp: 97.9 °F (36.6 °C)  Height: 6' 2" (188 cm)  Height (inches): 74 in  Weight Method: Bed Scale  Weight: 120.4 kg (265 lb 6.9 oz)  Weight (lb): 265.44 lb  Ideal Body Weight (IBW), Male: 190 lb  % Ideal Body Weight, Male (lb): 150.49 lb  BMI (Calculated): 36.8  BMI Grade: 35 - 39.9 - obesity - grade II       Lab/Procedures/Meds    Pertinent Labs Reviewed: reviewed  Pertinent Labs Comments: Glu 121, BUN 35, Cre 7.1, Alb 2.7, GFR 6.7  Pertinent Medications Reviewed: reviewed  Pertinent Medications Comments: metoprolol, pantoprazole, insulin      Estimated/Assessed Needs    Weight Used For Calorie Calculations: 129.7 kg (285 lb 15 oz)  Energy Calorie Requirements (kcal): 2,500 kcal/d  Energy Need Method: [place](PAL 1.2)  Protein Requirements: 129 g/kg  Weight Used For Protein Calculations: " 129.7 kg (285 lb 15 oz)  Fluid Requirements (mL): (Per MD or 1 mL/kcal)     RDA Method (mL): 2         Nutrition Prescription Ordered    Current Diet Order: Diabeteic 2000 shana, Renal  Nutrition Order Comments: 2000 mL FR     Evaluation of Received Nutrient/Fluid Intake    Comments: LBM: 6/17  Tolerance: tolerating  % Intake of Estimated Energy Needs: 50 - 75 %  % Meal Intake: 25 - 50 %    Nutrition Risk    Level of Risk/Frequency of Follow-up: low     Assessment and Plan  Nutrition Problem  Inadequate oral intake     Related to (etiology):   Decreased appetite      Signs and Symptoms (as evidenced by):   Meal intake 25 - 50%     Interventions/Recommendations (treatment strategy):  Collaboration of care.     Nutrition Diagnosis Status:   Continues      Monitor and Evaluation    Food and Nutrient Intake: energy intake, food and beverage intake  Food and Nutrient Adminstration: diet order  Physical Activity and Function: nutrition-related ADLs and IADLs  Anthropometric Measurements: weight change, body mass index, weight  Biochemical Data, Medical Tests and Procedures: electrolyte and renal panel, glucose/endocrine profile, gastrointestinal profile, lipid profile, inflammatory profile  Nutrition-Focused Physical Findings: overall appearance     Malnutrition Assessment  Pt does not meet criteria for malnutrition at this time.     Nutrition Follow-Up    RD Follow-up?: Yes

## 2019-06-17 NOTE — PROGRESS NOTES
HD treatment complete. Duration of treatment 4 hours and 3 L removed.  One dose of Midodrine given during the treatment. Treatment was tolerated well and no complications with access to right chest wall. Catheter flushed with NS and locked with gent. Capped and taped.

## 2019-06-18 NOTE — PLAN OF CARE
06/18/19 0935   Final Note   Assessment Type Final Discharge Note   Anticipated Discharge Disposition SNF   What phone number can be called within the next 1-3 days to see how you are doing after discharge? [phone]   Hospital Follow Up  Appt(s) scheduled? Yes   Right Care Referral Info   Post Acute Recommendation SNF / Sub-Acute Rehab   Facility Name [facility]

## 2019-06-25 PROBLEM — I46.9 CARDIAC ARREST: Status: RESOLVED | Noted: 2019-01-01 | Resolved: 2019-01-01

## 2019-06-25 PROBLEM — G25.81 RESTLESS LEG SYNDROME: Status: RESOLVED | Noted: 2019-01-01 | Resolved: 2019-01-01

## 2019-06-25 PROBLEM — I50.9 HEART FAILURE: Status: RESOLVED | Noted: 2019-01-01 | Resolved: 2019-01-01

## 2019-06-25 PROBLEM — K92.2 GIB (GASTROINTESTINAL BLEEDING): Status: RESOLVED | Noted: 2019-01-01 | Resolved: 2019-01-01

## 2019-06-25 PROBLEM — R65.20 SEVERE SEPSIS: Status: ACTIVE | Noted: 2019-01-01

## 2019-06-25 PROBLEM — A41.9 SEVERE SEPSIS: Status: ACTIVE | Noted: 2019-01-01

## 2019-06-25 PROBLEM — D72.829 LEUKOCYTOSIS: Status: RESOLVED | Noted: 2019-01-01 | Resolved: 2019-01-01

## 2019-06-25 PROBLEM — K92.2 ACUTE GASTROINTESTINAL BLEEDING: Status: RESOLVED | Noted: 2019-01-01 | Resolved: 2019-01-01

## 2019-06-25 PROBLEM — N17.9 AKI (ACUTE KIDNEY INJURY): Status: RESOLVED | Noted: 2019-01-01 | Resolved: 2019-01-01

## 2019-06-25 PROBLEM — R53.81 DEBILITY: Status: RESOLVED | Noted: 2019-01-01 | Resolved: 2019-01-01

## 2019-06-25 PROBLEM — R57.8 HEMORRHAGIC SHOCK: Status: RESOLVED | Noted: 2019-01-01 | Resolved: 2019-01-01

## 2019-06-25 PROBLEM — N39.0 URINARY TRACT INFECTION WITHOUT HEMATURIA: Status: ACTIVE | Noted: 2019-01-01

## 2019-06-25 PROBLEM — R79.89 ELEVATED BRAIN NATRIURETIC PEPTIDE (BNP) LEVEL: Status: RESOLVED | Noted: 2019-01-01 | Resolved: 2019-01-01

## 2019-06-25 PROBLEM — R31.9 HEMATURIA: Status: RESOLVED | Noted: 2019-01-01 | Resolved: 2019-01-01

## 2019-06-25 PROBLEM — E83.39 HYPERPHOSPHATEMIA: Status: RESOLVED | Noted: 2019-01-01 | Resolved: 2019-01-01

## 2019-06-25 PROBLEM — T14.8XXA MULTIPLE SKIN TEARS: Status: RESOLVED | Noted: 2019-01-01 | Resolved: 2019-01-01

## 2019-06-25 PROBLEM — E44.1 MILD MALNUTRITION: Status: RESOLVED | Noted: 2019-01-01 | Resolved: 2019-01-01

## 2019-06-25 NOTE — ED NOTES
Sleeping. IVF infusing via gravity without difficulty. 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

## 2019-06-25 NOTE — ED NOTES
Patient is resting in bed, opens eyes with the sound of my voice. No needs or questions at this time. Patient is tolerating bipap.

## 2019-06-25 NOTE — CONSULTS
Nephrology Consult Note        Patient Name: Frank Zayas  MRN: [id number]    Patient Class: IP- Inpatient   Admission Date: 6/25/2019  Length of Stay: 0 days  Date of Service: 6/25/2019    Attending Physician: Neptali Peña III, MD  Primary Care Provider: Mikhail Shields MD    Reason for Consult: ESRD    SUBJECTIVE:     HPI: 79M with multiple medical issues, including ESRD on HD TTS, last dialyzed on Sat, 6/22 is admitted from SNF with AMS, hypotension and concerns for UTI. Renal is consulted for comanagement. No emergent need for HD.    Past Medical History:   Diagnosis Date    Anticoagulant long-term use     Arthritis     Bladder cancer     Blood transfusion     CHF (congestive heart failure)     Chronic kidney disease     COPD (chronic obstructive pulmonary disease)     Coronary artery disease     Diabetes mellitus     Gout     [place] (hard of hearing)     HAS BILAT AIDS BUT DOES NOT WEAR    Hyperlipidemia     Hypertension     Myocardial infarction     RLS (restless legs syndrome)     Sleep apnea     NO MACHINE    Wears glasses      Past Surgical History:   Procedure Laterality Date    CARDIAC SURGERY      CABG X 2 1997    CATARACT EXTRACTION, BILATERAL      CHOLECYSTECTOMY      COLONOSCOPY      CORONARY ARTERY BYPASS GRAFT      x 2    coronary stents      CYSTOSCOPY      CYSTOSCOPY N/A 4/8/2019    [facility]formed by [facility]    EGD (ESOPHAGOGASTRODUODENOSCOPY) N/A 6/4/2019    Performed by [facility]z [facility] at [facility] ENDO (2ND FLR)    EXCISION-BLADDER TUMOR-TRANSURETHRAL (TURBT) N/A 9/8/2014    [facility] at [facility] OR    EXCISION-BLADDER TUMOR-TRANSURETHRAL (TURBT) N/A 11/18/2013    [facility] at [facility] OR    EYE SURGERY Bilateral     CATARACT    Insertion,catheter,tunneled N/A 5/16/2019    Performed by Wade Rodriguez MD at [facility] OR    TURBT (TRANSURETHRAL RESECTION OF BLADDER TUMOR) N/A 4/8/2019    [facility]    VASECTOMY       Family History   Problem Relation Age of Onset    Cancer Father     Heart disease Father     Cancer Sister     Cancer Brother      Social History     Tobacco Use    Smoking status: Former Smoker     Packs/day: 0.25     Years: 20.00     Pack years: 5.00     Types: Cigars     Last attempt to quit: 1982     Years since quittin.4    Smokeless tobacco: Never Used   Substance Use Topics    Alcohol use: No    Drug use: No       Review of patient's allergies indicates:   Allergen Reactions    Lidocaine Nausea Only     NOVACAINE --  Severe nausea    Statins-hmg-coa reductase inhibitors Anxiety       Outpatient meds:  No current facility-administered medications on file prior to encounter.      Current Outpatient Medications on File Prior to Encounter   Medication Sig Dispense Refill    alfuzosin (UROXATRAL) 10 mg Tb24 Take 10 mg by mouth nightly.       aspirin (ECOTRIN) 81 MG EC tablet Take 1 tablet (81 mg total) by mouth once daily.  0    aspirin (ECOTRIN) 81 MG EC tablet Take 81 mg by mouth once daily.      coenzyme Q10 (CO Q-10) 200 mg capsule Take 200 mg by mouth once daily.       ferrous sulfate 325 (65 FE) MG EC tablet Take 1 tablet (325 mg total) by mouth 2 (two) times daily with meals.  0    fluconazole (DIFLUCAN) 100 MG tablet Take 1 tablet (100 mg total) by mouth once daily. for 10 days 10 tablet 0    FLUoxetine 20 MG capsule Take 20 mg by mouth 2 (two) times daily.       linaGLIPtin (TRADJENTA) 5 mg Tab tablet Take 5 mg by mouth once daily.      metoprolol tartrate (LOPRESSOR) 25 MG tablet Take 1 tablet (25 mg total) by mouth 2 (two) times daily. 60 tablet 11    miconazole nitrate 2% (MICOTIN) 2 % Oint Apply topically 2 (two) times daily.  0    pantoprazole (PROTONIX) 40 MG tablet Take 1 tablet (40 mg total) by mouth 2 (two) times daily before meals. 60 tablet 11    sevelamer carbonate (RENVELA) 800 mg Tab Take 1 tablet (800 mg total) by mouth 3  (three) times daily with meals. 90 tablet 11       Scheduled meds:   vancomycin (VANCOCIN) IVPB  1,500 mg Intravenous ED 1 Time       Infusions:      PRN meds:      Review of Systems:  Review of Systems   Unable to perform ROS: Mental status change       OBJECTIVE:     Vital Signs and IO (Last 24H):  Temp:  [97.1 °F (36.2 °C)]   Pulse:  [71-77]   Resp:  [20]   BP: ()/(49-58)   SpO2:  [86 %-100 %]   No intake/output data recorded.    Wt Readings from Last 5 Encounters:   06/25/19 120.2 kg (265 lb)   06/17/19 120.4 kg (265 lb 6.9 oz)   05/30/19 121.5 kg (267 lb 13.7 oz)   05/17/19 121.5 kg (267 lb 14.4 oz)   04/24/19 129.3 kg (285 lb 0.9 oz)         Physical Exam:  Physical Exam   Constitutional: He appears well-developed and well-nourished. No distress.   HENT:   Head: Normocephalic and atraumatic.   Mouth/Throat: Oropharynx is clear and moist.   Eyes: Pupils are equal, round, and reactive to light. EOM are normal. No scleral icterus.   Neck: Neck supple.   Cardiovascular: Normal rate and regular rhythm.   Pulmonary/Chest: Effort normal. No stridor. No respiratory distress.   Abdominal: Soft. He exhibits no distension.   Musculoskeletal: Normal range of motion. He exhibits no edema or deformity.   Neurological: He is alert. No cranial nerve deficit.   Skin: Skin is warm and dry. No rash noted. He is not diaphoretic. No erythema.       Body mass index is 34.02 kg/m².    Laboratory:  Recent Labs   Lab 06/25/19  1050      K 5.0      CO2 23   BUN 40*   CREATININE 5.9*   ESTGFRAFRICA 10*   EGFRNONAA 8*   CALCIUM 8.5*   ALBUMIN 2.9*       Recent Labs   Lab 06/25/19  1050   WBC 11.89   HGB 8.5*   HCT 28.8*   PLT 81*   *   MCHC 29.5*   MONO 7.2  0.9       Recent Labs   Lab 06/25/19  1050   ALKPHOS 116   BILITOT 0.5   PROT 6.3   ALBUMIN 2.9*   ALT 21   AST 27       ASSESSMENT/PLAN:     Active Hospital Problems    Diagnosis  POA    Urinary tract infection without hematuria [N39.0]  Yes      Resolved  Hospital Problems   No resolved problems to display.     Sepsis due to UTI  ESRD on HD TTS  Continue current dialysis prescription.  Next HD Wed and Thursday.  Renal diet - low K, low phos.  No IVs or BP checks on access arm.  Treat infection, do not give more IVF.    Anemia of CKD + recent  GIB  Hgb is low but stable.   Monitor. NO need for transfusion.  Continue RAFAEL.  No need for IV iron.    MBD / Secondary HPT  Monitor phos levels. Low phos diet.  Hold Binders if not eating.    HTN, now hypotensive  Un-controlled.   Tolerate asymptomatic HTN up to -160.  Hold home meds.    Thank you for allowing us to participate in the care of your patient!   We will follow the patient and provide recommendations as needed.    Reyes Yoo MD    [facility]  [address] [place] [address]  [place] [address]    [phone] - tel  [phone] - fax    6/25/2019 12:54 PM

## 2019-06-25 NOTE — ED PROVIDER NOTES
Encounter Date: 6/25/2019    SCRIBE #1 NOTE: I, Vanna Silva, am scribing for, and in the presence of, Neptali Peña MD.       History     Chief Complaint   Patient presents with    Altered Mental Status     pt not as alert as normal since yesterday. (Due for dialysis today)       Time seen by provider: 10:17 AM on 06/25/2019    Frank Zayas is a 79 y.o. male who presents to the ED via EMS with an onset of confusion. Per EMS, the patient was noted to be more confused than normal yesterday. Per nursing home, he was last dialyzed 5 days ago and completed his full session. The patient has a PMHx of DM, HTN, CAD, CHF, CKD on dialysis, COPD, and prior MI. He has a PSHx including a CABG with 2 stent placements (1997). Lidocaine and Statins-hmg-coa reductase inhibitor drug allergies noted.  HPI is limited secondary to a change in his mental status.     The history is provided by the EMS personnel.     Review of patient's allergies indicates:   Allergen Reactions    Lidocaine Nausea Only     NOVACAINE --  Severe nausea    Statins-hmg-coa reductase inhibitors Anxiety     Past Medical History:   Diagnosis Date    Anticoagulant long-term use     Arthritis     Bladder cancer     Blood transfusion     CHF (congestive heart failure)     Chronic kidney disease     COPD (chronic obstructive pulmonary disease)     Coronary artery disease     Diabetes mellitus     Gout     [place] (hard of hearing)     HAS BILAT AIDS BUT DOES NOT WEAR    Hyperlipidemia     Hypertension     Myocardial infarction     RLS (restless legs syndrome)     Sleep apnea     NO MACHINE    Wears glasses      Past Surgical History:   Procedure Laterality Date    CARDIAC SURGERY      CABG X 2 1997    CATARACT EXTRACTION, BILATERAL      CHOLECYSTECTOMY      COLONOSCOPY      CORONARY ARTERY BYPASS GRAFT      x 2    coronary stents      CYSTOSCOPY      CYSTOSCOPY N/A 4/8/2019    [facility]    [facility]  (ESOPHAGOGASTRODUODENOSCOPY) N/A 2019    [facility]formed by [facility] at [facility] ENDO (2ND FLR)    EXCISION-BLADDER TUMOR-TRANSURETHRAL (TURBT) N/A 2014    [facility] OR    EXCISION-BLADDER TUMOR-TRANSURETHRAL (TURBT) N/A 2013    [facility] by [facility] at [facility] OR    EYE SURGERY Bilateral     CATARACT    Insertion,catheter,tunneled N/A 2019    [facility]formed by Wade Rodriguez MD at [facility] OR    TURBT (TRANSURETHRAL RESECTION OF BLADDER TUMOR) N/A 2019    [facility] by [facility] at [facility] OR    VASECTOMY       Family History   Problem Relation Age of Onset    Cancer Father     Heart disease Father     Cancer Sister     Cancer Brother      Social History     Tobacco Use    Smoking status: Former Smoker     Packs/day: 0.25     Years: 20.00     Pack years: 5.00     Types: Cigars     Last attempt to quit: 1982     Years since quittin.4    Smokeless tobacco: Never Used   Substance Use Topics    Alcohol use: No    Drug use: No     Review of Systems   Unable to perform ROS: Mental status change   Psychiatric/Behavioral: Positive for confusion.     Physical Exam     Initial Vitals [19 1003]   BP Pulse Resp Temp SpO2   (!) 87/49 75 20 97.1 °F (36.2 °C) (!) 86 %      MAP       --         Physical Exam    Nursing note and vitals reviewed.  Constitutional: He appears well-developed and well-nourished.   HENT:   Head: Normocephalic and atraumatic.   Eyes: Conjunctivae are normal.   Neck: Normal range of motion. Neck supple.   Cardiovascular: Normal rate, regular rhythm and normal heart sounds. Exam reveals no gallop and no friction rub.    No murmur heard.  Pulmonary/Chest: No respiratory distress. He has no wheezes. He has no rhonchi. He has rales.   Bibasilar rales.    Abdominal: Soft. He exhibits no distension. There is no tenderness.   Soft, non-tender abdomen.    Musculoskeletal: Normal range of motion.   Neurological: He is alert.    Skin: Skin is warm and dry.   Psychiatric: He has a normal mood and affect.       ED Course   Critical Care  Date/Time: 6/25/2019 12:39 PM  Performed by: Neptali Peña III, MD  Authorized by: Neptali Peña III, MD   Direct patient critical care time: 60 minutes  Total critical care time (exclusive of procedural time) : 60 minutes  Critical care was necessary to treat or prevent imminent or life-threatening deterioration of the following conditions: circulatory failure.  Critical care was time spent personally by me on the following activities: review of old charts, pulse oximetry, ordering and review of laboratory studies, obtaining history from patient or surrogate, evaluation of patient's response to treatment, discussions with primary provider, development of treatment plan with patient or surrogate, examination of patient, ordering and performing treatments and interventions, ordering and review of radiographic studies and re-evaluation of patient's condition.  Subsequent provider of critical care: I assumed direction of critical care for this patient from another provider of my specialty.        Labs Reviewed   CBC W/ AUTO DIFFERENTIAL - Abnormal; Notable for the following components:       Result Value    RBC 2.79 (*)     Hemoglobin 8.5 (*)     Hematocrit 28.8 (*)     Mean Corpuscular Volume 103 (*)     Mean Corpuscular Hemoglobin Conc 29.5 (*)     RDW 22.6 (*)     Platelets 81 (*)     Gran # (ANC) 10.1 (*)     Immature Grans (Abs) 0.15 (*)     Lymph # 0.7 (*)     Gran% 84.7 (*)     Lymph% 6.2 (*)     All other components within normal limits   COMPREHENSIVE METABOLIC PANEL - Abnormal; Notable for the following components:    Glucose 140 (*)     BUN, Bld 40 (*)     Creatinine 5.9 (*)     Calcium 8.5 (*)     Albumin 2.9 (*)     eGFR if  10 (*)     eGFR if non  8 (*)     All other components within normal limits   URINALYSIS, REFLEX TO URINE CULTURE - Abnormal; Notable for  the following components:    Appearance, UA Cloudy (*)     Specific Gravity, UA >=1.030 (*)     Protein, UA 2+ (*)     Ketones, UA 1+ (*)     Bilirubin (UA) 1+ (*)     Occult Blood UA 2+ (*)     Leukocytes, UA 2+ (*)     All other components within normal limits    Narrative:     Preferred Collection Type->Urine, Clean Catch   URINALYSIS MICROSCOPIC - Abnormal; Notable for the following components:    RBC, UA 10 (*)     WBC, UA >100 (*)     Bacteria Moderate (*)     All other components within normal limits    Narrative:     Preferred Collection Type->Urine, Clean Catch   CULTURE, BLOOD   CULTURE, BLOOD   CULTURE, URINE   LACTIC ACID, PLASMA     EKG Readings: (Independently Interpreted)   Initial Reading: No STEMI.   Atrial fibrillation at a ventricular rate of 68 bpm with a left axis deviation, right bundle branch block, and T-waves in V1-V6.      Imaging Results          X-Ray Chest AP Portable (Final result)  Result time 06/25/19 11:00:33    Final result by Addy Weaver Jr., MD (06/25/19 11:00:33)                 Impression:      Increasing density consistent with infiltrate and possible pleural effusion on the right.  Left perihilar infiltrate has decreased.  Cardiomegaly.  Prior sternotomy.  Double-lumen central line in position.      Electronically signed by: Addy Weaver MD  Date:    06/25/2019  Time:    11:00             Narrative:    EXAMINATION:  XR CHEST AP PORTABLE    CLINICAL HISTORY:  Sepsis;    TECHNIQUE:  Single frontal view of the chest was performed.    COMPARISON:  Prior chest of May 30, 2018.    FINDINGS:  A double lumen central line is in place on the right ending in the distal superior vena cava.  The patient has had a prior sternotomy.  There is mild cardiomegaly in a biventricular pattern.  There is increased infiltrate and possible pleural effusion on the right.  Left perihilar infiltrate has decreased.  No pneumothorax is seen.                                 Medical Decision  Making:   History:   Old Medical Records: I decided to obtain old medical records.  Clinical Tests:   Lab Tests: Reviewed and Ordered  Radiological Study: Ordered and Reviewed  Medical Tests: Reviewed and Ordered  ED Management:  71-year-old male presents with a 1 day history of altered mental status.  Upon arrival systolic pressure is 87 which improved with a 500 mL bolus.  He is found to have significant pyuria and bacteriuria consistent with a UTI.  Hypotension raises suspicion of possible sepsis.  Lactic acid is normal. He will be admitted for IV antibiotics, dialysis and fluid support.  Other:   I have discussed this case with another health care provider.       <> Summary of the Discussion: Discussed with Dr. Kamara who will admit the patient       APC / Resident Notes:   I, Dr. Neptali Peña III, personally performed the services described in this documentation. All medical record entries made by the scribe were at my direction and in my presence.  I have reviewed the chart and agree that the record reflects my personal performance and is accurate and complete       Scribe Attestation:   Scribe #1: I performed the above scribed service and the documentation accurately describes the services I performed. I attest to the accuracy of the note.               Clinical Impression:       ICD-10-CM ICD-9-CM   1. Urinary tract infection without hematuria, site unspecified N39.0 599.0   2. Hypotension I95.9 458.9         Disposition:   Disposition: Discharged  Condition: Stable                        Neptali Peña III, MD  06/25/19 3311

## 2019-06-25 NOTE — ED NOTES
Patient opens his eyes with the sound of my voice. No needs or questions at this time from patient or wife.

## 2019-06-25 NOTE — SIGNIFICANT EVENT
06/25/19 1343   PRE-TX-O2   SpO2 98 %   Pulse 81   Labs   $ Was an ABG obtained? Arterial Puncture;ISTAT - Blood gas   Critical Value Communication   Name of Notified Physician/Designee Dr Kamara  (possible veinous blood. Dr Kamara aware)   Date Result Received 06/25/19   Time Result Received 1343   Resulting Department of Critical Value respiratory   Who communicated critical value from resulting department? MARY Cardenas   Critical Test #1 ph   Critical Test #1 Result 7.167   Critical Test #2 pc02   Critical Test #2 Result 82.8   Critical Test #3  p02   Critical Test #3 Result 30   Date Notified 06/25/19   Time Notified 1345   Read Back Verification Yes   Physician Directive bipap 15/5. abg in 2 hours

## 2019-06-26 NOTE — ASSESSMENT & PLAN NOTE
Body mass index is 34.02 kg/m². Morbid obesity complicates all aspects of disease management from diagnostic modalities to treatment. Weight loss encouraged and health benefits explained to patient.

## 2019-06-26 NOTE — NURSING
Pt had HD Tx this am, uf net 1300ml off pt, post vss, pt voiced no complaints, report given to Pat MEZA.

## 2019-06-26 NOTE — ASSESSMENT & PLAN NOTE
This patient does have evidence of infective focus  My overall impression is sepsis.  Antibiotics given-   Antibiotics (From admission, onward)    Start     Stop Route Frequency Ordered    06/26/19 1045  cefepime in dextrose 5 % 1 gram/50 mL IVPB 1 g      -- IV Every 12 hours (non-standard times) 06/26/19 0931    06/25/19 2100  mupirocin 2 % ointment      06/30 2059 Nasl 2 times daily 06/25/19 1300          Latest lactate reviewed, they are-  Recent Labs   Lab 06/25/19  1035 06/25/19  1355   LACTATE 1.4 1.0       Organ dysfunction indicated by Encephalopathy, Acute respiratory failure and Acute liver injury  Source- lung

## 2019-06-26 NOTE — PROGRESS NOTES
Pharmacokinetic Assessment Follow Up: IV Vancomycin    Vancomycin serum concentration assessment(s):    The random level was drawned correctly and can be used to guide therapy at this time.    Vancomycin Regimen Plan:    Patient to receive vancomycin 750 mg x 1 dose after HD today. Level ordered with AM labs 6/27/19.     Pharmacy will continue to follow and monitor vancomycin.    Please contact pharmacy at extension [phone] for questions regarding this assessment.    Thank you for the consult,   Jerman Stover     Patient brief summary:  Frank Zayas is a 79 y.o. male initiated on antimicrobial therapy with IV Vancomycin for treatment of suspected bacteremia    The patient received an initial dose for 1500 mg and will be redosed after HD based on preHD levels.     Drug Allergies:   Review of patient's allergies indicates:   Allergen Reactions    Lidocaine Nausea Only     NOVACAINE --  Severe nausea    Statins-hmg-coa reductase inhibitors Anxiety       Actual Body Weight:   127 kg     Renal Function:   CrCl cannot be calculated (Unknown ideal weight.).,     Dialysis Method (if applicable):  intermittent HD    CBC (last 72 hours):  Recent Labs   Lab Result Units 06/25/19  1050 06/26/19  0338   WBC K/uL 11.89 8.89   Hemoglobin g/dL 8.5* 8.1*   Hematocrit % 28.8* 28.0*   Platelets K/uL 81* 95*   Gran% % 84.7* 91.4*   Lymph% % 6.2* 4.7*   Mono% % 7.2 2.7*   Eosinophil% % 0.3 0.4   Basophil% % 0.3 0.1   Differential Method  Automated Automated       Metabolic Panel (last 72 hours):  Recent Labs   Lab Result Units 06/25/19  1050 06/25/19  1110 06/26/19  0338   Sodium mmol/L 139  --  140   Potassium mmol/L 5.0  --  4.8   Chloride mmol/L 103  --  103   CO2 mmol/L 23  --  23   Glucose mg/dL 140*  --  113*   Glucose, UA   --  Negative  --    BUN, Bld mg/dL 40*  --  44*   Creatinine mg/dL 5.9*  --  6.4*   Albumin g/dL 2.9*  --  2.6*   Total Bilirubin mg/dL 0.5  --  0.6   Alkaline Phosphatase U/L 116  --  102   AST U/L 27  --   13   ALT U/L 21  --  14   Magnesium mg/dL  --   --  2.3   Phosphorus mg/dL  --   --  7.1*       Vancomycin Administrations:  vancomycin given in the last 96 hours                     vancomycin 1.5 g in dextrose 5 % 250 mL IVPB (ready to mix) (mg) 1,500 mg New Bag 06/25/19 1417                      Drug levels (last 3 results):  Recent Labs   Lab Result Units 06/26/19  0338   Vancomycin, Random ug/mL 14.2       Microbiologic Results:  Microbiology Results (last 7 days)       Procedure Component Value Units Date/Time    Blood culture [[id number]] Collected:  06/25/19 2302    Order Status:  Sent Specimen:  Blood Updated:  06/25/19 2303    Blood culture [[id number]] Collected:  06/25/19 2254    Order Status:  Sent Specimen:  Blood Updated:  06/25/19 2254    Blood culture x two cultures. Draw prior to antibiotics. [[id number]] Collected:  06/25/19 1035    Order Status:  Completed Specimen:  Blood Updated:  06/25/19 2115     Blood Culture, Routine No Growth to date    Narrative:       Aerobic and anaerobic    Blood culture x two cultures. Draw prior to antibiotics. [[id number]] Collected:  06/25/19 1044    Order Status:  Completed Specimen:  Blood Updated:  06/25/19 2115     Blood Culture, Routine No Growth to date    Narrative:       Aerobic and anaerobic    Urine culture [[id number]] Collected:  06/25/19 1110    Order Status:  No result Specimen:  Urine Updated:  06/25/19 1123

## 2019-06-26 NOTE — CONSULTS
Nephrology Consult Progress Note        Patient Name: Frank Zayas  MRN: [id number]    Patient Class: IP- Inpatient   Admission Date: 6/25/2019  Length of Stay: 1 days  Date of Service: 6/26/2019    Attending Physician: Vishal Kamara MD  Primary Care Provider: Mikhail Shields MD    Reason for Consult: ESRD    SUBJECTIVE:     HPI: 79M with multiple medical issues, including ESRD on HD TTS, last dialyzed on Sat, 6/22 is admitted from SNF with AMS, hypotension and concerns for UTI. Renal is consulted for comanagement.    6/26 VSS, seen and examined on HD, tolerating well, still on BiPAP.    Past Medical History:   Diagnosis Date    Anticoagulant long-term use     Arthritis     Bladder cancer     Blood transfusion     Cardiac arrest 5/31/2019    CHF (congestive heart failure)     Chronic kidney disease     COPD (chronic obstructive pulmonary disease)     Coronary artery disease     Diabetes mellitus     Gout     Hemorrhagic shock 6/1/2019    [place] (hard of hearing[place]     HAS BILAT AIDS BUT DOES NOT WEAR    Hyperlipidemia     Hypertension     Myocardial infarction     NSTEMI (non-ST elevated myocardial infarction)     RLS (restless legs syndrome)     Sleep apnea     NO MACHINE    Wears glasses      Past Surgical History:   Procedure Laterality Date    CARDIAC SURGERY      CABG X 2 1997    CATARACT EXTRACTION, BILATERAL      CHOLECYSTECTOMY      COLONOSCOPY      CORONARY ARTERY BYPASS GRAFT      x 2    coronary stents      CYSTOSCOPY      CYSTOSCOPY N/A 4/8/2019    Performed by [facility]lee [facility]quez [facility] at [facility]    EGD (ESOPHAGOGASTRODUODENOSCOPY) N/A 6/4/2019    Performed by Clint [facility]z [facility] at [facility] (2ND FLR)    EXCISION-BLADDER TUMOR-TRANSURETHRAL (TURBT) N/A 9/8/2014    Performed by [facility] at [facility] OR    EXCISION-BLADDER TUMOR-TRANSURETHRAL (TURBT) N/A 11/18/2013    Performed by [facility] at [facility] OR    EYE SURGERY Bilateral     CATARACT    Insertion,catheter,tunneled  N/A 2019    [facility]    TURBT (TRANSURETHRAL RESECTION OF BLADDER TUMOR) N/A 2019    [facility] at [facility] OR    VASECTOMY       Family History   Problem Relation Age of Onset    Cancer Father     Heart disease Father     Cancer Sister     Cancer Brother      Social History     Tobacco Use    Smoking status: Former Smoker     Packs/day: 0.25     Years: 20.00     Pack years: 5.00     Types: Cigars     Last attempt to quit: 1982     Years since quittin.4    Smokeless tobacco: Never Used   Substance Use Topics    Alcohol use: No    Drug use: No       Review of patient's allergies indicates:   Allergen Reactions    Lidocaine Nausea Only     NOVACAINE --  Severe nausea    Statins-hmg-coa reductase inhibitors Anxiety       Outpatient meds:  No current facility-administered medications on file prior to encounter.      Current Outpatient Medications on File Prior to Encounter   Medication Sig Dispense Refill    alfuzosin (UROXATRAL) 10 mg Tb24 Take 10 mg by mouth nightly.       aspirin (ECOTRIN) 81 MG EC tablet Take 1 tablet (81 mg total) by mouth once daily.  0    coenzyme Q10 (CO Q-10) 100 mg capsule Take 100 mg by mouth once daily.       ferrous sulfate 325 (65 FE) MG EC tablet Take 1 tablet (325 mg total) by mouth 2 (two) times daily with meals.  0    FLUoxetine 20 MG capsule Take 20 mg by mouth every evening.       metoprolol tartrate (LOPRESSOR) 25 MG tablet Take 1 tablet (25 mg total) by mouth 2 (two) times daily. (Patient taking differently: Take 25 mg by mouth every Mon, Wed, Fri, Sun. ) 60 tablet 11    nitroGLYCERIN (NITROSTAT) 0.4 MG SL tablet Place 0.4 mg under the tongue every 5 (five) minutes as needed for Chest pain.      omeprazole (PRILOSEC) 20 MG capsule Take 20 mg by mouth once daily.      rOPINIRole (REQUIP) 2 MG tablet Take 2 mg by mouth every evening.      sevelamer carbonate (RENVELA) 800 mg Tab Take 1 tablet  (800 mg total) by mouth 3 (three) times daily with meals. 90 tablet 11       Scheduled meds:   alfuzosin  10 mg Oral Nightly    aspirin  81 mg Oral Daily    ceFEPime (MAXIPIME) IVPB  1 g Intravenous Q12H    fluconazole  100 mg Oral Daily    FLUoxetine  20 mg Oral BID    methylPREDNISolone sodium succinate  80 mg Intravenous Q12H    mupirocin   Nasal BID    pantoprazole  40 mg Oral BID AC    SAXagliptin  2.5 mg Oral Daily    senna-docusate 8.6-50 mg  1 tablet Oral BID    sevelamer carbonate  800 mg Oral TID WM    sodium chloride 0.9%  3 mL Intravenous Q8H    vancomycin (VANCOCIN) IVPB  750 mg Intravenous Once       Infusions:      PRN meds:      Review of Systems:  Review of Systems   Unable to perform ROS: Mental status change       OBJECTIVE:     Vital Signs and IO (Last 24H):  Temp:  [95.8 °F (35.4 °C)-98.3 °F (36.8 °C)]   Pulse:  [64-85]   Resp:  [13-31]   BP: ()/(50-89)   SpO2:  [90 %-100 %]   I/O last 3 completed shifts:  In: 50 [IV Piggyback:50]  Out: -     Wt Readings from Last 5 Encounters:   06/26/19 127 kg (279 lb 15.8 oz)   06/17/19 120.4 kg (265 lb 6.9 oz)   05/30/19 121.5 kg (267 lb 13.7 oz)   05/17/19 121.5 kg (267 lb 14.4 oz)   04/24/19 129.3 kg (285 lb 0.9 oz)         Physical Exam:  Physical Exam   Constitutional: He appears well-developed and well-nourished. No distress.   HENT:   Head: Normocephalic and atraumatic.   Mouth/Throat: Oropharynx is clear and moist.   Eyes: Pupils are equal, round, and reactive to light. EOM are normal. No scleral icterus.   Neck: Neck supple.   Cardiovascular: Normal rate and regular rhythm.   Pulmonary/Chest: Effort normal. No stridor. No respiratory distress.   Abdominal: Soft. He exhibits no distension.   Musculoskeletal: Normal range of motion. He exhibits no edema or deformity.   Neurological: He is alert. No cranial nerve deficit.   Skin: Skin is warm and dry. No rash noted. He is not diaphoretic. No erythema.       Body mass index is 35.95  kg/m².    Laboratory:  Recent Labs   Lab 06/25/19  1050 06/26/19  0338    140   K 5.0 4.8    103   CO2 23 23   BUN 40* 44*   CREATININE 5.9* 6.4*   ESTGFRAFRICA 10* 9*   EGFRNONAA 8* 8*   CALCIUM 8.5* 8.4*   ALBUMIN 2.9* 2.6*   PHOS  --  7.1*       Recent Labs   Lab 06/25/19  1050 06/26/19  0338   WBC 11.89 8.89   HGB 8.5* 8.1*   HCT 28.8* 28.0*   PLT 81* 95*   * 103*   MCHC 29.5* 28.9*   MONO 7.2  0.9 2.7*  0.2*       Recent Labs   Lab 06/25/19  1050 06/26/19  0338   ALKPHOS 116 102   BILITOT 0.5 0.6   PROT 6.3 5.5*   ALBUMIN 2.9* 2.6*   ALT 21 14   AST 27 13       ASSESSMENT/PLAN:     Active Hospital Problems    Diagnosis  POA    *Acute hypercapnic respiratory failure [J96.02]  Yes    Urinary tract infection without hematuria [N39.0]  Yes    Severe sepsis [A41.9, R65.20]  Yes    Anemia of renal disease [D63.1]  Yes    Dermatitis associated with moisture [L30.8]  Yes    ESRD (end stage renal disease) on dialysis [N18.6, Z99.2]  Not Applicable    Encephalopathy, metabolic [G93.41]  Yes    Morbid (severe) obesity with alveolar hypoventilation [E66.2]  Yes    HTN (hypertension) [I10]  Yes    Type 2 diabetes mellitus with kidney complication, with long-term current use of insulin [E11.29, Z79.4]  Not Applicable    DM type 2 with diabetic mixed hyperlipidemia [E11.69, E78.2]  Yes    Coronary artery disease [I25.10]  Yes    COPD (chronic obstructive pulmonary disease) [J44.9]  Yes    A-fib [I48.91]  Yes    Anemia, chronic disease [D63.8]  Yes    BRANDEN (obstructive sleep apnea) [G47.33]  Yes    Malignant neoplasm of bladder [C67.9]  Yes      Resolved Hospital Problems   No resolved problems to display.     Sepsis due to UTI  ESRD on HD TTS  AMS, new hypercapnic respiratory failure, requiring BiPAP  Continue current dialysis prescription.  Next HD PRN tomorrow or Friday?.  Renal diet - low K, low phos.  No IVs or BP checks on access arm.  Treat infection, do not give more IVF.    Anemia  of CKD + recent  GIB  Hgb is low but stable.   Monitor. No need for transfusion.  Continue RAFAEL.  No need for IV iron.    MBD / Secondary HPT  Monitor phos levels. Low phos diet.  Hold Binders if not eating.    HTN, now hypotensive  Un-controlled.   Tolerate asymptomatic HTN up to -160.  Hold home meds.    Thank you for allowing us to participate in the care of your patient!   We will follow the patient and provide recommendations as needed.    Reyes Yoo MD    [facility]  [address] [place] [address]  LILIANA Jones [address]    [phone] - tel  [phone] - fax    6/26/2019 12:54 PM

## 2019-06-26 NOTE — ASSESSMENT & PLAN NOTE
Patient with known CAD s/p CABG, which is controlled Will continue ASA and Statin and monitor for S/Sx of angina/ACS. Continue to monitor on telemetry.

## 2019-06-26 NOTE — CARE UPDATE
06/26/19 0748   Patient Assessment/Suction   Level of Consciousness (AVPU) responds to voice   All Lung Fields Breath Sounds diminished   PRE-TX-O2   O2 Device (Oxygen Therapy) BiPAP   $ Is the patient on Low Flow Oxygen? Yes   Oxygen Concentration (%) 25   SpO2 95 %   Pulse Oximetry Type Continuous   $ Pulse Oximetry - Multiple Charge Pulse Oximetry - Multiple   Pulse 68   Resp (!) 22   Ready to Wean/Extubation Screen   FIO2<=50 (chart decimal) 0.25   Preset CPAP/BiPAP Settings   Mode Of Delivery AVAPS   $ CPAP/BiPAP Daily Charge BiPAP/CPAP Daily   $ Initial CPAP/BiPAP Setup? No   $ Is patient using? Yes   Sized Appropriately? Yes   Equipment Type V60   Airway Device Type large full face mask   EPAP (cm H2O) 8   AVAPS Min P (cm H2O) 10   AVAPS Max P (cm H2O) 25   Set Tidal Volume (mL) 550 mL   Set Rate (Breaths/Min) 15   ITime (sec) 1   Rise Time (sec) 3   Patient CPAP/BiPAP Settings   RR Total (Breaths/Min) 21   Tidal Volume (mL) 670   VE Minute Ventilation (L/min) 13.1 L/min   Peak Inspiratory Pressure (cm H2O) 18   TiTOT (%) 29   Total Leak (L/Min) 44   Patient Trigger - ST Mode Only (%) 98   CPAP/BiPAP Alarms   High Pressure (cm H2O) 25   Low Pressure (cm H2O) 15   Minute Ventilation (L/Min) 3   High RR (breaths/min) 50   Low RR (breaths/min) 6

## 2019-06-26 NOTE — H&P
[facility] Hospital Medicine  History & Physical    Patient Name: Frank Zayas  MRN: [id number]  Admission Date: 6/25/2019  Attending Physician: Vishal Kamara MD  Primary Care Provider: Mikhail Shields MD         Patient information was obtained from patient, spouse/SO, past medical records and ER records.     Subjective:     Principal Problem:Acute hypercapnic respiratory failure    Chief Complaint:   Chief Complaint   Patient presents with    Altered Mental Status     pt not as alert as normal since yesterday. (Due for dialysis today)        HPI: Frank Zayas is a 79 y.o. male who presents to the ED via EMS with an onset of confusion. Per EMS, the patient was noted to be more confused than normal yesterday. Per nursing home, he was last dialyzed 5 days ago and completed his full session. The patient has a PMHx of DM, HTN, CAD, CHF, CKD on dialysis, COPD, and prior MI. He has a PSHx including a CABG with 2 stent placements (1997). Lidocaine and Statins-hmg-coa reductase inhibitor drug allergies noted.Patient seen and examined in the emergency room, and was initially noted to be hypotensive.  Patient received 1 L fluid resuscitation in the emergency department with resolution of his hypotension subsequently.  ABG done shows evidence of severe respiratory acidosis and hypercarbia.  BiPAP was started in the emergency department, and adjusted with improvement in the patient's pCO2 and acidosis.  Patient was given broad-spectrum antibiotic therapy in the ED.    Past Medical History:   Diagnosis Date    Anticoagulant long-term use     Arthritis     Bladder cancer     Blood transfusion     CHF (congestive heart failure)     Chronic kidney disease     COPD (chronic obstructive pulmonary disease)     Coronary artery disease     Diabetes mellitus     Gout     [place] (hard of hearing[place]     HAS BILAT AIDS BUT DOES NOT WEAR    Hyperlipidemia     Hypertension     Myocardial infarction     RLS  (restless legs syndrome)     Sleep apnea     NO MACHINE    Wears glasses        Past Surgical History:   Procedure Laterality Date    CARDIAC SURGERY      CABG X 2 1997    CATARACT EXTRACTION, BILATERAL      CHOLECYSTECTOMY      COLONOSCOPY      CORONARY ARTERY BYPASS GRAFT      x 2    coronary stents      CYSTOSCOPY      CYSTOSCOPY N/A 4/8/2019    [facility]formed by [facility]    EGD (ESOPHAGOGASTRODUODENOSCOPY) N/A 6/4/2019    [facility]formed [facility] (2ND FLR)    EXCISION-BLADDER TUMOR-TRANSURETHRAL (TURBT) N/A 9/8/2014    [facility]formed by [facility] at [facility] OR    EXCISION-BLADDER TUMOR-TRANSURETHRAL (TURBT) N/A 11/18/2013    Performed by [facility] at [facility] OR    EYE SURGERY Bilateral     CATARACT    Insertion,catheter,tunneled N/A 5/16/2019    Performed by Wade Rodriguez MD at [facility] OR    TURBT (TRANSURETHRAL RESECTION OF BLADDER TUMOR) N/A 4/8/2019    Performed by [facility] at [facility] OR    VASECTOMY         Review of patient's allergies indicates:   Allergen Reactions    Lidocaine Nausea Only     NOVACAINE --  Severe nausea    Statins-hmg-coa reductase inhibitors Anxiety       No current facility-administered medications on file prior to encounter.      Current Outpatient Medications on File Prior to Encounter   Medication Sig    alfuzosin (UROXATRAL) 10 mg Tb24 Take 10 mg by mouth nightly.     aspirin (ECOTRIN) 81 MG EC tablet Take 1 tablet (81 mg total) by mouth once daily.    coenzyme Q10 (CO Q-10) 100 mg capsule Take 100 mg by mouth once daily.     ferrous sulfate 325 (65 FE) MG EC tablet Take 1 tablet (325 mg total) by mouth 2 (two) times daily with meals.    FLUoxetine 20 MG capsule Take 20 mg by mouth every evening.     metoprolol tartrate (LOPRESSOR) 25 MG tablet Take 1 tablet (25 mg total) by mouth 2 (two) times daily. (Patient taking differently: Take 25 mg by mouth every Mon, Wed, Fri, Sun. )    nitroGLYCERIN (NITROSTAT)  0.4 MG SL tablet Place 0.4 mg under the tongue every 5 (five) minutes as needed for Chest pain.    omeprazole (PRILOSEC) 20 MG capsule Take 20 mg by mouth once daily.    rOPINIRole (REQUIP) 2 MG tablet Take 2 mg by mouth every evening.    sevelamer carbonate (RENVELA) 800 mg Tab Take 1 tablet (800 mg total) by mouth 3 (three) times daily with meals.    [DISCONTINUED] aspirin (ECOTRIN) 81 MG EC tablet Take 81 mg by mouth once daily.    [DISCONTINUED] fluconazole (DIFLUCAN) 100 MG tablet Take 1 tablet (100 mg total) by mouth once daily. for 10 days    [DISCONTINUED] linaGLIPtin (TRADJENTA) 5 mg Tab tablet Take 5 mg by mouth once daily.    [DISCONTINUED] miconazole nitrate 2% (MICOTIN) 2 % Oint Apply topically 2 (two) times daily.    [DISCONTINUED] pantoprazole (PROTONIX) 40 MG tablet Take 1 tablet (40 mg total) by mouth 2 (two) times daily before meals.     Family History     Problem Relation (Age of Onset)    Cancer Father, Sister, Brother    Heart disease Father        Tobacco Use    Smoking status: Former Smoker     Packs/day: 0.25     Years: 20.00     Pack years: 5.00     Types: Cigars     Last attempt to quit: 1982     Years since quittin.4    Smokeless tobacco: Never Used   Substance and Sexual Activity    Alcohol use: No    Drug use: No    Sexual activity: Not on file     Review of Systems   Unable to perform ROS: Mental status change     Objective:     Vital Signs (Most Recent):  Temp: 97.1 °F (36.2 °C) (19 1003)  Pulse: 76 (19)  Resp: 20 (19 1003)  BP: (!) 94/56 (19)  SpO2: 99 % (19) Vital Signs (24h Range):  Temp:  [97.1 °F (36.2 °C)] 97.1 °F (36.2 °C)  Pulse:  [71-83] 76  Resp:  [20] 20  SpO2:  [86 %-100 %] 99 %  BP: ()/(49-69) 94/56     Weight: 120.2 kg (265 lb)  Body mass index is 34.02 kg/m².    Physical Exam   Constitutional: He is oriented to person, place, and time. No distress.   Elderly, somnolent  male.   HENT:    Head: Normocephalic.   Mouth/Throat: Oropharynx is clear and moist.   Eyes: Conjunctivae are normal. Right eye exhibits no discharge. Left eye exhibits no discharge. No scleral icterus.   Neck: No JVD present.   Cardiovascular: Normal rate, regular rhythm, normal heart sounds and intact distal pulses. Exam reveals no friction rub.   No murmur heard.  Midline sternotomy scar noted   Pulmonary/Chest: No respiratory distress. He has no wheezes.   Markedly decreased breath sounds noted bilaterally with poor inspiratory effort noted.   Abdominal: Soft. Bowel sounds are normal. He exhibits no distension. There is no tenderness.   Protuberant abdomen   Musculoskeletal: Normal range of motion. He exhibits no edema.   Bilateral lower extremity edema noted with evidence of weeping and stasis dermatitis.   Lymphadenopathy:     He has no cervical adenopathy.   Neurological: He is oriented to person, place, and time. He has normal reflexes.   Lethargic, but arousable.  Confused.   Skin: No rash noted. He is not diaphoretic. No erythema. There is pallor.   Psychiatric: He has a normal mood and affect. His behavior is normal.   Nursing note and vitals reviewed.          Significant Labs: All pertinent labs within the past 24 hours have been reviewed.    Significant Imaging: I have reviewed all pertinent imaging results/findings within the past 24 hours.    Assessment/Plan:     * Acute hypercapnic respiratory failure  Patient's vent settings, CXR and last ABG were reviewed.         ABG  Recent Labs   Lab 06/25/19 2028   PH 7.227*   PO2 172*   PCO2 65.4*   HCO3 27.2   BE 0       Will adjust vent management as follows-  Continue on noninvasive positive pressure ventilation and monitor blood gases every 4-6 hours.  Consult with pulmonology in the morning if patient does not appear to be improving.  Start corticosteroids and antibiotics.        Encephalopathy, metabolic  Patient with encephalopathy due to   Hypercarbia, sepsis.  Treatment for illness causative of encephalopathy is under way. Encephalopathy noted as a secondary process related to the patient's acute illness. There is no specific treatment. Monitor neuro status, avoid medications such as narcotics and benzos which may worsen confusion, and use anti-psychotics to prevent behaviors of self harm.        COPD (chronic obstructive pulmonary disease)  Patient's COPD is uncontrolled due to continued dyspnea and worsening of baseline hypoxia currently. Continue scheduled inhalers Steroids, Antibiotics and Supplemental oxygen and monitor respiratory status closely.         Severe sepsis  This patient does have evidence of infective focus  My overall impression is sepsis.  Antibiotics given-   Antibiotics (From admission, onward)    Start     Stop Route Frequency Ordered    06/25/19 2345  cefepime in dextrose 5 % 1 gram/50 mL IVPB 1 g      -- IV Every 12 hours (non-standard times) 06/25/19 2235    06/25/19 2100  mupirocin 2 % ointment      06/30 2059 Nasl 2 times daily 06/25/19 1300          Latest lactate reviewed, they are-  Recent Labs   Lab 06/25/19  1035 06/25/19  1355   LACTATE 1.4 1.0       Organ dysfunction indicated by Encephalopathy, Acute respiratory failure and Acute liver injury  Source- lung          ESRD (end stage renal disease) on dialysis  Nephrology consulted. Continue Chronic hemodialysis. Monitor daily electrolytes and defer dialysis orders to nephrology.        Dermatitis associated with moisture        Morbid (severe) obesity with alveolar hypoventilation  Body mass index is 34.02 kg/m². Morbid obesity complicates all aspects of disease management from diagnostic modalities to treatment. Weight loss encouraged and health benefits explained to patient.        BRANDEN (obstructive sleep apnea)  BIPAP      Anemia, chronic disease  Patient's anemia is currently controlled. S/p 0 units of PRBCs. Etiology likely d/t ACD- ESRD  Current CBC reviewed-   Lab Results    Component Value Date    HGB 8.5 (L) 06/25/2019    HCT 28.8 (L) 06/25/2019     Monitor serial CBC and transfuse if patient becomes hemodynamically unstable, symptomatic or H/H drops below 7/21.         A-fib  Atrial Fibrillation controlled currently with no meds. [id number] score 5. Anticoagulation not indicated currently. D/t recent bleed.        Coronary artery disease  Patient with known CAD s/p CABG, which is controlled Will continue ASA and Statin and monitor for S/Sx of angina/ACS. Continue to monitor on telemetry.         DM type 2 with diabetic mixed hyperlipidemia   Patient is chronically on statin. Will continue for now. Monitor clinically. Last LDL was   Lab Results   Component Value Date    LDLCALC 55.8 (L) 05/05/2019            Type 2 diabetes mellitus with kidney complication, with long-term current use of insulin  Patient's FSGs are controlled on current hypoglycemics.   Last A1c reviewed-   Lab Results   Component Value Date    HGBA1C 5.6 05/04/2019     Most recent fingerstick glucose reviewed- No results for input(s): POCTGLUCOSE in the last 24 hours.  Current correctional scale  Medium  Maintain anti-hyperglycemic dose as follows-   Antihyperglycemics (From admission, onward)    Start     Stop Route Frequency Ordered    06/26/19 0900  SAXagliptin tablet 2.5 mg      -- Oral Daily 06/25/19 2158    06/25/19 2342  insulin aspart U-100 pen 1-10 Units      -- SubQ Every 6 hours PRN 06/25/19 2244              HTN (hypertension)   Blood pressure low.  BP medications currently on hold.      Malignant neoplasm of bladder   Chronic.  No acute inpatient management indicated        VTE Risk Mitigation (From admission, onward)        Ordered     IP VTE HIGH RISK PATIENT  Once      06/25/19 2240     Place JACK hose  Until discontinued      06/25/19 2240     Place sequential compression device  Until discontinued      06/25/19 2240        Critical care time spent on the evaluation and treatment of severe organ  dysfunction, review of pertinent labs and imaging studies, discussions with consulting providers and discussions with patient/family: 43 minutes.     Vishal Kamara MD  Department of Hospital Medicine   [facility]

## 2019-06-26 NOTE — ASSESSMENT & PLAN NOTE
Patient with encephalopathy due to   Hypercarbia, sepsis which is improving. Treatment for illness causative of encephalopathy is under way. Encephalopathy noted as a secondary process related to the patient's acute illness. There is no specific treatment. Monitor neuro status, avoid medications such as narcotics and benzos which may worsen confusion, and use anti-psychotics to prevent behaviors of self harm.

## 2019-06-26 NOTE — ASSESSMENT & PLAN NOTE
This patient does have evidence of infective focus  My overall impression is sepsis.  Antibiotics given-   Antibiotics (From admission, onward)    Start     Stop Route Frequency Ordered    06/25/19 2345  cefepime in dextrose 5 % 1 gram/50 mL IVPB 1 g      -- IV Every 12 hours (non-standard times) 06/25/19 2235    06/25/19 2100  mupirocin 2 % ointment      06/30 2059 Nasl 2 times daily 06/25/19 1300          Latest lactate reviewed, they are-  Recent Labs   Lab 06/25/19  1035 06/25/19  1355   LACTATE 1.4 1.0       Organ dysfunction indicated by Encephalopathy, Acute respiratory failure and Acute liver injury  Source- lung

## 2019-06-26 NOTE — ASSESSMENT & PLAN NOTE
Patient's FSGs are controlled on current hypoglycemics.   Last A1c reviewed-   Lab Results   Component Value Date    HGBA1C 5.6 05/04/2019     Most recent fingerstick glucose reviewed-   Recent Labs   Lab 06/26/19  0030   POCTGLUCOSE 97     Current correctional scale  Medium  Maintain anti-hyperglycemic dose as follows-   Antihyperglycemics (From admission, onward)    Start     Stop Route Frequency Ordered    06/26/19 0900  SAXagliptin tablet 2.5 mg      -- Oral Daily 06/25/19 2158    06/25/19 2342  insulin aspart U-100 pen 1-10 Units      -- SubQ Every 6 hours PRN 06/25/19 9316

## 2019-06-26 NOTE — PLAN OF CARE
Problem: Adult Inpatient Plan of Care  Goal: Plan of Care Review  Outcome: Ongoing (interventions implemented as appropriate)  Pt received from ED, hard to arouse and put on BiPAP once on the unit.  Pt remained on BiPAP throughout shift, tolerated well.  ABG this morning suggests he is responding appropriately, mental status improved as shift progressed.  POCT BG checked, no coverage needed.  EKG run for abnormal heart rhythm, closely resembled EKG from ED, left in chart for MD to view today.  Pt resting at this time, will continue to monitor closely.

## 2019-06-26 NOTE — ASSESSMENT & PLAN NOTE
Patient's vent settings, CXR and last ABG were reviewed.         ABG  Recent Labs   Lab 06/25/19 2028   PH 7.227*   PO2 172*   PCO2 65.4*   HCO3 27.2   BE 0       Will adjust vent management as follows-  Continue on noninvasive positive pressure ventilation and monitor blood gases every 4-6 hours.  Consult with pulmonology in the morning if patient does not appear to be improving.  Start corticosteroids and antibiotics.

## 2019-06-26 NOTE — ASSESSMENT & PLAN NOTE
Body mass index is 42.57 kg/m². Morbid obesity complicates all aspects of disease management from diagnostic modalities to treatment. Weight loss encouraged and health benefits explained to patient.

## 2019-06-26 NOTE — ASSESSMENT & PLAN NOTE
Patient's COPD is uncontrolled due to continued dyspnea and worsening of baseline hypoxia currently. Continue scheduled inhalers Steroids, Antibiotics and Supplemental oxygen and monitor respiratory status closely.

## 2019-06-26 NOTE — PLAN OF CARE
1149  Patient sleeping with bipap in place; no family in room.    1215  Contacted pt's spouse, Yue, to complete assessment.  Spouse states that patient admitted to this facility from [facility], and the plan is to return to [facility] upon discharge.    Discharge plan is [facility]       06/26/19 1216   Discharge Assessment   Assessment Type Discharge Planning Assessment   Confirmed/corrected address and phone number on facesheet? Yes   Assessment information obtained from? Medical Record;Other  (spouse)   Prior to hospitilization cognitive status: Alert/Oriented   Prior to hospitalization functional status: Assistive Equipment;Needs Assistance   Current cognitive status: Unable to Assess  (sleeping)   Current Functional Status: Assistive Equipment;Needs Assistance   Able to Return to Prior Arrangements yes   Is patient able to care for self after discharge? No   Patient's perception of discharge disposition skilled nursing facility   Readmission Within the Last 30 Days current reason for admission unrelated to previous admission   If yes, most recent facility [facility]   Patient currently being followed by outpatient case management? No   Patient currently receives any other outside agency services? No   Discharge Plan A Skilled Nursing Facility   Patient/Family in Agreement with Plan yes   Readmission Questionnaire   At the time of your discharge, did someone talk to you about what your health problems were? Yes   At the time of discharge, did someone talk to you about what to watch out for regarding worsening of your health problem? Yes   At the time of discharge, did someone talk to you about what to do if you experienced worsening of your health problem? Yes   At the time of discharge, did someone talk to you about which medication to take when you left the hospital and which ones to stop taking? Yes   At the time of discharge, did someone talk to you about when and where to follow up  with a doctor after you left the hospital? Yes   What do you believe caused you to be sick enough to be re-admitted? admitted for SNF with hypotension

## 2019-06-26 NOTE — ASSESSMENT & PLAN NOTE
Patient's vent settings, CXR and last ABG were reviewed.    Oxygen Concentration (%):  [21-25] 25    ABG  Recent Labs   Lab 06/26/19  0030   PH 7.323*   PO2 70*   PCO2 46.9*   HCO3 24.3   BE -2       Will adjust vent management as follows-continue patient on BiPAP 2 hr on and 2 hr off throughout the day, and wean as he improves.  Check blood gas in the morning.

## 2019-06-26 NOTE — ASSESSMENT & PLAN NOTE
Patient is chronically on statin. Will continue for now. Monitor clinically. Last LDL was   Lab Results   Component Value Date    LDLCALC 55.8 (L) 05/05/2019

## 2019-06-26 NOTE — HPI
Frank Zayas is a 79 y.o. male who presents to the ED via EMS with an onset of confusion. Per EMS, the patient was noted to be more confused than normal yesterday. Per nursing home, he was last dialyzed 5 days ago and completed his full session. The patient has a PMHx of DM, HTN, CAD, CHF, CKD on dialysis, COPD, and prior MI. He has a PSHx including a CABG with 2 stent placements (1997). Lidocaine and Statins-hmg-coa reductase inhibitor drug allergies noted.Patient seen and examined in the emergency room, and was initially noted to be hypotensive.  Patient received 1 L fluid resuscitation in the emergency department with resolution of his hypotension subsequently.  ABG done shows evidence of severe respiratory acidosis and hypercarbia.  BiPAP was started in the emergency department, and adjusted with improvement in the patient's pCO2 and acidosis.  Patient was given broad-spectrum antibiotic therapy in the ED.

## 2019-06-26 NOTE — EICU
EICU    Pt is a 80 y/o m with pmhx sig for dm, htn, CAD s/p CABG ('97), COPD, CKD, TURBT on HD brought to the ER due to increasing confusion.  Per pt nursing home pt was last dialyzed 5d pta.  On arrival to the ER, pt was confused and had sbp 87 which responded to 90-100s with 500 cc saline. Found to have pyuria with bacteruira, ketonuria.  Pt was written for ceftriaxone- unclear if received yet; no report flank pain, +hx kidney stones, no chest pain    /65 99% RA hr 76 afebrile  Pt currently sleeping on NIPP  rij hd catheter dressing dry/intact oriented to person/place, ?year, knows that   Warm patellae  No mottling; bilat stasis dermatitis  Moves all ext  no flank pain ellicited on nursing exam    ua sg >1.03 1+ ketones, >100 wbc 2 rbc 2 sq epit  Mod bacteria, 2+ LE/neg nitrites  Wbc 11 P 85 L 6 M 7  hgb 8.5 mcv 103   plt 81  Na 139 K 5 HCO3 23 ag 13 bun 40 creat 5.9 gluc 140 Ca 8.5  7.167/82/30  7.22/65/172 NIPP  Lac 1.4 --> 1    ekg not in EPIC  cxr cardiac silhouette uln, opacification on right hemithorax with blunting right hd most c/w pleural effusions +/- infiltrate (review of several recent cxr back to 5/19 shows persistent density on right; no CT scan for comparison); sternotomy wires    TTE 6/19  Severely decr LVSF, ef 25% global hypokinesis, diastolic dysfucntion, mod/severely reduced RVSF  TTE 5/19 mild decr LVSF, nl RVSF    A/P  Sepsis/Hypotension/?Urinary Source/Hypercapneic and hypoxic resp Failure  Pt with hx decr LVSF (mild 5/19, severely decr 6/19), ESRD on HD sent for confusion found to have sig pyuria, persistent right hemithorax infiltrate (suspect effusions +/- infiltrate, cxr similar for two months). Possibility that pt has infected kidney stone, though only 2 rbcs on ua and no flank pain/n/v.  Likely acute on chronic CO2 retention, hx BRANDEN +/- OH +/- COPD  - cont nipp, should help forward flow  - hd better, received 500 cc crystalloid, has likely sig pleural effusion on cxr that seems  chronic, unclear if responds to dialysis  - early pressors and albumin if fluids needed with recent poor LVSF on echo  - hypercapneic and hypoxic resp failure; hx CPAP unclear if compliant with NIPP, ?hx COPD as well; tolerating BIPAP well, goal SAO2 88-92%, at risk for hypoventilation with increased FIO2  - ms likely delerious, wife says better after NIPP started; hypercapnea, sepsis encephalopathy  - hd per renal, hardeep would avoid taking too much fluid off if pt truly septic  - cont abx, pt reports hx nephrolithiasis; would start with ultrasound to assess for hydronephrosis, may need CT scan if ureter obstructed wihtout hydronephrosis  - pulm source possible as well, review of two months cxr shows chronic likely right pl effusion- could get seeded- but no pulm complaints when interviewing; do no clearly have source control, though urine could be culprit  - bedside u/s to assess pleural space vs chest CT  - low plt hardeep related to sepsis, could be DIC, but less likely; send DIC pane/fibrinogenl if cont to fall, if not bleeding/clotting, no interevention  - dvt proph, hold on chemical proph with low plateltes  - f/u EKG, not in Epic yet, if abnl, check trops, but no cp currently

## 2019-06-26 NOTE — ED NOTES
Patient is resting in bed, opens his eyes with the sound of my voice. Patient is tolerating bipap.

## 2019-06-26 NOTE — PLAN OF CARE
Problem: Adult Inpatient Plan of Care  Goal: Plan of Care Review  Outcome: Ongoing (interventions implemented as appropriate)  Plan of care reviewed with patient and patients wife through out the day. Patient now awake and eating, did not remember coming to the hospital or what day it was when he 'woke up' this afternoon. Patients VSS, BP slowly improving. BP medications held by Dr. Kamara due to patient being hypotensive in ED. Afebrile. Patient received dialysis this am, see note for details.

## 2019-06-26 NOTE — ASSESSMENT & PLAN NOTE
Patient's FSGs are controlled on current hypoglycemics.   Last A1c reviewed-   Lab Results   Component Value Date    HGBA1C 5.6 05/04/2019     Most recent fingerstick glucose reviewed- No results for input(s): POCTGLUCOSE in the last 24 hours.  Current correctional scale  Medium  Maintain anti-hyperglycemic dose as follows-   Antihyperglycemics (From admission, onward)    Start     Stop Route Frequency Ordered    06/26/19 0900  SAXagliptin tablet 2.5 mg      -- Oral Daily 06/25/19 2158    06/25/19 2342  insulin aspart U-100 pen 1-10 Units      -- SubQ Every 6 hours PRN 06/25/19 5590

## 2019-06-26 NOTE — ASSESSMENT & PLAN NOTE
Nephrology consulted. Continue Chronic hemodialysis. Monitor daily electrolytes and defer dialysis orders to nephrology.

## 2019-06-26 NOTE — ASSESSMENT & PLAN NOTE
Atrial Fibrillation controlled currently with no meds. [id number] score 5. Anticoagulation not indicated currently. D/t recent bleed.

## 2019-06-26 NOTE — PROGRESS NOTES
[facility] Hospital Medicine  Progress Note    Patient Name: Frank Zayas  MRN: [id number]  Patient Class: IP- Inpatient   Admission Date: 6/25/2019  Length of Stay: 1 days  Attending Physician: Vishal Kamara MD  Primary Care Provider: Mikhail Shields MD        Subjective:     Principal Problem:Acute hypercapnic respiratory failure      HPI:  Frank Zayas is a 79 y.o. male who presents to the ED via EMS with an onset of confusion. Per EMS, the patient was noted to be more confused than normal yesterday. Per nursing home, he was last dialyzed 5 days ago and completed his full session. The patient has a PMHx of DM, HTN, CAD, CHF, CKD on dialysis, COPD, and prior MI. He has a PSHx including a CABG with 2 stent placements (1997). Lidocaine and Statins-hmg-coa reductase inhibitor drug allergies noted.Patient seen and examined in the emergency room, and was initially noted to be hypotensive.  Patient received 1 L fluid resuscitation in the emergency department with resolution of his hypotension subsequently.  ABG done shows evidence of severe respiratory acidosis and hypercarbia.  BiPAP was started in the emergency department, and adjusted with improvement in the patient's pCO2 and acidosis.  Patient was given broad-spectrum antibiotic therapy in the ED.    Overview/Hospital Course:  No notes on file    Interval History:  Patient doing much better throughout the course the day today.  On BiPAP 2 hr on 2 hr off.  Mental status and respiratory status appear to be greatly improved.  Blood pressure remains stable.  Plan of care reviewed with the patient and his wife at the bedside.    Review of Systems   Constitutional: Positive for fatigue. Negative for chills and fever.   Respiratory: Positive for cough and shortness of breath.    Cardiovascular: Negative for chest pain and leg swelling.   Gastrointestinal: Negative for abdominal pain, nausea and vomiting.   Musculoskeletal: Negative for back pain.    Neurological: Negative for weakness.   Psychiatric/Behavioral: Negative for confusion. The patient is not nervous/anxious.    All other systems reviewed and are negative.    Objective:     Vital Signs (Most Recent):  Temp: 97.4 °F (36.3 °C) (06/26/19 1600)  Pulse: 102 (06/26/19 1800)  Resp: 20 (06/26/19 1800)  BP: 123/61 (06/26/19 1800)  SpO2: 99 % (06/26/19 1800) Vital Signs (24h Range):  Temp:  [95.8 °F (35.4 °C)-98.3 °F (36.8 °C)] 97.4 °F (36.3 °C)  Pulse:  [] 102  Resp:  [13-31] 20  SpO2:  [90 %-100 %] 99 %  BP: ()/(50-89) 123/61     Weight: 127 kg (279 lb 15.7 oz)  Body mass index is 42.57 kg/m².    Intake/Output Summary (Last 24 hours) at 6/26/2019 1829  Last data filed at 6/26/2019 1600  Gross per 24 hour   Intake 1055 ml   Output 1800 ml   Net -745 ml      Physical Exam   Constitutional:   Elderly obese  male in no acute distress   Eyes: Pupils are equal, round, and reactive to light. EOM are normal.   Neck: No JVD present.   Cardiovascular: Normal rate, regular rhythm, normal heart sounds and intact distal pulses.   Midline sternotomy scar   Pulmonary/Chest: Breath sounds normal.   Diminished breath sounds noted bilaterally with increased work of breathing   Abdominal: Soft. Bowel sounds are normal. He exhibits no distension. There is no tenderness.   Protuberant   Musculoskeletal: He exhibits edema (2+ pretibial edema bilaterally). He exhibits no deformity.   Lymphadenopathy:     He has no cervical adenopathy.   Skin: Capillary refill takes 2 to 3 seconds. No rash noted. No pallor.   Nursing note and vitals reviewed.      Significant Labs: All pertinent labs within the past 24 hours have been reviewed.    Significant Imaging: I have reviewed all pertinent imaging results/findings within the past 24 hours.      Assessment/Plan:      * Acute hypercapnic respiratory failure  Patient's vent settings, CXR and last ABG were reviewed.    Oxygen Concentration (%):  [21-25] 25    ABG  Recent  Labs   Lab 06/26/19  0030   PH 7.323*   PO2 70*   PCO2 46.9*   HCO3 24.3   BE -2       Will adjust vent management as follows-continue patient on BiPAP 2 hr on and 2 hr off throughout the day, and wean as he improves.  Check blood gas in the morning.        Encephalopathy, metabolic  Patient with encephalopathy due to   Hypercarbia, sepsis which is improving. Treatment for illness causative of encephalopathy is under way. Encephalopathy noted as a secondary process related to the patient's acute illness. There is no specific treatment. Monitor neuro status, avoid medications such as narcotics and benzos which may worsen confusion, and use anti-psychotics to prevent behaviors of self harm.        COPD (chronic obstructive pulmonary disease)  Patient's COPD is uncontrolled due to continued dyspnea and worsening of baseline hypoxia currently. Continue scheduled inhalers Steroids, Antibiotics and Supplemental oxygen and monitor respiratory status closely.         Severe sepsis  This patient does have evidence of infective focus  My overall impression is sepsis.  Antibiotics given-   Antibiotics (From admission, onward)    Start     Stop Route Frequency Ordered    06/26/19 1045  cefepime in dextrose 5 % 1 gram/50 mL IVPB 1 g      -- IV Every 12 hours (non-standard times) 06/26/19 0931    06/25/19 2100  mupirocin 2 % ointment      06/30 2059 Nasl 2 times daily 06/25/19 1300          Latest lactate reviewed, they are-  Recent Labs   Lab 06/25/19  1035 06/25/19  1355   LACTATE 1.4 1.0       Organ dysfunction indicated by Encephalopathy, Acute respiratory failure and Acute liver injury  Source- lung          Urinary tract infection without hematuria  Treat per sepsis above.      ESRD (end stage renal disease) on dialysis  Nephrology consulted. Continue Chronic hemodialysis. Monitor daily electrolytes and defer dialysis orders to nephrology.        Dermatitis associated with moisture  Chronic, no acute inpatient treatment  indicated currently.      Morbid (severe) obesity with alveolar hypoventilation  Body mass index is 42.57 kg/m². Morbid obesity complicates all aspects of disease management from diagnostic modalities to treatment. Weight loss encouraged and health benefits explained to patient.        BRANDEN (obstructive sleep apnea)  BIPAP      Anemia, chronic disease  Patient's anemia is currently   A-fib  Atrial Fibrillation controlled currently with no meds. [id number] score 5. Anticoagulation not indicated currently. D/t recent bleed.        Coronary artery disease  Patient with known CAD s/p CABG, which is controlled Will continue ASA and Statin and monitor for S/Sx of angina/ACS. Continue to monitor on telemetry.         DM type 2 with diabetic mixed hyperlipidemia   Patient is chronically on statin. Will continue for now. Monitor clinically. Last LDL was   Lab Results   Component Value Date    LDLCALC 55.8 (L) 05/05/2019            Type 2 diabetes mellitus with kidney complication, with long-term current use of insulin  Patient's FSGs are controlled on current hypoglycemics.   Last A1c reviewed-   Lab Results   Component Value Date    HGBA1C 5.6 05/04/2019     Most recent fingerstick glucose reviewed-   Recent Labs   Lab 06/26/19  0030   POCTGLUCOSE 97     Current correctional scale  Medium  Maintain anti-hyperglycemic dose as follows-   Antihyperglycemics (From admission, onward)    Start     Stop Route Frequency Ordered    06/26/19 0900  SAXagliptin tablet 2.5 mg      -- Oral Daily 06/25/19 2158    06/25/19 2342  insulin aspart U-100 pen 1-10 Units      -- SubQ Every 6 hours PRN 06/25/19 2244              HTN (hypertension)   Blood pressure low.  BP medications currently on hold.      Malignant neoplasm of bladder   Chronic.  No acute inpatient management indicated        VTE Risk Mitigation (From admission, onward)        Ordered     IP VTE HIGH RISK PATIENT  Once      06/25/19 2240     Place JACK hose  Until discontinued       06/25/19 2240     Place sequential compression device  Until discontinued      06/25/19 2240          Critical care time spent on the evaluation and treatment of severe organ dysfunction, review of pertinent labs and imaging studies, discussions with consulting providers and discussions with patient/family:  35 minutes.      Vishal Kamara MD  Department of Hospital Medicine   [facility]

## 2019-06-26 NOTE — ASSESSMENT & PLAN NOTE
Patient's anemia is currently controlled. S/p 0 units of PRBCs. Etiology likely d/t ACD- ESRD  Current CBC reviewed-   Lab Results   Component Value Date    HGB 8.5 (L) 06/25/2019    HCT 28.8 (L) 06/25/2019     Monitor serial CBC and transfuse if patient becomes hemodynamically unstable, symptomatic or H/H drops below 7/21.

## 2019-06-26 NOTE — CONSULTS
Pharmacokinetic Initial Assessment: IV Vancomycin    Assessment/Plan:    Initiate intravenous vancomycin with loading dose of 1500 mg once with subsequent doses when random concentrations are less than 25 mcg/ml  Desired empiric serum trough concentration is 15 to 20 mcg/mL.  Draw vancomycin random level on 06/26 at AM lab draw.  Pharmacy will continue to follow and monitor vancomycin.      Please contact pharmacy at extension [phone] with any questions regarding this assessment.     Thank you for the consult,   Shayy Moura     Patient brief summary:  Frank Zayas is a 79 y.o. male initiated on antimicrobial therapy with IV Vancomycin for treatment of suspected bacteremia    Drug Allergies:   Review of patient's allergies indicates:   Allergen Reactions    Lidocaine Nausea Only     NOVACAINE --  Severe nausea    Statins-hmg-coa reductase inhibitors Anxiety       Actual Body Weight:   120.2 kg    Renal Function:   CrCl cannot be calculated (Unknown ideal weight.).,     Dialysis Method (if applicable):  intermittent HD    CBC (last 72 hours):  Recent Labs   Lab Result Units 06/25/19  1050   WBC K/uL 11.89   Hemoglobin g/dL 8.5*   Hematocrit % 28.8*   Platelets K/uL 81*   Gran% % 84.7*   Lymph% % 6.2*   Mono% % 7.2   Eosinophil% % 0.3   Basophil% % 0.3   Differential Method  Automated       Metabolic Panel (last 72 hours):  Recent Labs   Lab Result Units 06/25/19  1050 06/25/19  1110   Sodium mmol/L 139  --    Potassium mmol/L 5.0  --    Chloride mmol/L 103  --    CO2 mmol/L 23  --    Glucose mg/dL 140*  --    Glucose, UA   --  Negative   BUN, Bld mg/dL 40*  --    Creatinine mg/dL 5.9*  --    Albumin g/dL 2.9*  --    Total Bilirubin mg/dL 0.5  --    Alkaline Phosphatase U/L 116  --    AST U/L 27  --    ALT U/L 21  --        Drug levels (last 3 results):  No results for input(s): VANCOMYCINRA, VANCOMYCINPE, VANCOMYCINTR in the last 72 hours.    Microbiologic Results:  Microbiology Results (last 7 days)        Procedure Component Value Units Date/Time    Blood culture [[id number]] Collected:  06/25/19 2302    Order Status:  Sent Specimen:  Blood Updated:  06/25/19 2303    Blood culture [[id number]] Collected:  06/25/19 2254    Order Status:  Sent Specimen:  Blood Updated:  06/25/19 2254    Blood culture x two cultures. Draw prior to antibiotics. [[id number]] Collected:  06/25/19 1035    Order Status:  Completed Specimen:  Blood Updated:  06/25/19 2115     Blood Culture, Routine No Growth to date    Narrative:       Aerobic and anaerobic    Blood culture x two cultures. Draw prior to antibiotics. [[id number]] Collected:  06/25/19 1044    Order Status:  Completed Specimen:  Blood Updated:  06/25/19 2115     Blood Culture, Routine No Growth to date    Narrative:       Aerobic and anaerobic    Urine culture [[id number]] Collected:  06/25/19 1110    Order Status:  No result Specimen:  Urine Updated:  06/25/19 1123

## 2019-06-26 NOTE — SUBJECTIVE & OBJECTIVE
Interval History:  Patient doing much better throughout the course the day today.  On BiPAP 2 hr on 2 hr off.  Mental status and respiratory status appear to be greatly improved.  Blood pressure remains stable.  Plan of care reviewed with the patient and his wife at the bedside.    Review of Systems   Constitutional: Positive for fatigue. Negative for chills and fever.   Respiratory: Positive for cough and shortness of breath.    Cardiovascular: Negative for chest pain and leg swelling.   Gastrointestinal: Negative for abdominal pain, nausea and vomiting.   Musculoskeletal: Negative for back pain.   Neurological: Negative for weakness.   Psychiatric/Behavioral: Negative for confusion. The patient is not nervous/anxious.    All other systems reviewed and are negative.    Objective:     Vital Signs (Most Recent):  Temp: 97.4 °F (36.3 °C) (06/26/19 1600)  Pulse: 102 (06/26/19 1800)  Resp: 20 (06/26/19 1800)  BP: 123/61 (06/26/19 1800)  SpO2: 99 % (06/26/19 1800) Vital Signs (24h Range):  Temp:  [95.8 °F (35.4 °C)-98.3 °F (36.8 °C)] 97.4 °F (36.3 °C)  Pulse:  [] 102  Resp:  [13-31] 20  SpO2:  [90 %-100 %] 99 %  BP: ()/(50-89) 123/61     Weight: 127 kg (279 lb 15.7 oz)  Body mass index is 42.57 kg/m².    Intake/Output Summary (Last 24 hours) at 6/26/2019 1829  Last data filed at 6/26/2019 1600  Gross per 24 hour   Intake 1055 ml   Output 1800 ml   Net -745 ml      Physical Exam   Constitutional:   Elderly obese  male in no acute distress   Eyes: Pupils are equal, round, and reactive to light. EOM are normal.   Neck: No JVD present.   Cardiovascular: Normal rate, regular rhythm, normal heart sounds and intact distal pulses.   Midline sternotomy scar   Pulmonary/Chest: Breath sounds normal.   Diminished breath sounds noted bilaterally with increased work of breathing   Abdominal: Soft. Bowel sounds are normal. He exhibits no distension. There is no tenderness.   Protuberant   Musculoskeletal: He  exhibits edema (2+ pretibial edema bilaterally). He exhibits no deformity.   Lymphadenopathy:     He has no cervical adenopathy.   Skin: Capillary refill takes 2 to 3 seconds. No rash noted. No pallor.   Nursing note and vitals reviewed.      Significant Labs: All pertinent labs within the past 24 hours have been reviewed.    Significant Imaging: I have reviewed all pertinent imaging results/findings within the past 24 hours.

## 2019-06-26 NOTE — SUBJECTIVE & OBJECTIVE
Past Medical History:   Diagnosis Date    Anticoagulant long-term use     Arthritis     Bladder cancer     Blood transfusion     CHF (congestive heart failure)     Chronic kidney disease     COPD (chronic obstructive pulmonary disease)     Coronary artery disease     Diabetes mellitus     Gout     [place]hard of hearing[place]     HAS BILAT AIDS BUT DOES NOT WEAR    Hyperlipidemia     Hypertension     Myocardial infarction     RLS (restless legs syndrome)     Sleep apnea     NO MACHINE    Wears glasses        Past Surgical History:   Procedure Laterality Date    CARDIAC SURGERY      CABG X 2 1997    CATARACT EXTRACTION, BILATERAL      CHOLECYSTECTOMY      COLONOSCOPY      CORONARY ARTERY BYPASS GRAFT      x 2    coronary stents      CYSTOSCOPY      CYSTOSCOPY N/A 4/8/2019    [facility]    EGD (ESOPHAGOGASTRODUODENOSCOPY) N/A 6/4/2019    [facility] ([facility]ND [facility])    EXCISION-BLADDER TUMOR-TRANSURETHRAL (TURBT) N/A 9/8/2014    [facility] at [facility] OR    EXCISION-BLADDER TUMOR-TRANSURETHRAL (TURBT) N/A 11/18/2013    [facility] at [facility] OR    EYE SURGERY Bilateral     CATARACT    Insertion,catheter,tunneled N/A 5/16/2019    Performed by Wade Rodriguez [facility] at [facility] OR    TURBT (TRANSURETHRAL RESECTION OF BLADDER TUMOR) N/A 4/8/2019    [facility] OR    VASECTOMY         Review of patient's allergies indicates:   Allergen Reactions    Lidocaine Nausea Only     NOVACAINE --  Severe nausea    Statins-hmg-coa reductase inhibitors Anxiety       No current facility-administered medications on file prior to encounter.      Current Outpatient Medications on File Prior to Encounter   Medication Sig    alfuzosin (UROXATRAL) 10 mg Tb24 Take 10 mg by mouth nightly.     aspirin (ECOTRIN) 81 MG EC tablet Take 1 tablet (81 mg total) by mouth once daily.    coenzyme Q10 (CO Q-10) 100  mg capsule Take 100 mg by mouth once daily.     ferrous sulfate 325 (65 FE) MG EC tablet Take 1 tablet (325 mg total) by mouth 2 (two) times daily with meals.    FLUoxetine 20 MG capsule Take 20 mg by mouth every evening.     metoprolol tartrate (LOPRESSOR) 25 MG tablet Take 1 tablet (25 mg total) by mouth 2 (two) times daily. (Patient taking differently: Take 25 mg by mouth every Mon, Wed, Fri, Sun. )    nitroGLYCERIN (NITROSTAT) 0.4 MG SL tablet Place 0.4 mg under the tongue every 5 (five) minutes as needed for Chest pain.    omeprazole (PRILOSEC) 20 MG capsule Take 20 mg by mouth once daily.    rOPINIRole (REQUIP) 2 MG tablet Take 2 mg by mouth every evening.    sevelamer carbonate (RENVELA) 800 mg Tab Take 1 tablet (800 mg total) by mouth 3 (three) times daily with meals.    [DISCONTINUED] aspirin (ECOTRIN) 81 MG EC tablet Take 81 mg by mouth once daily.    [DISCONTINUED] fluconazole (DIFLUCAN) 100 MG tablet Take 1 tablet (100 mg total) by mouth once daily. for 10 days    [DISCONTINUED] linaGLIPtin (TRADJENTA) 5 mg Tab tablet Take 5 mg by mouth once daily.    [DISCONTINUED] miconazole nitrate 2% (MICOTIN) 2 % Oint Apply topically 2 (two) times daily.    [DISCONTINUED] pantoprazole (PROTONIX) 40 MG tablet Take 1 tablet (40 mg total) by mouth 2 (two) times daily before meals.     Family History     Problem Relation (Age of Onset)    Cancer Father, Sister, Brother    Heart disease Father        Tobacco Use    Smoking status: Former Smoker     Packs/day: 0.25     Years: 20.00     Pack years: 5.00     Types: Cigars     Last attempt to quit: 1982     Years since quittin.4    Smokeless tobacco: Never Used   Substance and Sexual Activity    Alcohol use: No    Drug use: No    Sexual activity: Not on file     Review of Systems   Unable to perform ROS: Mental status change     Objective:     Vital Signs (Most Recent):  Temp: 97.1 °F (36.2 °C) (19 1003)  Pulse: 76 (192)  Resp: 20  (06/25/19 1003)  BP: (!) 94/56 (06/25/19 2122)  SpO2: 99 % (06/25/19 2122) Vital Signs (24h Range):  Temp:  [97.1 °F (36.2 °C)] 97.1 °F (36.2 °C)  Pulse:  [71-83] 76  Resp:  [20] 20  SpO2:  [86 %-100 %] 99 %  BP: ()/(49-69) 94/56     Weight: 120.2 kg (265 lb)  Body mass index is 34.02 kg/m².    Physical Exam   Constitutional: He is oriented to person, place, and time. No distress.   Elderly, somnolent  male.   HENT:   Head: Normocephalic.   Mouth/Throat: Oropharynx is clear and moist.   Eyes: Conjunctivae are normal. Right eye exhibits no discharge. Left eye exhibits no discharge. No scleral icterus.   Neck: No JVD present.   Cardiovascular: Normal rate, regular rhythm, normal heart sounds and intact distal pulses. Exam reveals no friction rub.   No murmur heard.  Midline sternotomy scar noted   Pulmonary/Chest: No respiratory distress. He has no wheezes.   Markedly decreased breath sounds noted bilaterally with poor inspiratory effort noted.   Abdominal: Soft. Bowel sounds are normal. He exhibits no distension. There is no tenderness.   Protuberant abdomen   Musculoskeletal: Normal range of motion. He exhibits no edema.   Bilateral lower extremity edema noted with evidence of weeping and stasis dermatitis.   Lymphadenopathy:     He has no cervical adenopathy.   Neurological: He is oriented to person, place, and time. He has normal reflexes.   Lethargic, but arousable.  Confused.   Skin: No rash noted. He is not diaphoretic. No erythema. There is pallor.   Psychiatric: He has a normal mood and affect. His behavior is normal.   Nursing note and vitals reviewed.          Significant Labs: All pertinent labs within the past 24 hours have been reviewed.    Significant Imaging: I have reviewed all pertinent imaging results/findings within the past 24 hours.

## 2019-06-26 NOTE — ASSESSMENT & PLAN NOTE
Patient's anemia is currently controlled. S/p 0 units of PRBCs. Etiology likely d/t ACD- ESRD  Current CBC reviewed-   Lab Results   Component Value Date    HGB 8.1 (L) 06/26/2019    HCT 28.0 (L) 06/26/2019     Monitor serial CBC and transfuse if patient becomes hemodynamically unstable, symptomatic or H/H drops below 7/21.

## 2019-06-26 NOTE — ASSESSMENT & PLAN NOTE
Atrial Fibrillation controlled currently with no meds. [id number] score 5. Anticoagulation not indicated currently. D/t recent bleed.

## 2019-06-27 PROBLEM — K74.60 CIRRHOSIS OF LIVER: Status: ACTIVE | Noted: 2019-01-01

## 2019-06-27 NOTE — PROGRESS NOTES
Pharmacokinetic Assessment Follow Up: IV Vancomycin    Vancomycin serum concentration assessment(s):    The random level was drawned correctly and can be used to guide therapy at this time. The measurement is within the desired definitive target range of 15 to 20 mcg/mL.    Vancomycin Regimen Plan:    Re-dose when the random level is less than 25 mcg/mL, next level to be drawn at 0430 on 6/28 with AM labs.     Pharmacy will continue to follow and monitor vancomycin.    Please contact pharmacy at extension [phone] for questions regarding this assessment.    Thank you for the consult,   Nadira Nichols     Patient brief summary:  Frank Zayas is a 79 y.o. male initiated on antimicrobial therapy with IV Vancomycin for treatment of suspected sepsis    The patient received an initial dose of 1500 mg, followed by the current treatment regimen: random levels with plan to redose when level is less than 25 mcg/mL.    Drug Allergies:   Review of patient's allergies indicates:   Allergen Reactions    Lidocaine Nausea Only     NOVACAINE --  Severe nausea    Statins-hmg-coa reductase inhibitors Anxiety       Actual Body Weight:   129 kg    Renal Function:   Estimated Creatinine Clearance: 15.3 mL/min (A) (based on SCr of 5.2 mg/dL (H)).,     Dialysis Method (if applicable):  intermittent HD Tues-Thurs-Sat PTA; currently PRN per Nephrology    CBC (last 72 hours):  Recent Labs   Lab Result Units 06/25/19  1050 06/26/19  0338 06/27/19  0321   WBC K/uL 11.89 8.89 6.74   Hemoglobin g/dL 8.5* 8.1* 7.5*   Hematocrit % 28.8* 28.0* 25.8*   Platelets K/uL 81* 95* 78*   Gran% % 84.7* 91.4* 92.0*   Lymph% % 6.2* 4.7* 6.0*   Mono% % 7.2 2.7* 2.0*   Eosinophil% % 0.3 0.4 0.0   Basophil% % 0.3 0.1 0.0   Differential Method  Automated Automated Manual       Metabolic Panel (last 72 hours):  Recent Labs   Lab Result Units 06/25/19  1050 06/25/19  1110 06/26/19  0338 06/27/19  0321   Sodium mmol/L 139  --  140 135*   Potassium mmol/L 5.0  --  4.8  4.6   Chloride mmol/L 103  --  103 101   CO2 mmol/L 23  --  23 25   Glucose mg/dL 140*  --  113* 180*   Glucose, UA   --  Negative  --   --    BUN, Bld mg/dL 40*  --  44* 41*   Creatinine mg/dL 5.9*  --  6.4* 5.2*   Albumin g/dL 2.9*  --  2.6* 2.6*   Total Bilirubin mg/dL 0.5  --  0.6 0.5   Alkaline Phosphatase U/L 116  --  102 102   AST U/L 27  --  13 10   ALT U/L 21  --  14 15   Magnesium mg/dL  --   --  2.3 2.2   Phosphorus mg/dL  --   --  7.1* 6.0*       Vancomycin Administrations:  vancomycin given in the last 96 hours                     vancomycin (VANCOCIN) 750 mg in dextrose 5 % 250 mL IVPB (mg) 750 mg New Bag 06/26/19 1652    vancomycin 1.5 g in dextrose 5 % 250 mL IVPB (ready to mix) (mg) 1,500 mg New Bag 06/25/19 1417                      Drug levels (last 3 results):  Recent Labs   Lab Result Units 06/26/19  0338 06/27/19  0321   Vancomycin, Random ug/mL 14.2 16.5       Microbiologic Results:  Microbiology Results (last 7 days)       Procedure Component Value Units Date/Time    Urine culture [[id number]] Collected:  06/25/19 1110    Order Status:  Completed Specimen:  Urine Updated:  06/26/19 2123     Urine Culture, Routine No growth    Narrative:       Preferred Collection Type->Urine, Clean Catch    Blood culture [[id number]] Collected:  06/25/19 2250    Order Status:  Completed Specimen:  Blood Updated:  06/26/19 1715     Blood Culture, Routine No Growth to date    Narrative:       Aerobic and Anaerobic    Blood culture [[id number]] Collected:  06/25/19 2302    Order Status:  Completed Specimen:  Blood Updated:  06/26/19 1715     Blood Culture, Routine No Growth to date    Narrative:       Aerobic and Anaerobic    Blood culture x two cultures. Draw prior to antibiotics. [[id number]] Collected:  06/25/19 1035    Order Status:  Completed Specimen:  Blood Updated:  06/26/19 1412     Blood Culture, Routine No Growth to date      No Growth to date    Narrative:       Aerobic and anaerobic    Blood culture x  two cultures. Draw prior to antibiotics. [[id number]] Collected:  06/25/19 1044    Order Status:  Completed Specimen:  Blood Updated:  06/26/19 1412     Blood Culture, Routine No Growth to date      No Growth to date    Narrative:       Aerobic and anaerobic

## 2019-06-27 NOTE — PT/OT/SLP EVAL
Physical Therapy Evaluation    Patient Name:  Frank Zayas   MRN:  [id number]    Recommendations:     Discharge Recommendations:  nursing facility, skilled   Discharge Equipment Recommendations: none   Barriers to discharge: None    Assessment:     Frank Zayas is a 79 y.o. male admitted with a medical diagnosis of Acute hypercapnic respiratory failure.  He presents with the following impairments/functional limitations:  weakness, impaired endurance, impaired self care skills, impaired functional mobilty, gait instability, impaired balance, decreased lower extremity function, edema, impaired cardiopulmonary response to activity . Pt seen at ICU, alert/cooperative, pt tolerated EOB sitting, standing to transfer to commode for BM then few steps to transfer to wheelchair. Pt will benefit from return to [facility].    Rehab Prognosis: Fair; patient would benefit from acute skilled PT services to address these deficits and reach maximum level of function.    Recent Surgery: * No surgery found *      Plan:     During this hospitalization, patient to be seen 6 x/week to address the identified rehab impairments via gait training, therapeutic activities, therapeutic exercises and progress toward the following goals:    · Plan of Care Expires:  07/26/19    Subjective   Spouse stated pt in and out of hospital x 1 month and has been at [facility] x 1 week and will return for therapy  Pt stated is walking with PT 100ft  Chief Complaint: wanting to get OOB to use commode. Bedpan was uncomfortable earlier  Patient/Family Comments/goals: get home  Pain/Comfort:  · Pain Rating 1: 0/10    Patients cultural, spiritual, Jewish conflicts given the current situation:      Living Environment:  Home with spouse prior to illness  Prior to admission, patients level of function was ambulatory with RW with assist.  Equipment used at home: walker, rolling, rollator.  DME owned (not currently used): none.  Upon discharge,  patient will have assistance from family.    Objective:     Communicated with nurse alba prior to session.  Patient found HOB elevated with telemetry, oxygen, peripheral IV, pulse ox (continuous), blood pressure cuff  upon PT entry to room.    General Precautions: Standard, fall   Orthopedic Precautions:N/A   Braces: N/A     Exams:  · Postural Exam:  Patient presented with the following abnormalities:    · -       Rounded shoulders  · -       Forward head  · -       Kyphosis  · RLE ROM: WFL  · RLE Strength: Deficits: 3+/5  · LLE ROM: WFL  · LLE Strength: Deficits: 3+/5    Functional Mobility:  · Bed Mobility:     · Rolling Left:  minimum assistance  · Scooting: minimum assistance  · Supine to Sit: minimum assistance  · Transfers:     · Sit to Stand:  moderate assistance with rolling walker  · Bed to Chair: moderate assistance with  rolling walker  using  Stand Pivot  · Toilet Transfer: moderate assistance with  bedside commode  using  Stand Pivot  · Gait: few small steps with RW, kyphotic      Therapeutic Activities and Exercises:   commode use for BM, total assist from nurse anjali for hygiene care  Standing with RW to transfer to wheelchair mod assist  Completed LE's thera ex with AP,LAQ, marches,GS x 10-20 reps  Pt awaiting transfer to [facility]      AM-PAC 6 CLICK MOBILITY  Total Score:13     Patient left up in chair with all lines intact, call button in reach and nurse Anjali and spouse present.    GOALS:   Multidisciplinary Problems     Physical Therapy Goals        Problem: Physical Therapy Goal    Goal Priority Disciplines Outcome Goal Variances Interventions   Physical Therapy Goal     PT, PT/OT Ongoing (interventions implemented as appropriate)     Description:  Goals to be met by: 2019     Patient will increase functional independence with mobility by performin. Supine to sit with MInimal Assistance  2. Sit to stand transfer with Minimal Assistance  3. Bed to chair transfer with Minimal Assistance  using Rolling Walker  4. Gait  x 250 feet with Minimal Assistance using Rolling Walker.   5. Lower extremity exercise program x20 reps                    History:     Past Medical History:   Diagnosis Date    Anticoagulant long-term use     Arthritis     Bladder cancer     Blood transfusion     Cardiac arrest 5/31/2019    CHF (congestive heart failure)     Chronic kidney disease     COPD (chronic obstructive pulmonary disease)     Coronary artery disease     Diabetes mellitus     Gout     Hemorrhagic shock 6/1/2019    [place] (hard of hearing)     HAS BILAT AIDS BUT DOES NOT WEAR    Hyperlipidemia     Hypertension     Myocardial infarction     NSTEMI (non-ST elevated myocardial infarction)     RLS (restless legs syndrome)     Sleep apnea     NO MACHINE    Wears glasses        Past Surgical History:   Procedure Laterality Date    CARDIAC SURGERY      CABG X 2 1997    CATARACT EXTRACTION, BILATERAL      CHOLECYSTECTOMY      COLONOSCOPY      CORONARY ARTERY BYPASS GRAFT      x 2    coronary stents      CYSTOSCOPY      CYSTOSCOPY N/A 4/8/2019    [facility] OR    EGD (ESOPHAGOGASTRODUODENOSCOPY) N/A 6/4/2019    Performed by Clint [facility] at [facility] ENDO (2ND FLR)    EXCISION-BLADDER TUMOR-TRANSURETHRAL (TURBT) N/A 9/8/2014    Performed by [facility] at [facility] OR    EXCISION-BLADDER TUMOR-TRANSURETHRAL (TURBT) N/A 11/18/2013    Performed by [facility]lee [facility] at [facility] OR    EYE SURGERY Bilateral     CATARACT    Insertion,catheter,tunneled N/A 5/16/2019    Performed by Wade Rodriguez MD at [facility] OR    TURBT (TRANSURETHRAL RESECTION OF BLADDER TUMOR) N/A 4/8/2019    Performed by Kaylee [facility]quez MD at [facility] OR    VASECTOMY         Time Tracking:     PT Received On: 06/27/19  PT Start Time: 1438     PT Stop Time: 1516  PT Total Time (min): 38 min     Billable Minutes: Evaluation 10, Therapeutic Activity 18 and Therapeutic Exercise 10      Reina Wniter  PT  06/27/2019

## 2019-06-27 NOTE — CONSULTS
Addendum @ 3:01 PM  Will do HD today and give 1 PRBC with HD.    Reyes Yoo MD      Nephrology Consult Progress Note        Patient Name: Frank Zayas  MRN: [id number]    Patient Class: IP- Inpatient   Admission Date: 6/25/2019  Length of Stay: 2 days  Date of Service: 6/27/2019    Attending Physician: Vishal Kamara MD  Primary Care Provider: Mikhail Shields MD    Reason for Consult: ESRD    SUBJECTIVE:     HPI: 79M with multiple medical issues, including ESRD on HD TTS, last dialyzed on Sat, 6/22 is admitted from SNF with AMS, hypotension and concerns for UTI. Renal is consulted for comanagement.    6/26 VSS, seen and examined on HD, tolerating well, still on BiPAP.  6/27 VSS, much more awake, conversant. HD in AM or Sat per schedule.    Past Medical History:   Diagnosis Date    Anticoagulant long-term use     Arthritis     Bladder cancer     Blood transfusion     Cardiac arrest 5/31/2019    CHF (congestive heart failure)     Chronic kidney disease     COPD (chronic obstructive pulmonary disease)     Coronary artery disease     Diabetes mellitus     Gout     Hemorrhagic shock 6/1/2019    [place]     HAS BILAT AIDS BUT DOES NOT WEAR    Hyperlipidemia     Hypertension     Myocardial infarction     NSTEMI (non-ST elevated myocardial infarction)     RLS (restless legs syndrome)     Sleep apnea     NO MACHINE    Wears glasses      Past Surgical History:   Procedure Laterality Date    CARDIAC SURGERY      CABG X 2 1997    CATARACT EXTRACTION, BILATERAL      CHOLECYSTECTOMY      COLONOSCOPY      CORONARY ARTERY BYPASS GRAFT      x 2    coronary stents      CYSTOSCOPY      CYSTOSCOPY N/A 4/8/2019    [facility]formed by [facility]    EGD (ESOPHAGOGASTRODUODENOSCOPY) N/A 6/4/2019    [facility] ENDO (2ND FLR)    EXCISION-BLADDER TUMOR-TRANSURETHRAL (TURBT) N/A 9/8/2014    Performed by [facility] at [facility] OR    EXCISION-BLADDER  TUMOR-TRANSURETHRAL (TURBT) N/A 2013    [facility] by [facility] OR    EYE SURGERY Bilateral     CATARACT    Insertion,catheter,tunneled N/A 2019    Performed by Wade Rodriguez [facility] at [facility] OR    TURBT (TRANSURETHRAL RESECTION OF BLADDER TUMOR) N/A 2019    Performed by [facility] at [facility] OR    VASECTOMY       Family History   Problem Relation Age of Onset    Cancer Father     Heart disease Father     Cancer Sister     Cancer Brother      Social History     Tobacco Use    Smoking status: Former Smoker     Packs/day: 0.25     Years: 20.00     Pack years: 5.00     Types: Cigars     Last attempt to quit: 1982     Years since quittin.4    Smokeless tobacco: Never Used   Substance Use Topics    Alcohol use: No    Drug use: No       Review of patient's allergies indicates:   Allergen Reactions    Lidocaine Nausea Only     NOVACAINE --  Severe nausea    Statins-hmg-coa reductase inhibitors Anxiety       Outpatient meds:  No current facility-administered medications on file prior to encounter.      Current Outpatient Medications on File Prior to Encounter   Medication Sig Dispense Refill    alfuzosin (UROXATRAL) 10 mg Tb24 Take 10 mg by mouth nightly.       aspirin (ECOTRIN) 81 MG EC tablet Take 1 tablet (81 mg total) by mouth once daily.  0    coenzyme Q10 (CO Q-10) 100 mg capsule Take 100 mg by mouth once daily.       ferrous sulfate 325 (65 FE) MG EC tablet Take 1 tablet (325 mg total) by mouth 2 (two) times daily with meals.  0    FLUoxetine 20 MG capsule Take 20 mg by mouth every evening.       metoprolol tartrate (LOPRESSOR) 25 MG tablet Take 1 tablet (25 mg total) by mouth 2 (two) times daily. (Patient taking differently: Take 25 mg by mouth every Mon, Wed, Fri, Sun. ) 60 tablet 11    nitroGLYCERIN (NITROSTAT) 0.4 MG SL tablet Place 0.4 mg under the tongue every 5 (five) minutes as needed for Chest pain.      omeprazole (PRILOSEC) 20 MG capsule  Take 20 mg by mouth once daily.      rOPINIRole (REQUIP) 2 MG tablet Take 2 mg by mouth every evening.      sevelamer carbonate (RENVELA) 800 mg Tab Take 1 tablet (800 mg total) by mouth 3 (three) times daily with meals. 90 tablet 11       Scheduled meds:   alfuzosin  10 mg Oral Nightly    aspirin  81 mg Oral Daily    ceFEPime (MAXIPIME) IVPB  1 g Intravenous Q12H    fluconazole  100 mg Oral Daily    FLUoxetine  20 mg Oral BID    methylPREDNISolone sodium succinate  80 mg Intravenous Q12H    metoprolol tartrate  25 mg Oral BID    mupirocin   Nasal BID    pantoprazole  40 mg Oral BID AC    SAXagliptin  2.5 mg Oral Daily    senna-docusate 8.6-50 mg  1 tablet Oral BID    sevelamer carbonate  800 mg Oral TID WM    sodium chloride 0.9%  3 mL Intravenous Q8H    vancomycin 500 mg in dextrose 5 % 100 mL IVPB (ready to mix system)  500 mg Intravenous Once       Infusions:      PRN meds:      Review of Systems:  ROS    OBJECTIVE:     Vital Signs and IO (Last 24H):  Temp:  [97.4 °F (36.3 °C)-98.7 °F (37.1 °C)]   Pulse:  []   Resp:  [10-31]   BP: ()/(57-89)   SpO2:  [93 %-100 %]   I/O last 3 completed shifts:  In: 1105 [P.O.:400; I.V.:55; Other:500; IV Piggyback:150]  Out: 1800 [Other:1800]    Wt Readings from Last 5 Encounters:   06/27/19 129 kg (284 lb 6.3 oz)   06/17/19 120.4 kg (265 lb 6.9 oz)   05/30/19 121.5 kg (267 lb 13.7 oz)   05/17/19 121.5 kg (267 lb 14.4 oz)   04/24/19 129.3 kg (285 lb 0.9 oz)         Physical Exam:  Physical Exam   Constitutional: He appears well-developed and well-nourished. No distress.   HENT:   Head: Normocephalic and atraumatic.   Mouth/Throat: Oropharynx is clear and moist.   Eyes: Pupils are equal, round, and reactive to light. EOM are normal. No scleral icterus.   Neck: Neck supple.   Cardiovascular: Normal rate and regular rhythm.   Pulmonary/Chest: Effort normal. No stridor. No respiratory distress.   Abdominal: Soft. He exhibits no distension.    Musculoskeletal: Normal range of motion. He exhibits no edema or deformity.   Neurological: He is alert. No cranial nerve deficit.   Skin: Skin is warm and dry. No rash noted. He is not diaphoretic. No erythema.       Body mass index is 43.24 kg/m².    Laboratory:  Recent Labs   Lab 06/25/19  1050 06/26/19  0338 06/27/19  0321    140 135*   K 5.0 4.8 4.6    103 101   CO2 23 23 25   BUN 40* 44* 41*   CREATININE 5.9* 6.4* 5.2*   ESTGFRAFRICA 10* 9* 11*   EGFRNONAA 8* 8* 10*   CALCIUM 8.5* 8.4* 8.0*   ALBUMIN 2.9* 2.6* 2.6*   PHOS  --  7.1* 6.0*       Recent Labs   Lab 06/25/19  1050 06/26/19  0338 06/27/19  0321   WBC 11.89 8.89 6.74   HGB 8.5* 8.1* 7.5*   HCT 28.8* 28.0* 25.8*   PLT 81* 95* 78*   * 103* 102*   MCHC 29.5* 28.9* 29.1*   MONO 7.2  0.9 2.7*  0.2* 2.0*  CANCELED       Recent Labs   Lab 06/25/19  1050 06/26/19  0338 06/27/19  0321   ALKPHOS 116 102 102   BILITOT 0.5 0.6 0.5   PROT 6.3 5.5* 5.4*   ALBUMIN 2.9* 2.6* 2.6*   ALT 21 14 15   AST 27 13 10       ASSESSMENT/PLAN:     Active Hospital Problems    Diagnosis  POA    *Acute hypercapnic respiratory failure [J96.02]  Yes    Cirrhosis of liver [K74.60]  Yes    Urinary tract infection without hematuria [N39.0]  Yes    Severe sepsis [A41.9, R65.20]  Yes    Anemia of renal disease [D63.1]  Yes    Dermatitis associated with moisture [L30.8]  Yes    ESRD (end stage renal disease) on dialysis [N18.6, Z99.2]  Not Applicable    Encephalopathy, metabolic [G93.41]  Yes    Morbid (severe) obesity with alveolar hypoventilation [E66.2]  Yes    HTN (hypertension) [I10]  Yes    Type 2 diabetes mellitus with kidney complication, with long-term current use of insulin [E11.29, Z79.4]  Not Applicable    DM type 2 with diabetic mixed hyperlipidemia [E11.69, E78.2]  Yes    Coronary artery disease [I25.10]  Yes    COPD (chronic obstructive pulmonary disease) [J44.9]  Yes    A-fib [I48.91]  Yes    Anemia, chronic disease [D63.8]  Yes     BRANDEN (obstructive sleep apnea) [G47.33]  Yes    Malignant neoplasm of bladder [C67.9]  Yes      Resolved Hospital Problems   No resolved problems to display.     Sepsis due to UTI, better.  ESRD on HD TTS.  AMS, new hypercapnic respiratory failure, requiring BiPAP, better  Continue current dialysis prescription.  Next HD PRN tomorrow or Sat?  Renal diet - low K, low phos.  No IVs or BP checks on access arm.  Treat infection, do not give more IVF.    Anemia of CKD + recent  GIB  Hgb is low but stable.   Monitor. No need for transfusion.  Continue RAFAEL.  No need for IV iron.    MBD / Secondary HPT  Monitor phos levels. Low phos diet.  Hold Binders if not eating.    HTN, now hypotensive  Un-controlled.   Tolerate asymptomatic HTN up to -160.  Hold home meds.    Thank you for allowing us to participate in the care of your patient!   We will follow the patient and provide recommendations as needed.    Reyes Yoo MD    [facility]  [address] [place] [address]  [place] [address]    [phone] - tel  [phone] - fax    6/27/2019 12:54 PM

## 2019-06-27 NOTE — PLAN OF CARE
Problem: Adult Inpatient Plan of Care  Goal: Plan of Care Review  Outcome: Ongoing (interventions implemented as appropriate)  02 saturation 97% on nc at 4lpm.

## 2019-06-27 NOTE — PLAN OF CARE
Problem: Physical Therapy Goal  Goal: Physical Therapy Goal  Goals to be met by: 2019     Patient will increase functional independence with mobility by performin. Supine to sit with MInimal Assistance  2. Sit to stand transfer with Minimal Assistance  3. Bed to chair transfer with Minimal Assistance using Rolling Walker  4. Gait  x 250 feet with Minimal Assistance using Rolling Walker.   5. Lower extremity exercise program x20 reps  Outcome: Ongoing (interventions implemented as appropriate)  PT eval and treat- seen at ICU, EOB sitting, standing to transfer to commode + BM. Few steps to wheelchair with RW mod assist. Pt has difficulty with sit to stand. Completed LE's thera ex

## 2019-06-27 NOTE — NURSING
Dr. Kamara here earlier and pt seen. Transfer orders received and initiated. Transferred to room 208-2 per wc. Accompanied by myself and spouse. No c/o voiced. Report given at bedside to nurse Rollins

## 2019-06-27 NOTE — PLAN OF CARE
Problem: Adult Inpatient Plan of Care  Goal: Plan of Care Review  Outcome: Ongoing (interventions implemented as appropriate)  No acute events overnight.  Pt able to rest intermittently throughout night.  BiPAP applied shortly after 0000 and tolerated well until 0300 when he grew tired of it and requested it be removed.  O2 saturations maintained on 3L NC without issue.  BG taken as ordered, coverage required for both.  Pt requested bedpan, one was found and placed in room.  All questions and concerns were addressed, will continue to monitor closely.

## 2019-06-28 PROBLEM — K92.2 ACUTE GASTROINTESTINAL BLEEDING: Status: RESOLVED | Noted: 2019-01-01 | Resolved: 2019-01-01

## 2019-06-28 PROBLEM — K92.2 ACUTE GASTROINTESTINAL BLEEDING: Status: ACTIVE | Noted: 2019-01-01

## 2019-06-28 NOTE — PLAN OF CARE
Problem: Physical Therapy Goal  Goal: Physical Therapy Goal  Goals to be met by: 2019     Patient will increase functional independence with mobility by performin. Supine to sit with MInimal Assistance  2. Sit to stand transfer with Minimal Assistance  3. Bed to chair transfer with Minimal Assistance using Rolling Walker  4. Gait  x 250 feet with Minimal Assistance using Rolling Walker.   5. Lower extremity exercise program x20 reps   Outcome: Ongoing (interventions implemented as appropriate)  Pt seen for gait training 40ft with RW min assist and O2 at 3 liters SAT 98%. Pt has difficulty standing from a low surface chair

## 2019-06-28 NOTE — PLAN OF CARE
06/28/19 1233   PRE-TX-O2   O2 Device (Oxygen Therapy) nasal cannula   $ Is the patient on Low Flow Oxygen? Yes   Flow (L/min) 2  (decreased flow to 2L NC from 4L for sats 99%)   SpO2 99 %   Pulse Oximetry Type Intermittent   $ Pulse Oximetry - Multiple Charge Pulse Oximetry - Multiple

## 2019-06-28 NOTE — PROGRESS NOTES
Pharmacokinetic Assessment Follow Up: IV Vancomycin    Vancomycin serum concentration assessment(s):    The random level was drawn with AM labs today and may be used to guide therapy at this time. The measurement is 18.6 mcg/mL and within the desired definitive target range of 15 to 20 mcg/mL for suspected sepsis.    Vancomycin Regimen Plan:    Re-dose when the random level is less than 25 mcg/mL, next level to be drawn at 0430 on 6/29 with AM labs.     Pharmacy will continue to follow and monitor vancomycin.    Please contact pharmacy at extension [phone] for questions regarding this assessment.    Thank you for the consult and for allowing us to participate in this patient's care.     Nadira Nichols     Patient brief summary:  Frank Zayas is a 79 y.o. male initiated on antimicrobial therapy with IV Vancomycin for treatment of suspected sepsis.    The patient received an initial dose of 1500 mg, followed by the current treatment regimen of drawing random levels with plan to redose when level is less than 25 mcg/mL.    Drug Allergies:   Review of patient's allergies indicates:   Allergen Reactions    Lidocaine Nausea Only     NOVACAINE --  Severe nausea    Statins-hmg-coa reductase inhibitors Anxiety       Actual Body Weight:   129 kg    Renal Function:   Estimated Creatinine Clearance: 17.3 mL/min (A) (based on SCr of 4.6 mg/dL (H)).,     Dialysis Method (if applicable):  intermittent HD Tues-Thurs-Sat schedule    CBC (last 72 hours):  Recent Labs   Lab Result Units 06/25/19  1050 06/26/19  0338 06/27/19  0321 06/28/19  0437   WBC K/uL 11.89 8.89 6.74 8.85   Hemoglobin g/dL 8.5* 8.1* 7.5* 8.9*   Hematocrit % 28.8* 28.0* 25.8* 30.2*   Platelets K/uL 81* 95* 78* 61*   Gran% % 84.7* 91.4* 92.0* 93.0*   Lymph% % 6.2* 4.7* 6.0* 3.2*   Mono% % 7.2 2.7* 2.0* 2.7*   Eosinophil% % 0.3 0.4 0.0 0.0   Basophil% % 0.3 0.1 0.0 0.1   Differential Method  Automated Automated Manual Automated       Metabolic Panel (last 72  hours):  Recent Labs   Lab Result Units 06/25/19  1050 06/25/19  1110 06/26/19  0338 06/27/19  0321 06/28/19  0437   Sodium mmol/L 139  --  140 135* 137   Potassium mmol/L 5.0  --  4.8 4.6 4.5   Chloride mmol/L 103  --  103 101 103   CO2 mmol/L 23  --  23 25 23   Glucose mg/dL 140*  --  113* 180* 204*   Glucose, UA   --  Negative  --   --   --    BUN, Bld mg/dL 40*  --  44* 41* 40*   Creatinine mg/dL 5.9*  --  6.4* 5.2* 4.6*   Albumin g/dL 2.9*  --  2.6* 2.6* 2.8*   Total Bilirubin mg/dL 0.5  --  0.6 0.5 0.5   Alkaline Phosphatase U/L 116  --  102 102 104   AST U/L 27  --  13 10 11   ALT U/L 21  --  14 15 17   Magnesium mg/dL  --   --  2.3 2.2 2.1   Phosphorus mg/dL  --   --  7.1* 6.0* 5.7*       Vancomycin Administrations:  vancomycin given in the last 96 hours                     vancomycin 500 mg in dextrose 5 % 100 mL IVPB (ready to mix system) (mg) 500 mg New Bag 06/27/19 2233    vancomycin (VANCOCIN) 750 mg in dextrose 5 % 250 mL IVPB (mg) 750 mg New Bag 06/26/19 1652                      Drug levels (last 3 results):  Recent Labs   Lab Result Units 06/26/19  0338 06/27/19  0321 06/28/19  0437   Vancomycin, Random ug/mL 14.2 16.5 18.6       Microbiologic Results:  Microbiology Results (last 7 days)       Procedure Component Value Units Date/Time    Blood culture x two cultures. Draw prior to antibiotics. [[id number]] Collected:  06/25/19 1035    Order Status:  Completed Specimen:  Blood Updated:  06/27/19 1412     Blood Culture, Routine No Growth to date      No Growth to date      No Growth to date    Narrative:       Aerobic and anaerobic    Blood culture x two cultures. Draw prior to antibiotics. [[id number]] Collected:  06/25/19 1044    Order Status:  Completed Specimen:  Blood Updated:  06/27/19 1412     Blood Culture, Routine No Growth to date      No Growth to date      No Growth to date    Narrative:       Aerobic and anaerobic    Blood culture [[id number]] Collected:  06/25/19 0543    Order Status:   Completed Specimen:  Blood Updated:  06/27/19 1212     Blood Culture, Routine No Growth to date      No Growth to date    Narrative:       Aerobic and Anaerobic    Blood culture [[id number]] Collected:  06/25/19 2302    Order Status:  Completed Specimen:  Blood Updated:  06/27/19 1212     Blood Culture, Routine No Growth to date      No Growth to date    Narrative:       Aerobic and Anaerobic    Urine culture [[id number]] Collected:  06/25/19 1110    Order Status:  Completed Specimen:  Urine Updated:  06/26/19 2123     Urine Culture, Routine No growth    Narrative:       Preferred Collection Type->Urine, Clean Catch

## 2019-06-28 NOTE — CONSULTS
Nephrology Consult Progress Note        Patient Name: Frank Zayas  MRN: [id number]    Patient Class: IP- Inpatient   Admission Date: 6/25/2019  Length of Stay: 3 days  Date of Service: 6/28/2019    Attending Physician: Vishal Kamara MD  Primary Care Provider: Mikhail Shields MD    Reason for Consult: ESRD    SUBJECTIVE:     HPI: 79M with multiple medical issues, including ESRD on HD TTS, last dialyzed on Sat, 6/22 is admitted from SNF with AMS, hypotension and concerns for UTI. Renal is consulted for comanagement.    6/26 VSS, seen and examined on HD, tolerating well, still on BiPAP.  6/27 VSS, much more awake, conversant. HD in AM or Sat per schedule.  6/28 VSS, awake, conversant. HD in AM per schedule.    Past Medical History:   Diagnosis Date    Anticoagulant long-term use     Arthritis     Bladder cancer     Blood transfusion     Cardiac arrest 5/31/2019    CHF (congestive heart failure)     Chronic kidney disease     COPD (chronic obstructive pulmonary disease)     Coronary artery disease     Diabetes mellitus     Gout     Hemorrhagic shock 6/1/2019    [place] (hard of hearing)     HAS BILAT AIDS BUT DOES NOT WEAR    Hyperlipidemia     Hypertension     Myocardial infarction     NSTEMI (non-ST elevated myocardial infarction)     RLS (restless legs syndrome)     Sleep apnea     NO MACHINE    Wears glasses      Past Surgical History:   Procedure Laterality Date    CARDIAC SURGERY      CABG X 2 1997    CATARACT EXTRACTION, BILATERAL      CHOLECYSTECTOMY      COLONOSCOPY      CORONARY ARTERY BYPASS GRAFT      x 2    coronary stents      CYSTOSCOPY      CYSTOSCOPY N/A 4/8/2019    [facility]    EGD (ESOPHAGOGASTRODUODENOSCOPY) N/A 6/4/2019    [facility] ([facility])    EXCISION-BLADDER TUMOR-TRANSURETHRAL (TURBT) N/A 9/8/2014    [facility] OR    EXCISION-BLADDER TUMOR-TRANSURETHRAL (TURBT) N/A  2013    [facility] OR    EYE SURGERY Bilateral     CATARACT    Insertion,catheter,tunneled N/A 2019    Performed by Wade Rodriguez [facility] at [facility] OR    TURBT (TRANSURETHRAL RESECTION OF BLADDER TUMOR) N/A 2019    [facility] by [facility] at [facility] OR    VASECTOMY       Family History   Problem Relation Age of Onset    Cancer Father     Heart disease Father     Cancer Sister     Cancer Brother      Social History     Tobacco Use    Smoking status: Former Smoker     Packs/day: 0.25     Years: 20.00     Pack years: 5.00     Types: Cigars     Last attempt to quit: 1982     Years since quittin.4    Smokeless tobacco: Never Used   Substance Use Topics    Alcohol use: No    Drug use: No       Review of patient's allergies indicates:   Allergen Reactions    Lidocaine Nausea Only     NOVACAINE --  Severe nausea    Statins-hmg-coa reductase inhibitors Anxiety       Outpatient meds:  No current facility-administered medications on file prior to encounter.      Current Outpatient Medications on File Prior to Encounter   Medication Sig Dispense Refill    alfuzosin (UROXATRAL) 10 mg Tb24 Take 10 mg by mouth nightly.       aspirin (ECOTRIN) 81 MG EC tablet Take 1 tablet (81 mg total) by mouth once daily.  0    coenzyme Q10 (CO Q-10) 100 mg capsule Take 100 mg by mouth once daily.       ferrous sulfate 325 (65 FE) MG EC tablet Take 1 tablet (325 mg total) by mouth 2 (two) times daily with meals.  0    FLUoxetine 20 MG capsule Take 20 mg by mouth every evening.       metoprolol tartrate (LOPRESSOR) 25 MG tablet Take 1 tablet (25 mg total) by mouth 2 (two) times daily. (Patient taking differently: Take 25 mg by mouth every Mon, Wed, Fri, Sun. ) 60 tablet 11    nitroGLYCERIN (NITROSTAT) 0.4 MG SL tablet Place 0.4 mg under the tongue every 5 (five) minutes as needed for Chest pain.      omeprazole (PRILOSEC) 20 MG capsule Take 20 mg by mouth once daily.       rOPINIRole (REQUIP) 2 MG tablet Take 2 mg by mouth every evening.      sevelamer carbonate (RENVELA) 800 mg Tab Take 1 tablet (800 mg total) by mouth 3 (three) times daily with meals. 90 tablet 11       Scheduled meds:   sodium chloride 0.9%   Intravenous Once    sodium chloride 0.9%   Intravenous Once    alfuzosin  10 mg Oral Nightly    aspirin  81 mg Oral Daily    ceFEPime (MAXIPIME) IVPB  1 g Intravenous Q12H    epoetin quinton-ebpx (RETACRIT) injection  10,000 Units Subcutaneous Every Tues, Thurs, Sat    fluconazole  100 mg Oral Daily    FLUoxetine  20 mg Oral BID    methylPREDNISolone sodium succinate  40 mg Intravenous Q12H    metoprolol tartrate  25 mg Oral BID    mupirocin   Nasal BID    pantoprazole  40 mg Oral BID AC    SAXagliptin  2.5 mg Oral Daily    senna-docusate 8.6-50 mg  1 tablet Oral BID    sevelamer carbonate  800 mg Oral TID WM    sodium chloride 0.9%  3 mL Intravenous Q8H    [START ON 6/29/2019] vancomycin 500 mg in dextrose 5 % 100 mL IVPB (ready to mix system)  500 mg Intravenous Once       Infusions:      PRN meds:      Review of Systems:  ROS    OBJECTIVE:     Vital Signs and IO (Last 24H):  Temp:  [96.1 °F (35.6 °C)-98.5 °F (36.9 °C)]   Pulse:  []   Resp:  [14-26]   BP: ()/(53-78)   SpO2:  [95 %-100 %]   I/O last 3 completed shifts:  In: 700 [P.O.:150; Other:500; IV Piggyback:50]  Out: 2500 [Other:2500]    Wt Readings from Last 5 Encounters:   06/27/19 129 kg (284 lb 6.3 oz)   06/17/19 120.4 kg (265 lb 6.9 oz)   05/30/19 121.5 kg (267 lb 13.7 oz)   05/17/19 121.5 kg (267 lb 14.4 oz)   04/24/19 129.3 kg (285 lb 0.9 oz)         Physical Exam:  Physical Exam   Constitutional: He appears well-developed and well-nourished. No distress.   HENT:   Head: Normocephalic and atraumatic.   Mouth/Throat: Oropharynx is clear and moist.   Eyes: Pupils are equal, round, and reactive to light. EOM are normal. No scleral icterus.   Neck: Neck supple.   Cardiovascular: Normal  rate and regular rhythm.   Pulmonary/Chest: Effort normal. No stridor. No respiratory distress.   Abdominal: Soft. He exhibits no distension.   Musculoskeletal: Normal range of motion. He exhibits no edema or deformity.   Neurological: He is alert. No cranial nerve deficit.   Skin: Skin is warm and dry. No rash noted. He is not diaphoretic. No erythema.       Body mass index is 43.24 kg/m².    Laboratory:  Recent Labs   Lab 06/26/19 0338 06/27/19 0321 06/28/19 0437    135* 137   K 4.8 4.6 4.5    101 103   CO2 23 25 23   BUN 44* 41* 40*   CREATININE 6.4* 5.2* 4.6*   ESTGFRAFRICA 9* 11* 13*   EGFRNONAA 8* 10* 11*   CALCIUM 8.4* 8.0* 8.3*   ALBUMIN 2.6* 2.6* 2.8*   PHOS 7.1* 6.0* 5.7*       Recent Labs   Lab 06/26/19 0338 06/27/19 0321 06/28/19 0437   WBC 8.89 6.74 8.85   HGB 8.1* 7.5* 8.9*   HCT 28.0* 25.8* 30.2*   PLT 95* 78* 61*   * 102* 101*   MCHC 28.9* 29.1* 29.5*   MONO 2.7*  0.2* 2.0*  CANCELED 2.7*  0.2*       Recent Labs   Lab 06/26/19 0338 06/27/19 0321 06/28/19 0437   ALKPHOS 102 102 104   BILITOT 0.6 0.5 0.5   PROT 5.5* 5.4* 5.8*   ALBUMIN 2.6* 2.6* 2.8*   ALT 14 15 17   AST 13 10 11       ASSESSMENT/PLAN:     Active Hospital Problems    Diagnosis  POA    *Acute hypercapnic respiratory failure [J96.02]  Yes    Acute gastrointestinal bleeding [K92.2]  Yes    Cirrhosis of liver [K74.60]  Yes    Urinary tract infection without hematuria [N39.0]  Yes    Severe sepsis [A41.9, R65.20]  Yes    Anemia of renal disease [D63.1]  Yes    Dermatitis associated with moisture [L30.8]  Yes    ESRD (end stage renal disease) on dialysis [N18.6, Z99.2]  Not Applicable    Encephalopathy, metabolic [G93.41]  Yes    Morbid (severe) obesity with alveolar hypoventilation [E66.2]  Yes    HTN (hypertension) [I10]  Yes    Type 2 diabetes mellitus with kidney complication, with long-term current use of insulin [E11.29, Z79.4]  Not Applicable    DM type 2 with diabetic mixed hyperlipidemia  [E11.69, E78.2]  Yes    Coronary artery disease [I25.10]  Yes    COPD (chronic obstructive pulmonary disease) [J44.9]  Yes    A-fib [I48.91]  Yes    Anemia, chronic disease [D63.8]  Yes    BRANDEN (obstructive sleep apnea) [G47.33]  Yes    Malignant neoplasm of bladder [C67.9]  Yes      Resolved Hospital Problems   No resolved problems to display.     Sepsis due to UTI, resolving.  ESRD on HD TTS.  AMS, new hypercapnic respiratory failure, requiring BiPAP, better  Continue current dialysis prescription.  Next HD Sat or PRN  Renal diet - low K, low phos.  No IVs or BP checks on access arm.    Anemia of CKD + recent  GIB  Hgb is low but stable.   Monitor.   No need for transfusion.  Continue RAFAEL.  No need for IV iron.    MBD / Secondary HPT  Monitor phos levels. Low phos diet.  Hold Binders if not eating.    HTN, now hypotensive  Un-controlled.   Tolerate asymptomatic HTN up to -160.  Hold home meds.    Thank you for allowing us to participate in the care of your patient!   We will follow the patient and provide recommendations as needed.    Reyes Yoo MD    [facility]  [address] [place] [address]  LILIANA Jones [address]    [phone] - tel  [phone] - fax    6/28/2019 12:54 PM

## 2019-06-28 NOTE — PLAN OF CARE
CM following for any DC needs. PT transferred from ICU to PCU 6/27. DC plan is to return to  SNF.      06/28/19 1612   Discharge Assessment   Assessment Type Discharge Planning Reassessment

## 2019-06-28 NOTE — NURSING
Verified signed hd and blood administration  consents prior to start of treatment.   1 unit PRBC administered with hd treatment.  Net UF 1700 ml with hd treatment.  Treatment tolerated without diff.    Report called to primary nurse.  Patient denies any complaints post treatment.

## 2019-06-28 NOTE — PLAN OF CARE
Problem: Adult Inpatient Plan of Care  Goal: Plan of Care Review  Outcome: Ongoing (interventions implemented as appropriate)  Bed is in low position, call light is within reach, SR up x 2, instructed to use call light for assistance to maintain fall prevention.  Cardiac monitored AFIB low 100's, VSS, Glucose monitored and maintained to sliding scale, no complaints of pain, IV ABX tolerated, BIPAP in place, resting between care, will continue to monitor and round.

## 2019-06-28 NOTE — PLAN OF CARE
Problem: Occupational Therapy Goal  Goal: Occupational Therapy Goal  Goals to be met by: 7/12/2019     Patient will increase functional independence with ADLs by performing:    LE Dressing with Modified [place].  Grooming while standing at sink with Modified [place].  Toileting from toilet with Supervision for hygiene and clothing management.   Supine to sit with Supervision.  Toilet transfer to toilet with Stand-by Assistance.    Outcome: Ongoing (interventions implemented as appropriate)  OT evaluation completed today. Goals & care plan established.    Jm Patel, OT  6/28/2019

## 2019-06-28 NOTE — PLAN OF CARE
Problem: Adult Inpatient Plan of Care  Goal: Plan of Care Review  Outcome: Ongoing (interventions implemented as appropriate)     06/27/19 1930   Plan of Care Review   Plan of Care Reviewed With patient;spouse   Progress no change     Received pt from SCARLETT Herrera RN at 1525. Pt in wheelchair, AAOx4 with wife at bedside. VSS with NAD noted; pt free of injury or falls.  Pt to have evening hemodialysis treatment with 1 PRBC unit. Vancomycin to be administered post HD. Will continue to monitor.

## 2019-06-28 NOTE — PT/OT/SLP EVAL
"Occupational Therapy   Evaluation    Name: Frank Zayas  MRN: [id number]  Admitting Diagnosis:  Acute hypercapnic respiratory failure      Recommendations:     Discharge Recommendations: nursing facility, skilled  Discharge Equipment Recommendations:  none  Barriers to discharge:  None    Assessment:     Frank Zayas is a 79 y.o. male with a medical diagnosis of Acute hypercapnic respiratory failure.  Performance deficits affecting function: weakness, impaired endurance, impaired self care skills, impaired cardiopulmonary response to activity, gait instability, impaired functional mobilty, impaired balance, edema. Pt able to perform transfers with min A using RW. Pt requires less assistance with transfers when seat is elevated. Pt can perform hygiene tasks at sink with SBA. Prior to activity, pt's SpO2 was 98 on 4L of O2. Pt's NC was removed and pt ambulated to sink performing hygiene tasks. Pt's SpO2 dropped to 85. Pt's NC was re-applied to portable O2 at 3L. Pt's SpO2 increased to 92. Pt would benefit from SNF placement to increase independence with ADLs and functional mobility.       Rehab Prognosis: Good; patient would benefit from acute skilled OT services to address these deficits and reach maximum level of function.       Plan:     Patient to be seen 2 x/week to address the above listed problems via self-care/home management, therapeutic activities, therapeutic exercises  · Plan of Care Expires: 07/12/19  · Plan of Care Reviewed with: patient    Subjective     Chief Complaint: difficulty standing   Patient/Family Comments/goals: "I can walk just fine, it's standing that's the problem."    Occupational Profile:  Living Environment: Pt lives with spouse in a [facility] with 1 [employer].   Previous level of function: Pt was mod I with ADLs and ambulatory with RW if assisted.   Equipment Used at Home:  shower chair, walker, rolling, rollator  Assistance upon Discharge: Pt will receive assistance from " spouse.    Pain/Comfort:  Pain Rating 1: 0/10  Pain Rating Post-Intervention 1: 0/10    Patients cultural, spiritual, Mu-ism conflicts given the current situation:      Objective:     Communicated with: nurse Rodgers prior to session.  Patient found HOB elevated with telemetry, oxygen upon OT entry to room.    General Precautions: Standard, fall   Orthopedic Precautions:N/A   Braces: N/A     Occupational Performance:    Bed Mobility:    · Patient completed Scooting/Bridging with contact guard assistance  · Patient completed Supine to Sit with minimum assistance  · Patient completed Sit to Supine with contact guard assistance    Functional Mobility/Transfers:  · Patient completed Sit <> Stand Transfer with contact guard assistance  with  rolling walker   · Patient completed Toilet Transfer Stand Pivot technique with minimum assistance with  rolling walker  · Functional Mobility: Pt ambulated with SBA and RW from bed>sink>bathroom>bed again. No LOB. Pt demonstrates greater difficulty standing from lower seating thereby requiring more assistance to stand.     Activities of Daily Living:  · Grooming: stand by assistance with oral and facial hygiene while standing at sink.  · Lower Body Dressing: supervision to don/doff socks while seated EOB.    Cognitive/Visual Perceptual:  Cognitive/Psychosocial Skills:     -       Oriented to: Person, Place and Situation   -       Follows Commands/attention:Follows multistep  commands  -       Communication: clear/fluent  -       Safety awareness/insight to disability: impaired   -       Mood/Affect/Coping skills/emotional control: Appropriate to situation  Visual/Perceptual:      -Intact     Physical Exam:  Postural examination/scapula alignment:    -       Rounded shoulders  -       Forward head  Upper Extremity Range of Motion:     -       Right Upper Extremity: WFL  -       Left Upper Extremity: WFL  Upper Extremity Strength:    -       Right Upper Extremity: 3+/5  -       Left  Upper Extremity: 3+/5   Strength:    -       Right Upper Extremity: WFL  -       Left Upper Extremity: WFL  Fine Motor Coordination:    -       Intact  Gross motor coordination:   WFL    AMPAC 6 Click ADL:  AMPAC Total Score: 20    Treatment & Education:  OT ed patient on safety with walker use for functional mobility with cues for hand placement & sequencing.   OT educated patient on energy conservation techniques to reduce demand on cardiovascular system with performance of ADL tasks.     Education:    Patient left HOB elevated with all lines intact and call button in reach    GOALS:   Multidisciplinary Problems     Occupational Therapy Goals        Problem: Occupational Therapy Goal    Goal Priority Disciplines Outcome Interventions   Occupational Therapy Goal     OT, PT/OT Ongoing (interventions implemented as appropriate)    Description:  Goals to be met by: 7/12/2019     Patient will increase functional independence with ADLs by performing:    LE Dressing with Modified [place].  Grooming while standing at sink with Modified [place].  Toileting from toilet with Supervision for hygiene and clothing management.   Supine to sit with Supervision.  Toilet transfer to toilet with Stand-by Assistance.                      History:     Past Medical History:   Diagnosis Date    Anticoagulant long-term use     Arthritis     Bladder cancer     Blood transfusion     Cardiac arrest 5/31/2019    CHF (congestive heart failure)     Chronic kidney disease     COPD (chronic obstructive pulmonary disease)     Coronary artery disease     Diabetes mellitus     Gout     Hemorrhagic shock 6/1/2019    [place] (hard of hearing)     HAS BILAT AIDS BUT DOES NOT WEAR    Hyperlipidemia     Hypertension     Myocardial infarction     NSTEMI (non-ST elevated myocardial infarction)     RLS (restless legs syndrome)     Sleep apnea     NO MACHINE    Wears glasses          Past Surgical History:   Procedure  Laterality Date    CARDIAC SURGERY      CABG X 2 1997    CATARACT EXTRACTION, BILATERAL      CHOLECYSTECTOMY      COLONOSCOPY      CORONARY ARTERY BYPASS GRAFT      x 2    coronary stents      CYSTOSCOPY      CYSTOSCOPY N/A 4/8/2019    [facility]    EGD (ESOPHAGOGASTRODUODENOSCOPY) N/A 6/4/2019    [facility] by [facility] (2ND FLR)    EXCISION-BLADDER TUMOR-TRANSURETHRAL (TURBT) N/A 9/8/2014    [facility] at [facility] OR    EXCISION-BLADDER TUMOR-TRANSURETHRAL (TURBT) N/A 11/18/2013    Per[facility] by [facility] at [facility] OR    EYE SURGERY Bilateral     CATARACT    Insertion,catheter,tunneled N/A 5/16/2019    Performed by Wade Rodriguez MD at [facility] OR    TURBT (TRANSURETHRAL RESECTION OF BLADDER TUMOR) N/A 4/8/2019    Per[facility] by [facility] at [facility] OR    VASECTOMY         Time Tracking:     OT Date of Treatment: 06/28/19  OT Start Time: 1021  OT Stop Time: 1050  OT Total Time (min): 29 min    Billable Minutes:Evaluation 10  Self Care/Home Management 19    Jm Patel, OT  6/28/2019

## 2019-06-29 NOTE — ASSESSMENT & PLAN NOTE
This patient does have evidence of infective focus  My overall impression is sepsis.  Antibiotics given-   Antibiotics (From admission, onward)    Start     Stop Route Frequency Ordered    07/02/19 1700  vancomycin 500 mg in dextrose 5 % 100 mL IVPB (ready to mix system)      -- IV Once 06/29/19 0756    06/29/19 1700  vancomycin 500 mg in dextrose 5 % 100 mL IVPB (ready to mix system)      -- IV Once 06/28/19 0843    06/26/19 1045  cefepime in dextrose 5 % 1 gram/50 mL IVPB 1 g      -- IV Every 12 hours (non-standard times) 06/26/19 0931    06/25/19 2100  mupirocin 2 % ointment      06/30 2059 Nasl 2 times daily 06/25/19 1300        Organ dysfunction indicated by Encephalopathy, Acute respiratory failure and Acute liver injury  Source- lung

## 2019-06-29 NOTE — ASSESSMENT & PLAN NOTE
Patient's FSGs are controlled on current hypoglycemics.   Last A1c reviewed-   Lab Results   Component Value Date    HGBA1C 5.6 05/04/2019     Most recent fingerstick glucose reviewed-   Recent Labs   Lab 06/27/19  2138 06/28/19  1141 06/28/19  1655   POCTGLUCOSE 158* 180* 186*     Current correctional scale  Medium  Maintain anti-hyperglycemic dose as follows-   Antihyperglycemics (From admission, onward)    Start     Stop Route Frequency Ordered    06/26/19 0900  SAXagliptin tablet 2.5 mg      -- Oral Daily 06/25/19 2158 06/25/19 2342  insulin aspart U-100 pen 1-10 Units      -- SubQ Every 6 hours PRN 06/25/19 3244

## 2019-06-29 NOTE — PT/OT/SLP PROGRESS
Physical Therapy Treatment    Patient Name:  Frank Zayas   MRN:  [id number]    Recommendations:     Discharge Recommendations:  nursing facility, skilled   Discharge Equipment Recommendations: none   Barriers to discharge: None    Assessment:     Frank Zayas is a 79 y.o. male admitted with a medical diagnosis of Acute hypercapnic respiratory failure.  He presents with the following impairments/functional limitations:  weakness, impaired endurance, impaired self care skills, impaired functional mobilty, impaired balance, decreased lower extremity function, impaired cardiopulmonary response to activity . Patient was able to double his gt distance today to 80', but was SOB and he may have set his goal a little to high. He was tired when returned to his bed and just wanted to sit on the EOB. RN stated this was ok and normal for patient.    Rehab Prognosis: Fair; patient would benefit from acute skilled PT services to address these deficits and reach maximum level of function.    Recent Surgery: * No surgery found *      Plan:     During this hospitalization, patient to be seen 6 x/week to address the identified rehab impairments via gait training, therapeutic activities, therapeutic exercises and progress toward the following goals:    · Plan of Care Expires:  07/26/19    Subjective     Chief Complaint: to feel better   Patient/Family Comments/goals: get stronger and be able to walk like he could when he was in rehab 100' or more  Pain/Comfort:  · Pain Rating 1: 0/10  · Pain Addressed 1: Reposition, Nurse notified  · Pain Rating Post-Intervention 1: 0/10      Objective:     Communicated with DERRICK Rodgers prior to session.  Patient found HOB elevated with oxygen, peripheral IV, telemetry upon PT entry to room.     General Precautions: Standard, fall   Orthopedic Precautions:N/A   Braces: N/A     Functional Mobility:  · Bed Mobility:     · Supine to Sit: stand by assistance and contact guard assistance  · Gait: 80'  with rest 1/2 way for 3 minutes standing/leaning against wall - refused chair.      AM-PAC 6 CLICK MOBILITY          Therapeutic Activities and Exercises:   Patient had small steps and slow gt with unsteadiness in RW. He sat on EOB after gt using stand pivot transfer with SBA.    Patient left sitting on EOB with all lines intact, call button in reach, bed alarm off and RN Vishal notified..    GOALS:   Multidisciplinary Problems     Physical Therapy Goals        Problem: Physical Therapy Goal    Goal Priority Disciplines Outcome Goal Variances Interventions   Physical Therapy Goal     PT, PT/OT Ongoing (interventions implemented as appropriate)     Description:  Goals to be met by: 2019     Patient will increase functional independence with mobility by performin. Supine to sit with MInimal Assistance  2. Sit to stand transfer with Minimal Assistance  3. Bed to chair transfer with Minimal Assistance using Rolling Walker  4. Gait  x 250 feet with Minimal Assistance using Rolling Walker.   5. Lower extremity exercise program x20 reps                    Time Tracking:     PT Received On: 19  PT Start Time: 1026     PT Stop Time: 1043  PT Total Time (min): 17 min     Billable Minutes: Gait Training 17    Treatment Type: Treatment  PT/PTA: PTA     PTA Visit Number: 1     Monika Morse, PTA  2019

## 2019-06-29 NOTE — PLAN OF CARE
Problem: Adult Inpatient Plan of Care  Goal: Plan of Care Review  Outcome: Ongoing (interventions implemented as appropriate)  POC reviewed with pt with verbalized understanding. BiPap worn throughout shift.  Abx tx continued.  Free from falls, safety maintained, VSS. Call light in reach, bed locked/lowest position. Srx2. Will continue to monitor.

## 2019-06-29 NOTE — HOSPITAL COURSE
Patient was admitted for respiratory failure with hypercapnia as well as hypotension and sepsis.  Chest x-ray showed infiltrate on the right side.  The patient was started on antibiotics for hospital associated pneumonia, but his blood pressure medications were held.  He received initial bolus in the emergency department and BP remained stable since.  He was placed on continuous BiPAP in the ICU from 6/25 through 6/27.  As the patient's hypercapnia improved, his mental status also improved.  Pancultures were done which were essentially negative at this point in time.  He was maintained on broad-spectrum antibiotic therapy given his multiple risk factors ( cirrhosis, end-stage renal disease, CAD, recent GI bleed, and severe debility ) patient was stepped down out of the ICU on 06/27 and his BiPAP was changed to Q HS.  Patient required IV Lasix doses for pulmonary edema with excellent diuresis.  Infectious Disease was consulted and de-escalated antibiotics to doxycycline.  Patient was evaluated for need for home oxygen and qualified this was arranged.  PT and OT recommended home health for the patient which was also ordered.  Patient was breathing comfortably on nasal cannula time of discharge and felt ready for discharge.    Discharge exam  Awake, alert, no apparent distress  Mild bibasilar crackles, on nasal cannula  Regular rate and rhythm

## 2019-06-29 NOTE — SUBJECTIVE & OBJECTIVE
Interval History:  Patient seen and examined.  Mental status appears to be at baseline.  Getting dialysis today.    Review of Systems   Constitutional: Positive for fatigue. Negative for chills and fever.   Respiratory: Positive for cough and shortness of breath.    Cardiovascular: Negative for chest pain and leg swelling.   Gastrointestinal: Negative for abdominal pain, nausea and vomiting.   Musculoskeletal: Negative for back pain.   Neurological: Negative for weakness.   Psychiatric/Behavioral: Negative for confusion. The patient is not nervous/anxious.    All other systems reviewed and are negative.    Objective:     Vital Signs (Most Recent):  Temp: 96.9 °F (36.1 °C) (06/29/19 1118)  Pulse: 70 (06/29/19 1118)  Resp: 18 (06/29/19 1118)  BP: (!) 101/59 (06/29/19 1118)  SpO2: 95 % (06/29/19 1118) Vital Signs (24h Range):  Temp:  [96.3 °F (35.7 °C)-97.6 °F (36.4 °C)] 96.9 °F (36.1 °C)  Pulse:  [70-98] 70  Resp:  [18-24] 18  SpO2:  [94 %-99 %] 95 %  BP: ()/(59-66) 101/59     Weight: 126.6 kg (279 lb 3.2 oz)  Body mass index is 42.45 kg/m².    Intake/Output Summary (Last 24 hours) at 6/29/2019 1156  Last data filed at 6/29/2019 0600  Gross per 24 hour   Intake 60 ml   Output --   Net 60 ml      Physical Exam   Constitutional:   Elderly obese  male in no acute distress   Eyes: Pupils are equal, round, and reactive to light. EOM are normal.   Neck: No JVD present.   Cardiovascular: Normal rate, regular rhythm, normal heart sounds and intact distal pulses.   Midline sternotomy scar   Pulmonary/Chest: Breath sounds normal.   Diminished breath sounds noted bilaterally with increased work of breathing   Abdominal: Soft. Bowel sounds are normal. He exhibits no distension. There is no tenderness.   Protuberant   Musculoskeletal: He exhibits edema (2+ pretibial edema bilaterally). He exhibits no deformity.   Lymphadenopathy:     He has no cervical adenopathy.   Skin: Capillary refill takes 2 to 3 seconds. No  rash noted. No pallor.   Nursing note and vitals reviewed.      Significant Labs: All pertinent labs within the past 24 hours have been reviewed.    Significant Imaging: I have reviewed and interpreted all pertinent imaging results/findings within the past 24 hours.

## 2019-06-29 NOTE — ASSESSMENT & PLAN NOTE
Patient with known Cirrhosis with Child's class C. Continue chronic meds. 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 Will avoid any hepatotoxic meds, and monitor CMP/INR for synthetic function.

## 2019-06-29 NOTE — NURSING
Attempted to change wound dressings per order, but pt refused stating that he does not want the dressings changed at this time.

## 2019-06-29 NOTE — ASSESSMENT & PLAN NOTE
Chronic, controlled.  Latest blood pressure and vitals reviewed-   Temp:  [96.3 °F (35.7 °C)-97.6 °F (36.4 °C)]   Pulse:  [70-98]   Resp:  [18-24]   BP: ()/(59-66)   SpO2:  [94 %-99 %] .   Current meds for hypertension include Metoprolol.  Will continue BP medications as needed for sustained BP control.  Will utilize p.r.n. blood pressure medication only if patient's blood pressure greater than  180/110 and he develops symptoms such as worsening chest pain or shortness of breath.

## 2019-06-29 NOTE — PROGRESS NOTES
[facility] Hospital Medicine  Progress Note    Patient Name: Frank Zayas  MRN: [id number]  Patient Class: IP- Inpatient   Admission Date: 6/25/2019  Length of Stay: 4 days  Attending Physician: Breanne Parrish MD  Primary Care Provider: Mikhail Shields MD        Subjective:     Principal Problem:Acute hypercapnic respiratory failure      HPI:  Frank Zayas is a 79 y.o. male who presents to the ED via EMS with an onset of confusion. Per EMS, the patient was noted to be more confused than normal yesterday. Per nursing home, he was last dialyzed 5 days ago and completed his full session. The patient has a PMHx of DM, HTN, CAD, CHF, CKD on dialysis, COPD, and prior MI. He has a PSHx including a CABG with 2 stent placements (1997). Lidocaine and Statins-hmg-coa reductase inhibitor drug allergies noted.Patient seen and examined in the emergency room, and was initially noted to be hypotensive.  Patient received 1 L fluid resuscitation in the emergency department with resolution of his hypotension subsequently.  ABG done shows evidence of severe respiratory acidosis and hypercarbia.  BiPAP was started in the emergency department, and adjusted with improvement in the patient's pCO2 and acidosis.  Patient was given broad-spectrum antibiotic therapy in the ED.    Overview/Hospital Course:    Patient was admitted for respiratory failure with hypercapnia as well as hypotension and sepsis.  Chest x-ray showed infiltrate on the right side.  The patient was started on antibiotics for hospital associated pneumonia, but his blood pressure medications were held.  He received initial bolus in the emergency department and BP remained stable since.  He was placed on continuous BiPAP in the ICU from 6/25 through 6/27.  As the patient's hypercapnia improved, his mental status also improved.  Pancultures were done which were essentially negative at this point in time.  He was maintained on broad-spectrum  antibiotic therapy given his multiple risk factors ( cirrhosis, end-stage renal disease, CAD, recent GI bleed, and severe debility ) patient was stepped down out of the ICU on 06/27 and his BiPAP was changed to Q HS.    Interval History:  Patient seen and examined.  Mental status appears to be at baseline.  Getting dialysis today.    Review of Systems   Constitutional: Positive for fatigue. Negative for chills and fever.   Respiratory: Positive for cough and shortness of breath.    Cardiovascular: Negative for chest pain and leg swelling.   Gastrointestinal: Negative for abdominal pain, nausea and vomiting.   Musculoskeletal: Negative for back pain.   Neurological: Negative for weakness.   Psychiatric/Behavioral: Negative for confusion. The patient is not nervous/anxious.    All other systems reviewed and are negative.    Objective:     Vital Signs (Most Recent):  Temp: 96.9 °F (36.1 °C) (06/29/19 1118)  Pulse: 70 (06/29/19 1118)  Resp: 18 (06/29/19 1118)  BP: (!) 101/59 (06/29/19 1118)  SpO2: 95 % (06/29/19 1118) Vital Signs (24h Range):  Temp:  [96.3 °F (35.7 °C)-97.6 °F (36.4 °C)] 96.9 °F (36.1 °C)  Pulse:  [70-98] 70  Resp:  [18-24] 18  SpO2:  [94 %-99 %] 95 %  BP: ()/(59-66) 101/59     Weight: 126.6 kg (279 lb 3.2 oz)  Body mass index is 42.45 kg/m².    Intake/Output Summary (Last 24 hours) at 6/29/2019 1156  Last data filed at 6/29/2019 0600  Gross per 24 hour   Intake 60 ml   Output --   Net 60 ml      Physical Exam   Constitutional:   Elderly obese  male in no acute distress   Eyes: Pupils are equal, round, and reactive to light. EOM are normal.   Neck: No JVD present.   Cardiovascular: Normal rate, regular rhythm, normal heart sounds and intact distal pulses.   Midline sternotomy scar   Pulmonary/Chest: Breath sounds normal.   Diminished breath sounds noted bilaterally with increased work of breathing   Abdominal: Soft. Bowel sounds are normal. He exhibits no distension. There is no  tenderness.   Protuberant   Musculoskeletal: He exhibits edema (2+ pretibial edema bilaterally). He exhibits no deformity.   Lymphadenopathy:     He has no cervical adenopathy.   Skin: Capillary refill takes 2 to 3 seconds. No rash noted. No pallor.   Nursing note and vitals reviewed.      Significant Labs: All pertinent labs within the past 24 hours have been reviewed.    Significant Imaging: I have reviewed and interpreted all pertinent imaging results/findings within the past 24 hours.      Assessment/Plan:      * Acute hypercapnic respiratory failure  Patient's vent settings, CXR and last ABG were reviewed.         ABG  Recent Labs   Lab 06/27/19  0525   PH 7.346*   PO2 94   PCO2 47.5*   HCO3 26.0   BE 0       Patient no longer needs BiPAP during the daytime.  Will continue BiPAP at night and monitor respiratory status and mental status closely.  Treatment for underlying etiologies as described below.        Cirrhosis of liver  Patient with known Cirrhosis with Child's class C. Continue chronic meds. 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 Will avoid any hepatotoxic meds, and monitor CMP/INR for synthetic function.         Severe sepsis  This patient does have evidence of infective focus  My overall impression is sepsis.  Antibiotics given-   Antibiotics (From admission, onward)    Start     Stop Route Frequency Ordered    07/02/19 1700  vancomycin 500 mg in dextrose 5 % 100 mL IVPB (ready to mix system)      -- IV Once 06/29/19 0756    06/29/19 1700  vancomycin 500 mg in dextrose 5 % 100 mL IVPB (ready to mix system)      -- IV Once 06/28/19 0843    06/26/19 1045  cefepime in dextrose 5 % 1 gram/50 mL IVPB 1 g      -- IV Every 12 hours (non-standard times) 06/26/19 0931    06/25/19 2100  mupirocin 2 % ointment      06/30 2059 Nasl 2 times daily 06/25/19 1300        Organ dysfunction indicated by Encephalopathy, Acute respiratory failure and Acute liver injury  Source- lung          Urinary  tract infection without hematuria  Treat per sepsis above.      Anemia of renal disease        Dermatitis associated with moisture  Chronic, no acute inpatient treatment indicated currently.      ESRD (end stage renal disease) on dialysis  Nephrology consulted. Continue Chronic hemodialysis. Monitor daily electrolytes and defer dialysis orders to nephrology.        Encephalopathy, metabolic  Patient with encephalopathy due to   Hypercarbia, sepsis which is improving. Treatment for illness causative of encephalopathy is under way. Encephalopathy noted as a secondary process related to the patient's acute illness. There is no specific treatment. Monitor neuro status, avoid medications such as narcotics and benzos which may worsen confusion, and use anti-psychotics to prevent behaviors of self harm.        Morbid (severe) obesity with alveolar hypoventilation  Body mass index is 42.45 kg/m². Morbid obesity complicates all aspects of disease management from diagnostic modalities to treatment. Weight loss encouraged and health benefits explained to patient.        BRANDEN (obstructive sleep apnea)   Continue to encourage patient to wear BiPAP at night.      Anemia, chronic disease  Patient's anemia is currently controlled. S/p 0 units of PRBCs. Etiology likely d/t ACD- ESRD  Current CBC reviewed-   Lab Results   Component Value Date    HGB 9.2 (L) 06/29/2019    HCT 31.3 (L) 06/29/2019     Monitor serial CBC and transfuse if patient becomes hemodynamically unstable, symptomatic or H/H drops below 7/21.         A-fib  Atrial Fibrillation controlled currently with Beta Blocker. [id number] score 5. Anticoagulation not indicated currently. D/t recent bleed.        COPD (chronic obstructive pulmonary disease)  Patient's COPD is controlled currently. Continue scheduled inhalers Steroids, Antibiotics and Supplemental oxygen and monitor respiratory status closely.         Coronary artery disease  Patient with known CAD s/p CABG,  which is controlled Will continue ASA and Statin and monitor for S/Sx of angina/ACS. Continue to monitor on telemetry.         DM type 2 with diabetic mixed hyperlipidemia   Patient is chronically on statin. Will continue for now. Monitor clinically. Last LDL was   Lab Results   Component Value Date    LDLCALC 55.8 (L) 05/05/2019            Type 2 diabetes mellitus with kidney complication, with long-term current use of insulin  Patient's FSGs are controlled on current hypoglycemics.   Last A1c reviewed-   Lab Results   Component Value Date    HGBA1C 5.6 05/04/2019     Most recent fingerstick glucose reviewed-   Recent Labs   Lab 06/28/19  1655 06/28/19  2149 06/29/19  0508 06/29/19  1133   POCTGLUCOSE 186* 174* 166* 206*     Current correctional scale  Medium  Maintain anti-hyperglycemic dose as follows-   Antihyperglycemics (From admission, onward)    Start     Stop Route Frequency Ordered    06/26/19 0900  SAXagliptin tablet 2.5 mg      -- Oral Daily 06/25/19 2158 06/25/19 2342  insulin aspart U-100 pen 1-10 Units      -- SubQ Every 6 hours PRN 06/25/19 2244              HTN (hypertension)  Chronic, controlled.  Latest blood pressure and vitals reviewed-   Temp:  [96.3 °F (35.7 °C)-97.6 °F (36.4 °C)]   Pulse:  [70-98]   Resp:  [18-24]   BP: ()/(59-66)   SpO2:  [94 %-99 %] .   Current meds for hypertension include Metoprolol.  Will continue BP medications as needed for sustained BP control.  Will utilize p.r.n. blood pressure medication only if patient's blood pressure greater than  180/110 and he develops symptoms such as worsening chest pain or shortness of breath.        Malignant neoplasm of bladder   Chronic.  No acute inpatient management indicated        VTE Risk Mitigation (From admission, onward)        Ordered     IP VTE HIGH RISK PATIENT  Once      06/25/19 2240     Place JACK hose  Until discontinued      06/25/19 2240     Place sequential compression device  Until discontinued      06/25/19  8445                Breanne Parrish MD  Department of Hospital Medicine   [facility]

## 2019-06-29 NOTE — ASSESSMENT & PLAN NOTE
Atrial Fibrillation controlled currently with Beta Blocker. [id number] score 5. Anticoagulation not indicated currently. D/t recent bleed.

## 2019-06-29 NOTE — PROGRESS NOTES
Progress Note  Nephrology    Admit Date: 6/25/2019   LOS: 4 days     SUBJECTIVE:     CC:  ESRD    Past medical, surgical and social history reviewed    HPI: 79M with multiple medical issues, including ESRD on HD TTS, last dialyzed on Sat, 6/22 is admitted from SNF with AMS, hypotension and concerns for UTI. Renal is consulted for comanagement.     6/26 VSS, seen and examined on HD, tolerating well, still on BiPAP.  6/27 VSS, much more awake, conversant. HD in AM or Sat per schedule.  6/28 VSS, awake, conversant. HD in AM per schedule.  6/29  VSS, no c/o chest pain or SOB.  HD today.    Assessment/plan:    Sepsis due to UTI, resolving.  ESRD on HD TTS.  AMS, new hypercapnic respiratory failure, requiring BiPAP, better  Continue current dialysis prescription.  Next HD Sat or PRN  Renal diet - low K, low phos.  No IVs or BP checks on access arm.     Anemia of CKD + recent  GIB  Hgb is low but stable.   Monitor.   No need for transfusion.  Continue RAFAEL.  No need for IV iron.     MBD / Secondary HPT  Monitor phos levels. Low phos diet.  Hold Binders if not eating.     HTN, now hypotensive  Un-controlled.   Tolerate asymptomatic HTN up to -160.  Hold home meds.     Thank you for allowing us to participate in the care of your patient!   We will follow the patient and provide recommendations as needed.    Review of Systems:  Constitutional: no fever or chills  Respiratory: positive for dyspnea on exertion and orthopnea  Cardiovascular: no chest pain or palpitations  Gastrointestinal: no nausea or vomiting, no abdominal pain or change in bowel habits  Musculoskeletal: no arthralgias or myalgias  Neurological: no seizures or tremors    OBJECTIVE:     Vital Signs (Most Recent)  Temp: 96.4 °F (35.8 °C) (06/29/19 0834)  Pulse: 98 (06/29/19 0834)  Resp: 20 (06/29/19 0834)  BP: (!) 139/59 (06/29/19 0834)  SpO2: (!) 94 % (06/29/19 0834)    Vital Signs Range (Last 24H):  Temp:  [96.3 °F (35.7 °C)-97.6 °F (36.4 °C)]   Pulse:   [71-98]   Resp:  [14-24]   BP: ()/(58-66)   SpO2:  [94 %-99 %]     Temp (24hrs), Av.7 °F (35.9 °C), Min:96.3 °F (35.7 °C), Max:97.6 °F (36.4 °C)    Systolic (24hrs), Av , Min:93 , Max:139     Diastolic (24hrs), Av, Min:58, Max:66      I & O (Last 24H):    Intake/Output Summary (Last 24 hours) at 2019 0903  Last data filed at 2019 0600  Gross per 24 hour   Intake 60 ml   Output --   Net 60 ml     Physical Exam:  General appearance: well developed, well nourished  Eyes:  conjunctivae/corneas clear. PERRL.  Neck: supple, symmetrical, trachea midline, no JVD  Lungs:  clear to auscultation bilaterally and normal respiratory effort  Heart: regular rate and rhythm, S1, S2 normal, no murmur, click, rub or gallop  Abdomen: soft, non-tender non-distented; bowel sounds normal; no masses,  no organomegaly  Extremities: edema generalized  Skin: Skin color, texture, turgor normal. No rashes or lesions  Neurologic: Normal strength and tone. No focal numbness or weakness    Laboratory:  CBC:  Recent Labs   Lab 19  0403   WBC 6.29   RBC 3.12*   HGB 9.2*   HCT 31.3*   PLT 59*   *   MCH 29.5   MCHC 29.4*     BMP:  Recent Labs   Lab 19  0403      K 5.0      CO2 21*   BUN 55*   CREATININE 5.5*   CALCIUM 8.5*      CMP:   Recent Labs   Lab 19  0403   *   CALCIUM 8.5*   ALBUMIN 2.8*   PROT 5.7*      K 5.0   CO2 21*      BUN 55*   CREATININE 5.5*   ALKPHOS 99   ALT 14   AST 11   BILITOT 0.4     All other lab data reviewed and negative unless otherwise specified   Diagnostic Results:  Labs: Reviewed    ASSESSMENT/PLAN:     Active Hospital Problems    Diagnosis  POA    *Acute hypercapnic respiratory failure [J96.02]  Yes    Cirrhosis of liver [K74.60]  Yes    Urinary tract infection without hematuria [N39.0]  Yes    Severe sepsis [A41.9, R65.20]  Yes    Anemia of renal disease [D63.1]  Yes    Dermatitis associated with moisture [L30.8]  Yes    ESRD  (end stage renal disease) on dialysis [N18.6, Z99.2]  Not Applicable    Encephalopathy, metabolic [G93.41]  Yes    Morbid (severe) obesity with alveolar hypoventilation [E66.2]  Yes    HTN (hypertension) [I10]  Yes    Type 2 diabetes mellitus with kidney complication, with long-term current use of insulin [E11.29, Z79.4]  Not Applicable    DM type 2 with diabetic mixed hyperlipidemia [E11.69, E78.2]  Yes    Coronary artery disease [I25.10]  Yes    COPD (chronic obstructive pulmonary disease) [J44.9]  Yes    A-fib [I48.91]  Yes    Anemia, chronic disease [D63.8]  Yes    BRANDEN (obstructive sleep apnea) [G47.33]  Yes    Malignant neoplasm of bladder [C67.9]  Yes      Resolved Hospital Problems    Diagnosis Date Resolved POA    Acute gastrointestinal bleeding [K92.2] 06/28/2019 Yes

## 2019-06-29 NOTE — ASSESSMENT & PLAN NOTE
This patient does have evidence of infective focus  My overall impression is sepsis.  Antibiotics given-   Antibiotics (From admission, onward)    Start     Stop Route Frequency Ordered    06/29/19 1700  vancomycin 500 mg in dextrose 5 % 100 mL IVPB (ready to mix system)      -- IV Once 06/28/19 0843    06/26/19 1045  cefepime in dextrose 5 % 1 gram/50 mL IVPB 1 g      -- IV Every 12 hours (non-standard times) 06/26/19 0931    06/25/19 2100  mupirocin 2 % ointment      06/30 2059 Nasl 2 times daily 06/25/19 1300        Organ dysfunction indicated by Encephalopathy, Acute respiratory failure and Acute liver injury  Source- lung

## 2019-06-29 NOTE — ASSESSMENT & PLAN NOTE
Chronic, controlled.  Latest blood pressure and vitals reviewed-   Temp:  [96.1 °F (35.6 °C)-98.5 °F (36.9 °C)]   Pulse:  [71-98]   Resp:  [14-18]   BP: ()/(58-78)   SpO2:  [94 %-100 %] .   Current meds for hypertension include Metoprolol.  Will continue BP medications as needed for sustained BP control.  Will utilize p.r.n. blood pressure medication only if patient's blood pressure greater than  180/110 and he develops symptoms such as worsening chest pain or shortness of breath.

## 2019-06-29 NOTE — PLAN OF CARE
Problem: Physical Therapy Goal  Goal: Physical Therapy Goal  Goals to be met by: 2019     Patient will increase functional independence with mobility by performin. Supine to sit with MInimal Assistance  2. Sit to stand transfer with Minimal Assistance  3. Bed to chair transfer with Minimal Assistance using Rolling Walker  4. Gait  x 250 feet with Minimal Assistance using Rolling Walker.   5. Lower extremity exercise program x20 reps   Outcome: Ongoing (interventions implemented as appropriate)  Patient participated in gait training, therapeutic and activities to improve functional mobility, improve ADL's and promote safe ambulation to decrease fall risk.

## 2019-06-29 NOTE — ASSESSMENT & PLAN NOTE
Patient's anemia is currently controlled. S/p 0 units of PRBCs. Etiology likely d/t ACD- ESRD  Current CBC reviewed-   Lab Results   Component Value Date    HGB 8.9 (L) 06/28/2019    HCT 30.2 (L) 06/28/2019     Monitor serial CBC and transfuse if patient becomes hemodynamically unstable, symptomatic or H/H drops below 7/21.

## 2019-06-29 NOTE — ASSESSMENT & PLAN NOTE
Patient's anemia is currently controlled. S/p 0 units of PRBCs. Etiology likely d/t ACD- ESRD  Current CBC reviewed-   Lab Results   Component Value Date    HGB 9.2 (L) 06/29/2019    HCT 31.3 (L) 06/29/2019     Monitor serial CBC and transfuse if patient becomes hemodynamically unstable, symptomatic or H/H drops below 7/21.

## 2019-06-29 NOTE — ASSESSMENT & PLAN NOTE
Patient's FSGs are controlled on current hypoglycemics.   Last A1c reviewed-   Lab Results   Component Value Date    HGBA1C 5.6 05/04/2019     Most recent fingerstick glucose reviewed-   Recent Labs   Lab 06/28/19  1655 06/28/19  2149 06/29/19  0508 06/29/19  1133   POCTGLUCOSE 186* 174* 166* 206*     Current correctional scale  Medium  Maintain anti-hyperglycemic dose as follows-   Antihyperglycemics (From admission, onward)    Start     Stop Route Frequency Ordered    06/26/19 0900  SAXagliptin tablet 2.5 mg      -- Oral Daily 06/25/19 2158    06/25/19 2342  insulin aspart U-100 pen 1-10 Units      -- SubQ Every 6 hours PRN 06/25/19 8625

## 2019-06-29 NOTE — SUBJECTIVE & OBJECTIVE
Interval History: ***    Review of Systems  Objective:     Vital Signs (Most Recent):  Temp: 96.6 °F (35.9 °C) (06/28/19 1516)  Pulse: 71 (06/28/19 1516)  Resp: 18 (06/28/19 1516)  BP: 119/61 (06/28/19 1516)  SpO2: 97 % (06/28/19 1720) Vital Signs (24h Range):  Temp:  [96.1 °F (35.6 °C)-98.5 °F (36.9 °C)] 96.6 °F (35.9 °C)  Pulse:  [71-98] 71  Resp:  [14-18] 18  SpO2:  [94 %-100 %] 97 %  BP: ()/(58-78) 119/61     Weight: 129 kg (284 lb 6.3 oz)  Body mass index is 43.24 kg/m².    Intake/Output Summary (Last 24 hours) at 6/28/2019 1957  Last data filed at 6/28/2019 0600  Gross per 24 hour   Intake 650 ml   Output 2500 ml   Net -1850 ml      Physical Exam    Significant Labs: {Results:[id number]}    Significant Imaging: {Imaging Review:[id number]}

## 2019-06-29 NOTE — ASSESSMENT & PLAN NOTE
Patient's vent settings, CXR and last ABG were reviewed.         ABG  Recent Labs   Lab 06/27/19  0525   PH 7.346*   PO2 94   PCO2 47.5*   HCO3 26.0   BE 0       Patient no longer needs BiPAP during the daytime.  Will continue BiPAP at night and monitor respiratory status and mental status closely.  Treatment for underlying etiologies as described below.

## 2019-06-29 NOTE — ASSESSMENT & PLAN NOTE
Body mass index is 42.45 kg/m². Morbid obesity complicates all aspects of disease management from diagnostic modalities to treatment. Weight loss encouraged and health benefits explained to patient.

## 2019-06-29 NOTE — SUBJECTIVE & OBJECTIVE
Interval History:  Patient doing much better throughout the course the day today.   Patient not wearing BiPAP over night, despite being encouraged to wear it..  Mental status and respiratory status appear to be greatly improved.  Blood pressure remains stable.  Plan of care reviewed with the patient and his wife at the bedside.    Review of Systems   Constitutional: Positive for fatigue. Negative for chills and fever.   Respiratory: Positive for cough and shortness of breath.    Cardiovascular: Negative for chest pain and leg swelling.   Gastrointestinal: Negative for abdominal pain, nausea and vomiting.   Musculoskeletal: Negative for back pain.   Neurological: Negative for weakness.   Psychiatric/Behavioral: Negative for confusion. The patient is not nervous/anxious.    All other systems reviewed and are negative.    Objective:     Vital Signs (Most Recent):  Temp: 96.6 °F (35.9 °C) (06/28/19 1516)  Pulse: 71 (06/28/19 1516)  Resp: 18 (06/28/19 1516)  BP: 119/61 (06/28/19 1516)  SpO2: 97 % (06/28/19 1720) Vital Signs (24h Range):  Temp:  [96.1 °F (35.6 °C)-98.5 °F (36.9 °C)] 96.6 °F (35.9 °C)  Pulse:  [71-98] 71  Resp:  [14-18] 18  SpO2:  [94 %-100 %] 97 %  BP: ()/(58-78) 119/61     Weight: 129 kg (284 lb 6.3 oz)  Body mass index is 43.24 kg/m².    Intake/Output Summary (Last 24 hours) at 6/28/2019 1949  Last data filed at 6/28/2019 0600  Gross per 24 hour   Intake 650 ml   Output 2500 ml   Net -1850 ml      Physical Exam   Constitutional:   Elderly obese  male in no acute distress   Eyes: Pupils are equal, round, and reactive to light. EOM are normal.   Neck: No JVD present.   Cardiovascular: Normal rate, regular rhythm, normal heart sounds and intact distal pulses.   Midline sternotomy scar   Pulmonary/Chest: Breath sounds normal.   Diminished breath sounds noted bilaterally with increased work of breathing   Abdominal: Soft. Bowel sounds are normal. He exhibits no distension. There is no  tenderness.   Protuberant   Musculoskeletal: He exhibits edema (2+ pretibial edema bilaterally). He exhibits no deformity.   Lymphadenopathy:     He has no cervical adenopathy.   Skin: Capillary refill takes 2 to 3 seconds. No rash noted. No pallor.   Nursing note and vitals reviewed.      Significant Labs: All pertinent labs within the past 24 hours have been reviewed.    Significant Imaging: I have reviewed all pertinent imaging results/findings within the past 24 hours.

## 2019-06-29 NOTE — PROGRESS NOTES
[facility] Hospital Medicine  Progress Note    Patient Name: Frank Zayas  MRN: [id number]  Patient Class: IP- Inpatient   Admission Date: 6/25/2019  Length of Stay: 3 days  Attending Physician: Vishal Kamara MD  Primary Care Provider: Mikhail Shields MD        Subjective:     Principal Problem:Acute hypercapnic respiratory failure      HPI:  Frank Zayas is a 79 y.o. male who presents to the ED via EMS with an onset of confusion. Per EMS, the patient was noted to be more confused than normal yesterday. Per nursing home, he was last dialyzed 5 days ago and completed his full session. The patient has a PMHx of DM, HTN, CAD, CHF, CKD on dialysis, COPD, and prior MI. He has a PSHx including a CABG with 2 stent placements (1997). Lidocaine and Statins-hmg-coa reductase inhibitor drug allergies noted.Patient seen and examined in the emergency room, and was initially noted to be hypotensive.  Patient received 1 L fluid resuscitation in the emergency department with resolution of his hypotension subsequently.  ABG done shows evidence of severe respiratory acidosis and hypercarbia.  BiPAP was started in the emergency department, and adjusted with improvement in the patient's pCO2 and acidosis.  Patient was given broad-spectrum antibiotic therapy in the ED.    Overview/Hospital Course:  No notes on file    Interval History:  Patient doing much better throughout the course the day today.   Patient not wearing BiPAP over night, despite being encouraged to wear it..  Mental status and respiratory status appear to be greatly improved.  Blood pressure remains stable.  Plan of care reviewed with the patient and his wife at the bedside.    Review of Systems   Constitutional: Positive for fatigue. Negative for chills and fever.   Respiratory: Positive for cough and shortness of breath.    Cardiovascular: Negative for chest pain and leg swelling.   Gastrointestinal: Negative for abdominal pain, nausea and  vomiting.   Musculoskeletal: Negative for back pain.   Neurological: Negative for weakness.   Psychiatric/Behavioral: Negative for confusion. The patient is not nervous/anxious.    All other systems reviewed and are negative.    Objective:     Vital Signs (Most Recent):  Temp: 96.6 °F (35.9 °C) (06/28/19 1516)  Pulse: 71 (06/28/19 1516)  Resp: 18 (06/28/19 1516)  BP: 119/61 (06/28/19 1516)  SpO2: 97 % (06/28/19 1720) Vital Signs (24h Range):  Temp:  [96.1 °F (35.6 °C)-98.5 °F (36.9 °C)] 96.6 °F (35.9 °C)  Pulse:  [71-98] 71  Resp:  [14-18] 18  SpO2:  [94 %-100 %] 97 %  BP: ()/(58-78) 119/61     Weight: 129 kg (284 lb 6.3 oz)  Body mass index is 43.24 kg/m².    Intake/Output Summary (Last 24 hours) at 6/28/2019 1949  Last data filed at 6/28/2019 0600  Gross per 24 hour   Intake 650 ml   Output 2500 ml   Net -1850 ml      Physical Exam   Constitutional:   Elderly obese  male in no acute distress   Eyes: Pupils are equal, round, and reactive to light. EOM are normal.   Neck: No JVD present.   Cardiovascular: Normal rate, regular rhythm, normal heart sounds and intact distal pulses.   Midline sternotomy scar   Pulmonary/Chest: Breath sounds normal.   Diminished breath sounds noted bilaterally with increased work of breathing   Abdominal: Soft. Bowel sounds are normal. He exhibits no distension. There is no tenderness.   Protuberant   Musculoskeletal: He exhibits edema (2+ pretibial edema bilaterally). He exhibits no deformity.   Lymphadenopathy:     He has no cervical adenopathy.   Skin: Capillary refill takes 2 to 3 seconds. No rash noted. No pallor.   Nursing note and vitals reviewed.      Significant Labs: All pertinent labs within the past 24 hours have been reviewed.    Significant Imaging: I have reviewed all pertinent imaging results/findings within the past 24 hours.      Assessment/Plan:      * Acute hypercapnic respiratory failure  Patient's vent settings, CXR and last ABG were reviewed.          AB  Recent Labs   Lab 06/27/19  0525   PH 7.346*   PO2 94   PCO2 47.5*   HCO3 26.0   BE 0       Patient no longer needs BiPAP during the daytime.  Will continue BiPAP at night and monitor respiratory status and mental status closely.  Treatment for underlying etiologies as described below.        Encephalopathy, metabolic  Patient with encephalopathy due to   Hypercarbia, sepsis which is improving. Treatment for illness causative of encephalopathy is under way. Encephalopathy noted as a secondary process related to the patient's acute illness. There is no specific treatment. Monitor neuro status, avoid medications such as narcotics and benzos which may worsen confusion, and use anti-psychotics to prevent behaviors of self harm.        COPD (chronic obstructive pulmonary disease)  Patient's COPD is controlled currently. Continue scheduled inhalers Steroids, Antibiotics and Supplemental oxygen and monitor respiratory status closely.         Severe sepsis  This patient does have evidence of infective focus  My overall impression is sepsis.  Antibiotics given-   Antibiotics (From admission, onward)    Start     Stop Route Frequency Ordered    06/29/19 1700  vancomycin 500 mg in dextrose 5 % 100 mL IVPB (ready to mix system)      -- IV Once 06/28/19 0843    06/26/19 1045  cefepime in dextrose 5 % 1 gram/50 mL IVPB 1 g      -- IV Every 12 hours (non-standard times) 06/26/19 0931    06/25/19 2100  mupirocin 2 % ointment      06/30 2059 Nasl 2 times daily 06/25/19 1300        Organ dysfunction indicated by Encephalopathy, Acute respiratory failure and Acute liver injury  Source- lung          Urinary tract infection without hematuria  Treat per sepsis above.      ESRD (end stage renal disease) on dialysis  Nephrology consulted. Continue Chronic hemodialysis. Monitor daily electrolytes and defer dialysis orders to nephrology.        Cirrhosis of liver  Patient with known Cirrhosis with Child's class C. Continue chronic  meds. 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 Will avoid any hepatotoxic meds, and monitor CMP/INR for synthetic function.         Anemia of renal disease        Dermatitis associated with moisture  Chronic, no acute inpatient treatment indicated currently.      Morbid (severe) obesity with alveolar hypoventilation  Body mass index is 43.24 kg/m². Morbid obesity complicates all aspects of disease management from diagnostic modalities to treatment. Weight loss encouraged and health benefits explained to patient.        BRANDEN (obstructive sleep apnea)   Continue to encourage patient to wear BiPAP at night.      Anemia, chronic disease  Patient's anemia is currently controlled. S/p 0 units of PRBCs. Etiology likely d/t ACD- ESRD  Current CBC reviewed-   Lab Results   Component Value Date    HGB 8.9 (L) 06/28/2019    HCT 30.2 (L) 06/28/2019     Monitor serial CBC and transfuse if patient becomes hemodynamically unstable, symptomatic or H/H drops below 7/21.         A-fib  Atrial Fibrillation controlled currently with Beta Blocker. [id number] score 5. Anticoagulation not indicated currently. D/t recent bleed.        Coronary artery disease  Patient with known CAD s/p CABG, which is controlled Will continue ASA and Statin and monitor for S/Sx of angina/ACS. Continue to monitor on telemetry.         DM type 2 with diabetic mixed hyperlipidemia   Patient is chronically on statin. Will continue for now. Monitor clinically. Last LDL was   Lab Results   Component Value Date    LDLCALC 55.8 (L) 05/05/2019            Type 2 diabetes mellitus with kidney complication, with long-term current use of insulin  Patient's FSGs are controlled on current hypoglycemics.   Last A1c reviewed-   Lab Results   Component Value Date    HGBA1C 5.6 05/04/2019     Most recent fingerstick glucose reviewed-   Recent Labs   Lab 06/27/19  2138 06/28/19  1141 06/28/19  1655   POCTGLUCOSE 158* 180* 186*     Current correctional scale   Medium  Maintain anti-hyperglycemic dose as follows-   Antihyperglycemics (From admission, onward)    Start     Stop Route Frequency Ordered    06/26/19 0900  SAXagliptin tablet 2.5 mg      -- Oral Daily 06/25/19 2158    06/25/19 2342  insulin aspart U-100 pen 1-10 Units      -- SubQ Every 6 hours PRN 06/25/19 2244              HTN (hypertension)  Chronic, controlled.  Latest blood pressure and vitals reviewed-   Temp:  [96.1 °F (35.6 °C)-98.5 °F (36.9 °C)]   Pulse:  [71-98]   Resp:  [14-18]   BP: ()/(58-78)   SpO2:  [94 %-100 %] .   Current meds for hypertension include Metoprolol.  Will continue BP medications as needed for sustained BP control.  Will utilize p.r.n. blood pressure medication only if patient's blood pressure greater than  180/110 and he develops symptoms such as worsening chest pain or shortness of breath.        Malignant neoplasm of bladder   Chronic.  No acute inpatient management indicated        VTE Risk Mitigation (From admission, onward)        Ordered     IP VTE HIGH RISK PATIENT  Once      06/25/19 2240     Place JACK hose  Until discontinued      06/25/19 2240     Place sequential compression device  Until discontinued      06/25/19 2240                Vishal Kamara MD  Department of Hospital Medicine   [facility]

## 2019-06-29 NOTE — ASSESSMENT & PLAN NOTE
Patient's COPD is controlled currently. Continue scheduled inhalers Steroids, Antibiotics and Supplemental oxygen and monitor respiratory status closely.

## 2019-06-29 NOTE — ASSESSMENT & PLAN NOTE
Atrial Fibrillation controlled currently with Beta Blocker. [id number] score 5. Anticoagulation not indicated currently. D/t recent bleed.

## 2019-06-29 NOTE — ASSESSMENT & PLAN NOTE
Body mass index is 43.24 kg/m². Morbid obesity complicates all aspects of disease management from diagnostic modalities to treatment. Weight loss encouraged and health benefits explained to patient.

## 2019-06-30 NOTE — ASSESSMENT & PLAN NOTE
This patient does have evidence of infective focus  My overall impression is sepsis.  Antibiotics given-   Antibiotics (From admission, onward)    Start     Stop Route Frequency Ordered    07/02/19 1700  vancomycin 500 mg in dextrose 5 % 100 mL IVPB (ready to mix system)      -- IV Once 06/29/19 0756    06/26/19 1045  cefepime in dextrose 5 % 1 gram/50 mL IVPB 1 g      -- IV Every 12 hours (non-standard times) 06/26/19 0931        Organ dysfunction indicated by Encephalopathy, Acute respiratory failure and Acute liver injury  Source- lung

## 2019-06-30 NOTE — ASSESSMENT & PLAN NOTE
Chronic, controlled.  Latest blood pressure and vitals reviewed-   Temp:  [96.3 °F (35.7 °C)-98.4 °F (36.9 °C)]   Pulse:  []   Resp:  [18-20]   BP: ()/()   SpO2:  [91 %-99 %] .   Current meds for hypertension include Metoprolol.  Will continue BP medications as needed for sustained BP control.  Will utilize p.r.n. blood pressure medication only if patient's blood pressure greater than  180/110 and he develops symptoms such as worsening chest pain or shortness of breath.

## 2019-06-30 NOTE — ASSESSMENT & PLAN NOTE
Atrial Fibrillation controlled currently with Beta Blocker. [id number] score 5. Anticoagulation not indicated currently. D/t recent bleed.

## 2019-06-30 NOTE — ASSESSMENT & PLAN NOTE
Patient's FSGs are controlled on current hypoglycemics.   Last A1c reviewed-   Lab Results   Component Value Date    HGBA1C 5.6 05/04/2019     Most recent fingerstick glucose reviewed-   Recent Labs   Lab 06/29/19  1133 06/29/19  2045 06/30/19  0532   POCTGLUCOSE 206* 178* 179*     Current correctional scale  Medium  Maintain anti-hyperglycemic dose as follows-   Antihyperglycemics (From admission, onward)    Start     Stop Route Frequency Ordered    06/26/19 0900  SAXagliptin tablet 2.5 mg      -- Oral Daily 06/25/19 2158 06/25/19 2342  insulin aspart U-100 pen 1-10 Units      -- SubQ Every 6 hours PRN 06/25/19 3544

## 2019-06-30 NOTE — ASSESSMENT & PLAN NOTE
Patient no longer needs BiPAP during the daytime.  Will continue BiPAP at night and monitor respiratory status and mental status closely.  Treatment for underlying etiologies as described below.

## 2019-06-30 NOTE — SUBJECTIVE & OBJECTIVE
Interval History:  Patient seen and examined.  Reports feeling much better.  Denies shortness of breath.  Plan for return to skilled nursing facility tomorrow    Review of Systems   Constitutional: Positive for fatigue. Negative for chills and fever.   Respiratory: Positive for cough. Negative for shortness of breath.    Cardiovascular: Negative for chest pain and leg swelling.   Gastrointestinal: Negative for abdominal pain, nausea and vomiting.   Musculoskeletal: Negative for back pain.   Neurological: Negative for weakness.   Psychiatric/Behavioral: Negative for confusion. The patient is not nervous/anxious.    All other systems reviewed and are negative.    Objective:     Vital Signs (Most Recent):  Temp: 96.7 °F (35.9 °C) (06/30/19 0708)  Pulse: (!) 113 (06/30/19 0708)  Resp: 18 (06/30/19 0708)  BP: 110/80 (06/30/19 0708)  SpO2: 98 % (06/30/19 0730) Vital Signs (24h Range):  Temp:  [96.3 °F (35.7 °C)-98.4 °F (36.9 °C)] 96.7 °F (35.9 °C)  Pulse:  [] 113  Resp:  [18-20] 18  SpO2:  [91 %-99 %] 98 %  BP: ()/() 110/80     Weight: 127 kg (279 lb 15.8 oz)  Body mass index is 42.57 kg/m².    Intake/Output Summary (Last 24 hours) at 6/30/2019 1027  Last data filed at 6/30/2019 0500  Gross per 24 hour   Intake 620 ml   Output 2500 ml   Net -1880 ml      Physical Exam   Constitutional:   Elderly obese  male in no acute distress   Eyes: Pupils are equal, round, and reactive to light. EOM are normal.   Neck: No JVD present.   Cardiovascular: Normal rate, regular rhythm, normal heart sounds and intact distal pulses.   Midline sternotomy scar   Pulmonary/Chest: Effort normal. He has no wheezes.   Diminished breath sounds noted bilaterally    Abdominal: Soft. Bowel sounds are normal. He exhibits no distension. There is no tenderness.   Protuberant   Musculoskeletal: He exhibits edema. He exhibits no deformity.   Lymphadenopathy:     He has no cervical adenopathy.   Skin: Capillary refill takes less  than 2 seconds. No rash noted. No pallor.   Nursing note and vitals reviewed.      Significant Labs: All pertinent labs within the past 24 hours have been reviewed.    Significant Imaging: I have reviewed and interpreted all pertinent imaging results/findings within the past 24 hours.

## 2019-06-30 NOTE — CARE UPDATE
06/30/19 0300   PRE-TX-O2   O2 Device (Oxygen Therapy) BiPAP   Oxygen Concentration (%) 35   SpO2 96 %   Pulse Oximetry Type Intermittent   $ Pulse Oximetry - Multiple Charge Pulse Oximetry - Multiple   Ready to Wean/Extubation Screen   FIO2<=50 (chart decimal) 0.35   Preset CPAP/BiPAP Settings   Mode Of Delivery AVAPS   EPAP (cm H2O) 8   AVAPS Min P (cm H2O) 10   AVAPS Max P (cm H2O) 25   Set Tidal Volume (mL) 500 mL   Set Rate (Breaths/Min) 12   ITime (sec) 1   Rise Time (sec) 0.3

## 2019-06-30 NOTE — ASSESSMENT & PLAN NOTE
Patient's anemia is currently controlled. S/p 0 units of PRBCs. Etiology likely d/t ACD- ESRD  Current CBC reviewed-   Lab Results   Component Value Date    HGB 9.2 (L) 06/30/2019    HCT 30.5 (L) 06/30/2019     Monitor serial CBC and transfuse if patient becomes hemodynamically unstable, symptomatic or H/H drops below 7/21.

## 2019-06-30 NOTE — ASSESSMENT & PLAN NOTE
Patient's COPD is controlled currently. Continue scheduled inhalers Steroids, Antibiotics and Supplemental oxygen and monitor respiratory status closely.  Wean steroids.

## 2019-06-30 NOTE — PROGRESS NOTES
[facility] Hospital Medicine  Progress Note    Patient Name: Frank Zayas  MRN: [id number]  Patient Class: IP- Inpatient   Admission Date: 6/25/2019  Length of Stay: 5 days  Attending Physician: Breanne Parrish MD  Primary Care Provider: Mikhail Shields MD        Subjective:     Principal Problem:Acute hypercapnic respiratory failure      HPI:  Frank Zayas is a 79 y.o. male who presents to the ED via EMS with an onset of confusion. Per EMS, the patient was noted to be more confused than normal yesterday. Per nursing home, he was last dialyzed 5 days ago and completed his full session. The patient has a PMHx of DM, HTN, CAD, CHF, CKD on dialysis, COPD, and prior MI. He has a PSHx including a CABG with 2 stent placements (1997). Lidocaine and Statins-hmg-coa reductase inhibitor drug allergies noted.Patient seen and examined in the emergency room, and was initially noted to be hypotensive.  Patient received 1 L fluid resuscitation in the emergency department with resolution of his hypotension subsequently.  ABG done shows evidence of severe respiratory acidosis and hypercarbia.  BiPAP was started in the emergency department, and adjusted with improvement in the patient's pCO2 and acidosis.  Patient was given broad-spectrum antibiotic therapy in the ED.    Overview/Hospital Course:    Patient was admitted for respiratory failure with hypercapnia as well as hypotension and sepsis.  Chest x-ray showed infiltrate on the right side.  The patient was started on antibiotics for hospital associated pneumonia, but his blood pressure medications were held.  He received initial bolus in the emergency department and BP remained stable since.  He was placed on continuous BiPAP in the ICU from 6/25 through 6/27.  As the patient's hypercapnia improved, his mental status also improved.  Pancultures were done which were essentially negative at this point in time.  He was maintained on broad-spectrum  antibiotic therapy given his multiple risk factors ( cirrhosis, end-stage renal disease, CAD, recent GI bleed, and severe debility ) patient was stepped down out of the ICU on 06/27 and his BiPAP was changed to Q HS.    Interval History:  Patient seen and examined.  Reports feeling much better.  Denies shortness of breath.  Plan for return to skilled nursing facility tomorrow    Review of Systems   Constitutional: Positive for fatigue. Negative for chills and fever.   Respiratory: Positive for cough. Negative for shortness of breath.    Cardiovascular: Negative for chest pain and leg swelling.   Gastrointestinal: Negative for abdominal pain, nausea and vomiting.   Musculoskeletal: Negative for back pain.   Neurological: Negative for weakness.   Psychiatric/Behavioral: Negative for confusion. The patient is not nervous/anxious.    All other systems reviewed and are negative.    Objective:     Vital Signs (Most Recent):  Temp: 96.7 °F (35.9 °C) (06/30/19 0708)  Pulse: (!) 113 (06/30/19 0708)  Resp: 18 (06/30/19 0708)  BP: 110/80 (06/30/19 0708)  SpO2: 98 % (06/30/19 0730) Vital Signs (24h Range):  Temp:  [96.3 °F (35.7 °C)-98.4 °F (36.9 °C)] 96.7 °F (35.9 °C)  Pulse:  [] 113  Resp:  [18-20] 18  SpO2:  [91 %-99 %] 98 %  BP: ()/() 110/80     Weight: 127 kg (279 lb 15.8 oz)  Body mass index is 42.57 kg/m².    Intake/Output Summary (Last 24 hours) at 6/30/2019 1027  Last data filed at 6/30/2019 0500  Gross per 24 hour   Intake 620 ml   Output 2500 ml   Net -1880 ml      Physical Exam   Constitutional:   Elderly obese  male in no acute distress   Eyes: Pupils are equal, round, and reactive to light. EOM are normal.   Neck: No JVD present.   Cardiovascular: Normal rate, regular rhythm, normal heart sounds and intact distal pulses.   Midline sternotomy scar   Pulmonary/Chest: Effort normal. He has no wheezes.   Diminished breath sounds noted bilaterally    Abdominal: Soft. Bowel sounds are normal.  He exhibits no distension. There is no tenderness.   Protuberant   Musculoskeletal: He exhibits edema. He exhibits no deformity.   Lymphadenopathy:     He has no cervical adenopathy.   Skin: Capillary refill takes less than 2 seconds. No rash noted. No pallor.   Nursing note and vitals reviewed.      Significant Labs: All pertinent labs within the past 24 hours have been reviewed.    Significant Imaging: I have reviewed and interpreted all pertinent imaging results/findings within the past 24 hours.      Assessment/Plan:      * Acute hypercapnic respiratory failure    Patient no longer needs BiPAP during the daytime.  Will continue BiPAP at night and monitor respiratory status and mental status closely.  Treatment for underlying etiologies as described below.        Cirrhosis of liver  Patient with known Cirrhosis with Child's class C. Continue chronic meds. 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 Will avoid any hepatotoxic meds, and monitor CMP/INR for synthetic function.         Severe sepsis  This patient does have evidence of infective focus  My overall impression is sepsis.  Antibiotics given-   Antibiotics (From admission, onward)    Start     Stop Route Frequency Ordered    07/02/19 1700  vancomycin 500 mg in dextrose 5 % 100 mL IVPB (ready to mix system)      -- IV Once 06/29/19 0756    06/26/19 1045  cefepime in dextrose 5 % 1 gram/50 mL IVPB 1 g      -- IV Every 12 hours (non-standard times) 06/26/19 0931        Organ dysfunction indicated by Encephalopathy, Acute respiratory failure and Acute liver injury  Source- lung          Urinary tract infection without hematuria  Treat per sepsis above.      Anemia of renal disease        Dermatitis associated with moisture  Chronic, no acute inpatient treatment indicated currently.      ESRD (end stage renal disease) on dialysis  Nephrology consulted. Continue Chronic hemodialysis. Monitor daily electrolytes and defer dialysis orders to  nephrology.        Encephalopathy, metabolic  Patient with encephalopathy due to   Hypercarbia, sepsis which is improving. Treatment for illness causative of encephalopathy is under way. Encephalopathy noted as a secondary process related to the patient's acute illness. There is no specific treatment. Monitor neuro status, avoid medications such as narcotics and benzos which may worsen confusion, and use anti-psychotics to prevent behaviors of self harm.        Morbid (severe) obesity with alveolar hypoventilation  Body mass index is 42.57 kg/m². Morbid obesity complicates all aspects of disease management from diagnostic modalities to treatment. Weight loss encouraged and health benefits explained to patient.        BRANDEN (obstructive sleep apnea)   Continue to encourage patient to wear BiPAP at night.      Anemia, chronic disease  Patient's anemia is currently controlled. S/p 0 units of PRBCs. Etiology likely d/t ACD- ESRD  Current CBC reviewed-   Lab Results   Component Value Date    HGB 9.2 (L) 06/30/2019    HCT 30.5 (L) 06/30/2019     Monitor serial CBC and transfuse if patient becomes hemodynamically unstable, symptomatic or H/H drops below 7/21.         A-fib  Atrial Fibrillation controlled currently with Beta Blocker. [id number] score 5. Anticoagulation not indicated currently. D/t recent bleed.        COPD (chronic obstructive pulmonary disease)  Patient's COPD is controlled currently. Continue scheduled inhalers Steroids, Antibiotics and Supplemental oxygen and monitor respiratory status closely.  Wean steroids.        Coronary artery disease  Patient with known CAD s/p CABG, which is controlled Will continue ASA and Statin and monitor for S/Sx of angina/ACS. Continue to monitor on telemetry.         DM type 2 with diabetic mixed hyperlipidemia   Patient is chronically on statin. Will continue for now. Monitor clinically. Last LDL was   Lab Results   Component Value Date    LDLCALC 55.8 (L) 05/05/2019             Type 2 diabetes mellitus with kidney complication, with long-term current use of insulin  Patient's FSGs are controlled on current hypoglycemics.   Last A1c reviewed-   Lab Results   Component Value Date    HGBA1C 5.6 05/04/2019     Most recent fingerstick glucose reviewed-   Recent Labs   Lab 06/29/19  1133 06/29/19 2045 06/30/19  0532   POCTGLUCOSE 206* 178* 179*     Current correctional scale  Medium  Maintain anti-hyperglycemic dose as follows-   Antihyperglycemics (From admission, onward)    Start     Stop Route Frequency Ordered    06/26/19 0900  SAXagliptin tablet 2.5 mg      -- Oral Daily 06/25/19 2158 06/25/19 2342  insulin aspart U-100 pen 1-10 Units      -- SubQ Every 6 hours PRN 06/25/19 2244              HTN (hypertension)  Chronic, controlled.  Latest blood pressure and vitals reviewed-   Temp:  [96.3 °F (35.7 °C)-98.4 °F (36.9 °C)]   Pulse:  []   Resp:  [18-20]   BP: ()/()   SpO2:  [91 %-99 %] .   Current meds for hypertension include Metoprolol.  Will continue BP medications as needed for sustained BP control.  Will utilize p.r.n. blood pressure medication only if patient's blood pressure greater than  180/110 and he develops symptoms such as worsening chest pain or shortness of breath.        Malignant neoplasm of bladder   Chronic.  No acute inpatient management indicated        VTE Risk Mitigation (From admission, onward)        Ordered     IP VTE HIGH RISK PATIENT  Once      06/25/19 2240     Place JACK hose  Until discontinued      06/25/19 2240     Place sequential compression device  Until discontinued      06/25/19 2240                Breanne Parrish MD  Department of Hospital Medicine   [facility]

## 2019-06-30 NOTE — CARE UPDATE
06/29/19 2200   Patient Assessment/Suction   Level of Consciousness (AVPU) alert   Positioning   Body Position positioned/repositioned independently   Preset CPAP/BiPAP Settings   Mode Of Delivery AVAPS   $ CPAP/BiPAP Daily Charge BiPAP/CPAP Daily   $ Initial CPAP/BiPAP Setup? No   $ Is patient using? Yes   Size of Mask Medium/Large   Sized Appropriately? Yes   Equipment Type V60   Airway Device Type large full face mask   Humidifier not applicable   EPAP (cm H2O) 8   AVAPS Min P (cm H2O) 10   AVAPS Max P (cm H2O) 25   Set Tidal Volume (mL) 500 mL   Set Rate (Breaths/Min) 12   ITime (sec) 1   Rise Time (sec) 0.3

## 2019-06-30 NOTE — PROGRESS NOTES
Progress Note  Nephrology    Admit Date: 6/25/2019   LOS: 5 days     SUBJECTIVE:     CC:  ESRD    Past medical, surgical and social history reviewed    HPI: 79M with multiple medical issues, including ESRD on HD TTS, last dialyzed on Sat, 6/22 is admitted from SNF with AMS, hypotension and concerns for UTI. Renal is consulted for comanagement.     6/26 VSS, seen and examined on HD, tolerating well, still on BiPAP.  6/27 VSS, much more awake, conversant. HD in AM or Sat per schedule.  6/28 VSS, awake, conversant. HD in AM per schedule.  6/29  VSS, no c/o chest pain or SOB.  HD today.  6/30  Hgb stable, UF 2.5L yesterday. Next HD due Tuesday.  Denies chest pain, reports SOB improved following HD.    Assessment/plan:    Sepsis due to UTI, resolving.  ESRD on HD TTS.  AMS, new hypercapnic respiratory failure, requiring BiPAP, better  Continue current dialysis prescription.  Next HD Sat or PRN  Renal diet - low K, low phos.  No IVs or BP checks on access arm.     Anemia of CKD + recent  GIB  Hgb is low but stable.   Monitor.   No need for transfusion.  Continue RAFAEL.  No need for IV iron.     MBD / Secondary HPT  Monitor phos levels. Low phos diet.  Hold Binders if not eating.     HTN, now hypotensive  Un-controlled.   Tolerate asymptomatic HTN up to -160.  Hold home meds.     Thank you for allowing us to participate in the care of your patient!   We will follow the patient and provide recommendations as needed.    Review of Systems:  Constitutional: no fever or chills  Respiratory: positive for dyspnea on exertion and orthopnea  Cardiovascular: no chest pain or palpitations  Gastrointestinal: no nausea or vomiting, no abdominal pain or change in bowel habits  Musculoskeletal: no arthralgias or myalgias  Neurological: no seizures or tremors    OBJECTIVE:     Vital Signs (Most Recent)  Temp: 96.7 °F (35.9 °C) (06/30/19 0708)  Pulse: (!) 113 (06/30/19 0708)  Resp: 18 (06/30/19 0708)  BP: 110/80 (06/30/19 0708)  SpO2:  98 % (19 0730)    Vital Signs Range (Last 24H):  Temp:  [96.3 °F (35.7 °C)-98.4 °F (36.9 °C)]   Pulse:  []   Resp:  [18-20]   BP: ()/()   SpO2:  [91 %-99 %]     Temp (24hrs), Av.1 °F (36.2 °C), Min:96.3 °F (35.7 °C), Max:98.4 °F (36.9 °C)    Systolic (24hrs), Av , Min:90 , Max:151     Diastolic (24hrs), Av, Min:56, Max:105      I & O (Last 24H):    Intake/Output Summary (Last 24 hours) at 2019 0824  Last data filed at 2019 0500  Gross per 24 hour   Intake 620 ml   Output 2500 ml   Net -1880 ml     Physical Exam:  General appearance: well developed, well nourished  Eyes:  conjunctivae/corneas clear. PERRL.  Neck: supple, symmetrical, trachea midline, no JVD  Lungs:  clear to auscultation bilaterally and normal respiratory effort  Heart: regular rate and rhythm, S1, S2 normal, no murmur, click, rub or gallop  Abdomen: soft, non-tender non-distented; bowel sounds normal; no masses,  no organomegaly  Extremities: edema generalized  Skin: Skin color, texture, turgor normal. No rashes or lesions  Neurologic: Normal strength and tone. No focal numbness or weakness    Laboratory:  CBC:  Recent Labs   Lab 19   WBC 6.71   RBC 3.03*   HGB 9.2*   HCT 30.5*   PLT 59*   *   MCH 30.4   MCHC 30.2*     BMP:  Recent Labs   Lab 19      K 4.7      CO2 21*   BUN 59*   CREATININE 5.5*   CALCIUM 8.3*      CMP:   Recent Labs   Lab 19   *   CALCIUM 8.3*   ALBUMIN 2.8*   PROT 5.6*      K 4.7   CO2 21*      BUN 59*   CREATININE 5.5*   ALKPHOS 106   ALT 17   AST 12   BILITOT 0.5     All other lab data reviewed and negative unless otherwise specified   Diagnostic Results:  Labs: Reviewed    ASSESSMENT/PLAN:     Active Hospital Problems    Diagnosis  POA    *Acute hypercapnic respiratory failure [J96.02]  Yes    Cirrhosis of liver [K74.60]  Yes    Urinary tract infection without hematuria [N39.0]  Yes    Severe sepsis  [A41.9, R65.20]  Yes    Anemia of renal disease [D63.1]  Yes    Dermatitis associated with moisture [L30.8]  Yes    ESRD (end stage renal disease) on dialysis [N18.6, Z99.2]  Not Applicable    Encephalopathy, metabolic [G93.41]  Yes    Morbid (severe) obesity with alveolar hypoventilation [E66.2]  Yes    HTN (hypertension) [I10]  Yes    Type 2 diabetes mellitus with kidney complication, with long-term current use of insulin [E11.29, Z79.4]  Not Applicable    DM type 2 with diabetic mixed hyperlipidemia [E11.69, E78.2]  Yes    Coronary artery disease [I25.10]  Yes    COPD (chronic obstructive pulmonary disease) [J44.9]  Yes    A-fib [I48.91]  Yes    Anemia, chronic disease [D63.8]  Yes    BRANDEN (obstructive sleep apnea) [G47.33]  Yes    Malignant neoplasm of bladder [C67.9]  Yes      Resolved Hospital Problems    Diagnosis Date Resolved POA    Acute gastrointestinal bleeding [K92.2] 06/28/2019 Yes

## 2019-07-01 NOTE — PLAN OF CARE
I sent the pts 3 day packet, discharge orders and AVS to [facility] for review via [facility]. PTs AVS updated. Tish Jamison, [facility]     Date Status/Notes Created By   · 7/1/2019 10:03:02 AM Note: Morning, thanks pulling now.  [email or web address]  · 7/1/2019 9:45:01 AM New: The pt is returning today. Thanks ! Tish [phone]  Tish Jamison       07/01/19 0945   Post-Acute Status   Post-Acute Authorization Placement   Post-Acute Placement Status Pending Post-Acute Medical Review

## 2019-07-01 NOTE — DISCHARGE SUMMARY
[facility] Hospital Medicine  Discharge Summary      Patient Name: Frank Zayas  MRN: [id number]  Admission Date: 6/25/2019  Hospital Length of Stay: 6 days  Discharge Date and Time: 7/1/2019  1:32 PM  Attending Physician: No att. providers found   Discharging Provider: Breanne Parrish MD  Primary Care Provider: Mikhail Shields MD      HPI:   Frank Zayas is a 79 y.o. male who presents to the ED via EMS with an onset of confusion. Per EMS, the patient was noted to be more confused than normal yesterday. Per nursing home, he was last dialyzed 5 days ago and completed his full session. The patient has a PMHx of DM, HTN, CAD, CHF, CKD on dialysis, COPD, and prior MI. He has a PSHx including a CABG with 2 stent placements (1997). Lidocaine and Statins-hmg-coa reductase inhibitor drug allergies noted.Patient seen and examined in the emergency room, and was initially noted to be hypotensive.  Patient received 1 L fluid resuscitation in the emergency department with resolution of his hypotension subsequently.  ABG done shows evidence of severe respiratory acidosis and hypercarbia.  BiPAP was started in the emergency department, and adjusted with improvement in the patient's pCO2 and acidosis.  Patient was given broad-spectrum antibiotic therapy in the ED.    * No surgery found *      Hospital Course:     Patient was admitted for respiratory failure with hypercapnia as well as hypotension and sepsis.  Chest x-ray showed infiltrate on the right side.  The patient was started on antibiotics for hospital associated pneumonia, but his blood pressure medications were held.  He received initial bolus in the emergency department and BP remained stable since.  He was placed on continuous BiPAP in the ICU from 6/25 through 6/27.  As the patient's hypercapnia improved, his mental status also improved.  Pancultures were done which were essentially negative at this point in time.  He was maintained on  broad-spectrum antibiotic therapy given his multiple risk factors ( cirrhosis, end-stage renal disease, CAD, recent GI bleed, and severe debility ) patient was stepped down out of the ICU on 06/27 and his BiPAP was changed to Q HS.  Patient's respiratory status continued to improve and he was breathing comfortably on nasal cannula.  Nephrology was consulted during his stay and performed regular dialysis sessions on Tuesday/Thursday/Saturday.  Patient was discharged to [facility] on antibiotics to complete a 14 day course as well as a 5 day additional course of steroids.    Discharge exam  Awake, alert, no apparent distress  Mild bibasilar crackles, on nasal cannula  Regular rate and rhythm     Consults:     No new Assessment & Plan notes have been filed under this hospital service since the last note was generated.  Service: Hospital Medicine    Final Active Diagnoses:    Diagnosis Date Noted POA    PRINCIPAL PROBLEM:  Acute hypercapnic respiratory failure [J96.02] 05/08/2019 Yes    Cirrhosis of liver [K74.60] 06/27/2019 Yes    Urinary tract infection without hematuria [N39.0] 06/25/2019 Yes    Severe sepsis [A41.9, R65.20] 06/25/2019 Yes    Anemia of renal disease [D63.1] 06/04/2019 Yes    Dermatitis associated with moisture [L30.8] 06/03/2019 Yes    ESRD (end stage renal disease) on dialysis [N18.6, Z99.2] 05/31/2019 Not Applicable    Encephalopathy, metabolic [G93.41] 05/08/2019 Yes    Morbid (severe) obesity with alveolar hypoventilation [E66.2] 05/08/2019 Yes    HTN (hypertension) [I10] 05/05/2019 Yes    Type 2 diabetes mellitus with kidney complication, with long-term current use of insulin [E11.29, Z79.4] 05/05/2019 Not Applicable    DM type 2 with diabetic mixed hyperlipidemia [E11.69, E78.2] 05/05/2019 Yes    Coronary artery disease [I25.10] 05/05/2019 Yes    COPD (chronic obstructive pulmonary disease) [J44.9] 05/05/2019 Yes    A-fib [I48.91] 05/05/2019 Yes    Anemia, chronic  disease [D63.8] 05/05/2019 Yes    BRANDEN (obstructive sleep apnea) [G47.33] 05/05/2019 Yes    Malignant neoplasm of bladder [C67.9] 11/18/2013 Yes      Problems Resolved During this Admission:    Diagnosis Date Noted Date Resolved POA    Acute gastrointestinal bleeding [K92.2] 06/28/2019 06/28/2019 Yes       Discharged Condition: good    Disposition: Skilled Nursing Facility    Follow Up:  Follow-up Information     [facility].    Why:  Nursing Home, SNF  Contact information:  [address] [place] [id number] [phone]               Patient Instructions:      Notify your health care provider if you experience any of the following:  temperature >100.4     Notify your health care provider if you experience any of the following:  persistent nausea and vomiting or diarrhea     Notify your health care provider if you experience any of the following:  difficulty breathing or increased cough     Notify your health care provider if you experience any of the following:  persistent dizziness, light-headedness, or visual disturbances     Notify your health care provider if you experience any of the following:  increased confusion or weakness     Activity as tolerated       Significant Diagnostic Studies: Labs:   BMP:   Recent Labs   Lab 06/30/19 0457 07/01/19  0354   * 139*    137   K 4.7 4.7    103   CO2 21* 22*   BUN 59* 77*   CREATININE 5.5* 6.5*   CALCIUM 8.3* 8.3*   MG 2.4 2.5   , CMP   Recent Labs   Lab 06/30/19  0457 07/01/19  0354    137   K 4.7 4.7    103   CO2 21* 22*   * 139*   BUN 59* 77*   CREATININE 5.5* 6.5*   CALCIUM 8.3* 8.3*   PROT 5.6* 5.6*   ALBUMIN 2.8* 2.7*   BILITOT 0.5 0.5   ALKPHOS 106 105   AST 12 13   ALT 17 19   ANIONGAP 11 12   ESTGFRAFRICA 11* 9*   EGFRNONAA 9* 7*    and CBC   Recent Labs   Lab 06/30/19 0457 07/01/19  0354   WBC 6.71 8.11   HGB 9.2* 9.4*   HCT 30.5* 32.3*   PLT 59* 63*   Radiology Results (last 7 days)      Procedure Component Value Units Date/Time   X-Ray Chest 1 View [[id number]] Resulted: 06/28/19 0759   Order Status: Completed Updated: 06/28/19 0801   Narrative:     EXAMINATION:  XR CHEST 1 VIEW    CLINICAL HISTORY:  SOB;    TECHNIQUE:  Single frontal view of the chest was performed.    COMPARISON:  06/27/2019    FINDINGS:  A right subclavian dialysis catheter has its tip at the cavoatrial junction.  There has been a prior sternotomy.  The heart is enlarged.  There is hazy right lung airspace disease and small right pleural effusion without significant change.   Impression:       Stable examination demonstrating right lung airspace disease and small pleural effusion.      Electronically signed by: Pop Salas MD  Date: 06/28/2019  Time: 07:59   X-Ray Chest 1 View [[id number]] Resulted: 06/27/19 0748   Order Status: Completed Updated: 06/27/19 0750   Narrative:     EXAMINATION:  XR CHEST 1 VIEW    CLINICAL HISTORY:  SOB;    TECHNIQUE:  Single frontal view of the chest was performed.    COMPARISON:  Prior chest of 26 June 2019.    FINDINGS:  A double lumen central line enters on the right and ends in the superior vena cava.  The patient has had a prior sternotomy.  There is mild cardiomegaly in a biventricular pattern.  A small to moderate right pleural effusion is noted.  There remains increased density in the right lung probably due to atelectasis and infiltrate.  The left lung is relatively clear.  No pneumothorax is seen.   Impression:       Small to moderate right pleural effusion with infiltrate atelectasis of the right lung.  Double-lumen central line in position.  Cardiomegaly.  Prior sternotomy.      Electronically signed by: Addy Weaver MD  Date: 06/27/2019  Time: 07:48   X-Ray Chest 1 View [[id number]] Resulted: 06/26/19 1421   Order Status: Completed Updated: 06/26/19 1424   Narrative:     EXAMINATION:  XR CHEST 1 VIEW    CLINICAL HISTORY:  SOB;    TECHNIQUE:  Single frontal view of the chest  was performed.    COMPARISON:  Prior chest of June 25, 2019.    FINDINGS:  A double lumen central line enters on the right and ends in the superior vena cava.  The patient has had a prior sternotomy.  There is continued mild cardiomegaly in a biventricular pattern.  There remains density in the right hemithorax consistent with probable infiltrate and pleural effusion.  Left lung is relatively clear.  No pneumothorax is seen.   Impression:       Density in the right hemithorax is probably due to atelectasis and right pleural effusion.  Recommend lateral decubitus view to determine the size of the effusion..  Cardiomegaly.  Prior sternotomy.  Double-lumen central line in position.      Electronically signed by: Addy Weaver MD  Date: 06/26/2019  Time: 14:21   X-Ray Chest AP Portable [[id number]] Resulted: 06/25/19 1100   Order Status: Completed Updated: 06/25/19 1103   Narrative:     EXAMINATION:  XR CHEST AP PORTABLE    CLINICAL HISTORY:  Sepsis;    TECHNIQUE:  Single frontal view of the chest was performed.    COMPARISON:  Prior chest of May 30, 2018.    FINDINGS:  A double lumen central line is in place on the right ending in the distal superior vena cava.  The patient has had a prior sternotomy.  There is mild cardiomegaly in a biventricular pattern.  There is increased infiltrate and possible pleural effusion on the right.  Left perihilar infiltrate has decreased.  No pneumothorax is seen.   Impression:       Increasing density consistent with infiltrate and possible pleural effusion on the right.  Left perihilar infiltrate has decreased.  Cardiomegaly.  Prior sternotomy.  Double-lumen central line in position.      Electronically signed by: Addy Weaver MD  Date: 06/25/2019  Time: 11:00   Microbiology Results (Last 90 Days)     Procedure Component Value Units Date/Time   Blood culture [[id number]] Collected: 06/25/19 2302   Order Status: Completed Specimen: Blood Updated: 07/01/19 1212    Blood Culture,  Routine No growth after 5 days.   Narrative:     Aerobic and Anaerobic   Blood culture [[id number]] Collected: 06/25/19 2250   Order Status: Completed Specimen: Blood Updated: 07/01/19 1212    Blood Culture, Routine No growth after 5 days.   Narrative:     Aerobic and Anaerobic   Urine culture [[id number]] Collected: 06/25/19 1110   Order Status: Completed Specimen: Urine Updated: 06/26/19 2123    Urine Culture, Routine No growth   Narrative:     Preferred Collection Type->Urine, Clean Catch   Blood culture x two cultures. Draw prior to antibiotics. [[id number]] Collected: 06/25/19 1044   Order Status: Completed Specimen: Blood Updated: 06/30/19 1412    Blood Culture, Routine No growth after 5 days.   Narrative:     Aerobic and anaerobic   Blood culture x two cultures. Draw prior to antibiotics. [[id number]] Collected: 06/25/19 1035   Order Status: Completed Specimen: Blood Updated: 06/30/19 1412    Blood Culture, Routine No growth after 5 days.   Narrative:     Aerobic and anaerobic           Pending Diagnostic Studies:     None         Medications:  Reconciled Home Medications:      Medication List      START taking these medications    levoFLOXacin 500 MG tablet  Commonly known as:  LEVAQUIN  Take 1 tablet (500 mg total) by mouth every other day. for 4 days  Start taking on:  7/2/2019     predniSONE 10 MG tablet  Commonly known as:  DELTASONE  Take 4 tablets (40 mg total) by mouth once daily. for 5 days     SAXagliptin 2.5 mg Tab tablet  Commonly known as:  ONGLYZA  Take 1 tablet (2.5 mg total) by mouth once daily.  Start taking on:  7/2/2019  Replaces:  TRADJENTA 5 mg Tab tablet        CHANGE how you take these medications    sevelamer carbonate 800 mg Tab  Commonly known as:  RENVELA  Take 2 tablets (1,600 mg total) by mouth 3 (three) times daily with meals.  What changed:  how much to take        CONTINUE taking these medications    alfuzosin 10 mg Tb24  Commonly known as:  UROXATRAL  Take 10 mg by mouth  nightly.     aspirin 81 MG EC tablet  Commonly known as:  ECOTRIN  Take 1 tablet (81 mg total) by mouth once daily.     CO Q-10 100 mg capsule  Generic drug:  coenzyme Q10  Take 100 mg by mouth once daily.     ferrous sulfate 325 (65 FE) MG EC tablet  Take 1 tablet (325 mg total) by mouth 2 (two) times daily with meals.     FLUoxetine 20 MG capsule  Take 20 mg by mouth every evening.     metoprolol tartrate 25 MG tablet  Commonly known as:  LOPRESSOR  Take 1 tablet (25 mg total) by mouth every Mon, Wed, Fri, Sun.     nitroGLYCERIN 0.4 MG SL tablet  Commonly known as:  NITROSTAT  Place 0.4 mg under the tongue every 5 (five) minutes as needed for Chest pain.     pantoprazole 40 MG tablet  Commonly known as:  PROTONIX  Take 1 tablet (40 mg total) by mouth 2 (two) times daily before meals.     rOPINIRole 2 MG tablet  Commonly known as:  REQUIP  Take 2 mg by mouth every evening.        STOP taking these medications    omeprazole 20 MG capsule  Commonly known as:  PRILOSEC     TRADJENTA 5 mg Tab tablet  Generic drug:  linaGLIPtin  Replaced by:  SAXagliptin 2.5 mg Tab tablet            Indwelling Lines/Drains at time of discharge:   Lines/Drains/Airways     Central Venous Catheter Line                 Hemodialysis Catheter 05/16/19 1600 right subclavian 45 days          Pressure Ulcer                 Pressure Injury 05/31/19 0700 Left Buttocks #1 Stage 2 31 days                Time spent on the discharge of patient: 35 minutes  Patient was seen and examined on the date of discharge and determined to be suitable for discharge.         Breanne Parrish MD  Department of Hospital Medicine  [facility]

## 2019-07-01 NOTE — PLAN OF CARE
07/01/19 1403   Final Note   Assessment Type Final Discharge Note   Anticipated Discharge Disposition SNF

## 2019-07-01 NOTE — PT/OT/SLP PROGRESS
"Physical Therapy Treatment    Patient Name:  Frank Zayas   MRN:  [id number]    Recommendations:     Discharge Recommendations:  nursing facility, skilled       Assessment:     Frank Zayas is a 79 y.o. male admitted with a medical diagnosis of Acute hypercapnic respiratory failure.  He presents with the following impairments/functional limitations:  weakness, impaired endurance, impaired self care skills, impaired functional mobilty, gait instability, impaired balance, decreased lower extremity function, impaired cardiopulmonary response to activity .    Rehab Prognosis: Good and Fair; patient would benefit from acute skilled PT services to address these deficits and reach maximum level of function.    Recent Surgery: * No surgery found *      Plan:     During this hospitalization, patient to be seen 6 x/week to address the identified rehab impairments via gait training, therapeutic activities, therapeutic exercises and progress toward the following goals:    · Plan of Care Expires:  07/26/19    Subjective     Chief Complaint: increased weakness  Patient/Family Comments/goals: "I can walk to the wall and back"  Pain/Comfort:  · Pain Rating 1: 0/10      Objective:     Communicated with nurse Gonzales prior to session.  Patient found supine with oxygen, peripheral IV, telemetry upon PT entry to room.     General Precautions: Standard, fall   Orthopedic Precautions:N/A   Braces: N/A     Functional Mobility:  · Bed Mobility:     · Scooting: contact guard assistance  · Supine to Sit: contact guard assistance    · Transfers:     · Sit to Stand:  moderate assistance and of 2 for safety with rolling walker    · Gait: 60' with CGA-min A using RW. O2 in tow. standing break taken, with pt leaning his back against wall      Therapeutic Activities and Exercises:   pt assisted to sitting EOB post gait.      seated BLE therex: AP, LAQ, marching, glute sets x 5-10 reps each as tolerated 2' fatigue.    Patient left seated EOB " (per pt request) with all lines intact, call button in reach and nurse notified..    GOALS:   Multidisciplinary Problems     Physical Therapy Goals        Problem: Physical Therapy Goal    Goal Priority Disciplines Outcome Goal Variances Interventions   Physical Therapy Goal     PT, PT/OT Ongoing (interventions implemented as appropriate)     Description:  Goals to be met by: 2019     Patient will increase functional independence with mobility by performin. Supine to sit with MInimal Assistance  2. Sit to stand transfer with Minimal Assistance  3. Bed to chair transfer with Minimal Assistance using Rolling Walker  4. Gait  x 250 feet with Minimal Assistance using Rolling Walker.   5. Lower extremity exercise program x20 reps                    Time Tracking:     PT Received On: 19  PT Start Time: 0951     PT Stop Time: 1015  PT Total Time (min): 24 min     Billable Minutes: Gait Training 15 and Therapeutic Exercise 8    Treatment Type: Treatment  PT/PTA: PTA     PTA Visit Number: 2     Jackie Cage, PTA  2019

## 2019-07-01 NOTE — PROGRESS NOTES
AAOX3  VSS.  Denies any needs and no distress noted.  IV AND tele dc'd.  Report given to Pina.  Waiting for transport.

## 2019-07-01 NOTE — PHYSICIAN QUERY
PT Name: Frank Zayas  MR #: [id number]    Physician Query Form - Respiratory Condition Clarification       CDS/: Padma Dillard RN CCDS               Contact information:  [email or web address]    This form is a permanent document in the medical record.  Query Date: July 1, 2019    By submitting this query, we are merely seeking further clarification of documentation. Please utilize your independent clinical judgment when addressing the question(s) below.    The Medical record contains the following:   Indicators   Supporting Clinical Findings Location in Medical Record    KELLOGG, SOB, Productive Cough, Wheezing,   Use of Accessory Muscles, etc.     x Radiology Findings Increasing density consistent with infiltrate and possible pleural effusion on the right.  Left perihilar infiltrate has decreased. CXR 6/25    Hypoxia or Hypercapnia     x Respiratory Distress or Failure Acute hypercapnic respiratory failure    H&P   x Smoker/ History of Smoking  Former smoker H&P   x RR              ABGs                     O2 sat ABG done shows evidence of severe respiratory acidosis and hypercarbia. BiPAP was started in the emergency department, and adjusted with improvement in the patient's pCO2 and acidosis.   H&P   x Medication Methylprednisolone IV 80 mg Q 12hours 6/25-6/28  Methylprednisolone IV 40 mg Q 12 hours 6/28-6/30  Methylprednisolone 40mg IV x 1 7/1 MAR    Treatment        Home O2, Oxygen Dependence     x Supplemental O2 He was placed on continuous BiPAP in the ICU from 6/25 through 6/27.   DS   x Other Patient's COPD is uncontrolled due to continued dyspnea and worsening of baseline hypoxia currently    The patient was started on antibiotics for hospital associated pneumonia,     H&P        DS       Please further specify the acuity of the COPD.  Thank you.    [  x ] COPD with Acute Exacerbation   [   ] COPD without Acute Exacerbation (Chronic Only)   [   ] Other (please specify):   [  ] Clinically  Undetermined       Please document in your progress notes daily for the duration of treatment, until resolved, and include in your discharge summary.

## 2019-07-01 NOTE — DISCHARGE INSTRUCTIONS
[facility] Transfer Orders        Admit to: [facility]    Diagnoses:   Active Hospital Problems    Diagnosis  POA    *Acute hypercapnic respiratory failure [J96.02]  Yes    Cirrhosis of liver [K74.60]  Yes    Urinary tract infection without hematuria [N39.0]  Yes    Severe sepsis [A41.9, R65.20]  Yes    Anemia of renal disease [D63.1]  Yes    Dermatitis associated with moisture [L30.8]  Yes    ESRD (end stage renal disease) on dialysis [N18.6, Z99.2]  Not Applicable    Encephalopathy, metabolic [G93.41]  Yes    Morbid (severe) obesity with alveolar hypoventilation [E66.2]  Yes    HTN (hypertension) [I10]  Yes    Type 2 diabetes mellitus with kidney complication, with long-term current use of insulin [E11.29, Z79.4]  Not Applicable    DM type 2 with diabetic mixed hyperlipidemia [E11.69, E78.2]  Yes    Coronary artery disease [I25.10]  Yes    COPD (chronic obstructive pulmonary disease) [J44.9]  Yes    A-fib [I48.91]  Yes    Anemia, chronic disease [D63.8]  Yes    BRANDEN (obstructive sleep apnea) [G47.33]  Yes    Malignant neoplasm of bladder [C67.9]  Yes      Resolved Hospital Problems    Diagnosis Date Resolved POA    Acute gastrointestinal bleeding [K92.2] 06/28/2019 Yes     Allergies:   Review of patient's allergies indicates:   Allergen Reactions    Lidocaine Nausea Only     NOVACAINE --  Severe nausea    Statins-hmg-coa reductase inhibitors Anxiety       Code Status: Full    Vitals: Routine       Diet: renal diet    Activity: Activity as tolerated    Nursing Precautions: Aspiration  and Fall and pressure ulcer prevention    Bed/Surface: Low Air Loss    Consults: PT to evaluate and treat- 5 times a week, OT to evaluate and treat- 5 times a week and Wound Care    Oxygen: 2 liters/min via nasal cannula   BiPAP qHS FiO2 21. IP 25, EP 10    Dialysis: Patient is on dialysis. TTS    Labs: CBC and Renal function panel twice weekly  Pending Diagnostic Studies:      Procedure Component Value Units Date/Time    Hepatitis B surface antibody [[id number]] Collected:  06/29/19 1705    Order Status:  Sent Lab Status:  In process Updated:  06/29/19 2323    Specimen:  Blood     Hepatitis B surface antigen [[id number]] Collected:  06/29/19 1705    Order Status:  Sent Lab Status:  In process Updated:  06/29/19 2323    Specimen:  Blood     Lactic acid, plasma #2 [[id number]]     Order Status:  Sent Lab Status:  No result     Specimen:  Blood         Imaging: none    Miscellaneous Care:   Wound Care: yes:  change foam dressings to pressure wounds 2x a week  Diabetes Care: Diabetes: Check blood sugar. Fingerstick blood sugar AC and HS  Sliding Scale/Hypoglycemia Protocol: For FSG<80, give 1 amp D50 or 1 glucose tablet. For FSG , do nothing. For -200, give 1 unit of novolog in addition to pre-meal insulin. For -250, give 2 units of novolog in addition to pre-meal insulin. For -300, give 3 units of novolog in addition to pre-meal insulin. For 301-350, give 4 units of novolog in addition to pre-meal insulin. For 351-400, give 5 units of novolog in addition to pre-meal insulin. For FSG >400, give 5 units of novolog in addition to pre-meal insulin and please call MD    IV Access: Peripheral     Medications: Discontinue all previous medication orders, if any. See new list below.  Current Discharge Medication List      START taking these medications    Details   levoFLOXacin (LEVAQUIN) 500 MG tablet Take 1 tablet (500 mg total) by mouth every other day. for 4 days  Qty: 2 tablet, Refills: 0      predniSONE (DELTASONE) 10 MG tablet Take 4 tablets (40 mg total) by mouth once daily. for 5 days  Qty: 20 tablet, Refills: 0      SAXagliptin (ONGLYZA) 2.5 mg Tab tablet Take 1 tablet (2.5 mg total) by mouth once daily.  Qty: 90 tablet, Refills: 3         CONTINUE these medications which have CHANGED    Details   pantoprazole (PROTONIX) 40 MG tablet Take 1 tablet (40 mg total) by  mouth 2 (two) times daily before meals.  Qty: 60 tablet, Refills: 11      sevelamer carbonate (RENVELA) 800 mg Tab Take 2 tablets (1,600 mg total) by mouth 3 (three) times daily with meals.  Qty: 180 tablet, Refills: 11         CONTINUE these medications which have NOT CHANGED    Details   alfuzosin (UROXATRAL) 10 mg Tb24 Take 10 mg by mouth nightly.       aspirin (ECOTRIN) 81 MG EC tablet Take 1 tablet (81 mg total) by mouth once daily.  Refills: 0      coenzyme Q10 (CO Q-10) 100 mg capsule Take 100 mg by mouth once daily.       ferrous sulfate 325 (65 FE) MG EC tablet Take 1 tablet (325 mg total) by mouth 2 (two) times daily with meals.  Refills: 0      FLUoxetine 20 MG capsule Take 20 mg by mouth every evening.       metoprolol tartrate (LOPRESSOR) 25 MG tablet Take 1 tablet (25 mg total) by mouth 2 (two) times daily.  Qty: 60 tablet, Refills: 11      nitroGLYCERIN (NITROSTAT) 0.4 MG SL tablet Place 0.4 mg under the tongue every 5 (five) minutes as needed for Chest pain.      rOPINIRole (REQUIP) 2 MG tablet Take 2 mg by mouth every evening.         STOP taking these medications       omeprazole (PRILOSEC) 20 MG capsule Comments:   Reason for Stopping:         fluconazole (DIFLUCAN) 100 MG tablet Comments:   Reason for Stopping:         linaGLIPtin (TRADJENTA) 5 mg Tab tablet Comments:   Reason for Stopping:             Follow up:

## 2019-07-01 NOTE — CARE UPDATE
07/01/19 0000   PRE-TX-O2   SpO2 100 %   Pulse 71   Resp 18   Temp 97.3 °F (36.3 °C)   BP (!) 81/49   Preset CPAP/BiPAP Settings   Mode Of Delivery AVAPS   $ CPAP/BiPAP Daily Charge BiPAP/CPAP Daily   $ Initial CPAP/BiPAP Setup? No   $ Is patient using? Yes   Size of Mask Medium/Large   Sized Appropriately? Yes   Equipment Type V60   Airway Device Type large full face mask   Humidifier not applicable   Ipap 18   EPAP (cm H2O) 8   AVAPS Min P (cm H2O) 10   AVAPS Max P (cm H2O) 25   Pressure Support (cm H2O) 10   Set Tidal Volume (mL) 500 mL   Set Rate (Breaths/Min) 12   ITime (sec) 1   Rise Time (sec) 0.3

## 2019-07-01 NOTE — PLAN OF CARE
07/01/19 0800   PRE-TX-O2   O2 Device (Oxygen Therapy) nasal cannula   $ Is the patient on Low Flow Oxygen? Yes   Flow (L/min) 2   Oxygen Concentration (%) 28   SpO2 97 %   Pulse Oximetry Type Intermittent   $ Pulse Oximetry - Multiple Charge Pulse Oximetry - Multiple   Ready to Wean/Extubation Screen   FIO2<=50 (chart decimal) 0.28

## 2019-07-01 NOTE — PLAN OF CARE
Pt accepted to [facility] for SNF. Per Crystal report called be called to Lilian at station 1 at 12:30pm @[phone]. Destination booked in [facility]. CM updated pt's nurse, Janet.     · 7/1/2019 12:02:30 PM Note: sounds good. Thanks!  Ya Barfield  · 7/1/2019 11:53:54 AM Note: Got it, please have them call report to Lilian on station 1 at 12:30 and we will . Thanks!  Tiera [email or web address]     07/01/19 1202   Post-Acute Status   Post-Acute Authorization Placement   Post-Acute Placement Status Set-up Complete

## 2019-07-01 NOTE — PLAN OF CARE
Problem: Physical Therapy Goal  Goal: Physical Therapy Goal  Goals to be met by: 2019     Patient will increase functional independence with mobility by performin. Supine to sit with MInimal Assistance  2. Sit to stand transfer with Minimal Assistance  3. Bed to chair transfer with Minimal Assistance using Rolling Walker  4. Gait  x 250 feet with Minimal Assistance using Rolling Walker.   5. Lower extremity exercise program x20 reps   Outcome: Ongoing (interventions implemented as appropriate)  PT for bed mobility, transfer training, gait and therex

## 2019-07-01 NOTE — PLAN OF CARE
Sent AVS with medication in question fixed to [facility] via [facility]. CM will follow.     Date Status/Notes Created By   · 7/1/2019 11:40:48 AM Note: Just sent you fixed AVS. Per Dr. Parrish, metoprolol on dialysis days like he was taking before.  Ya Barfield  · 7/1/2019 11:02:48 AM Note: Ok thanks.  Tiera [email or web address]  · 7/1/2019 11:01:57 AM Note: Just reached out to Dr. Parrish. Will let you know after she responds.  Ya Barfield  · 7/1/2019 10:39:58 AM Note: Info reviewed, please verify the metoprolol order he only took it on non dialysis days, if this new order is accurate please include parameters for med. thanks.        07/01/19 1144   Post-Acute Status   Post-Acute Authorization Placement   Post-Acute Placement Status Pending Post-Acute Medical Review

## 2019-07-12 NOTE — TELEPHONE ENCOUNTER
Patient update received per Jia Mcdonald RN at [facility].    Clinical status:  Requires  a. Injections  b. IV  c. Nebulizers, 02 evaluation sats  d. Enteral feedings new or recent change  e. Care of new colostomy or teaching  f. Frequent suctioning, trach and /or vent needs  g. Frequent irrigation, replacement of urinary cath, complex suprapubic  h. Treatment stg 3/ or 4 pressure ulcers, complex wound care  i. Nursing evaluation due to unstable and complex medical condition  j. Nursing rehab teaching e.g. bowel and bladder training, adaptive care    PT/OT/ST    *Transfer mobility (eg, from bed to chair)   Independent (patient can perform an activity safely and independently without any devices, physical assistance, or supervision)  Modified independent (patient requires the use of an assistive device or extra time to complete an activity, but is otherwise independent)  Contact guard assistance (patient can perform an activity independently, but needs constant physical touch to steady or guide to ensure safe performance)  Supervision (patient can perform an activity, but supervision is provided to address safety concerns)  Minimum assistance (patient can perform most of an activity (ie, approximately 75%), but requires limited assistance from one person for safe completion)  Moderate assistance (patient can perform about half the effort of an activity, but requires extensive assistance from one or more persons for safe performance of the other half)  X     Maximum assistance (patient has difficulty performing an activity, but can offer some assistance (eg, approximately 25% of effort) and is dependent on one or more people to assist for safe completion)  Dependent or unable (patient is unable to perform an activity or depends on 2 or more people to complete it)    *Ambulation inside (eg, within room, hallways, to toilet) _____Ft  Independent  Modified independent  Contact guard  assistance  Supervision  Minimum assistance  X    Moderate assistance  Maximum assistance  Dependent or unable    *Ambulation outside (eg, getting in or out of buildings, walking on uneven surfaces) ______Ft  X  Not Applicable  Independent  Modified independent  Contact guard assistance  Supervision  Minimum assistance  Moderate assistance  Maximum assistance  Dependent or unable    *Toileting self-management:   Independent  Modified independent  Contact guard assistance  Supervision  Minimum assistance  Moderate assistance  X    Maximum assistance  Dependent or unable    *Nourishment self-management (ie, ability to feed self)   Independent  X    Modified independent  Supervision  Minimum assistance  Moderate assistance  Maximum assistance  Dependent or unable    *Personal care self-management (eg, dressing, bathing, brushing teeth, washing hair)   Independent  Modified independent  Contact guard assistance  Supervision  Minimum assistance  X    Moderate assistance  Maximum assistance  Dependent or unable    Medication support (ie, preparing/taking all prescribed and over-the-counter medications reliably and safely, including correct dosage at correct times)   Not applicable  Independent  Modified independent  Supervision  Minimum assistance  Moderate assistance  X    Maximum assistance    ADL adaptive devices self-management (ie, ability to manage putting on and/or removing braces, splints, and other adaptive devices)   Not applicable  Independent  Modified independent  Supervision  Minimum assistance  X    Moderate assistance  Maximum assistance  Dependent or unable          For Extensions  - Unanticipated functional problems impacting safe completion of therapy  - Multiple comorbidities or frailty impairing functional improvement  - Cognitive impairment requiring additional intervention and education  - Communication impairment requiring additional intervention and education  - Assessment or care unmanageable at  lower level of care  - Expect brief to moderate stay extension.        Barriers to progress:  shortness of breath with exertion. Requires frequent rest periods.       New treatments:  PT/OT/ST. Ambulating with rolling walker. X 2 person assist for bed/toilet transfers.       Projected length of stay/ expected discharge:  Re-hospitalized after admit. SOB on exertion during therapy exercises.  No estimated discharge date set at this time.      Discharge Disposition:  Plans to discharge home with home health        Recommendations:

## 2019-07-17 NOTE — TELEPHONE ENCOUNTER
"Patient update received, via email, per Jia Mcdonald RN at [facility].    Clinical status:  Requires  a. Injections  b. IV  c. Nebulizers, 02 evaluation sats  d. Enteral feedings new or recent change  e. Care of new colostomy or teaching  f. Frequent suctioning, trach and /or vent needs  g. Frequent irrigation, replacement of urinary cath, complex suprapubic  h. Treatment stg 3/ or 4 pressure ulcers, complex wound care  i. Nursing evaluation due to unstable and complex medical condition  j. Nursing rehab teaching e.g. bowel and bladder training, adaptive care    PT/OT/ST    *Transfer mobility (eg, from bed to chair)   Independent (patient can perform an activity safely and independently without any devices, physical assistance, or supervision)  Modified independent (patient requires the use of an assistive device or extra time to complete an activity, but is otherwise independent)  Contact guard assistance (patient can perform an activity independently, but needs constant physical touch to steady or guide to ensure safe performance)  Supervision (patient can perform an activity, but supervision is provided to address safety concerns)  Minimum assistance (patient can perform most of an activity (ie, approximately 75%), but requires limited assistance from one person for safe completion)  X    Moderate assistance (patient can perform about half the effort of an activity, but requires extensive assistance from one or more persons for safe performance of the other half)  Maximum assistance (patient has difficulty performing an activity, but can offer some assistance (eg, approximately 25% of effort) and is dependent on one or more people to assist for safe completion)  Dependent or unable (patient is unable to perform an activity or depends on 2 or more people to complete it)    *Ambulation inside (eg, within room, hallways, to toilet) _____Ft  "He is ambulating 26ft using rolling walker with mod " "assist. "      Independent  Modified independent  Contact guard assistance  Supervision  Minimum assistance  X    Moderate assistance  Maximum assistance  Dependent or unable    *Ambulation outside (eg, getting in or out of buildings, walking on uneven surfaces) ______Ft  Independent  Modified independent  Contact guard assistance  Supervision  Minimum assistance  X    Moderate assistance  Maximum assistance  Dependent or unable    *Toileting self-management:   Independent  Modified independent  Contact guard assistance  Supervision  Minimum assistance  Moderate assistance  X    Maximum assistance  Dependent or unable    *Nourishment self-management (ie, ability to feed self)   Independent  Modified independent  X    Supervision  Minimum assistance  Moderate assistance  Maximum assistance  Dependent or unable    *Personal care self-management (eg, dressing, bathing, brushing teeth, washing hair)   Independent  Modified independent  Contact guard assistance  Supervision  Minimum assistance  X    Moderate assistance  Maximum assistance  Dependent or unable    Medication support (ie, preparing/taking all prescribed and over-the-counter medications reliably and safely, including correct dosage at correct times)   Not applicable  Independent  Modified independent  Supervision  Minimum assistance  X    Moderate assistance  Maximum assistance    ADL adaptive devices self-management (ie, ability to manage putting on and/or removing braces, splints, and other adaptive devices)   Not applicable  Independent  Modified independent  Supervision  Minimum assistance  X    Moderate assistance  Maximum assistance  Dependent or unable          For Extensions  - Unanticipated functional problems impacting safe completion of therapy  - Multiple comorbidities or frailty impairing functional improvement  - Cognitive impairment requiring additional intervention and education  - Communication impairment requiring additional intervention " "and education  - Assessment or care unmanageable at lower level of care  - Expect brief to moderate stay extension.        Barriers to progress:  SOB with exertion. Requires frequent rest periods. Poor safety awareness.      New treatments:  New Goal: Perform toileting with CGA grab bar/BSC.       Projected length of stay/ expected discharge:  No projected d/c date set at this time    Reasons for Extension:  Limited by medical issues. "limited by being easily fatigued, SOB with exertion and therapy."    Discharge Disposition:          Recommendations:   "

## 2019-08-04 NOTE — PROGRESS NOTES
[facility] Hospital Medicine  Progress Note    Patient Name: Frank Zayas  MRN: [id number]  Patient Class: IP- Inpatient   Admission Date: 6/25/2019  Length of Stay: 3 days  Attending Physician: Vishal Kamara MD  Primary Care Provider: Mikhail Shields MD        Subjective:     Principal Problem:Acute hypercapnic respiratory failure      HPI:  Frakn Zayas is a 79 y.o. male who presents to the ED via EMS with an onset of confusion. Per EMS, the patient was noted to be more confused than normal yesterday. Per nursing home, he was last dialyzed 5 days ago and completed his full session. The patient has a PMHx of DM, HTN, CAD, CHF, CKD on dialysis, COPD, and prior MI. He has a PSHx including a CABG with 2 stent placements (1997). Lidocaine and Statins-hmg-coa reductase inhibitor drug allergies noted.Patient seen and examined in the emergency room, and was initially noted to be hypotensive.  Patient received 1 L fluid resuscitation in the emergency department with resolution of his hypotension subsequently.  ABG done shows evidence of severe respiratory acidosis and hypercarbia.  BiPAP was started in the emergency department, and adjusted with improvement in the patient's pCO2 and acidosis.  Patient was given broad-spectrum antibiotic therapy in the ED.    Overview/Hospital Course:  No notes on file    Interval History:  Patient doing much better throughout the course the day today. Mental status and respiratory status appear to be greatly improved.  Blood pressure remains stable.  Plan of care reviewed with the patient and his wife at the bedside.    Review of Systems   Constitutional: Positive for fatigue. Negative for chills and fever.   Respiratory: Positive for cough and shortness of breath.    Cardiovascular: Negative for chest pain and leg swelling.   Gastrointestinal: Negative for abdominal pain, nausea and vomiting.   Musculoskeletal: Negative for back pain.   Neurological: Negative for  weakness.   Psychiatric/Behavioral: Negative for confusion. The patient is not nervous/anxious.    All other systems reviewed and are negative.    Objective:           Weight: 129 kg (284 lb 6.3 ozBody mass index is 43.24 kg/m².    Physical Exam   Constitutional:   Elderly obese  male in no acute distress   Eyes: Pupils are equal, round, and reactive to light. EOM are normal.   Neck: No JVD present.   Cardiovascular: Normal rate, regular rhythm, normal heart sounds and intact distal pulses.   Midline sternotomy scar   Pulmonary/Chest: Breath sounds normal.   Diminished breath sounds noted bilaterally with increased work of breathing   Abdominal: Soft. Bowel sounds are normal. He exhibits no distension. There is no tenderness.   Protuberant   Musculoskeletal: He exhibits edema (2+ pretibial edema bilaterally). He exhibits no deformity.   Lymphadenopathy:     He has no cervical adenopathy.   Skin: Capillary refill takes 2 to 3 seconds. No rash noted. No pallor.   Nursing note and vitals reviewed.      Significant Labs: All pertinent labs within the past 24 hours have been reviewed.    Significant Imaging: I have reviewed all pertinent imaging results/findings within the past 24 hours.      Assessment/Plan:      * Acute hypercapnic respiratory failure  Patient's vent settings, CXR and last ABG were reviewed.      ABG  Recent Labs   Lab 06/27/19  0525   PH 7.346*   PO2 94   PCO2 47.5*   HCO3 26.0   BE 0       Patient no longer needs BiPAP during the daytime.  Will continue BiPAP at night and monitor respiratory status and mental status closely.  Treatment for underlying etiologies as described below.        Encephalopathy, metabolic  Patient with encephalopathy due to   Hypercarbia, sepsis which is improving. Treatment for illness causative of encephalopathy is under way. Encephalopathy noted as a secondary process related to the patient's acute illness. There is no specific treatment. Monitor neuro status,  avoid medications such as narcotics and benzos which may worsen confusion, and use anti-psychotics to prevent behaviors of self harm.        COPD (chronic obstructive pulmonary disease)  Patient's COPD is controlled currently. Continue scheduled inhalers Steroids, Antibiotics and Supplemental oxygen and monitor respiratory status closely.         Severe sepsis  This patient does have evidence of infective focus  My overall impression is sepsis.  Antibiotics given-   Antibiotics (From admission, onward)    Start     Stop Route Frequency Ordered    06/28/19 1700  vancomycin 500 mg in dextrose 5 % 100 mL IVPB (ready to mix system)      -- IV Once 06/28/19 0843    06/26/19 1045  cefepime in dextrose 5 % 1 gram/50 mL IVPB 1 g      -- IV Every 12 hours (non-standard times) 06/26/19 0931    06/25/19 2100  mupirocin 2 % ointment      06/30 2059 Nasl 2 times daily 06/25/19 1300        Organ dysfunction indicated by Encephalopathy, Acute respiratory failure and Acute liver injury  Source- lung        Urinary tract infection without hematuria  Treat per sepsis above.      ESRD (end stage renal disease) on dialysis  Nephrology consulted. Continue Chronic hemodialysis. Monitor daily electrolytes and defer dialysis orders to nephrology.        Cirrhosis of liver  Patient with known Cirrhosis with Child's class C. Continue chronic meds. 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 Will avoid any hepatotoxic meds, and monitor CMP/INR for synthetic function.         Anemia of renal disease        Dermatitis associated with moisture  Chronic, no acute inpatient treatment indicated currently.      Morbid (severe) obesity with alveolar hypoventilation  Body mass index is 43.24 kg/m². Morbid obesity complicates all aspects of disease management from diagnostic modalities to treatment. Weight loss encouraged and health benefits explained to patient.        BRANDEN (obstructive sleep apnea)   Continue to encourage patient to wear  BiPAP at night.      Anemia, chronic disease  Patient's anemia is currently controlled. S/p 0 units of PRBCs. Etiology likely d/t ACD- ESRD  Current CBC reviewed-   Lab Results   Component Value Date    HGB 8.9 (L) 06/28/2019    HCT 30.2 (L) 06/28/2019     Monitor serial CBC and transfuse if patient becomes hemodynamically unstable, symptomatic or H/H drops below 7/21.         A-fib  Atrial Fibrillation controlled currently with Beta Blocker. [id number] score 5. Anticoagulation not indicated currently. D/t recent bleed.        Coronary artery disease  Patient with known CAD s/p CABG, which is controlled Will continue ASA and Statin and monitor for S/Sx of angina/ACS. Continue to monitor on telemetry.         DM type 2 with diabetic mixed hyperlipidemia   Patient is chronically on statin. Will continue for now. Monitor clinically. Last LDL was   Lab Results   Component Value Date    LDLCALC 55.8 (L) 05/05/2019            Type 2 diabetes mellitus with kidney complication, with long-term current use of insulin  Patient's FSGs are controlled on current hypoglycemics.   Last A1c reviewed-   Lab Results   Component Value Date    HGBA1C 5.6 05/04/2019     Most recent fingerstick glucose reviewed-   Recent Labs   Lab 06/27/19  2138 06/28/19  1141 06/28/19  1655   POCTGLUCOSE 158* 180* 186*     Current correctional scale  Medium  Maintain anti-hyperglycemic dose as follows-   Antihyperglycemics (From admission, onward)    Start     Stop Route Frequency Ordered    06/26/19 0900  SAXagliptin tablet 2.5 mg      -- Oral Daily 06/25/19 2158    06/25/19 2342  insulin aspart U-100 pen 1-10 Units      -- SubQ Every 6 hours PRN 06/25/19 2244              HTN (hypertension)  Chronic, controlled.  Latest blood pressure and vitals reviewed-   Temp:  [96.1 °F (35.6 °C)-98.5 °F (36.9 °C)]   Pulse:  [71-98]   Resp:  [14-18]   BP: ()/(58-78)   SpO2:  [94 %-100 %] .   Current meds for hypertension include Metoprolol.  Will continue BP  medications as needed for sustained BP control.  Will utilize p.r.n. blood pressure medication only if patient's blood pressure greater than  180/110 and he develops symptoms such as worsening chest pain or shortness of breath.        Malignant neoplasm of bladder   Chronic.  No acute inpatient management indicated        VTE Risk Mitigation (From admission, onward)        Ordered     IP VTE HIGH RISK PATIENT  Once      06/25/19 2240     Place JACK hose  Until discontinued      06/25/19 2240     Place sequential compression device  Until discontinued      06/25/19 2240            Vishal Kamara MD  Department of Hospital Medicine   [facility]

## 2019-08-12 PROBLEM — K64.4 EXTERNAL HEMORRHOID: Status: ACTIVE | Noted: 2019-01-01

## 2019-08-12 PROBLEM — L89.153 SACRAL DECUBITUS ULCER, STAGE III: Status: ACTIVE | Noted: 2019-01-01

## 2019-08-12 PROBLEM — L03.116 CELLULITIS OF LEFT LOWER EXTREMITY: Status: ACTIVE | Noted: 2019-01-01

## 2019-08-12 PROBLEM — A41.9 SEPSIS: Status: ACTIVE | Noted: 2019-01-01

## 2019-08-12 NOTE — PROGRESS NOTES
Pt arrived to floor; moved to bed via slideboard.  Pt arrived with NC applied; connected at 2L. Wife and daughter at bedside. Pt AAOx4 with NAD noted except when flat to reposition; pt unable to lay flat for long.

## 2019-08-12 NOTE — ED PROVIDER NOTES
Encounter Date: 8/12/2019    SCRIBE #1 NOTE: I, Bo Gan, am scribing for, and in the presence of, Yobani Nascimento.       History     Chief Complaint   Patient presents with    Shortness of Breath     with exertion, does not use O2 at home. arrives to ED with 2 L NC    Foot Pain     left heel since being discharged from [facility]. bigger since Friday       Time seen by provider: 3:34 PM on 08/12/2019    Frank Zayas is a 79 y.o. male with COPD who presents to the ED with onset of worsening chronic SOB and dyspnea on exertion. Per EMS, his home health nurse noticed worsening SOB when going to re-wrap his legs, which are being treated for ulcers. The patient denies any chest pain, fever, chills, N/V/D, or any other symptoms at this time. Hx of CHF, HTN, hyperlipidemia, and MI.  PSHx of coronary stents, CABG, and cardiac sugery. No known drug allergies.    The history is provided by the EMS personnel.     Review of patient's allergies indicates:   Allergen Reactions    Lidocaine Nausea Only     NOVACAINE --  Severe nausea    Statins-hmg-coa reductase inhibitors Anxiety     Past Medical History:   Diagnosis Date    Anticoagulant long-term use     Arthritis     Bladder cancer     Blood transfusion     Cardiac arrest 5/31/2019    CHF (congestive heart failure)     Chronic kidney disease     COPD (chronic obstructive pulmonary disease)     Coronary artery disease     Diabetes mellitus     Gout     Hemorrhagic shock 6/1/2019    [place] (hard of hearing)     HAS BILAT AIDS BUT DOES NOT WEAR    Hyperlipidemia     Hypertension     Myocardial infarction     NSTEMI (non-ST elevated myocardial infarction)     RLS (restless legs syndrome)     Sleep apnea     NO MACHINE    Wears glasses      Past Surgical History:   Procedure Laterality Date    CARDIAC SURGERY      CABG X 2 1997    CATARACT EXTRACTION, BILATERAL      CHOLECYSTECTOMY      COLONOSCOPY      CORONARY ARTERY BYPASS GRAFT      x 2     coronary stents      CYSTOSCOPY      CYSTOSCOPY N/A 2019    Performed by [facility]lee [facility]quez [facility] at [facility] OR    EGD (ESOPHAGOGASTRODUODENOSCOPY) N/A 2019    [facility] (2ND FLR)    EXCISION-BLADDER TUMOR-TRANSURETHRAL (TURBT) N/A 2014    Performed by [facility]quez [facility] at [facility] OR    EXCISION-BLADDER TUMOR-TRANSURETHRAL (TURBT) N/A 2013    Performed by [facility] at [facility] OR    EYE SURGERY Bilateral     CATARACT    Insertion,catheter,tunneled N/A 2019    Performed by Wade Rodriguez MD at [facility] OR    TURBT (TRANSURETHRAL RESECTION OF BLADDER TUMOR) N/A 2019    Performed by [facility]lee [facility]quez [facility] at [facility] OR    VASECTOMY       Family History   Problem Relation Age of Onset    Cancer Father     Heart disease Father     Cancer Sister     Cancer Brother      Social History     Tobacco Use    Smoking status: Former Smoker     Packs/day: 0.25     Years: 20.00     Pack years: 5.00     Types: Cigars     Last attempt to quit: 1982     Years since quittin.5    Smokeless tobacco: Never Used   Substance Use Topics    Alcohol use: No    Drug use: No     Review of Systems   Constitutional: Negative for chills and fever.   HENT: Negative for nosebleeds.    Eyes: Negative for visual disturbance.   Respiratory: Positive for shortness of breath (Ongoing, worsening from baseline.). Negative for cough and chest tightness.    Cardiovascular: Negative for chest pain and palpitations.   Gastrointestinal: Negative for abdominal pain, diarrhea, nausea and vomiting.   Genitourinary: Negative for dysuria and hematuria.   Musculoskeletal: Negative for back pain and neck pain.   Skin: Positive for wound (Ulcers.). Negative for rash.   Neurological: Negative for seizures, syncope and headaches.       Physical Exam     Initial Vitals   BP Pulse Resp Temp SpO2   19 1409 19 1409 19 1409 19 1407 19 1503   125/75 102 18 98.3 °F (36.8 °C) 99  %      MAP       --                Physical Exam    Nursing note and vitals reviewed.  Constitutional: He appears well-developed and well-nourished. He is Obese .  Non-toxic appearance. No distress.   HENT:   Head: Normocephalic and atraumatic.   Eyes: EOM are normal. Pupils are equal, round, and reactive to light.   Neck: Normal range of motion. Neck supple. No neck rigidity. No JVD present.   Cardiovascular: Normal rate, regular rhythm, normal heart sounds and intact distal pulses. Exam reveals no gallop and no friction rub.    No murmur heard.  Pulmonary/Chest: He has decreased breath sounds. He has no wheezes. He has no rhonchi. He has no rales.   Abdominal: Soft. Bowel sounds are normal. He exhibits no distension. There is no tenderness. There is no rebound and no guarding.   Musculoskeletal: Normal range of motion. He exhibits edema.   2-3+ bilateral lower extremity pitting edema.    Neurological: He is alert and oriented to person, place, and time. He has normal strength and normal reflexes. No cranial nerve deficit or sensory deficit. He exhibits normal muscle tone. Coordination normal. GCS eye subscore is 4. GCS verbal subscore is 5. GCS motor subscore is 6.   Skin: Skin is warm and dry. There is erythema.   Significant erythema on LLE which blanches and extends from just below the knee down to the foot. Bilateral lower leg ulcers. Sacral decubitis ulcers. See pictures below.   Psychiatric: He has a normal mood and affect. His speech is normal and behavior is normal. He is not actively hallucinating.                                     ED Course   Procedures  Labs Reviewed   CBC W/ AUTO DIFFERENTIAL - Abnormal; Notable for the following components:       Result Value    WBC 29.38 (*)     RBC 4.00 (*)     Hemoglobin 12.0 (*)     Mean Corpuscular Volume 103 (*)     Mean Corpuscular Hemoglobin Conc 29.2 (*)     RDW 20.1 (*)     Gran # (ANC) 26.1 (*)     Immature Grans (Abs) 0.90 (*)     Lymph # 0.8 (*)      Mono # 1.5 (*)     Gran% 88.9 (*)     Lymph% 2.7 (*)     All other components within normal limits   COMPREHENSIVE METABOLIC PANEL - Abnormal; Notable for the following components:    Potassium 5.2 (*)     Glucose 253 (*)     BUN, Bld 50 (*)     Creatinine 6.2 (*)     Albumin 2.6 (*)     Alkaline Phosphatase 369 (*)     eGFR if  9 (*)     eGFR if non  8 (*)     All other components within normal limits   B-TYPE NATRIURETIC PEPTIDE - Abnormal; Notable for the following components:    BNP 3,184 (*)     All other components within normal limits   LACTIC ACID, PLASMA - Abnormal; Notable for the following components:    Lactate (Lactic Acid) 2.7 (*)     All other components within normal limits   CULTURE, BLOOD   CULTURE, BLOOD   LACTIC ACID, PLASMA   HEMOGLOBIN A1C     EKG Readings: (Independently Interpreted)   A-Fib at 90 bpm. RBBB. Lateral T-wave inversions. No STEMI.     ECG Results          EKG 12-lead (In process)  Result time 08/12/19 15:45:12    In process by Interface, Lab In [facility] (08/12/19 15:45:12)                 Narrative:    Test Reason : R06.02,    Vent. Rate : 090 BPM     Atrial Rate : 088 BPM     P-R Int : 000 ms          QRS Dur : 136 ms      QT Int : 398 ms       P-R-T Axes : 000 -61 143 degrees     QTc Int : 486 ms    Atrial fibrillation with premature ventricular or aberrantly conducted  complexes  Left axis deviation  Right bundle branch block  T wave abnormality, consider lateral ischemia  Abnormal ECG  When compared with ECG of 26-JUN-2019 06:19,  T wave inversion no longer evident in Inferior leads    Referred By: [email or web address]   SELF           Confirmed By:                             Imaging Results          X-Ray Chest PA And Lateral (Final result)  Result time 08/12/19 15:09:30    Final result by Addy Weaver Jr., MD (08/12/19 15:09:30)                 Impression:      Small to moderate right pleural effusion.  Atelectasis of the right lower lung field.   Mild cardiomegaly.  Prior sternotomy.  Double-lumen central line in position.      Electronically signed by: Addy Weaver MD  Date:    08/12/2019  Time:    15:09             Narrative:    EXAMINATION:  XR CHEST PA AND LATERAL    CLINICAL HISTORY:  sob;    TECHNIQUE:  PA and lateral views of the chest were performed.    COMPARISON:  Prior chest of June 28, 2019.    FINDINGS:  A double lumen central line remains in placed ending in the superior vena cava.  The patient has had a prior sternotomy.  There is mild cardiomegaly in a biventricular pattern.  There is a small to moderate right pleural effusion and atelectasis of the right lower lung field.  The left lung is clear.  No pneumothorax is seen.                                 Medical Decision Making:   History:   Old Medical Records: I decided to obtain old medical records.  Independently Interpreted Test(s):   I have ordered and independently interpreted EKG Reading(s) - see prior notes  Clinical Tests:   Lab Tests: Ordered and Reviewed  Radiological Study: Ordered and Reviewed  Medical Tests: Ordered and Reviewed            Scribe Attestation:   Scribe #1: I performed the above scribed service and the documentation accurately describes the services I performed. I attest to the accuracy of the note.    Attending Attestation:         Attending Critical Care:   Critical Care Times:   Direct Patient Care (initial evaluation, reassessments, and time considering the case)................................................................8 minutes.   Additional History from reviewing old medical records or taking additional history from the family, EMS, PCP, etc.......................6 minutes.   Ordering, Reviewing, and Interpreting Diagnostic Studies...............................................................................................................8 minutes.    Documentation..................................................................................................................................................................................7 minutes.   Other..................................................................................................................................................................................................6 minutes.   ==============================================================  · Total Critical Care Time - exclusive of procedural time: 35 minutes.  ==============================================================  Critical care was necessary to treat or prevent imminent or life-threatening deterioration of the following conditions: sepsis.   The following critical care procedures were done by me (see procedure notes): pulse oximetry and blood draw for specimens.   Critical care was time spent personally by me on the following activities: obtaining history from patient or relative, examination of patient, review of x-rays / CT sent with the patient, review of old charts, ordering lab, x-rays, and/or EKG, development of treatment plan with patient or relative, ordering and performing treatments and interventions, evaluation of patient's response to treatment, interpretation of cardiac measurements and re-evaluation of patient's conition.   Critical Care Condition: potentially life-threatening         Frank Zayas is a 79 y.o. male who presents to the Emergency Department       This patient does have evidence of infective focus  My overall impression is severe sepsis.  Antibiotics given-   Antibiotics (From admission, onward)    None          Severe Sepsis only-  Latest labs reviewed, they are-  Recent Labs   Lab 08/12/19  1444   BILITOT 0.8     Recent Labs   Lab 08/12/19  1556   LACTATE 2.7*     No results for input(s): PH, PO2, PCO2, HCO3, BE in the last 168 hours.    Organ dysfunction indicated by elevated lactae  >2    Follow up lactate needed- in 3 hours    Patient with severe cellulitis, possible osteomyelitis.  Lactic acid 2.7.  Started on clindamycin.  Patient also with CHF exacerbation and BNP greater than 3000 with significant peripheral edema.  Given 40 of Lasix in the ED IV.  Patient was discussed with the hospitalist who agrees to admit the patient for further treatment.      I, Azam Morse, personally performed the services described in this documentation. All medical record entries made by the scribe were at my direction and in my presence.  I have reviewed the chart and agree that the record reflects my personal performance and is accurate and complete. Yobani Nascimento MD.  6:42 PM 08/12/2019       DISCLAIMER: This note was prepared with [employer] Direct voice recognition transcription software. Garbled syntax, mangled pronouns, and other bizarre constructions may be attributed to that software system.                 Clinical Impression:       ICD-10-CM ICD-9-CM   1. Sepsis, due to unspecified organism A41.9 038.9     995.91   2. SOB (shortness of breath) R06.02 786.05   3. Cellulitis of lower extremity, unspecified laterality L03.119 682.6   4. Chest pain R07.9 786.50         Disposition:   Disposition: Admitted                        Yobani Nascimento MD  08/12/19 1842       Yobani Nascimento MD  08/12/19 1843

## 2019-08-12 NOTE — HPI
Frank Zayas is a 79 y.o. male with COPD who presents to the ED with ongoing SOB greater than baseline. Per EMS his home health nurse noticed worsening SOB when going to re-wrap his legs, which are being treated for ulcer. The patient denies any chest pain, fever, chills or fever, N/V/D, or any other symptoms at this time.  He was stable in the ED with oxygen at 2 L. he is noted to have an elevated BNP and bilateral pulmonary edema on chest x-ray.  He was given Lasix 60 mg IV in the ED and Nephrology was notified of his pulmonary edema. His usual dialysis days are Tuesday Thursday and Saturday.  He was also sent by a home health nurse because of malodorous drainage to his heel and erythema in his left lower extremity that had not been near the previous dressing change.  His left heel was drain and very malodorous.  No gangrene is noted.  There skin sloughing off of his left heel as noted in the photos.       Hx of CHF, HTN, hyperlipidemia, and MI.  PSHx of coronary stents, CABG, and cardiac sugery. No known drug allergies

## 2019-08-12 NOTE — UM SECONDARY REVIEW
Physician Advisor External    Level of Care Issue    Approved Inpatient for admit 8/12/19 per ehr md review..

## 2019-08-12 NOTE — ED NOTES
Upon transfer to room 222, patient is AAOx3, no cardiac or respiratory complications at this time. No needs or questions from family or patient at this time.

## 2019-08-12 NOTE — ED NOTES
"Frank Zayas presents to the ED with c/o SOB that started a few days ago. Patient is a TTS dialysis patient with his last treatment being on Saturday. Patient typically ambulates with a walker. Family reports that over the past few days, the patient has not been able to do so. "He did not exert himself yesterday, [place]." Associated complaints are erythema to left lower heel that started when he was in [place]. Family reported to EMS that the heel wound has increased in size since Friday. + for warmth and erythema to left lower extremity. Patient denies any fever at home and is afebrile upon arrival to the ED. Mucous membranes are pink and moist. Skin is warm, dry and intact. Lungs are clear bilaterally, respirations are regular and unlabored. Denies SOB, cough, congestion or rhinorrhea. BS active x4, no tenderness with palpation, abd is soft and not distended. Denies any appetite or activity change. S1S2, capillary refill is < 2 seconds. Denies dysuria, difficulty urinating, frequency, numbness, tingling or weakness. [place] VSS    "

## 2019-08-12 NOTE — ED NOTES
Patient has a stage II to bilateral buttocks, purple ecchymosis to sacrum from a fall 2 weeks ago, patient denies hitting head with fall, erythema to left shin with warmth, pressure ulcer to left heel. Temporary dressings will be applied for nurse assessment upon arrival to floor.

## 2019-08-12 NOTE — PLAN OF CARE
Problem: Adult Inpatient Plan of Care  Goal: Plan of Care Review  Outcome: Ongoing (interventions implemented as appropriate)     08/12/19 1845   Plan of Care Review   Plan of Care Reviewed With patient;spouse;daughter   Progress no change     Pt AAOx4, young's in bed with wife and spouse at bedside and room near NS. VSS with NAD noted; pt free of injury or falls. Wounds noted to sacrum and left heel; consults added to address wounds. Will continue to monitor.     Problem: Diabetes Comorbidity  Goal: Blood Glucose Level Within Desired Range    Intervention: Maintain Glycemic Control     08/12/19 1845   Monitor and Manage Ketoacidosis   Glycemic Management blood glucose monitoring     Bedside capillary glucose yielded 225. 2 units of insulin administered to abdomen.

## 2019-08-13 PROBLEM — I96 GANGRENE OF LEFT FOOT: Status: ACTIVE | Noted: 2019-01-01

## 2019-08-13 PROBLEM — E44.0 MODERATE MALNUTRITION: Status: ACTIVE | Noted: 2019-01-01

## 2019-08-13 NOTE — ASSESSMENT & PLAN NOTE
Acute--severe -with large left heel ulcer.  X-ray done to evaluate for osteomyelitis.  Will likely need some workup  For peripheral artery disease and consider CT scan aortogram with runoff.  Consider vascular surgery consult.  Topical wound care, podiatry consult, and consider Infectious Disease consult pending x-ray and further testing.  Wound care wound care has been consulted for evaluation

## 2019-08-13 NOTE — PT/OT/SLP PROGRESS
Physical Therapy      Patient Name:  Frank Zayas   MRN:  [id number]    Patient not seen today secondary to dialysis  . Will follow-up 08-.    Reina Winter, PT

## 2019-08-13 NOTE — PLAN OF CARE
Pt currently not in room, CM will try at later time to complete discharge assessment.     08/13/19 1051   Discharge Assessment   Assessment Type Discharge Planning Assessment

## 2019-08-13 NOTE — CONSULTS
Consult Note  Infectious Disease    Reason for Consult:  Diabetic foot infection    HPI: Frank Zayas is a  79 y.o. male sent to the ED by his home health nurse for SOB and worsening left leg, and foot wound. Found to be in pulmonary edema, with gangrenous changes of left heel and cellulitis of the left leg. Started on vanc and meropenem. Xray and CT left foot were reviewed and he was seen by podiatry who will debride his foot this evening. He developed some hypotension in dialysis requiring albumin. He cannot really tell me how long the wound of the left heel has been present. He tells me he ambulated as recently as yesterday. Later in the evening I was called with a positive blood culture for GNR.     Review of patient's allergies indicates:   Allergen Reactions    Lidocaine Nausea Only     NOVACAINE --  Severe nausea    Statins-hmg-coa reductase inhibitors Anxiety     Past Medical History:   Diagnosis Date    Anticoagulant long-term use     Arthritis     Bladder cancer     Blood transfusion     Cardiac arrest 5/31/2019    CHF (congestive heart failure)     COPD (chronic obstructive pulmonary disease)     Coronary artery disease     Diabetes mellitus     ESRD (end stage renal disease) on dialysis 2019    Gout     Hemorrhagic shock 6/1/2019    [place] (hard of hearing)     HAS BILAT AIDS BUT DOES NOT WEAR    Hyperlipidemia     Hypertension     Myocardial infarction     NSTEMI (non-ST elevated myocardial infarction)     RLS (restless legs syndrome)     Sleep apnea     NO MACHINE    Wears glasses      Past Surgical History:   Procedure Laterality Date    CARDIAC SURGERY      CABG X 2 1997    CATARACT EXTRACTION, BILATERAL      CHOLECYSTECTOMY      COLONOSCOPY      CORONARY ARTERY BYPASS GRAFT      x 2    coronary stents      CYSTOSCOPY      CYSTOSCOPY N/A 4/8/2019    Performed by Kaylee [facility]quez MD [facility]    EGD (ESOPHAGOGASTRODUODENOSCOPY) N/A 6/4/2019    Performed by Clint Dietz  [facility] (2ND F[facility])    EXCISION-BLADDER TUMOR-TRANSURETHRAL (TURBT) N/A 2014    [facility] OR    EXCISION-BLADDER TUMOR-TRANSURETHRAL (TURBT) N/A 2013    [facility]formed by [facility] at [facility] OR    EYE SURGERY Bilateral     CATARACT    Insertion,catheter,tunneled N/A 2019    Performed by Wade Rodriguez MD at [facility] OR    TURBT (TRANSURETHRAL RESECTION OF BLADDER TUMOR) N/A 2019    Performed by [facility] at [facility] OR    VASECTOMY       Social History     Socioeconomic History    Marital status:      Spouse name: Not on file    Number of children: Not on file    Years of education: Not on file    Highest education level: Not on file   Occupational History    Not on file   Social Needs    Financial resource strain: Not on file    Food insecurity:     Worry: Not on file     Inability: Not on file    Transportation needs:     Medical: Not on file     Non-medical: Not on file   Tobacco Use    Smoking status: Former Smoker     Packs/day: 0.25     Years: 20.00     Pack years: 5.00     Types: Cigars     Last attempt to quit: 1982     Years since quittin.5    Smokeless tobacco: Never Used   Substance and Sexual Activity    Alcohol use: No    Drug use: No    Sexual activity: Not on file   Lifestyle    Physical activity:     Days per week: Not on file     Minutes per session: Not on file    Stress: Not on file   Relationships    Social connections:     Talks on phone: Not on file     Gets together: Not on file     Attends Gnosticist service: Not on file     Active member of club or organization: Not on file     Attends meetings of clubs or organizations: Not on file     Relationship status: Not on file   Other Topics Concern    Not on file   Social History Narrative    Not on file     Family History   Problem Relation Age of Onset    Cancer Father     Heart disease Father     Cancer Sister     Cancer Brother         Pertinent medications noted:     Review of Systems:  His ability to give a history is impaired.  denies chills, fever, sweats, weight loss  No change in vision,      No pain in mouth or throat.  .  No chest pain, palpitations, syncope  No cough, sputum production, but had shortness of breath, dyspnea on exertion,    No nausea, vomiting, diarrhea,   or focal abd pain,   No dysuria,       No swelling of joints, redness of joints, injuries   No unusual headaches, but may have some neuropathy  No anxiety, depression, substance abuse, sleep disturbance but he is very saddened by the risk of limb loss  Diabetic nephropathy  No bleeding, and unclear if he has had peripheral stents  No new rashes,        EXAM & DIAGNOSTICS REVIEWED:   Vitals:     Temp:  [96.3 °F (35.7 °C)-97.9 °F (36.6 °C)]   Temp: 97.8 °F (36.6 °C) (08/13/19 1930)  Pulse: 96 (08/13/19 1930)  Resp: 20 (08/13/19 1930)  BP: 110/75 (08/13/19 1930)  SpO2: 97 % (08/13/19 1930)    Intake/Output Summary (Last 24 hours) at 8/13/2019 1950  Last data filed at 8/13/2019 1600  Gross per 24 hour   Intake 650 ml   Output 1860 ml   Net -1210 ml       General:  In NAD. Fatigued,but attentive, cooperative, uncomfortable  Eyes:  Anicteric, PERRL, EOMI  ENT:  No ulcers, exudates, thrush, nares patent,   Neck:  supple, no masses or adenopathy appreciated  Lungs: Bibasilar crackles  Heart:  iRRR, no gallop/murmur/rub noted  Abd:  Soft, obese, NT, ND, normal BS, no masses or organomegaly appreciated.  :  Voids  Musc:  Joints without effusion, swelling, .   Skin:  Bilateral venous stasis cellulitis  Wound:             Neuro: Alert, attentive, speech fluent, face symmetric, moves all extremities, no focal weakness. Ambulatory?  Psych:  Calm, cooperative, upset about potential for BKA  Lymphatic:        Extrem: Chronic LE edema,  With erythema,   Cellulitis, from forefoot to knee on the left. Bilateral venous stasis changes as well. Feet are warm. Pulses are difficult to  appreciate  VAD:  Right sided tunneled dialysis catheter  Isolation:     Lines/Tubes/Drains:    General Labs reviewed:  Recent Labs   Lab 08/13/19 0446   WBC 19.14*   RBC 3.79*   HGB 11.4*   HCT 38.6*   *   *   MCH 30.1   MCHC 29.5*     Recent Labs   Lab 08/13/19 0446   CALCIUM 9.1   *   K 5.4*   CO2 26   CL 97   BUN 54*   CREATININE 6.6*       Micro:  Microbiology Results (last 7 days)     Procedure Component Value Units Date/Time    Culture, Anaerobe [[id number]] Collected:  08/13/19 1836    Order Status:  Sent Specimen:  Abscess from Foot, Left Updated:  08/13/19 1837    Aerobic culture [[id number]] Collected:  08/13/19 1836    Order Status:  Sent Specimen:  Abscess from Foot, Left Updated:  08/13/19 1837    Blood culture x two cultures. Draw prior to antibiotics. [[id number]] Collected:  08/12/19 1623    Order Status:  Completed Specimen:  Blood Updated:  08/13/19 1512     Blood Culture, Routine Gram stain demarcus bottle: Gram negative rods      Results called to and read back by: Vasu Cerda RN 08/13/2019        15:12    Narrative:       Aerobic and anaerobic    Blood culture x two cultures. Draw prior to antibiotics. [[id number]] Collected:  08/12/19 1556    Order Status:  Completed Specimen:  Blood Updated:  08/13/19 1511     Blood Culture, Routine Gram stain demarcus bottle: Gram negative rods      Results called to and read back by: Vasu Cerda RN 08/13/2019        15:11    Narrative:       Aerobic and anaerobic    Blood culture (site 1) [[id number]]     Order Status:  Canceled Specimen:  Blood         Imaging Reviewed:   CXR,  US arteries, foot xray  CT foot 8/13  Impression       Soft tissue wound of the heel, without evidence for abscess, gas-forming infection, or calcaneal osteomyelitis.    Severe arthritic change of the 1st metatarsophalangeal joint which may represent sequelae of prior septic arthritis, or gout.  Active septic arthritis/osteomyelitis cannot be excluded.    Abundant  soft tissue edema versus cellulitis of the foot.       Cardiology:    IMPRESSION & PLAN   Gangrene left heel with gram negative tao in the blood  Cellulitis left leg  Bilateral venous stasis  ESRD  Pulmonary edema  DM with vascular disease  ?memory loss    Rec: continue with present antibiotics  Concur and appreciate prompt debridement  Repeat blood cultures in am  Will de-escalate as soon as possible  Vascular surgery consult    D/w Dr. Kaufman, RN dialysis

## 2019-08-13 NOTE — ASSESSMENT & PLAN NOTE
This patient does have evidence of infective focus  My overall impression is severe sepsis .  Antibiotics given- clindamycin /vancomycn   Antibiotics (From admission, onward)    None          Latest lactate reviewed, they are-  Recent Labs   Lab 08/12/19  1556 08/12/19  1941   LACTATE 2.7* 2.0       Organ dysfunction indicated by Acute kidney injury and Acute respiratory failure  Source- heel/possible pneumonia -check procalcitonin  Source control Achieved by- iv abx, diuresis  Blood culture  Heel xray  Ua/urine culture  cxr-trend

## 2019-08-13 NOTE — CARE UPDATE
08/13/19 0649   Patient Assessment/Suction   Level of Consciousness (AVPU) alert   Respiratory Effort Unlabored;Normal   Rhythm/Pattern, Respiratory unlabored;depth regular;pattern regular   Cough Frequency no cough   PRE-TX-O2   O2 Device (Oxygen Therapy) nasal cannula   $ Is the patient on Low Flow Oxygen? Yes   Flow (L/min) 2   SpO2 96 %   Pulse Oximetry Type Intermittent   $ Pulse Oximetry - Multiple Charge Pulse Oximetry - Multiple   Pulse 93   Resp 18   Preset CPAP/BiPAP Settings   Mode Of Delivery Standby

## 2019-08-13 NOTE — PLAN OF CARE
Problem: Malnutrition  Goal: Improved Nutritional Intake    Intervention: Promote and Optimize Oral Intake  Intervention: Nutrition education- protein/fluid needs, potassium, phosphorus, sodium, carbohydrate modified diet, nutrition supplement therapy-commercial beverage      Recommendation:  1) Continue diet as ordered   2) Add Boost glucosec Control BID + Glenn BID   3) Weigh pt weekly   4) Nutrition education verbally given, renal diet review at f/u     Goals: 1) PO intakes > 50% of meals and supplements a f/u   Nutrition Goal Status: new  Communication of RD Recs: (POC, sticky note, second sign )

## 2019-08-13 NOTE — CONSULTS
Friction/skin integrity impairment buttocks area; probable incontinence associated dermatitis.  See photo.  Discussed plan of care with Dr. Kaufman.

## 2019-08-13 NOTE — ASSESSMENT & PLAN NOTE
Patient's COPD is controlled currently. Continue scheduled inhalers prn nebulizer; /oxygen  and monitor respiratory status closely.

## 2019-08-13 NOTE — ASSESSMENT & PLAN NOTE
Contributing Nutrition Diagnosis  Moderate chronic illness related malnutrition    Related to (etiology):   Decreased appetite and increased kcal/protein needs d/t HD    Signs and Symptoms (as evidenced by):   1) Stage 2 PI buttocks + heel PI  2) Trace edema in legs  3) PO intakes < 75% EEN x > 1 month    Interventions/Recommendations (treatment strategy):  1) Nutrition supplement therapy    Nutrition Diagnosis Status:   New

## 2019-08-13 NOTE — CONSULTS
INPATIENT NEPHROLOGY CONSULT   [facility] NEPHROLOGY    Patient Name: Frank Zayas  Date: 08/13/2019    Reason for consultation: ESRD    Chief Complaint:   Chief Complaint   Patient presents with    Shortness of Breath     with exertion, does not use O2 at home. arrives to ED with 2 L NC    Foot Pain     left heel since being discharged from [facility]. bigger since Friday       History of Present Illness:  Frank Zayas is a 79 y.o. male with COPD and ESRD on HD TTS who presents to the ED with ongoing SOB greater than baseline. Per EMS his home health nurse noticed worsening SOB when going to re-wrap his legs, which are being treated for ulcer. The patient denies any chest pain, fever, chills or fever, N/V/D, or any other symptoms at this time.  He was stable in the ED with oxygen at 2 L. he is noted to have an elevated BNP and bilateral pulmonary edema on chest x-ray.  He was given Lasix 60 mg IV in the ED. He was also sent by a home health nurse because of malodorous drainage to his heel and erythema in his left lower extremity that had not been near the previous dressing change.  His left heel was drain and very malodorous.  No gangrene is noted. There are no exac/reliev factors. We are consulted for dialysis.    Foot xray- no osteo, + cellulitis, + soft tissue ulcer of the heel    Seen on HD= low BP    Plan of Care:    Assessment:  ESRD  Chronic hypotension/Diastolic CHF/Acute pulm edema  Hyperkalemia  SHPT  Anemia of CKD    Plan:    - Ordered HD TTS.  - Ordered 25g albumin with 2-3L UF. May need IUF in AM as well.  - Ordered 2K bath.  - Check phos- continue sevelamer.  - Ordered EPO 5K with HD.     Thank you for allowing us to participate in this patient's care. We will continue to follow.    Vital Signs:  Temp Readings from Last 3 Encounters:   08/13/19 97.5 °F (36.4 °C) (Oral)   07/01/19 96.4 °F (35.8 °C) (Oral)   06/17/19 98.7 °F (37.1 °C) (Oral)       Pulse Readings from Last 3 Encounters:    08/13/19 95   07/01/19 94   06/17/19 69       BP Readings from Last 3 Encounters:   08/13/19 123/67   07/01/19 108/72   06/17/19 (!) 105/56       Weight:  Wt Readings from Last 3 Encounters:   08/12/19 115.2 kg (254 lb)   06/30/19 127 kg (279 lb 15.8 oz)   06/17/19 120.4 kg (265 lb 6.9 oz)       Past Medical & Surgical History:  Past Medical History:   Diagnosis Date    Anticoagulant long-term use     Arthritis     Bladder cancer     Blood transfusion     Cardiac arrest 5/31/2019    CHF (congestive heart failure)     Chronic kidney disease     COPD (chronic obstructive pulmonary disease)     Coronary artery disease     Diabetes mellitus     Gout     Hemorrhagic shock 6/1/2019    [place] (hard of hearing)     HAS BILAT AIDS BUT DOES NOT WEAR    Hyperlipidemia     Hypertension     Myocardial infarction     NSTEMI (non-ST elevated myocardial infarction)     RLS (restless legs syndrome)     Sleep apnea     NO MACHINE    Wears glasses        Past Surgical History:   Procedure Laterality Date    CARDIAC SURGERY      CABG X 2 1997    CATARACT EXTRACTION, BILATERAL      CHOLECYSTECTOMY      COLONOSCOPY      CORONARY ARTERY BYPASS GRAFT      x 2    coronary stents      CYSTOSCOPY      CYSTOSCOPY N/A 4/8/2019    [facility] OR    EGD (ESOPHAGOGASTRODUODENOSCOPY) N/A 6/4/2019    [facility]formed by Clint [facility] ENDO (2ND FLR)    EXCISION-BLADDER TUMOR-TRANSURETHRAL (TURBT) N/A 9/8/2014    [facility] OR    EXCISION-BLADDER TUMOR-TRANSURETHRAL (TURBT) N/A 11/18/2013    [facility] OR    EYE SURGERY Bilateral     CATARACT    Insertion,catheter,tunneled N/A 5/16/2019    Performed by Wade Rodriguez MD at [facility] OR    TURBT (TRANSURETHRAL RESECTION OF BLADDER TUMOR) N/A 4/8/2019    [facility] OR    VASECTOMY         Past Social History:  Social History     Socioeconomic History     Marital status:      Spouse name: Not on file    Number of children: Not on file    Years of education: Not on file    Highest education level: Not on file   Occupational History    Not on file   Social Needs    Financial resource strain: Not on file    Food insecurity:     Worry: Not on file     Inability: Not on file    Transportation needs:     Medical: Not on file     Non-medical: Not on file   Tobacco Use    Smoking status: Former Smoker     Packs/day: 0.25     Years: 20.00     Pack years: 5.00     Types: Cigars     Last attempt to quit: 1982     Years since quittin.5    Smokeless tobacco: Never Used   Substance and Sexual Activity    Alcohol use: No    Drug use: No    Sexual activity: Not on file   Lifestyle    Physical activity:     Days per week: Not on file     Minutes per session: Not on file    Stress: Not on file   Relationships    Social connections:     Talks on phone: Not on file     Gets together: Not on file     Attends Latter day service: Not on file     Active member of club or organization: Not on file     Attends meetings of clubs or organizations: Not on file     Relationship status: Not on file   Other Topics Concern    Not on file   Social History Narrative    Not on file       Medications:  No current facility-administered medications on file prior to encounter.      Current Outpatient Medications on File Prior to Encounter   Medication Sig Dispense Refill    FLUoxetine 20 MG capsule Take 20 mg by mouth every morning.       metoprolol tartrate (LOPRESSOR) 25 MG tablet Take 1 tablet (25 mg total) by mouth every Mon, Wed, Fri, Sun. 16 tablet 11    nitroGLYCERIN (NITROSTAT) 0.4 MG SL tablet Place 0.4 mg under the tongue every 5 (five) minutes as needed for Chest pain. Seek medical help if pain is not relieved by the third dose.      pantoprazole (PROTONIX) 40 MG tablet Take 1 tablet (40 mg total) by mouth 2 (two) times daily before meals. 60 tablet 11     sevelamer carbonate (RENVELA) 800 mg Tab Take 2 tablets (1,600 mg total) by mouth 3 (three) times daily with meals. 180 tablet 11     Scheduled Meds:   alfuzosin  10 mg Oral Nightly    aspirin  81 mg Oral Daily    FLUoxetine  20 mg Oral QAM    heparin (porcine)  5,000 Units Subcutaneous Q8H    hydrocortisone   Rectal BID    insulin detemir U-100  10 Units Subcutaneous QHS    meropenem (MERREM) IVPB  500 mg Intravenous Q12H    metoprolol tartrate  25 mg Oral Every Mon, Wed, Fri, Sun    mupirocin   Nasal BID    pantoprazole  40 mg Oral BID AC    rOPINIRole  2 mg Oral QHS    senna-docusate 8.6-50 mg  1 tablet Oral BID    sevelamer carbonate  1,600 mg Oral TID WM    vancomycin (VANCOCIN) IVPB  500 mg Intravenous Once     Continuous Infusions:  PRN Meds:.acetaminophen, dextrose 50%, dextrose 50%, glucagon (human recombinant), glucose, glucose, insulin aspart U-100, lidocaine HCL 2%, [START ON 8/14/2019] miconazole nitrate 2%, nitroGLYCERIN, ondansetron, promethazine (PHENERGAN) IVPB, sodium chloride 0.9%    Allergies:  Lidocaine and Statins-hmg-coa reductase inhibitors    Past Family History:  Reviewed; refer to Hospitalist Admission Note    Review of Systems:  Review of Systems - All 14 systems reviewed and negative, except as noted in HPI    Physical Exam:  General Appearance:    NAD, AAO x 3, cooperative, appears stated age   Head:    Normocephalic, atraumatic   Eyes:    PER, EOMI, and conjunctiva/sclera clear bilaterally       Throat:   Moist mucus membranes   Chest wall:    No tenderness or deformity   Heart:    Regular rate and rhythm, S1 and S2 normal, no murmur, rub   or gallop   Abdomen:     Soft, non-tender, non-distended, bowel sounds active all four   quadrants, no RT or guarding, no masses, no organomegaly   Extremities:   + edema, LLE erythema, RLE wrapped   MSK:   Muscle wasting   Skin:   See above   Neurologic/Psychiatric:   CNII-XII intact, normal strength and sensation       throughout, no  asterixis; normal affect, memory, judgement     and insight      Results:  Lab Results   Component Value Date     (L) 08/13/2019    K 5.4 (H) 08/13/2019    CL 97 08/13/2019    CO2 26 08/13/2019    BUN 54 (H) 08/13/2019    CREATININE 6.6 (H) 08/13/2019    CALCIUM 9.1 08/13/2019    ANIONGAP 12 08/13/2019    ESTGFRAFRICA 8 (A) 08/13/2019    EGFRNONAA 7 (A) 08/13/2019       Lab Results   Component Value Date    CALCIUM 9.1 08/13/2019    PHOS 5.9 (H) 08/13/2019       Recent Labs   Lab 08/13/19  0446   WBC 19.14*   RBC 3.79*   HGB 11.4*   HCT 38.6*   *   *   MCH 30.1   MCHC 29.5*       I have personally reviewed pertinent radiological imaging and reports.    Marivel Quinonez MD MPH  [facility] [address] [address]  [phone] (p)  [phone] (f)

## 2019-08-13 NOTE — CONSULTS
Pharmacokinetic Initial Assessment: IV Vancomycin    Assessment/Plan:    Initiated intravenous vancomycin with dose of 1500 mg once with subsequent doses on dialysis days. Tue.Thur/Sat  Desired empiric serum trough concentration is 15 to 20 mcg/mL  Draw vancomycin random level on 8/13 at AM labs.  Pharmacy will continue to follow and monitor vancomycin.      Please contact pharmacy at extension [phone] with any questions regarding this assessment.     Thank you for the consult,   Suri Gordon       Patient brief summary:  Frank Zayas is a 79 y.o. male initiated on antimicrobial therapy with IV Vancomycin for treatment of suspected bone/joint infection/severe sepsis    Drug Allergies:   Review of patient's allergies indicates:   Allergen Reactions    Lidocaine Nausea Only     NOVACAINE --  Severe nausea    Statins-hmg-coa reductase inhibitors Anxiety       Actual Body Weight:   115.2 kg    Renal Function:   Estimated Creatinine Clearance: 13.3 mL/min (A) (based on SCr of 6.2 mg/dL (H)).,     Dialysis Method (if applicable):  intermittent HD on Tuesday, Thursday, and Saturday.    CBC (last 72 hours):  Recent Labs   Lab Result Units 08/12/19  1444   WBC K/uL 29.38*   Hemoglobin g/dL 12.0*   Hematocrit % 41.1   Platelets K/uL 203   Gran% % 88.9*   Lymph% % 2.7*   Mono% % 5.1   Eosinophil% % 0.1   Basophil% % 0.1   Differential Method  Automated       Metabolic Panel (last 72 hours):  Recent Labs   Lab Result Units 08/12/19  1444   Sodium mmol/L 136   Potassium mmol/L 5.2*   Chloride mmol/L 96   CO2 mmol/L 26   Glucose mg/dL 253*   BUN, Bld mg/dL 50*   Creatinine mg/dL 6.2*   Albumin g/dL 2.6*   Total Bilirubin mg/dL 0.8   Alkaline Phosphatase U/L 369*   AST U/L 36   ALT U/L 37       Drug levels (last 3 results):  No results for input(s): VANCOMYCINRA, VANCOMYCINPE, VANCOMYCINTR in the last 72 hours.    Microbiologic Results:  Microbiology Results (last 7 days)     Procedure Component Value Units Date/Time     Blood culture x two cultures. Draw prior to antibiotics. [[id number]] Collected:  08/12/19 1623    Order Status:  Sent Specimen:  Blood Updated:  08/12/19 2226    Blood culture x two cultures. Draw prior to antibiotics. [[id number]] Collected:  08/12/19 1556    Order Status:  Sent Specimen:  Blood Updated:  08/12/19 2226    Blood culture (site 1) [[id number]]     Order Status:  Canceled Specimen:  Blood

## 2019-08-13 NOTE — PLAN OF CARE
"CM met with pt bedside to complete discharge assessment. Pt verified all information on facesheet as correct. Pt denies POA/LW. Pt reports living at listed address with spouse. PCP is Dr. Shields. [facility]. Pt is active with [facility], would like to continue once dc (disclosure form completed). Pt is established at [facility] on [address] for dialysis (t,th,sat 2nd shift). DME- raised toliet seat, wc, rw, walker, wc, grab bars, cane, sc- pt states he "has [facility] at home". Pt reports being recently discharged from [facility] 2 weeks ago- has been home with hh since. Reports being modified independent with assistive devices. Pt's spouse Yue provides transportation to [facility]. Dc plan is home with hh. CM following.     08/13/19 1220   Discharge Assessment   Assessment Type Discharge Planning Assessment   Confirmed/corrected address and phone number on facesheet? Yes   Assessment information obtained from? Patient   Communicated expected length of stay with patient/caregiver yes   Prior to hospitilization cognitive status: Alert/Oriented   Prior to hospitalization functional status: Assistive Equipment   Current cognitive status: Alert/Oriented   Current Functional Status: Assistive Equipment   Facility Arrived From: home   Lives With spouse   Able to Return to Prior Arrangements yes   Is patient able to care for self after discharge? Yes   Patient's perception of discharge disposition home health   Readmission Within the Last 30 Days no previous admission in last 30 days   Patient currently being followed by outpatient case management? No   Patient currently receives any other outside agency services? No   Equipment Currently Used at Home walker, standard;walker, rolling;glucometer;shower chair;raised toilet;grab bar;wheelchair   Do you have any problems affording any of your prescribed medications? No   Is the patient taking medications as prescribed? yes   Does " the patient have transportation home? Yes   Transportation Anticipated family or friend will provide   Dialysis Name and Scheduled days [address]. T, TH, SAT 11   Does the patient receive services at the [facility]? No   Discharge Plan A Home Health   Discharge Plan B Home with family   DME Needed Upon Discharge  none   Patient/Family in Agreement with Plan yes

## 2019-08-13 NOTE — ASSESSMENT & PLAN NOTE
Contributing Nutrition Diagnosis  Altered nutrition related lab values    Related to (etiology):   Altered absorption of nutrients and inconsistent carbohydrate intake    Signs and Symptoms (as evidenced by):   Elevated glucose, non-healing wounds    Interventions/Recommendations (treatment strategy):  1) DM 2000 kcal diet     Nutrition Diagnosis Status:   New

## 2019-08-13 NOTE — ASSESSMENT & PLAN NOTE
Contributing Nutrition Diagnosis  Obesity Stage 1    Related to (etiology):   Hx. Of excessive energy intake/ edema    Signs and Symptoms (as evidenced by):   1) BMI > 30 kg/m2    Interventions/Recommendations (treatment strategy):  1) Meet not exceed kcal needs    Nutrition Diagnosis Status:   New

## 2019-08-13 NOTE — NURSING
Notified By Stacy Tomlinson of [facility] microbiology lab, that the blood cultures taken from pt's left forearm have tested positive for gram negative rods. Notified Dr Kaufman and Dr Barreto

## 2019-08-13 NOTE — ASSESSMENT & PLAN NOTE
Chronic and stable.  Hypertension Medications             metoprolol tartrate (LOPRESSOR) 25 MG tablet Take 1 tablet (25 mg total) by mouth every Mon, Wed, Fri, Sun.    nitroGLYCERIN (NITROSTAT) 0.4 MG SL tablet Place 0.4 mg under the tongue every 5 (five) minutes as needed for Chest pain. Seek medical help if pain is not relieved by the third dose.      Hospital Medications             furosemide injection 40 mg (Completed) Inject 4 mLs (40 mg total) into the vein ED 1 Time.    metoprolol tartrate (LOPRESSOR) tablet 25 mg Take 1 tablet (25 mg total) by mouth every Mon, Wed, Fri, Sun.    nitroGLYCERIN SL tablet 0.4 mg Place 1 tablet (0.4 mg total) under the tongue every 5 (five) minutes as needed for Chest pain.

## 2019-08-13 NOTE — ASSESSMENT & PLAN NOTE
Atrial Fibrillation controlled currently with Beta Blocker. [id number] score 5. Anticoagulation indicated currently. Anticoagulation done with-- on hold  For possible surgery-no anticoagulants noted on home med list.   Hx gi bleed ?     Atrial fibrillation with premature ventricular or aberrantly conducted complexes  Left axis deviation  Right bundle branch block  T wave abnormality, consider lateral ischemia  Abnormal ECG  When compared with ECG of 26-JUN-2019 06:19,  T wave inversion no longer evident in Inferior leads

## 2019-08-13 NOTE — ASSESSMENT & PLAN NOTE
Chronic and stable.  Continue home medications with aspirin and beta-blocker.  Diuresing with Lasix and will star and hemodialysis again tomorrow.  and monitor on telemetry monitor signs for acute coronary syndrome.  Initial troponin negative and EKG shows AFib with possible lateral ischemic changes.

## 2019-08-13 NOTE — BRIEF OP NOTE
[facility]  Brief Operative Note    SUMMARY     Surgery Date: 8/13/2019     Surgeon(s) and Role:     * Robert Wilson DPM - Primary    Assisting Surgeon: None    Pre-op Diagnosis:  Gangrene of left foot [I96]    Post-op Diagnosis:  Post-Op Diagnosis Codes:     * Gangrene of left foot [I96]    Procedure(s) (LRB):  DEBRIDEMENT, FOOT (Left)    Anesthesia: General    Description of Procedure: debridement wound left heel incisional below deep fascia including muscle.    Description of the findings of the procedure: extensive liquifactive necrosis, pus, necrosis of tissue down to periosteum.    Estimated Blood Loss: * No values recorded between 8/13/2019  5:35 PM and 8/13/2019  6:27 PM *         Specimens:   Specimen (12h ago, onward)    None

## 2019-08-13 NOTE — CONSULTS
[facility]  Podiatry  Consult Note    Patient Name: Frank Zayas  MRN: [id number]  Admission Date: 8/12/2019  Hospital Length of Stay: 1 days  Attending Physician: Katerin Kaufman MD  Primary Care Provider: Mikhail Shields MD     Inpatient consult to Podiatry  Consult performed by: Robert Wilson DPM  Consult ordered by: Katerin Kaufman MD        Subjective:     History of Present Illness: smelling black soft wound back and bottom left heel.  Gradual onset,he estimates 2 month duration, with home health under Dr. Shields recently worsening.  No self care. Denies trauma.  Relates he has not eaten since about 08:30am this morning.  His lunch is bedside untouched.    Scheduled Meds:   alfuzosin  10 mg Oral Nightly    aspirin  81 mg Oral Daily    FLUoxetine  20 mg Oral QAM    heparin (porcine)  5,000 Units Subcutaneous Q8H    hydrocortisone   Rectal BID    insulin detemir U-100  10 Units Subcutaneous QHS    meropenem (MERREM) IVPB  1 g Intravenous Q8H    metoprolol tartrate  25 mg Oral Every Mon, Wed, Fri, Sun    mupirocin   Nasal BID    pantoprazole  40 mg Oral BID AC    rOPINIRole  2 mg Oral QHS    senna-docusate 8.6-50 mg  1 tablet Oral BID    sevelamer carbonate  1,600 mg Oral TID WM     Continuous Infusions:  PRN Meds:acetaminophen, dextrose 50%, dextrose 50%, glucagon (human recombinant), glucose, glucose, insulin aspart U-100, lidocaine HCL 2%, [START ON 8/14/2019] miconazole nitrate 2%, nitroGLYCERIN, ondansetron, promethazine (PHENERGAN) IVPB, sodium chloride 0.9%    Review of patient's allergies indicates:   Allergen Reactions    Lidocaine Nausea Only     NOVACAINE --  Severe nausea    Statins-hmg-coa reductase inhibitors Anxiety        Past Medical History:   Diagnosis Date    Anticoagulant long-term use     Arthritis     Bladder cancer     Blood transfusion     Cardiac arrest 5/31/2019    CHF (congestive heart failure)     Chronic kidney disease     COPD (chronic  obstructive pulmonary disease)     Coronary artery disease     Diabetes mellitus     Gout     Hemorrhagic shock 2019    [place]     HAS BILAT AIDS BUT DOES NOT WEAR    Hyperlipidemia     Hypertension     Myocardial infarction     NSTEMI (non-ST elevated myocardial infarction)     RLS (restless legs syndrome)     Sleep apnea     NO MACHINE    Wears glasses      Past Surgical History:   Procedure Laterality Date    CARDIAC SURGERY      CABG X 2     CATARACT EXTRACTION, BILATERAL      CHOLECYSTECTOMY      COLONOSCOPY      CORONARY ARTERY BYPASS GRAFT      x 2    coronary stents      CYSTOSCOPY      CYSTOSCOPY N/A 2019    Performed by [facility]lee [facility]quez [facility] at [facility] OR    EGD (ESOPHAGOGASTRODUODENOSCOPY) N/A 2019    [facility]formed by [facility] at [facility] (2ND FLR)    EXCISION-BLADDER TUMOR-TRANSURETHRAL (TURBT) N/A 2014    Performed by [facility]lee Vasquez MD at [facility] OR    EXCISION-BLADDER TUMOR-TRANSURETHRAL (TURBT) N/A 2013    Performed by Kaylee Vasquez MD at [facility] OR    EYE SURGERY Bilateral     CATARACT    Insertion,catheter,tunneled N/A 2019    Performed by Wade Rodriguez MD at [facility] OR    TURBT (TRANSURETHRAL RESECTION OF BLADDER TUMOR) N/A 2019    Performed by Kaylee Vasquez MD at [facility] OR    VASECTOMY         Family History     Problem Relation (Age of Onset)    Cancer Father, Sister, Brother    Heart disease Father        Tobacco Use    Smoking status: Former Smoker     Packs/day: 0.25     Years: 20.00     Pack years: 5.00     Types: Cigars     Last attempt to quit: 1982     Years since quittin.5    Smokeless tobacco: Never Used   Substance and Sexual Activity    Alcohol use: No    Drug use: No    Sexual activity: Not on file     Review of Systems  Objective:     Vital Signs (Most Recent):  Temp: 97.5 °F (36.4 °C) (19 1014)  Pulse: 95 (19 1014)  Resp: 18 (19 1014)  BP: 123/67 (19 1014)  SpO2: 96 %  (08/13/19 1014) Vital Signs (24h Range):  Temp:  [96.3 °F (35.7 °C)-98.3 °F (36.8 °C)] 97.5 °F (36.4 °C)  Pulse:  [] 95  Resp:  [16-22] 18  SpO2:  [95 %-100 %] 96 %  BP: ()/(51-90) 123/67     Weight: 115.2 kg (254 lb)  Body mass index is 32.61 kg/m².    Foot Exam    Laboratory:  A1C:   Recent Labs   Lab 05/04/19  1910 08/12/19  1941   HGBA1C 5.6 6.1*     Blood Cultures:   Recent Labs   Lab 08/12/19  1556 08/12/19  1623   LABBLOO No Growth to date No Growth to date     BMP:   Recent Labs   Lab 08/13/19  0446   *   *   K 5.4*   CL 97   CO2 26   BUN 54*   CREATININE 6.6*   CALCIUM 9.1     CBC:   Recent Labs   Lab 08/13/19  0446   WBC 19.14*   RBC 3.79*   HGB 11.4*   HCT 38.6*   *   *   MCH 30.1   MCHC 29.5*     CMP:   Recent Labs   Lab 08/12/19  1444 08/13/19  0446   * 207*   CALCIUM 9.3 9.1   ALBUMIN 2.6* 2.4*   PROT 6.6  --     135*   K 5.2* 5.4*   CO2 26 26   CL 96 97   BUN 50* 54*   CREATININE 6.2* 6.6*   ALKPHOS 369*  --    ALT 37  --    AST 36  --    BILITOT 0.8  --      Coagulation: No results for input(s): PT, INR, APTT in the last 168 hours.  CRP: No results for input(s): CRP in the last 168 hours.  ESR: No results for input(s): SEDRATE in the last 168 hours.  Prealbumin: No results for input(s): PREALBUMIN in the last 48 hours.  Wound Cultures: No results for input(s): LABAERO in the last 4320 hours.  Microbiology Results (last 7 days)     Procedure Component Value Units Date/Time    Blood culture x two cultures. Draw prior to antibiotics. [[id number]] Collected:  08/12/19 1623    Order Status:  Completed Specimen:  Blood Updated:  08/13/19 0515     Blood Culture, Routine No Growth to date    Narrative:       Aerobic and anaerobic    Blood culture x two cultures. Draw prior to antibiotics. [[id number]] Collected:  08/12/19 1556    Order Status:  Completed Specimen:  Blood Updated:  08/13/19 0515     Blood Culture, Routine No Growth to date    Narrative:        Aerobic and anaerobic    Blood culture (site 1) [[id number]]     Order Status:  Canceled Specimen:  Blood         Specimen (12h ago, onward)    None          Diagnostic Results:  CT: I have reviewed all pertinent results/findings within the past 24 hours.  negative abscess, negative osteomyelitis  X-Ray: I have reviewed all pertinent results/findings within the past 24 hours.  negative acute injury    Clinical Findings:  Bottom surface extending to back of left heel is black, spongy, fluctuant, with malodor, but  without pus, tracking, or soft tissue emphysema.    Deep bright rubor left leg to knee appears worse than on admit photographs.    Otherwise, skin is thin, arophic without pedal hair both feet.    Pulses are trace through <2+ pitting edema both feet/ankles.  Feet are tepid to touch.  Cap fill about 5 - 7 seconds all toes.    Gait not observed due to heel ulceration and lack of shoe gear.  All ten toes without clubbing, cyanosis, or signs of ischemia.  No pain to palpation bilateral lower extremities.  Range of motion, stability, muscle strength, and muscle tone normal bilateral feet and legs.     Diminished/loss of protective sensation all toes bilateral to 10 gram monofilament.  Decreased/absent vibratory sensation bilateral feet to 128Hz tuning fork.  Voluntary movenent both feet intact with MMT 4/5 bilateral legs/feet.        Assessment/Plan:     Active Diagnoses:    Diagnosis Date Noted POA    PRINCIPAL PROBLEM:  Severe sepsis [A41.9, R65.20] 06/25/2019 Yes    Cellulitis of left lower extremity [L03.116] 08/12/2019 Yes    Sacral decubitus ulcer, stage III [L89.153] 08/12/2019 Yes    External hemorrhoid [K64.4] 08/12/2019 Yes    ESRD (end stage renal disease) on dialysis [N18.6, Z99.2] 05/31/2019 Not Applicable    Morbid (severe) obesity with alveolar hypoventilation [E66.2] 05/08/2019 Yes    Hyperkalemia [E87.5] 05/08/2019 Yes    Skin ulcer of left lower leg, limited to breakdown of skin  [L97.921] 05/05/2019 Yes    HTN (hypertension) [I10] 05/05/2019 Yes    Type 2 diabetes mellitus with kidney complication, with long-term current use of insulin [E11.29, Z79.4] 05/05/2019 Not Applicable    DM type 2 with diabetic mixed hyperlipidemia [E11.69, E78.2] 05/05/2019 Yes    Coronary artery disease [I25.10] 05/05/2019 Yes    COPD (chronic obstructive pulmonary disease) [J44.9] 05/05/2019 Yes    A-fib [I48.91] 05/05/2019 Yes    Anemia, chronic disease [D63.8] 05/05/2019 Yes    BRANDEN (obstructive sleep apnea) [G47.33] 05/05/2019 Yes    Malignant neoplasm of anterior wall of urinary bladder [C67.3] 09/08/2014 Yes      Problems Resolved During this Admission:       Soft tissue gangrene heel left with cellulitis of leg.    Arterial dopplers, heel cushion boots bilateral.    OR debridement tonight.  NPO, he will not eat lunch.  Tray removed.    Discussed with patient that removing the dead, nonviable, infected soft tissues from the heel often leads to exposure of the tendons and heel bone which have a very high rate of non healing, infection, and amputation at the below knee level.  He is aware that this is an attempt to save his leg and life from amputation, infection, sepsis, death.  This attempt is often unsuccessful for his condition.  He voiced understanding and had no questions..    Thank you for your consult. I will follow-up with patient. Please contact us if you have any additional questions.    Robert Wilson DPM  Podiatry  [facility]

## 2019-08-13 NOTE — NURSING
Notified by dialysis Nurse, Preeti, that pt's systolic BP keeps dropping into 80's during dialysis and inquiring if midodrine could be ordered. Notified Dr. Quinonez, nephrologist, whom said she is going to pt's bedside now. Also notified DR Kaufman whom order to ask Dr. Quinonez to order or recommend what dose of midodrine pt should be placed on.   Contacted Dr. Quinonez who said not to worry about ordering the midodrine at this time because she just placed new orders to in Albumin IV with dialysis.

## 2019-08-13 NOTE — ASSESSMENT & PLAN NOTE
Nephrology consult to follow.  Dialysis days Tuesday Thursday Saturday and will dose medications for renal failure.  And maintain blood pressure control.

## 2019-08-13 NOTE — PT/OT/SLP PROGRESS
"Occupational Therapy      Patient Name:  Frank Zayas   MRN:  [id number]    Attempted OT evaluation this AM. Pt refused participation 2/2 fatigue. Pt stated "I don't feel like doing anything right now. I'm too tired." Nurse notified. Pt is scheduled for dialysis later today. Will follow up when pt is appropriate.     Jm Patel, OT  8/13/2019  "

## 2019-08-13 NOTE — PROGRESS NOTES
Pharmacokinetic Initial Assessment: IV Vancomycin    Assessment/Plan:    Initiated intravenous vancomycin with dose of 1500 mg once with subsequent doses on dialysis days. Tue.Thur/Sat  Desired empiric serum trough concentration is 15 to 20 mcg/mL  Draw vancomycin random level on 8/13 at AM labs.  Pharmacy will continue to follow and monitor vancomycin.      Please contact pharmacy at extension [phone] with any questions regarding this assessment.     Thank you for the consult,   Suri Gordon       Patient brief summary:  Frank Zayas is a 79 y.o. male initiated on antimicrobial therapy with IV Vancomycin for treatment of suspected bone/joint infection/severe sepsis    Drug Allergies:   Review of patient's allergies indicates:   Allergen Reactions    Lidocaine Nausea Only     NOVACAINE --  Severe nausea    Statins-hmg-coa reductase inhibitors Anxiety       Actual Body Weight:   115.2 kg    Renal Function:   Estimated Creatinine Clearance: 13.3 mL/min (A) (based on SCr of 6.2 mg/dL (H)).,     Dialysis Method (if applicable):  intermittent HD on Tuesday, Thursday, and Saturday.    CBC (last 72 hours):  Recent Labs   Lab Result Units 08/12/19  1444   WBC K/uL 29.38*   Hemoglobin g/dL 12.0*   Hematocrit % 41.1   Platelets K/uL 203   Gran% % 88.9*   Lymph% % 2.7*   Mono% % 5.1   Eosinophil% % 0.1   Basophil% % 0.1   Differential Method  Automated       Metabolic Panel (last 72 hours):  Recent Labs   Lab Result Units 08/12/19  1444   Sodium mmol/L 136   Potassium mmol/L 5.2*   Chloride mmol/L 96   CO2 mmol/L 26   Glucose mg/dL 253*   BUN, Bld mg/dL 50*   Creatinine mg/dL 6.2*   Albumin g/dL 2.6*   Total Bilirubin mg/dL 0.8   Alkaline Phosphatase U/L 369*   AST U/L 36   ALT U/L 37       Drug levels (last 3 results):  No results for input(s): VANCOMYCINRA, VANCOMYCINPE, VANCOMYCINTR in the last 72 hours.    Microbiologic Results:  Microbiology Results (last 7 days)       Procedure Component Value Units Date/Time     Blood culture x two cultures. Draw prior to antibiotics. [[id number]] Collected:  08/12/19 1623    Order Status:  Sent Specimen:  Blood Updated:  08/12/19 2226    Blood culture x two cultures. Draw prior to antibiotics. [[id number]] Collected:  08/12/19 1556    Order Status:  Sent Specimen:  Blood Updated:  08/12/19 2226    Blood culture (site 1) [[id number]]     Order Status:  Canceled Specimen:  Blood

## 2019-08-13 NOTE — CONSULTS
"  [facility]  Adult Nutrition  Consult Note    SUMMARY    Intervention: Nutrition education- protein/fluid needs, potassium, phosphorus, sodium, carbohydrate modified diet, nutrition supplement therapy-commercial beverage     Recommendation:  1) Continue diet as ordered   2) Add Boost glucosec Control BID + Glenn BID   3) Weigh pt weekly   4) Nutrition education verbally given, renal diet review at f/u    Goals: 1) PO intakes > 50% of meals and supplements a f/u   Nutrition Goal Status: new  Communication of RD Recs: (POC, sticky note, second sign )    Reason for Assessment    Reason For Assessment: consult  Diagnosis: (severe sepsis)  Relevant Medical History: ESRD on HD, CAD, COPD, HTN, CHF, DM  Interdisciplinary Rounds: attended    General Information Comments: 80 y/o male admitted with cellulitis of heel PI, also with stage 2 PI on buttocks. Pt states he sometimes skips meals. Stated wt only, ? wt loss. NFPE done 19, no wasting seen. At time of visit pt falling asleep on me, pt did state that he was eatin 3 small meals recently, decreased appetite.    Nutrition Discharge Planning: To be determined- Renal, DM 2000 kcal    Nutrition Risk Screen    Nutrition Risk Screen: other (see comments)    Nutrition/Diet History    Patient Reported Diet/Restrictions/Preferences: diabetic diet  Spiritual, Cultural Beliefs, Gnosticism Practices, Values that Affect Care: no  Food Allergies: NKFA  Factors Affecting Nutritional Intake: decreased appetite    Anthropometrics    Temp: 97.3 °F (36.3 °C)  Height Method: Stated  Height: 6' 2"  Height (inches): 74 in  Weight Method: Stated  Weight: 115.2 kg (253 lb 15.5 oz)  Weight (lb): 253.97 lb  Ideal Body Weight (IBW), Male: 190 lb  % Ideal Body Weight, Male (lb): 133.67 lb  BMI (Calculated): 32.7  BMI Grade: 30 - 34.9- obesity - grade I  Usual Body Weight (UBW), k kg(19)  % Usual Body Weight: 96.2  % Weight Change From Usual Weight: -4 %   "     Lab/Procedures/Meds    Pertinent Labs Reviewed: reviewed  BMP  Lab Results   Component Value Date     (L) 08/13/2019    K 5.4 (H) 08/13/2019    CL 97 08/13/2019    CO2 26 08/13/2019    BUN 54 (H) 08/13/2019    CREATININE 6.6 (H) 08/13/2019    CALCIUM 9.1 08/13/2019    ANIONGAP 12 08/13/2019    ESTGFRAFRICA 8 (A) 08/13/2019    EGFRNONAA 7 (A) 08/13/2019     Lab Results   Component Value Date    ALBUMIN 2.4 (L) 08/13/2019     Lab Results   Component Value Date    CALCIUM 9.1 08/13/2019    PHOS 5.9 (H) 08/13/2019     Lab Results   Component Value Date    IRON 28 (L) 05/07/2019    TIBC 361 05/07/2019    FERRITIN 55 05/07/2019     Lab Results   Component Value Date    FOLATE 10.8 05/07/2019     Lab Results   Component Value Date    YRXDLDBI13 374 05/07/2019     POCT Glucose   Date Value Ref Range Status   08/13/2019 181 (H) 70 - 110 mg/dL Final   08/13/2019 200 (H) 70 - 110 mg/dL Final   08/12/2019 202 (H) 70 - 110 mg/dL Final   08/12/2019 225 (H) 70 - 110 mg/dL Final     Lab Results   Component Value Date    HGBA1C 6.1 (H) 08/12/2019       Pertinent Medications Reviewed: reviewed  Pertinent Medications Comments: lasix, insulin, senna, zofran    Estimated/Assessed Needs    Weight Used For Calorie Calculations: 120 kg (264 lb 8.8 oz)(last measured weight 6/17/19- kcal/kg overestimating as pt obese)  Energy Calorie Requirements (kcal): Pickerel St Jeor x ( no activity factor obesity) = 1990 kcal  Energy Need Method: Pickerel-St Jeor  Protein Requirements: 1.2-1.3 g protein/kg ( multiple PIs + HD) = 134-145 g protein  Weight Used For Protein Calculations: 112 kg (246 lb 14.6 oz)(KDOQI adjusted BW)  Fluid Requirements (mL): Urine Output + 1000 ml  Estimated Fluid Requirement Method: other (see comments)  CHO Requirement: 45-50%      Nutrition Prescription Ordered    Current Diet Order: DM 2000 kcal, renal    Evaluation of Received Nutrient/Fluid Intake    Energy Calories Required: not meeting needs  Protein Required:  not meeting needs  Fluid Required: not meeting needs  Tolerance: tolerating  % Intake of Estimated Energy Needs: 50%  % Meal Intake: 50%    Nutrition Risk    Level of Risk/Frequency of Follow-up: moderate 2 x weekly     Assessment and Plan    Morbid (severe) obesity with alveolar hypoventilation  Contributing Nutrition Diagnosis  Obesity Stage 1    Related to (etiology):   Hx. Of excessive energy intake/ edema    Signs and Symptoms (as evidenced by):   1) BMI > 30 kg/m2    Interventions/Recommendations (treatment strategy):  1) Meet not exceed kcal needs    Nutrition Diagnosis Status:   New      Moderate malnutrition  Contributing Nutrition Diagnosis  Moderate chronic illness related malnutrition    Related to (etiology):   Decreased appetite and increased kcal/protein needs d/t HD    Signs and Symptoms (as evidenced by):   1) Stage 2 PI buttocks + heel PI  2) Trace edema in legs  3) PO intakes < 75% EEN x > 1 month    Interventions/Recommendations (treatment strategy):  1) Nutrition supplement therapy    Nutrition Diagnosis Status:   New      Type 2 diabetes mellitus with kidney complication, with long-term current use of insulin  Contributing Nutrition Diagnosis  Altered nutrition related lab values    Related to (etiology):   Altered absorption of nutrients and inconsistent carbohydrate intake    Signs and Symptoms (as evidenced by):   Elevated glucose, non-healing wounds    Interventions/Recommendations (treatment strategy):  1) DM 2000 kcal diet     Nutrition Diagnosis Status:   New           Monitor and Evaluation    Food and Nutrient Intake: energy intake, food and beverage intake  Food and Nutrient Adminstration: diet order  Anthropometric Measurements: weight  Biochemical Data, Medical Tests and Procedures: electrolyte and renal panel, glucose/endocrine profile  Nutrition-Focused Physical Findings: overall appearance, skin     Malnutrition Assessment     Skin (Micronutrient): (Derek = 20, stage 2 PI  buttocks + Heel PI)  Teeth (Micronutrient): (WDL)   Micronutrient Evaluation: suspected deficiency  Micronutrient Evaluation Comments: replace zinc, Phos, and vitamin C if needed, check B12 and folate and replete if needed- would benefit from longterm renal MVI   Energy Intake (Malnutrition): less than 75% for greater than or equal to 1 month           Edema (Fluid Accumulation): 1-->trace   Subcutaneous Fat Loss (Final Summary): well nourished  Muscle Loss Evaluation (Final Summary): well nourished         Nutrition Follow-Up    RD Follow-up?: Yes

## 2019-08-13 NOTE — PROGRESS NOTES
1230 Pt arrived at dialysis unit bp 106/87 hr 38. Notified Hemanth MEZA to check monitor for me and see what rhythm pt is in and what is the pulse . Sarah MEZA called back and said pt hr in 80's and is in Afib. Pt was then placed on the machine and BP dropped in to the 60's spoke with Reyes MEZA again she stated Dr. Kramer is on her way down to see the pt. uf off at this time and gave albumin.

## 2019-08-13 NOTE — ASSESSMENT & PLAN NOTE
Chronic and uncontrolled.  Wife reports he has not been taking insulin at home.  Will hold his oral medications and use low-dose Levemir insulin to scale and Accu-Cheks AC and HS.  Hemoglobin A1c pending.  Dietitian consulted.

## 2019-08-13 NOTE — SUBJECTIVE & OBJECTIVE
Past Medical History:   Diagnosis Date    Anticoagulant long-term use     Arthritis     Bladder cancer     Blood transfusion     Cardiac arrest 5/31/2019    CHF (congestive heart failure)     Chronic kidney disease     COPD (chronic obstructive pulmonary disease)     Coronary artery disease     Diabetes mellitus     Gout     Hemorrhagic shock 6/1/2019    [place] hearing[place]     HAS BILAT AIDS BUT DOES NOT WEAR    Hyperlipidemia     Hypertension     Myocardial infarction     NSTEMI (non-ST elevated myocardial infarction)     RLS (restless legs syndrome)     Sleep apnea     NO MACHINE    Wears glasses        Past Surgical History:   Procedure Laterality Date    CARDIAC SURGERY      CABG X 2 1997    CATARACT EXTRACTION, BILATERAL      CHOLECYSTECTOMY      COLONOSCOPY      CORONARY ARTERY BYPASS GRAFT      x 2    coronary stents      CYSTOSCOPY      CYSTOSCOPY N/A 4/8/2019    Performed by [facility]lee [facility]quez [facility] at [facility]    EGD (ESOPHAGOGASTRODUODENOSCOPY) N/A 6/4/2019    Performed by [facility] (2ND FLR)    EXCISION-BLADDER TUMOR-TRANSURETHRAL (TURBT) N/A 9/8/2014    Performed by [facility]lee [facility]quez [facility] at [facility] OR    EXCISION-BLADDER TUMOR-TRANSURETHRAL (TURBT) N/A 11/18/2013    Performed by Kalyee Vasquez MD at [facility] OR    EYE SURGERY Bilateral     CATARACT    Insertion,catheter,tunneled N/A 5/16/2019    Performed by Wade Rodriguez MD at [facility] OR    TURBT (TRANSURETHRAL RESECTION OF BLADDER TUMOR) N/A 4/8/2019    Performed by [facility]lee [facility]quez [facility] at [facility] OR    VASECTOMY         Review of patient's allergies indicates:   Allergen Reactions    Lidocaine Nausea Only     NOVACAINE --  Severe nausea    Statins-hmg-coa reductase inhibitors Anxiety       No current facility-administered medications on file prior to encounter.      Current Outpatient Medications on File Prior to Encounter   Medication Sig    FLUoxetine 20 MG capsule Take 20 mg by mouth every morning.      metoprolol tartrate (LOPRESSOR) 25 MG tablet Take 1 tablet (25 mg total) by mouth every Mon, Wed, Fri, Sun.    nitroGLYCERIN (NITROSTAT) 0.4 MG SL tablet Place 0.4 mg under the tongue every 5 (five) minutes as needed for Chest pain. Seek medical help if pain is not relieved by the third dose.    pantoprazole (PROTONIX) 40 MG tablet Take 1 tablet (40 mg total) by mouth 2 (two) times daily before meals.    sevelamer carbonate (RENVELA) 800 mg Tab Take 2 tablets (1,600 mg total) by mouth 3 (three) times daily with meals.    [DISCONTINUED] alfuzosin (UROXATRAL) 10 mg Tb24 Take 10 mg by mouth nightly.     [DISCONTINUED] aspirin (ECOTRIN) 81 MG EC tablet Take 1 tablet (81 mg total) by mouth once daily.    [DISCONTINUED] coenzyme Q10 (CO Q-10) 100 mg capsule Take 100 mg by mouth once daily.     [DISCONTINUED] ferrous sulfate 325 (65 FE) MG EC tablet Take 1 tablet (325 mg total) by mouth 2 (two) times daily with meals.    [DISCONTINUED] rOPINIRole (REQUIP) 2 MG tablet Take 2 mg by mouth every evening.    [DISCONTINUED] SAXagliptin (ONGLYZA) 2.5 mg Tab tablet Take 1 tablet (2.5 mg total) by mouth once daily.     Family History     Problem Relation (Age of Onset)    Cancer Father, Sister, Brother    Heart disease Father        Tobacco Use    Smoking status: Former Smoker     Packs/day: 0.25     Years: 20.00     Pack years: 5.00     Types: Cigars     Last attempt to quit: 1982     Years since quittin.5    Smokeless tobacco: Never Used   Substance and Sexual Activity    Alcohol use: No    Drug use: No    Sexual activity: Not on file     Review of Systems   Constitutional: Positive for activity change. Negative for chills, fever and unexpected weight change.   HENT: Negative for congestion and sore throat.    Eyes: Negative for discharge and itching.   Respiratory: Positive for shortness of breath. Negative for cough, chest tightness and wheezing.    Cardiovascular: Negative for chest pain, palpitations  and leg swelling.   Gastrointestinal: Positive for abdominal distention. Negative for abdominal pain, constipation, nausea and vomiting.   Endocrine: Negative for cold intolerance and heat intolerance.   Genitourinary: Negative for difficulty urinating and dysuria.   Musculoskeletal: Positive for gait problem (Difficulty walking because of heel ulcer). Negative for arthralgias, back pain and neck stiffness.   Skin: Positive for wound (Left heel time several weeks). Negative for pallor and rash.   Allergic/Immunologic: Positive for immunocompromised state. Negative for environmental allergies and food allergies.   Neurological: Positive for weakness. Negative for dizziness.   Hematological: Negative for adenopathy. Does not bruise/bleed easily.   Psychiatric/Behavioral: Negative for agitation and decreased concentration. The patient is not nervous/anxious.      Objective:     Vital Signs (Most Recent):  Temp: 97.3 °F (36.3 °C) (08/12/19 2002)  Pulse: 96 (08/12/19 2100)  Resp: 18 (08/12/19 2100)  BP: (!) 121/57 (08/12/19 2002)  SpO2: 98 % (08/12/19 2100) Vital Signs (24h Range):  Temp:  [96.9 °F (36.1 °C)-98.3 °F (36.8 °C)] 97.3 °F (36.3 °C)  Pulse:  [] 96  Resp:  [16-22] 18  SpO2:  [96 %-100 %] 98 %  BP: ()/(57-90) 121/57     Weight: 115.2 kg (254 lb)  Body mass index is 32.61 kg/m².    Physical Exam   Constitutional: He is oriented to person, place, and time. He appears well-developed and well-nourished. He appears distressed (Mild respiratory).   HENT:   Head: Normocephalic and atraumatic.   Right Ear: External ear normal.   Left Ear: External ear normal.   Nose: Nose normal.   Mouth/Throat: Oropharynx is clear and moist. No oropharyngeal exudate.   Eyes: Conjunctivae and EOM are normal. Right eye exhibits no discharge. Left eye exhibits no discharge. No scleral icterus.   Neck: Normal range of motion. Neck supple. No thyromegaly present.   Cardiovascular: An irregularly irregular rhythm present.  Tachycardia present. Exam reveals distant heart sounds. Exam reveals no gallop and no friction rub.   No murmur heard.  Pulses:       Dorsalis pedis pulses are 1+ on the right side, and 1+ on the left side.        Posterior tibial pulses are 1+ on the right side, and 1+ on the left side.   Pulmonary/Chest: Effort normal. Tachypnea noted. No respiratory distress. He has decreased breath sounds in the right middle field, the right lower field, the left middle field and the left lower field. He has no wheezes. He has rales in the right middle field, the right lower field, the left middle field and the left lower field.   Abdominal: Soft. He exhibits distension. He exhibits no mass. Bowel sounds are decreased. There is no tenderness. There is no rebound and no guarding.   Genitourinary:   Genitourinary Comments: Large external hemorrhoid with mild thrombosis also is as noted on a in the sacral area   Musculoskeletal: Normal range of motion. He exhibits edema and tenderness.   Left large left heel ulcer.  It is purple because of painting head at home with gentian violet   Lymphadenopathy:     He has no cervical adenopathy.   Neurological: He is alert and oriented to person, place, and time. No cranial nerve deficit. Coordination normal.   Skin: Skin is warm and dry. Capillary refill takes less than 2 seconds. No rash noted. No erythema.   Psychiatric: He has a normal mood and affect. His behavior is normal. Judgment and thought content normal.   Nursing note and vitals reviewed.                            CRANIAL NERVES     CN III, IV, VI   Extraocular motions are normal.        Significant Labs:   CBC:   Recent Labs   Lab 08/12/19  1444   WBC 29.38*   HGB 12.0*   HCT 41.1        CMP:   Recent Labs   Lab 08/12/19  1444      K 5.2*   CL 96   CO2 26   *   BUN 50*   CREATININE 6.2*   CALCIUM 9.3   PROT 6.6   ALBUMIN 2.6*   BILITOT 0.8   ALKPHOS 369*   AST 36   ALT 37   ANIONGAP 14   EGFRNONAA 8*        Significant Imaging: I have reviewed and interpreted all pertinent imaging results/findings within the past 24 hours.   Impression:       Small to moderate right pleural effusion.  Atelectasis of the right lower lung field.  Mild cardiomegaly.  Prior sternotomy.  Double-lumen central line in position.      Electronically signed by: Addy Weaver MD  Date: 08/12/2019

## 2019-08-13 NOTE — PLAN OF CARE
Problem: Adult Inpatient Plan of Care  Goal: Plan of Care Review  Pt had dialysis today. Surgery to debride heel. Will continue to monitor.

## 2019-08-13 NOTE — PLAN OF CARE
Pt rec'd to pre op bay 1 by bed.  Drowsy but easily aroused and oriented.  Oxygen sats 95-99% on 3L/NC. LLE red, with edema and doppler pulse.  Dressing to Left heel.  No family with pt.  Pre op assessment performed and questions answered

## 2019-08-13 NOTE — ASSESSMENT & PLAN NOTE
Monitor and transfuse if he becomes hemodynamically unstable or H/H < 7/21  Continue po iron/ epo per nephrology

## 2019-08-13 NOTE — H&P
[facility] Medicine  History & Physical    Patient Name: Frank Zayas  MRN: [id number]  Admission Date: 8/12/2019  Attending Physician: Katerin Kaufman MD   Primary Care Provider: Mikhail Shields MD         Patient information was obtained from patient, spouse/SO, past medical records and ER records.     Subjective:     Principal Problem:Severe sepsis    Chief Complaint:   Chief Complaint   Patient presents with    Shortness of Breath     with exertion, does not use O2 at home. arrives to ED with 2 L NC    Foot Pain     left heel since being discharged from [facility]. bigger since Friday        HPI: Frank Zayas is a 79 y.o. male with COPD who presents to the ED with ongoing SOB greater than baseline. Per EMS his home health nurse noticed worsening SOB when going to re-wrap his legs, which are being treated for ulcer. The patient denies any chest pain, fever, chills or fever, N/V/D, or any other symptoms at this time.  He was stable in the ED with oxygen at 2 L. he is noted to have an elevated BNP and bilateral pulmonary edema on chest x-ray.  He was given Lasix 60 mg IV in the ED and Nephrology was notified of his pulmonary edema. His usual dialysis days are Tuesday Thursday and Saturday.  He was also sent by a home health nurse because of malodorous drainage to his heel and erythema in his left lower extremity that had not been near the previous dressing change.  His left heel was drain and very malodorous.  No gangrene is noted.  There skin sloughing off of his left heel as noted in the photos.       Hx of CHF, HTN, hyperlipidemia, and MI.  PSHx of coronary stents, CABG, and cardiac sugery. No known drug allergies    Past Medical History:   Diagnosis Date    Anticoagulant long-term use     Arthritis     Bladder cancer     Blood transfusion     Cardiac arrest 5/31/2019    CHF (congestive heart failure)     Chronic kidney disease     COPD (chronic obstructive pulmonary  disease)     Coronary artery disease     Diabetes mellitus     Gout     Hemorrhagic shock 6/1/2019    [place] (hard of hearing)     HAS BILAT AIDS BUT DOES NOT WEAR    Hyperlipidemia     Hypertension     Myocardial infarction     NSTEMI (non-ST elevated myocardial infarction)     RLS (restless legs syndrome)     Sleep apnea     NO MACHINE    Wears glasses        Past Surgical History:   Procedure Laterality Date    CARDIAC SURGERY      CABG X 2 1997    CATARACT EXTRACTION, BILATERAL      CHOLECYSTECTOMY      COLONOSCOPY      CORONARY ARTERY BYPASS GRAFT      x 2    coronary stents      CYSTOSCOPY      CYSTOSCOPY N/A 4/8/2019    Performed by [facility] at [facility] OR    EGD (ESOPHAGOGASTRODUODENOSCOPY) N/A 6/4/2019    [facility] (2ND F[facility])    EXCISION-BLADDER TUMOR-TRANSURETHRAL (TURBT) N/A 9/8/2014    Performed by [facility] at [facility] OR    EXCISION-BLADDER TUMOR-TRANSURETHRAL (TURBT) N/A 11/18/2013    Performed by [facility] at [facility] OR    EYE SURGERY Bilateral     CATARACT    Insertion,catheter,tunneled N/A 5/16/2019    Performed by Wade Rodriguez MD at [facility] OR    TURBT (TRANSURETHRAL RESECTION OF BLADDER TUMOR) N/A 4/8/2019    Performed by [facility] at [facility] OR    VASECTOMY         Review of patient's allergies indicates:   Allergen Reactions    Lidocaine Nausea Only     NOVACAINE --  Severe nausea    Statins-hmg-coa reductase inhibitors Anxiety       No current facility-administered medications on file prior to encounter.      Current Outpatient Medications on File Prior to Encounter   Medication Sig    FLUoxetine 20 MG capsule Take 20 mg by mouth every morning.     metoprolol tartrate (LOPRESSOR) 25 MG tablet Take 1 tablet (25 mg total) by mouth every Mon, Wed, Fri, Sun.    nitroGLYCERIN (NITROSTAT) 0.4 MG SL tablet Place 0.4 mg under the tongue every 5 (five) minutes as needed for Chest pain. Seek medical help if pain is not  relieved by the third dose.    pantoprazole (PROTONIX) 40 MG tablet Take 1 tablet (40 mg total) by mouth 2 (two) times daily before meals.    sevelamer carbonate (RENVELA) 800 mg Tab Take 2 tablets (1,600 mg total) by mouth 3 (three) times daily with meals.    [DISCONTINUED] alfuzosin (UROXATRAL) 10 mg Tb24 Take 10 mg by mouth nightly.     [DISCONTINUED] aspirin (ECOTRIN) 81 MG EC tablet Take 1 tablet (81 mg total) by mouth once daily.    [DISCONTINUED] coenzyme Q10 (CO Q-10) 100 mg capsule Take 100 mg by mouth once daily.     [DISCONTINUED] ferrous sulfate 325 (65 FE) MG EC tablet Take 1 tablet (325 mg total) by mouth 2 (two) times daily with meals.    [DISCONTINUED] rOPINIRole (REQUIP) 2 MG tablet Take 2 mg by mouth every evening.    [DISCONTINUED] SAXagliptin (ONGLYZA) 2.5 mg Tab tablet Take 1 tablet (2.5 mg total) by mouth once daily.     Family History     Problem Relation (Age of Onset)    Cancer Father, Sister, Brother    Heart disease Father        Tobacco Use    Smoking status: Former Smoker     Packs/day: 0.25     Years: 20.00     Pack years: 5.00     Types: Cigars     Last attempt to quit: 1982     Years since quittin.5    Smokeless tobacco: Never Used   Substance and Sexual Activity    Alcohol use: No    Drug use: No    Sexual activity: Not on file     Review of Systems   Constitutional: Positive for activity change. Negative for chills, fever and unexpected weight change.   HENT: Negative for congestion and sore throat.    Eyes: Negative for discharge and itching.   Respiratory: Positive for shortness of breath. Negative for cough, chest tightness and wheezing.    Cardiovascular: Negative for chest pain, palpitations and leg swelling.   Gastrointestinal: Positive for abdominal distention. Negative for abdominal pain, constipation, nausea and vomiting.   Endocrine: Negative for cold intolerance and heat intolerance.   Genitourinary: Negative for difficulty urinating and dysuria.    Musculoskeletal: Positive for gait problem (Difficulty walking because of heel ulcer). Negative for arthralgias, back pain and neck stiffness.   Skin: Positive for wound (Left heel time several weeks). Negative for pallor and rash.   Allergic/Immunologic: Positive for immunocompromised state. Negative for environmental allergies and food allergies.   Neurological: Positive for weakness. Negative for dizziness.   Hematological: Negative for adenopathy. Does not bruise/bleed easily.   Psychiatric/Behavioral: Negative for agitation and decreased concentration. The patient is not nervous/anxious.      Objective:     Vital Signs (Most Recent):  Temp: 97.3 °F (36.3 °C) (08/12/19 2002)  Pulse: 96 (08/12/19 2100)  Resp: 18 (08/12/19 2100)  BP: (!) 121/57 (08/12/19 2002)  SpO2: 98 % (08/12/19 2100) Vital Signs (24h Range):  Temp:  [96.9 °F (36.1 °C)-98.3 °F (36.8 °C)] 97.3 °F (36.3 °C)  Pulse:  [] 96  Resp:  [16-22] 18  SpO2:  [96 %-100 %] 98 %  BP: ()/(57-90) 121/57     Weight: 115.2 kg (254 lb)  Body mass index is 32.61 kg/m².    Physical Exam   Constitutional: He is oriented to person, place, and time. He appears well-developed and well-nourished. He appears distressed (Mild respiratory).   HENT:   Head: Normocephalic and atraumatic.   Right Ear: External ear normal.   Left Ear: External ear normal.   Nose: Nose normal.   Mouth/Throat: Oropharynx is clear and moist. No oropharyngeal exudate.   Eyes: Conjunctivae and EOM are normal. Right eye exhibits no discharge. Left eye exhibits no discharge. No scleral icterus.   Neck: Normal range of motion. Neck supple. No thyromegaly present.   Cardiovascular: An irregularly irregular rhythm present. Tachycardia present. Exam reveals distant heart sounds. Exam reveals no gallop and no friction rub.   No murmur heard.  Pulses:       Dorsalis pedis pulses are 1+ on the right side, and 1+ on the left side.        Posterior tibial pulses are 1+ on the right side, and 1+  on the left side.   Pulmonary/Chest: Effort normal. Tachypnea noted. No respiratory distress. He has decreased breath sounds in the right middle field, the right lower field, the left middle field and the left lower field. He has no wheezes. He has rales in the right middle field, the right lower field, the left middle field and the left lower field.   Abdominal: Soft. He exhibits distension. He exhibits no mass. Bowel sounds are decreased. There is no tenderness. There is no rebound and no guarding.   Genitourinary:   Genitourinary Comments: Large external hemorrhoid with mild thrombosis also is as noted on a in the sacral area   Musculoskeletal: Normal range of motion. He exhibits edema and tenderness.   Left large left heel ulcer.  It is purple because of painting head at home with gentian violet   Lymphadenopathy:     He has no cervical adenopathy.   Neurological: He is alert and oriented to person, place, and time. No cranial nerve deficit. Coordination normal.   Skin: Skin is warm and dry. Capillary refill takes less than 2 seconds. No rash noted. No erythema.   Psychiatric: He has a normal mood and affect. His behavior is normal. Judgment and thought content normal.   Nursing note and vitals reviewed.                            CRANIAL NERVES     CN III, IV, VI   Extraocular motions are normal.        Significant Labs:   CBC:   Recent Labs   Lab 08/12/19  1444   WBC 29.38*   HGB 12.0*   HCT 41.1        CMP:   Recent Labs   Lab 08/12/19  1444      K 5.2*   CL 96   CO2 26   *   BUN 50*   CREATININE 6.2*   CALCIUM 9.3   PROT 6.6   ALBUMIN 2.6*   BILITOT 0.8   ALKPHOS 369*   AST 36   ALT 37   ANIONGAP 14   EGFRNONAA 8*       Significant Imaging: I have reviewed and interpreted all pertinent imaging results/findings within the past 24 hours.   Impression:       Small to moderate right pleural effusion.  Atelectasis of the right lower lung field.  Mild cardiomegaly.  Prior sternotomy.   Double-lumen central line in position.      Electronically signed by: Addy Weaver MD  Date: 08/12/2019         Assessment/Plan:     * Severe sepsis  This patient does have evidence of infective focus  My overall impression is severe sepsis .  Antibiotics given- clindamycin /vancomycn   Antibiotics (From admission, onward)    None          Latest lactate reviewed, they are-  Recent Labs   Lab 08/12/19  1556 08/12/19  1941   LACTATE 2.7* 2.0       Organ dysfunction indicated by Acute kidney injury and Acute respiratory failure  Source- heel/possible pneumonia -check procalcitonin  Source control Achieved by- iv abx, diuresis  Blood culture  Heel xray  Ua/urine culture  cxr-trend       External hemorrhoid    Noted on exam use external hemorrhoid treatment and consider surgical evaluation    Sacral decubitus ulcer, stage III  See photos -consult wound care. No evidence infection       Cellulitis of left lower extremity  Acute--severe -with large left heel ulcer.  X-ray done to evaluate for osteomyelitis.  Will likely need some workup  For peripheral artery disease and consider CT scan aortogram with runoff.  Consider vascular surgery consult.  Topical wound care, podiatry consult, and consider Infectious Disease consult pending x-ray and further testing.  Wound care wound care has been consulted for evaluation      ESRD (end stage renal disease) on dialysis   Nephrology consult to follow.  Dialysis days Tuesday Thursday Saturday and will dose medications for renal failure.  And maintain blood pressure control.    Hyperkalemia  Acute-no ekg changes. For HD in am -avoid supplements/AceI/ARB      Morbid (severe) obesity with alveolar hypoventilation  Pulse monitoring CPAP at night      Skin ulcer of left lower leg, limited to breakdown of skin  Chronic, severe -see sepsis/cellulitis ; podiatry /wound care consult        BRANDEN (obstructive sleep apnea)  bipap while in hospital unless pt brings own cpap machine.  Reportedly non-compliant at home      Anemia, chronic disease  Monitor and transfuse if he becomes hemodynamically unstable or H/H < 7/21  Continue po iron/ epo per nephrology       A-fib    Atrial Fibrillation controlled currently with Beta Blocker. [id number] score 5. Anticoagulation indicated currently. Anticoagulation done with-- on hold  For possible surgery-no anticoagulants noted on home med list.   Hx gi bleed ?     Atrial fibrillation with premature ventricular or aberrantly conducted complexes  Left axis deviation  Right bundle branch block  T wave abnormality, consider lateral ischemia  Abnormal ECG  When compared with ECG of 26-JUN-2019 06:19,  T wave inversion no longer evident in Inferior leads    COPD (chronic obstructive pulmonary disease)  Patient's COPD is controlled currently. Continue scheduled inhalers prn nebulizer; /oxygen  and monitor respiratory status closely.       Coronary artery disease  Chronic and stable.  Continue home medications with aspirin and beta-blocker.  Diuresing with Lasix and will star and hemodialysis again tomorrow.  and monitor on telemetry monitor signs for acute coronary syndrome.  Initial troponin negative and EKG shows AFib with possible lateral ischemic changes.        DM type 2 with diabetic mixed hyperlipidemia  Chronic and uncontrolled.  Wife reports he has not been taking insulin at home.  Will hold his oral medications and use low-dose Levemir insulin to scale and Accu-Cheks AC and HS.  Hemoglobin A1c pending.  Dietitian consulted.      Type 2 diabetes mellitus with kidney complication, with long-term current use of insulin    See dm    HTN (hypertension)  Chronic and stable.  Hypertension Medications             metoprolol tartrate (LOPRESSOR) 25 MG tablet Take 1 tablet (25 mg total) by mouth every Mon, Wed, Fri, Sun.    nitroGLYCERIN (NITROSTAT) 0.4 MG SL tablet Place 0.4 mg under the tongue every 5 (five) minutes as needed for Chest pain. Seek medical help  if pain is not relieved by the third dose.      Hospital Medications             furosemide injection 40 mg (Completed) Inject 4 mLs (40 mg total) into the vein ED 1 Time.    metoprolol tartrate (LOPRESSOR) tablet 25 mg Take 1 tablet (25 mg total) by mouth every Mon, Wed, Fri, Sun.    nitroGLYCERIN SL tablet 0.4 mg Place 1 tablet (0.4 mg total) under the tongue every 5 (five) minutes as needed for Chest pain.              VTE Risk Mitigation (From admission, onward)        Ordered     heparin (porcine) injection 5,000 Units  Every 8 hours      08/12/19 1735     IP VTE HIGH RISK PATIENT  Once      08/12/19 1735           Time spent seeing patient( greater than 1/2 spent in direct contact) : 65  Wife upset -reports did not get better at [facility] , now heel infection   Consult to Cm -dispo pending results   Consult PT/OT am   Katerin Kaufman MD  Department of Hospital Medicine   [facility]

## 2019-08-13 NOTE — TRANSFER OF CARE
"Anesthesia Transfer of Care Note    Patient: Frank Zayas    Procedure(s) Performed: Procedure(s) (LRB):  DEBRIDEMENT, FOOT (Left)    Patient location: PACU    Anesthesia Type: MAC    Transport from OR: Transported from OR on 2-3 L/min O2 by NC with adequate spontaneous ventilation    Post pain: adequate analgesia    Post assessment: tolerated procedure well and no apparent anesthetic complications    Post vital signs: stable    Level of consciousness: awake and confused    Nausea/Vomiting: no nausea/vomiting    Complications: none    Transfer of care protocol was followed      Last vitals:   Visit Vitals  /69 (BP Location: Left arm, Patient Position: Lying)   Pulse 92   Temp 36.4 °C (97.5 °F) (Skin)   Resp 18   Ht 6' 2" (1.88 m)   Wt 115.2 kg (253 lb 15.5 oz)   SpO2 97%   BMI 32.61 kg/m²     "

## 2019-08-14 PROBLEM — E08.621: Status: ACTIVE | Noted: 2019-01-01

## 2019-08-14 PROBLEM — L97.425: Status: ACTIVE | Noted: 2019-01-01

## 2019-08-14 NOTE — PROGRESS NOTES
"[facility] Medicine  Progress Note    Patient Name: Frank Zayas  MRN: [id number]  Patient Class: IP- Inpatient   Admission Date: 8/12/2019  Length of Stay: 2 days  Attending Physician: Katerin Kaufman MD  Primary Care Provider: Mikhail Shields MD        Subjective:     Principal Problem:Severe sepsis        HPI:  Frank Zayas is a 79 y.o. male with COPD who presents to the ED with ongoing SOB greater than baseline. Per EMS his home health nurse noticed worsening SOB when going to re-wrap his legs, which are being treated for ulcer. The patient denies any chest pain, fever, chills or fever, N/V/D, or any other symptoms at this time.  He was stable in the ED with oxygen at 2 L. he is noted to have an elevated BNP and bilateral pulmonary edema on chest x-ray.  He was given Lasix 60 mg IV in the ED and Nephrology was notified of his pulmonary edema. His usual dialysis days are Tuesday Thursday and Saturday.  He was also sent by a home health nurse because of malodorous drainage to his heel and erythema in his left lower extremity that had not been near the previous dressing change.  His left heel was drain and very malodorous.  No gangrene is noted.  There skin sloughing off of his left heel as noted in the photos.       Hx of CHF, HTN, hyperlipidemia, and MI.  PSHx of coronary stents, CABG, and cardiac sugery. No known drug allergies    Overview/Hospital Course:  Frank Zayas is a 79 y.o. male admitted with severe sepsis 2/2 cellulitis/infected left heel diabetic ulcer -initiated on vanc/merrem   Podiatry consutled -surgical debridement to muscle on 8/13. Cultures taken/wound care done. CM consulted for ltac   Blood cultures -proteus     Id consulted         Interval History:   Pt seen/examined-on HD -he is crying with jamie _"all over" popeye butt and legs   Some sob   No chest pain   Feeling miserable -        Review of Systems   Constitutional: Positive for activity change. " Negative for chills and fever.   Respiratory: Positive for cough and shortness of breath.    Genitourinary: Negative for difficulty urinating and dysuria.   Musculoskeletal: Positive for arthralgias, back pain, joint swelling and myalgias.   Skin: Positive for wound (Superficial wound left heel/gangrene-now s/p debridement to muscle ).   Hematological: Negative for adenopathy. Bruises/bleeds easily.     Objective:     Vital Signs (Most Recent):  Temp: 98.1 °F (36.7 °C) (08/14/19 1056)  Pulse: 89 (08/14/19 1056)  Resp: 20 (08/14/19 1056)  BP: (!) 124/53 (08/14/19 1056)  SpO2: 98 % (08/14/19 0930) Vital Signs (24h Range):  Temp:  [96.7 °F (35.9 °C)-98.2 °F (36.8 °C)] 98.1 °F (36.7 °C)  Pulse:  [] 89  Resp:  [14-20] 20  SpO2:  [93 %-100 %] 98 %  BP: ()/(50-75) 124/53     Weight: 115.2 kg (253 lb 15.5 oz)  Body mass index is 32.61 kg/m².    Intake/Output Summary (Last 24 hours) at 8/14/2019 1602  Last data filed at 8/13/2019 2008  Gross per 24 hour   Intake 0 ml   Output --   Net 0 ml      Physical Exam   Constitutional: He is oriented to person, place, and time. He appears well-developed and well-nourished. No distress (Mild respiratory).   HENT:   Head: Normocephalic and atraumatic.   Right Ear: External ear normal.   Left Ear: External ear normal.   Nose: Nose normal.   Mouth/Throat: Oropharynx is clear and moist. No oropharyngeal exudate.   Eyes: Conjunctivae and EOM are normal. Right eye exhibits no discharge. Left eye exhibits no discharge. No scleral icterus.   Neck: Normal range of motion. Neck supple. No thyromegaly present.   Cardiovascular: Normal rate and regular rhythm. Exam reveals distant heart sounds. Exam reveals no gallop and no friction rub.   No murmur heard.  Pulses:       Dorsalis pedis pulses are 1+ on the right side, and 1+ on the left side.        Posterior tibial pulses are 1+ on the right side, and 1+ on the left side.   Treated decreased pulses posterior tibial and dorsalis post  left over right   Pulmonary/Chest: Effort normal. Tachypnea noted. No respiratory distress. He has decreased breath sounds in the right middle field, the right lower field, the left middle field and the left lower field. He has no wheezes. He has no rales.   Abdominal: Soft. Bowel sounds are normal. He exhibits distension. He exhibits no mass. There is no tenderness. There is no rebound and no guarding.   Genitourinary:   Genitourinary Comments: Large external hemorrhoid with thrombosis  Sacral friction tears     Musculoskeletal: Normal range of motion. He exhibits edema and tenderness.   Left large left heel ulcer.  It is purple because of painting head at home with gentian violet   Lymphadenopathy:     He has no cervical adenopathy.   Neurological: He is alert and oriented to person, place, and time. No cranial nerve deficit. Coordination normal.   Skin: Skin is warm and dry. Capillary refill takes less than 2 seconds. No rash noted. No erythema.   Psychiatric: He has a normal mood and affect. His behavior is normal. Judgment and thought content normal.   Nursing note and vitals reviewed.      Significant Labs:   CBC:   Recent Labs   Lab 08/13/19  0446   WBC 19.14*   HGB 11.4*   HCT 38.6*   *     CMP:   Recent Labs   Lab 08/13/19  0446 08/14/19  0602   * 134*   K 5.4* 6.2*   CL 97 101   CO2 26 17*   * 125*   BUN 54* 43*   CREATININE 6.6* 5.5*   CALCIUM 9.1 9.0   ALBUMIN 2.4* 2.7*   ANIONGAP 12 16   EGFRNONAA 7* 9*       Significant Imaging: I have reviewed and interpreted all pertinent imaging results/findings within the past 24 hours. no n=new     Imaging Reviewed:              CXR,    US arteries, foot xray  CT foot 8/13  Impression         Soft tissue wound of the heel, without evidence for abscess, gas-forming infection, or calcaneal osteomyelitis.    Severe arthritic change of the 1st metatarsophalangeal joint which may represent sequelae of prior septic arthritis, or gout.  Active septic  arthritis/osteomyelitis cannot be excluded.    Abundant soft tissue edema versus cellulitis of the foot           Assessment/Plan:      * Severe sepsis  This patient does have evidence of infective focus-diabetic ulcer left heel   Gm neg bacteremia --Proteus ;   My overall impression is severe sepsis .-improving   Antibiotics given- clindamycin(only in ED)  /vancomycn   Antibiotics (From admission, onward)    Start     Stop Route Frequency Ordered    08/13/19 2300  meropenem-0.9% sodium chloride 500 mg/50 mL IVPB      -- IV Every 12 hours (non-standard times) 08/13/19 1231    08/12/19 2245  mupirocin 2 % ointment      08/17 2059 Nasl 2 times daily 08/12/19 2143          Latest lactate reviewed, they are-  Recent Labs   Lab 08/12/19  1556 08/12/19  1941   LACTATE 2.7* 2.0       Organ dysfunction indicated by Acute kidney injury and Acute respiratory failure  Source- heel  Ruled out  pneumonia -check procalcitonin--neg   Source control Achieved by- iv abx, diuresis-debridement /wound care  Of left heel /ID and podiatry following     Heel xray-neg  CT foot -possible septic arthritis 1st MT joint   WOund culture 8/13  Ua/urine culture-ngtd     cxr-trend -      Diabetic ulcer of left heel associated with diabetes mellitus due to underlying condition, with muscle involvement without evidence of necrosis  See sepsis       Gangrene of left foot  Acute-2/2 gangrenous ulcer left heel.  Podiatry wound care and Infectious Disease consulted.  Also needs vascular surgery.  See sepsis     External hemorrhoid    Noted on exam use external hemorrhoid treatment and consider surgical evaluation    Sacral decubitus ulcer, stage III  See photos -consult wound care. No evidence infection       Cellulitis of left lower extremity  Acute--severe -with large left heel ulcer.  -see sepsis     ESRD (end stage renal disease) on dialysis   Nephrology consult to follow.  Dialysis days Tuesday Thursday Saturday and will dose medications for renal  failure.  And maintain blood pressure control.    Hyperkalemia  Acute-no ekg changes. Treated with HD unless ekg changes -then acute treatement will be required  -avoid supplements/AceI/ARB      Morbid (severe) obesity with alveolar hypoventilation  Pulse monitoring CPAP at night      Skin ulcer of left lower leg, limited to breakdown of skin  Chronic, severe -see sepsis/cellulitis ; podiatry /wound care consult        BRANDEN (obstructive sleep apnea)  bipap while in hospital unless pt brings own cpap machine. Reportedly non-compliant at home  Refuses bipap in hospital -consider haldol at hs for agitation and continue to encourage use to avoid respiratory compromise   Add IS/Acapella       Moderate malnutrition  Boost and emmanuel bid -dietary recommendations /weight weekly       Anemia, chronic disease  Monitor and transfuse if he becomes hemodynamically unstable or H/H < 7/21  Continue po iron/ epo per nephrology recs       A-fib    Atrial Fibrillation controlled currently with Beta Blocker. [id number] score 5. Anticoagulation indicated currently. Anticoagulation done with-- on hold  For possible surgery-no anticoagulants noted on home med list.   Hx gi bleed ?     Atrial fibrillation with premature ventricular or aberrantly conducted complexes  Left axis deviation  Right bundle branch block  T wave abnormality, consider lateral ischemia  Abnormal ECG  When compared with ECG of 26-JUN-2019 06:19,  T wave inversion no longer evident in Inferior leads    COPD (chronic obstructive pulmonary disease)  Patient's COPD is controlled currently. Continue scheduled inhalers prn nebulizer; /oxygen  and monitor respiratory status closely.   HD to remove volume from lung.  IS and acapella for pulm toilet       Coronary artery disease  Chronic and stable.  Continue home medications with aspirin and beta-blocker.  Diuresing with Lasix and will star and hemodialysis again tomorrow.  and monitor on telemetry monitor signs for acute  coronary syndrome.  Initial troponin negative and EKG shows AFib with possible lateral ischemic changes.        DM type 2 with diabetic mixed hyperlipidemia  Chronic and uncontrolled.-on admit-trended to low 100's   Wife reports he has not been taking insulin at home.  Held on admit -- oral medications and use low-dose Levemir insulin to scale and Accu-Cheks AC and HS.  Hemoglobin A1c pending.  Dietitian consulted.-supplements for  Malnutrition     Lab Results   Component Value Date    HGBA1C 6.1 (H) 08/12/2019       Type 2 diabetes mellitus with kidney complication, with long-term current use of insulin    See dm    HTN (hypertension)  Chronic and stable.  Hypertension Medications             metoprolol tartrate (LOPRESSOR) 25 MG tablet Take 1 tablet (25 mg total) by mouth every Mon, Wed, Fri, Sun.    nitroGLYCERIN (NITROSTAT) 0.4 MG SL tablet Place 0.4 mg under the tongue every 5 (five) minutes as needed for Chest pain. Seek medical help if pain is not relieved by the third dose.      Hospital Medications             furosemide injection 40 mg (Completed) Inject 4 mLs (40 mg total) into the vein ED 1 Time.    metoprolol tartrate (LOPRESSOR) tablet 25 mg Take 1 tablet (25 mg total) by mouth every Mon, Wed, Fri, Sun.    nitroGLYCERIN SL tablet 0.4 mg Place 1 tablet (0.4 mg total) under the tongue every 5 (five) minutes as needed for Chest pain.            Malignant neoplasm of anterior wall of urinary bladder  Chronic, stable; monitoring I/o . PVR as needed         VTE Risk Mitigation (From admission, onward)        Ordered     heparin (porcine) injection 5,000 Units  As needed (PRN)      08/13/19 1435     heparin (porcine) injection 5,000 Units  Every 8 hours      08/12/19 1735     IP VTE HIGH RISK PATIENT  Once      08/12/19 1735                Katerin Kaufman MD  Department of Hospital Medicine   [facility]

## 2019-08-14 NOTE — PROGRESS NOTES
INPATIENT NEPHROLOGY PROGRESS NOTE  [facility] NEPHROLOGY    Patient Name: Frank Zayas  Date: 08/14/2019    Reason for consultation: ESRD    Chief Complaint:   Chief Complaint   Patient presents with    Shortness of Breath     with exertion, does not use O2 at home. arrives to ED with 2 L NC    Foot Pain     left heel since being discharged from [facility]. bigger since Friday       History of Present Illness:  Frank Zayas is a 79 y.o. male with COPD and ESRD on HD TTS who presents to the ED with ongoing SOB greater than baseline. Per EMS his home health nurse noticed worsening SOB when going to re-wrap his legs, which are being treated for ulcer. The patient denies any chest pain, fever, chills or fever, N/V/D, or any other symptoms at this time.  He was stable in the ED with oxygen at 2 L. he is noted to have an elevated BNP and bilateral pulmonary edema on chest x-ray.  He was given Lasix 60 mg IV in the ED. He was also sent by a home health nurse because of malodorous drainage to his heel and erythema in his left lower extremity that had not been near the previous dressing change.  His left heel was drain and very malodorous.  No gangrene is noted. We are consulted for dialysis.     Foot xray- no osteo, + cellulitis, + soft tissue ulcer of the heel    · Interval History/Subjective:    - BP stable, on 2L NC    · Review of Systems: sleeping    Plan of Care:    Assessment:  ESRD  Chronic hypotension/Diastolic CHF/Acute pulm edema  Hyperkalemia  SHPT  Anemia of CKD  Foot ulcer/cellulitis     Plan:     - Ordered supplemental HD today for clearance and volume removal. Continue routine HD TTS.  - Ordered 25g albumin with 2-3L UF.   - Ordered 2K bath.  - Phos is above goal- change sevelamer to 3 tabs with meals.  - Hb is stable. Continue EPO 5K with HD.   - Dose antbx for dialysis.    Thank you for allowing us to participate in this patient's care. We will continue to follow.    Medications:  No current  facility-administered medications on file prior to encounter.      Current Outpatient Medications on File Prior to Encounter   Medication Sig Dispense Refill    FLUoxetine 20 MG capsule Take 20 mg by mouth every morning.       metoprolol tartrate (LOPRESSOR) 25 MG tablet Take 1 tablet (25 mg total) by mouth every Mon, Wed, Fri, Sun. 16 tablet 11    nitroGLYCERIN (NITROSTAT) 0.4 MG SL tablet Place 0.4 mg under the tongue every 5 (five) minutes as needed for Chest pain. Seek medical help if pain is not relieved by the third dose.      pantoprazole (PROTONIX) 40 MG tablet Take 1 tablet (40 mg total) by mouth 2 (two) times daily before meals. 60 tablet 11    sevelamer carbonate (RENVELA) 800 mg Tab Take 2 tablets (1,600 mg total) by mouth 3 (three) times daily with meals. 180 tablet 11     Scheduled Meds:   albumin human 25%  25 g Intravenous Once    alfuzosin  10 mg Oral Nightly    aspirin  81 mg Oral Daily    epoetin quinton-epbx  50 Units/kg Intravenous Every Tues, Thurs, Sat    FLUoxetine  20 mg Oral QAM    heparin (porcine)  5,000 Units Subcutaneous Q8H    hydrocortisone   Rectal BID    insulin detemir U-100  10 Units Subcutaneous QHS    meropenem (MERREM) IVPB  500 mg Intravenous Q12H    metoprolol tartrate  25 mg Oral Every Mon, Wed, Fri, Sun    mupirocin   Nasal BID    pantoprazole  40 mg Oral BID AC    rOPINIRole  2 mg Oral QHS    senna-docusate 8.6-50 mg  1 tablet Oral BID    sevelamer carbonate  1,600 mg Oral TID WM     Continuous Infusions:  PRN Meds:.sodium chloride 0.9%, acetaminophen, dextrose 50%, dextrose 50%, glucagon (human recombinant), glucose, glucose, heparin (porcine), insulin aspart U-100, lidocaine HCL 2%, miconazole nitrate 2%, nitroGLYCERIN, ondansetron, promethazine (PHENERGAN) IVPB, sodium chloride 0.9%    Allergies:  Lidocaine and Statins-hmg-coa reductase inhibitors    Vital Signs:  Temp Readings from Last 3 Encounters:   08/14/19 96.7 °F (35.9 °C) (Axillary)   07/01/19  96.4 °F (35.8 °C) (Oral)   06/17/19 98.7 °F (37.1 °C) (Oral)       Pulse Readings from Last 3 Encounters:   08/14/19 106   07/01/19 94   06/17/19 69       BP Readings from Last 3 Encounters:   08/14/19 100/63   07/01/19 108/72   06/17/19 (!) 105/56       Weight:  Wt Readings from Last 3 Encounters:   08/13/19 115.2 kg (253 lb 15.5 oz)   06/30/19 127 kg (279 lb 15.8 oz)   06/17/19 120.4 kg (265 lb 6.9 oz)       Physical Exam:  Constitutional: nad, sleeping, nontoxic, chronically ill  Heart: rrr, no m/r/g, wwp, + edema  Lungs: ant ausc clear, no w/r/r/c, no lb  Abdomen: s/nt/nd, +BS    Results:  Lab Results   Component Value Date     (L) 08/13/2019    K 5.4 (H) 08/13/2019    CL 97 08/13/2019    CO2 26 08/13/2019    BUN 54 (H) 08/13/2019    CREATININE 6.6 (H) 08/13/2019    CALCIUM 9.1 08/13/2019    ANIONGAP 12 08/13/2019    ESTGFRAFRICA 8 (A) 08/13/2019    EGFRNONAA 7 (A) 08/13/2019       Lab Results   Component Value Date    CALCIUM 9.1 08/13/2019    PHOS 5.9 (H) 08/13/2019       No results for input(s): WBC, RBC, HGB, HCT, PLT, MCV, MCH, MCHC in the last 24 hours.    I have personally reviewed pertinent radiological imaging and reports.    Marivel Quinonez MD MPH  [facility] [address] [address]  [phone] (p)  [phone] (f)

## 2019-08-14 NOTE — ASSESSMENT & PLAN NOTE
Patient's COPD is controlled currently. Continue scheduled inhalers prn nebulizer; /oxygen  and monitor respiratory status closely.   HD to remove volume from lung.  IS and acapella for pulm toilet

## 2019-08-14 NOTE — PLAN OF CARE
Problem: Adult Inpatient Plan of Care  Goal: Plan of Care Review  Outcome: Ongoing (interventions implemented as appropriate)  Pt in bed resting. Pt able to make needs and wants known. Pt sleeping majority of night post surgical procedure. Pt easily awaken to voice. Bed low, call light in reach, safety maintained will continue to monitor

## 2019-08-14 NOTE — ANESTHESIA PREPROCEDURE EVALUATION
08/13/2019  Frank Zayas is a 79 y.o., male.    Pre-op Assessment    I have reviewed the Patient Summary Reports.     I have reviewed the Nursing Notes.   I have reviewed the Medications.     Review of Systems  Anesthesia Hx:  No problems with previous Anesthesia    Social:  Former Smoker      Hematology/Oncology:         -- Cancer in past history: Other (see Oncology comments)   Cardiovascular:   Hypertension Past MI CAD   CHF    Pulmonary:   COPD, moderate Sleep Apnea    Renal/:   Chronic Renal Disease, Dialysis, ESRD    Hepatic/GI:   Liver Disease,    Endocrine:   Diabetes, well controlled        Physical Exam  General:  Obesity    Airway/Jaw/Neck:  Airway Findings: Mouth Opening: Normal Tongue: Normal  General Airway Assessment: Adult, Good  Mallampati: II  Improves to II with phonation.  TM Distance: 4-6 cm      Dental:  Dental Findings: In tact   Chest/Lungs:  Chest/Lungs Findings: Clear to auscultation, Normal Respiratory Rate     Heart/Vascular:  Heart Findings: Rate: Normal  Rhythm: Regular Rhythm  Sounds: Normal  Heart murmur: negative       Mental Status:  Mental Status Findings:  Cooperative, Alert and Oriented         Anesthesia Plan  Type of Anesthesia, risks & benefits discussed:  Anesthesia Type:  MAC, regional  Patient's Preference:   Intra-op Monitoring Plan: standard ASA monitors  Intra-op Monitoring Plan Comments:   Post Op Pain Control Plan: IV/PO Opioids PRN, peripheral nerve block and multimodal analgesia  Post Op Pain Control Plan Comments:   Induction:   IV  Beta Blocker:  Patient is on a Beta-Blocker and has received one dose within the past 24 hours (No further documentation required).       Informed Consent: Patient understands risks and agrees with Anesthesia plan.  Questions answered. Anesthesia consent signed with patient.  ASA Score: 4     Day of Surgery Review of History  & Physical: I have interviewed and examined the patient. I have reviewed the patient's H&P dated:  There are no significant changes.  H&P update referred to the surgeon.         Ready For Surgery From Anesthesia Perspective.

## 2019-08-14 NOTE — PLAN OF CARE
Per Renata with [facility] she needs the results of the pts ultrasound of his leg. Per Jasmin, PCU charge- they couldn't do the procedure yesterday because the pt was in surgery and today he was in dialysis. I updated the pts nurse Lilli that [facility] is requesting the results. CM to follow. Tish Jamison, [facility]      08/14/19 9763   Post-Acute Status   Post-Acute Authorization Placement   Post-Acute Placement Status Pending Post-Acute Medical Review

## 2019-08-14 NOTE — SUBJECTIVE & OBJECTIVE
Interval History:   Pt seen/examined-continues to have malodorous drainage  From his left heel  and x-ray negative for osteo.  No fever.  Shortness of breath improved with diuresis.  White blood cell count trending down.    Review of Systems   Constitutional: Positive for activity change and fever.   Respiratory: Positive for cough and shortness of breath.    Genitourinary: Negative for difficulty urinating and dysuria.   Skin: Positive for wound (Superficial green green left heel).   Hematological: Negative for adenopathy. Bruises/bleeds easily.     Objective:     Vital Signs (Most Recent):  Temp: 97.8 °F (36.6 °C) (08/13/19 1958)  Pulse: 103 (08/13/19 1958)  Resp: 16 (08/13/19 1958)  BP: 103/68 (08/13/19 1958)  SpO2: 96 % (08/13/19 1958) Vital Signs (24h Range):  Temp:  [96.3 °F (35.7 °C)-97.9 °F (36.6 °C)] 97.8 °F (36.6 °C)  Pulse:  [] 103  Resp:  [14-20] 16  SpO2:  [95 %-99 %] 96 %  BP: ()/(49-87) 103/68     Weight: 115.2 kg (253 lb 15.5 oz)  Body mass index is 32.61 kg/m².    Intake/Output Summary (Last 24 hours) at 8/13/2019 2237  Last data filed at 8/13/2019 2008  Gross per 24 hour   Intake 650 ml   Output 1860 ml   Net -1210 ml      Physical Exam   Constitutional: He is oriented to person, place, and time. He appears well-developed and well-nourished. No distress (Mild respiratory).   HENT:   Head: Normocephalic and atraumatic.   Right Ear: External ear normal.   Left Ear: External ear normal.   Nose: Nose normal.   Mouth/Throat: Oropharynx is clear and moist. No oropharyngeal exudate.   Eyes: Conjunctivae and EOM are normal. Right eye exhibits no discharge. Left eye exhibits no discharge. No scleral icterus.   Neck: Normal range of motion. Neck supple. No thyromegaly present.   Cardiovascular: Normal rate and regular rhythm. Exam reveals distant heart sounds. Exam reveals no gallop and no friction rub.   No murmur heard.  Pulses:       Dorsalis pedis pulses are 1+ on the right side, and 1+ on  the left side.        Posterior tibial pulses are 1+ on the right side, and 1+ on the left side.   Treated decreased pulses posterior tibial and dorsalis post left over right   Pulmonary/Chest: Effort normal. Tachypnea noted. No respiratory distress. He has decreased breath sounds in the right middle field, the right lower field, the left middle field and the left lower field. He has no wheezes. He has no rales.   Abdominal: Soft. Bowel sounds are normal. He exhibits distension. He exhibits no mass. There is no tenderness. There is no rebound and no guarding.   Genitourinary:   Genitourinary Comments: Large external hemorrhoid with mild thrombosis also is as noted on a in the sacral area   Musculoskeletal: Normal range of motion. He exhibits edema and tenderness.   Left large left heel ulcer.  It is purple because of painting head at home with gentian violet   Lymphadenopathy:     He has no cervical adenopathy.   Neurological: He is alert and oriented to person, place, and time. No cranial nerve deficit. Coordination normal.   Skin: Skin is warm and dry. Capillary refill takes less than 2 seconds. No rash noted. No erythema.   Psychiatric: He has a normal mood and affect. His behavior is normal. Judgment and thought content normal.   Nursing note and vitals reviewed.      Significant Labs: All pertinent labs within the past 24 hours have been reviewed.    Significant Imaging: I have reviewed and interpreted all pertinent imaging results/findings within the past 24 hours.

## 2019-08-14 NOTE — ASSESSMENT & PLAN NOTE
Chronic and uncontrolled.-on admit-trended to low 100's   Wife reports he has not been taking insulin at home.  Held on admit -- oral medications and use low-dose Levemir insulin to scale and Accu-Cheks AC and HS.  Hemoglobin A1c pending.  Dietitian consulted.-supplements for  Malnutrition     Lab Results   Component Value Date    HGBA1C 6.1 (H) 08/12/2019

## 2019-08-14 NOTE — ASSESSMENT & PLAN NOTE
bipap while in hospital unless pt brings own cpap machine. Reportedly non-compliant at home  Refuses bipap in hospital -consider haldol at hs for agitation and continue to encourage use to avoid respiratory compromise   Add IS/Acapella

## 2019-08-14 NOTE — PHYSICIAN QUERY
PT Name: Frank Zayas  MR #: [id number]    Physician Query Form - Respiratory Condition Clarification      CDS/: MARCUS Serrano, RN, CDS              Contact information:[email or web address]     This form is a permanent document in the medical record.    Query Date: August 14, 2019    By submitting this query, we are merely seeking further clarification of documentation. Please utilize your independent clinical judgment when addressing the question(s) below.    The Medical record contains the following   Indicators   Supporting Clinical Findings Location in Medical Record   X   SOB, KELLGOG, Wheezing, Productive Cough, Use of Accessory Muscles, etc.  shortness of breath (Ongoing, worsening from baseline)     Tachypnea noted    ED note, Dr. Nascimento, 8/12       H&P, Dr. Kaufman, 8/12   X   Acute/Chronic Illness  CHF, HTN,  MI, COPD, BRANDEN (obstructive sleep apnea)    H&P, Dr. Kaufman, 8/12   X   Radiology Findings  bilateral pulmonary edema on chest x-ray  H&P, Dr. Kaufman, 8/12   X   Respiratory Distress or Failure  distressed (Mild respiratory)     Organ dysfunction indicated by  ... and Acute respiratory failure  H&P, Dr. Kaufman, 8/12      Hypoxia or Hypercapnia     X   RR         ABGs         O2 sat  Resp 22    O2 sat 88%  Vital sign flowsheet, 8/12 @1816     Vital sign flowsheet, 8/14 @ 7:58AM   X   BiPAP/Intubation  BRANDEN (obstructive sleep apnea)  bipap while in hospital unless pt brings own cpap machine    H&P, Dr. Kaufman, 8/12   X   Supplemental O2  He was stable in the ED with oxygen at 2 L  H&P, Dr. Kaufman, 8/12      Home O2, Oxygen Dependence     X   Treatment  oxygen at 2 L     Lasix 60 mg IV     H&P, Dr. Kaufman, 8/12   X   Other  Nephrology was notified of his pulmonary edema. His usual dialysis days are Tuesday Thursday and Saturday.    H&P, Dr. Kaufman, 8/12     Respiratory failure can be acute, chronic or both. It is generally further specified as hypoxic, hypercapnic or both. Lastly, it is  important to identify an etiology, if known or suspected.   References::  https://www.acphospitalist.org/archives/2013/10/coding.htm http://[employer]/acute-respiratory-failure-know     The clinical guidelines noted below are only system guidelines, and do not replace the providers clinical judgment.    Provider, please specify diagnosis or diagnoses associated with above clinical findings.   [   ] Acute Respiratory Failure with Hypoxia - ABG pO2 < 60 mmHg or O2 sat of 88% on RA and respiratory symptoms documented   [   ] Other Acute Respiratory Failure     [  x ] Acute Respiratory Insufficiency - Generally describes less severe respiratory symptoms and measurements (pO2, SpO2, pH, and pCO2) NOT meeting criteria for respiratory failure     [   ] Acute Respiratory Distress - Generally describes less severe respiratory symptoms (tachypnea, in respiratory distress, increased work of breathing, unable to speak in complete sentences, labored breathing, use of accessory muscles, RR> 24, cyanosis, dyspnea, wheezing, stridor, lethargy) without sufficient measurements (pO2, SpO2, pH, and pCO2) to meet criteria for respiratory failure    [   ] Other Respiratory Diagnosis (please specify): _________________________________   [   ]  Clinically Undetermined       Please document in your progress notes daily for the duration of treatment until resolved and include in your discharge summary.

## 2019-08-14 NOTE — ASSESSMENT & PLAN NOTE
Acute-2/2 gangrenous ulcer left heel.  Podiatry wound care and Infectious Disease consulted.  Also needs vascular surgery.  For debridement of heel and continue IV antibiotics for a diabetic foot ulcer.  X-ray and CT have been done to rule septic arthritis and osteomyelitis and are negative.

## 2019-08-14 NOTE — PHYSICIAN QUERY
"PT Name: Frank Zayas  MR #: [id number]    Physician Query Form - Heart  Condition Clarification     CDS/: MARCUS Serrano, RN, CDS               Contact information:[email or web address]  This form is a permanent document in the medical record.     Query Date: August 14, 2019    By submitting this query, we are merely seeking further clarification of documentation. Please utilize your independent clinical judgment when addressing the question(s) below.    The medical record contains the following   Indicators     Supporting Clinical Findings Location in Medical Record   X BNP    BNP 3,184 (H)     Lab, 8/12    EF     X Radiology findings  Small to moderate right pleural effusion    CXR, 8/12    Echo Results      "Ascites" documented     X "SOB" or "KELLOGG" documented  SOB (shortness of breath)    ED provider note, Dr. Nascimento, 8/12    "Hypoxia" documented     X Heart Failure documented  Patient also with CHF exacerbation     Diastolic CHF    ED provider note, Dr. Nascimento, 8/12     Neph note, Dr. Quinonez, 8/14   X "Edema" documented  significant peripheral edema    ED provider note, Dr. Nascimento, 8/12    Diuretics/Meds     X Treatment:  Given 40 of Lasix in the ED IV.    ED provider note, Dr. Nascimento, 8/12   X Other:     Patient also with CHF exacerbation and BNP greater than 3000 with significant peripheral edema.  Given 40 of Lasix in the ED IV.         He was stable in the ED with oxygen at 2 L   SOB greater than baseline     Nephrology was notified of his pulmonary edema. His usual dialysis days are Tuesday Thursday and Saturday  ED provider note, Dr. Nascimento, 8/12         H&P note, Dr. Kaufman, 8/12     Heart failure (HF) can be acute, chronic or both. It is generally further specificed as systolic, diastolic, or combined. Lastly, it is important to identify an underlying etiology if known or suspected.     Common clues to acute exacerbation:  Rapidly progressive symptoms (w/in 2 weeks of presentation), using IV diuretics " to treat, using supplemental O2, pulmonary edema on Xray, MI w/in 4 weeks, and/or BNP >500    Systolic Heart Failure: is defined as chart documentation of a left ventricular ejection fraction (LVEF) less than 40%     Diastolic Heart Failure: is defined as a left ventricular ejection fraction (LVEF) greater than 40%   +      Evidence of diastolic dysfunction on echocardiography OR    Right heart catheterization wedge pressure above 12 mm Hg OR    Left heart catheterization left ventricular end diastolic pressure 18 mm Hg or above.    References: *American Heart Association    The clinical guidelines noted below are only system guidelines, and do not replace the providers clinical judgment.     Provider, please specify the diagnosis associated with above clinical findings    [x   ] Acute on Chronic Diastolic Heart Failure -    Pre-existing diastoic HF diagnosis.  EF > 40%  and acute HF symptoms documented       [   ] Chronic Diastolic Heart Failure - Pre-existing diastolic HF diagnosis.  EF > 40%  without  acute HF symptoms documented   [   ] Other Type of Heart Failure (please specify type):   [   ] Other (please specify):   [  ] Clinically Undetermined                           Please document in your progress notes daily for the duration of treatment until resolved and include in your discharge summary.

## 2019-08-14 NOTE — PT/OT/SLP PROGRESS
Occupational Therapy      Patient Name:  Frank Zayas   MRN:  [id number]    Patient not seen today secondary to Patient unwilling to participate, Other (Comment)(pt in dialysis). Will follow-up 8/15/19.    Trish Mayers OT  8/14/2019

## 2019-08-14 NOTE — PHYSICIAN QUERY
PT Name: Frank Zayas  MR #: [id number]    Physician Query Form - Consultant Diagnosis Clarification     CDS/: MARCUS Serrano, RN, CDS               Contact information:[email or web address]  This form is a permanent document in the medical record.     Query Date: August 14, 2019      By submitting this query, we are merely seeking further clarification of documentation.  Please utilize your independent clinical judgment when addressing the question(s) below.      The Medical record contains the following:   Diagnosis Supporting Clinical Information Location in Medical Record   Moderate malnutrition   Contributing Nutrition Diagnosis  Moderate chronic illness related malnutrition     Related to (etiology):   Decreased appetite and increased kcal/protein needs d/t HD     Signs and Symptoms (as evidenced by):   1) Stage 2 PI buttocks + heel PI  2) Trace edema in legs  3) PO intakes < 75% EEN x > 1 month     Interventions/Recommendations (treatment strategy):  1) Nutrition supplement therapy      Muscle wasting   RD note, 8/13                                    Neph note, Dr. Quinonez, 8/13         Do you agree with the Consultants diagnosis of Moderate Protein Malnutrition?    [x  ] Yes   [  ] No   [  ] Other/Clarification of findings:   [  ] Clinically undetermined

## 2019-08-14 NOTE — SUBJECTIVE & OBJECTIVE
"Interval History:   Pt seen/examined-on HD -he is crying with jamie _"all over" popeye butt and legs   Some sob   No chest pain   Feeling miserable -        Review of Systems   Constitutional: Positive for activity change. Negative for chills and fever.   Respiratory: Positive for cough and shortness of breath.    Genitourinary: Negative for difficulty urinating and dysuria.   Musculoskeletal: Positive for arthralgias, back pain, joint swelling and myalgias.   Skin: Positive for wound (Superficial wound left heel/gangrene-now s/p debridement to muscle ).   Hematological: Negative for adenopathy. Bruises/bleeds easily.     Objective:     Vital Signs (Most Recent):  Temp: 98.1 °F (36.7 °C) (08/14/19 1056)  Pulse: 89 (08/14/19 1056)  Resp: 20 (08/14/19 1056)  BP: (!) 124/53 (08/14/19 1056)  SpO2: 98 % (08/14/19 0930) Vital Signs (24h Range):  Temp:  [96.7 °F (35.9 °C)-98.2 °F (36.8 °C)] 98.1 °F (36.7 °C)  Pulse:  [] 89  Resp:  [14-20] 20  SpO2:  [93 %-100 %] 98 %  BP: ()/(50-75) 124/53     Weight: 115.2 kg (253 lb 15.5 oz)  Body mass index is 32.61 kg/m².    Intake/Output Summary (Last 24 hours) at 8/14/2019 1602  Last data filed at 8/13/2019 2008  Gross per 24 hour   Intake 0 ml   Output --   Net 0 ml      Physical Exam   Constitutional: He is oriented to person, place, and time. He appears well-developed and well-nourished. No distress (Mild respiratory).   HENT:   Head: Normocephalic and atraumatic.   Right Ear: External ear normal.   Left Ear: External ear normal.   Nose: Nose normal.   Mouth/Throat: Oropharynx is clear and moist. No oropharyngeal exudate.   Eyes: Conjunctivae and EOM are normal. Right eye exhibits no discharge. Left eye exhibits no discharge. No scleral icterus.   Neck: Normal range of motion. Neck supple. No thyromegaly present.   Cardiovascular: Normal rate and regular rhythm. Exam reveals distant heart sounds. Exam reveals no gallop and no friction rub.   No murmur heard.  Pulses:      "  Dorsalis pedis pulses are 1+ on the right side, and 1+ on the left side.        Posterior tibial pulses are 1+ on the right side, and 1+ on the left side.   Treated decreased pulses posterior tibial and dorsalis post left over right   Pulmonary/Chest: Effort normal. Tachypnea noted. No respiratory distress. He has decreased breath sounds in the right middle field, the right lower field, the left middle field and the left lower field. He has no wheezes. He has no rales.   Abdominal: Soft. Bowel sounds are normal. He exhibits distension. He exhibits no mass. There is no tenderness. There is no rebound and no guarding.   Genitourinary:   Genitourinary Comments: Large external hemorrhoid with thrombosis  Sacral friction tears     Musculoskeletal: Normal range of motion. He exhibits edema and tenderness.   Left large left heel ulcer.  It is purple because of painting head at home with gentian violet   Lymphadenopathy:     He has no cervical adenopathy.   Neurological: He is alert and oriented to person, place, and time. No cranial nerve deficit. Coordination normal.   Skin: Skin is warm and dry. Capillary refill takes less than 2 seconds. No rash noted. No erythema.   Psychiatric: He has a normal mood and affect. His behavior is normal. Judgment and thought content normal.   Nursing note and vitals reviewed.      Significant Labs:   CBC:   Recent Labs   Lab 08/13/19  0446   WBC 19.14*   HGB 11.4*   HCT 38.6*   *     CMP:   Recent Labs   Lab 08/13/19  0446 08/14/19  0602   * 134*   K 5.4* 6.2*   CL 97 101   CO2 26 17*   * 125*   BUN 54* 43*   CREATININE 6.6* 5.5*   CALCIUM 9.1 9.0   ALBUMIN 2.4* 2.7*   ANIONGAP 12 16   EGFRNONAA 7* 9*       Significant Imaging: I have reviewed and interpreted all pertinent imaging results/findings within the past 24 hours. no n=new     Imaging Reviewed:              CXR,    US arteries, foot xray  CT foot 8/13  Impression         Soft tissue wound of the heel, without  evidence for abscess, gas-forming infection, or calcaneal osteomyelitis.    Severe arthritic change of the 1st metatarsophalangeal joint which may represent sequelae of prior septic arthritis, or gout.  Active septic arthritis/osteomyelitis cannot be excluded.    Abundant soft tissue edema versus cellulitis of the foot

## 2019-08-14 NOTE — ASSESSMENT & PLAN NOTE
This patient does have evidence of infective focus-diabetic ulcer left heel   My overall impression is severe sepsis .  Antibiotics given- clindamycin /vancomycn   Antibiotics (From admission, onward)    Start     Stop Route Frequency Ordered    08/13/19 2300  meropenem-0.9% sodium chloride 500 mg/50 mL IVPB      -- IV Every 12 hours (non-standard times) 08/13/19 1231    08/12/19 2245  mupirocin 2 % ointment      08/17 2059 Nasl 2 times daily 08/12/19 2143          Latest lactate reviewed, they are-  Recent Labs   Lab 08/12/19  1556 08/12/19  1941   LACTATE 2.7* 2.0       Organ dysfunction indicated by Acute kidney injury and Acute respiratory failure  Source- heel/possible pneumonia -check procalcitonin  Source control Achieved by- iv abx, diuresis  Blood culture--gm neg rods in 2 bottles   Heel xray-neg  Ua/urine culture  cxr-trend

## 2019-08-14 NOTE — PT/OT/SLP PROGRESS
Physical Therapy      Patient Name:  Frank Zayas   MRN:  [id number]    Patient not seen today secondary to pt is in dialysis. Will follow-up 8/15/2019.    Norma Salomon, PT

## 2019-08-14 NOTE — PROGRESS NOTES
[facility] Medicine  Progress Note    Patient Name: Frank Zayas  MRN: [id number]  Patient Class: IP- Inpatient   Admission Date: 8/12/2019  Length of Stay: 1 days  Attending Physician: Katerin Kaufman MD  Primary Care Provider: Mikhail Shields MD        Subjective:     Principal Problem:Severe sepsis        HPI:  Frank Zayas is a 79 y.o. male with COPD who presents to the ED with ongoing SOB greater than baseline. Per EMS his home health nurse noticed worsening SOB when going to re-wrap his legs, which are being treated for ulcer. The patient denies any chest pain, fever, chills or fever, N/V/D, or any other symptoms at this time.  He was stable in the ED with oxygen at 2 L. he is noted to have an elevated BNP and bilateral pulmonary edema on chest x-ray.  He was given Lasix 60 mg IV in the ED and Nephrology was notified of his pulmonary edema. His usual dialysis days are Tuesday Thursday and Saturday.  He was also sent by a home health nurse because of malodorous drainage to his heel and erythema in his left lower extremity that had not been near the previous dressing change.  His left heel was drain and very malodorous.  No gangrene is noted.  There skin sloughing off of his left heel as noted in the photos.       Hx of CHF, HTN, hyperlipidemia, and MI.  PSHx of coronary stents, CABG, and cardiac sugery. No known drug allergies    Overview/Hospital Course:  No notes on file    Interval History:   Pt seen/examined-continues to have malodorous drainage  From his left heel  and x-ray negative for osteo.  No fever.  Shortness of breath improved with diuresis.  White blood cell count trending down.    Review of Systems   Constitutional: Positive for activity change and fever.   Respiratory: Positive for cough and shortness of breath.    Genitourinary: Negative for difficulty urinating and dysuria.   Skin: Positive for wound (Superficial green green left heel).   Hematological:  Negative for adenopathy. Bruises/bleeds easily.     Objective:     Vital Signs (Most Recent):  Temp: 97.8 °F (36.6 °C) (08/13/19 1958)  Pulse: 103 (08/13/19 1958)  Resp: 16 (08/13/19 1958)  BP: 103/68 (08/13/19 1958)  SpO2: 96 % (08/13/19 1958) Vital Signs (24h Range):  Temp:  [96.3 °F (35.7 °C)-97.9 °F (36.6 °C)] 97.8 °F (36.6 °C)  Pulse:  [] 103  Resp:  [14-20] 16  SpO2:  [95 %-99 %] 96 %  BP: ()/(49-87) 103/68     Weight: 115.2 kg (253 lb 15.5 oz)  Body mass index is 32.61 kg/m².    Intake/Output Summary (Last 24 hours) at 8/13/2019 2237  Last data filed at 8/13/2019 2008  Gross per 24 hour   Intake 650 ml   Output 1860 ml   Net -1210 ml      Physical Exam   Constitutional: He is oriented to person, place, and time. He appears well-developed and well-nourished. No distress (Mild respiratory).   HENT:   Head: Normocephalic and atraumatic.   Right Ear: External ear normal.   Left Ear: External ear normal.   Nose: Nose normal.   Mouth/Throat: Oropharynx is clear and moist. No oropharyngeal exudate.   Eyes: Conjunctivae and EOM are normal. Right eye exhibits no discharge. Left eye exhibits no discharge. No scleral icterus.   Neck: Normal range of motion. Neck supple. No thyromegaly present.   Cardiovascular: Normal rate and regular rhythm. Exam reveals distant heart sounds. Exam reveals no gallop and no friction rub.   No murmur heard.  Pulses:       Dorsalis pedis pulses are 1+ on the right side, and 1+ on the left side.        Posterior tibial pulses are 1+ on the right side, and 1+ on the left side.   Treated decreased pulses posterior tibial and dorsalis post left over right   Pulmonary/Chest: Effort normal. Tachypnea noted. No respiratory distress. He has decreased breath sounds in the right middle field, the right lower field, the left middle field and the left lower field. He has no wheezes. He has no rales.   Abdominal: Soft. Bowel sounds are normal. He exhibits distension. He exhibits no mass.  There is no tenderness. There is no rebound and no guarding.   Genitourinary:   Genitourinary Comments: Large external hemorrhoid with mild thrombosis also is as noted on a in the sacral area   Musculoskeletal: Normal range of motion. He exhibits edema and tenderness.   Left large left heel ulcer.  It is purple because of painting head at home with gentian violet   Lymphadenopathy:     He has no cervical adenopathy.   Neurological: He is alert and oriented to person, place, and time. No cranial nerve deficit. Coordination normal.   Skin: Skin is warm and dry. Capillary refill takes less than 2 seconds. No rash noted. No erythema.   Psychiatric: He has a normal mood and affect. His behavior is normal. Judgment and thought content normal.   Nursing note and vitals reviewed.      Significant Labs: All pertinent labs within the past 24 hours have been reviewed.    Significant Imaging: I have reviewed and interpreted all pertinent imaging results/findings within the past 24 hours.      Assessment/Plan:      * Severe sepsis  This patient does have evidence of infective focus-diabetic ulcer left heel   My overall impression is severe sepsis .  Antibiotics given- clindamycin /vancomycn   Antibiotics (From admission, onward)    Start     Stop Route Frequency Ordered    08/13/19 2300  meropenem-0.9% sodium chloride 500 mg/50 mL IVPB      -- IV Every 12 hours (non-standard times) 08/13/19 1231    08/12/19 2245  mupirocin 2 % ointment      08/17 2059 Nasl 2 times daily 08/12/19 2143          Latest lactate reviewed, they are-  Recent Labs   Lab 08/12/19  1556 08/12/19  1941   LACTATE 2.7* 2.0       Organ dysfunction indicated by Acute kidney injury and Acute respiratory failure  Source- heel/possible pneumonia -check procalcitonin  Source control Achieved by- iv abx, diuresis  Blood culture--gm neg rods in 2 bottles   Heel xray-neg  Ua/urine culture  cxr-trend       Gangrene of left foot  Acute-2/2 gangrenous ulcer left heel.   Podiatry wound care and Infectious Disease consulted.  Also needs vascular surgery.  For debridement of heel and continue IV antibiotics for a diabetic foot ulcer.  X-ray and CT have been done to rule septic arthritis and osteomyelitis and are negative.      External hemorrhoid    Noted on exam use external hemorrhoid treatment and consider surgical evaluation    Sacral decubitus ulcer, stage III  See photos -consult wound care. No evidence infection       Cellulitis of left lower extremity  Acute--severe -with large left heel ulcer.  X-ray done to evaluate for osteomyelitis.  Will likely need some workup  For peripheral artery disease and consider CT scan aortogram with runoff.  Consider vascular surgery consult.  Topical wound care, podiatry consult, and consider Infectious Disease consult pending x-ray and further testing.  Wound care wound care has been consulted for evaluation      ESRD (end stage renal disease) on dialysis   Nephrology consult to follow.  Dialysis days Tuesday Thursday Saturday and will dose medications for renal failure.  And maintain blood pressure control.    Hyperkalemia  Acute-no ekg changes. For HD in am -avoid supplements/AceI/ARB      Morbid (severe) obesity with alveolar hypoventilation  Pulse monitoring CPAP at night      Skin ulcer of left lower leg, limited to breakdown of skin  Chronic, severe -see sepsis/cellulitis ; podiatry /wound care consult        BRANDEN (obstructive sleep apnea)  bipap while in hospital unless pt brings own cpap machine. Reportedly non-compliant at home      Anemia, chronic disease  Monitor and transfuse if he becomes hemodynamically unstable or H/H < 7/21  Continue po iron/ epo per nephrology       A-fib    Atrial Fibrillation controlled currently with Beta Blocker. [id number] score 5. Anticoagulation indicated currently. Anticoagulation done with-- on hold  For possible surgery-no anticoagulants noted on home med list.   Hx gi bleed ?     Atrial fibrillation  with premature ventricular or aberrantly conducted complexes  Left axis deviation  Right bundle branch block  T wave abnormality, consider lateral ischemia  Abnormal ECG  When compared with ECG of 26-JUN-2019 06:19,  T wave inversion no longer evident in Inferior leads    COPD (chronic obstructive pulmonary disease)  Patient's COPD is controlled currently. Continue scheduled inhalers prn nebulizer; /oxygen  and monitor respiratory status closely.       Coronary artery disease  Chronic and stable.  Continue home medications with aspirin and beta-blocker.  Diuresing with Lasix and will star and hemodialysis again tomorrow.  and monitor on telemetry monitor signs for acute coronary syndrome.  Initial troponin negative and EKG shows AFib with possible lateral ischemic changes.        DM type 2 with diabetic mixed hyperlipidemia  Chronic and uncontrolled.  Wife reports he has not been taking insulin at home.  Will hold his oral medications and use low-dose Levemir insulin to scale and Accu-Cheks AC and HS.  Hemoglobin A1c pending.  Dietitian consulted.-supplements for  Malnutrition   Lab Results   Component Value Date    HGBA1C 6.1 (H) 08/12/2019       Type 2 diabetes mellitus with kidney complication, with long-term current use of insulin    See dm    HTN (hypertension)  Chronic and stable.  Hypertension Medications             metoprolol tartrate (LOPRESSOR) 25 MG tablet Take 1 tablet (25 mg total) by mouth every Mon, Wed, Fri, Sun.    nitroGLYCERIN (NITROSTAT) 0.4 MG SL tablet Place 0.4 mg under the tongue every 5 (five) minutes as needed for Chest pain. Seek medical help if pain is not relieved by the third dose.      Hospital Medications             furosemide injection 40 mg (Completed) Inject 4 mLs (40 mg total) into the vein ED 1 Time.    metoprolol tartrate (LOPRESSOR) tablet 25 mg Take 1 tablet (25 mg total) by mouth every Mon, Wed, Fri, Sun.    nitroGLYCERIN SL tablet 0.4 mg Place 1 tablet (0.4 mg total) under  the tongue every 5 (five) minutes as needed for Chest pain.            Malignant neoplasm of anterior wall of urinary bladder  Chronic, stable; monitoring I/o . PVR as needed         VTE Risk Mitigation (From admission, onward)        Ordered     heparin (porcine) injection 5,000 Units  As needed (PRN)      08/13/19 1435     heparin (porcine) injection 5,000 Units  Every 8 hours      08/12/19 1735     IP VTE HIGH RISK PATIENT  Once      08/12/19 1735        Discuss with wound care , podiatry and ID   And case management -          Katerin Kaufman MD  Department of Hospital Medicine   [facility]

## 2019-08-14 NOTE — PLAN OF CARE
Per Renata with [facility]- she met with the pt and he is agreeable to LTAC services. She is going to work him up for acceptance. Per Dr. Kaufman- please place an LTAC consult.     I sent the referral via [facility]. Per Renata she needs the results of the pts procedure from yesterday to see if he has blood flow to his wound. CM following. Tish Jamison, [facility]     08/14/19 4676   Post-Acute Status   Post-Acute Authorization Placement   Post-Acute Placement Status Referrals Sent

## 2019-08-14 NOTE — ASSESSMENT & PLAN NOTE
Acute-no ekg changes. Treated with HD unless ekg changes -then acute treatement will be required  -avoid supplements/AceI/ARB

## 2019-08-14 NOTE — ANESTHESIA PROCEDURE NOTES
Peripheral Block    Patient location during procedure: pre-op   Block not for primary anesthetic.  Reason for block: at surgeon's request and post-op pain management   Post-op Pain Location: left foot   Start time: 8/13/2019 4:25 PM  Timeout: 8/13/2019 4:25 PM   End time: 8/13/2019 4:35 PM    Staffing  Authorizing Provider: Harsha Price MD  Performing Provider: Harsha Price MD    Preanesthetic Checklist  Completed: patient identified, site marked, surgical consent, pre-op evaluation, timeout performed, IV checked, risks and benefits discussed and monitors and equipment checked  Peripheral Block  Patient position: supine  Prep: ChloraPrep  Patient monitoring: heart rate, cardiac monitor, continuous pulse ox, continuous capnometry and frequent blood pressure checks  Block type: popliteal  Laterality: left  Injection technique: single shot  Needle  Needle type: Stimuplex   Needle gauge: 21 G  Needle length: 4 in  Needle localization: anatomical landmarks and ultrasound guidance   -ultrasound image captured on disc.  Assessment  Injection assessment: negative aspiration, negative parasthesia and local visualized surrounding nerve  Paresthesia pain: none  Heart rate change: no  Slow fractionated injection: yes  Additional Notes  VSS.  [facility] RN monitoring vitals throughout procedure.  Patient tolerated procedure well.

## 2019-08-14 NOTE — ASSESSMENT & PLAN NOTE
Acute-2/2 gangrenous ulcer left heel.  Podiatry wound care and Infectious Disease consulted.  Also needs vascular surgery.  See sepsis

## 2019-08-14 NOTE — PLAN OF CARE
Problem: Adult Inpatient Plan of Care  Goal: Plan of Care Review  02 saturation 98% on nc at 2lpm.

## 2019-08-14 NOTE — ASSESSMENT & PLAN NOTE
Chronic and uncontrolled.  Wife reports he has not been taking insulin at home.  Will hold his oral medications and use low-dose Levemir insulin to scale and Accu-Cheks AC and HS.  Hemoglobin A1c pending.  Dietitian consulted.-supplements for  Malnutrition   Lab Results   Component Value Date    HGBA1C 6.1 (H) 08/12/2019

## 2019-08-14 NOTE — PHYSICIAN QUERY
PT Name: Frank Zayas  MR #: [id number]    Physician Query Form - Atrial Fibrillation Specificity     CDS/: MARCUS Serrano, RN, CDS               Contact information:[email or web address]     This form is a permanent document in the medical record.     Query Date: August 14, 2019    By submitting this query, we are merely seeking further clarification of documentation. Please utilize your independent clinical judgment when addressing the question(s) below.    The medical record contains the following:   Indicators     Supporting Clinical Findings Location in Medical Record   X Atrial Fibrillation  Atrial Fibrillation controlled currently with Beta Blocker    H&P, Dr. Kaufman, 8/12   X EKG results  Atrial fibrillation with premature ventricular or aberrantly conducted  complexes  Left axis deviation  Right bundle branch block  T wave abnormality, consider lateral ischemia  Abnormal ECG  When compared with ECG of 26-JUN-2019 06:19,  T wave inversion no longer evident in Inferior leads       Atrial fibrillation  Left axis deviation  Right bundle branch block  T wave abnormality, consider inferolateral ischemia  Abnormal ECG  When compared with ECG of 12-AUG-2019 14:34,  No significant change was found    EKG, 8/12                     EKG, 8/13   X Medication  controlled currently with Beta Blocker     no anticoagulants noted on home med list.     H&P, Dr. Kaufman, 8/12   X Treatment  Anticoagulation indicated currently     H&PDr. Kaufman, 8/12    Other           Provider, please further specify the Atrial Fibrillation diagnosis.    [ x ] Chronic   [  ] Paroxysmal   [  ] Permanent   [  ] Persistent   [  ] Other (please specify):   [  ] Clinically Undetermined       Please document in your progress notes daily for the duration of treatment until resolved, and include in your discharge summary.

## 2019-08-14 NOTE — PLAN OF CARE
Patient transferred back to room 222.  Report to sejal Hill. Released from Anesthesia. Wife at bedside. Denies pain/nausea.  Dressing CDI.  VSS.  NAD noted.

## 2019-08-14 NOTE — ASSESSMENT & PLAN NOTE
Monitor and transfuse if he becomes hemodynamically unstable or H/H < 7/21  Continue po iron/ epo per nephrology recs

## 2019-08-14 NOTE — ASSESSMENT & PLAN NOTE
Atrial Fibrillation controlled currently with Beta Blocker. [id number] score 5. Anticoagulation indicated currently. Anticoagulation done with-- on hold  For possible surgery-no anticoagulants noted on home med list.   Hx gi bleed ?     Atrial fibrillation with premature ventricular or aberrantly conducted complexes  Left axis deviation  Right bundle branch block  T wave abnormality, consider lateral ischemia  Abnormal ECG  When compared with ECG of 26-JUN-2019 06:19,  T wave inversion no longer evident in Inferior leads

## 2019-08-14 NOTE — ANESTHESIA POSTPROCEDURE EVALUATION
Anesthesia Post Evaluation    Patient: Frank Zayas    Procedure(s) Performed: Procedure(s) (LRB):  DEBRIDEMENT, FOOT (Left)    Final Anesthesia Type: MAC (Mac + block )  Patient location during evaluation: PACU  Patient participation: Yes- Able to Participate  Level of consciousness: awake and alert  Post-procedure vital signs: reviewed and stable  Pain management: adequate  Airway patency: patent  PONV status at discharge: No PONV  Anesthetic complications: no      Cardiovascular status: hemodynamically stable  Respiratory status: unassisted and nasal cannula  Hydration status: euvolemic  Follow-up not needed.          Vitals Value Taken Time   /68 8/13/2019  7:58 PM   Temp 36.6 °C (97.8 °F) 8/13/2019  7:58 PM   Pulse 103 8/13/2019  7:58 PM   Resp 16 8/13/2019  7:58 PM   SpO2 96 % 8/13/2019  7:58 PM         Event Time     Out of Recovery 08/13/2019 20:08:46          Pain/Giorgi Score: Giorgi Score: 8 (8/13/2019  7:30 PM)

## 2019-08-14 NOTE — ASSESSMENT & PLAN NOTE
This patient does have evidence of infective focus-diabetic ulcer left heel   Gm neg bacteremia --Proteus ;   My overall impression is severe sepsis .-improving   Antibiotics given- clindamycin(only in ED)  /vancomycn   Antibiotics (From admission, onward)    Start     Stop Route Frequency Ordered    08/13/19 2300  meropenem-0.9% sodium chloride 500 mg/50 mL IVPB      -- IV Every 12 hours (non-standard times) 08/13/19 1231    08/12/19 2245  mupirocin 2 % ointment      08/17 2059 Nasl 2 times daily 08/12/19 2143          Latest lactate reviewed, they are-  Recent Labs   Lab 08/12/19  1556 08/12/19  1941   LACTATE 2.7* 2.0       Organ dysfunction indicated by Acute kidney injury and Acute respiratory failure  Source- heel  Ruled out  pneumonia -check procalcitonin--neg   Source control Achieved by- iv abx, diuresis-debridement /wound care  Of left heel /ID and podiatry following     Heel xray-neg  CT foot -possible septic arthritis 1st MT joint   WOund culture 8/13  Ua/urine culture-ngtd     cxr-trend -

## 2019-08-14 NOTE — PHYSICIAN QUERY
"PT Name: Frank Zayas  MR #: [id number]    Physician Query Form - Procedure Clarification     CDS/: MARCUS Serrano, RN, CDS               Contact information:[email or web address]  This form is a permanent document in the medical record.     Query Date: August 14, 2019  By submitting this query, we are merely seeking further clarification of documentation. Please utilize your independent clinical judgment when addressing the question(s) below.    The Medical record contains the following:     Indicators       Supporting Clinical Findings   Location in Medical Record   X Documentation of "Debridement"    debridement wound left heel incisional below deep fascia including muscle.      All necrotic tissue was removed   with sharp instrumentation including scalpel and rongeurs     Podiatry note, Dr. Wilson, 8/13      Podiatry note, Dr. Wilson, 8/13    Documentation of "I & D"     X EBL =  No values recorded between 8/13/2019  5:35 PM and 8/13/2019  6:27 PM *  Podiatry note, Dr. Wilson, 8/13    Other:       Excisional debridement is a surgical removal of  nonvitalized tissue, necrosis or slough. The use of a sharp instrument does not always indicate that an excisional debridement was performed.  Non excisional debridement is the scraping, washing, irrigating, brushing away or removal of loose tissue fragments.    Provider, please specify type of procedure(s) performed:    [ x   ]  Excisional Debridement      [    ]  Non-excisional Debridement     [    ] Other Procedure (Specify) ________         [  ] Clinically Undetermined           "

## 2019-08-14 NOTE — HOSPITAL COURSE
Frank Zayas is a 79 y.o. Male ESRD/ htn/DM  admitted with severe sepsis 2/2 cellulitis/infected left heel diabetic ulcer -initiated on vanc/merrem. CT with prob septic arthritis   Podiatry consutled -surgical debridement to muscle on 8/13. Cultures taken  Blood cultures neg. Wound  PROTEUS MIRABILIS ESBL -put in isolation   No other significant isolate so Vanco dc . And cefipime changed to merrem.      ID consulted; with podiatry recommend aka vs bka so vascular/orthopedic surg consulted--wound care done.   CTaortogram /runoff done and Dr. Rodriguez thought that patient's blood supply was adequate for a BKA.  BKA was planned but decision was made on day of surgery to change to AKA the next day.  BKA was canceled.  Patient underwent AKA with Dr. Vines.  He was discharged to LTAC for further therapy and wound care.  He will complete 2 more days of meropenem had LTAC and then finish the course.  HTN, /Dm remained stable  Sacral decub/skin excoriations /ext hemorrhoid noted on admit-wound care consulted.

## 2019-08-15 PROBLEM — D69.6 THROMBOCYTOPENIA: Status: ACTIVE | Noted: 2019-01-01

## 2019-08-15 NOTE — PLAN OF CARE
Problem: Adult Inpatient Plan of Care  Goal: Plan of Care Review  Patient place BIPAP setting 16/8 40%FIO2

## 2019-08-15 NOTE — PT/OT/SLP PROGRESS
Physical Therapy      Patient Name:  Frank Zayas   MRN:  [id number]    Patient not seen today secondary to dialysis. Will follow-up this afternoon as schedule allows.    Norma Salomon, PT

## 2019-08-15 NOTE — PROGRESS NOTES
[facility]  Podiatry  Progress Note    Patient Name: Frank Zayas  MRN: [id number]  Admission Date: 8/12/2019  Hospital Length of Stay: 2 days  Attending Physician: Katerin Kaufman MD  Primary Care Provider: Mikhail Shields MD     Subjective:     Interval History: pod#`1 left heel debridement to bone.  Dressing CDI.  WBC improved.  Led cellulitis improved to visual inspection.    Follow-up For: Procedure(s) (LRB):  DEBRIDEMENT, FOOT (Left)    Post-Operative Day: 1 Day Post-Op    Scheduled Meds:   albuterol-ipratropium  3 mL Nebulization Q6H    alfuzosin  10 mg Oral Nightly    aspirin  81 mg Oral Daily    epoetin quinton-epbx  50 Units/kg Intravenous Every Tues, Thurs, Sat    FLUoxetine  20 mg Oral QAM    heparin (porcine)  5,000 Units Subcutaneous Q8H    hydrocortisone   Rectal BID    insulin detemir U-100  10 Units Subcutaneous QHS    meropenem (MERREM) IVPB  500 mg Intravenous Q12H    metoprolol tartrate  25 mg Oral Every Mon, Wed, Fri, Sun    mupirocin   Nasal BID    pantoprazole  40 mg Oral BID AC    rOPINIRole  2 mg Oral QHS    senna-docusate 8.6-50 mg  1 tablet Oral BID    sevelamer carbonate  2,400 mg Oral TID WM     Continuous Infusions:  PRN Meds:sodium chloride 0.9%, acetaminophen, albumin human 25%, dextrose 50%, dextrose 50%, glucagon (human recombinant), glucose, glucose, heparin (porcine), HYDROcodone-acetaminophen, insulin aspart U-100, lidocaine HCL 2%, miconazole nitrate 2%, nitroGLYCERIN, ondansetron, promethazine (PHENERGAN) IVPB, sodium chloride 0.9%    Review of Systems  Objective:     Vital Signs (Most Recent):  Temp: 97.2 °F (36.2 °C) (08/14/19 1730)  Pulse: 100 (08/14/19 1944)  Resp: 20 (08/14/19 1944)  BP: (!) 104/57 (08/14/19 1730)  SpO2: 95 % (08/14/19 1944) Vital Signs (24h Range):  Temp:  [96.7 °F (35.9 °C)-98.2 °F (36.8 °C)] 97.2 °F (36.2 °C)  Pulse:  [] 100  Resp:  [16-20] 20  SpO2:  [93 %-100 %] 95 %  BP: ()/(53-91) 104/57     Weight:  115.2 kg (253 lb 15.5 oz)  Body mass index is 32.61 kg/m².    Foot Exam    Laboratory:  A1C:   Recent Labs   Lab 05/04/19  1910 08/12/19  1941   HGBA1C 5.6 6.1*     Blood Cultures:   Recent Labs   Lab 08/14/19  0602   LABBLOO No Growth to date     BMP:   Recent Labs   Lab 08/14/19  0602   *   *   K 6.2*      CO2 17*   BUN 43*   CREATININE 5.5*   CALCIUM 9.0     CBC:   Recent Labs   Lab 08/13/19  0446   WBC 19.14*   RBC 3.79*   HGB 11.4*   HCT 38.6*   *   *   MCH 30.1   MCHC 29.5*     CMP:   Recent Labs   Lab 08/12/19  1444  08/14/19  0602   *   < > 125*   CALCIUM 9.3   < > 9.0   ALBUMIN 2.6*   < > 2.7*   PROT 6.6  --   --       < > 134*   K 5.2*   < > 6.2*   CO2 26   < > 17*   CL 96   < > 101   BUN 50*   < > 43*   CREATININE 6.2*   < > 5.5*   ALKPHOS 369*  --   --    ALT 37  --   --    AST 36  --   --    BILITOT 0.8  --   --     < > = values in this interval not displayed.     Coagulation: No results for input(s): PT, INR, APTT in the last 168 hours.  CRP: No results for input(s): CRP in the last 168 hours.  ESR: No results for input(s): SEDRATE in the last 168 hours.  Prealbumin: No results for input(s): PREALBUMIN in the last 48 hours.  Wound Cultures: No results for input(s): LABAERO in the last 4320 hours.  Microbiology Results (last 7 days)     Procedure Component Value Units Date/Time    Blood culture [[id number]] Collected:  08/14/19 0602    Order Status:  Completed Specimen:  Blood Updated:  08/14/19 1715     Blood Culture, Routine No Growth to date    Blood culture x two cultures. Draw prior to antibiotics. [[id number]]  (Abnormal) Collected:  08/12/19 5391    Order Status:  Completed Specimen:  Blood Updated:  08/14/19 1347     Blood Culture, Routine Gram stain demarcus bottle: Gram negative rods      Results called to and read back by: Vasu Cerda RN 08/13/2019        15:11      PROTEUS MIRABILIS  For susceptibility see order #[id number]      Narrative:        Aerobic and anaerobic    Blood culture x two cultures. Draw prior to antibiotics. [[id number]]  (Abnormal) Collected:  08/12/19 1623    Order Status:  Completed Specimen:  Blood Updated:  08/14/19 1347     Blood Culture, Routine Gram stain demarcus bottle: Gram negative rods      Results called to and read back by: Vasu Cerda RN 08/13/2019        15:12      PROTEUS MIRABILIS  Susceptibility pending      Narrative:       Aerobic and anaerobic    Culture, Anaerobe [[id number]] Collected:  08/13/19 1830    Order Status:  Sent Specimen:  Abscess from Foot, Left Updated:  08/14/19 0012    Aerobic culture [[id number]] Collected:  08/13/19 1830    Order Status:  Sent Specimen:  Abscess from Foot, Left Updated:  08/14/19 0012    Blood culture (site 1) [[id number]]     Order Status:  Canceled Specimen:  Blood         Specimen (12h ago, onward)    None          Diagnostic Results:  None  Xray left foot post op - no apparent complication.    Clinical Findings:  Wound left heel has exposed bone of heel nearly 50%.  Remaining periosteum and fascia are the base of the wound and are produce serosanguinous drainage only without pus, tracking, fluctuance, malodor or localized signs infection.  No granular/viable tissue to accept graft.    Pulses trace left foot DP/PT  Temp all toes tepid.        Assessment/Plan:     Active Diagnoses:    Diagnosis Date Noted POA    PRINCIPAL PROBLEM:  Severe sepsis [A41.9, R65.20] 06/25/2019 Yes    Diabetic ulcer of left heel associated with diabetes mellitus due to underlying condition, with muscle involvement without evidence of necrosis [E08.621, L97.425] 08/14/2019 Yes    Gangrene of left foot [I96] 08/13/2019 Yes    Cellulitis of left lower extremity [L03.116] 08/12/2019 Yes    External hemorrhoid [K64.4] 08/12/2019 Yes    ESRD (end stage renal disease) on dialysis [N18.6, Z99.2] 05/31/2019 Not Applicable    Morbid (severe) obesity with alveolar hypoventilation [E66.2] 05/08/2019 Yes     Hyperkalemia [E87.5] 05/08/2019 Yes    Skin ulcer of left lower leg, limited to breakdown of skin [L97.921] 05/05/2019 Yes    HTN (hypertension) [I10] 05/05/2019 Yes    Type 2 diabetes mellitus with kidney complication, with long-term current use of insulin [E11.29, Z79.4] 05/05/2019 Not Applicable    DM type 2 with diabetic mixed hyperlipidemia [E11.69, E78.2] 05/05/2019 Yes    Coronary artery disease [I25.10] 05/05/2019 Yes    COPD (chronic obstructive pulmonary disease) [J44.9] 05/05/2019 Yes    A-fib [I48.91] 05/05/2019 Yes    Anemia, chronic disease [D63.8] 05/05/2019 Yes    BRANDEN (obstructive sleep apnea) [G47.33] 05/05/2019 Yes    Moderate malnutrition [E44.0] 05/05/2019 Yes    Malignant neoplasm of anterior wall of urinary bladder [C67.3] 09/08/2014 Yes      Problems Resolved During this Admission:       In my estimation, his procedure has decreased his risk of worsening sepsis leading to death for now.      Foot is not savable.  He will need orthopedic consultation for BK/AK amputaion to save limb/life following vascular optimization.    Consult vascular surgery  Consult orthopedic surgery  Wound care orders entered:  Santyl, kerlix.    Robert Wilson DPM  Podiatry  [facility]

## 2019-08-15 NOTE — NURSING
HD complete. Total uf 3 liters. Pt received on hd epogen 5,800 units, albumin 12.5 gm x 2 to support bp and locked ports x 2 with heparin. HD catheter dressing changed. Report given to Ria Gan RN.

## 2019-08-15 NOTE — PROGRESS NOTES
"[facility] Medicine  Progress Note    Patient Name: Frank Zayas  MRN: [id number]  Patient Class: IP- Inpatient   Admission Date: 8/12/2019  Length of Stay: 3 days  Attending Physician: Katerin Kaufman MD  Primary Care Provider: Mikahil Shields MD        Subjective:     Principal Problem:Severe sepsis        HPI:  Frank Zayas is a 79 y.o. male with COPD who presents to the ED with ongoing SOB greater than baseline. Per EMS his home health nurse noticed worsening SOB when going to re-wrap his legs, which are being treated for ulcer. The patient denies any chest pain, fever, chills or fever, N/V/D, or any other symptoms at this time.  He was stable in the ED with oxygen at 2 L. he is noted to have an elevated BNP and bilateral pulmonary edema on chest x-ray.  He was given Lasix 60 mg IV in the ED and Nephrology was notified of his pulmonary edema. His usual dialysis days are Tuesday Thursday and Saturday.  He was also sent by a home health nurse because of malodorous drainage to his heel and erythema in his left lower extremity that had not been near the previous dressing change.  His left heel was drain and very malodorous.  No gangrene is noted.  There skin sloughing off of his left heel as noted in the photos.       Hx of CHF, HTN, hyperlipidemia, and MI.  PSHx of coronary stents, CABG, and cardiac sugery. No known drug allergies    Overview/Hospital Course:  Frank Zayas is a 79 y.o. male admitted with severe sepsis 2/2 cellulitis/infected left heel diabetic ulcer -initiated on vanc/merrem   Podiatry consutled -surgical debridement to muscle on 8/13. Cultures taken/wound care done. CM consulted for ltac   Blood cultures -proteus     Id consulted         Interval History:   Pt seen/examined -hypotension on HD  Today -given albumin/midodrine   C/o pain -"all over" -mainly buttock and back area   Appetite and alertness improved -ate with assistance with PT       Review of " Systems   Constitutional: Positive for activity change. Negative for chills and fever.   Respiratory: Negative for cough and shortness of breath.    Gastrointestinal: Positive for constipation. Negative for abdominal pain.   Genitourinary: Negative for difficulty urinating and dysuria.   Musculoskeletal: Positive for arthralgias, back pain, joint swelling and myalgias.   Skin: Positive for wound (Superficial wound left heel/gangrene-now s/p debridement to muscle ).   Hematological: Negative for adenopathy. Bruises/bleeds easily.     Objective:     Vital Signs (Most Recent):  Temp: 97.3 °F (36.3 °C) (08/15/19 1230)  Pulse: 89 (08/15/19 1311)  Resp: 18 (08/15/19 1311)  BP: (!) 103/56 (08/15/19 1230)  SpO2: 95 % (08/15/19 1311) Vital Signs (24h Range):  Temp:  [96.2 °F (35.7 °C)-98.6 °F (37 °C)] 97.3 °F (36.3 °C)  Pulse:  [] 89  Resp:  [16-20] 18  SpO2:  [95 %-100 %] 95 %  BP: ()/(49-75) 103/56     Weight: 112.7 kg (248 lb 7.3 oz)  Body mass index is 31.9 kg/m².    Intake/Output Summary (Last 24 hours) at 8/15/2019 1443  Last data filed at 8/15/2019 1125  Gross per 24 hour   Intake 1150 ml   Output 7150 ml   Net -6000 ml      Physical Exam   Constitutional: He is oriented to person, place, and time. He appears well-developed and well-nourished. No distress.   HENT:   Head: Normocephalic and atraumatic.   Right Ear: External ear normal.   Left Ear: External ear normal.   Nose: Nose normal.   Mouth/Throat: Oropharynx is clear and moist. No oropharyngeal exudate.   Eyes: Conjunctivae and EOM are normal. Right eye exhibits no discharge. Left eye exhibits no discharge. No scleral icterus.   Neck: Neck supple. No thyromegaly present.   Cardiovascular: Normal rate and regular rhythm. Exam reveals distant heart sounds. Exam reveals no gallop and no friction rub.   No murmur heard.  Pulses:       Dorsalis pedis pulses are 1+ on the right side, and 1+ on the left side.        Posterior tibial pulses are 1+ on the  right side, and 1+ on the left side.   Treated decreased pulses posterior tibial and dorsalis post left over right   Pulmonary/Chest: Effort normal. Tachypnea noted. No respiratory distress. He has decreased breath sounds in the right middle field, the right lower field, the left middle field and the left lower field. He has no wheezes. He has no rales.   Abdominal: Soft. Bowel sounds are normal. He exhibits distension. He exhibits no mass. There is no tenderness. There is no rebound and no guarding.   Genitourinary:   Genitourinary Comments: Large external hemorrhoid with thrombosis  Sacral friction tears     Musculoskeletal: Normal range of motion. He exhibits edema (RlE 1-2+ pitting ). He exhibits no tenderness.   Ace intact right le post op   Superficial ulcer left mid medial calf -small amt yellow exudate drainage    Lymphadenopathy:     He has no cervical adenopathy.   Neurological: He is alert and oriented to person, place, and time. No cranial nerve deficit. Coordination normal.   Skin: Skin is warm and dry. Capillary refill takes less than 2 seconds. No rash noted. No erythema.   Psychiatric: He has a normal mood and affect. His behavior is normal. Judgment and thought content normal.   Nursing note and vitals reviewed.      Significant Labs: All pertinent labs within the past 24 hours have been reviewed.    Significant Imaging: I have reviewed and interpreted all pertinent imaging results/findings within the past 24 hours.      Assessment/Plan:      * Severe sepsis  This patient does have evidence of infective focus-diabetic ulcer left heel   Gm neg bacteremia --Proteus s/merrem -continue per ID;   My overall impression is severe sepsis .-improving   Antibiotics given- clindamycin(only in ED)  /vancomycn   Antibiotics (From admission, onward)    Start     Stop Route Frequency Ordered    08/15/19 1700  vancomycin 750 mg in dextrose 5 % 250 mL IVPB      -- IV Once 08/15/19 0922    08/13/19 2300   meropenem-0.9% sodium chloride 500 mg/50 mL IVPB      -- IV Every 12 hours (non-standard times) 08/13/19 1231    08/12/19 2245  mupirocin 2 % ointment      08/17 2059 Nasl 2 times daily 08/12/19 2143          Latest lactate reviewed, they are-  Recent Labs   Lab 08/12/19  1556 08/12/19  1941   LACTATE 2.7* 2.0       Organ dysfunction indicated by Acute kidney injury and Acute respiratory failure  Source- heel  Ruled out  pneumonia -check procalcitonin--neg   Source control Achieved by- iv abx, diuresis-debridement /wound care  Of left heel /ID and podiatry following     Heel xray-neg  CT foot -possible septic arthritis 1[facility] joint vascular surg consulted   WOund culture 8/13  Ua/urine culture-ngtd     cxr-trend -      Diabetic ulcer of left heel associated with diabetes mellitus due to underlying condition, with muscle involvement without evidence of necrosis  See sepsis       Gangrene of left foot  Acute-2/2 gangrenous ulcer left heel.  Podiatry wound care and Infectious Disease consulted.  Also needs vascular surgery.  See sepsis     External hemorrhoid    Noted on exam use external hemorrhoid treatment and consider surgical evaluation    Sacral decubitus ulcer, stage III  See photos -consult wound care. No evidence infection       Cellulitis of left lower extremity  Acute--severe -with large left heel ulcer.  -see sepsis     ESRD (end stage renal disease) on dialysis   Nephrology consult to follow.  Dialysis days Tuesday Thursday Saturday and will dose medications for renal failure.  And maintain blood pressure control.    Hyperkalemia  Acute-no ekg changes. Treated with HD unless ekg changes -then acute treatement will be required  -avoid supplements/AceI/ARB      Morbid (severe) obesity with alveolar hypoventilation  Pulse monitoring CPAP at night      Skin ulcer of left lower leg, limited to breakdown of skin  Chronic, severe -see sepsis/cellulitis ; podiatry /wound care consult        BRANDEN (obstructive sleep  apnea)  bipap while in hospital unless pt brings own cpap machine. Reportedly non-compliant at home  Refuses bipap in hospital -consider haldol at hs for agitation and continue to encourage use to avoid respiratory compromise   Add IS/Acapella       Moderate malnutrition  Boost and emmanuel bid -dietary recommendations /weight weekly       Anemia, chronic disease  Monitor and transfuse if he becomes hemodynamically unstable or H/H < 7/21  Continue po iron/ epo per nephrology recs       A-fib    Atrial Fibrillation controlled currently with Beta Blocker. [id number] score 5. Anticoagulation indicated currently. Anticoagulation held prior to debrideemt  8/12 -- Hep 5000/8 hrs -continue vte   Hx gi bleed ?     Atrial fibrillation with premature ventricular or aberrantly conducted complexes  Left axis deviation  Right bundle branch block  T wave abnormality, consider lateral ischemia  Abnormal ECG  When compared with ECG of 26-JUN-2019 06:19,  T wave inversion no longer evident in Inferior leads    COPD (chronic obstructive pulmonary disease)  Patient's COPD is controlled currently. Continue scheduled inhalers prn nebulizer; /oxygen  and monitor respiratory status closely.   HD to remove volume from lung.  IS and acapella for pulm toilet       Coronary artery disease  Chronic and stable.  Continue home medications with aspirin and beta-blocker.  Diuresing with Lasix and will star and hemodialysis again tomorrow.  and monitor on telemetry monitor signs for acute coronary syndrome.  Initial troponin negative and EKG shows AFib with possible lateral ischemic changes.        DM type 2 with diabetic mixed hyperlipidemia  Chronic and uncontrolled.-on admit-trended to low 100's    Trended down but appetite then improved    Wife reports he has not been taking insulin at home.  Held on admit -- oral medications and use low-dose Levemir insulin to scale and Accu-Cheks AC and HS.    Dietitian consulted.-supplements for  Malnutrition      Lab Results   Component Value Date    HGBA1C 6.1 (H) 08/12/2019       Type 2 diabetes mellitus with kidney complication, with long-term current use of insulin    See dm    HTN (hypertension)  Chronic and stable.  Hypertension Medications             metoprolol tartrate (LOPRESSOR) 25 MG tablet Take 1 tablet (25 mg total) by mouth every Mon, Wed, Fri, Sun.    nitroGLYCERIN (NITROSTAT) 0.4 MG SL tablet Place 0.4 mg under the tongue every 5 (five) minutes as needed for Chest pain. Seek medical help if pain is not relieved by the third dose.      Hospital Medications             furosemide injection 40 mg (Completed) Inject 4 mLs (40 mg total) into the vein ED 1 Time.    metoprolol tartrate (LOPRESSOR) tablet 25 mg Take 1 tablet (25 mg total) by mouth every Mon, Wed, Fri, Sun.    nitroGLYCERIN SL tablet 0.4 mg Place 1 tablet (0.4 mg total) under the tongue every 5 (five) minutes as needed for Chest pain.            Malignant neoplasm of anterior wall of urinary bladder  Chronic, stable; monitoring I/o . PVR as needed         VTE Risk Mitigation (From admission, onward)        Ordered     heparin (porcine) injection 5,000 Units  As needed (PRN)      08/13/19 1435     heparin (porcine) injection 5,000 Units  Every 8 hours      08/12/19 1735     IP VTE HIGH RISK PATIENT  Once      08/12/19 1735        Addend-platelets trending down -  Check HIT AB         Katerin Kaufman MD  Department of Hospital Medicine   [facility]

## 2019-08-15 NOTE — NURSING
Consult to Dr. Rodriguez was called in yesterday, spoke with Dr. Rodriguez today to make certain he is aware of consult. Jennifer saw pt yesterday and will put a note in today.

## 2019-08-15 NOTE — ASSESSMENT & PLAN NOTE
Atrial Fibrillation controlled currently with Beta Blocker. [id number] score 5. Anticoagulation indicated currently. Anticoagulation held prior to debrideemt  8/12 -- Hep 5000/8 hrs -continue vte   Hx gi bleed ?     Atrial fibrillation with premature ventricular or aberrantly conducted complexes  Left axis deviation  Right bundle branch block  T wave abnormality, consider lateral ischemia  Abnormal ECG  When compared with ECG of 26-JUN-2019 06:19,  T wave inversion no longer evident in Inferior leads

## 2019-08-15 NOTE — CONSULTS
[facility]  Orthopedics  Consult Note    Patient Name: Frank Zayas  MRN: [id number]  Admission Date: 8/12/2019  Hospital Length of Stay: 3 days  Attending Provider: Katerin Kaufman MD  Primary Care Provider: Mikhail Shields MD    Patient information was obtained from past medical records and ER records.     Consults  Subjective:     Principal Problem:Severe sepsis    Chief Complaint:   Chief Complaint   Patient presents with    Shortness of Breath     with exertion, does not use O2 at home. arrives to ED with 2 L NC    Foot Pain     left heel since being discharged from [facility]. bigger since Friday        HPI: Admitted through ED about 3 days ago with sepsis. Primary focus of infection L heel. Has other foci of infection as well. Yesterday underwent L heel surgical debridement by podiatrist. Clinical findings: Wound left heel has exposed bone of heel nearly 50%.  Remaining periosteum and fascia are the base of the wound and produce serosanguinous drainage only without pus, tracking, fluctuance, malodor or localized signs infection.  No granular/viable tissue to accept graft.  - It was determined that the foot is not viable and ortho has been consulted for possible BKA. Vascular surgery has been consulted to determine if patient may need AKA. Vascular surgery consult is not available to review.\  - Has a very extensive PMH + for DM, Cv ds, and ESRD    Past Medical History:   Diagnosis Date    Anticoagulant long-term use     Arthritis     Bladder cancer     Blood transfusion     Cardiac arrest 5/31/2019    CHF (congestive heart failure)     COPD (chronic obstructive pulmonary disease)     Coronary artery disease     Diabetes mellitus     ESRD (end stage renal disease) on dialysis 2019    Gout     Hemorrhagic shock 6/1/2019    [place] (hard of hearing)     HAS BILAT AIDS BUT DOES NOT WEAR    Hyperlipidemia     Hypertension     Myocardial infarction     NSTEMI (non-ST elevated  myocardial infarction)     RLS (restless legs syndrome)     Sleep apnea     NO MACHINE    Wears glasses        Past Surgical History:   Procedure Laterality Date    CARDIAC SURGERY      CABG X 2 1997    CATARACT EXTRACTION, BILATERAL      CHOLECYSTECTOMY      COLONOSCOPY      CORONARY ARTERY BYPASS GRAFT      x 2    coronary stents      CYSTOSCOPY      CYSTOSCOPY N/A 4/8/2019    [facility] by [facility] at [facility] OR    DEBRIDEMENT, FOOT Left 8/13/2019    [facility]formed by Robert Wilson DPM at [facility] OR    EGD (ESOPHAGOGASTRODUODENOSCOPY) N/A 6/4/2019    [facility] ([facility])    EXCISION-BLADDER TUMOR-TRANSURETHRAL (TURBT) N/A 9/8/2014    [facility]formed by [facility] at [facility] OR    EXCISION-BLADDER TUMOR-TRANSURETHRAL (TURBT) N/A 11/18/2013    [facility] by [facility] at [facility] OR    EYE SURGERY Bilateral     CATARACT    Insertion,catheter,tunneled N/A 5/16/2019    Performed by Wade Rodriguez MD at [facility] OR    TURBT (TRANSURETHRAL RESECTION OF BLADDER TUMOR) N/A 4/8/2019    Performed by [facility] at [facility] OR    VASECTOMY         Review of patient's allergies indicates:   Allergen Reactions    Lidocaine Nausea Only     NOVACAINE --  Severe nausea    Statins-hmg-coa reductase inhibitors Anxiety       Current Facility-Administered Medications   Medication    0.9%  NaCl infusion    acetaminophen tablet 1,000 mg    albumin human 25% bottle 12.5 g    albuterol-ipratropium 2.5 mg-0.5 mg/3 mL nebulizer solution 3 mL    alfuzosin 24 hr tablet 10 mg    aspirin EC tablet 81 mg    collagenase ointment    dextrose 50% injection 12.5 g    dextrose 50% injection 25 g    epoetin quinton-epbx injection 5,800 Units    FLUoxetine capsule 20 mg    glucagon (human recombinant) injection 1 mg    glucose chewable tablet 16 g    glucose chewable tablet 24 g    heparin (porcine) injection 5,000 Units    heparin (porcine) injection 5,000 Units     "HYDROcodone-acetaminophen  mg per tablet 1 tablet    hydrocortisone 2.5 % rectal cream    insulin aspart U-100 pen 0-5 Units    insulin detemir U-100 pen 10 Units    lidocaine HCL 2% jelly    meropenem-0.9% sodium chloride 500 mg/50 mL IVPB    metoprolol tartrate (LOPRESSOR) tablet 25 mg    miconazole nitrate 2% ointment    mupirocin 2 % ointment    nitroGLYCERIN SL tablet 0.4 mg    ondansetron injection 4 mg    pantoprazole EC tablet 40 mg    promethazine (PHENERGAN) 6.25 mg in dextrose 5 % 50 mL IVPB    rOPINIRole tablet 2 mg    senna-docusate 8.6-50 mg per tablet 1 tablet    sevelamer carbonate tablet 2,400 mg    sodium chloride 0.9% flush 10 mL    vancomycin 750 mg in dextrose 5 % 250 mL IVPB     Family History     Problem Relation (Age of Onset)    Cancer Father, Sister, Brother    Heart disease Father        Tobacco Use    Smoking status: Former Smoker     Packs/day: 0.25     Years: 20.00     Pack years: 5.00     Types: Cigars     Last attempt to quit: 1982     Years since quittin.5    Smokeless tobacco: Never Used   Substance and Sexual Activity    Alcohol use: No    Drug use: No    Sexual activity: Not on file     Review of Systems   Unable to perform ROS: other     Objective:     Vital Signs (Most Recent):  Temp: 96.2 °F (35.7 °C) (08/15/19 0815)  Pulse: 108 (08/15/19 0930)  Resp: 20 (08/15/19 0815)  BP: 111/68 (08/15/19 0930)  SpO2: 99 % (08/15/19 0803) Vital Signs (24h Range):  Temp:  [96.2 °F (35.7 °C)-98.6 °F (37 °C)] 96.2 °F (35.7 °C)  Pulse:  [] 108  Resp:  [16-20] 20  SpO2:  [95 %-100 %] 99 %  BP: ()/(52-91) 111/68     Weight: 112.7 kg (248 lb 7.3 oz)  Height: 6' 2" (188 cm)  Body mass index is 31.9 kg/m².      Intake/Output Summary (Last 24 hours) at 8/15/2019 0942  Last data filed at 8/15/2019 0500  Gross per 24 hour   Intake 550 ml   Output 3550 ml   Net -3000 ml       Ortho/SPM Exam    Significant Labs:   CBC:   Recent Labs   Lab 08/15/19  0822 "   WBC 14.21*   HGB 10.9*   HCT 38.0*   *     CMP:   Recent Labs   Lab 08/14/19  0602 08/15/19  0822   * 137   K 6.2* 5.6*    103   CO2 17* 23   * 78   BUN 43* 34*   CREATININE 5.5* 4.6*   CALCIUM 9.0 8.6*   PROT  --  6.0   ALBUMIN 2.7* 3.2*   BILITOT  --  0.6   ALKPHOS  --  227*   AST  --  9*   ALT  --  17   ANIONGAP 16 11   EGFRNONAA 9* 11*     All pertinent labs within the past 24 hours have been reviewed.    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 I will follow-up with patient. Please contact us if you have any additional questions.    KASSIE CHRIS  Orthopedics  [facility]

## 2019-08-15 NOTE — PROGRESS NOTES
Progress Note  Infectious Disease    Follow-up For:  Gangrene of the foot    SUBJECTIVE:   Interval history/ROS:   8/14:  Status post life-saving debridement of gangrene of the foot.  Blood cultures have Proteus.  Discussed with Podiatry, foot is not viable and he will need amputation.  Afebrile.    Antibiotics (From admission, onward)    Start     Stop Route Frequency Ordered    08/13/19 2300  meropenem-0.9% sodium chloride 500 mg/50 mL IVPB      -- IV Every 12 hours (non-standard times) 08/13/19 1231    08/12/19 2245  mupirocin 2 % ointment      08/17 2059 Nasl 2 times daily 08/12/19 2143        Antifungals (From admission, onward)    Start     Stop Route Frequency Ordered    08/14/19 0000  miconazole nitrate 2% ointment      -- Top Daily PRN 08/13/19 1045        Antivirals (From admission, onward)    None            EXAM & DIAGNOSTICS REVIEWED:   Vitals:     Temp:  [96.7 °F (35.9 °C)-98.2 °F (36.8 °C)]   Temp: 97.5 °F (36.4 °C) (08/14/19 2122)  Pulse: 97 (08/14/19 2122)  Resp: 18 (08/14/19 2122)  BP: (!) 93/58 (08/14/19 2122)  SpO2: 97 % (08/14/19 2122)    Intake/Output Summary (Last 24 hours) at 8/14/2019 2207  Last data filed at 8/14/2019 1730  Gross per 24 hour   Intake 550 ml   Output 3550 ml   Net -3000 ml       General:         In NAD.   Eyes:               ENT:                No ulcers, exudates, thrush, nares patent,   Neck:              supple,  Lungs:            Bibasilar crackles  Heart:              iRRR, no gallop/murmur/rub noted  Abd:                Soft, obese, NT, ND, normal BS, no masses or organomegaly appreciated.  :                  Voids  Musc:              Joints without effusion, swelling, .   Skin:               Bilateral venous stasis cellulitis  Wound:                     Postop bandages not disturbed     Neuro:  sleeping  Psych:                         sleeping  Lymphatic:        Extrem:           Chronic LE edema,  With erythema,   Cellulitis, from forefoot to knee on the left.  Bilateral venous stasis changes as well. Feet are warm. Pulses are difficult to appreciate  VAD:               Right sided tunneled dialysis catheter  Isolation:     Lines/Tubes/Drains:    General Labs reviewed:  No results for input(s): WBC, RBC, HGB, HCT, PLT, MCV, MCH, MCHC in the last 24 hours.  Recent Labs   Lab 08/14/19  0602   CALCIUM 9.0   *   K 6.2*   CO2 17*      BUN 43*   CREATININE 5.5*       Micro:   Microbiology Results (last 7 days)     Procedure Component Value Units Date/Time    Blood culture [[id number]] Collected:  08/14/19 0602    Order Status:  Completed Specimen:  Blood Updated:  08/14/19 1715     Blood Culture, Routine No Growth to date    Blood culture x two cultures. Draw prior to antibiotics. [[id number]]  (Abnormal) Collected:  08/12/19 1556    Order Status:  Completed Specimen:  Blood Updated:  08/14/19 1347     Blood Culture, Routine Gram stain demarcus bottle: Gram negative rods      Results called to and read back by: Vasu Cerda RN 08/13/2019        15:11      PROTEUS MIRABILIS  For susceptibility see order #[id number]      Narrative:       Aerobic and anaerobic    Blood culture x two cultures. Draw prior to antibiotics. [[id number]]  (Abnormal) Collected:  08/12/19 1623    Order Status:  Completed Specimen:  Blood Updated:  08/14/19 1347     Blood Culture, Routine Gram stain demarcus bottle: Gram negative rods      Results called to and read back by: Vasu Cerda RN 08/13/2019        15:12      PROTEUS MIRABILIS  Susceptibility pending      Narrative:       Aerobic and anaerobic    Culture, Anaerobe [[id number]] Collected:  08/13/19 1830    Order Status:  Sent Specimen:  Abscess from Foot, Left Updated:  08/14/19 0012    Aerobic culture [[id number]] Collected:  08/13/19 1830    Order Status:  Sent Specimen:  Abscess from Foot, Left Updated:  08/14/19 0012    Blood culture (site 1) [[id number]]     Order Status:  Canceled Specimen:  Blood           Imaging  Reviewed:    Cardiology:    ASSESSMENT & PLAN    Gangrene left heel with Proteus in the blood  Cellulitis left leg  Bilateral venous stasis  ESRD  Pulmonary edema  DM with vascular disease  ?memory loss     Rec: continue with present antibiotics  Will de-escalate as soon as possible/tomorrow  Vascular surgery and Orthopedics consult

## 2019-08-15 NOTE — PLAN OF CARE
08/15/19 0724   Patient Assessment/Suction   Level of Consciousness (AVPU) responds to voice   Respiratory Effort Unlabored   All Lung Fields Breath Sounds coarse   Cough Frequency no cough   PRE-TX-O2   O2 Device (Oxygen Therapy) BiPAP   $ Is the patient on Low Flow Oxygen? Yes   Oxygen Concentration (%) 40   SpO2 99 %   Pulse Oximetry Type Intermittent   $ Pulse Oximetry - Multiple Charge Pulse Oximetry - Multiple   Pulse 85   Resp 16   Aerosol Therapy   $ Aerosol Therapy Charges Aerosol Treatment   Respiratory Treatment Status (SVN) given   Treatment Route (SVN) in-line   Patient Position (SVN) HOB elevated   Post Treatment Assessment (SVN) breath sounds unchanged   Signs of Intolerance (SVN) none   Breath Sounds Post-Respiratory Treatment   Throughout All Fields Post-Treatment All Fields   Throughout All Fields Post-Treatment coarse   Post-treatment Heart Rate (beats/min) 103   Post-treatment Resp Rate (breaths/min) 18   Incentive Spirometer   $ Incentive Spirometer Charges unable to perform;other (see comments)  (Pt. is sleeping)   Vibratory PEP Therapy   $ Vibratory PEP Charges   (unable to perform aerobika, pt is sleeping)   Wound Care   $ Wound Care Tech Time 15 min   Area of Concern Bridge of nose;Chin   Skin Color/Characteristics redness blanchable;other (see comments)  (on top of nose)   Skin Temperature warm   Preset CPAP/BiPAP Settings   Mode Of Delivery BiPAP   $ CPAP/BiPAP Daily Charge BiPAP/CPAP Daily   $ Initial CPAP/BiPAP Setup? No   $ Is patient using? Yes   Size of Mask Medium/Large   Sized Appropriately? Yes   Equipment Type V60   Humidifier not applicable   Ipap 16   EPAP (cm H2O) 8   Pressure Support (cm H2O) 8   Set Rate (Breaths/Min) 12   ITime (sec) 0.75   Rise Time (sec) 2   Patient CPAP/BiPAP Settings   RR Total (Breaths/Min) 34   Tidal Volume (mL) 343   VE Minute Ventilation (L/min) 10.9 L/min   Peak Inspiratory Pressure (cm H2O) 16   TiTOT (%) 36   Total Leak (L/Min) 34   Patient  Trigger - ST Mode Only (%) 99   CPAP/BiPAP Alarms   High Pressure (cm H2O) 32   Low Pressure (cm H2O) 10   Minute Ventilation (L/Min) 3   High RR (breaths/min) 40   Low RR (breaths/min) 10   Apnea (Sec) 20   Duoneb Q6, Aerobika Q4, Incentive Spirometer, Bipap QHS

## 2019-08-15 NOTE — SUBJECTIVE & OBJECTIVE
Past Medical History:   Diagnosis Date    Anticoagulant long-term use     Arthritis     Bladder cancer     Blood transfusion     Cardiac arrest 5/31/2019    CHF (congestive heart failure)     COPD (chronic obstructive pulmonary disease)     Coronary artery disease     Diabetes mellitus     ESRD (end stage renal disease) on dialysis 2019    Gout     Hemorrhagic shock 6/1/2019    [place] (hard of hearing)     HAS BILAT AIDS BUT DOES NOT WEAR    Hyperlipidemia     Hypertension     Myocardial infarction     NSTEMI (non-ST elevated myocardial infarction)     RLS (restless legs syndrome)     Sleep apnea     NO MACHINE    Wears glasses        Past Surgical History:   Procedure Laterality Date    CARDIAC SURGERY      CABG X 2 1997    CATARACT EXTRACTION, BILATERAL      CHOLECYSTECTOMY      COLONOSCOPY      CORONARY ARTERY BYPASS GRAFT      x 2    coronary stents      CYSTOSCOPY      CYSTOSCOPY N/A 4/8/2019    [facility] OR    DEBRIDEMENT, FOOT Left 8/13/2019    Performed by Robert Wilson DPM at [facility] OR    EGD (ESOPHAGOGASTRODUODENOSCOPY) N/A 6/4/2019    [facility] (2ND [facility])    EXCISION-BLADDER TUMOR-TRANSURETHRAL (TURBT) N/A 9/8/2014    Performed by [facility]lee [facility]quez [facility] at [facility] OR    EXCISION-BLADDER TUMOR-TRANSURETHRAL (TURBT) N/A 11/18/2013    Performed by [facility]lee [facility] at [facility] OR    EYE SURGERY Bilateral     CATARACT    Insertion,catheter,tunneled N/A 5/16/2019    Performed by Wade Rodriguez MD at [facility] OR    TURBT (TRANSURETHRAL RESECTION OF BLADDER TUMOR) N/A 4/8/2019    Performed by Kaylee [facility]quez [facility] at [facility] OR    VASECTOMY         Review of patient's allergies indicates:   Allergen Reactions    Lidocaine Nausea Only     NOVACAINE --  Severe nausea    Statins-hmg-coa reductase inhibitors Anxiety       Current Facility-Administered Medications   Medication    0.9%  NaCl infusion    acetaminophen tablet 1,000 mg     albumin human 25% bottle 12.5 g    albuterol-ipratropium 2.5 mg-0.5 mg/3 mL nebulizer solution 3 mL    alfuzosin 24 hr tablet 10 mg    aspirin EC tablet 81 mg    collagenase ointment    dextrose 50% injection 12.5 g    dextrose 50% injection 25 g    epoetin quinton-epbx injection 5,800 Units    FLUoxetine capsule 20 mg    glucagon (human recombinant) injection 1 mg    glucose chewable tablet 16 g    glucose chewable tablet 24 g    heparin (porcine) injection 5,000 Units    heparin (porcine) injection 5,000 Units    HYDROcodone-acetaminophen  mg per tablet 1 tablet    hydrocortisone 2.5 % rectal cream    insulin aspart U-100 pen 0-5 Units    insulin detemir U-100 pen 10 Units    lidocaine HCL 2% jelly    meropenem-0.9% sodium chloride 500 mg/50 mL IVPB    metoprolol tartrate (LOPRESSOR) tablet 25 mg    miconazole nitrate 2% ointment    mupirocin 2 % ointment    nitroGLYCERIN SL tablet 0.4 mg    ondansetron injection 4 mg    pantoprazole EC tablet 40 mg    promethazine (PHENERGAN) 6.25 mg in dextrose 5 % 50 mL IVPB    rOPINIRole tablet 2 mg    senna-docusate 8.6-50 mg per tablet 1 tablet    sevelamer carbonate tablet 2,400 mg    sodium chloride 0.9% flush 10 mL    vancomycin 750 mg in dextrose 5 % 250 mL IVPB     Family History     Problem Relation (Age of Onset)    Cancer Father, Sister, Brother    Heart disease Father        Tobacco Use    Smoking status: Former Smoker     Packs/day: 0.25     Years: 20.00     Pack years: 5.00     Types: Cigars     Last attempt to quit: 1982     Years since quittin.5    Smokeless tobacco: Never Used   Substance and Sexual Activity    Alcohol use: No    Drug use: No    Sexual activity: Not on file     Review of Systems   Unable to perform ROS: other     Objective:     Vital Signs (Most Recent):  Temp: 96.2 °F (35.7 °C) (08/15/19 0815)  Pulse: 108 (08/15/19 0930)  Resp: 20 (08/15/19 0815)  BP: 111/68 (08/15/19 0930)  SpO2: 99 % (08/15/19  "0803) Vital Signs (24h Range):  Temp:  [96.2 °F (35.7 °C)-98.6 °F (37 °C)] 96.2 °F (35.7 °C)  Pulse:  [] 108  Resp:  [16-20] 20  SpO2:  [95 %-100 %] 99 %  BP: ()/(52-91) 111/68     Weight: 112.7 kg (248 lb 7.3 oz)  Height: 6' 2" (188 cm)  Body mass index is 31.9 kg/m².      Intake/Output Summary (Last 24 hours) at 8/15/2019 0942  Last data filed at 8/15/2019 0500  Gross per 24 hour   Intake 550 ml   Output 3550 ml   Net -3000 ml       Ortho/SPM Exam    Significant Labs:   CBC:   Recent Labs   Lab 08/15/19  0822   WBC 14.21*   HGB 10.9*   HCT 38.0*   *     CMP:   Recent Labs   Lab 08/14/19  0602 08/15/19  0822   * 137   K 6.2* 5.6*    103   CO2 17* 23   * 78   BUN 43* 34*   CREATININE 5.5* 4.6*   CALCIUM 9.0 8.6*   PROT  --  6.0   ALBUMIN 2.7* 3.2*   BILITOT  --  0.6   ALKPHOS  --  227*   AST  --  9*   ALT  --  17   ANIONGAP 16 11   EGFRNONAA 9* 11*     All pertinent labs within the past 24 hours have been reviewed.    See C&S results    Significant Imaging: LLE US done 8/13 and 8/14 - no report available  "

## 2019-08-15 NOTE — PROGRESS NOTES
[facility]  Orthopedics  Progress Note    Patient Name: Frank Zayas  MRN: [id number]  Admission Date: 8/12/2019  Hospital Length of Stay: 3 days  Attending Provider: Katerin Kaufman MD  Primary Care Provider: Mikhail Shields MD  Follow-up For: Procedure(s) (LRB):  DEBRIDEMENT, FOOT (Left)    Post-Operative Day: 2 Days Post-Op  Subjective:     No new subjective & objective note has been filed under this hospital service since the last note was generated.    Assessment/Plan:     Diabetic ulcer of left heel associated with diabetes mellitus due to underlying condition, with muscle involvement without evidence of necrosis  Waiting for further assessment to determine when to proceed with BKA or if pt would be better served by AKA    Awaiting LLE US results    Scheduled for AV fistula procedure with vascular tomorrow          KASSIE CHRIS  Orthopedics  [facility]

## 2019-08-15 NOTE — SUBJECTIVE & OBJECTIVE
"Interval History:   Pt seen/examined -hypotension on HD  Today -given albumin/midodrine   C/o pain -"all over" -mainly buttock and back area   Appetite and alertness improved -ate with assistance with PT       Review of Systems   Constitutional: Positive for activity change. Negative for chills and fever.   Respiratory: Negative for cough and shortness of breath.    Gastrointestinal: Positive for constipation. Negative for abdominal pain.   Genitourinary: Negative for difficulty urinating and dysuria.   Musculoskeletal: Positive for arthralgias, back pain, joint swelling and myalgias.   Skin: Positive for wound (Superficial wound left heel/gangrene-now s/p debridement to muscle ).   Hematological: Negative for adenopathy. Bruises/bleeds easily.     Objective:     Vital Signs (Most Recent):  Temp: 97.3 °F (36.3 °C) (08/15/19 1230)  Pulse: 89 (08/15/19 1311)  Resp: 18 (08/15/19 1311)  BP: (!) 103/56 (08/15/19 1230)  SpO2: 95 % (08/15/19 1311) Vital Signs (24h Range):  Temp:  [96.2 °F (35.7 °C)-98.6 °F (37 °C)] 97.3 °F (36.3 °C)  Pulse:  [] 89  Resp:  [16-20] 18  SpO2:  [95 %-100 %] 95 %  BP: ()/(49-75) 103/56     Weight: 112.7 kg (248 lb 7.3 oz)  Body mass index is 31.9 kg/m².    Intake/Output Summary (Last 24 hours) at 8/15/2019 1443  Last data filed at 8/15/2019 1125  Gross per 24 hour   Intake 1150 ml   Output 7150 ml   Net -6000 ml      Physical Exam   Constitutional: He is oriented to person, place, and time. He appears well-developed and well-nourished. No distress.   HENT:   Head: Normocephalic and atraumatic.   Right Ear: External ear normal.   Left Ear: External ear normal.   Nose: Nose normal.   Mouth/Throat: Oropharynx is clear and moist. No oropharyngeal exudate.   Eyes: Conjunctivae and EOM are normal. Right eye exhibits no discharge. Left eye exhibits no discharge. No scleral icterus.   Neck: Neck supple. No thyromegaly present.   Cardiovascular: Normal rate and regular rhythm. Exam reveals " distant heart sounds. Exam reveals no gallop and no friction rub.   No murmur heard.  Pulses:       Dorsalis pedis pulses are 1+ on the right side, and 1+ on the left side.        Posterior tibial pulses are 1+ on the right side, and 1+ on the left side.   Treated decreased pulses posterior tibial and dorsalis post left over right   Pulmonary/Chest: Effort normal. Tachypnea noted. No respiratory distress. He has decreased breath sounds in the right middle field, the right lower field, the left middle field and the left lower field. He has no wheezes. He has no rales.   Abdominal: Soft. Bowel sounds are normal. He exhibits distension. He exhibits no mass. There is no tenderness. There is no rebound and no guarding.   Genitourinary:   Genitourinary Comments: Large external hemorrhoid with thrombosis  Sacral friction tears     Musculoskeletal: Normal range of motion. He exhibits edema (RlE 1-2+ pitting ). He exhibits no tenderness.   Ace intact right le post op   Superficial ulcer left mid medial calf -small amt yellow exudate drainage    Lymphadenopathy:     He has no cervical adenopathy.   Neurological: He is alert and oriented to person, place, and time. No cranial nerve deficit. Coordination normal.   Skin: Skin is warm and dry. Capillary refill takes less than 2 seconds. No rash noted. No erythema.   Psychiatric: He has a normal mood and affect. His behavior is normal. Judgment and thought content normal.   Nursing note and vitals reviewed.      Significant Labs: All pertinent labs within the past 24 hours have been reviewed.    Significant Imaging: I have reviewed and interpreted all pertinent imaging results/findings within the past 24 hours.

## 2019-08-15 NOTE — NURSING
"Pt back from HD. Pt drowsy. Pt states "It feels like the room is spinning." Family at bedside. Zofran given.   "

## 2019-08-15 NOTE — PLAN OF CARE
Problem: Adult Inpatient Plan of Care  Goal: Plan of Care Review  Patient aerosol Q6 given via msk tolerated well sats 95% on 3lpm/BIPAP in room. Aerobika attempted, patient refused would not tolerate nor follow commands/exact same with the I S.

## 2019-08-15 NOTE — HPI
Admitted with symptoms related to sepsis. Sepsis 2/2 L heel non-healing diabetic ulcer with osteomyelitis. Vascularly nonviable.  - Was tentatively Scheduled for L BKA yesterday  - Was seen by Dr. Montero yesterday and it was decided that AKA was more appropriate. General surgery was consulted

## 2019-08-15 NOTE — PT/OT/SLP EVAL
Occupational Therapy   Evaluation    Name: Frank Zayas  MRN: [id number]  Admitting Diagnosis:  Severe sepsis 2 Days Post-Op    Recommendations:     Discharge Recommendations: nursing facility, skilled, LTACH (long-term acute care hospital)  Discharge Equipment Recommendations:  walker, rolling  Barriers to discharge:  Decreased caregiver support    Assessment:     Frank Zayas is a 79 y.o. male with a medical diagnosis of Severe sepsis.  He presents with a decline in functional status due to the listed impairments, impacting ADLs and functional mobility.  Pt was fatigued from dialysis this morning but agreed to participate in OT evaluation. Pt was oriented to person, place and year and followed all 1 & 2 step commands. He displayed good B UE strength and AROM.  He was able to feed himself and perform grooming tasks with set-up and supervision with HOB upright. Pt noted to cough after drinking through a straw and when asked stated he was not supposed to use straws. Pt fatigued quickly, requiring frequent rest breaks during B UE therapeutic exercise. Recommend OT treatment to maximize endurance, safety & independence with ADL's & functional mobility.  Performance deficits affecting function: weakness, impaired self care skills, impaired endurance, impaired functional mobilty, decreased lower extremity function, impaired skin, decreased safety awareness, impaired balance, gait instability, impaired cardiopulmonary response to activity, edema, impaired cognition.    Pt will benefit from further inpatient therapy to maximize return to prior level of function, due to pt currently needing increased assistance for ADLs and functional mobility.    Rehab Prognosis: Fair; patient would benefit from acute skilled OT services to address these deficits and reach maximum level of function.       Plan:     Patient to be seen 4 x/week to address the above listed problems via self-care/home management, therapeutic activities,  therapeutic exercises  · Plan of Care Expires:    · Plan of Care Reviewed with: patient    Subjective     Chief Complaint: Fatigue  Patient/Family Comments/goals: To be able to stand up.    Occupational Profile:  Living Environment: Pt lives with his wife in a H with 0 LIGIA and a walk-in shower.  Previous level of function: Pt's wife assisted pt with bathing, toileting and LB dressing. Pt stated he was recently using a wheelchair at home to get around.  Roles and Routines: Was getting HH OT & PT.  Equipment Used at Home:  rollator, wheelchair, raised toilet, shower chair, grab bar(lift chair)  Assistance upon Discharge: Pt will need 24 hour care.    Pain/Comfort:  · Pain Rating 1: 0/10  · Pain Rating Post-Intervention 1: 0/10    Patients cultural, spiritual, Church conflicts given the current situation:      Objective:     Communicated with: Ria nurse prior to session.  Patient found HOB at 45* with telemetry, oxygen, bed alarm(Avasys) upon OT entry to room.    General Precautions: Standard, fall, aspiration, diabetic(skin)   Orthopedic Precautions:N/A   Braces: N/A     Occupational Performance:    Activities of Daily Living:  · Feeding: OT repositioned pt to HOB in upright sitting for self feeding & ed pt on safe positioning with self feeding to increase independence & reduce risk of aspiration with task. Required supervision and set-up and cues to take small sips to avoid aspiration.    · Grooming: supervision and set-up and cues to initiate wiping face with warm washcloth with HOB upright  · Lower Body Dressing: dependence to don R sock    Cognitive/Visual Perceptual:  Cognitive/Psychosocial Skills:     -       Oriented to: Person, Place, Situation and year, not month   -       Follows Commands/attention:Follows two-step commands  -       Communication: clear/fluent  -       Memory: Poor immediate recall  -       Safety awareness/insight to disability: impaired   -       Mood/Affect/Coping  skills/emotional control: Cooperative, Lethargic and mild confusion, providing conflicting answers to questions regarding his prior level of function  Visual/Perceptual:      -Intact      Physical Exam:  Postural examination/scapula alignment:    -       Rounded shoulders  -       Forward head  -       Lateral weight shift of hips to his right side. Pt stated he was comfortable leaning to his side due to having a sacral ulcer.  Skin integrity: Visible skin intact and L foot wrapped in dressing; pt noted to have a stage 3 sacral ulcer  Sensation:    -       Intact  light/touch B UE's  Motor Planning:    -       Intact  Dominant hand:    -       left  Upper Extremity Range of Motion:     -       Right Upper Extremity: WFL  -       Left Upper Extremity: WFL  Upper Extremity Strength:    -       Right Upper Extremity: WNL  -       Left Upper Extremity: WNL   Strength:    -       Right Upper Extremity: WNL  -       Left Upper Extremity: WNL  Fine Motor Coordination:    -       Intact    AMPAC 6 Click ADL:  AMPAC Total Score: 13    Treatment & Education:  OT oriented pt to date, time of day and situation and ed pt on OT role & POC as well as discharge recommendations.  OT ed pt & on keeping HOB raised and sitting up in chair as pt will tolerate to counteract effects of bedrest.  OT issued rubber squeeze ball to pt & educated pt on B hand resistive gross grasp HEP with instructions to squeeze ball 25 times every two hours while awake. Pt performed 25 reps each hand with cues for proper form.  OT issued red theraband to pt & educated pt on B UE resistive HEP. Pt performed B UE resistive therapeutic exercise x 10 reps each horizontal ad/abduction, shoulder flexion/extension & elbow flexion/extension with rest breaks taken between sets. Pt able to perform all exercises with Min A & cues after demonstration provided. Pt became SOB with exercises.  OT ed pt on use of pursed lip breathing & activity pacing  to prevent SOB &  fatigue with activity. Patient demonstrated understanding.  OT educated patient on delirium precautions including raising HOB, lights on and blinds open during the day and decreasing stimulation at night.  Pt verbalized understanding and agreed to having OT do all of the above.  OT ed pt on fall risk and strongly advised pt to call for help for all OOB mobility.  Education:    Patient left HOB elevated with all lines intact, call button in reach, bed alarm on, Ria, nurse notified and Avasys present    GOALS:   Multidisciplinary Problems     Occupational Therapy Goals        Problem: Occupational Therapy Goal    Goal Priority Disciplines Outcome Interventions   Occupational Therapy Goal     OT, PT/OT Ongoing (interventions implemented as appropriate)    Description:  Goals to be met by: 8/25/19     Patient will increase functional independence with ADLs by performing:    UE Dressing with Set-up Assistance and Supervision.  LE Dressing seated at EOB with Set-up Assistance, Minimal Assistance and Assistive Devices as needed.  Sitting at edge of bed x8 minutes with Supervision.  Supine to sit with Minimal Assistance and use of bedrail as needed.  Toilet stand pivot or scoot transfer to bedside commode with Moderate Assistance.  Upper extremity exercise program x10 reps per handout, with independence.                       History:     Past Medical History:   Diagnosis Date    Anticoagulant long-term use     Arthritis     Bladder cancer     Blood transfusion     Cardiac arrest 5/31/2019    CHF (congestive heart failure)     COPD (chronic obstructive pulmonary disease)     Coronary artery disease     Diabetes mellitus     ESRD (end stage renal disease) on dialysis 2019    Gout     Hemorrhagic shock 6/1/2019    [place] (hard of hearing)     HAS BILAT AIDS BUT DOES NOT WEAR    Hyperlipidemia     Hypertension     Myocardial infarction     NSTEMI (non-ST elevated myocardial infarction)     RLS (restless legs  syndrome)     Sleep apnea     NO MACHINE    Wears glasses        Past Surgical History:   Procedure Laterality Date    CARDIAC SURGERY      CABG X 2 1997    CATARACT EXTRACTION, BILATERAL      CHOLECYSTECTOMY      COLONOSCOPY      CORONARY ARTERY BYPASS GRAFT      x 2    coronary stents      CYSTOSCOPY      CYSTOSCOPY N/A 4/8/2019    Performed by [facility] at [facility]    DEBRIDEMENT, FOOT Left 8/13/2019    Performed by Robert Wilson DPM at [facility] OR    EGD (ESOPHAGOGASTRODUODENOSCOPY) N/A 6/4/2019    Performed by Clint Dietz [facility] at [facility] ENDO (2ND FLR)    EXCISION-BLADDER TUMOR-TRANSURETHRAL (TURBT) N/A 9/8/2014    Performed by [facility]lee [facility]quez MD at [facility] OR    EXCISION-BLADDER TUMOR-TRANSURETHRAL (TURBT) N/A 11/18/2013    Performed by Kaylee Vasquez MD at [facility] OR    EYE SURGERY Bilateral     CATARACT    Insertion,catheter,tunneled N/A 5/16/2019    Performed by Wade Rodriguez MD at [facility] OR    TURBT (TRANSURETHRAL RESECTION OF BLADDER TUMOR) N/A 4/8/2019    Performed by Kaylee [facility]quez MD at [facility] OR    VASECTOMY         Time Tracking:     OT Date of Treatment: 08/15/19  OT Start Time: 1413  OT Stop Time: 1445  OT Total Time (min): 32 min    Billable Minutes:Evaluation 9  Self Care/Home Management 10  Therapeutic Exercise 13    RUPINDER Lundy  8/15/2019

## 2019-08-15 NOTE — ASSESSMENT & PLAN NOTE
Chronic and uncontrolled.-on admit-trended to low 100's    Trended down but appetite then improved    Wife reports he has not been taking insulin at home.  Held on admit -- oral medications and use low-dose Levemir insulin to scale and Accu-Cheks AC and HS.    Dietitian consulted.-supplements for  Malnutrition     Lab Results   Component Value Date    HGBA1C 6.1 (H) 08/12/2019

## 2019-08-15 NOTE — ASSESSMENT & PLAN NOTE
This patient does have evidence of infective focus-diabetic ulcer left heel   Gm neg bacteremia --Proteus s/merrem -continue per ID;   My overall impression is severe sepsis .-improving   Antibiotics given- clindamycin(only in ED)  /vancomycn   Antibiotics (From admission, onward)    Start     Stop Route Frequency Ordered    08/15/19 1700  vancomycin 750 mg in dextrose 5 % 250 mL IVPB      -- IV Once 08/15/19 0922    08/13/19 2300  meropenem-0.9% sodium chloride 500 mg/50 mL IVPB      -- IV Every 12 hours (non-standard times) 08/13/19 1231    08/12/19 2245  mupirocin 2 % ointment      08/17 2059 Nasl 2 times daily 08/12/19 2143          Latest lactate reviewed, they are-  Recent Labs   Lab 08/12/19  1556 08/12/19  1941   LACTATE 2.7* 2.0       Organ dysfunction indicated by Acute kidney injury and Acute respiratory failure  Source- heel  Ruled out  pneumonia -check procalcitonin--neg   Source control Achieved by- iv abx, diuresis-debridement /wound care  Of left heel /ID and podiatry following     Heel xray-neg  CT foot -possible septic arthritis 1st [facility] surg consulted   WOund culture 8/13  Ua/urine culture-ngtd     cxr-trend -

## 2019-08-15 NOTE — PROGRESS NOTES
INPATIENT NEPHROLOGY PROGRESS NOTE  [facility] NEPHROLOGY    Patient Name: Frank Zayas  Date: 08/15/2019    Reason for consultation: ESRD    Chief Complaint:   Chief Complaint   Patient presents with    Shortness of Breath     with exertion, does not use O2 at home. arrives to ED with 2 L NC    Foot Pain     left heel since being discharged from [facility]. bigger since Friday       History of Present Illness:  Frank Zayas is a 79 y.o. male with COPD and ESRD on HD TTS who presents to the ED with ongoing SOB greater than baseline. Per EMS his home health nurse noticed worsening SOB when going to re-wrap his legs, which are being treated for ulcer. The patient denies any chest pain, fever, chills or fever, N/V/D, or any other symptoms at this time.  He was stable in the ED with oxygen at 2 L. he is noted to have an elevated BNP and bilateral pulmonary edema on chest x-ray.  He was given Lasix 60 mg IV in the ED. He was also sent by a home health nurse because of malodorous drainage to his heel and erythema in his left lower extremity that had not been near the previous dressing change.  His left heel was drain and very malodorous.  No gangrene is noted. We are consulted for dialysis.     Foot xray- no osteo, + cellulitis, + soft tissue ulcer of the heel    · Interval History/Subjective:    - seen on HD  - confused    · Review of Systems: unable to obtain due to AMS    Plan of Care:    Assessment:  ESRD  Chronic hypotension/Diastolic CHF/Acute pulm edema  Hyperkalemia  SHPT  Anemia of CKD  Foot ulcer/cellulitis     Plan:     - Continue routine HD TTS.  - Got off 3L yest. Ordered 25g albumin with 2-3L UF again today. Started midodrine 5mg pre HD to help with intradialytic hypotension.  - Ordered 2K bath and changed to 180 dialyzer. Added renal diet and changed protein supplements to Nepro (instead of boost, emmanuel).  - Ordered daily phos. Changed sevelamer to 3 tabs with meals yest.  - Hb is stable. Continue  EPO 5K with HD.   - Dose antbx for dialysis.    Thank you for allowing us to participate in this patient's care. We will continue to follow.    Medications:  No current facility-administered medications on file prior to encounter.      Current Outpatient Medications on File Prior to Encounter   Medication Sig Dispense Refill    FLUoxetine 20 MG capsule Take 20 mg by mouth every morning.       metoprolol tartrate (LOPRESSOR) 25 MG tablet Take 1 tablet (25 mg total) by mouth every Mon, Wed, Fri, Sun. 16 tablet 11    nitroGLYCERIN (NITROSTAT) 0.4 MG SL tablet Place 0.4 mg under the tongue every 5 (five) minutes as needed for Chest pain. Seek medical help if pain is not relieved by the third dose.      pantoprazole (PROTONIX) 40 MG tablet Take 1 tablet (40 mg total) by mouth 2 (two) times daily before meals. 60 tablet 11    sevelamer carbonate (RENVELA) 800 mg Tab Take 2 tablets (1,600 mg total) by mouth 3 (three) times daily with meals. 180 tablet 11     Scheduled Meds:   albuterol-ipratropium  3 mL Nebulization Q6H    alfuzosin  10 mg Oral Nightly    aspirin  81 mg Oral Daily    collagenase   Topical (Top) Every other day    epoetin quinton-epbx  50 Units/kg Intravenous Every Tues, Thurs, Sat    FLUoxetine  20 mg Oral QAM    heparin (porcine)  5,000 Units Subcutaneous Q8H    hydrocortisone   Rectal BID    insulin detemir U-100  10 Units Subcutaneous QHS    meropenem (MERREM) IVPB  500 mg Intravenous Q12H    metoprolol tartrate  25 mg Oral Every Mon, Wed, Fri, Sun    mupirocin   Nasal BID    pantoprazole  40 mg Oral BID AC    rOPINIRole  2 mg Oral QHS    senna-docusate 8.6-50 mg  1 tablet Oral BID    sevelamer carbonate  2,400 mg Oral TID WM    vancomycin (VANCOCIN) IVPB  750 mg Intravenous Once     Continuous Infusions:  PRN Meds:.sodium chloride 0.9%, acetaminophen, albumin human 25%, dextrose 50%, dextrose 50%, glucagon (human recombinant), glucose, glucose, heparin (porcine),  HYDROcodone-acetaminophen, insulin aspart U-100, lidocaine HCL 2%, miconazole nitrate 2%, nitroGLYCERIN, ondansetron, promethazine (PHENERGAN) IVPB, sodium chloride 0.9%    Allergies:  Lidocaine and Statins-hmg-coa reductase inhibitors    Vital Signs:  Temp Readings from Last 3 Encounters:   08/15/19 96.2 °F (35.7 °C) (Axillary)   07/01/19 96.4 °F (35.8 °C) (Oral)   06/17/19 98.7 °F (37.1 °C) (Oral)       Pulse Readings from Last 3 Encounters:   08/15/19 108   07/01/19 94   06/17/19 69       BP Readings from Last 3 Encounters:   08/15/19 111/68   07/01/19 108/72   06/17/19 (!) 105/56       Weight:  Wt Readings from Last 3 Encounters:   08/15/19 112.7 kg (248 lb 7.3 oz)   06/30/19 127 kg (279 lb 15.8 oz)   06/17/19 120.4 kg (265 lb 6.9 oz)       Physical Exam:  Constitutional: nad, confused, chronically ill, appears stated age  Heart: rrr, no m/r/g, wwp, + edema  Lungs: ant ausc clear, no w/r/r/c, no lb  Abdomen: s/nt/nd, +BS    Results:  Lab Results   Component Value Date     08/15/2019    K 5.6 (H) 08/15/2019     08/15/2019    CO2 23 08/15/2019    BUN 34 (H) 08/15/2019    CREATININE 4.6 (H) 08/15/2019    CALCIUM 8.6 (L) 08/15/2019    ANIONGAP 11 08/15/2019    ESTGFRAFRICA 13 (A) 08/15/2019    EGFRNONAA 11 (A) 08/15/2019       Lab Results   Component Value Date    CALCIUM 8.6 (L) 08/15/2019    PHOS 6.1 (H) 08/14/2019       Recent Labs   Lab 08/15/19  0822   WBC 14.21*   RBC 3.51*   HGB 10.9*   HCT 38.0*   *   *   MCH 31.1*   MCHC 28.7*       I have personally reviewed pertinent radiological imaging and reports.    Marivel Quinonez MD MPH  [facility]  [address] [place] [address]  [phone] (p)  [phone] (f)

## 2019-08-15 NOTE — PROGRESS NOTES
Frank Zayas [id number] is a 79 y.o. male who has been consulted for vancomycin dosing.    Pharmacy consult for vancomycin dosing in no longer required.  Vancomycin was discontinued.    Thank you for allowing us to participate in this patient's care.     Shon Hartley, RobD

## 2019-08-15 NOTE — PLAN OF CARE
Problem: Physical Therapy Goal  Goal: Physical Therapy Goal  Goals to be met by: 2019     Patient will increase functional independence with mobility by performin. Supine to sit with Modified [place]  2. Sit to supine with Modified [place]  3. Sit to stand transfer with Minimal Assistance  4. Bed to chair transfer with Minimal Assistance using Rolling Walker  5. Lower extremity exercise program x10-20 reps per handout, with independence    Outcome: Ongoing (interventions implemented as appropriate)  Pt sat on the EOB for 10 min with supervision, performing LE exercises, and finishing his lunch.

## 2019-08-15 NOTE — PLAN OF CARE
Problem: Occupational Therapy Goal  Goal: Occupational Therapy Goal  Goals to be met by: 8/25/19     Patient will increase functional independence with ADLs by performing:    UE Dressing with Set-up Assistance and Supervision.  LE Dressing with Set-up Assistance, Minimal Assistance and Assistive Devices as needed.  Sitting at edge of bed x8 minutes with Supervision.  Supine to sit with Minimal Assistance and use of bedrail as needed.  Toilet stand pivot or scoot transfer to bedside commode with Moderate Assistance.  Upper extremity exercise program x10 reps per handout, with independence.    Outcome: Ongoing (interventions implemented as appropriate)  OT evaluation completed today. Goals & care plan established.    RUPINDER Rodriguez  8/15/2019

## 2019-08-15 NOTE — PROGRESS NOTES
Pharmacokinetic Assessment Follow Up: IV Vancomycin    Vancomycin serum concentration assessment(s):    The random level was drawn correctly and can be used to guide therapy at this time. The measurement is below the desired definitive target range of 15 to 20 mcg/mL.    Vancomycin Regimen Plan:    Patient scheduled for HD today. Vanc dose will be 750 mg at 1700 postHD.    Vancomycin level ordered with AM labs before next HD 8/17.  Target goal is 15-20 mg/dL.       Drug levels (last 3 results):  Recent Labs   Lab Result Units 08/13/19  0446 08/15/19  0534   Vancomycin, Random ug/mL 21.9 14.6       Pharmacy will continue to follow and monitor vancomycin.    Please contact pharmacy at extension [phone] for questions regarding this assessment.    Thank you for the consult,   Shon Hartley       Patient brief summary:  Frank Zayas is a 79 y.o. male initiated on antimicrobial therapy with IV Vancomycin for treatment of sepsis        Drug Allergies:   Review of patient's allergies indicates:   Allergen Reactions    Lidocaine Nausea Only     NOVACAINE --  Severe nausea    Statins-hmg-coa reductase inhibitors Anxiety       Actual Body Weight:   112.7kg    Renal Function:   Estimated Creatinine Clearance: 17.7 mL/min (A) (based on SCr of 4.6 mg/dL (H)).,     Dialysis Method (if applicable):  intermittent HD Tues, Thurs, Sat    CBC (last 72 hours):  Recent Labs   Lab Result Units 08/12/19  1444 08/12/19  1941/19  0446 08/15/19  0822   WBC K/uL 29.38*  --  19.14* 14.21*   Hemoglobin g/dL 12.0*  --  11.4* 10.9*   Hemoglobin A1C %  --  6.1*  --   --    Hematocrit % 41.1  --  38.6* 38.0*   Platelets K/uL 203  --  139* 115*   Gran% % 88.9*  --  87.3* 85.7*   Lymph% % 2.7*  --  3.9* 5.2*   Mono% % 5.1  --  5.8 4.6   Eosinophil% % 0.1  --  0.2 0.4   Basophil% % 0.1  --  0.2 0.2   Differential Method  Automated  --  Automated Automated       Metabolic Panel (last 72 hours):  Recent Labs   Lab Result Units 08/12/19  1444  08/13/19  0446 08/14/19  0602 08/15/19  0822   Sodium mmol/L 136 135* 134* 137   Potassium mmol/L 5.2* 5.4* 6.2* 5.6*   Chloride mmol/L 96 97 101 103   CO2 mmol/L 26 26 17* 23   Glucose mg/dL 253* 207* 125* 78   BUN, Bld mg/dL 50* 54* 43* 34*   Creatinine mg/dL 6.2* 6.6* 5.5* 4.6*   Albumin g/dL 2.6* 2.4* 2.7* 3.2*   Total Bilirubin mg/dL 0.8  --   --  0.6   Alkaline Phosphatase U/L 369*  --   --  227*   AST U/L 36  --   --  9*   ALT U/L 37  --   --  17   Phosphorus mg/dL  --  5.9* 6.1*  --        Vancomycin Administrations:  vancomycin given in the last 96 hours                     vancomycin 500 mg in dextrose 5 % 100 mL IVPB (ready to mix system) (mg) 500 mg New Bag 08/13/19 2049                      Microbiologic Results:  Microbiology Results (last 7 days)       Procedure Component Value Units Date/Time    Culture, Anaerobe [[id number]] Collected:  08/13/19 1830    Order Status:  Completed Specimen:  Abscess from Foot, Left Updated:  08/15/19 0747     Anaerobic Culture Culture in progress    Blood culture [[id number]] Collected:  08/14/19 0602    Order Status:  Completed Specimen:  Blood Updated:  08/14/19 1715     Blood Culture, Routine No Growth to date    Blood culture x two cultures. Draw prior to antibiotics. [[id number]]  (Abnormal) Collected:  08/12/19 1556    Order Status:  Completed Specimen:  Blood Updated:  08/14/19 1347     Blood Culture, Routine Gram stain demarcus bottle: Gram negative rods      Results called to and read back by: Vasu Cerda RN 08/13/2019        15:11      PROTEUS MIRABILIS  For susceptibility see order #[id number]      Narrative:       Aerobic and anaerobic    Blood culture x two cultures. Draw prior to antibiotics. [[id number]]  (Abnormal) Collected:  08/12/19 1623    Order Status:  Completed Specimen:  Blood Updated:  08/14/19 1347     Blood Culture, Routine Gram stain demarcus bottle: Gram negative rods      Results called to and read back by: Vasu Cerda RN 08/13/2019         15:12      PROTEUS MIRABILIS  Susceptibility pending      Narrative:       Aerobic and anaerobic    Aerobic culture [[id number]] Collected:  08/13/19 1830    Order Status:  Sent Specimen:  Abscess from Foot, Left Updated:  08/14/19 0012    Blood culture (site 1) [[id number]]     Order Status:  Canceled Specimen:  Blood

## 2019-08-15 NOTE — PLAN OF CARE
Problem: Adult Inpatient Plan of Care  Goal: Patient-Specific Goal (Individualization)  Outcome: Ongoing (interventions implemented as appropriate)  Bed is in low position, call light is within reach, SR up x 2, Cardiac monitored AFIB, VSS, Glucose monitored and maintained to sliding scale, patient has been very drowsy throughout shift since dialysis per day nurse, responds to voice, but goes right back to sleep, safety maintained, no falls, will continue to monitor and round.

## 2019-08-15 NOTE — PT/OT/SLP EVAL
Physical Therapy Evaluation    Patient Name:  Frank Zayas   MRN:  [id number]    Recommendations:     Discharge Recommendations:  nursing facility, skilled, LTACH (long-term acute care hospital)   Discharge Equipment Recommendations: walker, rolling   Barriers to discharge: Pt requires increased assistance for all functional mobility at this time.     Assessment:     Frank Zayas is a 79 y.o. male admitted with a medical diagnosis of Severe sepsis.  He presents with the following impairments/functional limitations:  weakness, impaired self care skills, impaired balance, decreased safety awareness, impaired skin, impaired endurance, impaired functional mobilty, decreased upper extremity function, pain, gait instability, decreased lower extremity function, impaired cognition, impaired cardiopulmonary response to activity.  During PT evaluation, pt requires Min A for transfer supine<>sit, and was able to complete LE exercises sitting on the EOB, as well as finish eating his lunch.  Pt then required Mod/Max A +2 person for sit<>supine and supine scoot.      Rehab Prognosis: Fair; patient would benefit from acute skilled PT services to address these deficits and reach maximum level of function.    Recent Surgery: Procedure(s) (LRB):  DEBRIDEMENT, FOOT (Left) 2 Days Post-Op    Plan:     During this hospitalization, patient to be seen 6 x/week to address the identified rehab impairments via gait training, therapeutic activities, therapeutic exercises and progress toward the following goals:    · Plan of Care Expires:  08/29/19    Subjective     Chief Complaint: pain all over.  Pt states his biggest problem is going from sit<>stand  Patient/Family Comments/goals: none stated  Pain/Comfort:  · Pain Rating 1: 10/10  · Location - Orientation 1: generalized  · Pain Addressed 1: Pre-medicate for activity, Reposition, Distraction    Patients cultural, spiritual, Sikh conflicts given the current situation: no    Living  Environment:  Pt lives with his wife and dtr in a 1 level home, with 1 step to enter.    Prior to admission, patients level of function was modified independent using a RW and a lift chair.  Equipment used at home: rollator, wheelchair, raised toilet, shower chair, grab bar(lift chair).  DME owned (not currently used): none.  Upon discharge, patient will have assistance from family.    Objective:     Communicated with nurse Castellanos prior to session.  Patient found HOB elevated with telemetry, peripheral IV, bed alarm  upon PT entry to room.    General Precautions: Standard, aspiration, fall, diabetic(skin)   Orthopedic Precautions:N/A   Braces: N/A     Exams:  · Cognitive Exam:  Patient is oriented to Person and Place  · Postural Exam:  Patient presented with the following abnormalities:    · -       Rounded shoulders  · -       Forward head  · RLE ROM: WFL  · RLE Strength: Deficits: Grossly 3/5  · LLE ROM: WFL except ankle NT due to recent I&D with bandage around ankle  · LLE Strength: Deficits: Grossly 3/5.  Ankle NT    Functional Mobility:  · Bed Mobility:     · Scooting: maximal assistance and of 2 persons  · Supine to Sit: minimum assistance and HOB raised  · Sit to Supine: moderate assistance and of 2 persons  · Balance: Fair      Therapeutic Activities and Exercises:   Pt perform seated LAQ, marches, hip adduction with pillow squeeze, and R ankle pumps all x 10 reps with rest break in between.  Pt then sat up to finish the rest of his lunch sandwich, and take medication from the nurse. Pt was assisted back into bed at the end of PT tx.      AM-PAC 6 CLICK MOBILITY  Total Score:12     Patient left HOB elevated with all lines intact, call button in reach, bed alarm on and nurse Rollins notified.    GOALS:   Multidisciplinary Problems     Physical Therapy Goals        Problem: Physical Therapy Goal    Goal Priority Disciplines Outcome Goal Variances Interventions   Physical Therapy Goal     PT, PT/OT Ongoing  (interventions implemented as appropriate)     Description:  Goals to be met by: 2019     Patient will increase functional independence with mobility by performin. Supine to sit with Modified [place]  2. Sit to supine with Modified [place]  3. Sit to stand transfer with Minimal Assistance  4. Bed to chair transfer with Minimal Assistance using Rolling Walker  5. Lower extremity exercise program x10-20 reps per handout, with independence                      History:     Past Medical History:   Diagnosis Date    Anticoagulant long-term use     Arthritis     Bladder cancer     Blood transfusion     Cardiac arrest 2019    CHF (congestive heart failure)     COPD (chronic obstructive pulmonary disease)     Coronary artery disease     Diabetes mellitus     ESRD (end stage renal disease) on dialysis     Gout     Hemorrhagic shock 2019    Hopi (hard of hearing)     HAS BILAT AIDS BUT DOES NOT WEAR    Hyperlipidemia     Hypertension     Myocardial infarction     NSTEMI (non-ST elevated myocardial infarction)     RLS (restless legs syndrome)     Sleep apnea     NO MACHINE    Wears glasses        Past Surgical History:   Procedure Laterality Date    CARDIAC SURGERY      CABG X 2     CATARACT EXTRACTION, BILATERAL      CHOLECYSTECTOMY      COLONOSCOPY      CORONARY ARTERY BYPASS GRAFT      x 2    coronary stents      CYSTOSCOPY      CYSTOSCOPY N/A 2019    Performed by [facility] at [facility] OR    DEBRIDEMENT, FOOT Left 2019    Performed by Robert Wilson DPM at [facility] OR    EGD (ESOPHAGOGASTRODUODENOSCOPY) N/A 2019    [facility] (2ND FLR)    EXCISION-BLADDER TUMOR-TRANSURETHRAL (TURBT) N/A 2014    Performed by [facility]lee [facility]quez MD at [facility] OR    EXCISION-BLADDER TUMOR-TRANSURETHRAL (TURBT) N/A 2013    Performed by [facility]lee [facility]quez MD at [facility] OR    EYE SURGERY Bilateral     CATARACT     Insertion,catheter,tunneled N/A 5/16/2019    [facility]    TURBT (TRANSURETHRAL RESECTION OF BLADDER TUMOR) N/A 4/8/2019    [facility] OR    VASECTOMY         Time Tracking:     PT Received On: 08/15/19  PT Start Time: 1530     PT Stop Time: 1555  PT Total Time (min): 25 min     Billable Minutes: Evaluation 15 and Therapeutic Exercise 10      Norma Salomon, PT  08/15/2019

## 2019-08-16 PROBLEM — Z16.12 ESBL (EXTENDED SPECTRUM BETA-LACTAMASE) PRODUCING BACTERIA INFECTION: Status: ACTIVE | Noted: 2019-01-01

## 2019-08-16 PROBLEM — A49.9 ESBL (EXTENDED SPECTRUM BETA-LACTAMASE) PRODUCING BACTERIA INFECTION: Status: ACTIVE | Noted: 2019-01-01

## 2019-08-16 NOTE — PROGRESS NOTES
[facility] Medicine  Progress Note    Patient Name: Frank Zayas  MRN: [id number]  Patient Class: IP- Inpatient   Admission Date: 8/12/2019  Length of Stay: 4 days  Attending Physician: Ktaerin Kaufman MD  Primary Care Provider: Mikhail Shields MD        Subjective:     Principal Problem:Severe sepsis        HPI:  Frank Zayas is a 79 y.o. male with COPD who presents to the ED with ongoing SOB greater than baseline. Per EMS his home health nurse noticed worsening SOB when going to re-wrap his legs, which are being treated for ulcer. The patient denies any chest pain, fever, chills or fever, N/V/D, or any other symptoms at this time.  He was stable in the ED with oxygen at 2 L. he is noted to have an elevated BNP and bilateral pulmonary edema on chest x-ray.  He was given Lasix 60 mg IV in the ED and Nephrology was notified of his pulmonary edema. His usual dialysis days are Tuesday Thursday and Saturday.  He was also sent by a home health nurse because of malodorous drainage to his heel and erythema in his left lower extremity that had not been near the previous dressing change.  His left heel was drain and very malodorous.  No gangrene is noted.  There skin sloughing off of his left heel as noted in the photos.       Hx of CHF, HTN, hyperlipidemia, and MI.  PSHx of coronary stents, CABG, and cardiac sugery. No known drug allergies    Overview/Hospital Course:  Frank Zayas is a 79 y.o. Male ESRD/ htn/DM  admitted with severe sepsis 2/2 cellulitis/infected left heel diabetic ulcer -initiated on vanc/merrem. CT with prob septic arthritis   Podiatry consutled -surgical debridement to muscle on 8/13. Cultures taken  Blood cultures neg. Wound  PROTEUS MIRABILIS ESBL -put in isolation   No other significant isolate so Vanco dc . And cefipime changed to merrem.      ID consulted; with podiatry recommend aka vs bka so vascular/orthopedic surg consulted--wound care done.   CTaortogram  /runoff ordered  for 8/17 then HD   HTN, /Dm remained stable  Sacral decub/skin excoriations /ext hemorrhoid noted on admit-wound care consulted.              Interval History: pt seen/examined   Sleepy but arousable (has had morphine for pain )-02 sats good.   No new complaints   Tentatively scheduled for L BKA 8/19/19.   -   - Awaiting further vascular eval  Pt reports he is aware   Edema lower ext better     Review of Systems   Constitutional: Positive for activity change. Negative for chills and fever.   Respiratory: Negative for cough and shortness of breath.    Gastrointestinal: Positive for constipation. Negative for abdominal pain.   Genitourinary: Negative for difficulty urinating and dysuria.   Musculoskeletal: Positive for arthralgias, back pain, joint swelling and myalgias.   Skin: Positive for wound (Superficial wound left heel/gangrene-now s/p debridement to muscle ).   Hematological: Negative for adenopathy. Bruises/bleeds easily.     Objective:     Vital Signs (Most Recent):  Temp: 96.6 °F (35.9 °C) (08/16/19 1530)  Pulse: 99 (08/16/19 1611)  Resp: 18 (08/16/19 1611)  BP: 102/68 (08/16/19 1530)  SpO2: 98 % (08/16/19 1611) Vital Signs (24h Range):  Temp:  [96.6 °F (35.9 °C)-98.4 °F (36.9 °C)] 96.6 °F (35.9 °C)  Pulse:  [] 99  Resp:  [17-18] 18  SpO2:  [97 %-100 %] 98 %  BP: ()/(48-87) 102/68     Weight: 113.8 kg (250 lb 14.1 oz)  Body mass index is 32.21 kg/m².  No intake or output data in the 24 hours ending 08/16/19 1725   Physical Exam   Constitutional: He is oriented to person, place, and time. He appears well-developed and well-nourished. No distress.   HENT:   Head: Normocephalic and atraumatic.   Right Ear: External ear normal.   Left Ear: External ear normal.   Nose: Nose normal.   Mouth/Throat: Oropharynx is clear and moist. No oropharyngeal exudate.   Eyes: Conjunctivae and EOM are normal. Right eye exhibits no discharge. Left eye exhibits no discharge. No scleral icterus.    Neck: Neck supple. No thyromegaly present.   Cardiovascular: Normal rate and regular rhythm. Exam reveals distant heart sounds. Exam reveals no gallop and no friction rub.   No murmur heard.  Pulses:       Dorsalis pedis pulses are 1+ on the right side, and 1+ on the left side.        Posterior tibial pulses are 1+ on the right side, and 1+ on the left side.   decreased pulses posterior tibial and dorsalis post left over right  Left -needs eval    Pulmonary/Chest: Effort normal. Tachypnea noted. No respiratory distress. He has decreased breath sounds in the right middle field, the right lower field, the left middle field and the left lower field. He has no wheezes. He has no rales.   Abdominal: Soft. Bowel sounds are normal. He exhibits distension. He exhibits no mass. There is no tenderness. There is no rebound and no guarding.   Genitourinary:   Genitourinary Comments: Large external hemorrhoid with thrombosis  Sacral friction tears     Musculoskeletal: Normal range of motion. He exhibits no edema (RlE 1-2+ pitting ) or tenderness.   Ace intact right le post op   Superficial ulcer left mid medial calf -small amt yellow exudate drainage    Lymphadenopathy:     He has no cervical adenopathy.   Neurological: He is alert and oriented to person, place, and time. No cranial nerve deficit. Coordination normal.   Skin: Skin is warm and dry. Capillary refill takes less than 2 seconds. No rash noted. No erythema.   Psychiatric: He has a normal mood and affect. His behavior is normal. Judgment and thought content normal.   Nursing note and vitals reviewed.      Significant Labs:   BMP:   Recent Labs   Lab 08/16/19  0602   GLU 93      K 4.9      CO2 20*   BUN 29*   CREATININE 4.6*   CALCIUM 8.9     CBC:   Recent Labs   Lab 08/15/19  0822 08/16/19  0602   WBC 14.21* 14.22*   HGB 10.9* 11.4*   HCT 38.0* 39.8*   * 103*       Significant Imaging: I have reviewed and interpreted all pertinent imaging  results/findings within the past 24 hours.      Assessment/Plan:      * Severe sepsis  This patient does have evidence of infective focus-diabetic ulcer left heel   Gm neg bacteremia --ESBL Proteus s/merrem -continue per ID;   My overall impression is severe sepsis .-improving   Antibiotics given- clindamycin(only in ED)  /vancomycn /cefepime now--  Antibiotics (From admission, onward)    Start     Stop Route Frequency Ordered    08/13/19 2300  meropenem-0.9% sodium chloride 500 mg/50 mL IVPB      -- IV Every 12 hours (non-standard times) 08/13/19 1231    08/12/19 2245  mupirocin 2 % ointment      08/17 2059 Nasl 2 times daily 08/12/19 2143          Latest lactate reviewed, they are-  No results for input(s): LACTATE in the last 72 hours.    Organ dysfunction indicated by Acute kidney injury and Acute respiratory failure  Source- heel  Ruled out  pneumonia -check procalcitonin--neg   Source control Achieved by- iv abx, diuresis-debridement /wound care  Of left heel /ID and podiatry following     Heel xray-neg  CT foot -possible septic arthritis 1st [facility] joint vascular surg consulted -for CTAortogram w runoff am then determine bka vs aka . Ortho/podiatry following   WOund culture 8/13  Ua/urine culture-ngtd     cxr-trend -      ESBL (extended spectrum beta-lactamase) producing bacteria infection    See sepsis     Thrombocytopenia    Acute, uncontrolled, trending down   Sepsis vs HIT -check HIT ab . Consider heme eval   Monitor for bleeding       Diabetic ulcer of left heel associated with diabetes mellitus due to underlying condition, with muscle involvement without evidence of necrosis  See sepsis       Gangrene of left foot  Acute-2/2 gangrenous ulcer left heel.  Podiatry wound care and Infectious Disease consulted.  Also needs vascular surgery.  See sepsis     External hemorrhoid    Noted on exam use external hemorrhoid treatment and consider surgical evaluation    Sacral decubitus ulcer, stage III  See photos  -consult wound care. No evidence infection       Cellulitis of left lower extremity  Acute--severe -with large left heel ulcer.  -see sepsis     ESRD (end stage renal disease) on dialysis   Nephrology consult to follow.  Dialysis days Tuesday Thursday Saturday and will dose medications for renal failure.  And maintain blood pressure control.    Hyperkalemia  Acute-no ekg changes. Treated with HD unless ekg changes -then acute treatement will be required  -avoid supplements/AceI/ARB      Morbid (severe) obesity with alveolar hypoventilation  Pulse monitoring CPAP at night      Skin ulcer of left lower leg, limited to breakdown of skin  Chronic, severe -see sepsis/cellulitis ; podiatry /wound care consult        BRANDEN (obstructive sleep apnea)  bipap while in hospital unless pt brings own cpap machine. Reportedly non-compliant at home  Refuses bipap in hospital -consider haldol at hs for agitation and continue to encourage use to avoid respiratory compromise   Add IS/Acapella       Moderate malnutrition  Boost and emmanuel bid -dietary recommendations /weight weekly       Anemia, chronic disease  Monitor and transfuse if he becomes hemodynamically unstable or H/H < 7/21  Continue po iron/ epo per nephrology recs       A-fib    Atrial Fibrillation controlled currently with Beta Blocker. [id number] score 5. Anticoagulation indicated currently. Anticoagulation held prior to debrideemt  8/12 -- Hep 5000/8 hrs -continue vte   Hx gi bleed ?     Atrial fibrillation with premature ventricular or aberrantly conducted complexes  Left axis deviation  Right bundle branch block  T wave abnormality, consider lateral ischemia  Abnormal ECG  When compared with ECG of 26-JUN-2019 06:19,  T wave inversion no longer evident in Inferior leads    COPD (chronic obstructive pulmonary disease)  Patient's COPD is controlled currently. Continue scheduled inhalers prn nebulizer; /oxygen  and monitor respiratory status closely.   HD to remove volume  from lung.  IS and acapella for pulm toilet       Coronary artery disease  Chronic and stable.  Continue home medications with aspirin and beta-blocker.  Diuresing with Lasix and will star and hemodialysis again tomorrow.  and monitor on telemetry monitor signs for acute coronary syndrome.  Initial troponin negative and EKG shows AFib with possible lateral ischemic changes.        DM type 2 with diabetic mixed hyperlipidemia  Chronic and uncontrolled.-on admit-trended to low 100's    Trended down but appetite then improved    Wife reports he has not been taking insulin at home.  Held on admit -- oral medications and use low-dose Levemir insulin to scale and Accu-Cheks AC and HS.    Dietitian consulted.-supplements for  Malnutrition     Lab Results   Component Value Date    HGBA1C 6.1 (H) 08/12/2019       Type 2 diabetes mellitus with kidney complication, with long-term current use of insulin    See dm    HTN (hypertension)  Chronic and stable.  Hypertension Medications             metoprolol tartrate (LOPRESSOR) 25 MG tablet Take 1 tablet (25 mg total) by mouth every Mon, Wed, Fri, Sun.    nitroGLYCERIN (NITROSTAT) 0.4 MG SL tablet Place 0.4 mg under the tongue every 5 (five) minutes as needed for Chest pain. Seek medical help if pain is not relieved by the third dose.      Hospital Medications             furosemide injection 40 mg (Completed) Inject 4 mLs (40 mg total) into the vein ED 1 Time.    metoprolol tartrate (LOPRESSOR) tablet 25 mg Take 1 tablet (25 mg total) by mouth every Mon, Wed, Fri, Sun.    nitroGLYCERIN SL tablet 0.4 mg Place 1 tablet (0.4 mg total) under the tongue every 5 (five) minutes as needed for Chest pain.            Malignant neoplasm of anterior wall of urinary bladder  Chronic, stable; monitoring I/o . PVR as needed         VTE Risk Mitigation (From admission, onward)        Ordered     heparin (porcine) injection 5,000 Units  As needed (PRN)      08/13/19 1435     heparin (porcine)  injection 5,000 Units  Every 8 hours      08/12/19 1735     IP VTE HIGH RISK PATIENT  Once      08/12/19 1735                Katerin Kaufman MD  Department of Hospital Medicine   [facility]

## 2019-08-16 NOTE — PROGRESS NOTES
INPATIENT NEPHROLOGY PROGRESS NOTE  [facility] NEPHROLOGY    Patient Name: Frank Zayas  Date: 08/16/2019    Reason for consultation: ESRD    Chief Complaint:   Chief Complaint   Patient presents with    Shortness of Breath     with exertion, does not use O2 at home. arrives to ED with 2 L NC    Foot Pain     left heel since being discharged from [facility]. bigger since Friday       History of Present Illness:  Frank Zayas is a 79 y.o. male with COPD and ESRD on HD TTS who presents to the ED with ongoing SOB greater than baseline. Per EMS his home health nurse noticed worsening SOB when going to re-wrap his legs, which are being treated for ulcer. The patient denies any chest pain, fever, chills or fever, N/V/D, or any other symptoms at this time.  He was stable in the ED with oxygen at 2 L. he is noted to have an elevated BNP and bilateral pulmonary edema on chest x-ray.  He was given Lasix 60 mg IV in the ED. He was also sent by a home health nurse because of malodorous drainage to his heel and erythema in his left lower extremity that had not been near the previous dressing change.  His left heel was drain and very malodorous.  No gangrene is noted. We are consulted for dialysis.     Foot xray- no osteo, + cellulitis, + soft tissue ulcer of the heel    · Interval History/Subjective:    - VSS, on 3L NC    · Review of Systems: unable to obtain due to AMS/sleeping    Plan of Care:    Assessment:  ESRD  Chronic hypotension/Diastolic CHF/Acute pulm edema  Hyperkalemia  SHPT  Anemia of CKD  Foot ulcer/cellulitis     Plan:     - Continue routine HD TTS.  - BP is stable. Continue midodrine pre HD. VS is incrementally improved. Continue UF 2-3L with albumin.   - HyperK is resolved with 2K bath and 180 dialyzer. Continue renal diet with Nepro shakes only.  - Ordered daily phos- pending. Continue sevelamer 3 tabs with meals.  - Hb is stable. Continue EPO 5K with HD.   - Dose antbx for dialysis.    Thank you for  allowing us to participate in this patient's care. We will continue to follow.    Medications:  No current facility-administered medications on file prior to encounter.      Current Outpatient Medications on File Prior to Encounter   Medication Sig Dispense Refill    FLUoxetine 20 MG capsule Take 20 mg by mouth every morning.       metoprolol tartrate (LOPRESSOR) 25 MG tablet Take 1 tablet (25 mg total) by mouth every Mon, Wed, Fri, Sun. 16 tablet 11    nitroGLYCERIN (NITROSTAT) 0.4 MG SL tablet Place 0.4 mg under the tongue every 5 (five) minutes as needed for Chest pain. Seek medical help if pain is not relieved by the third dose.      pantoprazole (PROTONIX) 40 MG tablet Take 1 tablet (40 mg total) by mouth 2 (two) times daily before meals. 60 tablet 11    sevelamer carbonate (RENVELA) 800 mg Tab Take 2 tablets (1,600 mg total) by mouth 3 (three) times daily with meals. 180 tablet 11     Scheduled Meds:   albuterol-ipratropium  3 mL Nebulization Q6H    alfuzosin  10 mg Oral Nightly    aspirin  81 mg Oral Daily    collagenase   Topical (Top) Every other day    epoetin quitnon-epbx  50 Units/kg Intravenous Every Tues, Thurs, Sat    FLUoxetine  20 mg Oral QAM    heparin (porcine)  5,000 Units Subcutaneous Q8H    hydrocortisone   Rectal BID    insulin detemir U-100  10 Units Subcutaneous QHS    meropenem (MERREM) IVPB  500 mg Intravenous Q12H    metoprolol tartrate  25 mg Oral Every Mon, Wed, Fri, Sun    midodrine  5 mg Oral Every Tues, Thurs, Sat    mupirocin   Nasal BID    pantoprazole  40 mg Oral BID AC    rOPINIRole  2 mg Oral QHS    senna-docusate 8.6-50 mg  1 tablet Oral BID    sevelamer carbonate  2,400 mg Oral TID WM     Continuous Infusions:  PRN Meds:.sodium chloride 0.9%, acetaminophen, albumin human 25%, dextrose 50%, dextrose 50%, glucagon (human recombinant), glucose, glucose, heparin (porcine), HYDROcodone-acetaminophen, insulin aspart U-100, lidocaine HCL 2%, miconazole nitrate 2%,  morphine, nitroGLYCERIN, ondansetron, promethazine (PHENERGAN) IVPB, sodium chloride 0.9%    Allergies:  Lidocaine and Statins-hmg-coa reductase inhibitors    Vital Signs:  Temp Readings from Last 3 Encounters:   08/16/19 97.6 °F (36.4 °C)   07/01/19 96.4 °F (35.8 °C) (Oral)   06/17/19 98.7 °F (37.1 °C) (Oral)       Pulse Readings from Last 3 Encounters:   08/16/19 100   07/01/19 94   06/17/19 69       BP Readings from Last 3 Encounters:   08/16/19 (!) 114/57   07/01/19 108/72   06/17/19 (!) 105/56       Weight:  Wt Readings from Last 3 Encounters:   08/16/19 113.8 kg (250 lb 14.1 oz)   06/30/19 127 kg (279 lb 15.8 oz)   06/17/19 120.4 kg (265 lb 6.9 oz)       Physical Exam:  Constitutional: nad, sleeping, chronically ill, appears stated age  Heart: rrr, no m/r/g, wwp, + edema  Lungs: ant ausc clear, no w/r/r/c, no lb  Abdomen: s/nt/nd, +BS    Results:  Lab Results   Component Value Date     08/16/2019    K 4.9 08/16/2019     08/16/2019    CO2 20 (L) 08/16/2019    BUN 29 (H) 08/16/2019    CREATININE 4.6 (H) 08/16/2019    CALCIUM 8.9 08/16/2019    ANIONGAP 15 08/16/2019    ESTGFRAFRICA 13 (A) 08/16/2019    EGFRNONAA 11 (A) 08/16/2019       Lab Results   Component Value Date    CALCIUM 8.9 08/16/2019    PHOS 6.1 (H) 08/14/2019       Recent Labs   Lab 08/16/19  0602   WBC 14.22*   RBC 3.66*   HGB 11.4*   HCT 39.8*   *   *   MCH 31.1*   MCHC 28.6*       I have personally reviewed pertinent radiological imaging and reports.    Marivel Quinonez MD MPH  [facility] [address]  [place] [address]  [phone] (p)  [phone] (f)

## 2019-08-16 NOTE — SUBJECTIVE & OBJECTIVE
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    Interval History: extensive PMH    Review of patient's allergies indicates:   Allergen Reactions    Lidocaine Nausea Only     NOVACAINE --  Severe nausea    Statins-hmg-coa reductase inhibitors Anxiety       Current Facility-Administered Medications   Medication    0.9%  NaCl infusion    acetaminophen tablet 1,000 mg    albumin human 25% bottle 12.5 g    albuterol-ipratropium 2.5 mg-0.5 mg/3 mL nebulizer solution 3 mL    alfuzosin 24 hr tablet 10 mg    aspirin EC tablet 81 mg    collagenase ointment    dextrose 50% injection 12.5 g    dextrose 50% injection 25 g    epoetin quinton-epbx injection 5,800 Units    FLUoxetine capsule 20 mg    glucagon (human recombinant) injection 1 mg    glucose chewable tablet 16 g    glucose chewable tablet 24 g    heparin (porcine) injection 5,000 Units    heparin (porcine) injection 5,000 Units    HYDROcodone-acetaminophen  mg per tablet 1 tablet    hydrocortisone 2.5 % rectal cream    insulin aspart U-100 pen 0-5 Units    insulin detemir U-100 pen 10 Units    lidocaine HCL 2% jelly    meropenem-0.9% sodium chloride 500 mg/50 mL IVPB    metoprolol tartrate (LOPRESSOR) tablet 25 mg    miconazole nitrate 2% ointment    midodrine tablet 5 mg    morphine injection 2 mg    mupirocin 2 % ointment    nitroGLYCERIN SL tablet 0.4 mg    ondansetron injection 4 mg    pantoprazole EC tablet 40 mg    promethazine (PHENERGAN) 6.25 mg in dextrose 5 % 50 mL IVPB    rOPINIRole tablet 2 mg    senna-docusate 8.6-50 mg per tablet 1 tablet    sevelamer carbonate tablet 2,400 mg    sodium chloride 0.9% flush 10 mL     Objective:     Vital Signs (Most Recent):  Temp: 97.3 °F (36.3 °C) (08/16/19 0450)  Pulse: 88 (08/16/19 0745)  Resp: 18 (08/16/19 0745)  BP: 94/64 (08/16/19 0450)  SpO2: 97 % (08/16/19 0745) Vital Signs (24h Range):  Temp:  [96.5 °F  "(35.8 °C)-98.4 °F (36.9 °C)] 97.3 °F (36.3 °C)  Pulse:  [] 88  Resp:  [17-20] 18  SpO2:  [95 %-99 %] 97 %  BP: ()/(48-69) 94/64     Weight: 113.8 kg (250 lb 14.1 oz)  Height: 6' 2" (188 cm)  Body mass index is 32.21 kg/m².      Intake/Output Summary (Last 24 hours) at 8/16/2019 0850  Last data filed at 8/15/2019 1125  Gross per 24 hour   Intake 600 ml   Output 3600 ml   Net -3000 ml       General    Nursing note and vitals reviewed.  Constitutional: He is oriented to person, place, and time. He appears well-developed and well-nourished.   Pulmonary/Chest: Effort normal.   Neurological: He is alert and oriented to person, place, and time.   Psychiatric: He has a normal mood and affect. His behavior is normal.         Left Ankle/Foot Exam     Comments:  L foot dressing not removed. LLE erythema from just below the knee to the foot and ankle. Very weak pulse.        Significant Labs:   Blood Culture: No results for input(s): LABBLOO in the last 48 hours.  CBC:   Recent Labs   Lab 08/15/19  0822 08/16/19  0602   WBC 14.21* 14.22*   HGB 10.9* 11.4*   HCT 38.0* 39.8*   * 103*     CMP:   Recent Labs   Lab 08/15/19  0822 08/16/19  0602    138   K 5.6* 4.9    103   CO2 23 20*   GLU 78 93   BUN 34* 29*   CREATININE 4.6* 4.6*   CALCIUM 8.6* 8.9   PROT 6.0  --    ALBUMIN 3.2*  --    BILITOT 0.6  --    ALKPHOS 227*  --    AST 9*  --    ALT 17  --    ANIONGAP 11 15   EGFRNONAA 11* 11*     CRP: No results for input(s): CRP in the last 48 hours.  Wound Culture: No results for input(s): LABAERO in the last 48 hours.  All pertinent labs within the past 24 hours have been reviewed.    Significant Imaging: None  "

## 2019-08-16 NOTE — PLAN OF CARE
08/15/19 1955   Patient Assessment/Suction   Level of Consciousness (AVPU) alert   PRE-TX-O2   O2 Device (Oxygen Therapy) nasal cannula   Flow (L/min) 3   Oxygen Concentration (%) 32   SpO2 98 %   Pulse Oximetry Type Intermittent   Pulse (!) 115   Resp 18   Aerosol Therapy   $ Aerosol Therapy Charges Refused   Respiratory Treatment Status (SVN) refused   Ready to Wean/Extubation Screen   FIO2<=50 (chart decimal) 0.32

## 2019-08-16 NOTE — PT/OT/SLP PROGRESS
Physical Therapy Treatment    Patient Name:  Frank Zayas   MRN:  [id number]    Recommendations:     Discharge Recommendations:  LTACH (long-term acute care hospital), nursing facility, skilled   Discharge Equipment Recommendations: walker, rolling   Barriers to discharge: Decreased caregiver support and pt requries increased level of assistance for all functional mobility    Assessment:     Frank Zayas is a 79 y.o. male admitted with a medical diagnosis of Severe sepsis.  He presents with the following impairments/functional limitations:  impaired self care skills, weakness, impaired balance, impaired skin, decreased safety awareness, impaired endurance, impaired functional mobilty.  During PT tx, pt was very lethargic, and not very talkative.  He required Mod A for transfer supine<>sit.  He was able to sit on the EOB x 10 min.  Attempted to have pt perform exercises, however he was too lethargic to perform, and therefore assisted pt back to bed.     Rehab Prognosis: Fair; patient would benefit from acute skilled PT services to address these deficits and reach maximum level of function.    Recent Surgery: Procedure(s) (LRB):  DEBRIDEMENT, FOOT (Left) 3 Days Post-Op    Plan:     During this hospitalization, patient to be seen 6 x/week to address the identified rehab impairments via gait training, therapeutic activities, therapeutic exercises and progress toward the following goals:    · Plan of Care Expires:  08/29/19    Subjective     Chief Complaint: pain  Patient/Family Comments/goals: none  Pain/Comfort:  · Pain Rating 1: 10/10  · Location - Orientation 1: generalized  · Pain Addressed 1: Reposition, Distraction, Nurse notified, Cessation of Activity  · Pain Rating Post-Intervention 1: 10/10      Objective:     Communicated with nurse Rollins prior to session.  Patient found HOB elevated with peripheral IV, telemetry, oxygen, bed alarm upon PT entry to room.     General Precautions: Standard, aspiration,  fall, diabetic(skin)   Orthopedic Precautions:N/A   Braces: N/A     Functional Mobility:  · Bed Mobility:     · Scooting: maximal assistance and of 2 persons  · Supine to Sit: moderate assistance and HOB completely raised  · Sit to Supine: maximal assistance  · Balance: fair - pt holding onto rails with B UE's.        AM-PAC 6 CLICK MOBILITY  Turning over in bed (including adjusting bedclothes, sheets and blankets)?: 2  Sitting down on and standing up from a chair with arms (e.g., wheelchair, bedside commode, etc.): 2  Moving from lying on back to sitting on the side of the bed?: 2  Moving to and from a bed to a chair (including a wheelchair)?: 1  Need to walk in hospital room?: 1  Climbing 3-5 steps with a railing?: 1  Basic Mobility Total Score: 9       Therapeutic Activities and Exercises:   Attempted to have pt perform LE exercises, however he was unable to stay awake long enough to perform them and was essentially falling asleep sitting on the EOB.  Pt assisted BTB.    Patient left HOB elevated with all lines intact, call button in reach and nurse Ria notified..    GOALS:   Multidisciplinary Problems     Physical Therapy Goals        Problem: Physical Therapy Goal    Goal Priority Disciplines Outcome Goal Variances Interventions   Physical Therapy Goal     PT, PT/OT Ongoing (interventions implemented as appropriate)     Description:  Goals to be met by: 2019     Patient will increase functional independence with mobility by performin. Supine to sit with Modified [place]  2. Sit to supine with Modified [place]  3. Sit to stand transfer with Minimal Assistance  4. Bed to chair transfer with Minimal Assistance using Rolling Walker  5. Lower extremity exercise program x10-20 reps per handout, with independence                      Time Tracking:     PT Received On: 19  PT Start Time: 1345     PT Stop Time: 1413  PT Total Time (min): 28 min     Billable Minutes: Therapeutic Activity  28    Treatment Type: Treatment  PT/PTA: PT           Norma Salomon, PT  08/16/2019

## 2019-08-16 NOTE — PLAN OF CARE
Problem: Adult Inpatient Plan of Care  Goal: Plan of Care Review  Outcome: Ongoing (interventions implemented as appropriate)     08/15/19 2005   Plan of Care Review   Plan of Care Reviewed With patient   Progress declining     Pt AAOx2 (self and place), young's with NC applied and room near NS with bed alarm set. VSS with NAD noted; pt free of injury or falls.  Dressing changed performed to Left foot; dx changed to be performed every other day.  Spoke with Dr. Montero who is awaiting Dr. Rodriguez to assess limb for amputation of BKA or AKA. Contact status isolation iniated due to ESBL in blood. Will continue to monitor.

## 2019-08-16 NOTE — PLAN OF CARE
Problem: Occupational Therapy Goal  Goal: Occupational Therapy Goal  Goals to be met by: 8/25/19     Patient will increase functional independence with ADLs by performing:    UE Dressing with Set-up Assistance and Supervision.  LE Dressing seated at EOB with Set-up Assistance, Minimal Assistance and Assistive Devices as needed.  Sitting at edge of bed x8 minutes with Supervision.  Supine to sit with Minimal Assistance and use of bedrail as needed.  Toilet stand pivot or scoot transfer to bedside commode with Moderate Assistance.  Upper extremity exercise program x10 reps per handout, with independence.      Outcome: Ongoing (interventions implemented as appropriate)  Pt seated @ EOB x ~20 min during session for bed bath from nurse Ria and student Morgan- with last 10 mins periodic contact guard from TAI 2/2 fatigue. Goal of 8min seated EOB w/ Supervision met today.

## 2019-08-16 NOTE — PROGRESS NOTES
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    Interval History: extensive PMH    Review of patient's allergies indicates:   Allergen Reactions    Lidocaine Nausea Only     NOVACAINE --  Severe nausea    Statins-hmg-coa reductase inhibitors Anxiety       Current Facility-Administered Medications   Medication    0.9%  NaCl infusion    acetaminophen tablet 1,000 mg    albumin human 25% bottle 12.5 g    albuterol-ipratropium 2.5 mg-0.5 mg/3 mL nebulizer solution 3 mL    alfuzosin 24 hr tablet 10 mg    aspirin EC tablet 81 mg    collagenase ointment    dextrose 50% injection 12.5 g    dextrose 50% injection 25 g    epoetin quinton-epbx injection 5,800 Units    FLUoxetine capsule 20 mg    glucagon (human recombinant) injection 1 mg    glucose chewable tablet 16 g    glucose chewable tablet 24 g    heparin (porcine) injection 5,000 Units    heparin (porcine) injection 5,000 Units    HYDROcodone-acetaminophen  mg per tablet 1 tablet    hydrocortisone 2.5 % rectal cream    insulin aspart U-100 pen 0-5 Units    insulin detemir U-100 pen 10 Units    lidocaine HCL 2% jelly    meropenem-0.9% sodium chloride 500 mg/50 mL IVPB    metoprolol tartrate (LOPRESSOR) tablet 25 mg    miconazole nitrate 2% ointment    midodrine tablet 5 mg    morphine injection 2 mg    mupirocin 2 % ointment    nitroGLYCERIN SL tablet 0.4 mg    ondansetron injection 4 mg    pantoprazole EC tablet 40 mg    promethazine (PHENERGAN) 6.25 mg in dextrose 5 % 50 mL IVPB    rOPINIRole tablet  "2 mg    senna-docusate 8.6-50 mg per tablet 1 tablet    sevelamer carbonate tablet 2,400 mg    sodium chloride 0.9% flush 10 mL     Objective:     Vital Signs (Most Recent):  Temp: 97.3 °F (36.3 °C) (08/16/19 0450)  Pulse: 88 (08/16/19 0745)  Resp: 18 (08/16/19 0745)  BP: 94/64 (08/16/19 0450)  SpO2: 97 % (08/16/19 0745) Vital Signs (24h Range):  Temp:  [96.5 °F (35.8 °C)-98.4 °F (36.9 °C)] 97.3 °F (36.3 °C)  Pulse:  [] 88  Resp:  [17-20] 18  SpO2:  [95 %-99 %] 97 %  BP: ()/(48-69) 94/64     Weight: 113.8 kg (250 lb 14.1 oz)  Height: 6' 2" (188 cm)  Body mass index is 32.21 kg/m².      Intake/Output Summary (Last 24 hours) at 8/16/2019 0850  Last data filed at 8/15/2019 1125  Gross per 24 hour   Intake 600 ml   Output 3600 ml   Net -3000 ml       General    Nursing note and vitals reviewed.  Constitutional: He is oriented to person, place, and time. He appears well-developed and well-nourished.   Pulmonary/Chest: Effort normal.   Neurological: He is alert and oriented to person, place, and time.   Psychiatric: He has a normal mood and affect. His behavior is normal.         Left Ankle/Foot Exam     Comments:  L foot dressing not removed. LLE erythema from just below the knee to the foot and ankle. Very weak pulse.        Significant Labs:   Blood Culture: No results for input(s): LABBLOO in the last 48 hours.  CBC:   Recent Labs   Lab 08/15/19  0822 08/16/19  0602   WBC 14.21* 14.22*   HGB 10.9* 11.4*   HCT 38.0* 39.8*   * 103*     CMP:   Recent Labs   Lab 08/15/19  0822 08/16/19  0602    138   K 5.6* 4.9    103   CO2 23 20*   GLU 78 93   BUN 34* 29*   CREATININE 4.6* 4.6*   CALCIUM 8.6* 8.9   PROT 6.0  --    ALBUMIN 3.2*  --    BILITOT 0.6  --    ALKPHOS 227*  --    AST 9*  --    ALT 17  --    ANIONGAP 11 15   EGFRNONAA 11* 11*     CRP: No results for input(s): CRP in the last 48 hours.  Wound Culture: No results for input(s): LABAERO in the last 48 hours.  All pertinent labs " within the past 24 hours have been reviewed.    Significant Imaging: None    Assessment/Plan:     * Severe sepsis  Tentatively scheduled for L BKA 8/19/19.   - Clearance  - Awaiting further vascular eval    Diabetic ulcer of left heel associated with diabetes mellitus due to underlying condition, with muscle involvement without evidence of necrosis  Waiting for further assessment to determine when to proceed with BKA or if pt would be better served by AKA    Awaiting LLE US results    Scheduled for AV fistula procedure with vascular tomorrow          KASSIE CHRIS  Orthopedics  [facility]

## 2019-08-16 NOTE — SUBJECTIVE & OBJECTIVE
Interval History: pt seen/examined   Sleepy but arousable (has had morphine for pain )-02 sats good.   No new complaints   Tentatively scheduled for L BKA 8/19/19.   -   - Awaiting further vascular eval  Pt reports he is aware   Edema lower ext better     Review of Systems   Constitutional: Positive for activity change. Negative for chills and fever.   Respiratory: Negative for cough and shortness of breath.    Gastrointestinal: Positive for constipation. Negative for abdominal pain.   Genitourinary: Negative for difficulty urinating and dysuria.   Musculoskeletal: Positive for arthralgias, back pain, joint swelling and myalgias.   Skin: Positive for wound (Superficial wound left heel/gangrene-now s/p debridement to muscle ).   Hematological: Negative for adenopathy. Bruises/bleeds easily.     Objective:     Vital Signs (Most Recent):  Temp: 96.6 °F (35.9 °C) (08/16/19 1530)  Pulse: 99 (08/16/19 1611)  Resp: 18 (08/16/19 1611)  BP: 102/68 (08/16/19 1530)  SpO2: 98 % (08/16/19 1611) Vital Signs (24h Range):  Temp:  [96.6 °F (35.9 °C)-98.4 °F (36.9 °C)] 96.6 °F (35.9 °C)  Pulse:  [] 99  Resp:  [17-18] 18  SpO2:  [97 %-100 %] 98 %  BP: ()/(48-87) 102/68     Weight: 113.8 kg (250 lb 14.1 oz)  Body mass index is 32.21 kg/m².  No intake or output data in the 24 hours ending 08/16/19 1725   Physical Exam   Constitutional: He is oriented to person, place, and time. He appears well-developed and well-nourished. No distress.   HENT:   Head: Normocephalic and atraumatic.   Right Ear: External ear normal.   Left Ear: External ear normal.   Nose: Nose normal.   Mouth/Throat: Oropharynx is clear and moist. No oropharyngeal exudate.   Eyes: Conjunctivae and EOM are normal. Right eye exhibits no discharge. Left eye exhibits no discharge. No scleral icterus.   Neck: Neck supple. No thyromegaly present.   Cardiovascular: Normal rate and regular rhythm. Exam reveals distant heart sounds. Exam reveals no gallop and no  friction rub.   No murmur heard.  Pulses:       Dorsalis pedis pulses are 1+ on the right side, and 1+ on the left side.        Posterior tibial pulses are 1+ on the right side, and 1+ on the left side.   decreased pulses posterior tibial and dorsalis post left over right  Left -needs eval    Pulmonary/Chest: Effort normal. Tachypnea noted. No respiratory distress. He has decreased breath sounds in the right middle field, the right lower field, the left middle field and the left lower field. He has no wheezes. He has no rales.   Abdominal: Soft. Bowel sounds are normal. He exhibits distension. He exhibits no mass. There is no tenderness. There is no rebound and no guarding.   Genitourinary:   Genitourinary Comments: Large external hemorrhoid with thrombosis  Sacral friction tears     Musculoskeletal: Normal range of motion. He exhibits no edema (RlE 1-2+ pitting ) or tenderness.   Ace intact right le post op   Superficial ulcer left mid medial calf -small amt yellow exudate drainage    Lymphadenopathy:     He has no cervical adenopathy.   Neurological: He is alert and oriented to person, place, and time. No cranial nerve deficit. Coordination normal.   Skin: Skin is warm and dry. Capillary refill takes less than 2 seconds. No rash noted. No erythema.   Psychiatric: He has a normal mood and affect. His behavior is normal. Judgment and thought content normal.   Nursing note and vitals reviewed.      Significant Labs:   BMP:   Recent Labs   Lab 08/16/19  0602   GLU 93      K 4.9      CO2 20*   BUN 29*   CREATININE 4.6*   CALCIUM 8.9     CBC:   Recent Labs   Lab 08/15/19  0822 08/16/19  0602   WBC 14.21* 14.22*   HGB 10.9* 11.4*   HCT 38.0* 39.8*   * 103*       Significant Imaging: I have reviewed and interpreted all pertinent imaging results/findings within the past 24 hours.

## 2019-08-16 NOTE — CARE UPDATE
08/16/19 0745   Patient Assessment/Suction   Level of Consciousness (AVPU) alert   All Lung Fields Breath Sounds diminished   PRE-TX-O2   O2 Device (Oxygen Therapy) nasal cannula w/ humidification   $ Is the patient on Low Flow Oxygen? Yes   Flow (L/min) 3   Oxygen Concentration (%) 32   SpO2 97 %   Pulse Oximetry Type Intermittent   $ Pulse Oximetry - Multiple Charge Pulse Oximetry - Multiple   Pulse 88   Resp 18   Aerosol Therapy   $ Aerosol Therapy Charges Aerosol Treatment   Respiratory Treatment Status (SVN) given   Treatment Route (SVN) mask   Patient Position (SVN) Ma's   Post Treatment Assessment (SVN) breath sounds unchanged   Signs of Intolerance (SVN) none   Breath Sounds Post-Respiratory Treatment   Throughout All Fields Post-Treatment no change   Post-treatment Heart Rate (beats/min) 92   Post-treatment Resp Rate (breaths/min) 16   Duo Q6, Aerobika Q4, IS QS, Bipap QHS, vitals as charted.

## 2019-08-16 NOTE — ASSESSMENT & PLAN NOTE
Acute, uncontrolled, trending down   Sepsis vs HIT -check HIT ab . Consider heme eval   Monitor for bleeding

## 2019-08-16 NOTE — PLAN OF CARE
Problem: Physical Therapy Goal  Goal: Physical Therapy Goal  Goals to be met by: 2019     Patient will increase functional independence with mobility by performin. Supine to sit with Modified [place]  2. Sit to supine with Modified [place]  3. Sit to stand transfer with Minimal Assistance  4. Bed to chair transfer with Minimal Assistance using Rolling Walker  5. Lower extremity exercise program x10-20 reps per handout, with independence     Outcome: Ongoing (interventions implemented as appropriate)  Pt required Max A for supine<>sit and was able to sit on the EOB for 10 min with supervision.

## 2019-08-16 NOTE — PT/OT/SLP PROGRESS
Occupational Therapy   Treatment    Name: Frank Zayas  MRN: [id number]  Admitting Diagnosis:  Severe sepsis  3 Days Post-Op    Recommendations:     Discharge Recommendations: nursing facility, skilled, LTACH (long-term acute care hospital)  Discharge Equipment Recommendations:  walker, rolling  Barriers to discharge:       Assessment:     Frank Zayas is a 79 y.o. male with a medical diagnosis of Severe sepsis.  He presents as fatigued and cooperative, also fearful to stand but willing to try. Performance deficits affecting function are weakness, impaired endurance, impaired skin, impaired self care skills, impaired balance, impaired functional mobilty, gait instability, decreased lower extremity function, pain, impaired cardiopulmonary response to activity, impaired cognition.     Rehab Prognosis:  Fair; patient would benefit from acute skilled OT services to address these deficits and reach maximum level of function.       Plan:     Patient to be seen 4 x/week to address the above listed problems via self-care/home management, therapeutic activities, therapeutic exercises  · Plan of Care Expires:    · Plan of Care Reviewed with: patient    Subjective     Pain/Comfort:  ·      Objective:     Communicated with: nurse, Ria and nursing student Morgan prior to session and during session- co treating for bed bath and therapy.  Patient found HOB elevated with  oxygen, telemetry, peripheral IV upon OT entry to room.    General Precautions: Standard, fall, aspiration, diabetic(skin)   Orthopedic Precautions:N/A   Braces:       Occupational Performance:     Bed Mobility:    · Patient completed Rolling/Turning to Left with  stand by assistance and with side rail  · Patient completed Scooting/Bridging with contact guard assistance, with side rail and verbal and physical cues from TAI to bend R knee and push through R heel to bridge and scoot to HOB.  · Patient completed Supine to Sit with moderate assistance and  phsical help from TAI to slide L LE to EOB and to hang off of bed without pain.  · Patient completed Sit to Supine with minimum assistance and TAI to lift L LE on to bed.     Functional Mobility/Transfers:  · Patient completed Sit <> Stand Transfer with moderate assistance and of 2 ppl persons  with  rolling walker   · Functional Mobility: 1st attempt to stand incomplete- TAI counted to 3 with rocking prior to stand. 2nd attempt complete (w/ countdown & rocking) and pt stood w/ RW and Mod A x 2ppl for 2 mins (nurse and nurse student changed his bed and medicated his buttox in this time). Pt R knee constantly buckling, TAI to support pt knee and verbal cues to push through the right/good leg. Pt verbalized understanding and participated as able. Pt requires cueing for hand placement/sequencing during sit>stand.  · Pt took 6 side steps toward HOB w/ RW and Mod A x 2 ppl to position him self for sit>supine. Pt scooting to EOB and into bed from EOB with supervision and verbal cues to move 1 hip at a time.    [facility] 6 Click ADL:      Treatment & Education:  Pt total time at EOB ~20 mins. Pt participated fully and was better able to communicate his needs toward the end of his therapy session/bed bath as he was arroused and feeling better. Pt asked for apple juice and ice cream and engaged staff in conversation about his food likes. He educated nursing staff that he was not supposed to have a straw with his apple juice before TAI could tell them. Pt (I) is increasing as he gains strength.    Patient left HOB elevated with all lines intact, call button in reach, bed alarm on and nurse Ria and nursing student Morgan  notifiedEducation:      GOALS:   Multidisciplinary Problems     Occupational Therapy Goals        Problem: Occupational Therapy Goal    Goal Priority Disciplines Outcome Interventions   Occupational Therapy Goal     OT, PT/OT Ongoing (interventions implemented as appropriate)    Description:  Goals to be  met by: 8/25/19     Patient will increase functional independence with ADLs by performing:    UE Dressing with Set-up Assistance and Supervision.  LE Dressing seated at EOB with Set-up Assistance, Minimal Assistance and Assistive Devices as needed.  Sitting at edge of bed x8 minutes with Supervision.  Supine to sit with Minimal Assistance and use of bedrail as needed.  Toilet stand pivot or scoot transfer to bedside commode with Moderate Assistance.  Upper extremity exercise program x10 reps per handout, with independence.                       Time Tracking:     OT Date of Treatment: 08/16/19  OT Start Time: 1005  OT Stop Time: 1044  OT Total Time (min): 39 min    Billable Minutes:Therapeutic Activity 39 min    SARAH Hamm  8/16/2019

## 2019-08-16 NOTE — CONSULTS
Patient seen/ chart reviewed. He is known to my service for dialysis access. Was recently scheduled for creation of AV fistula but that procedure was cancelled with this hospitalization  Admitted with sepsis secondary to L LE cellulitis and infected heel ulcer. L foot debrided by Dr Wilson. Referred now for evaluation of circulation before determining what should be next step.   Dressings recently changed so not disturbed. Still with cellulitis L lower leg. + edema bilaterally  Ultrasound suggests primarily runoff/small vessel disease  Would obtain CTA (aortogram with runoff) to further define the anatomy to determine if anything can be done to improve the blood flow to the foot.  Discussed with Dr Kaufman. She would prefer to wait 2-3 days before getting another contrast study  She will let me know when the study is ordered  Discussed with the family at the bedside and all questions answered    Thank you for the consult

## 2019-08-16 NOTE — PROGRESS NOTES
Progress Note  Infectious Disease    Follow-up For:  Gangrene of the foot    SUBJECTIVE:   Interval history/ROS:   8/14:  Status post life-saving debridement of gangrene of the foot.  Blood cultures have Proteus.  Discussed with Podiatry, foot is not viable and he will need amputation.  Afebrile.  8/15:  Cultures of blood and wound have Proteus, ESBL positive.  He feels poorly, poor appetite, hurts all over.  Does not recall conversations about loss of limb..  He vascular opinion not yet recorded.  Ultrasound of the lower extremity shows more obstruction in the right leg and suggestion of small vessel disease in the left leg.  He is not short of breath, having chest pain, nauseous, having abdominal pain.    Antibiotics (From admission, onward)    Start     Stop Route Frequency Ordered    08/13/19 2300  meropenem-0.9% sodium chloride 500 mg/50 mL IVPB      -- IV Every 12 hours (non-standard times) 08/13/19 1231    08/12/19 2245  mupirocin 2 % ointment      08/17 2059 Nasl 2 times daily 08/12/19 2143        Antifungals (From admission, onward)    Start     Stop Route Frequency Ordered    08/14/19 0000  miconazole nitrate 2% ointment      -- Top Daily PRN 08/13/19 1045        Antivirals (From admission, onward)    None            EXAM & DIAGNOSTICS REVIEWED:   Vitals:     Temp:  [96.2 °F (35.7 °C)-98.6 °F (37 °C)]   Temp: 98 °F (36.7 °C) (08/15/19 1509)  Pulse: 101 (08/15/19 1509)  Resp: 20 (08/15/19 1509)  BP: (!) 105/58 (08/15/19 1509)  SpO2: 95 % (08/15/19 1311)    Intake/Output Summary (Last 24 hours) at 8/15/2019 1924  Last data filed at 8/15/2019 1125  Gross per 24 hour   Intake 600 ml   Output 3600 ml   Net -3000 ml       General:         In NAD.  Alert and cooperative, does not recall prior conversations  Eyes:              anicteric, extraocular movements intact  ENT:                No ulcers, exudates, thrush, nares patent,   Neck:              supple,  Lungs:            Bibasilar crackles are  improved  Heart:              iRRR, no gallop/murmur/rub noted  Abd:                Soft, obese, NT, ND, normal BS, no masses or organomegaly appreciated.  :                  Voids  Musc:              Joints without effusion, swelling, .   Skin:               Bilateral venous stasis cellulitis  Wound:                     Postop bandages not disturbed 8/14    8/15:  All of the wet gangrene has been excised.  A large portion of the calcaneus is exposed.  The wound edges are dusky.      Neuro:  awake, nonfocal, memory impaired, able to communicate, moves all of his extremities  Psych:              feels unwell, with no appetite, saddened by the impending amputation  Lymphatic:        Extrem:           Chronic LE edema,  With erythema,   Cellulitis, from forefoot to knee on the left, which is slightly improved compared to 2 days ago Bilateral venous stasis changes as well. Feet are warm.    VAD:               Right sided tunneled dialysis catheter  Isolation:  will be on contact    Lines/Tubes/Drains:    General Labs reviewed:  Recent Labs   Lab 08/15/19  0822   WBC 14.21*   RBC 3.51*   HGB 10.9*   HCT 38.0*   *   *   MCH 31.1*   MCHC 28.7*     Recent Labs   Lab 08/15/19  0822   CALCIUM 8.6*   PROT 6.0      K 5.6*   CO2 23      BUN 34*   CREATININE 4.6*   ALKPHOS 227*   ALT 17   AST 9*   BILITOT 0.6       Micro:   Microbiology Results (last 7 days)     Procedure Component Value Units Date/Time    Blood culture [[id number]] Collected:  08/14/19 0602    Order Status:  Completed Specimen:  Blood Updated:  08/15/19 1212     Blood Culture, Routine No Growth to date      No Growth to date    Blood culture x two cultures. Draw prior to antibiotics. [[id number]]  (Abnormal) Collected:  08/12/19 1556    Order Status:  Completed Specimen:  Blood Updated:  08/15/19 1114     Blood Culture, Routine Gram stain demarcus bottle: Gram negative rods      Results called to and read back by: Vasu Cerda RN  08/13/2019        15:11      PROTEUS MIRABILIS ESBL  For susceptibility see order #[id number]      Narrative:       Aerobic and anaerobic    Blood culture x two cultures. Draw prior to antibiotics. [[id number]]  (Abnormal)  (Susceptibility) Collected:  08/12/19 1623    Order Status:  Completed Specimen:  Blood Updated:  08/15/19 1114     Blood Culture, Routine Gram stain demarcus bottle: Gram negative rods      Results called to and read back by: Vasu Cerda RN 08/13/2019        15:12      PROTEUS MIRABILIS ESBL    Narrative:       Aerobic and anaerobic    Aerobic culture [[id number]]  (Abnormal) Collected:  08/13/19 1830    Order Status:  Completed Specimen:  Abscess from Foot, Left Updated:  08/15/19 1113     Aerobic Bacterial Culture PRESUMPTIVE PROTEUS SPECIES  Many  Identification and susceptibility pending  No other significant isolate      Culture, Anaerobe [[id number]] Collected:  08/13/19 1830    Order Status:  Completed Specimen:  Abscess from Foot, Left Updated:  08/15/19 0747     Anaerobic Culture Culture in progress    Blood culture (site 1) [[id number]]     Order Status:  Canceled Specimen:  Blood           Imaging Reviewed:  Plain film of the foot, chest x-ray  Arterial Doppler  FINDINGS:  There are no elevated peak systolic velocities in the visualized arteries of the right or left lower extremity suggesting hemodynamically significant narrowing.  There are low velocities with peak systolic velocity right common femoral artery 46 centimeters/second left common femoral artery 60 centimeters/second which may be related to cardiac output.  Waveform is primarily multiphasic.  There is no flow in the posterior tibial artery and virtually no flow detected in the right peroneal artery.  There is plaque seen fairly diffusely throughout the visualized arteries of the right and left lower extremities.  Waveform is multiphasic throughout most of the lower extremities.      Impression       Findings consistent with  small vessel disease with no flow seen in the right posterior tibial artery and minimal flow in the right peroneal artery.       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    Cellulitis left leg, both bacterial and venous stasis  Bilateral venous stasis  ESRD  Pulmonary edema  DM with vascular disease  He memory loss     Rec:  Continue meropenem  Contact isolation  Vascular opinion pending to help guide level of amputation    Dr. Ferguson to cover 8/16-19

## 2019-08-16 NOTE — PLAN OF CARE
Problem: Adult Inpatient Plan of Care  Goal: Plan of Care Review  Outcome: Ongoing (interventions implemented as appropriate)  Pt laying supine in bed. Pt reports consistent pain 8/10 in BLE. Pt is resting with eyes closed. Quiet environment and relaxation facilitated. Plan of care for pain management discussed with pt. Bed in lowest position with wheels locked, and side rails up x2. Safety maintained. Plan of care reviewed and pt verbalized understanding. Call light in reach. No further requests at this time. Will continue to monitor.

## 2019-08-16 NOTE — ASSESSMENT & PLAN NOTE
Atrial Fibrillation controlled currently with Beta Blocker. [id number] score 5. Anticoagulation indicated currently. Anticoagulation held prior to debrideemt  8/12 -- Hep 5000/8 hrs -continue vte   Hx gi bleed ?     Atrial fibrillation with premature ventricular or aberrantly conducted complexes  Left axis deviation  Right bundle branch block  T wave abnormality, consider lateral ischemia  Abnormal ECG  When compared with ECG of 26-JUN-2019 06:19,  T wave inversion no longer evident in Inferior leads

## 2019-08-17 NOTE — NURSING
Received orders from Dr. Yoo for pt to come to dialysis now. Pt does not have to have CT first per Dr. Yoo. Pt will be in HD room by himself and will order terminal clean of room once HD is finished. Informed floor RN, Cyrus Goins of Dr. Yoo's orders and she verbalized understanding.

## 2019-08-17 NOTE — ASSESSMENT & PLAN NOTE
Patient's anemia is currently controlled. S/p 0 units of PRBCs. Current CBC reviewed-   Lab Results   Component Value Date    HGB 11.5 (L) 08/17/2019    HCT 40.8 08/17/2019     Monitor serial CBC and transfuse if patient becomes hemodynamically unstable, symptomatic or H/H drops below 7/21.

## 2019-08-17 NOTE — PROGRESS NOTES
[facility] Medicine  Progress Note    Patient Name: Frank Zayas  MRN: [id number]  Patient Class: IP- Inpatient   Admission Date: 8/12/2019  Length of Stay: 5 days  Attending Physician: Breanne Parrish MD  Primary Care Provider: Mikhail Shields MD        Subjective:     Principal Problem:Severe sepsis        HPI:  Frank Zayas is a 79 y.o. male with COPD who presents to the ED with ongoing SOB greater than baseline. Per EMS his home health nurse noticed worsening SOB when going to re-wrap his legs, which are being treated for ulcer. The patient denies any chest pain, fever, chills or fever, N/V/D, or any other symptoms at this time.  He was stable in the ED with oxygen at 2 L. he is noted to have an elevated BNP and bilateral pulmonary edema on chest x-ray.  He was given Lasix 60 mg IV in the ED and Nephrology was notified of his pulmonary edema. His usual dialysis days are Tuesday Thursday and Saturday.  He was also sent by a home health nurse because of malodorous drainage to his heel and erythema in his left lower extremity that had not been near the previous dressing change.  His left heel was drain and very malodorous.  No gangrene is noted.  There skin sloughing off of his left heel as noted in the photos.       Hx of CHF, HTN, hyperlipidemia, and MI.  PSHx of coronary stents, CABG, and cardiac sugery. No known drug allergies    Overview/Hospital Course:  Frank Zayas is a 79 y.o. Male ESRD/ htn/DM  admitted with severe sepsis 2/2 cellulitis/infected left heel diabetic ulcer -initiated on vanc/merrem. CT with prob septic arthritis   Podiatry consutled -surgical debridement to muscle on 8/13. Cultures taken  Blood cultures neg. Wound  PROTEUS MIRABILIS ESBL -put in isolation   No other significant isolate so Vanco dc . And cefipime changed to merrem.      ID consulted; with podiatry recommend aka vs bka so vascular/orthopedic surg consulted--wound care done.    CTaortogram /runoff ordered  for 8/17 then HD   HTN, /Dm remained stable  Sacral decub/skin excoriations /ext hemorrhoid noted on admit-wound care consulted.              Interval History:  Patient very frustrated this morning.  Wants to get out of bed.  Keeps yelling help me.  Very hard to redirect.  Will not answer review of systems questions.  Does know he is in the hospital but states the year is 1919.  CTA aortogram with runoff to happen today    Review of Systems   Unable to perform ROS: Other (See interval history)     Objective:     Vital Signs (Most Recent):  Temp: 98.6 °F (37 °C) (08/17/19 0737)  Pulse: 88 (08/17/19 0755)  Resp: 18 (08/17/19 0755)  BP: (!) 100/59 (08/17/19 0737)  SpO2: 95 % (08/17/19 0755) Vital Signs (24h Range):  Temp:  [96.1 °F (35.6 °C)-98.6 °F (37 °C)] 98.6 °F (37 °C)  Pulse:  [] 88  Resp:  [17-22] 18  SpO2:  [95 %-100 %] 95 %  BP: (100-123)/(57-68) 100/59     Weight: 113.8 kg (250 lb 14.1 oz)  Body mass index is 32.21 kg/m².  No intake or output data in the 24 hours ending 08/17/19 0934   Physical Exam   Constitutional: He is oriented to person, place, and time. He appears well-developed and well-nourished. No distress.   HENT:   Head: Normocephalic and atraumatic.   Right Ear: External ear normal.   Left Ear: External ear normal.   Nose: Nose normal.   Mouth/Throat: Oropharynx is clear and moist. No oropharyngeal exudate.   Eyes: Conjunctivae and EOM are normal. Right eye exhibits no discharge. Left eye exhibits no discharge. No scleral icterus.   Neck: Neck supple. No thyromegaly present.   Cardiovascular: Normal rate and regular rhythm. Exam reveals distant heart sounds. Exam reveals no gallop and no friction rub.   No murmur heard.  Cannot palpate posterior tibial and dorsalis pedis   Pulmonary/Chest: Effort normal. Tachypnea noted. No respiratory distress. He has decreased breath sounds in the right middle field, the right lower field, the left middle field and the  left lower field. He has no wheezes. He has no rales.   Abdominal: Soft. Bowel sounds are normal. He exhibits distension. He exhibits no mass. There is no tenderness. There is no rebound and no guarding.   Genitourinary:   Genitourinary Comments: Large external hemorrhoid with thrombosis  Sacral friction tears  These were not examined today   Musculoskeletal: Normal range of motion. He exhibits no edema (RlE 1-2+ pitting ) or tenderness.   Superficial ulcer left mid medial calf with small amt yellow exudate drainage    Lymphadenopathy:     He has no cervical adenopathy.   Neurological: He is alert and oriented to person, place, and time. No cranial nerve deficit. Coordination normal.   Skin: Skin is warm and dry. Capillary refill takes less than 2 seconds. No rash noted. No erythema.   Psychiatric: He has a normal mood and affect. His behavior is normal. Judgment and thought content normal.   Nursing note and vitals reviewed.      Significant Labs: All pertinent labs within the past 24 hours have been reviewed.    Significant Imaging: I have reviewed and interpreted all pertinent imaging results/findings within the past 24 hours.      Assessment/Plan:      * Severe sepsis  This patient does have evidence of infective focus-diabetic ulcer left heel   Gm neg bacteremia --ESBL Proteus s/merrem -continue per ID;   My overall impression is severe sepsis .-improving   Antibiotics given-   Antibiotics (From admission, onward)    Start     Stop Route Frequency Ordered    08/13/19 2300  meropenem-0.9% sodium chloride 500 mg/50 mL IVPB      -- IV Every 12 hours (non-standard times) 08/13/19 1231    08/12/19 2245  mupirocin 2 % ointment      08/17 2059 Nasl 2 times daily 08/12/19 2143          Latest lactate reviewed, they are-  No results for input(s): LACTATE in the last 72 hours.    Organ dysfunction indicated by Acute kidney injury and Acute respiratory failure  Source- heel  Ruled out  pneumonia -check procalcitonin--neg    Source control Achieved by- iv abx, diuresis-debridement /wound care  Of left heel /ID , Podiatry, ortho, vascular surgery following    Heel xray-neg  CT foot -possible septic arthritis [facility] vascular surg consulted -for CTAortogram w runoff today then determine bka vs aka .   WOund culture 8/13  Ua/urine culture-ngtd         ESBL (extended spectrum beta-lactamase) producing bacteria infection    See sepsis     Thrombocytopenia  Acute  Sepsis vs HIT -check HIT ab . Consider heme eval   Monitor for bleeding       Diabetic ulcer of left heel associated with diabetes mellitus due to underlying condition, with muscle involvement without evidence of necrosis  See sepsis       Gangrene of left foot  Acute-2/2 gangrenous ulcer left heel.  Podiatry, Orthopedics, wound care, vascular surgery, and Infectious Disease consulted.  See sepsis     External hemorrhoid    Noted on exam use external hemorrhoid treatment.    Sacral decubitus ulcer, stage III  See photos -consult wound care. No evidence infection       Cellulitis of left lower extremity  Acute--severe -with large left heel ulcer.  -see sepsis     ESRD (end stage renal disease) on dialysis   Nephrology consult to follow.  Dialysis days Tuesday Thursday Saturday and will dose medications for renal failure.  And maintain blood pressure control.    Hyperkalemia  Resolved    Morbid (severe) obesity with alveolar hypoventilation  Pulse monitoring CPAP at night      Skin ulcer of left lower leg, limited to breakdown of skin  Chronic, severe -see sepsis/cellulitis ; podiatry /wound care consult        BRANDEN (obstructive sleep apnea)  bipap while in hospital unless pt brings own cpap machine. Reportedly non-compliant at home  Refuses bipap in hospital -consider haldol at hs for agitation and continue to encourage use to avoid respiratory compromise   Add IS/Acapella       Moderate malnutrition  Nutrition consulted. Body mass index is 32.21 kg/m².. Encourage maximal PO  intake. Diet supplementation ordered per nutrition approval. Will encourage PO and monitor closely for weight changes.        Anemia, chronic disease  Patient's anemia is currently controlled. S/p 0 units of PRBCs. Current CBC reviewed-   Lab Results   Component Value Date    HGB 11.5 (L) 08/17/2019    HCT 40.8 08/17/2019     Monitor serial CBC and transfuse if patient becomes hemodynamically unstable, symptomatic or H/H drops below 7/21.         A-fib    Atrial Fibrillation controlled currently with Beta Blocker. [id number] score 5. Anticoagulation indicated currently. Anticoagulation held prior to debrideemt  8/12 -- Hep 5000/8 hrs -continue vte   Possible history of GI bleed.      Atrial fibrillation with premature ventricular or aberrantly conducted complexes  Left axis deviation  Right bundle branch block  T wave abnormality, consider lateral ischemia  Abnormal ECG  When compared with ECG of 26-JUN-2019 06:19,  T wave inversion no longer evident in Inferior leads    COPD (chronic obstructive pulmonary disease)  Patient's COPD is controlled currently. Continue scheduled inhalers prn nebulizer; /oxygen  and monitor respiratory status closely.   HD to remove volume from lung.  IS and acapella for pulm toilet       Coronary artery disease  Chronic and stable.  Continue home medications with aspirin and beta-blocker.  .monitor on telemetry monitor signs for acute coronary syndrome.          DM type 2 with diabetic mixed hyperlipidemia  Patient's FSGs are controlled on current hypoglycemics.   Last A1c reviewed-   Lab Results   Component Value Date    HGBA1C 6.1 (H) 08/12/2019     Most recent fingerstick glucose reviewed-   Recent Labs   Lab 08/16/19  1143 08/16/19  1533 08/16/19  2154 08/17/19  0657   POCTGLUCOSE 141* 191* 127* 86     Current correctional scale  Low  Maintain anti-hyperglycemic dose as follows-   Antihyperglycemics (From admission, onward)    Start     Stop Route Frequency Ordered    08/12/19 2100   insulin detemir U-100 pen 10 Units      -- SubQ Nightly 08/12/19 1735    08/12/19 1743  insulin aspart U-100 pen 0-5 Units      -- SubQ Before meals & nightly PRN 08/12/19 1735            Type 2 diabetes mellitus with kidney complication, with long-term current use of insulin    See dm    HTN (hypertension)  Chronic and stable.  Hypertension Medications             metoprolol tartrate (LOPRESSOR) 25 MG tablet Take 1 tablet (25 mg total) by mouth every Mon, Wed, Fri, Sun.    nitroGLYCERIN (NITROSTAT) 0.4 MG SL tablet Place 0.4 mg under the tongue every 5 (five) minutes as needed for Chest pain. Seek medical help if pain is not relieved by the third dose.      Hospital Medications             furosemide injection 40 mg (Completed) Inject 4 mLs (40 mg total) into the vein ED 1 Time.    metoprolol tartrate (LOPRESSOR) tablet 25 mg Take 1 tablet (25 mg total) by mouth every Mon, Wed, Fri, Sun.    nitroGLYCERIN SL tablet 0.4 mg Place 1 tablet (0.4 mg total) under the tongue every 5 (five) minutes as needed for Chest pain.            Malignant neoplasm of anterior wall of urinary bladder  Chronic, stable; monitoring I/o . PVR as needed         VTE Risk Mitigation (From admission, onward)        Ordered     heparin (porcine) injection 5,000 Units  As needed (PRN)      08/13/19 1435     heparin (porcine) injection 5,000 Units  Every 8 hours      08/12/19 1735     IP VTE HIGH RISK PATIENT  Once      08/12/19 1735                Breanne Parrish MD  Department of Hospital Medicine   [facility]

## 2019-08-17 NOTE — PROGRESS NOTES
Progress Note  Infectious Disease    Follow-up For:  Gangrene of the foot    SUBJECTIVE:   Interval history/ROS:   8/14:  Status post life-saving debridement of gangrene of the foot.  Blood cultures have Proteus.  Discussed with Podiatry, foot is not viable and he will need amputation.  Afebrile.  8/15:  Cultures of blood and wound have Proteus, ESBL positive.  He feels poorly, poor appetite, hurts all over.  Does not recall conversations about loss of limb..  He vascular opinion not yet recorded.  Ultrasound of the lower extremity shows more obstruction in the right leg and suggestion of small vessel disease in the left leg.  He is not short of breath, having chest pain, nauseous, having abdominal pain.  08/16 afebrile.  Hurting all over  CTA - aortogram with runoff has been ordered for tomorrow by vascular surgeon.     Antibiotics (From admission, onward)    Start     Stop Route Frequency Ordered    08/13/19 2300  meropenem-0.9% sodium chloride 500 mg/50 mL IVPB      -- IV Every 12 hours (non-standard times) 08/13/19 1231    08/12/19 2245  mupirocin 2 % ointment      08/17 2059 Nasl 2 times daily 08/12/19 2143        Antifungals (From admission, onward)    Start     Stop Route Frequency Ordered    08/14/19 0000  miconazole nitrate 2% ointment      -- Top Daily PRN 08/13/19 1045        Antivirals (From admission, onward)    None            EXAM & DIAGNOSTICS REVIEWED:   Vitals:     Temp:  [96.1 °F (35.6 °C)-97.9 °F (36.6 °C)]   Temp: 96.1 °F (35.6 °C) (08/16/19 1947)  Pulse: 108 (08/16/19 1947)  Resp: (!) 22 (08/16/19 1947)  BP: 123/61 (08/16/19 1947)  SpO2: 100 % (08/16/19 1947)  No intake or output data in the 24 hours ending 08/16/19 2030    General:         In NAD.  Alert and cooperative, does not recall prior conversations  Eyes:              anicteric, extraocular movements intact  ENT:                No ulcers, exudates, thrush, nares patent,   Neck:              supple,  Lungs:            Bibasilar crackles  are improved  Heart:              iRRR, no gallop/murmur/rub noted  Abd:                Soft, obese, NT, ND, normal BS, no masses or organomegaly appreciated.  :                  Voids  Musc:              Joints without effusion, swelling, .   Skin:               Bilateral venous stasis cellulitis  Wound:                     Postop bandages not disturbed 8/14    8/15:  All of the wet gangrene has been excised.  A large portion of the calcaneus is exposed.  The wound edges are dusky.      Neuro:  awake, nonfocal, memory impaired, able to communicate, moves all of his extremities  Psych:              feels unwell, with no appetite, saddened by the impending amputation  Lymphatic:        Extrem:          dressing is not disturbed  VAD:               Right sided tunneled dialysis catheter  Isolation:  will be on contact    Lines/Tubes/Drains:    General Labs reviewed:  Recent Labs   Lab 08/16/19 0602   WBC 14.22*   RBC 3.66*   HGB 11.4*   HCT 39.8*   *   *   MCH 31.1*   MCHC 28.6*     Recent Labs   Lab 08/16/19  0602   CALCIUM 8.9      K 4.9   CO2 20*      BUN 29*   CREATININE 4.6*       Micro:   Microbiology Results (last 7 days)     Procedure Component Value Units Date/Time    Blood culture [[id number]] Collected:  08/14/19 0602    Order Status:  Completed Specimen:  Blood Updated:  08/16/19 1212     Blood Culture, Routine No Growth to date      No Growth to date      No Growth to date    Aerobic culture [[id number]]  (Abnormal)  (Susceptibility) Collected:  08/13/19 1830    Order Status:  Completed Specimen:  Abscess from Foot, Left Updated:  08/16/19 1159     Aerobic Bacterial Culture PROTEUS MIRABILIS ESBL  Many  No other significant isolate      Culture, Anaerobe [[id number]] Collected:  08/13/19 1830    Order Status:  Completed Specimen:  Abscess from Foot, Left Updated:  08/16/19 0956     Anaerobic Culture Culture in progress    Blood culture x two cultures. Draw prior to antibiotics.  [[id number]]  (Abnormal) Collected:  08/12/19 1556    Order Status:  Completed Specimen:  Blood Updated:  08/15/19 1114     Blood Culture, Routine Gram stain demarcus bottle: Gram negative rods      Results called to and read back by: Vasu Cerda RN 08/13/2019        15:11      PROTEUS MIRABILIS ESBL  For susceptibility see order #[id number]      Narrative:       Aerobic and anaerobic    Blood culture x two cultures. Draw prior to antibiotics. [[id number]]  (Abnormal)  (Susceptibility) Collected:  08/12/19 1623    Order Status:  Completed Specimen:  Blood Updated:  08/15/19 1114     Blood Culture, Routine Gram stain demarcus bottle: Gram negative rods      Results called to and read back by: Vasu Cerda RN 08/13/2019        15:12      PROTEUS MIRABILIS ESBL    Narrative:       Aerobic and anaerobic    Blood culture (site 1) [[id number]]     Order Status:  Canceled Specimen:  Blood           Imaging Reviewed:  Plain film of the foot, chest x-ray  Arterial Doppler  FINDINGS:  There are no elevated peak systolic velocities in the visualized arteries of the right or left lower extremity suggesting hemodynamically significant narrowing.  There are low velocities with peak systolic velocity right common femoral artery 46 centimeters/second left common femoral artery 60 centimeters/second which may be related to cardiac output.  Waveform is primarily multiphasic.  There is no flow in the posterior tibial artery and virtually no flow detected in the right peroneal artery.  There is plaque seen fairly diffusely throughout the visualized arteries of the right and left lower extremities.  Waveform is multiphasic throughout most of the lower extremities.      Impression       Findings consistent with small vessel disease with no flow seen in the right posterior tibial artery and minimal flow in the right peroneal artery.       Cardiology:    ASSESSMENT & PLAN   Gangrene left heel with Proteus ESBL bacteremia/sepsis  Status post  wide excision of gangrenous tissue left heel which save his life but his heel wound renders his ft nonsalvageable.    Cellulitis left leg, both bacterial and venous stasis  Bilateral venous stasis  ESRD  Pulmonary edema  DM with vascular disease  He memory loss     Rec:    Continue meropenem  Contact isolation  CTA and  Vascular opinion pending to help guide level of amputation

## 2019-08-17 NOTE — SUBJECTIVE & OBJECTIVE
Principal Problem:Severe sepsis    Principal Orthopedic Problem: L foot osteolyelitis    Interval History: Extensive PMH and complications    Review of patient's allergies indicates:   Allergen Reactions    Lidocaine Nausea Only     NOVACAINE --  Severe nausea    Statins-hmg-coa reductase inhibitors Anxiety       Current Facility-Administered Medications   Medication    0.9%  NaCl infusion    acetaminophen tablet 1,000 mg    albumin human 25% bottle 12.5 g    albuterol-ipratropium 2.5 mg-0.5 mg/3 mL nebulizer solution 3 mL    alfuzosin 24 hr tablet 10 mg    aspirin EC tablet 81 mg    collagenase ointment    dextrose 50% injection 12.5 g    dextrose 50% injection 25 g    epoetin quinton-epbx injection 5,800 Units    FLUoxetine capsule 20 mg    glucagon (human recombinant) injection 1 mg    glucose chewable tablet 16 g    glucose chewable tablet 24 g    heparin (porcine) injection 5,000 Units    heparin (porcine) injection 5,000 Units    HYDROcodone-acetaminophen  mg per tablet 1 tablet    hydrocortisone 2.5 % rectal cream    insulin aspart U-100 pen 0-5 Units    insulin detemir U-100 pen 10 Units    lidocaine HCL 2% jelly    meropenem-0.9% sodium chloride 500 mg/50 mL IVPB    metoprolol tartrate (LOPRESSOR) tablet 25 mg    miconazole nitrate 2% ointment    midodrine tablet 5 mg    morphine injection 2 mg    mupirocin 2 % ointment    nitroGLYCERIN SL tablet 0.4 mg    ondansetron injection 4 mg    pantoprazole EC tablet 40 mg    promethazine (PHENERGAN) 6.25 mg in dextrose 5 % 50 mL IVPB    rOPINIRole tablet 2 mg    senna-docusate 8.6-50 mg per tablet 1 tablet    sevelamer carbonate tablet 2,400 mg    sodium chloride 0.9% flush 10 mL     Objective:     Vital Signs (Most Recent):  Temp: 98.6 °F (37 °C) (08/17/19 0737)  Pulse: 88 (08/17/19 0755)  Resp: 18 (08/17/19 0755)  BP: (!) 100/59 (08/17/19 0737)  SpO2: 95 % (08/17/19 0755) Vital Signs (24h Range):  Temp:  [96.1 °F (35.6  "°C)-98.6 °F (37 °C)] 98.6 °F (37 °C)  Pulse:  [] 88  Resp:  [17-22] 18  SpO2:  [95 %-100 %] 95 %  BP: (100-123)/(57-68) 100/59     Weight: 113.8 kg (250 lb 14.1 oz)  Height: 6' 2" (188 cm)  Body mass index is 32.21 kg/m².    No intake or output data in the 24 hours ending 08/17/19 0924    General    Nursing note and vitals reviewed.  Constitutional: He appears well-developed and well-nourished.   Pulmonary/Chest: Effort normal.   coughing   Neurological: He is alert.   Psychiatric:   Depressed affect         Left Ankle/Foot Exam     Comments:  L lower leg erythema/ cellulitis is improving. No palp pulse and very poor cap refill.        Significant Labs:   CBC:   Recent Labs   Lab 08/16/19  0602 08/17/19  0632   WBC 14.22* 12.51   HGB 11.4* 11.5*   HCT 39.8* 40.8   * 114*     CMP:   Recent Labs   Lab 08/16/19  0602 08/17/19  0632    138   K 4.9 5.1    102   CO2 20* 23   GLU 93 88   BUN 29* 36*   CREATININE 4.6* 5.7*   CALCIUM 8.9 8.8   ANIONGAP 15 13   EGFRNONAA 11* 9*     CRP: No results for input(s): CRP in the last 48 hours.  Wound Culture: No results for input(s): LABAERO in the last 48 hours.  All pertinent labs within the past 24 hours have been reviewed.    Significant Imaging: U/S: I have reviewed all pertinent results/findings and my personal findings are:  Findings consistent with small vessel disease with no flow seen in the right posterior tibial artery and minimal flow in the right peroneal artery.  "

## 2019-08-17 NOTE — CARE UPDATE
08/17/19 0755   Patient Assessment/Suction   Level of Consciousness (AVPU) alert   Respiratory Effort Normal;Unlabored   All Lung Fields Breath Sounds diminished   PRE-TX-O2   O2 Device (Oxygen Therapy) nasal cannula w/ humidification   $ Is the patient on Low Flow Oxygen? Yes   Flow (L/min) 3   Oxygen Concentration (%) 32   SpO2 95 %   Pulse Oximetry Type Intermittent   $ Pulse Oximetry - Multiple Charge Pulse Oximetry - Multiple   Pulse 88   Resp 18   Aerosol Therapy   $ Aerosol Therapy Charges Aerosol Treatment   Respiratory Treatment Status (SVN) given   Treatment Route (SVN) mask   Patient Position (SVN) HOB elevated   Post Treatment Assessment (SVN) breath sounds unchanged   Signs of Intolerance (SVN) none   Breath Sounds Post-Respiratory Treatment   Throughout All Fields Post-Treatment no change   Post-treatment Heart Rate (beats/min) 95   Post-treatment Resp Rate (breaths/min) 18   Incentive Spirometer   $ Incentive Spirometer Charges done with encouragement   Administration (IS) instruction provided, follow-up   Number of Repetitions (IS) 10   Level Incentive Spirometer (mL) 500   Patient Tolerance (IS) poor   Vibratory PEP Therapy   $ Vibratory PEP Charges Aerobika Therapy   $ Vibratory PEP Tech Time Charges 15 min   Type (PEP Therapy) vibratory/oscillatory   Device (PEP Therapy) flutter   Route (PEP Therapy) mouthpiece   Breaths per Cycle (PEP Therapy) 10   Cycles (PEP Therapy) 1   Patient Position (PEP Therapy) HOB elevated   Post Treatment Assessment (PEP) breath sounds unchanged   Signs of Intolerance (PEP Therapy) none

## 2019-08-17 NOTE — ASSESSMENT & PLAN NOTE
Acute-2/2 gangrenous ulcer left heel.  Podiatry, Orthopedics, wound care, vascular surgery, and Infectious Disease consulted.  See sepsis

## 2019-08-17 NOTE — ASSESSMENT & PLAN NOTE
This patient does have evidence of infective focus-diabetic ulcer left heel   Gm neg bacteremia --ESBL Proteus s/merrem -continue per ID;   My overall impression is severe sepsis .-improving   Antibiotics given-   Antibiotics (From admission, onward)    Start     Stop Route Frequency Ordered    08/13/19 2300  meropenem-0.9% sodium chloride 500 mg/50 mL IVPB      -- IV Every 12 hours (non-standard times) 08/13/19 1231    08/12/19 2245  mupirocin 2 % ointment      08/17 2059 Nasl 2 times daily 08/12/19 2143          Latest lactate reviewed, they are-  No results for input(s): LACTATE in the last 72 hours.    Organ dysfunction indicated by Acute kidney injury and Acute respiratory failure  Source- heel  Ruled out  pneumonia -check procalcitonin--neg   Source control Achieved by- iv abx, diuresis-debridement /wound care  Of left heel /ID , Podiatry, ortho, vascular surgery following    Heel xray-neg  CT foot -possible septic arthritis [facility] vascular surg consulted -for CTAortogram w runoff today then determine bka vs aka .   WOund culture 8/13  Ua/urine culture-ngtd

## 2019-08-17 NOTE — CONSULTS
[facility]  Cardiology  Consult Note    Patient Name: Frank Zayas  MRN: [id number]  Admission Date: 8/12/2019  Hospital Length of Stay: 5 days  Code Status: Full Code   Attending Provider: Breanne Parrish MD   Consulting Provider: Mikhail Rodriguez MD  Primary Care Physician: Mikhail Shields MD  Principal Problem:Severe sepsis    Patient information was obtained from ER records.     Consults  Subjective:     Chief Complaint:  Sepsis,  Sob  , infected LLE , chf      HPI: *Elderly male with ckd on dialysis  With  pmh of cabg, ischemic CM ( EF 25% on echo 6/19, chronic a fib ( not on blood thinners )  Developed severe ulcer l foot and cellulitis LLE  And presents ER with  chf  He developed some monomorhic vtach today   K has been high.  Pt has been confused       Past Medical History:   Diagnosis Date    Anticoagulant long-term use     Arthritis     Bladder cancer     Blood transfusion     Cardiac arrest 5/31/2019    CHF (congestive heart failure)     COPD (chronic obstructive pulmonary disease)     Coronary artery disease     Diabetes mellitus     ESRD (end stage renal disease) on dialysis 2019    Gout     Hemorrhagic shock 6/1/2019    [place] hearing[place]     HAS BILAT AIDS BUT DOES NOT WEAR    Hyperlipidemia     Hypertension     Myocardial infarction     NSTEMI (non-ST elevated myocardial infarction)     RLS (restless legs syndrome)     Sleep apnea     NO MACHINE    Wears glasses        Past Surgical History:   Procedure Laterality Date    CARDIAC SURGERY      CABG X 2 1997    CATARACT EXTRACTION, BILATERAL      CHOLECYSTECTOMY      COLONOSCOPY      CORONARY ARTERY BYPASS GRAFT      x 2    coronary stents      CYSTOSCOPY      CYSTOSCOPY N/A 4/8/2019    [facility]formed by [facility] OR    DEBRIDEMENT, FOOT Left 8/13/2019    Performed by Robert Wilson DPM at [facility] OR    EGD (ESOPHAGOGASTRODUODENOSCOPY) N/A 6/4/2019    Performed by Clint [facility]z [facility] at [facility]  ENDO (2ND FLR)    EXCISION-BLADDER TUMOR-TRANSURETHRAL (TURBT) N/A 2014    [facility] at [facility] OR    EXCISION-BLADDER TUMOR-TRANSURETHRAL (TURBT) N/A 2013    [facility]formed by [facility] at [facility] OR    EYE SURGERY Bilateral     CATARACT    Insertion,catheter,tunneled N/A 2019    Performed by Wade Rodriguez MD at [facility] OR    TURBT (TRANSURETHRAL RESECTION OF BLADDER TUMOR) N/A 2019    Performed by [facility]lee [facility]quez [facility] at [facility] OR    VASECTOMY         Review of patient's allergies indicates:   Allergen Reactions    Lidocaine Nausea Only     NOVACAINE --  Severe nausea    Statins-hmg-coa reductase inhibitors Anxiety       No current facility-administered medications on file prior to encounter.      Current Outpatient Medications on File Prior to Encounter   Medication Sig    FLUoxetine 20 MG capsule Take 20 mg by mouth every morning.     metoprolol tartrate (LOPRESSOR) 25 MG tablet Take 1 tablet (25 mg total) by mouth every Mon, Wed, Fri, Sun.    nitroGLYCERIN (NITROSTAT) 0.4 MG SL tablet Place 0.4 mg under the tongue every 5 (five) minutes as needed for Chest pain. Seek medical help if pain is not relieved by the third dose.    pantoprazole (PROTONIX) 40 MG tablet Take 1 tablet (40 mg total) by mouth 2 (two) times daily before meals.    sevelamer carbonate (RENVELA) 800 mg Tab Take 2 tablets (1,600 mg total) by mouth 3 (three) times daily with meals.     Family History     Problem Relation (Age of Onset)    Cancer Father, Sister, Brother    Heart disease Father        Tobacco Use    Smoking status: Former Smoker     Packs/day: 0.25     Years: 20.00     Pack years: 5.00     Types: Cigars     Last attempt to quit: 1982     Years since quittin.5    Smokeless tobacco: Never Used   Substance and Sexual Activity    Alcohol use: No    Drug use: No    Sexual activity: Not on file     ROS  Objective:     Vital Signs (Most Recent):  Temp: 96.7 °F (35.9 °C)  (08/17/19 1800)  Pulse: 86 (08/17/19 1800)  Resp: 18 (08/17/19 1800)  BP: (!) 91/43 (08/17/19 1800)  SpO2: (!) 94 % (08/17/19 1229) Vital Signs (24h Range):  Temp:  [96.1 °F (35.6 °C)-98.6 °F (37 °C)] 96.7 °F (35.9 °C)  Pulse:  [] 86  Resp:  [17-22] 18  SpO2:  [89 %-100 %] 94 %  BP: ()/(29-69) 91/43     Weight: 113.8 kg (250 lb 14.1 oz)  Body mass index is 32.21 kg/m².    SpO2: (!) 94 %  O2 Device (Oxygen Therapy): nasal cannula      Intake/Output Summary (Last 24 hours) at 8/17/2019 1835  Last data filed at 8/17/2019 1745  Gross per 24 hour   Intake 700 ml   Output 600 ml   Net 100 ml       Lines/Drains/Airways     Central Venous Catheter Line                 Hemodialysis Catheter 05/16/19 1600 right subclavian 93 days          Peripheral Intravenous Line                 Peripheral IV - Single Lumen 06/29/19 0946 22 G Left;Posterior Forearm 49 days         Peripheral IV - Single Lumen 08/12/19 1409 20 G Left Antecubital 5 days                Physical Exam    100/85   p 85   heent  Mouth periodontal disease    lungs cracles  Cor irreg irreg  No M    abd distended   Extremities   LLE bandaged and erythematous skin warm  LLE  As well as RLE   Neuro confused   Significant Imaging   8/14 A double lumen central line remains in placed ending in the superior vena cava.  The patient has had a prior sternotomy.  There is mild cardiomegaly in a biventricular pattern.  There is a small to moderate right pleural effusion and atelectasis of the right lower lung field.  The left lung is clear.  No pneumothorax is g:   Assessment and Plan:   1) sepsis, infected feet ulcers,   2) ckd + increased k   3) ischemic cm and 1 run vtach   4) a fib chronic- could not find evidence hes on  Blood thinners as out pt   chf     Rec-  rx as is including heparin - a fib  Rate control     Active Diagnoses:    Diagnosis Date Noted POA    PRINCIPAL PROBLEM:  Severe sepsis [A41.9, R65.20] 06/25/2019 Yes    ESBL (extended spectrum  beta-lactamase) producing bacteria infection [A49.9, Z16.12] 08/16/2019 Yes    Thrombocytopenia [D69.6] 08/15/2019 No    Diabetic ulcer of left heel associated with diabetes mellitus due to underlying condition, with muscle involvement without evidence of necrosis [E08.621, L97.425] 08/14/2019 Yes    Gangrene of left foot [I96] 08/13/2019 Yes    Cellulitis of left lower extremity [L03.116] 08/12/2019 Yes    External hemorrhoid [K64.4] 08/12/2019 Yes    ESRD (end stage renal disease) on dialysis [N18.6, Z99.2] 05/31/2019 Not Applicable    Morbid (severe) obesity with alveolar hypoventilation [E66.2] 05/08/2019 Yes    Hyperkalemia [E87.5] 05/08/2019 Yes    Skin ulcer of left lower leg, limited to breakdown of skin [L97.921] 05/05/2019 Yes    HTN (hypertension) [I10] 05/05/2019 Yes    Type 2 diabetes mellitus with kidney complication, with long-term current use of insulin [E11.29, Z79.4] 05/05/2019 Not Applicable    DM type 2 with diabetic mixed hyperlipidemia [E11.69, E78.2] 05/05/2019 Yes    Coronary artery disease [I25.10] 05/05/2019 Yes    COPD (chronic obstructive pulmonary disease) [J44.9] 05/05/2019 Yes    A-fib [I48.91] 05/05/2019 Yes    Anemia, chronic disease [D63.8] 05/05/2019 Yes    BRANDEN (obstructive sleep apnea) [G47.33] 05/05/2019 Yes    Moderate malnutrition [E44.0] 05/05/2019 Yes    Malignant neoplasm of anterior wall of urinary bladder [C67.3] 09/08/2014 Yes      Problems Resolved During this Admission:     I personally reviewed chart and imaging studies ,examined patient, and discussed with the patient her illness and treatment including diet and follow-up care. This consult was prolonged and required approximately 50% time for review and 50% time examining patient and writing notes and orders     VTE Risk Mitigation (From admission, onward)        Ordered     heparin (porcine) injection 5,000 Units  As needed (PRN)      08/13/19 1435     heparin (porcine) injection 5,000 Units   Every 8 hours      08/12/19 1735     IP VTE HIGH RISK PATIENT  Once      08/12/19 1735          Thank you for your consult.  Mikhail Rodriguez MD  Cardiology   [facility]       Yes

## 2019-08-17 NOTE — ASSESSMENT & PLAN NOTE
Patient's FSGs are controlled on current hypoglycemics.   Last A1c reviewed-   Lab Results   Component Value Date    HGBA1C 6.1 (H) 08/12/2019     Most recent fingerstick glucose reviewed-   Recent Labs   Lab 08/16/19  1143 08/16/19  1533 08/16/19  2154 08/17/19  0657   POCTGLUCOSE 141* 191* 127* 86     Current correctional scale  Low  Maintain anti-hyperglycemic dose as follows-   Antihyperglycemics (From admission, onward)    Start     Stop Route Frequency Ordered    08/12/19 2100  insulin detemir U-100 pen 10 Units      -- SubQ Nightly 08/12/19 1735    08/12/19 1743  insulin aspart U-100 pen 0-5 Units      -- SubQ Before meals & nightly PRN 08/12/19 1735

## 2019-08-17 NOTE — PROGRESS NOTES
Progress Note  Infectious Disease    Follow-up For:  Gangrene of the foot    SUBJECTIVE:   Interval history/ROS:   8/14:  Status post life-saving debridement of gangrene of the foot.  Blood cultures have Proteus.  Discussed with Podiatry, foot is not viable and he will need amputation.  Afebrile.  8/15:  Cultures of blood and wound have Proteus, ESBL positive.  He feels poorly, poor appetite, hurts all over.  Does not recall conversations about loss of limb..  He vascular opinion not yet recorded.  Ultrasound of the lower extremity shows more obstruction in the right leg and suggestion of small vessel disease in the left leg.  He is not short of breath, having chest pain, nauseous, having abdominal pain.  08/16 afebrile.  Hurting all over  CTA - aortogram with runoff has been ordered for tomorrow by vascular surgeon.   08/17 : no new complaints.  Afebrile. Does not like ot offer much info Will go for monae then HD today    Antibiotics (From admission, onward)    Start     Stop Route Frequency Ordered    08/13/19 2300  meropenem-0.9% sodium chloride 500 mg/50 mL IVPB      -- IV Every 12 hours (non-standard times) 08/13/19 1231    08/12/19 2245  mupirocin 2 % ointment      08/17 2059 Nasl 2 times daily 08/12/19 2143        Antifungals (From admission, onward)    Start     Stop Route Frequency Ordered    08/14/19 0000  miconazole nitrate 2% ointment      -- Top Daily PRN 08/13/19 1045        Antivirals (From admission, onward)    None            EXAM & DIAGNOSTICS REVIEWED:   Vitals:     Temp:  [96.1 °F (35.6 °C)-98.6 °F (37 °C)]   Temp: 96.5 °F (35.8 °C) (08/17/19 1204)  Pulse: 90 (08/17/19 1229)  Resp: 18 (08/17/19 1229)  BP: 116/69 (08/17/19 1204)  SpO2: (!) 94 % (08/17/19 1229)  No intake or output data in the 24 hours ending 08/17/19 1303    General:         In NAD.  , does not  Like to talk much  Eyes:              anicteric, extraocular movements intact  ENT:                No ulcers, exudates, thrush, nares  patent,   Neck:              supple,  Lungs:            Bibasilar crackles are improved  Heart:              iRRR, no gallop/murmur/rub noted  Abd:                Soft, obese, NT, ND, normal BS, no masses or organomegaly appreciated.  :                  Voids  Musc:              Joints without effusion, swelling, .   Skin:               Bilateral venous stasis cellulitis  Wound:                     Postop bandages not disturbed 8/14    8/15:  All of the wet gangrene has been excised.  A large portion of the calcaneus is exposed.  The wound edges are dusky.      Neuro:  nonfocal, memory impaired, able to communicate, moves all of his extremities  Psych:              feels unwell, with no appetite, saddened by the impending amputation  Lymphatic:        Extrem:          dressing is not disturbed  VAD:               Right sided tunneled dialysis catheter  Isolation:  will be on contact    Lines/Tubes/Drains:    General Labs reviewed:  Recent Labs   Lab 08/17/19  0632   WBC 12.51   RBC 3.82*   HGB 11.5*   HCT 40.8   *   *   MCH 30.1   MCHC 28.2*     Recent Labs   Lab 08/17/19  0632   CALCIUM 8.8      K 5.1   CO2 23      BUN 36*   CREATININE 5.7*       Micro:   Microbiology Results (last 7 days)     Procedure Component Value Units Date/Time    Blood culture [[id number]] Collected:  08/14/19 0602    Order Status:  Completed Specimen:  Blood Updated:  08/17/19 1212     Blood Culture, Routine No Growth to date      No Growth to date      No Growth to date      No Growth to date    Aerobic culture [[id number]]  (Abnormal)  (Susceptibility) Collected:  08/13/19 1830    Order Status:  Completed Specimen:  Abscess from Foot, Left Updated:  08/16/19 1159     Aerobic Bacterial Culture PROTEUS MIRABILIS ESBL  Many  No other significant isolate      Culture, Anaerobe [[id number]] Collected:  08/13/19 1830    Order Status:  Completed Specimen:  Abscess from Foot, Left Updated:  08/16/19 0956     Anaerobic  Culture Culture in progress    Blood culture x two cultures. Draw prior to antibiotics. [[id number]]  (Abnormal) Collected:  08/12/19 1556    Order Status:  Completed Specimen:  Blood Updated:  08/15/19 1114     Blood Culture, Routine Gram stain demarcus bottle: Gram negative rods      Results called to and read back by: Vasu Cerda RN 08/13/2019        15:11      PROTEUS MIRABILIS ESBL  For susceptibility see order #[id number]      Narrative:       Aerobic and anaerobic    Blood culture x two cultures. Draw prior to antibiotics. [[id number]]  (Abnormal)  (Susceptibility) Collected:  08/12/19 1623    Order Status:  Completed Specimen:  Blood Updated:  08/15/19 1114     Blood Culture, Routine Gram stain demarcus bottle: Gram negative rods      Results called to and read back by: Vasu Cerda RN 08/13/2019        15:12      PROTEUS MIRABILIS ESBL    Narrative:       Aerobic and anaerobic    Blood culture (site 1) [[id number]]     Order Status:  Canceled Specimen:  Blood           Imaging Reviewed:  Plain film of the foot, chest x-ray  Arterial Doppler  FINDINGS:  There are no elevated peak systolic velocities in the visualized arteries of the right or left lower extremity suggesting hemodynamically significant narrowing.  There are low velocities with peak systolic velocity right common femoral artery 46 centimeters/second left common femoral artery 60 centimeters/second which may be related to cardiac output.  Waveform is primarily multiphasic.  There is no flow in the posterior tibial artery and virtually no flow detected in the right peroneal artery.  There is plaque seen fairly diffusely throughout the visualized arteries of the right and left lower extremities.  Waveform is multiphasic throughout most of the lower extremities.      Impression       Findings consistent with small vessel disease with no flow seen in the right posterior tibial artery and minimal flow in the right peroneal artery.        Cardiology:    ASSESSMENT & PLAN   Gangrene left heel with Proteus ESBL bacteremia/sepsis 08/13, Neg Cx 08/14  Status post wide excision of gangrenous tissue left heel which save his life but his heel wound renders his ft nonsalvageable.-- BKA vs AKA??    Cellulitis left leg, both bacterial and venous stasis  Bilateral venous stasis  ESRD  Pulmonary edema  DM with vascular disease  He memory loss     Rec:    Continue meropenem  Contact isolation  CTA and  Vascular opinion pending to help guide level of amputation

## 2019-08-17 NOTE — PLAN OF CARE
Problem: Adult Inpatient Plan of Care  Goal: Plan of Care Review  Outcome: Ongoing (interventions implemented as appropriate)     08/17/19 1184   Plan of Care Review   Plan of Care Reviewed With patient   Outcome Summary patient to dialysis    Patient denies pain or discomfort. sr up x 3. Patient turned q2 hours.

## 2019-08-17 NOTE — NURSING
"Spoke w Dr Yoo for clarification that pt is clear to have dialysis before CTA is done. MD stated pt needs dialysis and CTA can wait if needed.   Notified Preeti MEZA that pt is in isolation and she stated " he needs to come up here, I have a room that he can go in and I will just have a terminal clean done after dialysis."   Pt transported to dialysis via bed. NAD noted    "

## 2019-08-17 NOTE — ASSESSMENT & PLAN NOTE
Nutrition consulted. Body mass index is 32.21 kg/m².. Encourage maximal PO intake. Diet supplementation ordered per nutrition approval. Will encourage PO and monitor closely for weight changes.

## 2019-08-17 NOTE — PT/OT/SLP PROGRESS
Physical Therapy      Patient Name:  Frank Zayas   MRN:  [id number]    Patient not seen today secondary to not feeling well. Will follow-up on 08/18/19.    Misael Massey, PT

## 2019-08-17 NOTE — ASSESSMENT & PLAN NOTE
Chronic and stable.  Continue home medications with aspirin and beta-blocker.  .monitor on telemetry monitor signs for acute coronary syndrome.

## 2019-08-17 NOTE — PROGRESS NOTES
INPATIENT NEPHROLOGY PROGRESS NOTE  [facility] NEPHROLOGY    Patient Name: Frank Zayas  Date: 08/17/2019    Reason for consultation: ESRD    Chief Complaint:   Chief Complaint   Patient presents with    Shortness of Breath     with exertion, does not use O2 at home. arrives to ED with 2 L NC    Foot Pain     left heel since being discharged from [facility]. bigger since Friday       History of Present Illness:  Frank Zayas is a 79 y.o. male with COPD and ESRD on HD TTS who presents to the ED with ongoing SOB greater than baseline. Per EMS his home health nurse noticed worsening SOB when going to re-wrap his legs, which are being treated for ulcer. The patient denies any chest pain, fever, chills or fever, N/V/D, or any other symptoms at this time.  He was stable in the ED with oxygen at 2 L. he is noted to have an elevated BNP and bilateral pulmonary edema on chest x-ray.  He was given Lasix 60 mg IV in the ED. He was also sent by a home health nurse because of malodorous drainage to his heel and erythema in his left lower extremity that had not been near the previous dressing change.  His left heel was drain and very malodorous.  No gangrene is noted. We are consulted for dialysis.     Foot xray- no osteo, + cellulitis, + soft tissue ulcer of the heel    8/17  Electrolytes stable.  Due for HD today.  Sleeping, NAD noted.    · Interval History/Subjective:    - VSS, on 3L NC    · Review of Systems: unable to obtain due to AMS/sleeping    Plan of Care:    Assessment:  ESRD  Chronic hypotension/Diastolic CHF/Acute pulm edema  Hyperkalemia  SHPT  Anemia of CKD  Foot ulcer/cellulitis     Plan:     - Continue routine HD TTS.  - BP is stable. Continue midodrine pre HD. VS is incrementally improved. Continue UF 2-3L with albumin.   - HyperK is resolved with 2K bath and 180 dialyzer. Continue renal diet with Nepro shakes only.  - Ordered daily phos- pending. Continue sevelamer 3 tabs with meals.  - Hb is stable.  Continue EPO 5K with HD.   - Dose antbx for dialysis.    Thank you for allowing us to participate in this patient's care. We will continue to follow.    Medications:  No current facility-administered medications on file prior to encounter.      Current Outpatient Medications on File Prior to Encounter   Medication Sig Dispense Refill    FLUoxetine 20 MG capsule Take 20 mg by mouth every morning.       metoprolol tartrate (LOPRESSOR) 25 MG tablet Take 1 tablet (25 mg total) by mouth every Mon, Wed, Fri, Sun. 16 tablet 11    nitroGLYCERIN (NITROSTAT) 0.4 MG SL tablet Place 0.4 mg under the tongue every 5 (five) minutes as needed for Chest pain. Seek medical help if pain is not relieved by the third dose.      pantoprazole (PROTONIX) 40 MG tablet Take 1 tablet (40 mg total) by mouth 2 (two) times daily before meals. 60 tablet 11    sevelamer carbonate (RENVELA) 800 mg Tab Take 2 tablets (1,600 mg total) by mouth 3 (three) times daily with meals. 180 tablet 11     Scheduled Meds:   albuterol-ipratropium  3 mL Nebulization Q6H    alfuzosin  10 mg Oral Nightly    aspirin  81 mg Oral Daily    collagenase   Topical (Top) Every other day    epoetin quinton-epbx  50 Units/kg Intravenous Every Tues, Thurs, Sat    FLUoxetine  20 mg Oral QAM    heparin (porcine)  5,000 Units Subcutaneous Q8H    hydrocortisone   Rectal BID    insulin detemir U-100  10 Units Subcutaneous QHS    meropenem (MERREM) IVPB  500 mg Intravenous Q12H    metoprolol tartrate  25 mg Oral Every Mon, Wed, Fri, Sun    midodrine  5 mg Oral Every Tues, Thurs, Sat    mupirocin   Nasal BID    pantoprazole  40 mg Oral BID AC    rOPINIRole  2 mg Oral QHS    senna-docusate 8.6-50 mg  1 tablet Oral BID    sevelamer carbonate  2,400 mg Oral TID WM     Continuous Infusions:  PRN Meds:.sodium chloride 0.9%, acetaminophen, albumin human 25%, dextrose 50%, dextrose 50%, glucagon (human recombinant), glucose, glucose, heparin (porcine),  HYDROcodone-acetaminophen, insulin aspart U-100, lidocaine HCL 2%, miconazole nitrate 2%, morphine, nitroGLYCERIN, ondansetron, promethazine (PHENERGAN) IVPB, sodium chloride 0.9%    Allergies:  Lidocaine and Statins-hmg-coa reductase inhibitors    Vital Signs:  Temp Readings from Last 3 Encounters:   08/17/19 98.6 °F (37 °C)   07/01/19 96.4 °F (35.8 °C) (Oral)   06/17/19 98.7 °F (37.1 °C) (Oral)       Pulse Readings from Last 3 Encounters:   08/17/19 88   07/01/19 94   06/17/19 69       BP Readings from Last 3 Encounters:   08/17/19 (!) 100/59   07/01/19 108/72   06/17/19 (!) 105/56       Weight:  Wt Readings from Last 3 Encounters:   08/16/19 113.8 kg (250 lb 14.1 oz)   06/30/19 127 kg (279 lb 15.8 oz)   06/17/19 120.4 kg (265 lb 6.9 oz)       Physical Exam:  Constitutional: nad, sleeping, chronically ill, appears stated age  Heart: rrr, no m/r/g, wwp, + edema  Lungs: ant ausc clear, no w/r/r/c, no lb  Abdomen: s/nt/nd, +BS    Results:  Lab Results   Component Value Date     08/17/2019    K 5.1 08/17/2019     08/17/2019    CO2 23 08/17/2019    BUN 36 (H) 08/17/2019    CREATININE 5.7 (H) 08/17/2019    CALCIUM 8.8 08/17/2019    ANIONGAP 13 08/17/2019    ESTGFRAFRICA 10 (A) 08/17/2019    EGFRNONAA 9 (A) 08/17/2019       Lab Results   Component Value Date    CALCIUM 8.8 08/17/2019    PHOS 6.2 (H) 08/17/2019       Recent Labs   Lab 08/17/19  0632   WBC 12.51   RBC 3.82*   HGB 11.5*   HCT 40.8   *   *   MCH 30.1   MCHC 28.2*       I have personally reviewed pertinent radiological imaging and reports.    [facility]  [address] [address] [phone] (p)  [phone] (f)

## 2019-08-17 NOTE — PLAN OF CARE
Problem: Adult Inpatient Plan of Care  Goal: Plan of Care Review  Outcome: Ongoing (interventions implemented as appropriate)  Uneventful night, pt free from falls or injury, NPO after midnight, CTA this morning with dialysis to follow,  Pain controlled with PRN medication,glycemic control maintained,safety maintained,pt turned Q 2 hr,  Legs elevated,NAD,will continue to monitor.

## 2019-08-17 NOTE — NURSING
Pt had 6 beat and a 3 beat run of vtach. Dr Parirsh notified,. Pt asymptomatic. NAD noted. Pt resting calmly in bed.

## 2019-08-17 NOTE — SUBJECTIVE & OBJECTIVE
Interval History:  Patient very frustrated this morning.  Wants to get out of bed.  Keeps yelling help me.  Very hard to redirect.  Will not answer review of systems questions.  Does know he is in the hospital but states the year is 1919.  CTA aortogram with runoff to happen today    Review of Systems   Unable to perform ROS: Other (See interval history)     Objective:     Vital Signs (Most Recent):  Temp: 98.6 °F (37 °C) (08/17/19 0737)  Pulse: 88 (08/17/19 0755)  Resp: 18 (08/17/19 0755)  BP: (!) 100/59 (08/17/19 0737)  SpO2: 95 % (08/17/19 0755) Vital Signs (24h Range):  Temp:  [96.1 °F (35.6 °C)-98.6 °F (37 °C)] 98.6 °F (37 °C)  Pulse:  [] 88  Resp:  [17-22] 18  SpO2:  [95 %-100 %] 95 %  BP: (100-123)/(57-68) 100/59     Weight: 113.8 kg (250 lb 14.1 oz)  Body mass index is 32.21 kg/m².  No intake or output data in the 24 hours ending 08/17/19 0934   Physical Exam   Constitutional: He is oriented to person, place, and time. He appears well-developed and well-nourished. No distress.   HENT:   Head: Normocephalic and atraumatic.   Right Ear: External ear normal.   Left Ear: External ear normal.   Nose: Nose normal.   Mouth/Throat: Oropharynx is clear and moist. No oropharyngeal exudate.   Eyes: Conjunctivae and EOM are normal. Right eye exhibits no discharge. Left eye exhibits no discharge. No scleral icterus.   Neck: Neck supple. No thyromegaly present.   Cardiovascular: Normal rate and regular rhythm. Exam reveals distant heart sounds. Exam reveals no gallop and no friction rub.   No murmur heard.  Cannot palpate posterior tibial and dorsalis pedis   Pulmonary/Chest: Effort normal. Tachypnea noted. No respiratory distress. He has decreased breath sounds in the right middle field, the right lower field, the left middle field and the left lower field. He has no wheezes. He has no rales.   Abdominal: Soft. Bowel sounds are normal. He exhibits distension. He exhibits no mass. There is no tenderness. There is  no rebound and no guarding.   Genitourinary:   Genitourinary Comments: Large external hemorrhoid with thrombosis  Sacral friction tears  These were not examined today   Musculoskeletal: Normal range of motion. He exhibits no edema (RlE 1-2+ pitting ) or tenderness.   Superficial ulcer left mid medial calf with small amt yellow exudate drainage    Lymphadenopathy:     He has no cervical adenopathy.   Neurological: He is alert and oriented to person, place, and time. No cranial nerve deficit. Coordination normal.   Skin: Skin is warm and dry. Capillary refill takes less than 2 seconds. No rash noted. No erythema.   Psychiatric: He has a normal mood and affect. His behavior is normal. Judgment and thought content normal.   Nursing note and vitals reviewed.      Significant Labs: All pertinent labs within the past 24 hours have been reviewed.    Significant Imaging: I have reviewed and interpreted all pertinent imaging results/findings within the past 24 hours.

## 2019-08-17 NOTE — PROGRESS NOTES
[facility]  Orthopedics  Progress Note    Patient Name: Frank Zayas  MRN: [id number]  Admission Date: 8/12/2019  Hospital Length of Stay: 5 days  Attending Provider: Breanne Parrish MD  Primary Care Provider: Mikhail Shields MD  Follow-up For: Procedure(s) (LRB):  DEBRIDEMENT, FOOT (Left)    Post-Operative Day: 4 Days Post-Op  Subjective:     Principal Problem:Severe sepsis    Principal Orthopedic Problem: L foot osteolyelitis    Interval History: Extensive PMH and complications    Review of patient's allergies indicates:   Allergen Reactions    Lidocaine Nausea Only     NOVACAINE --  Severe nausea    Statins-hmg-coa reductase inhibitors Anxiety       Current Facility-Administered Medications   Medication    0.9%  NaCl infusion    acetaminophen tablet 1,000 mg    albumin human 25% bottle 12.5 g    albuterol-ipratropium 2.5 mg-0.5 mg/3 mL nebulizer solution 3 mL    alfuzosin 24 hr tablet 10 mg    aspirin EC tablet 81 mg    collagenase ointment    dextrose 50% injection 12.5 g    dextrose 50% injection 25 g    epoetin quinton-epbx injection 5,800 Units    FLUoxetine capsule 20 mg    glucagon (human recombinant) injection 1 mg    glucose chewable tablet 16 g    glucose chewable tablet 24 g    heparin (porcine) injection 5,000 Units    heparin (porcine) injection 5,000 Units    HYDROcodone-acetaminophen  mg per tablet 1 tablet    hydrocortisone 2.5 % rectal cream    insulin aspart U-100 pen 0-5 Units    insulin detemir U-100 pen 10 Units    lidocaine HCL 2% jelly    meropenem-0.9% sodium chloride 500 mg/50 mL IVPB    metoprolol tartrate (LOPRESSOR) tablet 25 mg    miconazole nitrate 2% ointment    midodrine tablet 5 mg    morphine injection 2 mg    mupirocin 2 % ointment    nitroGLYCERIN SL tablet 0.4 mg    ondansetron injection 4 mg    pantoprazole EC tablet 40 mg    promethazine (PHENERGAN) 6.25 mg in dextrose 5 % 50 mL IVPB    rOPINIRole tablet 2 mg     "senna-docusate 8.6-50 mg per tablet 1 tablet    sevelamer carbonate tablet 2,400 mg    sodium chloride 0.9% flush 10 mL     Objective:     Vital Signs (Most Recent):  Temp: 98.6 °F (37 °C) (08/17/19 0737)  Pulse: 88 (08/17/19 0755)  Resp: 18 (08/17/19 0755)  BP: (!) 100/59 (08/17/19 0737)  SpO2: 95 % (08/17/19 0755) Vital Signs (24h Range):  Temp:  [96.1 °F (35.6 °C)-98.6 °F (37 °C)] 98.6 °F (37 °C)  Pulse:  [] 88  Resp:  [17-22] 18  SpO2:  [95 %-100 %] 95 %  BP: (100-123)/(57-68) 100/59     Weight: 113.8 kg (250 lb 14.1 oz)  Height: 6' 2" (188 cm)  Body mass index is 32.21 kg/m².    No intake or output data in the 24 hours ending 08/17/19 0924    General    Nursing note and vitals reviewed.  Constitutional: He appears well-developed and well-nourished.   Pulmonary/Chest: Effort normal.   coughing   Neurological: He is alert.   Psychiatric:   Depressed affect         Left Ankle/Foot Exam     Comments:  L lower leg erythema/ cellulitis is improving. No palp pulse and very poor cap refill.        Significant Labs:   CBC:   Recent Labs   Lab 08/16/19  0602 08/17/19  0632   WBC 14.22* 12.51   HGB 11.4* 11.5*   HCT 39.8* 40.8   * 114*     CMP:   Recent Labs   Lab 08/16/19  0602 08/17/19  0632    138   K 4.9 5.1    102   CO2 20* 23   GLU 93 88   BUN 29* 36*   CREATININE 4.6* 5.7*   CALCIUM 8.9 8.8   ANIONGAP 15 13   EGFRNONAA 11* 9*     CRP: No results for input(s): CRP in the last 48 hours.  Wound Culture: No results for input(s): LABAERO in the last 48 hours.  All pertinent labs within the past 24 hours have been reviewed.    Significant Imaging: U/S: I have reviewed all pertinent results/findings and my personal findings are:  Findings consistent with small vessel disease with no flow seen in the right posterior tibial artery and minimal flow in the right peroneal artery.    Assessment/Plan:     * Severe sepsis  Tentatively scheduled for ZENAIDA REID 8/19/19.   - Clearance  - CTA aortagram scheduled " for today.    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

## 2019-08-17 NOTE — NURSING
HD complete. UF positive 100mL. Unable to obtain hd goal per bp. Pt given albumin 25 gm IV x 2. Held epogen per h/H per LYDIA Limon. Report given to Cyrus Goins RN. Terminal clean of hd room ordered.

## 2019-08-17 NOTE — PLAN OF CARE
08/17/19 0106   Patient Assessment/Suction   Level of Consciousness (AVPU) responds to voice   Respiratory Effort Normal;Unlabored   Expansion/Accessory Muscles/Retractions expansion symmetric;no retractions;no use of accessory muscles   All Lung Fields Breath Sounds diminished   Cough Frequency no cough   PRE-TX-O2   O2 Device (Oxygen Therapy) nasal cannula   Flow (L/min) 3   Oxygen Concentration (%) 32   SpO2 98 %   Pulse Oximetry Type Intermittent   Pulse 103   Resp 20   Aerosol Therapy   $ Aerosol Therapy Charges Aerosol Treatment   Respiratory Treatment Status (SVN) given   Treatment Route (SVN) mask   Patient Position (SVN) HOB elevated   Signs of Intolerance (SVN) none   Breath Sounds Post-Respiratory Treatment   Throughout All Fields Post-Treatment All Fields   Throughout All Fields Post-Treatment no change   Post-treatment Heart Rate (beats/min) 101   Post-treatment Resp Rate (breaths/min) 20   Vibratory PEP Therapy   $ Vibratory PEP Charges   (held; pt wanted to sleep)   Ready to Wean/Extubation Screen   FIO2<=50 (chart decimal) 0.32

## 2019-08-17 NOTE — PLAN OF CARE
Problem: Adult Inpatient Plan of Care  Goal: Plan of Care Review  Patient aerosol Q6 given via msk tolerated well sats 99% on 3lpm/Aerobika Q6 done 10bpm, I S done 500ml x 8bpm poorly done needs encouragement. BIPAP in room

## 2019-08-17 NOTE — ASSESSMENT & PLAN NOTE
Atrial Fibrillation controlled currently with Beta Blocker. [id number] score 5. Anticoagulation indicated currently. Anticoagulation held prior to debrideemt  8/12 -- Hep 5000/8 hrs -continue vte   Possible history of GI bleed.      Atrial fibrillation with premature ventricular or aberrantly conducted complexes  Left axis deviation  Right bundle branch block  T wave abnormality, consider lateral ischemia  Abnormal ECG  When compared with ECG of 26-JUN-2019 06:19,  T wave inversion no longer evident in Inferior leads

## 2019-08-17 NOTE — PLAN OF CARE
Family at bedside during shift change/bedside report.  Family updated on pt's condition of Left extremity and IV ABX used.  Pt to have CT tomorrow with Dialysis to follow.  Family wishes to meet with doctor's treating pt to assess plan of care. Will continue to monitor.

## 2019-08-18 NOTE — ASSESSMENT & PLAN NOTE
Atrial Fibrillation controlled currently with Beta Blocker. [id number] score 5. Anticoagulation indicated currently. Anticoagulation held prior to debrideemt  8/12 -- Hep 5000/8 hrs -continue vte   Possible history of GI bleed.    Cardiology consulted for preoperative clearance

## 2019-08-18 NOTE — NURSING
Refusing to allow PCT to turn and reposition at present. Will attempt again at 0400 when also planning to perform left foot drsg change.

## 2019-08-18 NOTE — SUBJECTIVE & OBJECTIVE
Principal Problem:Severe sepsis    Principal Orthopedic Problem: L foot osteolyelitis    Interval History: Extensive PMH and complications    Review of patient's allergies indicates:   Allergen Reactions    Lidocaine Nausea Only     NOVACAINE --  Severe nausea    Statins-hmg-coa reductase inhibitors Anxiety       Current Facility-Administered Medications   Medication    0.9%  NaCl infusion    acetaminophen tablet 1,000 mg    albumin human 25% bottle 25 g    albuterol-ipratropium 2.5 mg-0.5 mg/3 mL nebulizer solution 3 mL    alfuzosin 24 hr tablet 10 mg    aspirin EC tablet 81 mg    collagenase ointment    dextrose 50% injection 12.5 g    dextrose 50% injection 25 g    epoetin quinton-epbx injection 5,800 Units    FLUoxetine capsule 20 mg    glucagon (human recombinant) injection 1 mg    glucose chewable tablet 16 g    glucose chewable tablet 24 g    heparin (porcine) injection 5,000 Units    heparin (porcine) injection 5,000 Units    HYDROcodone-acetaminophen  mg per tablet 1 tablet    hydrocortisone 2.5 % rectal cream    insulin aspart U-100 pen 0-5 Units    insulin detemir U-100 pen 10 Units    lidocaine HCL 2% jelly    meropenem-0.9% sodium chloride 500 mg/50 mL IVPB    metoprolol tartrate (LOPRESSOR) tablet 25 mg    miconazole nitrate 2% ointment    midodrine tablet 5 mg    morphine injection 2 mg    nitroGLYCERIN SL tablet 0.4 mg    ondansetron injection 4 mg    pantoprazole EC tablet 40 mg    promethazine (PHENERGAN) 6.25 mg in dextrose 5 % 50 mL IVPB    rOPINIRole tablet 2 mg    senna-docusate 8.6-50 mg per tablet 1 tablet    sevelamer carbonate tablet 2,400 mg    sodium chloride 0.9% flush 10 mL     Objective:     Vital Signs (Most Recent):  Temp: 97.4 °F (36.3 °C) (08/18/19 0744)  Pulse: 99 (08/18/19 0831)  Resp: 18 (08/18/19 0744)  BP: (!) 94/52 (08/18/19 0838)  SpO2: 99 % (08/18/19 0831) Vital Signs (24h Range):  Temp:  [96.5 °F (35.8 °C)-97.8 °F (36.6 °C)] 97.4 °F  "(36.3 °C)  Pulse:  [] 99  Resp:  [17-19] 18  SpO2:  [89 %-100 %] 99 %  BP: ()/(29-69) 94/52     Weight: 113.8 kg (250 lb 14.1 oz)  Height: 6' 2" (188 cm)  Body mass index is 32.21 kg/m².      Intake/Output Summary (Last 24 hours) at 8/18/2019 1123  Last data filed at 8/17/2019 1745  Gross per 24 hour   Intake 700 ml   Output 600 ml   Net 100 ml       General    Nursing note and vitals reviewed.  Constitutional: He is oriented to person, place, and time. He appears well-developed and well-nourished.   Pulmonary/Chest: Effort normal.   coughing   Neurological: He is alert and oriented to person, place, and time.   Psychiatric: He has a normal mood and affect. His behavior is normal.   Depressed affect         Left Ankle/Foot Exam     Comments:  L lower leg erythema/ cellulitis is improving. No palp pulse and very poor cap refill.        Significant Labs:   CBC:   Recent Labs   Lab 08/17/19  0632 08/18/19  0637   WBC 12.51 13.16*   HGB 11.5* 11.0*   HCT 40.8 38.7*   * 103*     CMP:   Recent Labs   Lab 08/17/19  0632 08/18/19  0637    139   K 5.1 4.6    102   CO2 23 26   GLU 88 71   BUN 36* 23   CREATININE 5.7* 4.2*   CALCIUM 8.8 8.8   ANIONGAP 13 11   EGFRNONAA 9* 13*     CRP: No results for input(s): CRP in the last 48 hours.  Wound Culture: No results for input(s): LABAERO in the last 48 hours.  All pertinent labs within the past 24 hours have been reviewed.    Significant Imaging: U/S: I have reviewed all pertinent results/findings and my personal findings are:  Findings consistent with small vessel disease with no flow seen in the right posterior tibial artery and minimal flow in the right peroneal artery.  "

## 2019-08-18 NOTE — CARE UPDATE
08/18/19 0709   Patient Assessment/Suction   Level of Consciousness (AVPU) alert   Respiratory Effort Normal;Unlabored   All Lung Fields Breath Sounds diminished   PRE-TX-O2   O2 Device (Oxygen Therapy) nasal cannula   $ Is the patient on Low Flow Oxygen? Yes   Flow (L/min) 2   Oxygen Concentration (%) 28   SpO2 100 %   Pulse Oximetry Type Intermittent   $ Pulse Oximetry - Multiple Charge Pulse Oximetry - Multiple   Pulse 80   Resp 18   Aerosol Therapy   $ Aerosol Therapy Charges Aerosol Treatment   Respiratory Treatment Status (SVN) given   Treatment Route (SVN) mask   Patient Position (SVN) semi-Ma's   Post Treatment Assessment (SVN) breath sounds unchanged   Signs of Intolerance (SVN) none   Breath Sounds Post-Respiratory Treatment   Throughout All Fields Post-Treatment no change   Post-treatment Heart Rate (beats/min) 86   Post-treatment Resp Rate (breaths/min) 18   Incentive Spirometer   $ Incentive Spirometer Charges refused   Administration (IS) refused   Vibratory PEP Therapy   $ Vibratory PEP Charges   (refused)   Duo Q6, Aerobika Q4, IS QS, Bipap QHS, tolerated nebulized  tx well, refused flutter valve and incentive spirometer.

## 2019-08-18 NOTE — PROGRESS NOTES
INPATIENT NEPHROLOGY PROGRESS NOTE  [facility] NEPHROLOGY    Patient Name: Frank Zayas  Date: 08/18/2019    Reason for consultation: ESRD    Chief Complaint:   Chief Complaint   Patient presents with    Shortness of Breath     with exertion, does not use O2 at home. arrives to ED with 2 L NC    Foot Pain     left heel since being discharged from [facility]. bigger since Friday       History of Present Illness:  Frank Zayas is a 79 y.o. male with COPD and ESRD on HD TTS who presents to the ED with ongoing SOB greater than baseline. Per EMS his home health nurse noticed worsening SOB when going to re-wrap his legs, which are being treated for ulcer. The patient denies any chest pain, fever, chills or fever, N/V/D, or any other symptoms at this time.  He was stable in the ED with oxygen at 2 L. he is noted to have an elevated BNP and bilateral pulmonary edema on chest x-ray.  He was given Lasix 60 mg IV in the ED. He was also sent by a home health nurse because of malodorous drainage to his heel and erythema in his left lower extremity that had not been near the previous dressing change.  His left heel was drain and very malodorous.  No gangrene is noted. We are consulted for dialysis.     Foot xray- no osteo, + cellulitis, + soft tissue ulcer of the heel    8/17  Electrolytes stable.  Due for HD today.  Sleeping, NAD noted.  8/18  Per notes, pt was unable to tolerate fluid being pulled with HD yesterday d/t hypotension despite albumin.  +100cc.  K+ better at 4.6.  Pt is sleeping quietly.  He continues to be hypotensive today.  D/w bedside nurse, family has requested meeting with primary team today, reports that comfort measures were suggested by MD.  Nurse states that pt keeps telling the staff he wants to die.    · Interval History/Subjective:    - VSS, on 3L NC    · Review of Systems: unable to obtain due to AMS/sleeping    Plan of Care:    Assessment:  ESRD  Chronic hypotension/Diastolic CHF/Acute pulm  edema  Hyperkalemia  SHPT  Anemia of CKD  Foot ulcer/cellulitis     Plan:     - Continue routine HD TTS.  - BP is stable. Continue midodrine pre HD. VS is incrementally improved. Continue UF 2-3L with albumin.   - HyperK is resolved with 2K bath and 180 dialyzer. Continue renal diet with Nepro shakes only.  - Ordered daily phos- pending. Continue sevelamer 3 tabs with meals.  - Hb is stable. Continue EPO 5K with HD.   - Dose antbx for dialysis.    Thank you for allowing us to participate in this patient's care. We will continue to follow.    Medications:  No current facility-administered medications on file prior to encounter.      Current Outpatient Medications on File Prior to Encounter   Medication Sig Dispense Refill    FLUoxetine 20 MG capsule Take 20 mg by mouth every morning.       metoprolol tartrate (LOPRESSOR) 25 MG tablet Take 1 tablet (25 mg total) by mouth every Mon, Wed, Fri, Sun. 16 tablet 11    nitroGLYCERIN (NITROSTAT) 0.4 MG SL tablet Place 0.4 mg under the tongue every 5 (five) minutes as needed for Chest pain. Seek medical help if pain is not relieved by the third dose.      pantoprazole (PROTONIX) 40 MG tablet Take 1 tablet (40 mg total) by mouth 2 (two) times daily before meals. 60 tablet 11    sevelamer carbonate (RENVELA) 800 mg Tab Take 2 tablets (1,600 mg total) by mouth 3 (three) times daily with meals. 180 tablet 11     Scheduled Meds:   albuterol-ipratropium  3 mL Nebulization Q6H    alfuzosin  10 mg Oral Nightly    aspirin  81 mg Oral Daily    collagenase   Topical (Top) Every other day    epoetin quinton-epbx  50 Units/kg Intravenous Every Tues, Thurs, Sat    FLUoxetine  20 mg Oral QAM    heparin (porcine)  5,000 Units Subcutaneous Q8H    hydrocortisone   Rectal BID    insulin detemir U-100  10 Units Subcutaneous QHS    meropenem (MERREM) IVPB  500 mg Intravenous Q12H    metoprolol tartrate  25 mg Oral Every Mon, Wed, Fri, Sun    midodrine  5 mg Oral Every Tues, Thurs,  Sat    pantoprazole  40 mg Oral BID AC    rOPINIRole  2 mg Oral QHS    senna-docusate 8.6-50 mg  1 tablet Oral BID    sevelamer carbonate  2,400 mg Oral TID WM     Continuous Infusions:  PRN Meds:.sodium chloride 0.9%, acetaminophen, albumin human 25%, dextrose 50%, dextrose 50%, glucagon (human recombinant), glucose, glucose, heparin (porcine), HYDROcodone-acetaminophen, insulin aspart U-100, lidocaine HCL 2%, miconazole nitrate 2%, morphine, nitroGLYCERIN, ondansetron, promethazine (PHENERGAN) IVPB, sodium chloride 0.9%    Allergies:  Lidocaine and Statins-hmg-coa reductase inhibitors    Vital Signs:  Temp Readings from Last 3 Encounters:   08/18/19 97.4 °F (36.3 °C)   07/01/19 96.4 °F (35.8 °C) (Oral)   06/17/19 98.7 °F (37.1 °C) (Oral)       Pulse Readings from Last 3 Encounters:   08/18/19 73   07/01/19 94   06/17/19 69       BP Readings from Last 3 Encounters:   08/18/19 (!) 81/51   07/01/19 108/72   06/17/19 (!) 105/56       Weight:  Wt Readings from Last 3 Encounters:   08/16/19 113.8 kg (250 lb 14.1 oz)   06/30/19 127 kg (279 lb 15.8 oz)   06/17/19 120.4 kg (265 lb 6.9 oz)       Physical Exam:  Constitutional: nad, sleeping, chronically ill, appears stated age  Heart: rrr, no m/r/g, wwp, + edema  Lungs: ant ausc clear, no w/r/r/c, no lb  Abdomen: s/nt/nd, +BS    Results:  Lab Results   Component Value Date     08/18/2019    K 4.6 08/18/2019     08/18/2019    CO2 26 08/18/2019    BUN 23 08/18/2019    CREATININE 4.2 (H) 08/18/2019    CALCIUM 8.8 08/18/2019    ANIONGAP 11 08/18/2019    ESTGFRAFRICA 15 (A) 08/18/2019    EGFRNONAA 13 (A) 08/18/2019       Lab Results   Component Value Date    CALCIUM 8.8 08/18/2019    PHOS 4.4 08/18/2019       Recent Labs   Lab 08/18/19  0637   WBC 13.16*   RBC 3.58*   HGB 11.0*   HCT 38.7*   *   *   MCH 30.7   MCHC 28.4*       I have personally reviewed pertinent radiological imaging and reports.    [facility]  [address]  [place] [address]  [phone] (p)  [phone] (f)

## 2019-08-18 NOTE — PT/OT/SLP PROGRESS
Physical Therapy      Patient Name:  Frank Zayas   MRN:  [id number]    Patient not seen today secondary to patient not available (CT)  . Will follow-up 8/19/2019.    Mitch Kendall, PT

## 2019-08-18 NOTE — PLAN OF CARE
08/1941   Patient Assessment/Suction   Level of Consciousness (AVPU) responds to voice   Respiratory Effort Normal;Unlabored   Expansion/Accessory Muscles/Retractions expansion symmetric;no retractions;no use of accessory muscles   All Lung Fields Breath Sounds diminished   Cough Frequency no cough   PRE-TX-O2   O2 Device (Oxygen Therapy) nasal cannula   Flow (L/min) 2   Oxygen Concentration (%) 28   SpO2 97 %   Pulse Oximetry Type Intermittent   Pulse 101   Resp 18   Aerosol Therapy   $ Aerosol Therapy Charges Aerosol Treatment   Respiratory Treatment Status (SVN) given   Treatment Route (SVN) mask;oxygen   Patient Position (SVN) Ma's   Signs of Intolerance (SVN) none   Breath Sounds Post-Respiratory Treatment   Throughout All Fields Post-Treatment All Fields   Throughout All Fields Post-Treatment no change   Post-treatment Heart Rate (beats/min) 103   Post-treatment Resp Rate (breaths/min) 20   Incentive Spirometer   $ Incentive Spirometer Charges refused   Administration (IS) refused   Vibratory PEP Therapy   $ Vibratory PEP Charges   (pt refused aerobika)   Ready to Wean/Extubation Screen   FIO2<=50 (chart decimal) 0.28   Preset CPAP/BiPAP Settings   Mode Of Delivery Standby

## 2019-08-18 NOTE — SUBJECTIVE & OBJECTIVE
Interval History:  Patient seen and examined.  Reports he  wants to go home.  CTA today.  I have family meeting set up for 1:00 p.m. to discuss plans with family.    Review of Systems   Constitutional: Positive for activity change. Negative for chills and fever.   Respiratory: Negative for cough and shortness of breath.    Gastrointestinal: Negative for abdominal distention and abdominal pain.   Genitourinary: Negative for difficulty urinating and dysuria.   Musculoskeletal: Positive for arthralgias, joint swelling and myalgias. Negative for back pain.   Skin: Positive for wound.   Psychiatric/Behavioral: Positive for confusion. Negative for agitation.     Objective:     Vital Signs (Most Recent):  Temp: 97.4 °F (36.3 °C) (08/18/19 0744)  Pulse: 99 (08/18/19 0831)  Resp: 18 (08/18/19 0744)  BP: (!) 94/52 (08/18/19 0838)  SpO2: 99 % (08/18/19 0831) Vital Signs (24h Range):  Temp:  [96.5 °F (35.8 °C)-97.8 °F (36.6 °C)] 97.4 °F (36.3 °C)  Pulse:  [] 99  Resp:  [17-19] 18  SpO2:  [89 %-100 %] 99 %  BP: ()/(29-69) 94/52     Weight: 113.8 kg (250 lb 14.1 oz)  Body mass index is 32.21 kg/m².    Intake/Output Summary (Last 24 hours) at 8/18/2019 1127  Last data filed at 8/17/2019 1745  Gross per 24 hour   Intake 700 ml   Output 600 ml   Net 100 ml      Physical Exam   Constitutional: He is oriented to person, place, and time. He appears well-developed and well-nourished. No distress.   HENT:   Head: Normocephalic and atraumatic.   Right Ear: External ear normal.   Left Ear: External ear normal.   Nose: Nose normal.   Mouth/Throat: Oropharynx is clear and moist. No oropharyngeal exudate.   Eyes: Conjunctivae and EOM are normal. Right eye exhibits no discharge. Left eye exhibits no discharge. No scleral icterus.   Neck: Neck supple. No thyromegaly present.   Cardiovascular: Normal rate and regular rhythm. Exam reveals distant heart sounds. Exam reveals no gallop and no friction rub.   No murmur heard.  Cannot  palpate posterior tibial and dorsalis pedis   Pulmonary/Chest: Effort normal. Tachypnea noted. No respiratory distress. He has decreased breath sounds in the right middle field, the right lower field, the left middle field and the left lower field. He has no wheezes. He has no rales.   Abdominal: Soft. Bowel sounds are normal. He exhibits no distension. There is no tenderness. There is no guarding.   Genitourinary:   Genitourinary Comments: Large external hemorrhoid with thrombosis  Sacral friction tears  These were not examined today   Musculoskeletal: Normal range of motion. He exhibits no edema (RlE 1-2+ pitting ) or tenderness.   Superficial ulcer left mid medial calf with small amt yellow exudate drainage   Bandage not taken down of left lower extremity wound today   Lymphadenopathy:     He has no cervical adenopathy.   Neurological: He is alert and oriented to person, place, and time. No cranial nerve deficit. Coordination normal.   Skin: Skin is warm and dry. Capillary refill takes less than 2 seconds. No rash noted. No erythema.   Psychiatric: He has a normal mood and affect. His behavior is normal. Judgment and thought content normal.   Nursing note and vitals reviewed.      Significant Labs: All pertinent labs within the past 24 hours have been reviewed.    Significant Imaging: I have reviewed and interpreted all pertinent imaging results/findings within the past 24 hours.

## 2019-08-18 NOTE — PROGRESS NOTES
[facility] Medicine  Progress Note    Patient Name: Frank Zayas  MRN: [id number]  Patient Class: IP- Inpatient   Admission Date: 8/12/2019  Length of Stay: 6 days  Attending Physician: Breanne Parrish MD  Primary Care Provider: Mikhail Shields MD        Subjective:     Principal Problem:Severe sepsis        HPI:  Frank Zayas is a 79 y.o. male with COPD who presents to the ED with ongoing SOB greater than baseline. Per EMS his home health nurse noticed worsening SOB when going to re-wrap his legs, which are being treated for ulcer. The patient denies any chest pain, fever, chills or fever, N/V/D, or any other symptoms at this time.  He was stable in the ED with oxygen at 2 L. he is noted to have an elevated BNP and bilateral pulmonary edema on chest x-ray.  He was given Lasix 60 mg IV in the ED and Nephrology was notified of his pulmonary edema. His usual dialysis days are Tuesday Thursday and Saturday.  He was also sent by a home health nurse because of malodorous drainage to his heel and erythema in his left lower extremity that had not been near the previous dressing change.  His left heel was drain and very malodorous.  No gangrene is noted.  There skin sloughing off of his left heel as noted in the photos.       Hx of CHF, HTN, hyperlipidemia, and MI.  PSHx of coronary stents, CABG, and cardiac sugery. No known drug allergies    Overview/Hospital Course:  Frank Zayas is a 79 y.o. Male ESRD/ htn/DM  admitted with severe sepsis 2/2 cellulitis/infected left heel diabetic ulcer -initiated on vanc/merrem. CT with prob septic arthritis   Podiatry consutled -surgical debridement to muscle on 8/13. Cultures taken  Blood cultures neg. Wound  PROTEUS MIRABILIS ESBL -put in isolation   No other significant isolate so Vanco dc . And cefipime changed to merrem.      ID consulted; with podiatry recommend aka vs bka so vascular/orthopedic surg consulted--wound care done.    CTaortogram /runoff ordered  for 8/17 then HD   HTN, /Dm remained stable  Sacral decub/skin excoriations /ext hemorrhoid noted on admit-wound care consulted.              Interval History:  Patient seen and examined.  Reports he  wants to go home.  CTA today.  I have family meeting set up for 1:00 p.m. to discuss plans with family.    Review of Systems   Constitutional: Positive for activity change. Negative for chills and fever.   Respiratory: Negative for cough and shortness of breath.    Gastrointestinal: Negative for abdominal distention and abdominal pain.   Genitourinary: Negative for difficulty urinating and dysuria.   Musculoskeletal: Positive for arthralgias, joint swelling and myalgias. Negative for back pain.   Skin: Positive for wound.   Psychiatric/Behavioral: Positive for confusion. Negative for agitation.     Objective:     Vital Signs (Most Recent):  Temp: 97.4 °F (36.3 °C) (08/18/19 0744)  Pulse: 99 (08/18/19 0831)  Resp: 18 (08/18/19 0744)  BP: (!) 94/52 (08/18/19 0838)  SpO2: 99 % (08/18/19 0831) Vital Signs (24h Range):  Temp:  [96.5 °F (35.8 °C)-97.8 °F (36.6 °C)] 97.4 °F (36.3 °C)  Pulse:  [] 99  Resp:  [17-19] 18  SpO2:  [89 %-100 %] 99 %  BP: ()/(29-69) 94/52     Weight: 113.8 kg (250 lb 14.1 oz)  Body mass index is 32.21 kg/m².    Intake/Output Summary (Last 24 hours) at 8/18/2019 1127  Last data filed at 8/17/2019 1745  Gross per 24 hour   Intake 700 ml   Output 600 ml   Net 100 ml      Physical Exam   Constitutional: He is oriented to person, place, and time. He appears well-developed and well-nourished. No distress.   HENT:   Head: Normocephalic and atraumatic.   Right Ear: External ear normal.   Left Ear: External ear normal.   Nose: Nose normal.   Mouth/Throat: Oropharynx is clear and moist. No oropharyngeal exudate.   Eyes: Conjunctivae and EOM are normal. Right eye exhibits no discharge. Left eye exhibits no discharge. No scleral icterus.   Neck: Neck supple. No  thyromegaly present.   Cardiovascular: Normal rate and regular rhythm. Exam reveals distant heart sounds. Exam reveals no gallop and no friction rub.   No murmur heard.  Cannot palpate posterior tibial and dorsalis pedis   Pulmonary/Chest: Effort normal. Tachypnea noted. No respiratory distress. He has decreased breath sounds in the right middle field, the right lower field, the left middle field and the left lower field. He has no wheezes. He has no rales.   Abdominal: Soft. Bowel sounds are normal. He exhibits no distension. There is no tenderness. There is no guarding.   Genitourinary:   Genitourinary Comments: Large external hemorrhoid with thrombosis  Sacral friction tears  These were not examined today   Musculoskeletal: Normal range of motion. He exhibits no edema (RlE 1-2+ pitting ) or tenderness.   Superficial ulcer left mid medial calf with small amt yellow exudate drainage   Bandage not taken down of left lower extremity wound today   Lymphadenopathy:     He has no cervical adenopathy.   Neurological: He is alert and oriented to person, place, and time. No cranial nerve deficit. Coordination normal.   Skin: Skin is warm and dry. Capillary refill takes less than 2 seconds. No rash noted. No erythema.   Psychiatric: He has a normal mood and affect. His behavior is normal. Judgment and thought content normal.   Nursing note and vitals reviewed.      Significant Labs: All pertinent labs within the past 24 hours have been reviewed.    Significant Imaging: I have reviewed and interpreted all pertinent imaging results/findings within the past 24 hours.      Assessment/Plan:      * Severe sepsis  This patient does have evidence of infective focus-diabetic ulcer left heel   Gm neg bacteremia --ESBL Proteus s/merrem -continue per ID;   My overall impression is severe sepsis .-improving   Antibiotics given-   Antibiotics (From admission, onward)    Start     Stop Route Frequency Ordered    08/13/19 2300   meropenem-0.9% sodium chloride 500 mg/50 mL IVPB      -- IV Every 12 hours (non-standard times) 08/13/19 1231    08/12/19 2245  mupirocin 2 % ointment      08/17 2059 Nasl 2 times daily 08/12/19 2143          Latest lactate reviewed, they are-  No results for input(s): LACTATE in the last 72 hours.    Organ dysfunction indicated by Acute kidney injury and Acute respiratory failure  Source- heel  Ruled out  pneumonia - procalcitonin--neg   Source control Achieved by- iv abx, diuresis-debridement /wound care  Of left heel /ID , Podiatry, ortho, vascular surgery following    Heel xray-neg  CT foot -possible septic arthritis 1st [facility] joint vascular surg consulted -for CTAortogram w runoff today then determine bka vs aka .         ESBL (extended spectrum beta-lactamase) producing bacteria infection    See sepsis     Thrombocytopenia  Acute  Sepsis vs HIT -check HIT ab . Consider heme eval   Monitor for bleeding       Diabetic ulcer of left heel associated with diabetes mellitus due to underlying condition, with muscle involvement without evidence of necrosis  See sepsis       Gangrene of left foot  Acute-2/2 gangrenous ulcer left heel.  Podiatry, Orthopedics, wound care, vascular surgery, and Infectious Disease consulted.  See sepsis     External hemorrhoid    Noted on exam use external hemorrhoid treatment.    Sacral decubitus ulcer, stage III  See photos -consult wound care. No evidence infection       Cellulitis of left lower extremity  Acute--severe -with large left heel ulcer.  -see sepsis     ESRD (end stage renal disease) on dialysis   Nephrology consult to follow.  Dialysis days Tuesday Thursday Saturday and will dose medications for renal failure.  And maintain blood pressure control.    Hyperkalemia  Resolved    Morbid (severe) obesity with alveolar hypoventilation  Pulse monitoring CPAP at night      Skin ulcer of left lower leg, limited to breakdown of skin  Chronic, severe -see sepsis/cellulitis ; podiatry  /wound care consult        BRANDEN (obstructive sleep apnea)  bipap while in hospital unless pt brings own cpap machine. Reportedly non-compliant at home  Refuses bipap in hospital -consider haldol at hs for agitation and continue to encourage use to avoid respiratory compromise   Add IS/Acapella       Moderate malnutrition  Nutrition consulted. Body mass index is 32.21 kg/m².. Encourage maximal PO intake. Diet supplementation ordered per nutrition approval. Will encourage PO and monitor closely for weight changes.        Anemia, chronic disease  Patient's anemia is currently controlled. S/p 0 units of PRBCs. Current CBC reviewed-   Lab Results   Component Value Date    HGB 11.5 (L) 08/17/2019    HCT 40.8 08/17/2019     Monitor serial CBC and transfuse if patient becomes hemodynamically unstable, symptomatic or H/H drops below 7/21.         A-fib    Atrial Fibrillation controlled currently with Beta Blocker. [id number] score 5. Anticoagulation indicated currently. Anticoagulation held prior to debrideemt  8/12 -- Hep 5000/8 hrs -continue vte   Possible history of GI bleed.    Cardiology consulted for preoperative clearance    COPD (chronic obstructive pulmonary disease)  Patient's COPD is controlled currently. Continue scheduled inhalers prn nebulizer; /oxygen  and monitor respiratory status closely.   HD to remove volume from lung.  IS and acapella for pulm toilet       Coronary artery disease  Chronic and stable.  Continue home medications with aspirin and beta-blocker.  .monitor on telemetry monitor signs for acute coronary syndrome.          DM type 2 with diabetic mixed hyperlipidemia  Patient's FSGs are controlled on current hypoglycemics.   Last A1c reviewed-   Lab Results   Component Value Date    HGBA1C 6.1 (H) 08/12/2019     Most recent fingerstick glucose reviewed-   Recent Labs   Lab 08/16/19  1143 08/16/19  1533 08/16/19  2154 08/17/19  0657   POCTGLUCOSE 141* 191* 127* 86     Current correctional scale   Low  Maintain anti-hyperglycemic dose as follows-   Antihyperglycemics (From admission, onward)    Start     Stop Route Frequency Ordered    08/12/19 2100  insulin detemir U-100 pen 10 Units      -- SubQ Nightly 08/12/19 1735    08/12/19 1743  insulin aspart U-100 pen 0-5 Units      -- SubQ Before meals & nightly PRN 08/12/19 1735            Type 2 diabetes mellitus with kidney complication, with long-term current use of insulin    See dm    HTN (hypertension)  Chronic and stable.  Hypertension Medications             metoprolol tartrate (LOPRESSOR) 25 MG tablet Take 1 tablet (25 mg total) by mouth every Mon, Wed, Fri, Sun.    nitroGLYCERIN (NITROSTAT) 0.4 MG SL tablet Place 0.4 mg under the tongue every 5 (five) minutes as needed for Chest pain. Seek medical help if pain is not relieved by the third dose.      Hospital Medications             furosemide injection 40 mg (Completed) Inject 4 mLs (40 mg total) into the vein ED 1 Time.    metoprolol tartrate (LOPRESSOR) tablet 25 mg Take 1 tablet (25 mg total) by mouth every Mon, Wed, Fri, Sun.    nitroGLYCERIN SL tablet 0.4 mg Place 1 tablet (0.4 mg total) under the tongue every 5 (five) minutes as needed for Chest pain.            Malignant neoplasm of anterior wall of urinary bladder  Chronic, stable; monitoring I/o . PVR as needed         VTE Risk Mitigation (From admission, onward)        Ordered     heparin (porcine) injection 5,000 Units  As needed (PRN)      08/13/19 1435     heparin (porcine) injection 5,000 Units  Every 8 hours      08/12/19 1735     IP VTE HIGH RISK PATIENT  Once      08/12/19 1735                Breanne Parrish MD  Department of Hospital Medicine   [facility]

## 2019-08-18 NOTE — PLAN OF CARE
Problem: Adult Inpatient Plan of Care  Goal: Plan of Care Review  Outcome: Ongoing (interventions implemented as appropriate)  Continue to float heels and turn Q2 hrs to prevent further skin breakdown, monitor blood sugar and vitals, monitor for   signs and symptoms of infection, pt is to be NPO, telesitter at bedside, change dressing daily, NAD,safety maintained,  will continue to monitor.

## 2019-08-18 NOTE — NURSING
Family  requesting a meeting w MD. Dr Parrish notified. Meeting arranged for 1pm . Spoke w eLila, pt's daughter.

## 2019-08-18 NOTE — PROGRESS NOTES
[facility]  Orthopedics  Progress Note    Patient Name: Frank Zayas  MRN: [id number]  Admission Date: 8/12/2019  Hospital Length of Stay: 6 days  Attending Provider: Breanne Parrish MD  Primary Care Provider: Mikhail Shields MD  Follow-up For: Procedure(s) (LRB):  DEBRIDEMENT, FOOT (Left)    Post-Operative Day: 5 Days Post-Op  Subjective:     Principal Problem:Severe sepsis    Principal Orthopedic Problem: L foot osteolyelitis    Interval History: Extensive PMH and complications    Review of patient's allergies indicates:   Allergen Reactions    Lidocaine Nausea Only     NOVACAINE --  Severe nausea    Statins-hmg-coa reductase inhibitors Anxiety       Current Facility-Administered Medications   Medication    0.9%  NaCl infusion    acetaminophen tablet 1,000 mg    albumin human 25% bottle 25 g    albuterol-ipratropium 2.5 mg-0.5 mg/3 mL nebulizer solution 3 mL    alfuzosin 24 hr tablet 10 mg    aspirin EC tablet 81 mg    collagenase ointment    dextrose 50% injection 12.5 g    dextrose 50% injection 25 g    epoetin quinton-epbx injection 5,800 Units    FLUoxetine capsule 20 mg    glucagon (human recombinant) injection 1 mg    glucose chewable tablet 16 g    glucose chewable tablet 24 g    heparin (porcine) injection 5,000 Units    heparin (porcine) injection 5,000 Units    HYDROcodone-acetaminophen  mg per tablet 1 tablet    hydrocortisone 2.5 % rectal cream    insulin aspart U-100 pen 0-5 Units    insulin detemir U-100 pen 10 Units    lidocaine HCL 2% jelly    meropenem-0.9% sodium chloride 500 mg/50 mL IVPB    metoprolol tartrate (LOPRESSOR) tablet 25 mg    miconazole nitrate 2% ointment    midodrine tablet 5 mg    morphine injection 2 mg    nitroGLYCERIN SL tablet 0.4 mg    ondansetron injection 4 mg    pantoprazole EC tablet 40 mg    promethazine (PHENERGAN) 6.25 mg in dextrose 5 % 50 mL IVPB    rOPINIRole tablet 2 mg    senna-docusate 8.6-50 mg per  "tablet 1 tablet    sevelamer carbonate tablet 2,400 mg    sodium chloride 0.9% flush 10 mL     Objective:     Vital Signs (Most Recent):  Temp: 97.4 °F (36.3 °C) (08/18/19 0744)  Pulse: 99 (08/18/19 0831)  Resp: 18 (08/18/19 0744)  BP: (!) 94/52 (08/18/19 0838)  SpO2: 99 % (08/18/19 0831) Vital Signs (24h Range):  Temp:  [96.5 °F (35.8 °C)-97.8 °F (36.6 °C)] 97.4 °F (36.3 °C)  Pulse:  [] 99  Resp:  [17-19] 18  SpO2:  [89 %-100 %] 99 %  BP: ()/(29-69) 94/52     Weight: 113.8 kg (250 lb 14.1 oz)  Height: 6' 2" (188 cm)  Body mass index is 32.21 kg/m².      Intake/Output Summary (Last 24 hours) at 8/18/2019 1123  Last data filed at 8/17/2019 1745  Gross per 24 hour   Intake 700 ml   Output 600 ml   Net 100 ml       General    Nursing note and vitals reviewed.  Constitutional: He is oriented to person, place, and time. He appears well-developed and well-nourished.   Pulmonary/Chest: Effort normal.   coughing   Neurological: He is alert and oriented to person, place, and time.   Psychiatric: He has a normal mood and affect. His behavior is normal.   Depressed affect         Left Ankle/Foot Exam     Comments:  L lower leg erythema/ cellulitis is improving. No palp pulse and very poor cap refill.        Significant Labs:   CBC:   Recent Labs   Lab 08/17/19  0632 08/18/19  0637   WBC 12.51 13.16*   HGB 11.5* 11.0*   HCT 40.8 38.7*   * 103*     CMP:   Recent Labs   Lab 08/17/19  0632 08/18/19  0637    139   K 5.1 4.6    102   CO2 23 26   GLU 88 71   BUN 36* 23   CREATININE 5.7* 4.2*   CALCIUM 8.8 8.8   ANIONGAP 13 11   EGFRNONAA 9* 13*     CRP: No results for input(s): CRP in the last 48 hours.  Wound Culture: No results for input(s): LABAERO in the last 48 hours.  All pertinent labs within the past 24 hours have been reviewed.    Significant Imaging: U/S: I have reviewed all pertinent results/findings and my personal findings are:  Findings consistent with small vessel disease with no flow " seen in the right posterior tibial artery and minimal flow in the right peroneal artery.    Assessment/Plan:     * Severe sepsis  Scheduled for L BKA 8/19/19.   - Clearance  - NPO after MN      Diabetic ulcer of left heel associated with diabetes mellitus due to underlying condition, with muscle involvement without evidence of necrosis  Waiting for further assessment to determine when to proceed with BKA or if pt would be better served by AKA    Awaiting LLE US results    Scheduled for AV fistula procedure with vascular tomorrow          KASSIE CHRIS  Orthopedics  [facility]

## 2019-08-18 NOTE — PROGRESS NOTES
Progress Note  Infectious Disease    Follow-up For:  Gangrene of the foot    SUBJECTIVE:   Interval history/ROS:   8/14:  Status post life-saving debridement of gangrene of the foot.  Blood cultures have Proteus.  Discussed with Podiatry, foot is not viable and he will need amputation.  Afebrile.  8/15:  Cultures of blood and wound have Proteus, ESBL positive.  He feels poorly, poor appetite, hurts all over.  Does not recall conversations about loss of limb..  He vascular opinion not yet recorded.  Ultrasound of the lower extremity shows more obstruction in the right leg and suggestion of small vessel disease in the left leg.  He is not short of breath, having chest pain, nauseous, having abdominal pain.  08/16 afebrile.  Hurting all over  CTA - aortogram with runoff has been ordered for tomorrow by vascular surgeon.   08/17 : no new complaints.  Afebrile. Does not like ot offer much info Will go for monae then HD today  08/18 He is confused, Nurses are patiently reassuring him over and over again. Even in the midst of confusion it is clear that he is concerned he will loose his leg tomorrow. Denies complaints    Antibiotics (From admission, onward)    Start     Stop Route Frequency Ordered    08/13/19 2300  meropenem-0.9% sodium chloride 500 mg/50 mL IVPB      -- IV Every 12 hours (non-standard times) 08/13/19 1231    08/12/19 2245  mupirocin 2 % ointment      08/17 2059 Nasl 2 times daily 08/12/19 2143        Antifungals (From admission, onward)    Start     Stop Route Frequency Ordered    08/14/19 0000  miconazole nitrate 2% ointment      -- Top Daily PRN 08/13/19 1045        Antivirals (From admission, onward)    None            EXAM & DIAGNOSTICS REVIEWED:   Vitals:     Temp:  [96.5 °F (35.8 °C)-97.8 °F (36.6 °C)]   Temp: 97.4 °F (36.3 °C) (08/18/19 0744)  Pulse: 99 (08/18/19 0831)  Resp: 18 (08/18/19 0744)  BP: (!) 94/52 (08/18/19 0838)  SpO2: 99 % (08/18/19 0831)    Intake/Output Summary (Last 24 hours) at  8/18/2019 1051  Last data filed at 8/17/2019 1745  Gross per 24 hour   Intake 700 ml   Output 600 ml   Net 100 ml       General:         In NAD. Confused  On and off and anxious/ concerned  Eyes:              anicteric, extraocular movements intact  ENT:                No ulcers, exudates, thrush, nares patent,   Neck:              supple,  Lungs:            Bibasilar crackles are improved  Heart:              iRRR, no gallop/murmur/rub noted  Abd:                Soft, obese, NT, ND, normal BS, no masses or organomegaly appreciated.  :                 Musc:              Joints without effusion, swelling, .   Skin:               Bilateral venous stasis cellulitis  Wound:                     Postop bandages not disturbed. pic of  8/14    8/15:  All of the wet gangrene has been excised.  A large portion of the calcaneus is exposed.  The wound edges are dusky.      Neuro:  nonfocal, memory impaired, able to communicate, moves all of his extremities  Psych:              feels unwell, with no appetite, saddened by the impending amputation  Lymphatic:        Extrem:          dressing is not disturbed  VAD:               Right sided tunneled dialysis catheter  Isolation:  will be on contact    Lines/Tubes/Drains:    General Labs reviewed:  Recent Labs   Lab 08/18/19  0637   WBC 13.16*   RBC 3.58*   HGB 11.0*   HCT 38.7*   *   *   MCH 30.7   MCHC 28.4*     Recent Labs   Lab 08/18/19  0637   CALCIUM 8.8      K 4.6   CO2 26      BUN 23   CREATININE 4.2*       Micro:   Microbiology Results (last 7 days)     Procedure Component Value Units Date/Time    Blood culture [[id number]] Collected:  08/14/19 0602    Order Status:  Completed Specimen:  Blood Updated:  08/17/19 1212     Blood Culture, Routine No Growth to date      No Growth to date      No Growth to date      No Growth to date    Aerobic culture [[id number]]  (Abnormal)  (Susceptibility) Collected:  08/13/19 1830    Order Status:  Completed  Specimen:  Abscess from Foot, Left Updated:  08/16/19 1159     Aerobic Bacterial Culture PROTEUS MIRABILIS ESBL  Many  No other significant isolate      Culture, Anaerobe [[id number]] Collected:  08/13/19 1830    Order Status:  Completed Specimen:  Abscess from Foot, Left Updated:  08/16/19 0956     Anaerobic Culture Culture in progress    Blood culture x two cultures. Draw prior to antibiotics. [[id number]]  (Abnormal) Collected:  08/12/19 1556    Order Status:  Completed Specimen:  Blood Updated:  08/15/19 1114     Blood Culture, Routine Gram stain demarcus bottle: Gram negative rods      Results called to and read back by: Vasu Cerda RN 08/13/2019        15:11      PROTEUS MIRABILIS ESBL  For susceptibility see order #[id number]      Narrative:       Aerobic and anaerobic    Blood culture x two cultures. Draw prior to antibiotics. [[id number]]  (Abnormal)  (Susceptibility) Collected:  08/12/19 1623    Order Status:  Completed Specimen:  Blood Updated:  08/15/19 1114     Blood Culture, Routine Gram stain demarcus bottle: Gram negative rods      Results called to and read back by: Vasu Cerda RN 08/13/2019        15:12      PROTEUS MIRABILIS ESBL    Narrative:       Aerobic and anaerobic    Blood culture (site 1) [[id number]]     Order Status:  Canceled Specimen:  Blood           Imaging Reviewed:  Plain film of the foot, chest x-ray  Arterial Doppler  FINDINGS:  There are no elevated peak systolic velocities in the visualized arteries of the right or left lower extremity suggesting hemodynamically significant narrowing.  There are low velocities with peak systolic velocity right common femoral artery 46 centimeters/second left common femoral artery 60 centimeters/second which may be related to cardiac output.  Waveform is primarily multiphasic.  There is no flow in the posterior tibial artery and virtually no flow detected in the right peroneal artery.  There is plaque seen fairly diffusely throughout the visualized  arteries of the right and left lower extremities.  Waveform is multiphasic throughout most of the lower extremities.      Impression       Findings consistent with small vessel disease with no flow seen in the right posterior tibial artery and minimal flow in the right peroneal artery.       Cardiology:    ASSESSMENT & PLAN     Gangrene left heel with Proteus ESBL bacteremia/sepsis 08/12, Neg BCx 08/14  Status post wide excision of gangrenous tissue left heel   -- BKA vs AKA tomorrow?    Cellulitis left leg, both bacterial and venous stasis  Bilateral venous stasis.   PVD ( CTA = small vessel disease with no flow seen in the right PTA and minimal flow in the right peroneal artery)  ESRD  DM with vascular disease  Memory loss     Rec:    Continue meropenem (d5)  Contact isolation  - going to OR tomorrow

## 2019-08-18 NOTE — PLAN OF CARE
Problem: Adult Inpatient Plan of Care  Goal: Plan of Care Review  Outcome: Ongoing (interventions implemented as appropriate)  POC updated and explained. Understanding verbalized. Pt refused most meds this shift. Pt frequently stated he just wants to die. MD spoke with family. Pt still full code. Made as comfortable as possible. Bed in low, locked position, call light in reach.

## 2019-08-18 NOTE — ASSESSMENT & PLAN NOTE
This patient does have evidence of infective focus-diabetic ulcer left heel   Gm neg bacteremia --ESBL Proteus s/merrem -continue per ID;   My overall impression is severe sepsis .-improving   Antibiotics given-   Antibiotics (From admission, onward)    Start     Stop Route Frequency Ordered    08/13/19 2300  meropenem-0.9% sodium chloride 500 mg/50 mL IVPB      -- IV Every 12 hours (non-standard times) 08/13/19 1231    08/12/19 2245  mupirocin 2 % ointment      08/17 2059 Nasl 2 times daily 08/12/19 2143          Latest lactate reviewed, they are-  No results for input(s): LACTATE in the last 72 hours.    Organ dysfunction indicated by Acute kidney injury and Acute respiratory failure  Source- heel  Ruled out  pneumonia - procalcitonin--neg   Source control Achieved by- iv abx, diuresis-debridement /wound care  Of left heel /ID , Podiatry, ortho, vascular surgery following    Heel xray-neg  CT foot -possible septic arthritis [facility] vascular surg consulted -for CTAortogram w runoff today then determine bka vs aka .

## 2019-08-19 NOTE — ASSESSMENT & PLAN NOTE
Patient's FSGs are controlled on current hypoglycemics.   Last A1c reviewed-   Lab Results   Component Value Date    HGBA1C 6.1 (H) 08/12/2019     Most recent fingerstick glucose reviewed-   Recent Labs   Lab 08/18/19  2028 08/19/19  0636 08/19/19  1124 08/19/19  1204   POCTGLUCOSE 84 63* 54* 112*     Current correctional scale  Low  Maintain anti-hyperglycemic dose as follows-   Antihyperglycemics (From admission, onward)    Start     Stop Route Frequency Ordered    08/12/19 2100  insulin detemir U-100 pen 10 Units      -- SubQ Nightly 08/12/19 1735    08/12/19 1743  insulin aspart U-100 pen 0-5 Units      -- SubQ Before meals & nightly PRN 08/12/19 1735

## 2019-08-19 NOTE — ASSESSMENT & PLAN NOTE
Contributing Nutrition Diagnosis  Obesity Stage 1    Related to (etiology):   Hx. Of excessive energy intake/ edema    Signs and Symptoms (as evidenced by):   1) BMI > 30 kg/m2    Interventions/Recommendations (treatment strategy):  1) Meet not exceed kcal needs    Nutrition Diagnosis Status:   Continues

## 2019-08-19 NOTE — ASSESSMENT & PLAN NOTE
Contributing Nutrition Diagnosis  Altered nutrition related lab values    Related to (etiology):   Altered absorption of nutrients and inconsistent carbohydrate intake    Signs and Symptoms (as evidenced by):   Elevated glucose, non-healing wounds    Interventions/Recommendations (treatment strategy):  1) DM 2000 kcal diet     Nutrition Diagnosis Status:   Continues

## 2019-08-19 NOTE — OR NURSING
Surgery cancelled today, by Dr Montero. Dr Cortez will do AKA 8/20 at 1200. Dr Montero spoke with wife via phone.Report called to [facility].

## 2019-08-19 NOTE — PT/OT/SLP PROGRESS
Occupational Therapy      Patient Name:  Frank Zayas   MRN:  [id number]    Patient not seen today secondary to Patient unwilling to participate. OT educated pt on benefits of activity and exercise to increase his strength and endurance. Pt continued to refuse therapy. Pt may have L BKA surgery later today.Will follow-up when pt is appropriate for therapy.    RUPINDER Lundy  8/19/2019

## 2019-08-19 NOTE — PROGRESS NOTES
[facility] Medicine  Progress Note    Patient Name: Frank Zayas  MRN: [id number]  Patient Class: IP- Inpatient   Admission Date: 8/12/2019  Length of Stay: 7 days  Attending Physician: Breanne Parrish MD  Primary Care Provider: Mikhail Shields MD        Subjective:     Principal Problem:Severe sepsis        HPI:  Frank Zayas is a 79 y.o. male with COPD who presents to the ED with ongoing SOB greater than baseline. Per EMS his home health nurse noticed worsening SOB when going to re-wrap his legs, which are being treated for ulcer. The patient denies any chest pain, fever, chills or fever, N/V/D, or any other symptoms at this time.  He was stable in the ED with oxygen at 2 L. he is noted to have an elevated BNP and bilateral pulmonary edema on chest x-ray.  He was given Lasix 60 mg IV in the ED and Nephrology was notified of his pulmonary edema. His usual dialysis days are Tuesday Thursday and Saturday.  He was also sent by a home health nurse because of malodorous drainage to his heel and erythema in his left lower extremity that had not been near the previous dressing change.  His left heel was drain and very malodorous.  No gangrene is noted.  There skin sloughing off of his left heel as noted in the photos.       Hx of CHF, HTN, hyperlipidemia, and MI.  PSHx of coronary stents, CABG, and cardiac sugery. No known drug allergies    Overview/Hospital Course:  Frank Zayas is a 79 y.o. Male ESRD/ htn/DM  admitted with severe sepsis 2/2 cellulitis/infected left heel diabetic ulcer -initiated on vanc/merrem. CT with prob septic arthritis   Podiatry consutled -surgical debridement to muscle on 8/13. Cultures taken  Blood cultures neg. Wound  PROTEUS MIRABILIS ESBL -put in isolation   No other significant isolate so Vanco dc . And cefipime changed to merrem.      ID consulted; with podiatry recommend aka vs bka so vascular/orthopedic surg consulted--wound care done.    CTaortogram /runoff ordered  for 8/17 then HD   HTN, /Dm remained stable  Sacral decub/skin excoriations /ext hemorrhoid noted on admit-wound care consulted.              Interval History:  Patient seen and examined.  Plan for BKA today.  Patient denies new complaints.  Remains a little confused.    Review of Systems   Constitutional: Positive for activity change. Negative for chills and fever.   Respiratory: Negative for cough and shortness of breath.    Gastrointestinal: Negative for abdominal distention and abdominal pain.   Genitourinary: Negative for difficulty urinating and dysuria.   Musculoskeletal: Positive for arthralgias, joint swelling and myalgias. Negative for back pain.   Skin: Positive for wound.   Psychiatric/Behavioral: Positive for confusion. Negative for agitation.     Objective:     Vital Signs (Most Recent):  Temp: 98.2 °F (36.8 °C) (08/19/19 1223)  Pulse: (!) 116 (08/19/19 1223)  Resp: 16 (08/19/19 1223)  BP: (!) 106/57 (08/19/19 1223)  SpO2: 99 % (08/19/19 1223) Vital Signs (24h Range):  Temp:  [97 °F (36.1 °C)-98.2 °F (36.8 °C)] 98.2 °F (36.8 °C)  Pulse:  [] 116  Resp:  [16-18] 16  SpO2:  [94 %-99 %] 99 %  BP: ()/(57-64) 106/57     Weight: 111.3 kg (245 lb 6 oz)  Body mass index is 31.5 kg/m².    Intake/Output Summary (Last 24 hours) at 8/19/2019 1238  Last data filed at 8/18/2019 1500  Gross per 24 hour   Intake 240 ml   Output --   Net 240 ml      Physical Exam   Constitutional: He is oriented to person, place, and time. He appears well-developed and well-nourished. No distress.   HENT:   Head: Normocephalic and atraumatic.   Right Ear: External ear normal.   Left Ear: External ear normal.   Nose: Nose normal.   Mouth/Throat: Oropharynx is clear and moist. No oropharyngeal exudate.   Eyes: Conjunctivae and EOM are normal. Right eye exhibits no discharge. Left eye exhibits no discharge. No scleral icterus.   Neck: Neck supple. No thyromegaly present.   Cardiovascular: Normal  rate and regular rhythm. Exam reveals distant heart sounds. Exam reveals no gallop and no friction rub.   No murmur heard.  Pulmonary/Chest: Effort normal and breath sounds normal. No respiratory distress. He has no wheezes. He has no rales.   Abdominal: Soft. Bowel sounds are normal. He exhibits no distension. There is no tenderness. There is no guarding.   Musculoskeletal: Normal range of motion. He exhibits no edema or tenderness.   Superficial ulcer left mid medial calf with small amt yellow exudate drainage   Bandage not taken down of left lower extremity wound today   Lymphadenopathy:     He has no cervical adenopathy.   Neurological: He is alert and oriented to person, place, and time. No cranial nerve deficit. Coordination normal.   Skin: Skin is warm and dry. Capillary refill takes less than 2 seconds. No rash noted. No erythema.   Psychiatric: He has a normal mood and affect. His behavior is normal. Judgment and thought content normal.   Nursing note and vitals reviewed.      Significant Labs: All pertinent labs within the past 24 hours have been reviewed.    Significant Imaging: I have reviewed and interpreted all pertinent imaging results/findings within the past 24 hours.      Assessment/Plan:      * Severe sepsis  This patient does have evidence of infective focus-diabetic ulcer left heel   Gm neg bacteremia --ESBL Proteus s/merrem -continue per ID;   My overall impression is severe sepsis .-improving   Antibiotics given-   Antibiotics (From admission, onward)    Start     Stop Route Frequency Ordered    08/13/19 2300  meropenem-0.9% sodium chloride 500 mg/50 mL IVPB      -- IV Every 12 hours (non-standard times) 08/13/19 1231    08/12/19 2245  mupirocin 2 % ointment      08/17 2059 Nasl 2 times daily 08/12/19 2143          Latest lactate reviewed, they are-  No results for input(s): LACTATE in the last 72 hours.    Organ dysfunction indicated by Acute kidney injury and Acute respiratory  failure  Source- heel  Ruled out  pneumonia - procalcitonin--neg   Source control Achieved by- iv abx, diuresis-debridement /wound care  Of left heel /ID , Podiatry, ortho, vascular surgery following    Heel xray-neg  CT foot -possible septic arthritis [facility] vascular surg consulted     To get BKA today        ESBL (extended spectrum beta-lactamase) producing bacteria infection    See sepsis     Thrombocytopenia  Acute  Sepsis vs HIT -check HIT ab .  Monitor for bleeding       Diabetic ulcer of left heel associated with diabetes mellitus due to underlying condition, with muscle involvement without evidence of necrosis  See sepsis       Gangrene of left foot  Acute-2/2 gangrenous ulcer left heel.  Podiatry, Orthopedics, wound care, vascular surgery, and Infectious Disease consulted.  See sepsis     External hemorrhoid    Noted on exam use external hemorrhoid treatment.    Sacral decubitus ulcer, stage III  See photos -consult wound care. No evidence infection       Cellulitis of left lower extremity  Acute--severe -with large left heel ulcer.  -see sepsis     ESRD (end stage renal disease) on dialysis   Nephrology consult to follow.  Dialysis days Tuesday Thursday Saturday and will dose medications for renal failure.  And maintain blood pressure control.    Hyperkalemia  Resolved    Morbid (severe) obesity with alveolar hypoventilation  Pulse monitoring CPAP at night      Skin ulcer of left lower leg, limited to breakdown of skin  Chronic, severe -see sepsis/cellulitis ; podiatry /wound care consult        BRANDEN (obstructive sleep apnea)  bipap while in hospital unless pt brings own cpap machine. Reportedly non-compliant at home  Refuses bipap in hospital -consider haldol at hs for agitation and continue to encourage use to avoid respiratory compromise   Add IS/Acapella       Moderate malnutrition  Nutrition consulted. Body mass index is 32.21 kg/m².. Encourage maximal PO intake. Diet supplementation ordered per  nutrition approval. Will encourage PO and monitor closely for weight changes.        Anemia, chronic disease  Patient's anemia is currently controlled. S/p 0 units of PRBCs. Current CBC reviewed-   Lab Results   Component Value Date    HGB 11.5 (L) 08/17/2019    HCT 40.8 08/17/2019     Monitor serial CBC and transfuse if patient becomes hemodynamically unstable, symptomatic or H/H drops below 7/21.         A-fib    Atrial Fibrillation controlled currently with Beta Blocker. [id number] score 5. Anticoagulation indicated currently. Anticoagulation held prior to debrideemt  8/12 -- Hep 5000/8 hrs -continue vte   Possible history of GI bleed.    Cardiology consulted for preoperative clearance    COPD (chronic obstructive pulmonary disease)  Patient's COPD is controlled currently. Continue scheduled inhalers prn nebulizer; /oxygen  and monitor respiratory status closely.   HD to remove volume from lung.  IS and acapella for pulm toilet       Coronary artery disease  Chronic and stable.  Continue home medications with aspirin and beta-blocker.  .monitor on telemetry monitor signs for acute coronary syndrome.          DM type 2 with diabetic mixed hyperlipidemia  Patient's FSGs are controlled on current hypoglycemics.   Last A1c reviewed-   Lab Results   Component Value Date    HGBA1C 6.1 (H) 08/12/2019     Most recent fingerstick glucose reviewed-   Recent Labs   Lab 08/18/19  2028 08/19/19  0636 08/19/19  1124 08/19/19  1204   POCTGLUCOSE 84 63* 54* 112*     Current correctional scale  Low  Maintain anti-hyperglycemic dose as follows-   Antihyperglycemics (From admission, onward)    Start     Stop Route Frequency Ordered    08/12/19 2100  insulin detemir U-100 pen 10 Units      -- SubQ Nightly 08/12/19 1735    08/12/19 1743  insulin aspart U-100 pen 0-5 Units      -- SubQ Before meals & nightly PRN 08/12/19 1735            Type 2 diabetes mellitus with kidney complication, with long-term current use of insulin    See  dm    HTN (hypertension)  Chronic and stable.  Hypertension Medications             metoprolol tartrate (LOPRESSOR) 25 MG tablet Take 1 tablet (25 mg total) by mouth every Mon, Wed, Fri, Sun.    nitroGLYCERIN (NITROSTAT) 0.4 MG SL tablet Place 0.4 mg under the tongue every 5 (five) minutes as needed for Chest pain. Seek medical help if pain is not relieved by the third dose.      Hospital Medications             furosemide injection 40 mg (Completed) Inject 4 mLs (40 mg total) into the vein ED 1 Time.    metoprolol tartrate (LOPRESSOR) tablet 25 mg Take 1 tablet (25 mg total) by mouth every Mon, Wed, Fri, Sun.    nitroGLYCERIN SL tablet 0.4 mg Place 1 tablet (0.4 mg total) under the tongue every 5 (five) minutes as needed for Chest pain.            Malignant neoplasm of anterior wall of urinary bladder  Chronic, stable; monitoring I/o . PVR as needed         VTE Risk Mitigation (From admission, onward)        Ordered     heparin (porcine) injection 5,000 Units  As needed (PRN)      08/13/19 1435     heparin (porcine) injection 5,000 Units  Every 8 hours      08/12/19 1735     IP VTE HIGH RISK PATIENT  Once      08/12/19 1735                Breanne Parrish MD  Department of Hospital Medicine   [facility]

## 2019-08-19 NOTE — PROGRESS NOTES
CTA reviewed.  As anticipated there is primarily runoff/small vessel disease.   Based on exam and imaging expect there is sufficient blood flow to heal a BKA

## 2019-08-19 NOTE — SUBJECTIVE & OBJECTIVE
Interval History:  Patient seen and examined.  Plan for BKA today.  Patient denies new complaints.  Remains a little confused.    Review of Systems   Constitutional: Positive for activity change. Negative for chills and fever.   Respiratory: Negative for cough and shortness of breath.    Gastrointestinal: Negative for abdominal distention and abdominal pain.   Genitourinary: Negative for difficulty urinating and dysuria.   Musculoskeletal: Positive for arthralgias, joint swelling and myalgias. Negative for back pain.   Skin: Positive for wound.   Psychiatric/Behavioral: Positive for confusion. Negative for agitation.     Objective:     Vital Signs (Most Recent):  Temp: 98.2 °F (36.8 °C) (08/19/19 1223)  Pulse: (!) 116 (08/19/19 1223)  Resp: 16 (08/19/19 1223)  BP: (!) 106/57 (08/19/19 1223)  SpO2: 99 % (08/19/19 1223) Vital Signs (24h Range):  Temp:  [97 °F (36.1 °C)-98.2 °F (36.8 °C)] 98.2 °F (36.8 °C)  Pulse:  [] 116  Resp:  [16-18] 16  SpO2:  [94 %-99 %] 99 %  BP: ()/(57-64) 106/57     Weight: 111.3 kg (245 lb 6 oz)  Body mass index is 31.5 kg/m².    Intake/Output Summary (Last 24 hours) at 8/19/2019 1238  Last data filed at 8/18/2019 1500  Gross per 24 hour   Intake 240 ml   Output --   Net 240 ml      Physical Exam   Constitutional: He is oriented to person, place, and time. He appears well-developed and well-nourished. No distress.   HENT:   Head: Normocephalic and atraumatic.   Right Ear: External ear normal.   Left Ear: External ear normal.   Nose: Nose normal.   Mouth/Throat: Oropharynx is clear and moist. No oropharyngeal exudate.   Eyes: Conjunctivae and EOM are normal. Right eye exhibits no discharge. Left eye exhibits no discharge. No scleral icterus.   Neck: Neck supple. No thyromegaly present.   Cardiovascular: Normal rate and regular rhythm. Exam reveals distant heart sounds. Exam reveals no gallop and no friction rub.   No murmur heard.  Pulmonary/Chest: Effort normal and breath sounds  normal. No respiratory distress. He has no wheezes. He has no rales.   Abdominal: Soft. Bowel sounds are normal. He exhibits no distension. There is no tenderness. There is no guarding.   Musculoskeletal: Normal range of motion. He exhibits no edema or tenderness.   Superficial ulcer left mid medial calf with small amt yellow exudate drainage   Bandage not taken down of left lower extremity wound today   Lymphadenopathy:     He has no cervical adenopathy.   Neurological: He is alert and oriented to person, place, and time. No cranial nerve deficit. Coordination normal.   Skin: Skin is warm and dry. Capillary refill takes less than 2 seconds. No rash noted. No erythema.   Psychiatric: He has a normal mood and affect. His behavior is normal. Judgment and thought content normal.   Nursing note and vitals reviewed.      Significant Labs: All pertinent labs within the past 24 hours have been reviewed.    Significant Imaging: I have reviewed and interpreted all pertinent imaging results/findings within the past 24 hours.

## 2019-08-19 NOTE — PLAN OF CARE
08/19/19 0651   Patient Assessment/Suction   Level of Consciousness (AVPU) alert   All Lung Fields Breath Sounds diminished   PRE-TX-O2   O2 Device (Oxygen Therapy) nasal cannula   $ Is the patient on Low Flow Oxygen? Yes   Flow (L/min) 2   Oxygen Concentration (%) 28   SpO2 95 %   Pulse Oximetry Type Intermittent   $ Pulse Oximetry - Multiple Charge Pulse Oximetry - Multiple   Pulse 102   Resp 18   Aerosol Therapy   $ Aerosol Therapy Charges Aerosol Treatment   Respiratory Treatment Status (SVN) given   Treatment Route (SVN) mask   Patient Position (SVN) HOB elevated   Post Treatment Assessment (SVN) breath sounds unchanged   Signs of Intolerance (SVN) none   Breath Sounds Post-Respiratory Treatment   Throughout All Fields Post-Treatment All Fields   Throughout All Fields Post-Treatment no change   Post-treatment Heart Rate (beats/min) 98   Post-treatment Resp Rate (breaths/min) 18   Incentive Spirometer   $ Incentive Spirometer Charges refused   Vibratory PEP Therapy   $ Vibratory PEP Charges   (refused)   Ready to Wean/Extubation Screen   FIO2<=50 (chart decimal) 0.28

## 2019-08-19 NOTE — PLAN OF CARE
Problem: Adult Inpatient Plan of Care  Goal: Plan of Care Review  POC updated and reviewed. Understanding verbalized. Pt blood sugar low throughout shift. D50 given. Blood sugar reassessed. Pt surgery rescheduled to tomorrow at noon. Family aware. Plan for AKA of left side. Bed low and locked, call light in reach. All needs met.

## 2019-08-19 NOTE — PROGRESS NOTES
Pt seen with family    Leg red to tibial tubercle    Unfortunately a poor candidate for BKA    Family wants most definitve procedure so will proceed with AKA     Dr Cortez will perform AKA and is aware

## 2019-08-19 NOTE — ASSESSMENT & PLAN NOTE
Contributing Nutrition Diagnosis  Moderate chronic illness related malnutrition    Related to (etiology):   Decreased appetite and increased kcal/protein needs d/t HD    Signs and Symptoms (as evidenced by):   1) Stage 2 PI buttocks + heel PI  2) Trace edema in legs  3) PO intakes < 75% EEN x > 1 month    Interventions/Recommendations (treatment strategy):  1) Nutrition supplement therapy    Nutrition Diagnosis Status:   Continues

## 2019-08-19 NOTE — ASSESSMENT & PLAN NOTE
This patient does have evidence of infective focus-diabetic ulcer left heel   Gm neg bacteremia --ESBL Proteus s/merrem -continue per ID;   My overall impression is severe sepsis .-improving   Antibiotics given-   Antibiotics (From admission, onward)    Start     Stop Route Frequency Ordered    08/13/19 2300  meropenem-0.9% sodium chloride 500 mg/50 mL IVPB      -- IV Every 12 hours (non-standard times) 08/13/19 1231    08/12/19 2245  mupirocin 2 % ointment      08/17 2059 Nasl 2 times daily 08/12/19 2143          Latest lactate reviewed, they are-  No results for input(s): LACTATE in the last 72 hours.    Organ dysfunction indicated by Acute kidney injury and Acute respiratory failure  Source- heel  Ruled out  pneumonia - procalcitonin--neg   Source control Achieved by- iv abx, diuresis-debridement /wound care  Of left heel /ID , Podiatry, ortho, vascular surgery following    Heel xray-neg  CT foot -possible septic arthritis 1st [facility] joint vascular surg consulted     To get BKA today

## 2019-08-19 NOTE — PLAN OF CARE
Problem: Adult Inpatient Plan of Care  Goal: Plan of Care Review  Outcome: Ongoing (interventions implemented as appropriate)  Pt laying supine in bed. Position adjusted with assistance throughout shift. Pt states that he has 4/10 pain in his legs and butt. Treated with PRN Hydrocodone. Quiet environment and relaxation facilitated. Bed in lowest position with wheels locked, and side rails up x2. Safety maintained. Plan of care reviewed. Call light in reach. No further requests at this time. Will continue to monitor.

## 2019-08-19 NOTE — PLAN OF CARE
Problem: Physical Therapy Goal  Goal: Physical Therapy Goal  Goals to be met by: 2019     Patient will increase functional independence with mobility by performin. Supine to sit with Modified [place]  2. Sit to supine with Modified [place]  3. Sit to stand transfer with Minimal Assistance  4. Bed to chair transfer with Minimal Assistance using Rolling Walker  5. Lower extremity exercise program x10-20 reps per handout, with independence     Outcome: Ongoing (interventions implemented as appropriate)  Pt assisted with re-positioning in the bed.  Pt declined further PT tx today.

## 2019-08-19 NOTE — PT/OT/SLP PROGRESS
"Physical Therapy Treatment    Patient Name:  Frank Zayas   MRN:  [id number]    Recommendations:     Discharge Recommendations:  LTACH (long-term acute care hospital)   Discharge Equipment Recommendations: walker, rolling   Barriers to discharge: Pt requires increased level of assistance for all functional mobility    Assessment:     Frank Zayas is a 79 y.o. male admitted with a medical diagnosis of Severe sepsis.  He presents with the following impairments/functional limitations:  weakness, impaired endurance, impaired balance, decreased lower extremity function, decreased upper extremity function, impaired cardiopulmonary response to activity, decreased safety awareness, impaired self care skills, pain, impaired skin, impaired functional mobilty.  PT tx limited today due to pain.  Pt only willing to allow PT to assist him with repositioning in the bed.  Continue to recommend LTACH.     Rehab Prognosis: Fair; patient would benefit from acute skilled PT services to address these deficits and reach maximum level of function.    Recent Surgery: Procedure(s) (LRB):  DEBRIDEMENT, FOOT (Left) 6 Days Post-Op    Plan:     During this hospitalization, patient to be seen 6 x/week to address the identified rehab impairments via therapeutic activities, therapeutic exercises and progress toward the following goals:    · Plan of Care Expires:  08/29/19    Subjective     Chief Complaint: pain  Patient/Family Comments/goals: none stated  Pain/Comfort:  · Pain Rating 1: 10/10  · Location - Orientation 1: generalized  · Pain Addressed 1: Reposition, Distraction, Cessation of Activity      Objective:     Communicated with nurse Srinivasan prior to session.  Patient found HOB elevated with peripheral IV, telemetry, oxygen, bed alarm upon PT entry to room. Pt requesting something to drink, however pt reminded that he his NPO for possible surgery.  Pt then begins stating "help me, help me" but will not elaborate with what he needs " help with.      General Precautions: Standard, aspiration, fall, NPO, diabetic(skin)   Orthopedic Precautions:N/A   Braces: N/A     Functional Mobility:  · Bed Mobility:     · Rolling Left:  minimum assistance  · Rolling Right: minimum assistance   Pt would only allow PT to assist him with repositioning in the bed with placement of pillows under hips, and behind his back.      AM-PAC 6 CLICK MOBILITY  Turning over in bed (including adjusting bedclothes, sheets and blankets)?: 2  Sitting down on and standing up from a chair with arms (e.g., wheelchair, bedside commode, etc.): 1  Moving from lying on back to sitting on the side of the bed?: 2  Moving to and from a bed to a chair (including a wheelchair)?: 1  Need to walk in hospital room?: 1  Climbing 3-5 steps with a railing?: 1  Basic Mobility Total Score: 8       Therapeutic Activities and Exercises:   Pt adamantly refused any type of exercises despite PT's education on importance to keep what strength he does have.     Patient left HOB elevated with all lines intact, call button in reach and PCT's present..    GOALS:   Multidisciplinary Problems     Physical Therapy Goals        Problem: Physical Therapy Goal    Goal Priority Disciplines Outcome Goal Variances Interventions   Physical Therapy Goal     PT, PT/OT Ongoing (interventions implemented as appropriate)     Description:  Goals to be met by: 2019     Patient will increase functional independence with mobility by performin. Supine to sit with Modified [place]  2. Sit to supine with Modified [place]  3. Sit to stand transfer with Minimal Assistance  4. Bed to chair transfer with Minimal Assistance using Rolling Walker  5. Lower extremity exercise program x10-20 reps per handout, with independence                      Time Tracking:     PT Received On: 19  PT Start Time: 921     PT Stop Time: 930  PT Total Time (min): 9 min     Billable Minutes: Therapeutic Activity 9    Treatment  Type: Treatment  PT/PTA: PT           Norma Salomon, PT  08/19/2019

## 2019-08-19 NOTE — PROGRESS NOTES
"[facility]  Adult Nutrition  Progress Note    SUMMARY    Intervention: Held Nutrition education- protein/fluid needs, potassium, phosphorus, sodium, carbohydrate modified diet, nutrition supplement therapy-commercial beverage      Recommendation:  1) Continue DM 2000 kcal renal diet s/p AKA  2) Continue Boost glucose Control BID + Glenn BID   3) Weigh pt weekly   4) Nutrition education verbally given, renal diet review at f/u     Goals: 1) PO intakes > 50% of meals and supplements a f/u   Nutrition Goal Status: Not met  Communication of RD Recs: (POC, sticky note, secure chat MD )     Reason for Assessment     Reason For Assessment: consult  Diagnosis: (severe sepsis)  Relevant Medical History: ESRD on HD, CAD, COPD, HTN, CHF, DM  Interdisciplinary Rounds: attended     General Information Comments: 78 y/o male admitted with cellulitis of heel PI, also with stage 2 PI on buttocks. Pt states he sometimes skips meals. Stated wt only, ? wt loss. NFPE done 8/13/19, no wasting seen. At time of visit pt falling asleep on me, pt did state that he was eatin 3 small meals recently, decreased appetite.  8/19/19 Not much documentation in flowsheets concerning PO intake, looks like 0-25%? Pt states he cannot remember how he has been eating. NPO x 1 day for sx. AKA rescheduled.      Nutrition Discharge Planning: To be determined- Renal, DM 2000 kcal + Boost Glucose control BID     Nutrition Risk Screen     Nutrition Risk Screen: other (see comments)     Nutrition/Diet History     Patient Reported Diet/Restrictions/Preferences: diabetic diet  Spiritual, Cultural Beliefs, Spiritism Practices, Values that Affect Care: no  Food Allergies: NKFA  Factors Affecting Nutritional Intake: decreased appetite     Anthropometrics    Height Method: Stated  Height: 6' 2"  Height (inches): 74 in  Weight Method: Stated  Weight: 111.5 8/19/19 ( 2 kg wt loss)  Weight (lb): 245 lb  Ideal Body Weight (IBW), Male: 190 lb  % Ideal " Body Weight, Male (lb): 133.67 lb  BMI (Calculated): 32.7 Admisison  BMI Grade: 30 - 34.9- obesity - grade I  Usual Body Weight (UBW), k kg(19)     Lab/Procedures/Meds     Pertinent Labs Reviewed: reviewed  BMP  Lab Results   Component Value Date     2019    K 4.9 2019     2019    CO2 24 2019    BUN 29 (H) 2019    CREATININE 5.2 (H) 2019    CALCIUM 9.0 2019    ANIONGAP 12 2019    ESTGFRAFRICA 11 (A) 2019    EGFRNONAA 10 (A) 2019     Lab Results   Component Value Date    CALCIUM 9.0 2019    PHOS 4.7 (H) 2019     Lab Results   Component Value Date    ALBUMIN 3.2 (L) 08/15/2019     POCT Glucose   Date Value Ref Range Status   2019 90 70 - 110 mg/dL Final   2019 112 (H) 70 - 110 mg/dL Final   2019 54 (L) 70 - 110 mg/dL Final   2019 63 (L) 70 - 110 mg/dL Final   2019 84 70 - 110 mg/dL Final   2019 77 70 - 110 mg/dL Final   2019 82 70 - 110 mg/dL Final   2019 95 70 - 110 mg/dL Final   2019 69 (L) 70 - 110 mg/dL Final   2019 66 (L) 70 - 110 mg/dL Final   2019 90 70 - 110 mg/dL Final   2019 77 70 - 110 mg/dL Final   2019 86 70 - 110 mg/dL Final   2019 127 (H) 70 - 110 mg/dL Final      Pertinent Medications Reviewed: reviewed  Pertinent Medications Comments: insulin, senna, zofran, phenergan     Estimated/Assessed Needs   Admission  Weight Used For Calorie Calculations: 120 kg (264 lb 8.8 oz)(last measured weight 19- kcal/kg overestimating as pt obese)  Energy Calorie Requirements (kcal): [place] Jeor x ( no activity factor obesity) = 1990 kcal  [facility]  Protein Requirements: 1.2-1.3 g protein/kg ( multiple PIs + HD) = 134-145 g protein  Weight Used For Protein Calculations: 112 kg (246 lb 14.6 oz)(KDOQI adjusted BW)  Fluid Requirements (mL): Urine Output + 1000 ml  Estimated Fluid Requirement Method: other (see  comments)  CHO Requirement: 45-50%        Nutrition Prescription Ordered     Current Diet Order: DM 2000 kcal, renal     Evaluation of Received Nutrient/Fluid Intake     Energy Calories Required: not meeting needs  Protein Required: not meeting needs  Fluid Required: not meeting needs  Tolerance: tolerating  % Intake of Estimated Energy Needs: 25%  % Meal Intake: 25%? Since 8/15     Nutrition Risk     Level of Risk/Frequency of Follow-up: moderate-high 2 x weekly      Assessment and Plan     Morbid (severe) obesity with alveolar hypoventilation  Contributing Nutrition Diagnosis  Obesity Stage 1    Related to (etiology):   Hx. Of excessive energy intake/ edema    Signs and Symptoms (as evidenced by):   1) BMI > 30 kg/m2    Interventions/Recommendations (treatment strategy):  1) Meet not exceed kcal needs    Nutrition Diagnosis Status:   Continues      Moderate malnutrition  Contributing Nutrition Diagnosis  Moderate chronic illness related malnutrition    Related to (etiology):   Decreased appetite and increased kcal/protein needs d/t HD    Signs and Symptoms (as evidenced by):   1) Stage 2 PI buttocks + heel PI  2) Trace edema in legs  3) PO intakes < 75% EEN x > 1 month    Interventions/Recommendations (treatment strategy):  1) Nutrition supplement therapy    Nutrition Diagnosis Status:   Continues      Type 2 diabetes mellitus with kidney complication, with long-term current use of insulin  Contributing Nutrition Diagnosis  Altered nutrition related lab values    Related to (etiology):   Altered absorption of nutrients and inconsistent carbohydrate intake    Signs and Symptoms (as evidenced by):   Elevated glucose, non-healing wounds    Interventions/Recommendations (treatment strategy):  1) DM 2000 kcal diet     Nutrition Diagnosis Status:   Continues     Monitor and Evaluation     Food and Nutrient Intake: energy intake, food and beverage intake  Food and Nutrient Adminstration: diet order  Anthropometric  Measurements: weight  Biochemical Data, Medical Tests and Procedures: electrolyte and renal panel, glucose/endocrine profile  Nutrition-Focused Physical Findings: overall appearance, skin      Malnutrition Assessment  Skin (Micronutrient): (Derek = 12, stage 2 PI buttocks + Heel PI)  Teeth (Micronutrient): (WDL)   Micronutrient Evaluation: suspected deficiency  Micronutrient Evaluation Comments: replace zinc, Phos, and vitamin C if needed, check B12 and folate and replete if needed- would benefit from longterm renal MVI   Energy Intake (Malnutrition): less than 75% for greater than or equal to 1 month           Edema (Fluid Accumulation): none noted  Subcutaneous Fat Loss (Final Summary): well nourished  Muscle Loss Evaluation (Final Summary): well nourished       Nutrition Follow-Up     RD Follow-up?: Yes

## 2019-08-19 NOTE — PROGRESS NOTES
INPATIENT NEPHROLOGY PROGRESS NOTE  [facility] NEPHROLOGY    Patient Name: Frank Zayas  Date: 08/19/2019    Reason for consultation: ESRD    Chief Complaint:   Chief Complaint   Patient presents with    Shortness of Breath     with exertion, does not use O2 at home. arrives to ED with 2 L NC    Foot Pain     left heel since being discharged from [facility]. bigger since Friday       History of Present Illness:  Frank Zayas is a 79 y.o. male with COPD and ESRD on HD TTS who presents to the ED with ongoing SOB greater than baseline. Per EMS his home health nurse noticed worsening SOB when going to re-wrap his legs, which are being treated for ulcer. The patient denies any chest pain, fever, chills or fever, N/V/D, or any other symptoms at this time.  He was stable in the ED with oxygen at 2 L. he is noted to have an elevated BNP and bilateral pulmonary edema on chest x-ray.  He was given Lasix 60 mg IV in the ED. He was also sent by a home health nurse because of malodorous drainage to his heel and erythema in his left lower extremity that had not been near the previous dressing change.  His left heel was drain and very malodorous.  No gangrene is noted. We are consulted for dialysis.     Foot xray- no osteo, + cellulitis, + soft tissue ulcer of the heel    8/17  Electrolytes stable.  Due for HD today.  Sleeping, NAD noted.  8/18  Per notes, pt was unable to tolerate fluid being pulled with HD yesterday d/t hypotension despite albumin.  +100cc.  K+ better at 4.6.  Pt is sleeping quietly.  He continues to be hypotensive today.  D/w bedside nurse, family has requested meeting with primary team today, reports that comfort measures were suggested by MD.  Nurse states that pt keeps telling the staff he wants to die.    8/19  Nausea this am.  A little confused.  Going for amputation today    · Interval History/Subjective:    - VSS, on 3L NC    · Review of Systems: unable to obtain due to AMS/sleeping    Plan of  Care:    Assessment:  ESRD  Chronic hypotension/Diastolic CHF/Acute pulm edema  Hyperkalemia  SHPT  Anemia of CKD  Foot ulcer/cellulitis     Plan:     - Continue routine HD TTS.  - BP is stable. Continue midodrine pre HD. VS is incrementally improved. Continue UF 2-3L with albumin.   - HyperK is resolved with 2K bath and 180 dialyzer. Continue renal diet with Nepro shakes only.  - Ordered daily phos- pending. Continue sevelamer 3 tabs with meals.  - Hb is stable. Continue EPO 5K with HD.   - Dose antbx for dialysis.    Thank you for allowing us to participate in this patient's care. We will continue to follow.    Medications:  No current facility-administered medications on file prior to encounter.      Current Outpatient Medications on File Prior to Encounter   Medication Sig Dispense Refill    FLUoxetine 20 MG capsule Take 20 mg by mouth every morning.       metoprolol tartrate (LOPRESSOR) 25 MG tablet Take 1 tablet (25 mg total) by mouth every Mon, Wed, Fri, Sun. 16 tablet 11    nitroGLYCERIN (NITROSTAT) 0.4 MG SL tablet Place 0.4 mg under the tongue every 5 (five) minutes as needed for Chest pain. Seek medical help if pain is not relieved by the third dose.      pantoprazole (PROTONIX) 40 MG tablet Take 1 tablet (40 mg total) by mouth 2 (two) times daily before meals. 60 tablet 11    sevelamer carbonate (RENVELA) 800 mg Tab Take 2 tablets (1,600 mg total) by mouth 3 (three) times daily with meals. 180 tablet 11     Scheduled Meds:   albuterol-ipratropium  3 mL Nebulization Q6H    alfuzosin  10 mg Oral Nightly    aspirin  81 mg Oral Daily    collagenase   Topical (Top) Every other day    epoetin quinton-epbx  50 Units/kg Intravenous Every Tues, Thurs, Sat    FLUoxetine  20 mg Oral QAM    heparin (porcine)  5,000 Units Subcutaneous Q8H    hydrocortisone   Rectal BID    insulin detemir U-100  10 Units Subcutaneous QHS    meropenem (MERREM) IVPB  500 mg Intravenous Q12H    metoprolol tartrate  25 mg  Oral Every Mon, Wed, Fri, Sun    midodrine  5 mg Oral Every Tues, Thurs, Sat    pantoprazole  40 mg Oral BID AC    rOPINIRole  2 mg Oral QHS    senna-docusate 8.6-50 mg  1 tablet Oral BID    sevelamer carbonate  2,400 mg Oral TID WM     Continuous Infusions:  PRN Meds:.sodium chloride 0.9%, acetaminophen, albumin human 25%, dextrose 50%, dextrose 50%, glucagon (human recombinant), glucose, glucose, heparin (porcine), HYDROcodone-acetaminophen, insulin aspart U-100, lidocaine HCL 2%, miconazole nitrate 2%, morphine, nitroGLYCERIN, ondansetron, promethazine (PHENERGAN) IVPB, sodium chloride 0.9%    Allergies:  Lidocaine and Statins-hmg-coa reductase inhibitors    Vital Signs:  Temp Readings from Last 3 Encounters:   08/19/19 97 °F (36.1 °C) (Oral)   07/01/19 96.4 °F (35.8 °C) (Oral)   06/17/19 98.7 °F (37.1 °C) (Oral)       Pulse Readings from Last 3 Encounters:   08/19/19 (!) 111   07/01/19 94   06/17/19 69       BP Readings from Last 3 Encounters:   08/19/19 101/62   07/01/19 108/72   06/17/19 (!) 105/56       Weight:  Wt Readings from Last 3 Encounters:   08/19/19 111.3 kg (245 lb 6 oz)   06/30/19 127 kg (279 lb 15.8 oz)   06/17/19 120.4 kg (265 lb 6.9 oz)       Physical Exam:  Constitutional: nad, sleeping, chronically ill, appears stated age  Heart: rrr, no m/r/g, wwp, + edema  Lungs: ant ausc clear, no w/r/r/c, no lb  Abdomen: s/nt/nd, +BS    Results:  Lab Results   Component Value Date     08/19/2019    K 4.9 08/19/2019     08/19/2019    CO2 24 08/19/2019    BUN 29 (H) 08/19/2019    CREATININE 5.2 (H) 08/19/2019    CALCIUM 9.0 08/19/2019    ANIONGAP 12 08/19/2019    ESTGFRAFRICA 11 (A) 08/19/2019    EGFRNONAA 10 (A) 08/19/2019       Lab Results   Component Value Date    CALCIUM 9.0 08/19/2019    PHOS 4.7 (H) 08/19/2019       Recent Labs   Lab 08/19/19  0441   WBC 12.89*   RBC 3.82*   HGB 11.4*   HCT 40.0   *   *   MCH 29.8   MCHC 28.5*       I have personally reviewed pertinent  radiological imaging and reports.    [facility] [place]  [address] [address]  [phone] (p)  [phone] (f)

## 2019-08-20 NOTE — ASSESSMENT & PLAN NOTE
This patient does have evidence of infective focus-diabetic ulcer left heel   Gm neg bacteremia --ESBL Proteus s/merrem -continue per ID;   My overall impression is severe sepsis .-improving   Antibiotics given-   Antibiotics (From admission, onward)    Start     Stop Route Frequency Ordered    08/13/19 2300  meropenem-0.9% sodium chloride 500 mg/50 mL IVPB      -- IV Every 12 hours (non-standard times) 08/13/19 1231    08/12/19 2245  mupirocin 2 % ointment      08/17 2059 Nasl 2 times daily 08/12/19 2143          Latest lactate reviewed, they are-  No results for input(s): LACTATE in the last 72 hours.    Organ dysfunction indicated by Acute kidney injury and Acute respiratory failure  Source- heel  Ruled out  pneumonia - procalcitonin--neg   Source control Achieved by- iv abx, diuresis-debridement /wound care  Of left heel /ID , Podiatry, ortho, vascular surgery following    Heel xray-neg  CT foot -possible septic arthritis [facility] surg consulted     To get AKA today.  BKA was aborted yesterday secondary to surgeon thinking wound would heal poorly.

## 2019-08-20 NOTE — PLAN OF CARE
Pt transported back to room 222. VSS, pain med given and pt now sleeping/resting comfortably, dressing to L leg cdi and elevated on pillows, IV fluids infusing. Ok per Dr. Whitley to transfer the pt back to the floor. Tele monitor [id number] placed back on pt. Report given to DERRICK Nettles.

## 2019-08-20 NOTE — TRANSFER OF CARE
"Anesthesia Transfer of Care Note    Patient: Frank Zayas    Procedure(s) Performed: Procedure(s) (LRB):  AMPUTATION, ABOVE KNEE (Left)    Patient location: PACU    Anesthesia Type: general    Transport from OR: Transported from OR on 2-3 L/min O2 by NC with adequate spontaneous ventilation    Post pain: adequate analgesia    Post assessment: no apparent anesthetic complications and tolerated procedure well    Post vital signs: stable    Level of consciousness: awake, alert and oriented    Nausea/Vomiting: no nausea/vomiting    Complications: none    Transfer of care protocol was followed      Last vitals:   Visit Vitals  BP (!) 101/59   Pulse 76   Temp 36.4 °C (97.6 °F) (Skin)   Resp 20   Ht 6' 2" (1.88 m)   Wt 112.8 kg (248 lb 10.9 oz)   SpO2 97%   BMI 31.93 kg/m²     "

## 2019-08-20 NOTE — ANESTHESIA PREPROCEDURE EVALUATION
08/20/2019  Frank Zayas is a 79 y.o., male.    Pre-op Assessment    I have reviewed the Patient Summary Reports.     I have reviewed the Nursing Notes.   I have reviewed the Medications.     Review of Systems  Anesthesia Hx:  No problems with previous Anesthesia  Denies Family Hx of Anesthesia complications.   Denies Personal Hx of Anesthesia complications.   Social:  Former Smoker      Hematology/Oncology:         -- Anemia: Hematology Comments: Sepsis/gangrene  --  Cancer in past history:  Other (see Oncology comments)   Cardiovascular:   Hypertension Past MI CAD   CHF ECG has been reviewed. Ef 25%, Volume contracted post dialysis,   Pulmonary:   COPD, moderate Sleep Apnea    Renal/:   Chronic Renal Disease, Dialysis, ESRD    Hepatic/GI:   Liver Disease,    Neurological:   Peripheral Neuropathy    Endocrine:   Diabetes, poorly controlled, type 2        Physical Exam  General:  Obesity    Airway/Jaw/Neck:  Airway Findings: Mouth Opening: Normal Tongue: Normal  General Airway Assessment: Adult, Good  Mallampati: II  Improves to II with phonation.  TM Distance: 4-6 cm  Jaw/Neck Findings:  Neck ROM: Extension Decreased, Mild  Neck Findings:  Girth Increased      Dental:  Dental Findings: In tact   Chest/Lungs:  Chest/Lungs Findings: Clear to auscultation, Normal Respiratory Rate, Decreased Breath Sounds Bilateral     Heart/Vascular:  Heart Findings: Rate: Normal  Rhythm: Regular Rhythm  Sounds: Normal  Heart murmur: negative       Mental Status:  Mental Status Findings:  Lethargic, Confusion         Anesthesia Plan  Type of Anesthesia, risks & benefits discussed:  Anesthesia Type:  general  Patient's Preference:   Intra-op Monitoring Plan: standard ASA monitors and arterial line  Intra-op Monitoring Plan Comments:   Post Op Pain Control Plan:   Post Op Pain Control Plan Comments:   Induction:   IV  Beta  Blocker:  Patient is on a Beta-Blocker and has received one dose within the past 24 hours (No further documentation required).       Informed Consent: Patient understands risks and agrees with Anesthesia plan.  Questions answered. Anesthesia consent signed with patient.  ASA Score: 4     Day of Surgery Review of History & Physical:    H&P update referred to the surgeon.     Anesthesia Plan Notes: Possible respiratory support and ICU care discussed with patient and wife who wish to proceed.        Ready For Surgery From Anesthesia Perspective.

## 2019-08-20 NOTE — PROGRESS NOTES
[facility] Medicine  Progress Note    Patient Name: Frank Zayas  MRN: [id number]  Patient Class: IP- Inpatient   Admission Date: 8/12/2019  Length of Stay: 8 days  Attending Physician: Breanne Parrish MD  Primary Care Provider: Mikhail Shields MD        Subjective:     Principal Problem:Severe sepsis        HPI:  Frank Zayas is a 79 y.o. male with COPD who presents to the ED with ongoing SOB greater than baseline. Per EMS his home health nurse noticed worsening SOB when going to re-wrap his legs, which are being treated for ulcer. The patient denies any chest pain, fever, chills or fever, N/V/D, or any other symptoms at this time.  He was stable in the ED with oxygen at 2 L. he is noted to have an elevated BNP and bilateral pulmonary edema on chest x-ray.  He was given Lasix 60 mg IV in the ED and Nephrology was notified of his pulmonary edema. His usual dialysis days are Tuesday Thursday and Saturday.  He was also sent by a home health nurse because of malodorous drainage to his heel and erythema in his left lower extremity that had not been near the previous dressing change.  His left heel was drain and very malodorous.  No gangrene is noted.  There skin sloughing off of his left heel as noted in the photos.       Hx of CHF, HTN, hyperlipidemia, and MI.  PSHx of coronary stents, CABG, and cardiac sugery. No known drug allergies    Overview/Hospital Course:  Frank Zayas is a 79 y.o. Male ESRD/ htn/DM  admitted with severe sepsis 2/2 cellulitis/infected left heel diabetic ulcer -initiated on vanc/merrem. CT with prob septic arthritis   Podiatry consutled -surgical debridement to muscle on 8/13. Cultures taken  Blood cultures neg. Wound  PROTEUS MIRABILIS ESBL -put in isolation   No other significant isolate so Vanco dc . And cefipime changed to merrem.      ID consulted; with podiatry recommend aka vs bka so vascular/orthopedic surg consulted--wound care done.    CTaortogram /runoff ordered  for 8/17 then HD   HTN, /Dm remained stable  Sacral decub/skin excoriations /ext hemorrhoid noted on admit-wound care consulted.              Interval History:  Patient seen and examined.  Agreeable to let me examine him today but states he does not want to talk to me this morning.    Review of Systems   Unable to perform ROS: Other (Patient declines answering questions.)     Objective:     Vital Signs (Most Recent):  Temp: 97.6 °F (36.4 °C) (08/20/19 1235)  Pulse: 76 (08/20/19 1235)  Resp: 20 (08/20/19 1235)  BP: (!) 101/59 (08/20/19 1245)  SpO2: 97 % (08/20/19 1200) Vital Signs (24h Range):  Temp:  [96.7 °F (35.9 °C)-97.7 °F (36.5 °C)] 97.6 °F (36.4 °C)  Pulse:  [64-94] 76  Resp:  [17-20] 20  SpO2:  [95 %-100 %] 97 %  BP: ()/(50-70) 101/59     Weight: 112.8 kg (248 lb 10.9 oz)  Body mass index is 31.93 kg/m².    Intake/Output Summary (Last 24 hours) at 8/20/2019 1351  Last data filed at 8/20/2019 1224  Gross per 24 hour   Intake 550 ml   Output 1270 ml   Net -720 ml      Physical Exam   Constitutional: He appears well-developed and well-nourished. No distress.   HENT:   Head: Normocephalic and atraumatic.   Right Ear: External ear normal.   Left Ear: External ear normal.   Nose: Nose normal.   Mouth/Throat: Oropharynx is clear and moist. No oropharyngeal exudate.   Eyes: Conjunctivae and EOM are normal. Right eye exhibits no discharge. Left eye exhibits no discharge. No scleral icterus.   Neck: Neck supple. No thyromegaly present.   Cardiovascular: Normal rate and regular rhythm. Exam reveals distant heart sounds. Exam reveals no gallop and no friction rub.   No murmur heard.  Pulmonary/Chest: Effort normal and breath sounds normal. No respiratory distress. He has no wheezes. He has no rales.   Abdominal: Soft. Bowel sounds are normal. He exhibits no distension. There is no tenderness. There is no guarding.   Musculoskeletal: Normal range of motion. He exhibits no edema or  tenderness.   Superficial ulcer left mid medial calf with small amt yellow exudate drainage   Bandage not taken down of left lower extremity wound today   Lymphadenopathy:     He has no cervical adenopathy.   Neurological: He is alert. No cranial nerve deficit. Coordination normal.   Skin: Skin is warm and dry. Capillary refill takes less than 2 seconds. No rash noted. No erythema.   Psychiatric:   Poor mood   Nursing note and vitals reviewed.      Significant Labs: All pertinent labs within the past 24 hours have been reviewed.    Significant Imaging: I have reviewed and interpreted all pertinent imaging results/findings within the past 24 hours.      Assessment/Plan:      * Severe sepsis  This patient does have evidence of infective focus-diabetic ulcer left heel   Gm neg bacteremia --ESBL Proteus s/merrem -continue per ID;   My overall impression is severe sepsis .-improving   Antibiotics given-   Antibiotics (From admission, onward)    Start     Stop Route Frequency Ordered    08/13/19 2300  meropenem-0.9% sodium chloride 500 mg/50 mL IVPB      -- IV Every 12 hours (non-standard times) 08/13/19 1231    08/12/19 2245  mupirocin 2 % ointment      08/17 2059 Nasl 2 times daily 08/12/19 2143          Latest lactate reviewed, they are-  No results for input(s): LACTATE in the last 72 hours.    Organ dysfunction indicated by Acute kidney injury and Acute respiratory failure  Source- heel  Ruled out  pneumonia - procalcitonin--neg   Source control Achieved by- iv abx, diuresis-debridement /wound care  Of left heel /ID , Podiatry, ortho, vascular surgery following    Heel xray-neg  CT foot -possible septic arthritis [facility] consulted     To get AKA today.  BKA was aborted yesterday secondary to surgeon thinking wound would heal poorly.        ESBL (extended spectrum beta-lactamase) producing bacteria infection    See sepsis     Thrombocytopenia  Acute  Sepsis vs HIT.  Hit antibody negative.  Ruled  out..  Monitor for bleeding       Diabetic ulcer of left heel associated with diabetes mellitus due to underlying condition, with muscle involvement without evidence of necrosis  See sepsis       Gangrene of left foot  Acute-2/2 gangrenous ulcer left heel.  Podiatry, Orthopedics, wound care, vascular surgery, and Infectious Disease consulted.  See sepsis     External hemorrhoid    Noted on exam use external hemorrhoid treatment.    Sacral decubitus ulcer, stage III  See photos -consult wound care. No evidence infection       Cellulitis of left lower extremity  Acute--severe -with large left heel ulcer.  -see sepsis     ESRD (end stage renal disease) on dialysis   Nephrology consult to follow.  Dialysis days Tuesday Thursday Saturday and will dose medications for renal failure.  And maintain blood pressure control.    Hyperkalemia  Getting dialysis today.    Morbid (severe) obesity with alveolar hypoventilation  Pulse monitoring CPAP at night      Skin ulcer of left lower leg, limited to breakdown of skin  Chronic, severe -see sepsis/cellulitis ; podiatry /wound care consult        BRANDEN (obstructive sleep apnea)  bipap while in hospital unless pt brings own cpap machine. Reportedly non-compliant at home  Refuses bipap in hospital -consider haldol at hs for agitation and continue to encourage use to avoid respiratory compromise   Add IS/Acapella       Moderate malnutrition  Nutrition consulted. Body mass index is 32.21 kg/m².. Encourage maximal PO intake. Diet supplementation ordered per nutrition approval. Will encourage PO and monitor closely for weight changes.        Anemia, chronic disease  Patient's anemia is currently controlled. S/p 0 units of PRBCs. Current CBC reviewed-   Lab Results   Component Value Date    HGB 11.5 (L) 08/17/2019    HCT 40.8 08/17/2019     Monitor serial CBC and transfuse if patient becomes hemodynamically unstable, symptomatic or H/H drops below 7/21.         A-fib    Atrial Fibrillation  controlled currently with Beta Blocker. [id number] score 5. Anticoagulation indicated currently. Anticoagulation held prior to debrideemt  8/12 -- Hep 5000/8 hrs -continue vte   Possible history of GI bleed.    Cardiology consulted for preoperative clearance    COPD (chronic obstructive pulmonary disease)  Patient's COPD is controlled currently. Continue scheduled inhalers prn nebulizer; /oxygen  and monitor respiratory status closely.   HD to remove volume from lung.  IS and acapella for pulm toilet       Coronary artery disease  Chronic and stable.  Continue home medications with aspirin and beta-blocker.  .monitor on telemetry monitor signs for acute coronary syndrome.          DM type 2 with diabetic mixed hyperlipidemia  Patient's FSGs are controlled on current hypoglycemics.   Last A1c reviewed-   Lab Results   Component Value Date    HGBA1C 6.1 (H) 08/12/2019     Most recent fingerstick glucose reviewed-   Recent Labs   Lab 08/19/19  1705 08/19/19  2338 08/20/19  0740   POCTGLUCOSE 89 78 94     Current correctional scale  Low  Maintain anti-hyperglycemic dose as follows-   Antihyperglycemics (From admission, onward)    Start     Stop Route Frequency Ordered    08/12/19 2100  insulin detemir U-100 pen 10 Units      -- SubQ Nightly 08/12/19 1735    08/12/19 1743  insulin aspart U-100 pen 0-5 Units      -- SubQ Before meals & nightly PRN 08/12/19 1735            Type 2 diabetes mellitus with kidney complication, with long-term current use of insulin    See dm    HTN (hypertension)  Chronic and stable.  Hypertension Medications             metoprolol tartrate (LOPRESSOR) 25 MG tablet Take 1 tablet (25 mg total) by mouth every Mon, Wed, Fri, Sun.    nitroGLYCERIN (NITROSTAT) 0.4 MG SL tablet Place 0.4 mg under the tongue every 5 (five) minutes as needed for Chest pain. Seek medical help if pain is not relieved by the third dose.      Hospital Medications             furosemide injection 40 mg (Completed) Inject 4  mLs (40 mg total) into the vein ED 1 Time.    metoprolol tartrate (LOPRESSOR) tablet 25 mg Take 1 tablet (25 mg total) by mouth every Mon, Wed, Fri, Sun.    nitroGLYCERIN SL tablet 0.4 mg Place 1 tablet (0.4 mg total) under the tongue every 5 (five) minutes as needed for Chest pain.            Malignant neoplasm of anterior wall of urinary bladder  Chronic, stable; monitoring I/o . PVR as needed         VTE Risk Mitigation (From admission, onward)        Ordered     heparin (porcine) injection 5,000 Units  As needed (PRN)      08/13/19 1435     heparin (porcine) injection 5,000 Units  Every 8 hours      08/12/19 1735     IP VTE HIGH RISK PATIENT  Once      08/12/19 1735                Breanne Parrish MD  Department of Hospital Medicine   [facility]

## 2019-08-20 NOTE — ANESTHESIA POSTPROCEDURE EVALUATION
Anesthesia Post Evaluation    Patient: Frank Zayas    Procedure(s) Performed: Procedure(s) (LRB):  AMPUTATION, ABOVE KNEE (Left)    Final Anesthesia Type: general  Patient location during evaluation: PACU  Patient participation: Yes- Able to Participate  Level of consciousness: awake and alert  Post-procedure vital signs: reviewed and stable  Pain management: adequate  Airway patency: patent  PONV status at discharge: No PONV  Anesthetic complications: no      Cardiovascular status: blood pressure returned to baseline  Respiratory status: unassisted  Hydration status: euvolemic  Follow-up not needed.          Vitals Value Taken Time   /69 8/20/2019  5:42 PM   Temp 36.4 °C (97.6 °F) 8/20/2019 12:35 PM   Pulse 90 8/20/2019  5:43 PM   Resp 36 8/20/2019  5:43 PM   SpO2 100 % 8/20/2019  5:43 PM   Vitals shown include unvalidated device data.      No case tracking events are documented in the log.      Pain/Giorgi Score: Pain Rating Prior to Med Admin: 10 (8/20/2019  5:42 PM)  Pain Rating Post Med Admin: 6 (8/19/2019  7:19 PM)

## 2019-08-20 NOTE — CONSULTS
[facility]  General Surgery  Consult Note    Consults  Subjective:     Chief Complaint/Reason for Admission: SOB, Large nonhealing left foot wound with gangrene    History of Present Illness:  Patient is 79-year-old male with multiple chronic medical issues including COPD, congestive heart failure, coronary artery disease, end-stage renal disease on dialysis, peripheral vascular disease with large left foot wound. He was brought to the emergency department due to increasing shortness of breath.  He was found to be septic with wet gangrene of the left foot wound. Blood cultures positive for ESBL.  He had emergent debridement of the left foot wound.  Vascular evaluation was performed and above knee amputation was recommended. The Popliteal has 70 % stenosis. The ant tibial and posterior tibial are occluded just beyond the origin. The peroneal has good flow only to the mid calf with trickle flow to the ankle. Surgery consulted for AKA.  Patient is awake.  He has been afebrile over the last 24 hr.  Hgb 11. He states he may have a left lower extremity vascular stent but does not completely remember.  He reports no hardware involving the femur.  Past surgical history of CABG, coronary stents.        No current facility-administered medications on file prior to encounter.      Current Outpatient Medications on File Prior to Encounter   Medication Sig    FLUoxetine 20 MG capsule Take 20 mg by mouth every morning.     metoprolol tartrate (LOPRESSOR) 25 MG tablet Take 1 tablet (25 mg total) by mouth every Mon, Wed, Fri, Sun.    nitroGLYCERIN (NITROSTAT) 0.4 MG SL tablet Place 0.4 mg under the tongue every 5 (five) minutes as needed for Chest pain. Seek medical help if pain is not relieved by the third dose.    pantoprazole (PROTONIX) 40 MG tablet Take 1 tablet (40 mg total) by mouth 2 (two) times daily before meals.    sevelamer carbonate (RENVELA) 800 mg Tab Take 2 tablets (1,600 mg total) by mouth 3  (three) times daily with meals.       Review of patient's allergies indicates:   Allergen Reactions    Lidocaine Nausea Only     NOVACAINE --  Severe nausea    Statins-hmg-coa reductase inhibitors Anxiety       Past Medical History:   Diagnosis Date    Anticoagulant long-term use     Arthritis     Bladder cancer     Blood transfusion     Cardiac arrest 5/31/2019    CHF (congestive heart failure)     COPD (chronic obstructive pulmonary disease)     Coronary artery disease     Diabetes mellitus     ESRD (end stage renal disease) on dialysis 2019    Gout     Hemorrhagic shock 6/1/2019    [place] (hard of hearing)     HAS BILAT AIDS BUT DOES NOT WEAR    Hyperlipidemia     Hypertension     Myocardial infarction     NSTEMI (non-ST elevated myocardial infarction)     RLS (restless legs syndrome)     Sleep apnea     NO MACHINE    Wears glasses      Past Surgical History:   Procedure Laterality Date    CARDIAC SURGERY      CABG X 2 1997    CATARACT EXTRACTION, BILATERAL      CHOLECYSTECTOMY      COLONOSCOPY      CORONARY ARTERY BYPASS GRAFT      x 2    coronary stents      CYSTOSCOPY      CYSTOSCOPY N/A 4/8/2019    Performed by Kaylee Vasquez MD at [facility] OR    DEBRIDEMENT, FOOT Left 8/13/2019    Performed by Robert Wilson DPM at [facility] OR    EGD (ESOPHAGOGASTRODUODENOSCOPY) N/A 6/4/2019    Performed [facility] Clint [facility]z [facility] (2ND FLR)    EXCISION-BLADDER TUMOR-TRANSURETHRAL (TURBT) N/A 9/8/2014    Performed by Kaylee Vasquez MD at [facility] OR    EXCISION-BLADDER TUMOR-TRANSURETHRAL (TURBT) N/A 11/18/2013    Performed by Kaylee Vasquez MD at [facility] OR    EYE SURGERY Bilateral     CATARACT    Insertion,catheter,tunneled N/A 5/16/2019    Performed by Wade Rodriguez MD at [facility] OR    TURBT (TRANSURETHRAL RESECTION OF BLADDER TUMOR) N/A 4/8/2019    Performed by Kaylee Vasquez MD at [facility] OR    VASECTOMY       Family History     Problem Relation (Age of Onset)    Cancer Father, Sister,  Brother    Heart disease Father        Tobacco Use    Smoking status: Former Smoker     Packs/day: 0.25     Years: 20.00     Pack years: 5.00     Types: Cigars     Last attempt to quit: 1982     Years since quittin.5    Smokeless tobacco: Never Used   Substance and Sexual Activity    Alcohol use: No    Drug use: No    Sexual activity: Not on file     Review of Systems   Constitutional: Positive for activity change, appetite change and fatigue. Negative for chills, fever and unexpected weight change.   HENT: Negative for hearing loss, rhinorrhea, sore throat and voice change.    Eyes: Negative for photophobia and visual disturbance.   Respiratory: Positive for shortness of breath. Negative for cough, choking, chest tightness and wheezing.    Cardiovascular: Negative for chest pain, palpitations and leg swelling.   Gastrointestinal: Negative for abdominal pain, blood in stool, constipation, diarrhea, nausea and vomiting.   Endocrine: Negative for cold intolerance, heat intolerance, polydipsia and polyuria.   Musculoskeletal: Positive for arthralgias and myalgias. Negative for back pain, joint swelling and neck stiffness.   Skin: Positive for color change and wound. Negative for pallor and rash.   Neurological: Negative for dizziness, seizures, syncope and headaches.   Hematological: Negative for adenopathy. Does not bruise/bleed easily.   Psychiatric/Behavioral: Negative for agitation, behavioral problems and confusion.     Objective:     Vital Signs (Most Recent):  Temp: 97.6 °F (36.4 °C) (19 1324)  Pulse: 94 (19)  Resp: 17 (19)  BP: (!) 96/52 (19)  SpO2: 100 % (19) Vital Signs (24h Range):  Temp:  [97 °F (36.1 °C)-98.2 °F (36.8 °C)] 97.6 °F (36.4 °C)  Pulse:  [] 94  Resp:  [16-20] 17  SpO2:  [94 %-100 %] 100 %  BP: ()/(52-91) 96/     Weight: 111.3 kg (245 lb 6 oz)  Body mass index is 31.5 kg/m².    No intake or output data in the 24 hours  ending 08/19/19 2110    Physical Exam   Constitutional: He is oriented to person, place, and time. He appears well-developed and well-nourished.  Non-toxic appearance. No distress.   HENT:   Head: Normocephalic and atraumatic. Head is without abrasion and without laceration.   Right Ear: External ear normal.   Left Ear: External ear normal.   Nose: Nose normal.   Mouth/Throat: Oropharynx is clear and moist.   Eyes: Pupils are equal, round, and reactive to light. EOM are normal.   Neck: Trachea normal. Neck supple. No tracheal deviation and normal range of motion present.   Cardiovascular: Normal rate and regular rhythm.   Pulmonary/Chest: Effort normal. No accessory muscle usage. No tachypnea. No respiratory distress.   On supplemental O2 via NC   Abdominal: Soft. Normal appearance and bowel sounds are normal. He exhibits no distension and no mass. There is no tenderness. There is no rigidity, no rebound and no guarding.   Lymphadenopathy:     He has no cervical adenopathy.     He has no axillary adenopathy.        Right: No inguinal adenopathy present.        Left: No inguinal adenopathy present.   Neurological: He is alert and oriented to person, place, and time. Coordination and gait normal.   Skin: Skin is warm and intact.   Left heal has been debrided, the left foot is cellulitic and ischemic.  The erythema extends to the mid calf and shin    Psychiatric: He has a normal mood and affect. His speech is normal and behavior is normal.       Significant Diagnostics:  I have reviewed all pertinent imaging results/findings within the past 24 hours.    Assessment/Plan:     Active Diagnoses:    Diagnosis Date Noted POA    PRINCIPAL PROBLEM:  Severe sepsis [A41.9, R65.20] 06/25/2019 Yes    ESBL (extended spectrum beta-lactamase) producing bacteria infection [A49.9, Z16.12] 08/16/2019 Yes    Thrombocytopenia [D69.6] 08/15/2019 No    Diabetic ulcer of left heel associated with diabetes mellitus due to underlying  condition, with muscle involvement without evidence of necrosis [E08.621, L97.425] 08/14/2019 Yes    Gangrene of left foot [I96] 08/13/2019 Yes    Cellulitis of left lower extremity [L03.116] 08/12/2019 Yes    External hemorrhoid [K64.4] 08/12/2019 Yes    ESRD (end stage renal disease) on dialysis [N18.6, Z99.2] 05/31/2019 Not Applicable    Morbid (severe) obesity with alveolar hypoventilation [E66.2] 05/08/2019 Yes    Hyperkalemia [E87.5] 05/08/2019 Yes    Skin ulcer of left lower leg, limited to breakdown of skin [L97.921] 05/05/2019 Yes    HTN (hypertension) [I10] 05/05/2019 Yes    Type 2 diabetes mellitus with kidney complication, with long-term current use of insulin [E11.29, Z79.4] 05/05/2019 Not Applicable    DM type 2 with diabetic mixed hyperlipidemia [E11.69, E78.2] 05/05/2019 Yes    Coronary artery disease [I25.10] 05/05/2019 Yes    COPD (chronic obstructive pulmonary disease) [J44.9] 05/05/2019 Yes    A-fib [I48.91] 05/05/2019 Yes    Anemia, chronic disease [D63.8] 05/05/2019 Yes    BRANDEN (obstructive sleep apnea) [G47.33] 05/05/2019 Yes    Moderate malnutrition [E44.0] 05/05/2019 Yes    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 He is on Meropenum. AKA has been recommended and I also agree.  He has ischemic, cellulitic skin coming up to the mid shin.  Will plan on AKA tomorrow 8/20.  All risks and benefits of the operation were discussed with the patient. Type and screen has been ordered.      Thank you for your consult. I will follow-up with patient. Please contact us if you have any additional questions.    Biju Vines III, MD  General Surgery  [facility]

## 2019-08-20 NOTE — PROGRESS NOTES
Progress Note  Infectious Disease    Follow-up For:  Gangrene of the foot    SUBJECTIVE:   Interval history/ROS:   8/14:  Status post life-saving debridement of gangrene of the foot.  Blood cultures have Proteus.  Discussed with Podiatry, foot is not viable and he will need amputation.  Afebrile.  8/15:  Cultures of blood and wound have Proteus, ESBL positive.  He feels poorly, poor appetite, hurts all over.  Does not recall conversations about loss of limb..  He vascular opinion not yet recorded.  Ultrasound of the lower extremity shows more obstruction in the right leg and suggestion of small vessel disease in the left leg.  He is not short of breath, having chest pain, nauseous, having abdominal pain.  08/16 afebrile.  Hurting all over  CTA - aortogram with runoff has been ordered for tomorrow by vascular surgeon.   08/17 : no new complaints.  Afebrile. Does not like ot offer much info Will go for monae then HD today  08/18 He is confused, Nurses are patiently reassuring him over and over again. Even in the midst of confusion it is clear that he is concerned he will loose his leg tomorrow. Denies complaints  08/19/2019.  Afebrile.  T-max 98.2°.  Confused on and off.  Minimally nauseous.  3 L O2 supplementation.  Surgery was canceled today.  It will happen tomorrow on 08/28. It will be  AKA    Antibiotics (From admission, onward)    Start     Stop Route Frequency Ordered    08/13/19 2300  meropenem-0.9% sodium chloride 500 mg/50 mL IVPB      -- IV Every 12 hours (non-standard times) 08/13/19 1231    08/12/19 2245  mupirocin 2 % ointment      08/17 2059 Nasl 2 times daily 08/12/19 2143        Antifungals (From admission, onward)    Start     Stop Route Frequency Ordered    08/14/19 0000  miconazole nitrate 2% ointment      -- Top Daily PRN 08/13/19 1045        Antivirals (From admission, onward)    None            EXAM & DIAGNOSTICS REVIEWED:   Vitals:     Temp:  [97 °F (36.1 °C)-98.2 °F (36.8 °C)]   Temp: 97.6 °F  (36.4 °C) (08/19/19 1324)  Pulse: 94 (08/19/19 1324)  Resp: 17 (08/19/19 1324)  BP: (!) 125/91 (08/19/19 1324)  SpO2: (!) 94 % (08/19/19 1324)  No intake or output data in the 24 hours ending 08/19/19 1901    General:         In NAD. Confused  On and off and anxious/ concerned  Eyes:              anicteric, extraocular movements intact  ENT:                No ulcers, exudates, thrush, nares patent,   Neck:              supple,  Lungs:            Bibasilar crackles are improved  Heart:              iRRR, no gallop/murmur/rub noted  Abd:                Soft, obese, NT, ND, normal BS, no masses or organomegaly appreciated.  :                 Musc:              Joints without effusion, swelling, .   Skin:               Bilateral venous stasis cellulitis  Wound:                 Neuro:  nonfocal, memory impaired, able to communicate, moves all of his extremities  Psych:              saddened by the impending amputation  Lymphatic:        Extrem:          dressing is not disturbed  VAD:               Right sided tunneled dialysis catheter  Isolation:  will be on contact    Lines/Tubes/Drains:    General Labs reviewed:  Recent Labs   Lab 08/19/19  0441   WBC 12.89*   RBC 3.82*   HGB 11.4*   HCT 40.0   *   *   MCH 29.8   MCHC 28.5*     Recent Labs   Lab 08/19/19  0441   CALCIUM 9.0      K 4.9   CO2 24      BUN 29*   CREATININE 5.2*       Micro:   Microbiology Results (last 7 days)     Procedure Component Value Units Date/Time    Blood culture [[id number]] Collected:  08/14/19 0602    Order Status:  Completed Specimen:  Blood Updated:  08/19/19 1212     Blood Culture, Routine No growth after 5 days.    Culture, Anaerobe [[id number]] Collected:  08/13/19 1830    Order Status:  Completed Specimen:  Abscess from Foot, Left Updated:  08/19/19 1005     Anaerobic Culture No anaerobes isolated    Aerobic culture [[id number]]  (Abnormal)  (Susceptibility) Collected:  08/13/19 1830    Order Status:  Completed  Specimen:  Abscess from Foot, Left Updated:  08/16/19 1159     Aerobic Bacterial Culture PROTEUS MIRABILIS ESBL  Many  No other significant isolate      Blood culture x two cultures. Draw prior to antibiotics. [[id number]]  (Abnormal) Collected:  08/12/19 1556    Order Status:  Completed Specimen:  Blood Updated:  08/15/19 1114     Blood Culture, Routine Gram stain demarcus bottle: Gram negative rods      Results called to and read back by: Vasu Cerda RN 08/13/2019        15:11      PROTEUS MIRABILIS ESBL  For susceptibility see order #[id number]      Narrative:       Aerobic and anaerobic    Blood culture x two cultures. Draw prior to antibiotics. [[id number]]  (Abnormal)  (Susceptibility) Collected:  08/12/19 1623    Order Status:  Completed Specimen:  Blood Updated:  08/15/19 1114     Blood Culture, Routine Gram stain demarcus bottle: Gram negative rods      Results called to and read back by: Vasu Cerda RN 08/13/2019        15:12      PROTEUS MIRABILIS ESBL    Narrative:       Aerobic and anaerobic          Imaging Reviewed:  Plain film of the foot, chest x-ray  Arterial Doppler  FINDINGS:  There are no elevated peak systolic velocities in the visualized arteries of the right or left lower extremity suggesting hemodynamically significant narrowing.  There are low velocities with peak systolic velocity right common femoral artery 46 centimeters/second left common femoral artery 60 centimeters/second which may be related to cardiac output.  Waveform is primarily multiphasic.  There is no flow in the posterior tibial artery and virtually no flow detected in the right peroneal artery.  There is plaque seen fairly diffusely throughout the visualized arteries of the right and left lower extremities.  Waveform is multiphasic throughout most of the lower extremities.      Impression       Findings consistent with small vessel disease with no flow seen in the right posterior tibial artery and minimal flow in the right  peroneal artery.     08/18/2019    CTA   1. Right leg demonstrates diffuse atherosclerosis with patent flow to the level of the knee and one-vessel runoff to the ankle via the peroneal artery.  2. Left lower leg demonstrates diffuse atherosclerosis with focal 60% stenosis distal left SFA and longer segment stenosis in the left popliteal artery, between 60-70%.  One-vessel runoff to the distal calf via the peroneal artery with trickle flow beyond this at the ankle.  3. Patent aortic branches in the abdomen.  4. Cardiomegaly and coronary artery disease.  5. Right basilar airspace disease which could reflect atelectasis or pneumonia.  Moderate right pleural effusion.  6. Splenomegaly.  7. Small volume ascites.  8. Several right renal lesions which are too high in density for simple cyst.  Consider elective ultrasound to exclude solid lesions.      ASSESSMENT & PLAN     Gangrene left heel with Proteus ESBL bacteremia/sepsis 08/12, Neg BCx 08/14  Status post wide excision of gangrenous tissue left heel   --  AKA tomorrow    Cellulitis left leg, both bacterial and venous stasis  Bilateral venous stasis.   PVD ( CTA = small vessel disease with no flow seen in the right PTA and minimal flow in the right peroneal artery)  ESRD  DM with vascular disease  Memory loss     Rec:    Continue meropenem (d6)  Contact isolation

## 2019-08-20 NOTE — PLAN OF CARE
08/19/19 1958   Patient Assessment/Suction   Level of Consciousness (AVPU) alert   Respiratory Effort Normal;Unlabored   Expansion/Accessory Muscles/Retractions no use of accessory muscles   All Lung Fields Breath Sounds diminished   PRE-TX-O2   O2 Device (Oxygen Therapy) nasal cannula   $ Is the patient on Low Flow Oxygen? Yes   Flow (L/min) 2   Oxygen Concentration (%) 28   SpO2 100 %   Pulse Oximetry Type Intermittent   $ Pulse Oximetry - Multiple Charge Pulse Oximetry - Multiple   Pulse 94   Resp 17   Aerosol Therapy   $ Aerosol Therapy Charges Aerosol Treatment   Respiratory Treatment Status (SVN) given   Treatment Route (SVN) mask;oxygen   Patient Position (SVN) HOB elevated   Post Treatment Assessment (SVN) breath sounds unchanged   Signs of Intolerance (SVN) none   Incentive Spirometer   $ Incentive Spirometer Charges done with encouragement   Administration (IS) instruction provided, follow-up   Number of Repetitions (IS) 5   Level Incentive Spirometer (mL) 400   Patient Tolerance (IS) poor  (pt stated he did not want to do anymore after 5)   Vibratory PEP Therapy   $ Vibratory PEP Charges Aerobika Therapy   $ Vibratory PEP Tech Time Charges 15 min   Type (PEP Therapy) vibratory/oscillatory   Device (PEP Therapy) flutter   Route (PEP Therapy) mouthpiece   Breaths per Cycle (PEP Therapy) 5   Cycles (PEP Therapy) 1   Patient Position (PEP Therapy) HOB elevated   Post Treatment Assessment (PEP) breath sounds unchanged   Signs of Intolerance (PEP Therapy) none   Ready to Wean/Extubation Screen   FIO2<=50 (chart decimal) 0.28   Preset CPAP/BiPAP Settings   Mode Of Delivery Standby  (pt said he does not want to wear bipap tonight )

## 2019-08-20 NOTE — OR NURSING
Pt unable to or refuses to state procedure. Pt consented for left above knee amputation, preop nurse reported pt wife agrees to procedure.

## 2019-08-20 NOTE — PROGRESS NOTES
[facility]  Orthopedics  Progress Note    Patient Name: Frank Zayas  MRN: [id number]  Admission Date: 8/12/2019  Hospital Length of Stay: 8 days  Attending Provider: Breanne Parrish MD  Primary Care Provider: Mikhail Shields MD  Follow-up For: Procedure(s) (LRB):  AMPUTATION, BELOW KNEE (Left)    Post-Operative Day: 1 Day Post-Op  Subjective:     No new subjective & objective note has been filed under this hospital service since the last note was generated.    Assessment/Plan:     * Severe sepsis  Defer to general surgery for AKA      Diabetic ulcer of left heel associated with diabetes mellitus due to underlying condition, with muscle involvement without evidence of necrosis  Waiting for further assessment to determine when to proceed with BKA or if pt would be better served by AKA    Awaiting LLE US results    Scheduled for AV fistula procedure with vascular tomorrow          KASSIE CHRIS  Orthopedics  [facility]

## 2019-08-20 NOTE — PROGRESS NOTES
INPATIENT NEPHROLOGY PROGRESS NOTE  [facility] NEPHROLOGY    Patient Name: Frank Zayas  Date: 08/20/2019    Reason for consultation: ESRD    Chief Complaint:   Chief Complaint   Patient presents with    Shortness of Breath     with exertion, does not use O2 at home. arrives to ED with 2 L NC    Foot Pain     left heel since being discharged from [facility]. bigger since Friday       History of Present Illness:  Frank Zayas is a 79 y.o. male with COPD and ESRD on HD TTS who presents to the ED with ongoing SOB greater than baseline. Per EMS his home health nurse noticed worsening SOB when going to re-wrap his legs, which are being treated for ulcer. The patient denies any chest pain, fever, chills or fever, N/V/D, or any other symptoms at this time.  He was stable in the ED with oxygen at 2 L. he is noted to have an elevated BNP and bilateral pulmonary edema on chest x-ray.  He was given Lasix 60 mg IV in the ED. He was also sent by a home health nurse because of malodorous drainage to his heel and erythema in his left lower extremity that had not been near the previous dressing change.  His left heel was drain and very malodorous.  No gangrene is noted. We are consulted for dialysis.     Foot xray- no osteo, + cellulitis, + soft tissue ulcer of the heel    8/17  Electrolytes stable.  Due for HD today.  Sleeping, NAD noted.  8/18  Per notes, pt was unable to tolerate fluid being pulled with HD yesterday d/t hypotension despite albumin.  +100cc.  K+ better at 4.6.  Pt is sleeping quietly.  He continues to be hypotensive today.  D/w bedside nurse, family has requested meeting with primary team today, reports that comfort measures were suggested by MD.  Nurse states that pt keeps telling the staff he wants to die.    8/19  Nausea this am.  A little confused.  Going for amputation today  8/20  Not in his room     · Interval History/Subjective:    - VSS, on 3L NC    · Review of Systems: unable to obtain due to  AMS/sleeping    Plan of Care:    Assessment:  ESRD  Chronic hypotension/Diastolic CHF/Acute pulm edema  Hyperkalemia  SHPT  Anemia of CKD  Foot ulcer/cellulitis     Plan:     - Continue routine HD TTS.  - BP is stable. Continue midodrine pre HD. VS is incrementally improved. Continue UF 2-3L with albumin.   - HyperK is resolved with 2K bath and 180 dialyzer. Continue renal diet with Nepro shakes only.  - Ordered daily phos- pending. Continue sevelamer 3 tabs with meals.  - Hb is stable. Continue EPO 5K with HD.   - Dose antbx for dialysis.    Thank you for allowing us to participate in this patient's care. We will continue to follow.    Medications:  No current facility-administered medications on file prior to encounter.      Current Outpatient Medications on File Prior to Encounter   Medication Sig Dispense Refill    FLUoxetine 20 MG capsule Take 20 mg by mouth every morning.       metoprolol tartrate (LOPRESSOR) 25 MG tablet Take 1 tablet (25 mg total) by mouth every Mon, Wed, Fri, Sun. 16 tablet 11    nitroGLYCERIN (NITROSTAT) 0.4 MG SL tablet Place 0.4 mg under the tongue every 5 (five) minutes as needed for Chest pain. Seek medical help if pain is not relieved by the third dose.      pantoprazole (PROTONIX) 40 MG tablet Take 1 tablet (40 mg total) by mouth 2 (two) times daily before meals. 60 tablet 11    sevelamer carbonate (RENVELA) 800 mg Tab Take 2 tablets (1,600 mg total) by mouth 3 (three) times daily with meals. 180 tablet 11     Scheduled Meds:   albuterol-ipratropium  3 mL Nebulization Q6H    alfuzosin  10 mg Oral Nightly    aspirin  81 mg Oral Daily    collagenase   Topical (Top) Every other day    epoetin quinton-epbx  50 Units/kg Intravenous Every Tues, Thurs, Sat    FLUoxetine  20 mg Oral QAM    heparin (porcine)  5,000 Units Subcutaneous Q8H    hydrocortisone   Rectal BID    insulin detemir U-100  10 Units Subcutaneous QHS    meropenem (MERREM) IVPB  500 mg Intravenous Q12H     metoprolol tartrate  25 mg Oral Every Mon, Wed, Fri, Sun    midodrine  5 mg Oral Every Tues, Thurs, Sat    pantoprazole  40 mg Oral BID AC    rOPINIRole  2 mg Oral QHS    senna-docusate 8.6-50 mg  1 tablet Oral BID    sevelamer carbonate  2,400 mg Oral TID WM     Continuous Infusions:  PRN Meds:.sodium chloride 0.9%, acetaminophen, albumin human 25%, dextrose 50%, dextrose 50%, glucagon (human recombinant), glucose, glucose, heparin (porcine), HYDROcodone-acetaminophen, insulin aspart U-100, lidocaine HCL 2%, miconazole nitrate 2%, morphine, nitroGLYCERIN, ondansetron, promethazine (PHENERGAN) IVPB, sodium chloride 0.9%    Allergies:  Lidocaine and Statins-hmg-coa reductase inhibitors    Vital Signs:  Temp Readings from Last 3 Encounters:   08/20/19 97.6 °F (36.4 °C) (Skin)   07/01/19 96.4 °F (35.8 °C) (Oral)   06/17/19 98.7 °F (37.1 °C) (Oral)       Pulse Readings from Last 3 Encounters:   08/20/19 76   07/01/19 94   06/17/19 69       BP Readings from Last 3 Encounters:   08/20/19 (!) 101/59   07/01/19 108/72   06/17/19 (!) 105/56       Weight:  Wt Readings from Last 3 Encounters:   08/20/19 112.8 kg (248 lb 10.9 oz)   06/30/19 127 kg (279 lb 15.8 oz)   06/17/19 120.4 kg (265 lb 6.9 oz)       Physical Exam: (8/19)  Constitutional: nad, sleeping, chronically ill, appears stated age  Heart: rrr, no m/r/g, wwp, + edema  Lungs: ant ausc clear, no w/r/r/c, no lb  Abdomen: s/nt/nd, +BS    Results:  Lab Results   Component Value Date     08/20/2019    K 5.3 (H) 08/20/2019     08/20/2019    CO2 23 08/20/2019    BUN 35 (H) 08/20/2019    CREATININE 6.3 (H) 08/20/2019    CALCIUM 8.7 08/20/2019    ANIONGAP 12 08/20/2019    ESTGFRAFRICA 9 (A) 08/20/2019    EGFRNONAA 8 (A) 08/20/2019       Lab Results   Component Value Date    CALCIUM 8.7 08/20/2019    PHOS 5.5 (H) 08/20/2019       Recent Labs   Lab 08/20/19  0542   WBC 11.36   RBC 3.41*   HGB 10.2*   HCT 35.5*   *   *   MCH 29.9   MCHC 28.7*        I have personally reviewed pertinent radiological imaging and reports.    [facility] [place] [id number] [facility]  [place] [address]  [phone] (p)  [phone] (f)

## 2019-08-20 NOTE — CARE UPDATE
08/20/19 0744   Patient Assessment/Suction   Level of Consciousness (AVPU) alert   Respiratory Effort Normal;Unlabored   All Lung Fields Breath Sounds diminished   PRE-TX-O2   O2 Device (Oxygen Therapy) nasal cannula   $ Is the patient on Low Flow Oxygen? Yes   Flow (L/min) 2   Oxygen Concentration (%) 28   SpO2 98 %   Pulse Oximetry Type Intermittent   $ Pulse Oximetry - Multiple Charge Pulse Oximetry - Multiple   Pulse 65   Resp 18   Aerosol Therapy   $ Aerosol Therapy Charges Refused   Respiratory Treatment Status (SVN) refused   Incentive Spirometer   $ Incentive Spirometer Charges refused   Vibratory PEP Therapy   $ Vibratory PEP Charges   (refused)   Preset CPAP/BiPAP Settings   Mode Of Delivery Standby

## 2019-08-20 NOTE — BRIEF OP NOTE
[facility]  Brief Operative Note    SUMMARY     Surgery Date: 8/20/2019     Surgeon(s) and Role:     * Biju Vines III, MD - Primary    Assisting Surgeon: None    Pre-op Diagnosis:    Left lower limb ischemia with necrotic left foot   Severe sepsis [A41.9, R65.20]  SOB (shortness of breath) [R06.02]    Post-op Diagnosis:  Post-Op Diagnosis Codes:     Left lower limb ischemia with necrotic left foot  * Severe sepsis [A41.9, R65.20]     * SOB (shortness of breath) [R06.02]    Procedure(s) (LRB):  AMPUTATION, ABOVE KNEE (Left)    Anesthesia: General    Description of Procedure: Patient was brought to the OR and transferred to the OR table in the supine position.  He was given general anesthesia and intubated.  The left lower extremity was prepped and draped circumferentially. A fishmouth circumferential incision was made above the knee. Dissection was carried through the skin and subq down to the muscle fascia.  The anterior muscle compartment was divided with Bovie.  The muscle was viable.  Dissection was carried to the bone.  The periosteum was elevated off the bone and dissected superiorly.  The bone was divided with the Gigli saw several cm's above the muscle flap edge.  The bone edges were smoothed down.  The Popliteal vein and artery were individually isolated and divided with Nadira clamps and silk ties.  The Sciatic nerve was located and divided with silk ties.  The posterior muscle was then divided with the amputation blade, amputating the leg.  Hemostasis was obtained.  The anterior and posterior muscle flap were sutured together over the bone with interrupted vicryl. The subq was approximated with interrupted vicryl.  The skin was closed with skin staples. The tourniquet was never inflated. The incision was bandaged. He was extubated and  transferred out to the  in stable condition.      Description of the findings of the procedure: Healthy appearing muscle with good bleeding  tissue in the thigh.     Estimated Blood Loss: 100 mL    Complications none     Instrument counts correct          Specimens:   Specimen (12h ago, onward)    Start     Ordered    08/20/19 1524  Specimen to Pathology - Surgery  Once     Comments:  Pre-op Diagnosis: Severe sepsis [A41.9, R65.20]SOB (shortness of breath) [R06.02]Post-op Diagnosis: SAME Procedure(s):AMPUTATION, ABOVE KNEE Number of specimens: 1Name of specimens: LEFT AKA     Start Status     08/20/19 1524 In process Order ID: [id number]       08/20/19 1523

## 2019-08-20 NOTE — ASSESSMENT & PLAN NOTE
Patient's FSGs are controlled on current hypoglycemics.   Last A1c reviewed-   Lab Results   Component Value Date    HGBA1C 6.1 (H) 08/12/2019     Most recent fingerstick glucose reviewed-   Recent Labs   Lab 08/19/19  1705 08/19/19  2338 08/20/19  0740   POCTGLUCOSE 89 78 94     Current correctional scale  Low  Maintain anti-hyperglycemic dose as follows-   Antihyperglycemics (From admission, onward)    Start     Stop Route Frequency Ordered    08/12/19 2100  insulin detemir U-100 pen 10 Units      -- SubQ Nightly 08/12/19 1735    08/12/19 1743  insulin aspart U-100 pen 0-5 Units      -- SubQ Before meals & nightly PRN 08/12/19 1735

## 2019-08-20 NOTE — SUBJECTIVE & OBJECTIVE
Interval History:  Patient seen and examined.  Agreeable to let me examine him today but states he does not want to talk to me this morning.    Review of Systems   Unable to perform ROS: Other (Patient declines answering questions.)     Objective:     Vital Signs (Most Recent):  Temp: 97.6 °F (36.4 °C) (08/20/19 1235)  Pulse: 76 (08/20/19 1235)  Resp: 20 (08/20/19 1235)  BP: (!) 101/59 (08/20/19 1245)  SpO2: 97 % (08/20/19 1200) Vital Signs (24h Range):  Temp:  [96.7 °F (35.9 °C)-97.7 °F (36.5 °C)] 97.6 °F (36.4 °C)  Pulse:  [64-94] 76  Resp:  [17-20] 20  SpO2:  [95 %-100 %] 97 %  BP: ()/(50-70) 101/59     Weight: 112.8 kg (248 lb 10.9 oz)  Body mass index is 31.93 kg/m².    Intake/Output Summary (Last 24 hours) at 8/20/2019 1351  Last data filed at 8/20/2019 1224  Gross per 24 hour   Intake 550 ml   Output 1270 ml   Net -720 ml      Physical Exam   Constitutional: He appears well-developed and well-nourished. No distress.   HENT:   Head: Normocephalic and atraumatic.   Right Ear: External ear normal.   Left Ear: External ear normal.   Nose: Nose normal.   Mouth/Throat: Oropharynx is clear and moist. No oropharyngeal exudate.   Eyes: Conjunctivae and EOM are normal. Right eye exhibits no discharge. Left eye exhibits no discharge. No scleral icterus.   Neck: Neck supple. No thyromegaly present.   Cardiovascular: Normal rate and regular rhythm. Exam reveals distant heart sounds. Exam reveals no gallop and no friction rub.   No murmur heard.  Pulmonary/Chest: Effort normal and breath sounds normal. No respiratory distress. He has no wheezes. He has no rales.   Abdominal: Soft. Bowel sounds are normal. He exhibits no distension. There is no tenderness. There is no guarding.   Musculoskeletal: Normal range of motion. He exhibits no edema or tenderness.   Superficial ulcer left mid medial calf with small amt yellow exudate drainage   Bandage not taken down of left lower extremity wound today   Lymphadenopathy:      He has no cervical adenopathy.   Neurological: He is alert. No cranial nerve deficit. Coordination normal.   Skin: Skin is warm and dry. Capillary refill takes less than 2 seconds. No rash noted. No erythema.   Psychiatric:   Poor mood   Nursing note and vitals reviewed.      Significant Labs: All pertinent labs within the past 24 hours have been reviewed.    Significant Imaging: I have reviewed and interpreted all pertinent imaging results/findings within the past 24 hours.

## 2019-08-20 NOTE — PLAN OF CARE
Pt resting quietly at this time. Able to make needs known. Incont b/b. Denies pain at this time. Avasys at bedside. Bed low and locked. Call light in reach.

## 2019-08-21 NOTE — PLAN OF CARE
08/21/19 1517   Final Note   Assessment Type Final Discharge Note   Anticipated Discharge Disposition LTAC

## 2019-08-21 NOTE — CARE UPDATE
08/21/19 0730   Patient Assessment/Suction   Level of Consciousness (AVPU) alert   Respiratory Effort Normal;Unlabored   All Lung Fields Breath Sounds clear   PRE-TX-O2   O2 Device (Oxygen Therapy) nasal cannula   $ Is the patient on Low Flow Oxygen? Yes   Flow (L/min) 2   Oxygen Concentration (%) 28   SpO2 97 %   Pulse Oximetry Type Intermittent   $ Pulse Oximetry - Multiple Charge Pulse Oximetry - Multiple   Pulse 90   Resp 18   Aerosol Therapy   $ Aerosol Therapy Charges Refused   Respiratory Treatment Status (SVN) refused   Incentive Spirometer   $ Incentive Spirometer Charges refused   Vibratory PEP Therapy   $ Vibratory PEP Charges   (refused)

## 2019-08-21 NOTE — PLAN OF CARE
Report can be called to [facility] at [phone] at 2:00 pm. The pt is going to room 205 and the nurse is Deana. I completed the ambulance transport form and placed it in the pts blue folder with a label attached. I updated the pts nurse and booked the pts discharge destination. Once report is called [facility] will arrange transport. . Tish Jamison, MARYW     08/21/19 1128   Post-Acute Status   Post-Acute Authorization Placement   Post-Acute Placement Status Set-up Complete

## 2019-08-21 NOTE — PLAN OF CARE
Per Bong with [facility], they should be able to accept the pt today if he is ready for discharge . I updated Dr. Parrish via secure chat. Tish Jamison LCSW     9:37 am- Renata with [facility] is here to see the pt and update him that they will be accepting him today. Tish Jamison LCSW    Per Dr. Parrish- Dr. Barreto has cleared the pt for discharge. Renata SORIANO is going to call the pts wife. Awaiting dc orders, AVS updated. Tish Jamison LCSW       08/21/19 0902   Post-Acute Status   Post-Acute Authorization Placement   Post-Acute Placement Status Pending Post-Acute Medical Review

## 2019-08-21 NOTE — PLAN OF CARE
I sent the pts discharge orders and current MAR to [facility] for review and I updated Yosi. Tish Jamison LCSW     Date Status/Notes Created By   · 8/21/2019 10:37:59 AM Completed  Tish Jamison  · 8/21/2019 10:37:07 AM Accepted  [email or web address]  · 8/14/2019 2:18:58 PM New  Tish Jamison       08/21/19 1008   Post-Acute Status   Post-Acute Authorization Placement   Post-Acute Placement Status Pending Post-Acute Medical Review

## 2019-08-21 NOTE — DISCHARGE INSTRUCTIONS
[facility] Transfer Orders        Admit to: [facility]    Diagnoses:   Active Hospital Problems    Diagnosis  POA    *Severe sepsis [A41.9, R65.20]  Yes    ESBL (extended spectrum beta-lactamase) producing bacteria infection [A49.9, Z16.12]  Yes    Thrombocytopenia [D69.6]  No    Diabetic ulcer of left heel associated with diabetes mellitus due to underlying condition, with muscle involvement without evidence of necrosis [E08.621, L97.425]  Yes    Gangrene of left foot [I96]  Yes    Cellulitis of left lower extremity [L03.116]  Yes    External hemorrhoid [K64.4]  Yes    Sepsis [A41.9]  Yes    ESRD (end stage renal disease) on dialysis [N18.6, Z99.2]  Not Applicable    Morbid (severe) obesity with alveolar hypoventilation [E66.2]  Yes    Hyperkalemia [E87.5]  Yes    Skin ulcer of left lower leg, limited to breakdown of skin [L97.921]  Yes    HTN (hypertension) [I10]  Yes    Type 2 diabetes mellitus with kidney complication, with long-term current use of insulin [E11.29, Z79.4]  Not Applicable    DM type 2 with diabetic mixed hyperlipidemia [E11.69, E78.2]  Yes    Coronary artery disease [I25.10]  Yes    COPD (chronic obstructive pulmonary disease) [J44.9]  Yes    A-fib [I48.91]  Yes    Anemia, chronic disease [D63.8]  Yes    BRANDEN (obstructive sleep apnea) [G47.33]  Yes    Moderate malnutrition [E44.0]  Yes    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     Allergies:   Review of patient's allergies indicates:   Allergen Reactions    Lidocaine Nausea Only     NOVACAINE --  Severe nausea    Statins-hmg-coa reductase inhibitors Anxiety       Code Status: FULL    Vitals: Routine       Diet: renal diet Boost glucose control chocolate BID    Activity: Activity as tolerated    Nursing Precautions: Aspiration , Fall and Pressure ulcer prevention    Bed/Surface: Low Air  Loss    Consults: PT to evaluate and treat- 7 times a week, OT to evaluate and treat- 7 times a week and Nutrition to evaluate and recommend diet    Oxygen: 2 liters/min via nasal cannula    Dialysis: Patient is on dialysis. T/Th/SAt    Labs: CBC and BMP every T/TH/Sat  Pending Diagnostic Studies:     None        Imaging: none    Miscellaneous Care:   Wound Care: yes:  eval and treat   Leave surgical ACE wrap on and reinforce as needed    IV Access: Peripheral and Dialysis Access     Medications: Discontinue all previous medication orders, if any. See new list below.  Current Discharge Medication List      START taking these medications    Details   acetaminophen (TYLENOL) 500 MG tablet Take 2 tablets (1,000 mg total) by mouth every 8 (eight) hours as needed.  Refills: 0      albuterol-ipratropium (DUO-NEB) 2.5 mg-0.5 mg/3 mL nebulizer solution Take 3 mLs by nebulization every 6 (six) hours. Rescue  Qty: 1 Box, Refills: 0      HYDROcodone-acetaminophen (NORCO)  mg per tablet Take 1 tablet by mouth every 6 (six) hours as needed.  Qty: 20 tablet, Refills: 0      hydrocortisone (ANUSOL-HC) 2.5 % rectal cream Place rectally 2 (two) times daily.      insulin detemir U-100 (LEVEMIR FLEXTOUCH) 100 unit/mL (3 mL) SubQ InPn pen Inject 10 Units into the skin every evening.  Refills: 0      lidocaine HCL 2% (XYLOCAINE) 2 % jelly Apply topically as needed (rectal pain).  Refills: 0      meropenem-0.9% sodium chloride 500 mg/50 mL PgBk Inject 50 mLs (500 mg total) into the vein every 12 (twelve) hours. for 2 days  Qty: 200 mL, Refills: 0      miconazole nitrate 2% (MICOTIN) 2 % Oint Apply topically daily as needed (after incontience care).  Refills: 0      midodrine (PROAMATINE) 5 MG Tab Take 1 tablet (5 mg total) by mouth every Tues, Thurs, Sat.  Qty: 12 tablet, Refills: 11      senna-docusate 8.6-50 mg (PERICOLACE) 8.6-50 mg per tablet Take 1 tablet by mouth 2 (two) times daily.         CONTINUE these medications which  have CHANGED    Details   aspirin (ECOTRIN) 81 MG EC tablet Take 1 tablet (81 mg total) by mouth once daily.  Refills: 0         CONTINUE these medications which have NOT CHANGED    Details   FLUoxetine 20 MG capsule Take 20 mg by mouth every morning.       metoprolol tartrate (LOPRESSOR) 25 MG tablet Take 1 tablet (25 mg total) by mouth every Mon, Wed, Fri, Sun.  Qty: 16 tablet, Refills: 11      nitroGLYCERIN (NITROSTAT) 0.4 MG SL tablet Place 0.4 mg under the tongue every 5 (five) minutes as needed for Chest pain. Seek medical help if pain is not relieved by the third dose.      pantoprazole (PROTONIX) 40 MG tablet Take 1 tablet (40 mg total) by mouth 2 (two) times daily before meals.  Qty: 60 tablet, Refills: 11      sevelamer carbonate (RENVELA) 800 mg Tab Take 2 tablets (1,600 mg total) by mouth 3 (three) times daily with meals.  Qty: 180 tablet, Refills: 11         STOP taking these medications       alfuzosin (UROXATRAL) 10 mg Tb24 Comments:   Reason for Stopping:         rOPINIRole (REQUIP) 2 MG tablet Comments:   Reason for Stopping:             Follow up:   Follow-up Information     Mikhail Shields MD.    Specialty:  Family Medicine  Why:  after SNF discharge  Contact information:  [address]  [address]  [facility] [address]  [phone]             Biju Vines III, MD In 2 weeks.    Specialties:  General Surgery, Surgery  Contact information:  [address]  [address]  [facility] [address]  [phone]             POST ACUTE MEDICAL SPECIALTY [facility].    Specialty:  LT[facility]  Why:  LTAC  Contact information:  [address] [facility] [address]  [phone]

## 2019-08-21 NOTE — NURSING
Dr. Parrish here earlier and pt seen. Discharge orders received and initiated. Report called to [facility] and given to nurse Deana. Awaiting ambulance for transport. No acute distress or discomfort noted at this time.

## 2019-08-21 NOTE — PHYSICIAN QUERY
PT Name: Frank Zayas  MR #: [id number]     Physician Query Form - Documentation Clarification      CDS/: MARCUS Serrano, RN, CDS               Contact information:[email or web address]    This form is a permanent document in the medical record.     Query Date: August 21, 2019    By submitting this query, we are merely seeking further clarification of documentation. Please utilize your independent clinical judgment when addressing the question(s) below.    The Medical record reflects the following:    Supporting Clinical Findings Location in Medical Record      Amputation, above knee (left)    Post-op Diagnosis:  Post-Op Diagnosis Codes:     Left lower limb ischemia with necrotic left foot     Dissection was carried through the skin and subq down to the muscle fascia.  The anterior muscle compartment was divided with Bovie.  The muscle was viable.  Dissection was carried to the bone.  The periosteum was elevated off the bone and dissected superiorly.  The bone was divided with the Gigli saw several cm's above the muscle flap edge.  The bone edges were smoothed down.  The Popliteal vein and artery were individually isolated and divided with Nadira clamps and silk ties.  The Sciatic nerve was located and divided with silk ties.  The posterior muscle was then divided with the amputation blade, amputating the leg      Description of the findings of the procedure: Healthy appearing muscle with good bleeding tissue in the thigh.          Op note, Dr. Jasmyn TAVERAS, 8/20                                                                            Doctor, Please specify diagnosis or diagnoses associated with above clinical findings.    Please specify location of Amputation, above the knee (left):    Provider Use Only     [   ] Amputation, above knee (left) - High ( proximal third)     [ x  ] Amputation, above knee (left) - Low  (distal third)     [   ] Amputation, above knee (left) - Mid  (middle third)                                                                                                                [  ] Clinically Undetermined

## 2019-08-21 NOTE — PLAN OF CARE
08/20/19 1938   Patient Assessment/Suction   Level of Consciousness (AVPU) responds to voice   Respiratory Effort Normal;Unlabored   Expansion/Accessory Muscles/Retractions no use of accessory muscles   All Lung Fields Breath Sounds clear;equal bilaterally   Rhythm/Pattern, Respiratory unlabored;pattern regular;depth regular   PRE-TX-O2   O2 Device (Oxygen Therapy) nasal cannula   $ Is the patient on Low Flow Oxygen? Yes   Flow (L/min) 3  (descreased to 2L for sats being 100% )   Oxygen Concentration (%) 32   SpO2 100 %   Pulse Oximetry Type Intermittent   $ Pulse Oximetry - Multiple Charge Pulse Oximetry - Multiple   Pulse 91   Resp 17   Aerosol Therapy   $ Aerosol Therapy Charges Aerosol Treatment   Respiratory Treatment Status (SVN) given   Treatment Route (SVN) mask;oxygen   Patient Position (SVN) HOB elevated   Post Treatment Assessment (SVN) breath sounds unchanged   Signs of Intolerance (SVN) none   Breath Sounds Post-Respiratory Treatment   Throughout All Fields Post-Treatment All Fields   Throughout All Fields Post-Treatment no change   Post-treatment Heart Rate (beats/min) 95   Post-treatment Resp Rate (breaths/min) 17   Incentive Spirometer   $ Incentive Spirometer Charges unable to perform   Vibratory PEP Therapy   $ Vibratory PEP Charges   (unable to perform )   Ready to Wean/Extubation Screen   FIO2<=50 (chart decimal) 0.32

## 2019-08-21 NOTE — OP NOTE
Surgery Date: 8/20/2019     Surgeon(s) and Role:     * Biju Vines III, MD - Primary    Assisting Surgeon: None    Pre-op Diagnosis:    Left lower limb ischemia with necrotic left foot   Severe sepsis [A41.9, R65.20]  SOB (shortness of breath) [R06.02]    Post-op Diagnosis:  Post-Op Diagnosis Codes:     Left lower limb ischemia with necrotic left foot  * Severe sepsis [A41.9, R65.20]     * SOB (shortness of breath) [R06.02]    Procedure(s) (LRB):  AMPUTATION, ABOVE KNEE (Left)    Anesthesia: General    Description of Procedure: Patient was brought to the OR and transferred to the OR table in the supine position.  He was given general anesthesia and intubated.  The left lower extremity was prepped and draped circumferentially. A fishmouth circumferential incision was made above the knee. Dissection was carried through the skin and subq down to the muscle fascia.  The anterior muscle compartment was divided with Bovie.  The muscle was viable.  Dissection was carried to the bone.  The periosteum was elevated off the bone and dissected superiorly.  The bone was divided with the Gigli saw several cm's above the muscle flap edge.  The bone edges were smoothed down.  The Popliteal vein and artery were individually isolated and divided with Nadira clamps and silk ties.  The Sciatic nerve was located and divided with silk ties.  The posterior muscle was then divided with the amputation blade, amputating the leg.  Hemostasis was obtained.  The anterior and posterior muscle flap were sutured together over the bone with interrupted vicryl. The subq was approximated with interrupted vicryl.  The skin was closed with skin staples. The tourniquet was never inflated. The incision was bandaged. He was extubated and  transferred out to the  in stable condition.      Description of the findings of the procedure: Healthy appearing muscle with good bleeding tissue in the thigh.     Estimated Blood Loss: 100 mL    Complications  none     Instrument counts correct          Specimens:   Specimen (12h ago, onward)    Start     Ordered    08/20/19 1524  Specimen to Pathology - Surgery  Once     Comments:  Pre-op Diagnosis: Severe sepsis [A41.9, R65.20]SOB (shortness of breath) [R06.02]Post-op Diagnosis: SAME Procedure(s):AMPUTATION, ABOVE KNEE Number of specimens: 1Name of specimens: LEFT AKA     Start Status     08/20/19 1524 In process Order ID: [id number]       08/20/19 1523

## 2019-08-22 NOTE — DISCHARGE SUMMARY
[facility] Medicine  Discharge Summary      Patient Name: Frank Zayas  MRN: [id number]  Admission Date: 8/12/2019  Hospital Length of Stay: 9 days  Discharge Date and Time: 8/21/2019  3:30 PM  Attending Physician: No att. providers found   Discharging Provider: Breanne Parrish MD  Primary Care Provider: Mikhail Shields MD      HPI:   Frank Zayas is a 79 y.o. male with COPD who presents to the ED with ongoing SOB greater than baseline. Per EMS his home health nurse noticed worsening SOB when going to re-wrap his legs, which are being treated for ulcer. The patient denies any chest pain, fever, chills or fever, N/V/D, or any other symptoms at this time.  He was stable in the ED with oxygen at 2 L. he is noted to have an elevated BNP and bilateral pulmonary edema on chest x-ray.  He was given Lasix 60 mg IV in the ED and Nephrology was notified of his pulmonary edema. His usual dialysis days are Tuesday Thursday and Saturday.  He was also sent by a home health nurse because of malodorous drainage to his heel and erythema in his left lower extremity that had not been near the previous dressing change.  His left heel was drain and very malodorous.  No gangrene is noted.  There skin sloughing off of his left heel as noted in the photos.       Hx of CHF, HTN, hyperlipidemia, and MI.  PSHx of coronary stents, CABG, and cardiac sugery. No known drug allergies    Procedure(s) (LRB):  AMPUTATION, ABOVE KNEE (Left)      Hospital Course:   Frank Zayas is a 79 y.o. Male ESRD/ htn/DM  admitted with severe sepsis 2/2 cellulitis/infected left heel diabetic ulcer -initiated on vanc/merrem. CT with prob septic arthritis   Podiatry consutled -surgical debridement to muscle on 8/13. Cultures taken  Blood cultures neg. Wound  PROTEUS MIRABILIS ESBL -put in isolation   No other significant isolate so Vanco dc . And cefipime changed to merrem.      ID consulted; with podiatry recommend aka vs bka so  vascular/orthopedic surg consulted--wound care done.   CTaortogram /runoff done and Dr. Rodriguez thought that patient's blood supply was adequate for a BKA.  BKA was planned but decision was made on day of surgery to change to AKA the next day.  BKA was canceled.  Patient underwent AKA with Dr. Nash.  He was discharged to LTAC for further therapy and wound care.  He will complete 2 more days of meropenem had LTAC and then finish the course.  HTN, /Dm remained stable  Sacral decub/skin excoriations /ext hemorrhoid noted on admit-wound care consulted.               Consults:   Consults (From admission, onward)        Status Ordering Provider     Inpatient consult to Infectious Diseases  Once     Provider:  Tory Barreto MD    Completed PAM SANTOYO     Inpatient consult to Nephrology  Once     Provider:  Kalyan Babin MD    Completed PAM SANTOYO.     Inpatient consult to Podiatry  Once     Provider:  Robert Wilson DPM    Completed PAM SANTOYO.     Inpatient consult to Registered Dietitian/Nutritionist  Once     Provider:  (Not yet assigned)    Completed PAM SANTOYO.     Inpatient consult to Social Work/Case Management  Once     Provider:  (Not yet assigned)    Completed PAM SANTOYO.     Inpatient consult to Social Work/Case Management  Once     Provider:  (Not yet assigned)    Completed ART NASH III          No new Assessment & Plan notes have been filed under this hospital service since the last note was generated.  Service: Hospital Medicine    Final Active Diagnoses:    Diagnosis Date Noted POA    PRINCIPAL PROBLEM:  Severe sepsis [A41.9, R65.20] 06/25/2019 Yes    ESBL (extended spectrum beta-lactamase) producing bacteria infection [A49.9, Z16.12] 08/16/2019 Yes    Thrombocytopenia [D69.6] 08/15/2019 No    Diabetic ulcer of left heel associated with diabetes mellitus due to underlying condition, with muscle involvement without evidence of necrosis [E08.621, L97.425]  08/14/2019 Yes    Gangrene of left foot [I96] 08/13/2019 Yes    Cellulitis of left lower extremity [L03.116] 08/12/2019 Yes    External hemorrhoid [K64.4] 08/12/2019 Yes    Sepsis [A41.9] 08/12/2019 Yes    ESRD (end stage renal disease) on dialysis [N18.6, Z99.2] 05/31/2019 Not Applicable    Morbid (severe) obesity with alveolar hypoventilation [E66.2] 05/08/2019 Yes    Hyperkalemia [E87.5] 05/08/2019 Yes    Skin ulcer of left lower leg, limited to breakdown of skin [L97.921] 05/05/2019 Yes    HTN (hypertension) [I10] 05/05/2019 Yes    Type 2 diabetes mellitus with kidney complication, with long-term current use of insulin [E11.29, Z79.4] 05/05/2019 Not Applicable    DM type 2 with diabetic mixed hyperlipidemia [E11.69, E78.2] 05/05/2019 Yes    Coronary artery disease [I25.10] 05/05/2019 Yes    COPD (chronic obstructive pulmonary disease) [J44.9] 05/05/2019 Yes    A-fib [I48.91] 05/05/2019 Yes    Anemia, chronic disease [D63.8] 05/05/2019 Yes    BRANDEN (obstructive sleep apnea) [G47.33] 05/05/2019 Yes    Moderate malnutrition [E44.0] 05/05/2019 Yes    Malignant neoplasm of anterior wall of urinary bladder [C67.3] 09/08/2014 Yes      Problems Resolved During this Admission:       Discharged Condition: good    Disposition: Long Term Acute Care    Follow Up:  Follow-up Information     Mikhail Shields MD.    Specialty:  Family Medicine  Why:  after SNF discharge  Contact information:  [address] [facility]  [address]  [facility] [place] [address]  [phone]             Biju Vines III, MD In 2 weeks.    Specialties:  General Surgery, Surgery  Contact information:  [address]  [address]  [place] [address]  [phone]             [facility].    Specialty:  [facility]  Why:  [facility]  Contact information:  [address] [facility] [address]  [phone]                 Patient Instructions:      Ambulatory referral to Outpatient Case  Management   Referral Priority: Routine Referral Type: Consultation   Referral Reason: Specialty Services Required   Number of Visits Requested: 1     Diet renal     Activity as tolerated       Significant Diagnostic Studies: Labs:   BMP:   Recent Labs   Lab 08/21/19  0816   GLU 89      K 4.9   CL 98   CO2 25   BUN 27*   CREATININE 5.4*   CALCIUM 8.5*   MG 2.2   , CMP   Recent Labs   Lab 08/21/19  0816      K 4.9   CL 98   CO2 25   GLU 89   BUN 27*   CREATININE 5.4*   CALCIUM 8.5*   ANIONGAP 13   ESTGFRAFRICA 11*   EGFRNONAA 9*    and CBC   Recent Labs   Lab 08/21/19  0816   WBC 14.23*   HGB 10.6*   HCT 37.0*   *       Pending Diagnostic Studies:     None       Radiology Results (last 7 days)     Procedure Component Value Units Date/Time   CTA Runoff ABD PEL Bilat Lower Ext [[id number]] Resulted: 08/18/19 1731   Order Status: Completed Updated: 08/18/19 1733   Narrative:     EXAMINATION:  CTA RUNOFF ABD PEL BILAT LOWER EXT    CLINICAL HISTORY:  Lower leg erythema, swelling, cellulitis suspected;osteo diabetic foot ulcer;    TECHNIQUE:  Using multi-detector helical CT technique, axial CT angiogram images of the abdomen, pelvis and bilateral lower extremities were obtained, after the administration of 125 cc of  Omnipaque 350 IV, . Post-processing coronal, sagittal, and 3D reconstructions of the lower extremity arteries performed.    COMPARISON:  08/14/2019 and 03/27/2019    FINDINGS:  Aorta: Patent    Aortic Branches: Mild plaque at the origin of the bilateral renal arteries with less than 50% stenosis.  Patent origin and course of the celiac and SMA.  Diffuse plaque along the splenic artery with less than 50% stenosis.    RIGHT LOWER EXTREMITY:    Common iliac: Minimal plaque    External iliac: Patent    Internal iliac: Mild diffuse plaque with less than 50% stenosis    Femoral: Mild diffuse plaque    SFA: Mild diffuse plaque    Deep Femoral: Mild diffuse plaque    Popliteal: Plaque with less than  50% stenosis    Trifurcation: Anterior tibial artery occluded just beyond the origin.  Posterior tibial artery occluded at the origin.  Diffuse plaque along the peroneal artery with flow identified to to the ankle    LEFT LOWER EXTREMITY:    Common iliac: Patent    External iliac: Patent    Internal iliac: Mild diffuse plaque    Femoral: Patent    SFA: Mild diffuse plaque with less than 50% stenosis in the proximal and mid segments.  In the distal segment there is a focal 70% stenosis due to plaque.    Deep Femoral: Minimal diffuse plaque    Popliteal: Near the origin there is a 2.5 cm segment of stenosis due to plaque.  Stenosis ranges 60-70%.    Trifurcation: Both the anterior and posterior tibial arteries are occluded just beyond the origin.  There is diffuse plaque within the peroneal artery with good flow to the level of the distal calf and trickle flow beyond this to the ankle    CHEST:    Heart/pericardium: Cardiomegaly with coronary artery disease.    Lung bases: There is right basilar airspace disease and pleural effusion.  Mild scar left lung base.    ABDOMEN:    Liver: Negative.    Gallbladder: Cholecystectomy..    Bile ducts: No dilation.    Spleen:  16.5 cm length.    Pancreas: Negative.    Kidneys: No hydronephrosis.  There are several simple cysts in the left renal cortex.  There is 1 or 2 exophytic simple cyst along the right renal cortex.  At the midpole there is a 1.4 cm lesion Hounsfield unit 52.  Along the lower pole there is an exophytic 1.5 cm lesion Hounsfield unit 38 and another measuring 1 cm with Hounsfield unit of 50.    Adrenals:  Unremarkable.    Lymph nodes: No adenopathy.    Abdomen wall: Body wall edema which also involves both legs.    BOWEL: No bowel obstruction or inflammation.  Scattered colonic diverticula.  There is ascites.    PELVIS: Hysterectomy.  Unremarkable urinary bladder.  No adenopathy.    Bones: No acute findings.   Impression:       1. Right leg demonstrates diffuse  atherosclerosis with patent flow to the level of the knee and one-vessel runoff to the ankle via the peroneal artery.  2. Left lower leg demonstrates diffuse atherosclerosis with focal 60% stenosis distal left SFA and longer segment stenosis in the left popliteal artery, between 60-70%.  One-vessel runoff to the distal calf via the peroneal artery with trickle flow beyond this at the ankle.  3. Patent aortic branches in the abdomen.  4. Cardiomegaly and coronary artery disease.  5. Right basilar airspace disease which could reflect atelectasis or pneumonia.  Moderate right pleural effusion.  6. Splenomegaly.  7. Small volume ascites.  8. Several right renal lesions which are too high in density for simple cyst.  Consider elective ultrasound to exclude solid lesions.      Electronically signed by: Vinayak Goodwin  Date: 08/18/2019  Time: 17:31   US Lower Extremity Arteries Bilateral [[id number]] Resulted: 08/15/19 1028   Order Status: Completed Updated: 08/15/19 1030   Narrative:     EXAMINATION:  US LOWER EXTREMITY ARTERIES BILATERAL    CLINICAL HISTORY:  gangrene left heel;  Sepsis, unspecified organism    TECHNIQUE:  Bilateral spectral, color and grayscale images of the large arteries of both lower extremities were performed.    COMPARISON:  None    FINDINGS:  There are no elevated peak systolic velocities in the visualized arteries of the right or left lower extremity suggesting hemodynamically significant narrowing.  There are low velocities with peak systolic velocity right common femoral artery 46 centimeters/second left common femoral artery 60 centimeters/second which may be related to cardiac output.  Waveform is primarily multiphasic.  There is no flow in the posterior tibial artery and virtually no flow detected in the right peroneal artery.  There is plaque seen fairly diffusely throughout the visualized arteries of the right and left lower extremities.  Waveform is multiphasic throughout most of the  lower extremities.   Impression:       Findings consistent with small vessel disease with no flow seen in the right posterior tibial artery and minimal flow in the right peroneal artery.      Electronically signed by: Anahi Galvez MD  Date: 08/15/2019  Time: 10:28   Microbiology Results (Last 90 Days)     Procedure Component Value Units Date/Time   Blood culture [[id number]] Collected: 08/14/19 0602   Order Status: Completed Specimen: Blood Updated: 08/19/19 1212    Blood Culture, Routine No growth after 5 days.   Culture, Anaerobe [[id number]] Collected: 08/13/19 1830   Order Status: Completed Specimen: Abscess from Foot, Left Updated: 08/19/19 1005    Anaerobic Culture No anaerobes isolated   Aerobic culture [[id number]] (Abnormal)  Collected: 08/13/19 1830   Order Status: Completed Specimen: Abscess from Foot, Left Updated: 08/16/19 1159    Aerobic Bacterial Culture PROTEUS MIRABILIS ESBL   Many   No other significant isolate   Abnormal    Susceptibility      Proteus mirabilis esbl     CULTURE, AEROBIC  (SPECIFY SOURCE)     Amox/K Clav'ate <=8/4 mcg/mL Sensitive     Amp/Sulbactam <=8/4 mcg/mL Sensitive     Ampicillin >16 mcg/mL Resistant     Cefazolin >16 mcg/mL Resistant     Cefepime >16 mcg/mL Resistant     Ceftriaxone >32 mcg/mL Resistant     Ciprofloxacin >2 mcg/mL Resistant     Ertapenem <=2 mcg/mL Sensitive     Gentamicin <=4 mcg/mL Sensitive     Levofloxacin >4 mcg/mL Resistant     Meropenem <=4 mcg/mL Sensitive     Piperacillin/Tazo <=16 mcg/mL Sensitive     Tetracycline >8 mcg/mL Resistant     Tobramycin <=4 mcg/mL Sensitive     Trimeth/Sulfa <=2/38 mcg/mL Sensitive            Linear View         Blood culture (site 1) [[id number]]    Order Status: Canceled Specimen: Blood    Blood culture x two cultures. Draw prior to antibiotics. [[id number]] (Abnormal)  Collected: 08/12/19 1623   Order Status: Completed Specimen: Blood Updated: 08/15/19 1114    Blood Culture, Routine Gram stain demarcus bottle: Gram negative  rods     Results called to and read back by: Vasu Cerda RN 08/13/2019       15:12     PROTEUS MIRABILIS ESBLAbnormal    Narrative:     Aerobic and anaerobic   Susceptibility      Proteus mirabilis esbl     CULTURE, BLOOD     Amox/K Clav'ate <=8/4 mcg/mL Sensitive     Amp/Sulbactam <=4/2 mcg/mL Sensitive     Ampicillin >16 mcg/mL Resistant     Cefazolin >16 mcg/mL Resistant     Cefepime >16 mcg/mL Resistant     Ceftriaxone >32 mcg/mL Resistant     Ciprofloxacin >2 mcg/mL Resistant     Ertapenem <=0.5 mcg/mL Sensitive     Gentamicin 2 mcg/mL Sensitive     Levofloxacin >4 mcg/mL Resistant     Meropenem <=1 mcg/mL Sensitive     Piperacillin/Tazo <=8 mcg/mL Sensitive     Tetracycline >8 mcg/mL Resistant     Tobramycin <=2 mcg/mL Sensitive     Trimeth/Sulfa 1/19 mcg/mL Sensitive            Linear View         Blood culture x two cultures. Draw prior to antibiotics. [[id number]] (Abnormal) Collected: 08/12/19 1556   Order Status: Completed Specimen: Blood Updated: 08/15/19 1114    Blood Culture, Routine Gram stain demarcus bottle: Gram negative rods     Results called to and read back by: Vasu Cerda RN 08/13/2019       15:11     PROTEUS MIRABILIS ESBL   For susceptibility see order #[id number]   Abnormal    Narrative:           Medications:  Reconciled Home Medications:      Medication List      START taking these medications    acetaminophen 500 MG tablet  Commonly known as:  TYLENOL  Take 2 tablets (1,000 mg total) by mouth every 8 (eight) hours as needed.     albuterol-ipratropium 2.5 mg-0.5 mg/3 mL nebulizer solution  Commonly known as:  DUO-NEB  Take 3 mLs by nebulization every 6 (six) hours. Rescue     HYDROcodone-acetaminophen  mg per tablet  Commonly known as:  NORCO  Take 1 tablet by mouth every 6 (six) hours as needed.     hydrocortisone 2.5 % rectal cream  Commonly known as:  ANUSOL-HC  Place rectally 2 (two) times daily.     insulin detemir U-100 100 unit/mL (3 mL) Inpn pen  Commonly known as:   LEVEMIR FLEXTOUCH  Inject 10 Units into the skin every evening.     lidocaine HCL 2% 2 % jelly  Commonly known as:  XYLOCAINE  Apply topically as needed (rectal pain).     meropenem-0.9% sodium chloride 500 mg/50 mL Pgbk  Inject 50 mLs (500 mg total) into the vein every 12 (twelve) hours. for 2 days     miconazole nitrate 2% 2 % Oint  Commonly known as:  MICOTIN  Apply topically daily as needed (after incontience care).     midodrine 5 MG Tab  Commonly known as:  PROAMATINE  Take 1 tablet (5 mg total) by mouth every Tues, Thurs, Sat.     senna-docusate 8.6-50 mg 8.6-50 mg per tablet  Commonly known as:  PERICOLACE  Take 1 tablet by mouth 2 (two) times daily.        CONTINUE taking these medications    aspirin 81 MG EC tablet  Commonly known as:  ECOTRIN  Take 1 tablet (81 mg total) by mouth once daily.     FLUoxetine 20 MG capsule  Take 20 mg by mouth every morning.     metoprolol tartrate 25 MG tablet  Commonly known as:  LOPRESSOR  Take 1 tablet (25 mg total) by mouth every Mon, Wed, Fri, Sun.     nitroGLYCERIN 0.4 MG SL tablet  Commonly known as:  NITROSTAT  Place 0.4 mg under the tongue every 5 (five) minutes as needed for Chest pain. Seek medical help if pain is not relieved by the third dose.     pantoprazole 40 MG tablet  Commonly known as:  PROTONIX  Take 1 tablet (40 mg total) by mouth 2 (two) times daily before meals.     sevelamer carbonate 800 mg Tab  Commonly known as:  RENVELA  Take 2 tablets (1,600 mg total) by mouth 3 (three) times daily with meals.            Indwelling Lines/Drains at time of discharge:   Lines/Drains/Airways     Central Venous Catheter Line                 Hemodialysis Catheter 05/16/19 1600 right subclavian 98 days          Pressure Ulcer                 Pressure Injury 05/31/19 0700 Left Buttocks #1 Stage 2 83 days                Time spent on the discharge of patient: 35 minutes  Patient was seen and examined on the date of discharge and determined to be suitable for  discharge.         Breanne Parrish MD  Department of Hospital Medicine  [facility]

## 2020-05-03 NOTE — SUBJECTIVE & OBJECTIVE
EXAM:

  XR Chest, 1 View

 

CLINICAL HISTORY:

  ABN CHST

 

TECHNIQUE:

  Frontal view of the chest.

 

COMPARISON:

  Chest x-ray 4/30/20

 

FINDINGS:

  Lungs:  Worsening bilateral interstitial and airspace opacities.

  Pleural space:  Probable small left pleural effusion.  Difficult to 

evaluate the right costophrenic angle due to overlying pacemaker.  No 

pneumothorax.

  Heart:  Cardiomegaly.

  Mediastinum:  Unremarkable.

  Bones/joints:  Unremarkable.

  Tubes, lines and devices:  Right-sided cardiac pacemaker.

 

IMPRESSION:     

1.  Worsening bilateral interstitial and airspace opacities.

2.  Probable small left pleural effusion.  Difficult to evaluate the 

right costophrenic angle due to overlying pacemaker. Interval History:    Patient seen examined.  No arrhythmias overnight.  No respiratory difficulty.    Review of Systems   Constitutional: Positive for activity change, appetite change and fatigue. Negative for chills and fever.   Respiratory: Negative for cough and shortness of breath.    Cardiovascular: Positive for leg swelling. Negative for chest pain and palpitations.   Gastrointestinal: Negative for abdominal distention, abdominal pain, constipation, diarrhea and vomiting.   Genitourinary: Positive for hematuria.        Huynh catheter present draining hematuria initially and now improved  Patient reports he had some hematuria about 3 or 4 weeks ago and then reoccurred after Huynh was placed   Musculoskeletal: Positive for gait problem. Negative for neck pain and neck stiffness.   Skin: Positive for wound.        Right lower extremity dressing intact   Neurological: Positive for weakness. Negative for seizures and speech difficulty.   Hematological: Negative for adenopathy. Bruises/bleeds easily.   Psychiatric/Behavioral: Negative for behavioral problems and confusion (AMS with hypercarbia).     Objective:     Vital Signs (Most Recent):  Temp: 96.6 °F (35.9 °C) (05/11/19 1420)  Pulse: (!) 46 (05/11/19 1420)  Resp: 20 (05/11/19 1420)  BP: (!) 111/47 (05/11/19 1420)  SpO2: 96 % (05/11/19 0815) Vital Signs (24h Range):  Temp:  [96.2 °F (35.7 °C)-98.7 °F (37.1 °C)] 96.6 °F (35.9 °C)  Pulse:  [] 46  Resp:  [17-41] 20  SpO2:  [89 %-100 %] 96 %  BP: ()/(47-95) 111/47     Weight: (!) 139.1 kg (306 lb 10.6 oz)  Body mass index is 39.37 kg/m².    Intake/Output Summary (Last 24 hours) at 5/11/2019 1443  Last data filed at 5/11/2019 1420  Gross per 24 hour   Intake 500 ml   Output 2525 ml   Net -2025 ml      Physical Exam   Constitutional: He is oriented to person, place, and time. He appears well-developed and well-nourished. No distress.   Obese.   HENT:   Head: Normocephalic and atraumatic.   Eyes: Pupils are  equal, round, and reactive to light. Conjunctivae and EOM are normal. Right eye exhibits no discharge. Left eye exhibits no discharge.   Neck: Normal range of motion. Neck supple.   Cardiovascular: Normal rate, normal heart sounds and intact distal pulses. An irregularly irregular rhythm present.   Pulses:       Dorsalis pedis pulses are 2+ on the right side, and 2+ on the left side.   Pulmonary/Chest: Effort normal. No accessory muscle usage. He has decreased breath sounds in the right lower field and the left lower field. He has no wheezes. He has no rhonchi. He has rales in the right lower field.   Abdominal: Soft. Bowel sounds are normal. He exhibits no distension. There is no tenderness. There is no guarding.   Genitourinary:   Genitourinary Comments: Huynh catheter noted draining--clear yellow urine   Musculoskeletal: Normal range of motion. He exhibits edema (2+ pitting BLE).   Bilateral lower extremity edema 3+   Neurological: He is alert and oriented to person, place, and time. He has normal strength. No cranial nerve deficit.   Skin: Skin is warm, dry and intact. Capillary refill takes less than 2 seconds. He is not diaphoretic.   Right lower extremity dressing intact   Psychiatric: He has a normal mood and affect. His speech is normal and behavior is normal. Judgment and thought content normal.   Vitals reviewed.      Significant Labs:   CBC:   Recent Labs   Lab 05/11/19  0312 05/12/19  0542   WBC 7.40 7.90   HGB 9.9* 10.0*   HCT 31.0* 31.1*   * 105*     CMP:   Recent Labs   Lab 05/10/19  1743 05/11/19  0312 05/12/19  0542   * 135* 135*   K 3.7 4.0 3.9   CL 98 98 98   CO2 27 24 28   * 113* 100   BUN 35* 39* 33*   CREATININE 2.8* 3.2* 3.1*   CALCIUM 8.5* 8.6* 8.8   ALBUMIN  --  2.8* 2.9*   ANIONGAP 10 13 9   EGFRNONAA 21* 18* 18*       Significant Imaging: I have reviewed and interpreted all pertinent imaging results/findings within the past 24 hours.

## 2020-07-26 NOTE — ASSESSMENT & PLAN NOTE
Fall and skin precautions  Continue physical therapy and occupational therapy     [facility]

## 2020-12-31 NOTE — ASSESSMENT & PLAN NOTE
Waiting for further assessment to determine when to proceed with BKA or if pt would be better served by AKA    Awaiting LLE US results    Scheduled for AV fistula procedure with vascular tomorrow   [FreeTextEntry1] : \par CKD stage 3 stable with normal electrolytes likely secondary to HTN nephrosclerosis;\par -Protein./cr ratio <200mg \par -SPEP/IPEP normal \par -avoid NSAIDS\par -Continue with Meds for BP ; \par -low salt diet reinforced. other readings are ok. \par -gout only prednisone for acute attacks. Allopurinol being managed by Rheum. \par -Anurago reviewed \par \par RTC in 4 months. \par \par

## 2021-01-14 NOTE — SUBJECTIVE & OBJECTIVE
CHEST X-RAY 1 VIEW on 1/14/2021 at 6:48 AM 



CLINICAL INDICATION: Seizure



COMPARISON: 7/28/2020



FINDINGS: The lungs are clear. There is slight elevation of the

right hemidiaphragm. Cardiac, hilar and mediastinal contours are

within normal limits. Pulmonary vascularity is within normal

limits. No bony abnormality is noted.



IMPRESSION: No active disease. Interval History:  Patient remains short of breath with significant volume on board.  Creatinine trending upward.  Cardiology and Nephrology following.  Hematuria still noted in Huynh.  Urology consult pending.     Review of Systems   Constitutional: Positive for activity change, appetite change and fatigue. Negative for chills and fever.   HENT: Negative for ear discharge, ear pain and facial swelling.    Eyes: Negative for pain and redness.   Respiratory: Positive for cough and shortness of breath.    Cardiovascular: Positive for leg swelling. Negative for chest pain and palpitations.   Gastrointestinal: Negative for abdominal distention, abdominal pain, constipation, diarrhea and vomiting.   Endocrine: Negative for polydipsia and polyphagia.   Genitourinary: Positive for hematuria.        Huynh catheter present draining hematuria  Patient reports he had some hematuria about 3 or 4 weeks ago and then reoccurred after Huynh was placed   Musculoskeletal: Positive for gait problem. Negative for neck pain and neck stiffness.   Skin: Positive for wound.        Right lower extremity dressing intact   Allergic/Immunologic: Negative for food allergies.   Neurological: Positive for weakness. Negative for seizures, facial asymmetry and speech difficulty.   Hematological: Bruises/bleeds easily.   Psychiatric/Behavioral: Negative for agitation, behavioral problems, confusion, hallucinations and suicidal ideas. The patient is not nervous/anxious.      Objective:     Vital Signs (Most Recent):  Temp: 97.7 °F (36.5 °C) (05/06/19 1133)  Pulse: 62 (05/06/19 1517)  Resp: 19 (05/06/19 1517)  BP: (!) 118/53 (05/06/19 1133)  SpO2: 98 % (05/06/19 1517) Vital Signs (24h Range):  Temp:  [96.2 °F (35.7 °C)-97.7 °F (36.5 °C)] 97.7 °F (36.5 °C)  Pulse:  [52-72] 62  Resp:  [16-20] 19  SpO2:  [93 %-99 %] 98 %  BP: ()/(53-77) 118/53     Weight: (!) 136.6 kg (301 lb 2.4 oz)  Body mass index is 38.67 kg/m².    Physical Exam    Constitutional: He is oriented to person, place, and time. He appears well-developed and well-nourished. No distress.   Obese.   HENT:   Head: Normocephalic and atraumatic.   Eyes: Pupils are equal, round, and reactive to light. Conjunctivae and EOM are normal. Right eye exhibits no discharge. Left eye exhibits no discharge.   Neck: Normal range of motion. Neck supple.   Cardiovascular: Normal rate, normal heart sounds and intact distal pulses. An irregularly irregular rhythm present.   Pulses:       Dorsalis pedis pulses are 2+ on the right side, and 2+ on the left side.   Pulmonary/Chest: Effort normal. No accessory muscle usage. He has no wheezes. He has no rhonchi. He has rales.   Bilateral breath sounds with rales  Dyspnea on mild exertion   Abdominal: Soft. Bowel sounds are normal. He exhibits no distension. There is no tenderness. There is no guarding.   Genitourinary:   Genitourinary Comments: Huynh catheter noted draining hematuria   Musculoskeletal: Normal range of motion. He exhibits edema (2+ pitting BLE).   Bilateral lower extremity edema 3+   Neurological: He is alert and oriented to person, place, and time. He has normal strength. No cranial nerve deficit.   Skin: Skin is warm, dry and intact. Capillary refill takes less than 2 seconds. He is not diaphoretic.   Right lower extremity dressing intact   Psychiatric: He has a normal mood and affect. His speech is normal and behavior is normal. Judgment and thought content normal.   Vitals reviewed.        CRANIAL NERVES     CN III, IV, VI   Pupils are equal, round, and reactive to light.  Extraocular motions are normal.     Labs reviewed

## 2021-02-17 NOTE — NURSING
23.6 Huynh cath irrigated with 60ml NS- cath irrigates easily with no clots noted but urine is very dark, bloody.  Will monitor.

## 2021-09-22 NOTE — PROGRESS NOTES
bp   Controlled    Cont   Low  Salt  Diet     CTA - aortogram with runoff has been ordered for tomorrow. Will review when done and determine if any vascular intervention indicated  Discussed with Dr Wilson

## 2022-02-11 NOTE — ASSESSMENT & PLAN NOTE
79 year old man with history of T2DM, ESRD on HD (started 1-2 ms ago), HFpEF, CAD s/p CABG on plavix, who is presenting from outside hospital due to concern for duodenal ulcer. Hemostasis obtained for actively bleeding duodenal ulcer at OSH (5/30), currently without signs of ongoing bleeding.    Plan  - CTA without signs of ongoing bleeding, H&H stable, decreasing vasopressor requirement.  - recommend protonix gtt x72hr then 40mg IV BID  - repeat EGD vs IR therapy if rebleeds  - no evidence of varices on EGD, does not require octreotide  - maintain 2 large bore peripheral IV  - maintain Hgb > 7  - call if any changes in patient's clinical status     yes...

## 2022-04-19 NOTE — ASSESSMENT & PLAN NOTE
Subjective:       Patient ID: Marva Perez is a 81 y.o. female.    Chief Complaint: Shortness of Breath (Patient here today for coughing and mucus, reports that even with oxygen she still feels SOB)    Pt is a 81 y.o. female who presents for check up for dyspnea and hypoxia-pulse ox is 80-83%. Doing well on current meds. Denies any side effects. Prevention is up to date.Pt is a 81 y.o. female who presents for check up for TAVARES & SOB.    Review of Systems   Respiratory: Positive for cough.         Pulseox is 93%;Pt seen by Dr RITA Matson yesterday; coughing up green phlegm       Objective:      Physical Exam  Constitutional:       Appearance: She is well-developed.   HENT:      Head: Normocephalic.   Eyes:      Pupils: Pupils are equal, round, and reactive to light.   Neck:      Thyroid: No thyromegaly.   Cardiovascular:      Rate and Rhythm: Normal rate and regular rhythm.   Pulmonary:      Effort: Respiratory distress present.      Breath sounds: No wheezing or rales.      Comments: SOB  Chest:      Chest wall: No tenderness.   Abdominal:      General: There is no distension.      Tenderness: There is no abdominal tenderness. There is no guarding or rebound.   Musculoskeletal:         General: No tenderness. Normal range of motion.      Cervical back: Normal range of motion and neck supple.   Lymphadenopathy:      Cervical: No cervical adenopathy.   Skin:     General: Skin is warm and dry.      Coloration: Skin is not pale.      Findings: No rash.   Neurological:      Mental Status: She is alert and oriented to person, place, and time.      Cranial Nerves: No cranial nerve deficit.      Motor: No abnormal muscle tone.      Coordination: Coordination normal.      Deep Tendon Reflexes: Reflexes are normal and symmetric. Reflexes normal.   Psychiatric:         Thought Content: Thought content normal.         Judgment: Judgment normal.         Assessment:       1. Essential hypertension    2. Chronic obstructive pulmonary  Telemetry  Continue home medications- including  ASA, Plavix, Metoprolol and Zetia.  Monitor closely for s/s of ACS.  Pt takes plavix only 3 x per week      disease, unspecified COPD type        Plan:   Marva was seen today for shortness of breath.    Diagnoses and all orders for this visit:    Essential hypertension    Chronic obstructive pulmonary disease, unspecified COPD type  -     X-Ray Chest PA And Lateral; Future  -     triamcinolone acetonide injection 40 mg  -     clindamycin injection 600 mg  -     clindamycin (CLEOCIN HCL) 150 MG capsule; Take 1 capsule (150 mg total) by mouth 3 (three) times daily. for 10 days

## 2022-08-10 NOTE — ASSESSMENT & PLAN NOTE
Continue renal multivitamin  Check iron and vitamin B levels  Check stool OCB  Monitor and transfuse if he becomes hemodynamically unstable or H/H < 7/21  epo per nephrology   No transfusion needed    Concentration Of Solution Injected (Mg/Ml): 3.5

## 2022-09-24 NOTE — PROGRESS NOTES
OFFICE NOTE    CHIEF COMPLAINT:  Transitional cell carcinoma of the bladder.    HISTORY OF PRESENT ILLNESS:  This 79-year-old male returns for routine recheck.    He had undergone a TURBT on 04/08/2019 and found to have two bladder lesions,   one on the trigone and one on the right lateral wall of the bladder, both of   which were fully resected.  The pathology report did reveal an area of what   appeared to be lamina propria muscle invasion of the trigonal lesions.  There is   no evidence of any muscle invasion on the right lateral wall lesion that in   fact was larger.  On followup visit today, the patient is having some   intermittent episodes of hematuria that appears to be resolving, but otherwise   doing well.  He has resumed the Plavix.  Further discussion was had with the   patient concerning further management of the above findings.  In view of his   poor cardiopulmonary and circulatory status and the fact that he is on Plavix,   it does not appear to would be a good candidate for radical cystectomy at this   time.  I did recommend that we plan on some intravesical BCG instillations and   repeat a TURBT and the possibility of referral to Oncology may also need to   consider.  He said he understood and agreed.    FINAL IMPRESSION:  Transitional cell carcinoma of the bladder.    RECOMMENDATION:  We will initiate six weekly intravesical BCG instillations and   subsequently plan for followup cystoscopy and TURBT and subsequently make a   decision depending on the response to the BCG treatments concerning other   treatment options as described above.      CHRIS  dd: 04/24/2019 09:43:04 (CDT)  td: 04/25/2019 01:02:15 (CDT)  Doc ID   #[id number]  Job ID #[id number]    CC:       
Patent

## 2022-11-16 NOTE — PROGRESS NOTES
Date: 8/13/2019     Pharmacokinetic Follow-up Assessment: IV Vancomycin    Frank Zayas [id number] is a 79 y.o. male who has been consulted for vancomycin dosing for sepsis secondary to severe cellulitis.    The patient has the following labs:     Creatinine (mg/dl)    WBC Count   Serum creatinine: 6.6 mg/dL (H) 08/13/19 0446  Estimated creatinine clearance: 12.4 mL/min (A) Lab Results   Component Value Date    WBC 19.14 (H) 08/13/2019          Current weight is 115.2 kg (254 lb)    Vanc Level: 08/12 4:46 am (21.9)    Assessment/Plan:    Pt is has ESRD on intermittent hemodialysis Tue/Thu/Sat.   Patient received an initial vancomycin dose of 1500 mg (~13 mg/kg) on 08/12.   Vanc random from AM labs resulted in 21.9 mcg/mL. Goal level is 15-20 mcg/mL.   Patient will receive a one time 500 mg Vancomycin dose following HD today.   Next Vancomycin level is due 08/15 with AM labs.       Patient will be followed by pharmacy for changes in renal function, toxicity, and efficacy.    Thank you for allowing us to participate in this patient's care.     Madelaine Gomez PharmD  Clinical Pharmacist  [phone]   Yes

## 2023-04-03 NOTE — PLAN OF CARE
Problem: Physical Therapy Goal  Goal: Physical Therapy Goal  1.Pt will be independent with bed mobility and transfers.  2. Pt will perform sit<>Stand with RW and SBA  3.Pt will ambulate 100' with AD with CGA.   4. Pt will perform LE exercises x 10-20 reps independently.   Outcome: Ongoing (interventions implemented as appropriate)  PT for functional mob training; progressing well with Tx       Spontaneous, unlabored and symmetrical

## 2023-04-18 NOTE — PROCEDURES
[facility]  Central [facility]  Procedure Note    SUMMARY     Date of Procedure: 6/5/2019     Procedure: Insertion of Central Venous Catheter    Provider: Moises Mcdonald MD    Assisting Provider: None    Indications: vascular access, hypovolemia     Pre-Procedure Diagnosis: Hemorrhagic shock    Post-Procedure Diagnosis: hemorrhagic shock    Anesthesia: Lidocaine, fentanyl     Technical Procedures Used: Ultrasound guidance    Description of the Findings of the Procedure:    Informed consent was obtained for the procedure, including sedation.  Risks of lung perforation, hemorrhage, arrhythmia, and adverse drug reaction were discussed.     Maximum sterile technique was used including antiseptics, cap, gloves, gown, hand hygiene, mask and sheet.    Under sterile conditions the skin above the groin on the right femoral vein was prepped with betadine and covered with a sterile drape. Local anesthesia was applied to the skin and subcutaneous tissues. An 18-gauge needle was then inserted into the vein. A guide wire was then passed easily through the catheter. There were no arrhythmias. The catheter was then withdrawn. A 7.5 Cambodian quad-lumen was then inserted into the vessel over the guide wire. The catheter was sutured into place.    There were no changes to vital signs. Catheter was flushed with 20 cc NS. Patient did tolerate procedure well.    Recommendations:    CXR not ordered as line is a femoral line    Significant Surgical Tasks Conducted by the Assistant(s), if Applicable: N/A    Complications: No    Estimated Blood Loss (EBL): None    Implants: none    Specimens: none    Condition: Good    Disposition: ICU - intubated and hemodynamically stable.     Intermediate Repair And Graft Additional Text (Will Appearing After The Standard Complex Repair Text): The intermediate repair was not sufficient to completely close the primary defect. The remaining additional defect was repaired with the graft mentioned below.

## 2023-07-27 NOTE — ASSESSMENT & PLAN NOTE
Continue renal multivitamin  Check iron and vitamin B levels  Check stool OCB     Additional Notes: Patient consent was obtained to proceed with the visit and recommended plan of care after discussion of all risks and benefits, including the risks of COVID-19 exposure. Detail Level: Simple

## 2023-08-29 NOTE — SUBJECTIVE & OBJECTIVE
Interval History: Stepped back down to medicine after sent back to the ICU in the early am hrs of 6/4 due to hypotension, melena, and drop in HCT. EGD on 6/5 showed an oozing ulcer treated with bioplar cautery. Received HD with 1 unit PRBC transfusion. Given midodrine with HD. HRs remain elevated due to being off BB and also given bleeding. ASA restarted. Feels tired but otherwise no complaints.      Review of Systems   Constitutional: Positive for fatigue. Negative for chills and fever.   Respiratory: Negative for cough and shortness of breath.    Cardiovascular: Negative for chest pain and palpitations.   Gastrointestinal: Negative for abdominal pain, blood in stool, diarrhea, nausea and vomiting.   Musculoskeletal: Negative.    Skin: Negative for rash.   Neurological: Negative for light-headedness and headaches.   Psychiatric/Behavioral: Negative for hallucinations. The patient is not nervous/anxious.      Objective:     Vital Signs (Most Recent):  Temp: 97.8 °F (36.6 °C) (06/09/19 0748)  Pulse: (!) 121 (06/09/19 0748)  Resp: (!) 23 (06/09/19 0748)  BP: 110/73 (06/09/19 0748)  SpO2: 98 % (06/09/19 0748) Vital Signs (24h Range):  Temp:  [97.6 °F (36.4 °C)-98.3 °F (36.8 °C)] 97.8 °F (36.6 °C)  Pulse:  [104-126] 121  Resp:  [20-27] 23  SpO2:  [93 %-100 %] 98 %  BP: (106-131)/(55-84) 110/73  Arterial Line BP: (102-140)/(52-79) 125/72     Weight: 131.7 kg (290 lb 5.5 oz)  Body mass index is 37.28 kg/m².    Intake/Output Summary (Last 24 hours) at 6/9/2019 1033  Last data filed at 6/9/2019 0300  Gross per 24 hour   Intake 1268 ml   Output 2611 ml   Net -1343 ml      Physical Exam   Constitutional: He is oriented to person, place, and time. He appears well-developed and well-nourished. No distress.   Eyes: No scleral icterus.   Cardiovascular:   Tachycardic    Pulmonary/Chest: Effort normal and breath sounds normal.   Abdominal: Soft. Bowel sounds are normal. There is no tenderness.   Musculoskeletal: He exhibits edema  (trace).   Neurological: He is alert and oriented to person, place, and time.   Skin: No erythema.   Psychiatric: He has a normal mood and affect.   Vitals reviewed.      Significant Labs: All pertinent labs within the past 24 hours have been reviewed.    Significant Imaging: I have reviewed all pertinent imaging results/findings within the past 24 hours.   O-L Flap Text: The defect edges were debeveled with a #15 scalpel blade.  Given the location of the defect, shape of the defect and the proximity to free margins an O-L flap was deemed most appropriate.  Using a sterile surgical marker, an appropriate advancement flap was drawn incorporating the defect and placing the expected incisions within the relaxed skin tension lines where possible.    The area thus outlined was incised deep to adipose tissue with a #15 scalpel blade.  The skin margins were undermined to an appropriate distance in all directions utilizing iris scissors.

## 2023-10-12 NOTE — ASSESSMENT & PLAN NOTE
Patient noncompliant with sleep apnea machine in past     Picato Counseling:  I discussed with the patient the risks of Picato including but not limited to erythema, scaling, itching, weeping, crusting, and pain.

## 2025-02-18 NOTE — ED PROVIDER NOTES
Encounter Date: 5/30/2019    SCRIBE #1 NOTE: I, Mayo Aguilar, am scribing for, and in the presence of, Dr. Wilkins.       History     Chief Complaint   Patient presents with    Melena     Time seen by provider: 9:42 AM on 05/30/2019      Frank Zayas is a 79 y.o. male with a PMHx of HTN, CKD (HD TThSat), DM, CAD, COPD, and CHF who presents to the ED via EMS for blood in stool that started 5 hours ago. Per patient, he does admit to having some clots in the toilet bowl as well. The patient states that he was having black stool with small bright red blood in the stool. He states that he has never had an episode like this previously. He does endorse being nauseated with the blood in stool, but denies vomiting and abdominal pain. Patient also admits that earlier in the day he felt SOB and had some fatigue, but none currently. Patient states that he is currently on Plavix. He also admits that this am he had a brief episode of dull left arm pain and some numbness to his lips. He relays that it felt similar to his previous MI, but denies any of those symptoms currently. Patient denies chest pain, cough, or any other complaint at this time. The patient has a PSHx of CABG, vasectomy, cardiac surgery, and cystoscopy.     The history is provided by the patient.     Review of patient's allergies indicates:   Allergen Reactions    Lidocaine Nausea Only     NOVACAINE --  Severe nausea    Statins-hmg-coa reductase inhibitors Anxiety     Past Medical History:   Diagnosis Date    Anticoagulant long-term use     Arthritis     Bladder cancer     Blood transfusion     CHF (congestive heart failure)     Chronic kidney disease     COPD (chronic obstructive pulmonary disease)     Coronary artery disease     Diabetes mellitus     Gout     [place] (hard of hearing)     HAS BILAT AIDS BUT DOES NOT WEAR    Hyperlipidemia     Hypertension     Myocardial infarction     RLS (restless legs syndrome)     Sleep apnea     NO  February 26, 2025     Cinthya Thomas  [address]  [facility] [address]      Dear Cinthya:    Below are the results from your recent visit:    The polyps that I removed during your recent colonoscopy were tubular adenomas on pathology.  This type of polyp is not a cancer, but it is the type of polyp that could grow into a cancer over time.  Any polyps that were removed will not grow into a cancer, but having polyps like this can increase your risk of developing other polyps or eventually a cancer.  I would recommend that you have another colonoscopy in about 5 years to look for other polyps.  Please follow-up with your primary care provider.     If you have any other questions or concerns please do not hesitate to call me at my office at [phone].     Repeat Colonoscopy Interval: 5 years    Sincerely,        Jesús Ritchie MD                                                Resulted Orders   Surgical Pathology Exam   Result Value Ref Range    Case Report       Surgical Pathology                                Case: [id number]                                  Authorizing Provider:  Jesús Ritchie MD      Collected:           02/18/2025 0923              Ordering Location:     [facility]    Received:            02/18/2025 1422                                     [facility]                                                                     Pathologist:           Joan Estrada MD                                                           Specimens:   A) - COLON - TRANSVERSE POLYP, x2                                                                   B) - COLON - DESCENDING POLYP                                                                       C) - RECTUM POLYPECTOMY                                                                    FINAL DIAGNOSIS       A. Colon, transverse, polyp x2, polypectomy:  -- Tubular adenoma (x1)    B. Colon, descending, polypectomy:  -- Non-neoplastic colonic  "mucosa (see note)    Note: Adenomatous epithelium is not identified.    C. Colon, rectum, polypectomy:  -- Hyperplastic polyp                By the signature on this report, the individual or group listed as making the Final Interpretation/Diagnosis certifies that they have reviewed this case.       Clinical History       Z12.11 - Colon cancer screening [ICD-10-CM]  Rule out adenoma      Gross Description       A: Received in formalin, labeled with the patient's name, hospital number and \"1, transverse polyp\", are multiple fragments tan soft tissue aggregating to 0.4 x 0.3 x 0.2 cm. The specimen is submitted in toto in 1 cassette.  KE/SBS  B: Received in formalin, labeled with the patient's name, hospital number and \"2, ascending polyp\", is a fragments tan soft tissue measuring 0.8 x 0.4 x 0.3 cm. The specimen is submitted in toto in 1 cassette.  KE/SBS  C: Received in formalin, labeled with the patient's name, hospital number and \"3, rectum polyp\", is a fragment tan soft tissue measuring 0.4 x 0.2 x 0.2 cm. The specimen is submitted in toto in 1 cassette.  KE/SBS           " MACHINE    Wears glasses      Past Surgical History:   Procedure Laterality Date    CARDIAC SURGERY      CABG X 2     CATARACT EXTRACTION, BILATERAL      CHOLECYSTECTOMY      COLONOSCOPY      CORONARY ARTERY BYPASS GRAFT      x 2    coronary stents      CYSTOSCOPY      CYSTOSCOPY N/A 2019    Performed by [facility] at [facility]    EXCISION-BLADDER TUMOR-TRANSURETHRAL (TURBT) N/A 2014    Performed by [facility] at [facility] OR    EXCISION-BLADDER TUMOR-TRANSURETHRAL (TURBT) N/A 2013    Performed by [facility] at [facility] OR    EYE SURGERY Bilateral     CATARACT    Insertion,catheter,tunneled N/A 2019    Performed by Wade Rodriguez MD at [facility] OR    TURBT (TRANSURETHRAL RESECTION OF BLADDER TUMOR) N/A 2019    Performed by [facility] at [facility] OR    VASECTOMY       Family History   Problem Relation Age of Onset    Cancer Father     Heart disease Father     Cancer Sister     Cancer Brother      Social History     Tobacco Use    Smoking status: Former Smoker     Packs/day: 0.25     Years: 20.00     Pack years: 5.00     Types: Cigars     Last attempt to quit: 1982     Years since quittin.3    Smokeless tobacco: Never Used   Substance Use Topics    Alcohol use: No    Drug use: No     Review of Systems   Constitutional: Positive for fatigue. Negative for fever.   Respiratory: Positive for shortness of breath. Negative for cough.    Cardiovascular: Negative for chest pain.   Gastrointestinal: Positive for blood in stool and nausea. Negative for abdominal pain, diarrhea and vomiting.   Genitourinary: Negative for flank pain.   Musculoskeletal: Positive for myalgias.   Skin: Negative for rash.   Neurological: Positive for numbness. Negative for headaches.   All other systems reviewed and are negative.      Physical Exam     Initial Vitals   BP Pulse Resp Temp SpO2   19 0948 19 0948 19 0951 19 0951 19 0948   (!)  91/57 91 16 97.9 °F (36.6 °C) 98 %      MAP       --                Physical Exam    Nursing note and vitals reviewed.  Constitutional: He appears well-developed and well-nourished. He is not diaphoretic.  Non-toxic appearance. He does not have a sickly appearance. He does not appear ill. No distress.   Pleasant polite well-appearing no distress   HENT:   Head: Normocephalic and atraumatic.   Eyes: EOM are normal.   Neck: Normal range of motion. Neck supple. Normal range of motion present. No neck rigidity.   Cardiovascular: Normal rate, regular rhythm and normal heart sounds. Exam reveals no gallop and no friction rub.    No murmur heard.  Pulmonary/Chest: Breath sounds normal. No respiratory distress. He has no wheezes. He has no rhonchi. He has no rales.   Abdominal: Soft. Bowel sounds are normal. There is no tenderness.   Genitourinary: : Acceptable.  Genitourinary Comments: Brown stool with small black specs. No gross melena or bright red rectal bleeding.    Musculoskeletal: Normal range of motion.   Venous stasis to the bilateral LE.    Neurological: He is alert and oriented to person, place, and time.   Skin: Skin is warm and dry. No rash noted.   Psychiatric: He has a normal mood and affect. His behavior is normal. Judgment and thought content normal.         ED Course   Critical Care  Date/Time: 5/30/2019 11:59 AM  Performed by: Ayo Wilkins MD  Authorized by: Ayo Wilkins MD   Direct patient critical care time: 25 minutes  Additional history critical care time: 11 minutes  Ordering / reviewing critical care time: 10 minutes  Documentation critical care time: 9 minutes  Consulting other physicians critical care time: 15 (Hospital Medicine, Gastroenterology, Nephrology.  Also phone call with transfer center to speak with [facility] emergency physician and cardiologist) minutes  Total critical care time (exclusive of procedural time) : 70 minutes  Critical care was necessary  to treat or prevent imminent or life-threatening deterioration of the following conditions: Hyperkalemia elevated troponin anemia requiring blood transfusion.  Critical care was time spent personally by me on the following activities: review of old charts, pulse oximetry, ordering and review of laboratory studies, obtaining history from patient or surrogate, evaluation of patient's response to treatment, examination of patient, ordering and performing treatments and interventions, re-evaluation of patient's condition and ordering and review of radiographic studies.        Labs Reviewed   CBC W/ AUTO DIFFERENTIAL - Abnormal; Notable for the following components:       Result Value    RBC 2.51 (*)     Hemoglobin 7.9 (*)     Hematocrit 24.3 (*)     Mean Corpuscular Hemoglobin 31.4 (*)     RDW 18.3 (*)     MPV 8.4 (*)     Lymph # 0.7 (*)     Gran% 86.7 (*)     Lymph% 7.7 (*)     All other components within normal limits   COMPREHENSIVE METABOLIC PANEL - Abnormal; Notable for the following components:    Potassium 5.7 (*)     Glucose 117 (*)     BUN, Bld 94 (*)     Creatinine 6.7 (*)     Calcium 8.6 (*)     Total Protein 5.7 (*)     Albumin 2.4 (*)     eGFR if  8 (*)     eGFR if non  7 (*)     All other components within normal limits   TROPONIN I - Abnormal; Notable for the following components:    Troponin I 0.386 (*)     All other components within normal limits   PROTIME-INR   TYPE & SCREEN   PREPARE RBC SOFT          Imaging Results          X-Ray Chest AP Portable (Final result)  Result time 05/30/19 10:35:40    Final result by Anahi Galvez MD (05/30/19 10:35:40)                 Impression:      Right basilar opacification consistent with right basilar infiltrate and small pleural effusion with infiltrate not appearing significantly changed compared to the past couple exams      Electronically signed by: Anahi Galvez MD  Date:    05/30/2019  Time:    10:35              Narrative:    EXAMINATION:  XR CHEST AP PORTABLE    CLINICAL HISTORY:  Gastrointestinal hemorrhage, unspecified    TECHNIQUE:  Single frontal view of the chest was performed.    COMPARISON:  5/16/2019    FINDINGS:  The cardiomediastinal silhouette is stable.  Right basilar opacification consistent combination of small pleural effusion and right basilar infiltrate and atelectasis not significantly changed compared to the prior exam..  Mild left perihilar infiltrate or pulmonary vascular distention is not significantly changed.  The left costophrenic sulcus remain sharp.  Central venous catheter remains in place.                                 Medical Decision Making:   History:   Old Medical Records: I decided to obtain old medical records.  Independently Interpreted Test(s):   I have ordered and independently interpreted EKG Reading(s) - see summary below  Clinical Tests:   Lab Tests: Ordered and Reviewed  Radiological Study: Reviewed and Ordered  Medical Tests: Ordered and Reviewed            Scribe Attestation:   Scribe #1: I performed the above scribed service and the documentation accurately describes the services I performed. I attest to the accuracy of the note.    I, Dr. Wilkins, personally performed the services described in this documentation. All medical record entries made by the scribe were at my direction and in my presence.  I have reviewed the chart and agree that the record reflects my personal performance and is accurate and complete.1:55 PM 05/30/2019            ED Course as of May 30 1310   Thu May 30, 2019   0957 BP(!): 91/57 [EF]   0957 Temp: 97.9 °F (36.6 °C) [EF]   0957 Temp src: Oral [EF]   0957 Pulse: 92 [EF]   0957 Resp: 16 [EF]   0957 SpO2: 97 % [EF]   0959 EKG independently interpreted atrial fibrillation by fascicular block 85 beats per minute no change from prior EKGs no ST elevation or depression    [EF]   1028 Hemoglobin(!): 7.9 [EF]   1031 Hemoglobin of 7.9 is a decline from  baseline which is around 10.  Patient has no evidence of active bleeding in the emergency department.    [EF]   1047 X-Ray Chest AP Portable [EF]   1053 Troponin I(!): 0.386 [EF]   1054 Potassium(!): 5.7 [EF]   1054 Creatinine(!): 6.7 [EF]   1054 Calcium(!): 8.6 [EF]   1100 Dr. Kenny of [facility] Medicine to admit.    [EF]   1101 Hyperkalemia will be treated    [EF]   1106 Called Gastroenterology Clinic to advise them of the need for a GI consultation    [EF]   1119 Dr mcclure will speak with dr kenny before patient admitted    [EF]   1154 Case discussed with Dr. Kenny again.  He has spoken with gastroenterology Dr. bazzi.  They have made the decision the patient should be transferred to [facility] in light of his elevated troponin.  Transfer center request will be placed.    [EF]   1259 Patient accepted in transfer by Dr. Irvin at [facility] and Dr. Vargas cardiology    [EF]      ED Course User Index  [EF] Ayo Wilkins MD     Clinical Impression:       ICD-10-CM ICD-9-CM   1. GIB (gastrointestinal bleeding) K92.2 578.9         Disposition:   Disposition: Transferred         79-year-old with multiple medical problems including CAD CHF COPD Tuesday Thursday Saturday dialysis presents with dark bloody stool.  Found to be anemic.  Patient reported initially he had an episode of left arm pain similar to a prior myocardial infarction but then recanted this and stated that he never had any arm pain.  Denied chest pain. Patient was given red blood cells in the emergency room.  Initially the plan was to keep the patient here at [facility] but gastroenterology felt that he would be better served at a facility that had Cardiology. Dr Mcclure spoke with Dr Kenny and they decided patient should be sent to [facility].  Hemodialysis had been arranged at our facility but was canceled.  Patient transferred by EMS to [facility]. Dr. Vargas and [facility] accepted patient.  No discomfort or distress  at any time in the emergency department.  Patient was also given dextrose and insulin for his hyperkalemia.             Ayo Wilkins MD  05/30/19 [id number]

## (undated) DEVICE — DRESSING TRANS 4X4 3/4

## (undated) DEVICE — SOL WATER STRL IRR 1000ML

## (undated) DEVICE — NDL SAFETY 21G X 1 1/2 ECLPSE

## (undated) DEVICE — BAG LINGEMAN URO DRAIN HOSE

## (undated) DEVICE — PACK ARTHROSCOPY W/ISO BAC

## (undated) DEVICE — SYR 10CC LUER LOCK

## (undated) DEVICE — SEE L#95700

## (undated) DEVICE — UNDERGLOVES BIOGEL PI SIZE 7.5

## (undated) DEVICE — SUT SILK 0 STRANDS 30IN BLK

## (undated) DEVICE — SEE MEDLINE ITEM 146345

## (undated) DEVICE — ELECTRODE REM PLYHSV RETURN 9

## (undated) DEVICE — SPONGE SUPER KERLIX 6X6.75IN

## (undated) DEVICE — DEVICE ANC SW STAT FOLEY 6-24

## (undated) DEVICE — DRESSING N ADH OIL EMUL 3X8 3S

## (undated) DEVICE — TOURNIQUET SB QC SP 18X4IN

## (undated) DEVICE — DRAPE THYROID WITH ARMBOARD

## (undated) DEVICE — SUT CTD VICRYL BR CR/SH VIL

## (undated) DEVICE — LINER SUCTION 3000CC

## (undated) DEVICE — SEE MEDLINE ITEM 157131

## (undated) DEVICE — SEE MEDLINE ITEM 146271

## (undated) DEVICE — PAD CAST SPECIALIST STRL 6

## (undated) DEVICE — COLLECTOR SPECIMEN ANAEROBIC

## (undated) DEVICE — TOURNIQUET SB QC DP 34X4IN

## (undated) DEVICE — DRAPE STERI-DRAPE 1000 17X11IN

## (undated) DEVICE — SUT FIBERWIRE 2 38 IN TAPER

## (undated) DEVICE — SUCTION FRAZIER TIP SURG 12FR

## (undated) DEVICE — DRAPE C ARM 42 X 120 10/BX

## (undated) DEVICE — SOL 9P NACL IRR PIC IL

## (undated) DEVICE — GLOVE PROTEXIS PI CLASSIC 7.5

## (undated) DEVICE — BLADE SAW GIGLI SS 12IN

## (undated) DEVICE — SUT CTD VICRYL CT-1 27

## (undated) DEVICE — SLEEVE SCD EXPRESS CALF MEDIUM

## (undated) DEVICE — STRAP OR TABLE 5IN X 72IN

## (undated) DEVICE — BLADE SURG CARBON STEEL #10

## (undated) DEVICE — SEE MEDLINE ITEM 152622

## (undated) DEVICE — SYS LABEL CORRECT MED

## (undated) DEVICE — SOL NACL 0.9% INJ PF/100 150

## (undated) DEVICE — CONTAINER SPECIMEN STRL 4OZ

## (undated) DEVICE — SET IRR URLGY 2LINE UNIV SPIKE

## (undated) DEVICE — GLOVE SURG ULTRA TOUCH 7.5

## (undated) DEVICE — EVACUATOR BLADDER UROVAC ADPT

## (undated) DEVICE — SCRUB HIBICLENS 4% CHG 4OZ

## (undated) DEVICE — PACK CUSTOM UNIV BASIN SLI

## (undated) DEVICE — ELECTRODE RESECTION LOOP LRGE

## (undated) DEVICE — TUBE SUCTION YANKAUER HI CAP

## (undated) DEVICE — NDL SAFETY 25G X 1.5 ECLIPSE

## (undated) DEVICE — STAPLER SKIN ROTATING HEAD

## (undated) DEVICE — SPONGE GAUZE 16PLY 4X4

## (undated) DEVICE — GLOVE 6.5 PROTEXIS PI BLUE

## (undated) DEVICE — APPLICATOR CHLORAPREP ORN 26ML

## (undated) DEVICE — SUT ETHILON 3-0 PS2 18 BLK

## (undated) DEVICE — SUT 3-0 12-18IN SILK

## (undated) DEVICE — PAD ABD 8X10 STERILE

## (undated) DEVICE — SUT VICRYL 2-0 CT-1 18 CR

## (undated) DEVICE — GLOVE SURG PLYSPHRN ORTH SZ7.5

## (undated) DEVICE — SUT CTD VICRYL CT-1 UND BR

## (undated) DEVICE — SEE MEDLINE ITEM 157185

## (undated) DEVICE — BANDAGE ESMARK 6X12

## (undated) DEVICE — DRAPE INCISE IOBAN 2 23X17IN

## (undated) DEVICE — SEE MEDLINE ITEM 152487

## (undated) DEVICE — PACK LOWER EXTREMITY

## (undated) DEVICE — SOL IRR NACL .9% 3000ML

## (undated) DEVICE — COVER SURG LIGHT HANDLE

## (undated) DEVICE — INTERPULSE SET

## (undated) DEVICE — DRESSING DERMACEA SPNG 10S

## (undated) DEVICE — SEE MEDLINE ITEM 157117

## (undated) DEVICE — NDL BOX COUNTER

## (undated) DEVICE — GLOVE BIOGEL PI ORTHO PRO 7.5

## (undated) DEVICE — NDL HYPODERMIC BLUNT 18G 1.5IN

## (undated) DEVICE — SEE MEDLINE ITEM 152530

## (undated) DEVICE — SUT VCRL CLSR 8-18IN CTX CO

## (undated) DEVICE — SET DECANTER MEDICHOICE

## (undated) DEVICE — SUT CTD VICRYL 4-0 BR PS-2

## (undated) DEVICE — GLOVE PROTEXIS PI CLASSIC 6.0

## (undated) DEVICE — BLADE SURG #15 CARBON STEEL

## (undated) DEVICE — SEE MEDLINE ITEM 157116

## (undated) DEVICE — BLADE SGTL XTK LNG 25X78.5X1.5

## (undated) DEVICE — SUT 2-0 VICRYL / CT-1

## (undated) DEVICE — SWAB CULTURETTE SINGLE

## (undated) DEVICE — SPONGE DERMACEA GAUZE 4X4

## (undated) DEVICE — GLOVE SURG ULTRA TOUCH 8.5

## (undated) DEVICE — UNDERGLOVES BIOGEL PI SIZE 8.5

## (undated) DEVICE — SYR ONLY LUER LOCK 20CC

## (undated) DEVICE — SPONGE LAP 18X18 PREWASHED

## (undated) DEVICE — Device

## (undated) DEVICE — SUT SA85H SILK 2-0

## (undated) DEVICE — SUT 2-0 12-18IN SILK

## (undated) DEVICE — PACK BASIC